# Patient Record
Sex: FEMALE | Race: BLACK OR AFRICAN AMERICAN | NOT HISPANIC OR LATINO | URBAN - METROPOLITAN AREA
[De-identification: names, ages, dates, MRNs, and addresses within clinical notes are randomized per-mention and may not be internally consistent; named-entity substitution may affect disease eponyms.]

---

## 2017-03-20 ENCOUNTER — INPATIENT (INPATIENT)
Facility: HOSPITAL | Age: 52
LOS: 20 days | Discharge: HOME | DRG: 643 | End: 2017-04-10
Attending: HOSPITALIST | Admitting: HOSPITALIST
Payer: MEDICAID

## 2017-03-20 VITALS
DIASTOLIC BLOOD PRESSURE: 88 MMHG | HEIGHT: 60 IN | WEIGHT: 162.92 LBS | SYSTOLIC BLOOD PRESSURE: 128 MMHG | HEART RATE: 82 BPM | RESPIRATION RATE: 20 BRPM | TEMPERATURE: 98 F | OXYGEN SATURATION: 100 %

## 2017-03-20 DIAGNOSIS — E87.0 HYPEROSMOLALITY AND HYPERNATREMIA: ICD-10-CM

## 2017-03-20 LAB
ALBUMIN SERPL ELPH-MCNC: 3.8 G/DL — SIGNIFICANT CHANGE UP (ref 3.3–5.2)
ALP SERPL-CCNC: 105 U/L — SIGNIFICANT CHANGE UP (ref 40–120)
ALT FLD-CCNC: 20 U/L — SIGNIFICANT CHANGE UP
ANION GAP SERPL CALC-SCNC: 12 MMOL/L — SIGNIFICANT CHANGE UP (ref 5–17)
ANION GAP SERPL CALC-SCNC: 9 MMOL/L — SIGNIFICANT CHANGE UP (ref 5–17)
ANISOCYTOSIS BLD QL: SLIGHT — SIGNIFICANT CHANGE UP
APTT BLD: 43.8 SEC — HIGH (ref 27.5–37.4)
AST SERPL-CCNC: 44 U/L — HIGH
BILIRUB SERPL-MCNC: 0.5 MG/DL — SIGNIFICANT CHANGE UP (ref 0.4–2)
BUN SERPL-MCNC: 29 MG/DL — HIGH (ref 8–20)
BUN SERPL-MCNC: 29 MG/DL — HIGH (ref 8–20)
CALCIUM SERPL-MCNC: 8.5 MG/DL — LOW (ref 8.6–10.2)
CALCIUM SERPL-MCNC: 9 MG/DL — SIGNIFICANT CHANGE UP (ref 8.6–10.2)
CHLORIDE SERPL-SCNC: 130 MMOL/L — CRITICAL HIGH (ref 98–107)
CHLORIDE SERPL-SCNC: 130 MMOL/L — CRITICAL HIGH (ref 98–107)
CK MB CFR SERPL CALC: 2.1 NG/ML — SIGNIFICANT CHANGE UP (ref 0–6.7)
CK SERPL-CCNC: 4427 U/L — HIGH (ref 25–170)
CO2 SERPL-SCNC: 24 MMOL/L — SIGNIFICANT CHANGE UP (ref 22–29)
CO2 SERPL-SCNC: 27 MMOL/L — SIGNIFICANT CHANGE UP (ref 22–29)
CREAT SERPL-MCNC: 1.13 MG/DL — SIGNIFICANT CHANGE UP (ref 0.5–1.3)
CREAT SERPL-MCNC: 1.17 MG/DL — SIGNIFICANT CHANGE UP (ref 0.5–1.3)
EOSINOPHIL NFR BLD AUTO: 2 % — SIGNIFICANT CHANGE UP (ref 0–5)
GLUCOSE SERPL-MCNC: 106 MG/DL — SIGNIFICANT CHANGE UP (ref 70–115)
GLUCOSE SERPL-MCNC: 86 MG/DL — SIGNIFICANT CHANGE UP (ref 70–115)
HCT VFR BLD CALC: 32.9 % — LOW (ref 37–47)
HGB BLD-MCNC: 9.6 G/DL — LOW (ref 12–16)
INR BLD: 1.1 RATIO — SIGNIFICANT CHANGE UP (ref 0.88–1.16)
LYMPHOCYTES # BLD AUTO: 43 % — SIGNIFICANT CHANGE UP (ref 20–55)
MACROCYTES BLD QL: SLIGHT — SIGNIFICANT CHANGE UP
MCHC RBC-ENTMCNC: 24.9 PG — LOW (ref 27–31)
MCHC RBC-ENTMCNC: 29.2 G/DL — LOW (ref 32–36)
MCV RBC AUTO: 85.5 FL — SIGNIFICANT CHANGE UP (ref 81–99)
MICROCYTES BLD QL: SLIGHT — SIGNIFICANT CHANGE UP
MONOCYTES NFR BLD AUTO: 5 % — SIGNIFICANT CHANGE UP (ref 3–10)
NEUTROPHILS NFR BLD AUTO: 50 % — SIGNIFICANT CHANGE UP (ref 37–73)
PLAT MORPH BLD: NORMAL — SIGNIFICANT CHANGE UP
PLATELET # BLD AUTO: 90 K/UL — LOW (ref 150–400)
POIKILOCYTOSIS BLD QL AUTO: SLIGHT — SIGNIFICANT CHANGE UP
POLYCHROMASIA BLD QL SMEAR: SLIGHT — SIGNIFICANT CHANGE UP
POTASSIUM SERPL-MCNC: 3.1 MMOL/L — LOW (ref 3.5–5.3)
POTASSIUM SERPL-MCNC: 3.4 MMOL/L — LOW (ref 3.5–5.3)
POTASSIUM SERPL-SCNC: 3.1 MMOL/L — LOW (ref 3.5–5.3)
POTASSIUM SERPL-SCNC: 3.4 MMOL/L — LOW (ref 3.5–5.3)
PROT SERPL-MCNC: 7.4 G/DL — SIGNIFICANT CHANGE UP (ref 6.6–8.7)
PROTHROM AB SERPL-ACNC: 12.1 SEC — SIGNIFICANT CHANGE UP (ref 10–13.1)
RBC # BLD: 3.85 M/UL — LOW (ref 4.4–5.2)
RBC # FLD: 17.1 % — HIGH (ref 11–15.6)
RBC BLD AUTO: ABNORMAL
SODIUM SERPL-SCNC: 166 MMOL/L — CRITICAL HIGH (ref 135–145)
SODIUM SERPL-SCNC: 166 MMOL/L — CRITICAL HIGH (ref 135–145)
TROPONIN T SERPL-MCNC: <0.01 NG/ML — SIGNIFICANT CHANGE UP (ref 0–0.06)
WBC # BLD: 5.2 K/UL — SIGNIFICANT CHANGE UP (ref 4.8–10.8)
WBC # FLD AUTO: 5.2 K/UL — SIGNIFICANT CHANGE UP (ref 4.8–10.8)

## 2017-03-20 PROCEDURE — 93010 ELECTROCARDIOGRAM REPORT: CPT

## 2017-03-20 PROCEDURE — 71010: CPT | Mod: 26

## 2017-03-20 PROCEDURE — 99284 EMERGENCY DEPT VISIT MOD MDM: CPT

## 2017-03-20 PROCEDURE — 70450 CT HEAD/BRAIN W/O DYE: CPT | Mod: 26

## 2017-03-20 PROCEDURE — 99223 1ST HOSP IP/OBS HIGH 75: CPT

## 2017-03-20 RX ORDER — DESMOPRESSIN ACETATE 0.1 MG/1
1 TABLET ORAL
Qty: 0 | Refills: 0 | Status: DISCONTINUED | OUTPATIENT
Start: 2017-03-20 | End: 2017-03-24

## 2017-03-20 RX ORDER — POTASSIUM CHLORIDE 20 MEQ
40 PACKET (EA) ORAL ONCE
Qty: 0 | Refills: 0 | Status: COMPLETED | OUTPATIENT
Start: 2017-03-20 | End: 2017-03-20

## 2017-03-20 RX ORDER — LEVETIRACETAM 250 MG/1
500 TABLET, FILM COATED ORAL
Qty: 0 | Refills: 0 | Status: DISCONTINUED | OUTPATIENT
Start: 2017-03-20 | End: 2017-03-24

## 2017-03-20 RX ORDER — SODIUM CHLORIDE 9 MG/ML
1000 INJECTION INTRAMUSCULAR; INTRAVENOUS; SUBCUTANEOUS
Qty: 0 | Refills: 0 | Status: DISCONTINUED | OUTPATIENT
Start: 2017-03-20 | End: 2017-03-20

## 2017-03-20 RX ORDER — AMLODIPINE BESYLATE 2.5 MG/1
10 TABLET ORAL DAILY
Qty: 0 | Refills: 0 | Status: DISCONTINUED | OUTPATIENT
Start: 2017-03-20 | End: 2017-04-10

## 2017-03-20 RX ORDER — SODIUM CHLORIDE 9 MG/ML
1000 INJECTION, SOLUTION INTRAVENOUS
Qty: 0 | Refills: 0 | Status: DISCONTINUED | OUTPATIENT
Start: 2017-03-20 | End: 2017-03-21

## 2017-03-20 RX ORDER — LABETALOL HCL 100 MG
100 TABLET ORAL
Qty: 0 | Refills: 0 | Status: DISCONTINUED | OUTPATIENT
Start: 2017-03-20 | End: 2017-03-24

## 2017-03-20 RX ADMIN — SODIUM CHLORIDE 125 MILLILITER(S): 9 INJECTION, SOLUTION INTRAVENOUS at 13:38

## 2017-03-20 RX ADMIN — DESMOPRESSIN ACETATE 1 SPRAY(S): 0.1 TABLET ORAL at 19:04

## 2017-03-20 RX ADMIN — LEVETIRACETAM 500 MILLIGRAM(S): 250 TABLET, FILM COATED ORAL at 19:04

## 2017-03-20 RX ADMIN — Medication 40 MILLIEQUIVALENT(S): at 15:38

## 2017-03-20 RX ADMIN — Medication 100 MILLIGRAM(S): at 19:05

## 2017-03-20 NOTE — ED ADULT NURSE NOTE - OBJECTIVE STATEMENT
pt c/o slurred speech for about 2 days with high sodium levels pt reports weakness and increase fatigue as per daughter pt was told to increase fluid intake and iv bolus via picc line, no change in patient's symptoms brought to hospital.

## 2017-03-20 NOTE — CONSULT NOTE ADULT - ASSESSMENT
Hypernatremia==> polyuria due to central DI; reportedly has been off nasal desmopressin  - restart desmopressin  - IV hypotonic fluids  - supplement K+

## 2017-03-20 NOTE — ED PROVIDER NOTE - OBJECTIVE STATEMENT
50 y/o female in ED c/o intermittent worsening AMS x 2 days.  as per family, pt with h/o brain aneurysm with intermittent episodes of AMS secondary to elevated sodium.  pt denies any fever, HA, cp, sob, n/v/d/abd pain.

## 2017-03-20 NOTE — H&P ADULT - NSHPPHYSICALEXAM_GEN_ALL_CORE
Vital Signs Last 24 Hrs  T(C): 36.8, Max: 36.8 (03-20 @ 10:33)  T(F): 98.3, Max: 98.3 (03-20 @ 10:33)  HR: 64 (64 - 82)  BP: 119/75 (119/75 - 128/88)  BP(mean): --  RR: 16 (16 - 20)  SpO2: 100% (100% - 100%)    PHYSICAL EXAM-  GENERAL: alert, NAD, pleasant, fluent speech  HEAD:  Atraumatic, Normocephalic  EYES: EOMI,  conjunctiva and sclera clear  NECK: Supple   CHEST/LUNG: CTA bilaterally   HEART: Regular rate and rhythm; No murmurs   ABDOMEN: Soft, Nontender, Nondistended; Bowel sounds present  EXTREMITIES:  2+ Peripheral Pulses, No clubbing, cyanosis, or edema  NERVOUS SYSTEM:  Alert & Oriented X2, cannot recall year, Motor Strength 5/5 B/L upper and lower extremities; CN grossly intact  SKIN: No rashes or lesions

## 2017-03-20 NOTE — ED PROVIDER NOTE - PMH
Brain aneurysm    Diabetes insipidus    HTN (Hypertension)    Memory loss, short term    Subarachnoid Hemorrhage

## 2017-03-20 NOTE — ED ADULT TRIAGE NOTE - CHIEF COMPLAINT QUOTE
Patient arrived to ED today with c/o slurred speech, high sodium levels, difficulty walking, and weakness for the past two days.  Patient has history of double brain aneurysm.

## 2017-03-20 NOTE — H&P ADULT - HISTORY OF PRESENT ILLNESS
51 y F hx HTN, DI, SAH, cerebral aneurysm s/p clipping 2011, short term memory loss, presented to ER for c/o altered mental status since yesterday. Per the patient and her daughter at bedside, she takes D5W through her PICC line every other day, last given 2 L 2 days ago for Na 160 noted on Friday by her PMD. Reports good po intake. Yesterday was noted to stumble with ambulation and today her daughter noted her slurring her words which has happened w past episodes. Denies headache,  vision disturbance, or seizure.  Denies F/C, CP, SOB, cough, N/V/D, dysuria, abdominal pain. Reports on last hospitalization Na dropped quickly and subsequently had a seizure while in the hospital.

## 2017-03-20 NOTE — ED ADULT NURSE REASSESSMENT NOTE - NS ED NURSE REASSESS COMMENT FT1
Assuming care from previous RN, pt AOx3, denies SOB, LS clear and equal bilaterally, denies pain, denies n/v, patent PICC line in left bicep, fluids running continuous @ 125/hr, pt aware of plan of care, offers no complaints at this time, will continue to monitor.

## 2017-03-21 DIAGNOSIS — R41.82 ALTERED MENTAL STATUS, UNSPECIFIED: ICD-10-CM

## 2017-03-21 DIAGNOSIS — E87.0 HYPEROSMOLALITY AND HYPERNATREMIA: ICD-10-CM

## 2017-03-21 DIAGNOSIS — I67.1 CEREBRAL ANEURYSM, NONRUPTURED: ICD-10-CM

## 2017-03-21 DIAGNOSIS — D69.6 THROMBOCYTOPENIA, UNSPECIFIED: ICD-10-CM

## 2017-03-21 DIAGNOSIS — M62.82 RHABDOMYOLYSIS: ICD-10-CM

## 2017-03-21 DIAGNOSIS — E23.2 DIABETES INSIPIDUS: ICD-10-CM

## 2017-03-21 DIAGNOSIS — E87.6 HYPOKALEMIA: ICD-10-CM

## 2017-03-21 DIAGNOSIS — I10 ESSENTIAL (PRIMARY) HYPERTENSION: ICD-10-CM

## 2017-03-21 LAB
ANION GAP SERPL CALC-SCNC: 10 MMOL/L — SIGNIFICANT CHANGE UP (ref 5–17)
BUN SERPL-MCNC: 29 MG/DL — HIGH (ref 8–20)
CALCIUM SERPL-MCNC: 8.9 MG/DL — SIGNIFICANT CHANGE UP (ref 8.6–10.2)
CHLORIDE SERPL-SCNC: 133 MMOL/L — CRITICAL HIGH (ref 98–107)
CK MB CFR SERPL CALC: 2.1 NG/ML — SIGNIFICANT CHANGE UP (ref 0–6.7)
CK SERPL-CCNC: 3566 U/L — HIGH (ref 25–170)
CO2 SERPL-SCNC: 24 MMOL/L — SIGNIFICANT CHANGE UP (ref 22–29)
CREAT SERPL-MCNC: 1.03 MG/DL — SIGNIFICANT CHANGE UP (ref 0.5–1.3)
GLUCOSE SERPL-MCNC: 78 MG/DL — SIGNIFICANT CHANGE UP (ref 70–115)
HCT VFR BLD CALC: 30.8 % — LOW (ref 37–47)
HGB BLD-MCNC: 9 G/DL — LOW (ref 12–16)
MAGNESIUM SERPL-MCNC: 2.5 MG/DL — SIGNIFICANT CHANGE UP (ref 1.8–2.5)
MCHC RBC-ENTMCNC: 25 PG — LOW (ref 27–31)
MCHC RBC-ENTMCNC: 29.2 G/DL — LOW (ref 32–36)
MCV RBC AUTO: 85.6 FL — SIGNIFICANT CHANGE UP (ref 81–99)
PLATELET # BLD AUTO: 95 K/UL — LOW (ref 150–400)
POTASSIUM SERPL-MCNC: 3.3 MMOL/L — LOW (ref 3.5–5.3)
POTASSIUM SERPL-SCNC: 3.3 MMOL/L — LOW (ref 3.5–5.3)
RBC # BLD: 3.6 M/UL — LOW (ref 4.4–5.2)
RBC # FLD: 17 % — HIGH (ref 11–15.6)
SODIUM SERPL-SCNC: 167 MMOL/L — CRITICAL HIGH (ref 135–145)
WBC # BLD: 5.5 K/UL — SIGNIFICANT CHANGE UP (ref 4.8–10.8)
WBC # FLD AUTO: 5.5 K/UL — SIGNIFICANT CHANGE UP (ref 4.8–10.8)

## 2017-03-21 PROCEDURE — 99233 SBSQ HOSP IP/OBS HIGH 50: CPT

## 2017-03-21 RX ORDER — POTASSIUM CHLORIDE 20 MEQ
40 PACKET (EA) ORAL ONCE
Qty: 0 | Refills: 0 | Status: COMPLETED | OUTPATIENT
Start: 2017-03-21 | End: 2017-03-21

## 2017-03-21 RX ORDER — SODIUM CHLORIDE 9 MG/ML
1000 INJECTION INTRAMUSCULAR; INTRAVENOUS; SUBCUTANEOUS
Qty: 0 | Refills: 0 | Status: DISCONTINUED | OUTPATIENT
Start: 2017-03-21 | End: 2017-03-23

## 2017-03-21 RX ADMIN — Medication 100 MILLIGRAM(S): at 17:27

## 2017-03-21 RX ADMIN — Medication 100 MILLIGRAM(S): at 06:26

## 2017-03-21 RX ADMIN — LEVETIRACETAM 500 MILLIGRAM(S): 250 TABLET, FILM COATED ORAL at 06:26

## 2017-03-21 RX ADMIN — LEVETIRACETAM 500 MILLIGRAM(S): 250 TABLET, FILM COATED ORAL at 17:27

## 2017-03-21 RX ADMIN — SODIUM CHLORIDE 150 MILLILITER(S): 9 INJECTION INTRAMUSCULAR; INTRAVENOUS; SUBCUTANEOUS at 20:11

## 2017-03-21 RX ADMIN — AMLODIPINE BESYLATE 10 MILLIGRAM(S): 2.5 TABLET ORAL at 06:26

## 2017-03-21 RX ADMIN — SODIUM CHLORIDE 150 MILLILITER(S): 9 INJECTION INTRAMUSCULAR; INTRAVENOUS; SUBCUTANEOUS at 16:50

## 2017-03-21 RX ADMIN — Medication 40 MILLIEQUIVALENT(S): at 09:05

## 2017-03-21 RX ADMIN — DESMOPRESSIN ACETATE 1 SPRAY(S): 0.1 TABLET ORAL at 17:27

## 2017-03-21 NOTE — PROGRESS NOTE ADULT - ASSESSMENT
Hypernatremia==> polyuria due to central DI; reportedly has been off nasal desmopressin  - restarted desmopressin  - IV hypotonic fluids adjusted; encouraged PO fluids as well  - supplement K+

## 2017-03-21 NOTE — PROGRESS NOTE ADULT - SUBJECTIVE AND OBJECTIVE BOX
INTERVAL HPI/OVERNIGHT EVENTS:  HPI:  51 y F hx HTN, DI, SAH, cerebral aneurysm s/p clipping 2011, short term memory loss, presented to ER for c/o altered mental status since yesterday. Per the patient and her daughter at bedside, she takes D5W through her PICC line every other day, last given 2 L 2 days ago for Na 160 noted on Friday by her PMD. Reports good po intake. Yesterday was noted to stumble with ambulation and today her daughter noted her slurring her words which has happened w past episodes. Denies headache,  vision disturbance, or seizure.  Denies F/C, CP, SOB, cough, N/V/D, dysuria, abdominal pain. Reports on last hospitalization Na dropped quickly and subsequently had a seizure while in the hospital. (20 Mar 2017 14:01    Patient seen and examined.  She is sitting up in bed A&O in NAD. pleasant, conversing freely and responding appropriately.  Speech clear without slurring.  Some difficulty remembering name of Hospital and year.  Patient reports she is feeling much better and has no complaints at this time.  Plan and the importance of taking home meds discussed with patient.    MEDICATIONS  (STANDING):  sodium chloride 0.45%. 1000milliLiter(s) IV Continuous <Continuous>  levETIRAcetam 500milliGRAM(s) Oral two times a day  labetalol 100milliGRAM(s) Oral two times a day  amLODIPine   Tablet 10milliGRAM(s) Oral daily  desmopressin 0.01% Nasal 1Spray(s) Nasal two times a day    Allergies  No Known Allergies    Vital Signs Last 24 Hrs  T(C): 36.3, Max: 36.9 (03-20 @ 20:29)  T(F): 97.4, Max: 98.5 (03-20 @ 20:29)  HR: 69 (63 - 70)  BP: 122/88 (100/65 - 129/81)  BP(mean): --  RR: 18 (16 - 18)  SpO2: 97% (97% - 100%)    General:   HEENT:  NCAT PERRLA, EOMI, Nares Patent, Pharynx Clear, Uvula midline,   Neck: no lymphadenopathy, no JVD,   Lungs: Clear to auscultation b/l,, no wheezes, no rhonchi, no rales or stridor  CV:  RRR,  S1,S2,  no Murmur  ABD: +BS x 4; soft,  nontender, no distension,  No guarding,   MS: FROM. Ambulates with a cane.  Muscle tone and strength equal b/l. no deformity  EXT:  No cyanosis, no clubbing, no edema b/l.    Neuro: A&O x 3; CN II- XII grossly intact.  No focal deficits,  No sensory or motor deficits.  Equal b/l.    LABS:                          9.0    5.5   )-----------( 95       ( 21 Mar 2017 07:04 )             30.8     21 Mar 2017 07:04    167    |  133    |  29.0   ----------------------------<  78     3.3     |  24.0   |  1.03     Ca    8.9        21 Mar 2017 07:04  Mg     2.5       21 Mar 2017 07:04    TPro  7.4    /  Alb  3.8    /  TBili  0.5    /  DBili  x      /  AST  44     /  ALT  20     /  AlkPhos  105    20 Mar 2017 12:03    PT/INR - ( 20 Mar 2017 12:03 )   PT: 12.1 sec;   INR: 1.10 ratio         PTT - ( 20 Mar 2017 12:03 )  PTT:43.8 sec    RADIOLOGY & ADDITIONAL TESTS:  CXR:  No infiltrate  CT Head:  No acute infarct or hemorrhage

## 2017-03-21 NOTE — PROGRESS NOTE ADULT - PROBLEM SELECTOR PLAN 1
Due to the polyuria of central DI.   Nephrology consult completed. Restarted  DDAVP, continue hypotonic IVF with K supplement.  Nephrology will adjust IV fluids for . Due to the polyuria of central DI.   Nephrology consult completed. Restarted  DDAVP, continue hypotonic IVF with K supplement.  Discussed with Nephrology, hypotonic fluids to be adjusted with close monitoring of sodium levels.

## 2017-03-21 NOTE — PROGRESS NOTE ADULT - SUBJECTIVE AND OBJECTIVE BOX
NEPHROLOGY INTERVAL HPI/OVERNIGHT EVENTS:  pt clinically stable  no acute distress    MEDICATIONS  (STANDING):  sodium chloride 0.45%. 1000milliLiter(s) IV Continuous <Continuous>  levETIRAcetam 500milliGRAM(s) Oral two times a day  labetalol 100milliGRAM(s) Oral two times a day  amLODIPine   Tablet 10milliGRAM(s) Oral daily  desmopressin 0.01% Nasal 1Spray(s) Nasal two times a day    MEDICATIONS  (PRN):      Allergies    No Known Allergies    Intolerances          Vital Signs Last 24 Hrs  T(C): 36.3, Max: 36.9 (03-20 @ 20:29)  T(F): 97.4, Max: 98.5 (03-20 @ 20:29)  HR: 69 (63 - 70)  BP: 122/88 (100/65 - 129/81)  BP(mean): --  RR: 18 (16 - 18)  SpO2: 97% (97% - 100%)    PHYSICAL EXAM:  GENERAL: NAD  EYES: EOMI, PERRLA, conjunctiva and sclera clear  ENMT: No tonsillar erythema, exudates, or enlargement; Moist mucous membranes, Good dentition, No lesions  NECK: Supple, No JVD, Normal thyroid  NERVOUS SYSTEM:  Awake and alert; no focality noted  CHEST/LUNG: Clear to percussion bilaterally; No rales, rhonchi, wheezing, or rubs  HEART: Regular rate and rhythm; No murmurs, rubs, or gallops  ABDOMEN: Soft, Nontender, Nondistended; Bowel sounds present  EXTREMITIES:  2+ Peripheral Pulses, No clubbing, cyanosis, or edema  SKIN: No rashes or lesions    LABS:                        9.0    5.5   )-----------( 95       ( 21 Mar 2017 07:04 )             30.8     21 Mar 2017 07:04    167    |  133    |  29.0   ----------------------------<  78     3.3     |  24.0   |  1.03     Ca    8.9        21 Mar 2017 07:04  Mg     2.5       21 Mar 2017 07:04    TPro  7.4    /  Alb  3.8    /  TBili  0.5    /  DBili  x      /  AST  44     /  ALT  20     /  AlkPhos  105    20 Mar 2017 12:03    PT/INR - ( 20 Mar 2017 12:03 )   PT: 12.1 sec;   INR: 1.10 ratio         PTT - ( 20 Mar 2017 12:03 )  PTT:43.8 sec    Magnesium, Serum: 2.5 mg/dL (03-21 @ 07:04)      RADIOLOGY & ADDITIONAL TESTS:

## 2017-03-21 NOTE — PROGRESS NOTE ADULT - PROBLEM SELECTOR PLAN 4
Continue home meds, Norvasc, Labetalol. On Norvasc and Labetalol. Medications confirmed with the patients daughter.

## 2017-03-21 NOTE — ED ADULT NURSE REASSESSMENT NOTE - NS ED NURSE REASSESS COMMENT FT1
Pt resting comfortably in stretcher, denies pain, denies n/v, resp even and unlabored, sat @ 98% on room air, placed in position of comfort, offers no complaints, will continue to monitor.

## 2017-03-22 LAB
ANION GAP SERPL CALC-SCNC: 9 MMOL/L — SIGNIFICANT CHANGE UP (ref 5–17)
BUN SERPL-MCNC: 22 MG/DL — HIGH (ref 8–20)
CALCIUM SERPL-MCNC: 8.9 MG/DL — SIGNIFICANT CHANGE UP (ref 8.6–10.2)
CHLORIDE SERPL-SCNC: 124 MMOL/L — HIGH (ref 98–107)
CO2 SERPL-SCNC: 27 MMOL/L — SIGNIFICANT CHANGE UP (ref 22–29)
CREAT SERPL-MCNC: 0.81 MG/DL — SIGNIFICANT CHANGE UP (ref 0.5–1.3)
GLUCOSE SERPL-MCNC: 95 MG/DL — SIGNIFICANT CHANGE UP (ref 70–115)
POTASSIUM SERPL-MCNC: 3.5 MMOL/L — SIGNIFICANT CHANGE UP (ref 3.5–5.3)
POTASSIUM SERPL-SCNC: 3.5 MMOL/L — SIGNIFICANT CHANGE UP (ref 3.5–5.3)
SODIUM SERPL-SCNC: 160 MMOL/L — CRITICAL HIGH (ref 135–145)

## 2017-03-22 PROCEDURE — 99233 SBSQ HOSP IP/OBS HIGH 50: CPT

## 2017-03-22 RX ADMIN — DESMOPRESSIN ACETATE 1 SPRAY(S): 0.1 TABLET ORAL at 05:35

## 2017-03-22 RX ADMIN — SODIUM CHLORIDE 150 MILLILITER(S): 9 INJECTION INTRAMUSCULAR; INTRAVENOUS; SUBCUTANEOUS at 17:54

## 2017-03-22 RX ADMIN — LEVETIRACETAM 500 MILLIGRAM(S): 250 TABLET, FILM COATED ORAL at 05:35

## 2017-03-22 RX ADMIN — Medication 100 MILLIGRAM(S): at 05:36

## 2017-03-22 RX ADMIN — SODIUM CHLORIDE 150 MILLILITER(S): 9 INJECTION INTRAMUSCULAR; INTRAVENOUS; SUBCUTANEOUS at 09:37

## 2017-03-22 RX ADMIN — LEVETIRACETAM 500 MILLIGRAM(S): 250 TABLET, FILM COATED ORAL at 17:54

## 2017-03-22 RX ADMIN — DESMOPRESSIN ACETATE 1 SPRAY(S): 0.1 TABLET ORAL at 17:54

## 2017-03-22 RX ADMIN — Medication 100 MILLIGRAM(S): at 17:54

## 2017-03-22 RX ADMIN — AMLODIPINE BESYLATE 10 MILLIGRAM(S): 2.5 TABLET ORAL at 05:36

## 2017-03-22 NOTE — PROGRESS NOTE ADULT - ASSESSMENT
Hypernatremia==> Improving Na. polyuria due to central DI; reportedly has been off nasal desmopressin  - Cont desmopressin  - Cont IV hypotonic fluids adjusted; encouraged PO fluids as well  - supplement K+

## 2017-03-22 NOTE — PROGRESS NOTE ADULT - SUBJECTIVE AND OBJECTIVE BOX
KRUPA MEDINA   ------------------------------  The patient was seen and evaluated for hypernatremia.  The patient is in no acute distress.  Denied any chest pain, palpitations, shortness of breath, abdominal pain.  Feels well.    Vital Signs Last 24 Hrs  T(C): 36.8, Max: 36.8 (03-22 @ 00:00)  T(F): 98.3, Max: 98.3 (03-22 @ 00:00)  HR: 64 (62 - 74)  BP: 140/90 (113/96 - 140/90)  BP(mean): --  RR: 18 (18 - 20)  SpO2: 95% (95% - 99%)    PHYSICAL EXAMINATION:  ----------------------------------------  General appearance: NAD, Awake, Alert  HEENT: NCAT, Conjunctiva clear, EOMI, Pupils reactive  Neck: Supple, No JVD, No tenderness  Lungs: Clear to auscultation, Breath sound equal bilaterally, No wheezes, No rales  Cardiovascular: S1S2, Regular rhythm  Abdomen: Soft, Nontender, Nondistended, No guarding/rebound, Positive bowel sounds  Extremities: No clubbing, No cyanosis, No calf tenderness, Mild lower extremity edema  Neuro: Strength equal bilaterally, No tremors  Psychiatric: Awake and oriented, No anxiety    LABS:  ------------------------------             9.0    5.5   )-----------( 95       ( 21 Mar 2017 07:04 )             30.8     22 Mar 2017 08:04    160    |  124    |  22.0   ----------------------------<  95     3.5     |  27.0   |  0.81     Ca    8.9        22 Mar 2017 08:04  Mg     2.5       21 Mar 2017 07:04    TPro  7.4    /  Alb  3.8    /  TBili  0.5    /  DBili  x      /  AST  44     /  ALT  20     /  AlkPhos  105    20 Mar 2017 12:03    LIVER FUNCTIONS - ( 20 Mar 2017 12:03 )  Alb: 3.8 g/dL / Pro: 7.4 g/dL / ALK PHOS: 105 U/L / ALT: 20 U/L / AST: 44 U/L / GGT: x           CARDIAC MARKERS ( 21 Mar 2017 07:04 )  x     / x     / 3566 U/L / x     / 2.1 ng/mL  CARDIAC MARKERS ( 20 Mar 2017 12:03 )  x     / <0.01 ng/mL / 4427 U/L / x     / 2.1 ng/mL    PT/INR - ( 20 Mar 2017 12:03 )   PT: 12.1 sec;   INR: 1.10 ratio    PTT - ( 20 Mar 2017 12:03 )  PTT:43.8 sec    MEDICATIONS  (STANDING):  levETIRAcetam 500milliGRAM(s) Oral two times a day  labetalol 100milliGRAM(s) Oral two times a day  amLODIPine   Tablet 10milliGRAM(s) Oral daily  desmopressin 0.01% Nasal 1Spray(s) Nasal two times a day  sodium chloride 0.225%. 1000milliLiter(s) IV Continuous <Continuous>      ASSESSMENT / PLAN:  -----------------------------------  Diabetes insipidus / Hypernatremia - Repeat laboratory results with improvement in the sodium level. On Desmopressin and hypotonic saline. Nephrology to follow.    Hypokalemia - Improved with supplementation.    HTN - Close blood pressure monitoring on labetalol and amlodipine.    Thrombocytopenia - Repeat laboratory studies ordered to monitor. No bleeding noted.

## 2017-03-22 NOTE — PROGRESS NOTE ADULT - SUBJECTIVE AND OBJECTIVE BOX
NEPHROLOGY INTERVAL HPI/OVERNIGHT EVENTS:    Examined earlier  Looks comfortable    MEDICATIONS  (STANDING):  levETIRAcetam 500milliGRAM(s) Oral two times a day  labetalol 100milliGRAM(s) Oral two times a day  amLODIPine   Tablet 10milliGRAM(s) Oral daily  desmopressin 0.01% Nasal 1Spray(s) Nasal two times a day  sodium chloride 0.225%. 1000milliLiter(s) IV Continuous <Continuous>    MEDICATIONS  (PRN):      Allergies    No Known Allergies    Intolerances        Vital Signs Last 24 Hrs  T(C): 36.3, Max: 36.8 (03-22 @ 00:00)  T(F): 97.3, Max: 98.3 (03-22 @ 00:00)  HR: 67 (62 - 74)  BP: 139/91 (113/96 - 140/90)  BP(mean): --  RR: 18 (18 - 20)  SpO2: 100% (95% - 100%)  Daily     Daily     PHYSICAL EXAM:  GENERAL: NAD  EYES: EOMI  NECK: Supple, No JVD  NERVOUS SYSTEM:  Awake and alert; no focality noted  CHEST/LUNG: Clear to percussion bilaterally  HEART: Regular rate and rhythm; No murmurs  ABDOMEN: Soft, Nontender, Nondistended; Bowel sounds present  EXTREMITIES:  ,No  edema        LABS:                        9.0    5.5   )-----------( 95       ( 21 Mar 2017 07:04 )             30.8     22 Mar 2017 08:04    160    |  124    |  22.0   ----------------------------<  95     3.5     |  27.0   |  0.81     Ca    8.9        22 Mar 2017 08:04  Mg     2.5       21 Mar 2017 07:04                  RADIOLOGY & ADDITIONAL TESTS:

## 2017-03-23 LAB
ANION GAP SERPL CALC-SCNC: 9 MMOL/L — SIGNIFICANT CHANGE UP (ref 5–17)
BUN SERPL-MCNC: 14 MG/DL — SIGNIFICANT CHANGE UP (ref 8–20)
CALCIUM SERPL-MCNC: 8.7 MG/DL — SIGNIFICANT CHANGE UP (ref 8.6–10.2)
CHLORIDE SERPL-SCNC: 113 MMOL/L — HIGH (ref 98–107)
CO2 SERPL-SCNC: 25 MMOL/L — SIGNIFICANT CHANGE UP (ref 22–29)
CREAT SERPL-MCNC: 0.71 MG/DL — SIGNIFICANT CHANGE UP (ref 0.5–1.3)
GLUCOSE SERPL-MCNC: 82 MG/DL — SIGNIFICANT CHANGE UP (ref 70–115)
HCT VFR BLD CALC: 33.1 % — LOW (ref 37–47)
HGB BLD-MCNC: 10 G/DL — LOW (ref 12–16)
MCHC RBC-ENTMCNC: 24.9 PG — LOW (ref 27–31)
MCHC RBC-ENTMCNC: 30.2 G/DL — LOW (ref 32–36)
MCV RBC AUTO: 82.3 FL — SIGNIFICANT CHANGE UP (ref 81–99)
PLATELET # BLD AUTO: 86 K/UL — LOW (ref 150–400)
POTASSIUM SERPL-MCNC: 3.3 MMOL/L — LOW (ref 3.5–5.3)
POTASSIUM SERPL-SCNC: 3.3 MMOL/L — LOW (ref 3.5–5.3)
RBC # BLD: 4.02 M/UL — LOW (ref 4.4–5.2)
RBC # FLD: 16.2 % — HIGH (ref 11–15.6)
SODIUM SERPL-SCNC: 147 MMOL/L — HIGH (ref 135–145)
WBC # BLD: 5.4 K/UL — SIGNIFICANT CHANGE UP (ref 4.8–10.8)
WBC # FLD AUTO: 5.4 K/UL — SIGNIFICANT CHANGE UP (ref 4.8–10.8)

## 2017-03-23 PROCEDURE — 99233 SBSQ HOSP IP/OBS HIGH 50: CPT

## 2017-03-23 RX ORDER — SODIUM CHLORIDE 9 MG/ML
1000 INJECTION INTRAMUSCULAR; INTRAVENOUS; SUBCUTANEOUS
Qty: 0 | Refills: 0 | Status: DISCONTINUED | OUTPATIENT
Start: 2017-03-23 | End: 2017-03-24

## 2017-03-23 RX ADMIN — LEVETIRACETAM 500 MILLIGRAM(S): 250 TABLET, FILM COATED ORAL at 06:03

## 2017-03-23 RX ADMIN — Medication 100 MILLIGRAM(S): at 17:03

## 2017-03-23 RX ADMIN — DESMOPRESSIN ACETATE 1 SPRAY(S): 0.1 TABLET ORAL at 06:03

## 2017-03-23 RX ADMIN — DESMOPRESSIN ACETATE 1 SPRAY(S): 0.1 TABLET ORAL at 17:04

## 2017-03-23 RX ADMIN — Medication 100 MILLIGRAM(S): at 06:03

## 2017-03-23 RX ADMIN — LEVETIRACETAM 500 MILLIGRAM(S): 250 TABLET, FILM COATED ORAL at 17:04

## 2017-03-23 RX ADMIN — AMLODIPINE BESYLATE 10 MILLIGRAM(S): 2.5 TABLET ORAL at 06:03

## 2017-03-23 RX ADMIN — SODIUM CHLORIDE 150 MILLILITER(S): 9 INJECTION INTRAMUSCULAR; INTRAVENOUS; SUBCUTANEOUS at 06:03

## 2017-03-23 NOTE — PROGRESS NOTE ADULT - SUBJECTIVE AND OBJECTIVE BOX
NEPHROLOGY INTERVAL HPI/OVERNIGHT EVENTS:    Examined earlier  Looks comfortable    MEDICATIONS  (STANDING):  levETIRAcetam 500milliGRAM(s) Oral two times a day  labetalol 100milliGRAM(s) Oral two times a day  amLODIPine   Tablet 10milliGRAM(s) Oral daily  desmopressin 0.01% Nasal 1Spray(s) Nasal two times a day  sodium chloride 0.225% 1000milliLiter(s) IV Continuous <Continuous>    MEDICATIONS  (PRN):      Allergies    No Known Allergies    Intolerances        Vital Signs Last 24 Hrs  T(C): 36, Max: 36.7 (03-22 @ 19:30)  T(F): 96.8, Max: 98 (03-22 @ 19:30)  HR: 67 (62 - 67)  BP: 154/99 (138/88 - 154/99)  BP(mean): --  RR: 18 (18 - 18)  SpO2: 96% (96% - 100%)  Daily     Daily     PHYSICAL EXAM:  GENERAL: NAD  EYES: EOMI  NECK: Supple, No JVD  NERVOUS SYSTEM:  Awake and alert; no focality noted  CHEST/LUNG: Clear to percussion bilaterally  HEART: Regular rate and rhythm; No murmurs  ABDOMEN: Soft, Nontender, Nondistended; Bowel sounds present  EXTREMITIES:  ,No  edema          LABS:                        10.0   5.4   )-----------( 86       ( 23 Mar 2017 07:36 )             33.1     23 Mar 2017 07:36    147    |  113    |  14.0   ----------------------------<  82     3.3     |  25.0   |  0.71     Ca    8.7        23 Mar 2017 07:36                  RADIOLOGY & ADDITIONAL TESTS:

## 2017-03-23 NOTE — PROGRESS NOTE ADULT - SUBJECTIVE AND OBJECTIVE BOX
KRUPA CAROL   ------------------------------  The patient was seen and evaluated for hypernatremia.  The patient is in no acute distress.  Denied any chest pain, palpitations, shortness of breath, abdominal pain.  Feels well.    Vital Signs Last 24 Hrs  T(C): 36, Max: 36.7 (03-22 @ 19:30)  T(F): 96.8, Max: 98 (03-22 @ 19:30)  HR: 67 (62 - 67)  BP: 154/99 (138/88 - 154/99)  BP(mean): --  RR: 18 (18 - 18)  SpO2: 96% (96% - 100%)    PHYSICAL EXAMINATION:  ----------------------------------------  General appearance: NAD, Awake, Alert  HEENT: NCAT, Conjunctiva clear, EOMI, Pupils reactive  Neck: Supple, No JVD, No tenderness  Lungs: Clear to auscultation, Breath sound equal bilaterally, No wheezes, No rales  Cardiovascular: S1S2, Regular rhythm  Abdomen: Soft, Nontender, Nondistended, No guarding/rebound, Positive bowel sounds  Extremities: No clubbing, No cyanosis, No calf tenderness, Mild lower extremity edema  Neuro: Strength equal bilaterally, No tremors  Psychiatric: Awake and oriented, No anxiety    LABS:  ------------------------------             10.0   5.4   )-----------( 86       ( 23 Mar 2017 07:36 )             33.1     23 Mar 2017 07:36    147    |  113    |  14.0   ----------------------------<  82     3.3     |  25.0   |  0.71     Ca    8.7        23 Mar 2017 07:36    MEDICATIONS  (STANDING):  levETIRAcetam 500milliGRAM(s) Oral two times a day  labetalol 100milliGRAM(s) Oral two times a day  amLODIPine   Tablet 10milliGRAM(s) Oral daily  desmopressin 0.01% Nasal 1Spray(s) Nasal two times a day  sodium chloride 0.225%. 1000milliLiter(s) IV Continuous <Continuous>      ASSESSMENT / PLAN:  -----------------------------------  Diabetes insipidus / Hypernatremia - Sodium level improved. On Desmopressin and hypotonic saline. Nephrology to follow. Repeat laboratory studies ordered to monitor.    Hypokalemia - Improved with supplementation.    HTN - Close blood pressure monitoring on labetalol and amlodipine.    Thrombocytopenia - Repeat laboratory studies ordered to monitor. No bleeding noted.

## 2017-03-23 NOTE — PROGRESS NOTE ADULT - ASSESSMENT
Hypernatremia==> Improving Na. polyuria due to central DI; reportedly has been off nasal DDAVP  - Cont DDAVP  - Cont IV hypotonic fluids adjusted; encouraged PO fluids as well  HypoKalemia I added KCL to IVF  AM labs Hypernatremia==> 2/2 Central DI -Improving Na. polyuria reportedly had been off nasal DDAVP  - DDAVP restarted cont  - Cont IV hypotonic fluids adjusted; encouraged PO fluids as well  HypoKalemia I added KCL to IVF  AM labs

## 2017-03-24 DIAGNOSIS — R56.9 UNSPECIFIED CONVULSIONS: ICD-10-CM

## 2017-03-24 DIAGNOSIS — M62.82 RHABDOMYOLYSIS: ICD-10-CM

## 2017-03-24 DIAGNOSIS — I69.398 OTHER SEQUELAE OF CEREBRAL INFARCTION: ICD-10-CM

## 2017-03-24 LAB
ALBUMIN SERPL ELPH-MCNC: 4.1 G/DL — SIGNIFICANT CHANGE UP (ref 3.3–5.2)
ALP SERPL-CCNC: 127 U/L — HIGH (ref 40–120)
ALT FLD-CCNC: 27 U/L — SIGNIFICANT CHANGE UP
ANION GAP SERPL CALC-SCNC: 12 MMOL/L — SIGNIFICANT CHANGE UP (ref 5–17)
ANION GAP SERPL CALC-SCNC: 13 MMOL/L — SIGNIFICANT CHANGE UP (ref 5–17)
ANION GAP SERPL CALC-SCNC: 13 MMOL/L — SIGNIFICANT CHANGE UP (ref 5–17)
ANION GAP SERPL CALC-SCNC: 17 MMOL/L — SIGNIFICANT CHANGE UP (ref 5–17)
ANION GAP SERPL CALC-SCNC: 20 MMOL/L — HIGH (ref 5–17)
APPEARANCE UR: ABNORMAL
APTT BLD: 39.7 SEC — HIGH (ref 27.5–37.4)
AST SERPL-CCNC: 69 U/L — HIGH
BILIRUB SERPL-MCNC: 1.1 MG/DL — SIGNIFICANT CHANGE UP (ref 0.4–2)
BILIRUB UR-MCNC: NEGATIVE — SIGNIFICANT CHANGE UP
BUN SERPL-MCNC: 10 MG/DL — SIGNIFICANT CHANGE UP (ref 8–20)
BUN SERPL-MCNC: 8 MG/DL — SIGNIFICANT CHANGE UP (ref 8–20)
BUN SERPL-MCNC: 9 MG/DL — SIGNIFICANT CHANGE UP (ref 8–20)
CALCIUM SERPL-MCNC: 8.4 MG/DL — LOW (ref 8.6–10.2)
CALCIUM SERPL-MCNC: 8.6 MG/DL — SIGNIFICANT CHANGE UP (ref 8.6–10.2)
CALCIUM SERPL-MCNC: 8.8 MG/DL — SIGNIFICANT CHANGE UP (ref 8.6–10.2)
CALCIUM SERPL-MCNC: 9.1 MG/DL — SIGNIFICANT CHANGE UP (ref 8.6–10.2)
CALCIUM SERPL-MCNC: 9.1 MG/DL — SIGNIFICANT CHANGE UP (ref 8.6–10.2)
CHLORIDE SERPL-SCNC: 87 MMOL/L — LOW (ref 98–107)
CHLORIDE SERPL-SCNC: 87 MMOL/L — LOW (ref 98–107)
CHLORIDE SERPL-SCNC: 89 MMOL/L — LOW (ref 98–107)
CHLORIDE SERPL-SCNC: 89 MMOL/L — LOW (ref 98–107)
CHLORIDE SERPL-SCNC: 95 MMOL/L — LOW (ref 98–107)
CK MB CFR SERPL CALC: 7.1 NG/ML — HIGH (ref 0–6.7)
CK SERPL-CCNC: 5772 U/L — HIGH (ref 25–170)
CO2 SERPL-SCNC: 23 MMOL/L — SIGNIFICANT CHANGE UP (ref 22–29)
CO2 SERPL-SCNC: 25 MMOL/L — SIGNIFICANT CHANGE UP (ref 22–29)
CO2 SERPL-SCNC: 27 MMOL/L — SIGNIFICANT CHANGE UP (ref 22–29)
CO2 SERPL-SCNC: 29 MMOL/L — SIGNIFICANT CHANGE UP (ref 22–29)
CO2 SERPL-SCNC: 30 MMOL/L — HIGH (ref 22–29)
COLOR SPEC: YELLOW — SIGNIFICANT CHANGE UP
COMMENT - URINE: SIGNIFICANT CHANGE UP
CREAT SERPL-MCNC: 0.55 MG/DL — SIGNIFICANT CHANGE UP (ref 0.5–1.3)
CREAT SERPL-MCNC: 0.61 MG/DL — SIGNIFICANT CHANGE UP (ref 0.5–1.3)
CREAT SERPL-MCNC: 0.63 MG/DL — SIGNIFICANT CHANGE UP (ref 0.5–1.3)
CREAT SERPL-MCNC: 0.7 MG/DL — SIGNIFICANT CHANGE UP (ref 0.5–1.3)
CREAT SERPL-MCNC: 0.74 MG/DL — SIGNIFICANT CHANGE UP (ref 0.5–1.3)
DIFF PNL FLD: ABNORMAL
EOSINOPHIL # BLD AUTO: 0.1 K/UL — SIGNIFICANT CHANGE UP (ref 0–0.5)
EOSINOPHIL NFR BLD AUTO: 0.8 % — SIGNIFICANT CHANGE UP (ref 0–6)
EPI CELLS # UR: SIGNIFICANT CHANGE UP
GLUCOSE SERPL-MCNC: 103 MG/DL — SIGNIFICANT CHANGE UP (ref 70–115)
GLUCOSE SERPL-MCNC: 105 MG/DL — SIGNIFICANT CHANGE UP (ref 70–115)
GLUCOSE SERPL-MCNC: 112 MG/DL — SIGNIFICANT CHANGE UP (ref 70–115)
GLUCOSE SERPL-MCNC: 126 MG/DL — HIGH (ref 70–115)
GLUCOSE SERPL-MCNC: 89 MG/DL — SIGNIFICANT CHANGE UP (ref 70–115)
GLUCOSE UR QL: NEGATIVE MG/DL — SIGNIFICANT CHANGE UP
HCT VFR BLD CALC: 33.4 % — LOW (ref 37–47)
HGB BLD-MCNC: 10.7 G/DL — LOW (ref 12–16)
INR BLD: 0.99 RATIO — SIGNIFICANT CHANGE UP (ref 0.88–1.16)
KETONES UR-MCNC: ABNORMAL
LACTATE BLDV-MCNC: 0.7 MMOL/L — SIGNIFICANT CHANGE UP (ref 0.7–2)
LEUKOCYTE ESTERASE UR-ACNC: NEGATIVE — SIGNIFICANT CHANGE UP
LYMPHOCYTES # BLD AUTO: 2.1 K/UL — SIGNIFICANT CHANGE UP (ref 1–4.8)
LYMPHOCYTES # BLD AUTO: 25.1 % — SIGNIFICANT CHANGE UP (ref 20–55)
MAGNESIUM SERPL-MCNC: 1.4 MG/DL — LOW (ref 1.8–2.5)
MAGNESIUM SERPL-MCNC: 2 MG/DL — SIGNIFICANT CHANGE UP (ref 1.8–2.5)
MAGNESIUM SERPL-MCNC: 2.2 MG/DL — SIGNIFICANT CHANGE UP (ref 1.8–2.5)
MAGNESIUM SERPL-MCNC: 2.3 MG/DL — SIGNIFICANT CHANGE UP (ref 1.8–2.5)
MCHC RBC-ENTMCNC: 25.2 PG — LOW (ref 27–31)
MCHC RBC-ENTMCNC: 32 G/DL — SIGNIFICANT CHANGE UP (ref 32–36)
MCV RBC AUTO: 78.6 FL — LOW (ref 81–99)
MONOCYTES # BLD AUTO: 0.3 K/UL — SIGNIFICANT CHANGE UP (ref 0–0.8)
MONOCYTES NFR BLD AUTO: 3 % — SIGNIFICANT CHANGE UP (ref 3–10)
NEUTROPHILS # BLD AUTO: 6 K/UL — SIGNIFICANT CHANGE UP (ref 1.8–8)
NEUTROPHILS NFR BLD AUTO: 70.6 % — SIGNIFICANT CHANGE UP (ref 37–73)
NITRITE UR-MCNC: NEGATIVE — SIGNIFICANT CHANGE UP
PH UR: 5 — SIGNIFICANT CHANGE UP (ref 4.8–8)
PHOSPHATE SERPL-MCNC: 2.1 MG/DL — LOW (ref 2.4–4.7)
PHOSPHATE SERPL-MCNC: 3 MG/DL — SIGNIFICANT CHANGE UP (ref 2.4–4.7)
PHOSPHATE SERPL-MCNC: 3.5 MG/DL — SIGNIFICANT CHANGE UP (ref 2.4–4.7)
PHOSPHATE SERPL-MCNC: 3.6 MG/DL — SIGNIFICANT CHANGE UP (ref 2.4–4.7)
PLATELET # BLD AUTO: 122 K/UL — LOW (ref 150–400)
POTASSIUM SERPL-MCNC: 3.7 MMOL/L — SIGNIFICANT CHANGE UP (ref 3.5–5.3)
POTASSIUM SERPL-MCNC: 3.8 MMOL/L — SIGNIFICANT CHANGE UP (ref 3.5–5.3)
POTASSIUM SERPL-MCNC: 3.8 MMOL/L — SIGNIFICANT CHANGE UP (ref 3.5–5.3)
POTASSIUM SERPL-MCNC: 3.9 MMOL/L — SIGNIFICANT CHANGE UP (ref 3.5–5.3)
POTASSIUM SERPL-MCNC: 4 MMOL/L — SIGNIFICANT CHANGE UP (ref 3.5–5.3)
POTASSIUM SERPL-SCNC: 3.7 MMOL/L — SIGNIFICANT CHANGE UP (ref 3.5–5.3)
POTASSIUM SERPL-SCNC: 3.8 MMOL/L — SIGNIFICANT CHANGE UP (ref 3.5–5.3)
POTASSIUM SERPL-SCNC: 3.8 MMOL/L — SIGNIFICANT CHANGE UP (ref 3.5–5.3)
POTASSIUM SERPL-SCNC: 3.9 MMOL/L — SIGNIFICANT CHANGE UP (ref 3.5–5.3)
POTASSIUM SERPL-SCNC: 4 MMOL/L — SIGNIFICANT CHANGE UP (ref 3.5–5.3)
PROT SERPL-MCNC: 8.2 G/DL — SIGNIFICANT CHANGE UP (ref 6.6–8.7)
PROT UR-MCNC: 30 MG/DL
PROTHROM AB SERPL-ACNC: 10.9 SEC — SIGNIFICANT CHANGE UP (ref 9.8–12.7)
RBC # BLD: 4.25 M/UL — LOW (ref 4.4–5.2)
RBC # FLD: 14.7 % — SIGNIFICANT CHANGE UP (ref 11–15.6)
RBC CASTS # UR COMP ASSIST: SIGNIFICANT CHANGE UP /HPF (ref 0–4)
SODIUM SERPL-SCNC: 129 MMOL/L — LOW (ref 135–145)
SODIUM SERPL-SCNC: 130 MMOL/L — LOW (ref 135–145)
SODIUM SERPL-SCNC: 131 MMOL/L — LOW (ref 135–145)
SODIUM SERPL-SCNC: 131 MMOL/L — LOW (ref 135–145)
SODIUM SERPL-SCNC: 135 MMOL/L — SIGNIFICANT CHANGE UP (ref 135–145)
SP GR SPEC: 1.02 — SIGNIFICANT CHANGE UP (ref 1.01–1.02)
UROBILINOGEN FLD QL: NEGATIVE MG/DL — SIGNIFICANT CHANGE UP
WBC # BLD: 8.5 K/UL — SIGNIFICANT CHANGE UP (ref 4.8–10.8)
WBC # FLD AUTO: 8.5 K/UL — SIGNIFICANT CHANGE UP (ref 4.8–10.8)
WBC UR QL: NEGATIVE — SIGNIFICANT CHANGE UP

## 2017-03-24 PROCEDURE — 70450 CT HEAD/BRAIN W/O DYE: CPT | Mod: 26

## 2017-03-24 PROCEDURE — 71010: CPT | Mod: 26

## 2017-03-24 PROCEDURE — 95819 EEG AWAKE AND ASLEEP: CPT | Mod: 26

## 2017-03-24 PROCEDURE — 99291 CRITICAL CARE FIRST HOUR: CPT

## 2017-03-24 PROCEDURE — 99292 CRITICAL CARE ADDL 30 MIN: CPT

## 2017-03-24 PROCEDURE — 93010 ELECTROCARDIOGRAM REPORT: CPT

## 2017-03-24 PROCEDURE — 99233 SBSQ HOSP IP/OBS HIGH 50: CPT

## 2017-03-24 RX ORDER — LEVETIRACETAM 250 MG/1
1000 TABLET, FILM COATED ORAL
Qty: 0 | Refills: 0 | Status: DISCONTINUED | OUTPATIENT
Start: 2017-03-24 | End: 2017-03-24

## 2017-03-24 RX ORDER — LABETALOL HCL 100 MG
10 TABLET ORAL EVERY 4 HOURS
Qty: 0 | Refills: 0 | Status: DISCONTINUED | OUTPATIENT
Start: 2017-03-24 | End: 2017-03-26

## 2017-03-24 RX ORDER — LEVETIRACETAM 250 MG/1
1000 TABLET, FILM COATED ORAL EVERY 12 HOURS
Qty: 0 | Refills: 0 | Status: DISCONTINUED | OUTPATIENT
Start: 2017-03-24 | End: 2017-03-26

## 2017-03-24 RX ORDER — MAGNESIUM SULFATE 500 MG/ML
2 VIAL (ML) INJECTION ONCE
Qty: 0 | Refills: 0 | Status: COMPLETED | OUTPATIENT
Start: 2017-03-24 | End: 2017-03-24

## 2017-03-24 RX ORDER — DEXTROSE MONOHYDRATE, SODIUM CHLORIDE, AND POTASSIUM CHLORIDE 50; .745; 4.5 G/1000ML; G/1000ML; G/1000ML
1000 INJECTION, SOLUTION INTRAVENOUS
Qty: 0 | Refills: 0 | Status: DISCONTINUED | OUTPATIENT
Start: 2017-03-24 | End: 2017-03-24

## 2017-03-24 RX ORDER — POTASSIUM CHLORIDE 20 MEQ
10 PACKET (EA) ORAL
Qty: 0 | Refills: 0 | Status: COMPLETED | OUTPATIENT
Start: 2017-03-24 | End: 2017-03-24

## 2017-03-24 RX ORDER — LABETALOL HCL 100 MG
10 TABLET ORAL ONCE
Qty: 0 | Refills: 0 | Status: COMPLETED | OUTPATIENT
Start: 2017-03-24 | End: 2017-03-24

## 2017-03-24 RX ORDER — SODIUM CHLORIDE 5 G/100ML
500 INJECTION, SOLUTION INTRAVENOUS
Qty: 0 | Refills: 0 | Status: DISCONTINUED | OUTPATIENT
Start: 2017-03-24 | End: 2017-03-24

## 2017-03-24 RX ADMIN — SODIUM CHLORIDE 50 MILLILITER(S): 5 INJECTION, SOLUTION INTRAVENOUS at 14:51

## 2017-03-24 RX ADMIN — Medication 63.75 MILLIMOLE(S): at 13:27

## 2017-03-24 RX ADMIN — AMLODIPINE BESYLATE 10 MILLIGRAM(S): 2.5 TABLET ORAL at 05:35

## 2017-03-24 RX ADMIN — Medication 100 MILLIEQUIVALENT(S): at 16:38

## 2017-03-24 RX ADMIN — DEXTROSE MONOHYDRATE, SODIUM CHLORIDE, AND POTASSIUM CHLORIDE 150 MILLILITER(S): 50; .745; 4.5 INJECTION, SOLUTION INTRAVENOUS at 10:14

## 2017-03-24 RX ADMIN — LEVETIRACETAM 500 MILLIGRAM(S): 250 TABLET, FILM COATED ORAL at 05:34

## 2017-03-24 RX ADMIN — Medication 50 GRAM(S): at 13:45

## 2017-03-24 RX ADMIN — Medication 100 MILLIEQUIVALENT(S): at 17:35

## 2017-03-24 RX ADMIN — DESMOPRESSIN ACETATE 1 SPRAY(S): 0.1 TABLET ORAL at 05:42

## 2017-03-24 RX ADMIN — Medication 1 MILLIGRAM(S): at 14:50

## 2017-03-24 RX ADMIN — Medication 10 MILLIGRAM(S): at 14:00

## 2017-03-24 RX ADMIN — Medication 10 MILLIGRAM(S): at 21:13

## 2017-03-24 RX ADMIN — SODIUM CHLORIDE 70 MILLILITER(S): 5 INJECTION, SOLUTION INTRAVENOUS at 22:44

## 2017-03-24 RX ADMIN — Medication 100 MILLIEQUIVALENT(S): at 18:37

## 2017-03-24 RX ADMIN — SODIUM CHLORIDE 30 MILLILITER(S): 5 INJECTION, SOLUTION INTRAVENOUS at 13:48

## 2017-03-24 RX ADMIN — LEVETIRACETAM 400 MILLIGRAM(S): 250 TABLET, FILM COATED ORAL at 21:13

## 2017-03-24 RX ADMIN — SODIUM CHLORIDE 70 MILLILITER(S): 5 INJECTION, SOLUTION INTRAVENOUS at 16:39

## 2017-03-24 RX ADMIN — Medication 100 MILLIGRAM(S): at 05:35

## 2017-03-24 NOTE — CONSULT NOTE ADULT - ASSESSMENT
52 yo female, PMHx HTN, DI, SAH s/p aneurysm clipping and R frontotemporal craniotomy in 2011 (Dr. De León - neurosurgery), presented to ED on 03/20/17 with altered mental status likely secondary to hypernatremia, which was rapidly corrected during hospital course, resulting in diffuse cerebral edema and altered mental status. Patient currently able to protect her airway despite altered mental status, but will continue to monitor for indications for advanced airway. Will discontinue DDAVP, treat with normal vs hypertonic saline, and closely monitor sodium levels. Will closely monitor patient for signs of seizures; seizure precautions initiated. Pt also noted to have elevated CPK levels, concern for rhabdomyolysis. Will treat with aggressive hydration and continue to trend CPK levels. 52 yo female, PMHx HTN, DI, SAH s/p aneurysm clipping and R frontotemporal craniotomy in 2011 (Dr. De León - neurosurgery), presented to ED on 03/20/17 with altered mental status likely secondary to hypernatremia, which was rapidly corrected during hospital course, resulting in diffuse cerebral edema and altered mental status. Patient currently able to protect her airway despite altered mental status, but will continue to monitor for indications for advanced airway. Will discontinue DDAVP, treat with normal vs hypertonic saline, and closely monitor sodium levels. Given history of seizures in hyponatremic state, patient is at high risk for seizing. Will closely monitor patient for signs of seizures; seizure precautions initiated. Pt also noted to have elevated CPK levels, concern for rhabdomyolysis. Will treat with aggressive hydration and continue to trend CPK levels.

## 2017-03-24 NOTE — CONSULT NOTE ADULT - SUBJECTIVE AND OBJECTIVE BOX
CHIEF COMPLAINT: altered mentation, seizure    HPI: 51yFemale with h/o cerebral aneursym holden admiteed 3/20 with hypernatremia and increased confusion. She has h/o epilepsy and DI since the aneursym surgery.    This morning she was found to be unresponsive. Sodium had decreaed during her admsiion.  CT suggests cerebral edema.  Brought to ICU and had a witnessed seizure.   Since she has been agitated but is somewhat responsive. Family reports that she has prlonged confusion and agitaiton when she has a seizure.  EEG in progress..no seizure activity to my brief review.      PAST MEDICAL & SURGICAL HISTORY:  Memory loss, short term  Brain aneurysm  Diabetes insipidus  DM (Diabetes Mellitus)  HTN (Hypertension)  Subarachnoid Hemorrhage  S/P Cerebral Aneurysm Repair  S/P Craniotomy    MEDICATIONS  (STANDING):  labetalol 100milliGRAM(s) Oral two times a day  amLODIPine   Tablet 10milliGRAM(s) Oral daily  levETIRAcetam 1000milliGRAM(s) Oral two times a day  sodium chloride 3%. 500milliLiter(s) IV Continuous <Continuous>  potassium chloride  10 mEq/100 mL IVPB 10milliEquivalent(s) IV Intermittent every 1 hour    MEDICATIONS  (PRN):    Allergies    No Known Allergies    Intolerances        FAMILY HISTORY:  Family history of hypertension (Father)          SOCIAL HISTORY:    Tobacco:  no  Alcohol:  no  Drugs:no          REVIEW OF SYSTEMS:    Relevant systems are negative except as noted in the chart, HPI, and PMH      VITAL SIGNS:  Vital Signs Last 24 Hrs  T(C): 36.6, Max: 36.8 (03-23 @ 23:10)  T(F): 97.8, Max: 98.3 (03-23 @ 23:10)  HR: 65 (65 - 97)  BP: 120/72 (120/72 - 171/104)  BP(mean): 91 (80 - 136)  RR: 13 (13 - 37)  SpO2: 100% (76% - 100%)    PHYSICAL EXAMINATION:    General: Well-developed, well nourished, in no acute distress.  Cardiac:  Regular rate and rhythm. No carotid bruits appreciated.  Neurologic:  Agitated, thrashing about in bed. Received Ativan a few minutes ago  PERRL , EOMI. symmetric facial grimace  Move 4 limbs very well without focal weakness      LABS:                          10.7   8.5   )-----------( 122      ( 24 Mar 2017 07:10 )             33.4     24 Mar 2017 15:17    130    |  87     |  8.0    ----------------------------<  126    3.7     |  23.0   |  0.74     Ca    8.8        24 Mar 2017 15:17  Phos  3.5       24 Mar 2017 15:17  Mg     2.3       24 Mar 2017 15:17    TPro  8.2    /  Alb  4.1    /  TBili  1.1    /  DBili  x      /  AST  69     /  ALT  27     /  AlkPhos  127    24 Mar 2017 07:10    LIVER FUNCTIONS - ( 24 Mar 2017 07:10 )  Alb: 4.1 g/dL / Pro: 8.2 g/dL / ALK PHOS: 127 U/L / ALT: 27 U/L / AST: 69 U/L / GGT: x           PT/INR - ( 24 Mar 2017 07:08 )   PT: 10.9 sec;   INR: 0.99 ratio         PTT - ( 24 Mar 2017 07:08 )  PTT:39.7 sec      RADIOLOGY & ADDITIONAL STUDIES:      IMPRESSION:    h/o aneurysm surgery  DI  Epilepsy  Hypernatremia to hyponatremia- may account for acute confusional state .  Recent seizure. Now post-ictal    PLAN:  1. Critical care management with slow correction of electrolytes  2. Keppra 1000mg BID  3. Follow up CT or MRI.

## 2017-03-24 NOTE — PROGRESS NOTE ADULT - SUBJECTIVE AND OBJECTIVE BOX
Pt seen in MICU. Pt was apparently transferred here due to lethargy and head CAT with evidence of edema.   On examination she is lethargic but moving arms, 02 noted - not intubated.  present and i discussed pt with him.  Lungs with bs bilaterally,   CVS no rub noted  Neuro lethargic  Labs : reviewed including this am  Xray report reviewed

## 2017-03-24 NOTE — CONSULT NOTE ADULT - SUBJECTIVE AND OBJECTIVE BOX
Patient is a 51y old  Female who presents with a chief complaint of altered mental status (20 Mar 2017 14:01)      BRIEF HOSPITAL COURSE: 50 yo female, PMHx HTN, DI, SAH s/p aneurysm clipping and R frontotemporal craniotomy in 2011 (Dr. De León - neurosurgery), presented to ED on 03/20/17 with altered mental status, including gait disturbance and slurred speech, x 2 days. Pt has chronic hypernatremia and has periods of AMS due to elevated sodium levels. Pt also has PICC line, which she infuses D5W through every other day for treatment of hypernatremia. According to patient's family, on last hospital admission Na dropped too rapidly and had a seizure. Upon arrival to ED, pt's sodium noted to be 166. Pt denied complaints at time of admission.     Events last 24 hours: Rapid response called at approx 0700 for altered mental status. Per RN, patient is able to walk, talk, and accomplish most ADLs independently; patient was at baseline yesterday. Found this morning to have altered mental status, somnolent, arousable to verbal stimuli but unable to answer simple questions or follow commands. ICU consulted at that time for evaluation of AMS. Upon review of patient's chart, Na was checked once daily during hospital course and dropped from 166 on 03/20/17 to 160 on 03/22/17, to 147 on 03/23/17, to 131 today (03/24/17).     PAST MEDICAL & SURGICAL HISTORY:  Memory loss, short term  Brain aneurysm  Diabetes insipidus  DM (Diabetes Mellitus)  HTN (Hypertension)  Subarachnoid Hemorrhage  S/P Cerebral Aneurysm Repair  S/P Craniotomy      Review of Systems:  CONSTITUTIONAL: No fever, chills, or fatigue  EYES: No eye pain, visual disturbances, or discharge  ENMT:  No difficulty hearing, tinnitus, vertigo; No sinus or throat pain  NECK: No pain or stiffness  RESPIRATORY: No cough, wheezing, chills or hemoptysis; No shortness of breath  CARDIOVASCULAR: No chest pain, palpitations, dizziness, or leg swelling  GASTROINTESTINAL: No abdominal or epigastric pain. No nausea, vomiting, or hematemesis; No diarrhea or constipation. No melena or hematochezia.  GENITOURINARY: No dysuria, frequency, hematuria, or incontinence  NEUROLOGICAL: No headaches, memory loss, loss of strength, numbness, or tremors  SKIN: No itching, burning, rashes, or lesions   MUSCULOSKELETAL: No joint pain or swelling; No muscle, back, or extremity pain  PSYCHIATRIC: No depression, anxiety, mood swings, or difficulty sleeping      Medications:    labetalol 100milliGRAM(s) Oral two times a day  amLODIPine   Tablet 10milliGRAM(s) Oral daily  labetalol Injectable 10milliGRAM(s) IV Push once      levETIRAcetam 500milliGRAM(s) Oral two times a day              sodium chloride 3%. 500milliLiter(s) IV Continuous <Continuous>                ICU Vital Signs Last 24 Hrs  T(C): 36.6, Max: 36.8 (03-23 @ 15:00)  T(F): 97.8, Max: 98.3 (03-23 @ 23:10)  HR: 88 (71 - 97)  BP: 171/104 (123/65 - 171/104)  BP(mean): 136 (80 - 136)  ABP: --  ABP(mean): --  RR: 37 (15 - 37)  SpO2: 98% (76% - 100%)          I&O's Detail        LABS:                        10.7   8.5   )-----------( 122      ( 24 Mar 2017 07:10 )             33.4     24 Mar 2017 12:54    129    |  87     |  8.0    ----------------------------<  89     3.9     |  29.0   |  0.55     Ca    9.1        24 Mar 2017 12:54  Phos  2.1       24 Mar 2017 08:48  Mg     1.4       24 Mar 2017 12:54    TPro  8.2    /  Alb  4.1    /  TBili  1.1    /  DBili  x      /  AST  69     /  ALT  27     /  AlkPhos  127    24 Mar 2017 07:10      CARDIAC MARKERS ( 24 Mar 2017 07:10 )  x     / x     / 5772 U/L / x     / 7.1 ng/mL      CAPILLARY BLOOD GLUCOSE    PT/INR - ( 24 Mar 2017 07:08 )   PT: 10.9 sec;   INR: 0.99 ratio         PTT - ( 24 Mar 2017 07:08 )  PTT:39.7 sec    CULTURES:      Physical Examination:    General: No acute distress.      HEENT: Pupils equal, reactive to light.  Symmetric.    PULM: Clear to auscultation bilaterally, no significant sputum production    CVS: Regular rate and rhythm, no murmurs, rubs, or gallops    ABD: Soft, nondistended, nontender, normoactive bowel sounds, no masses    EXT: No edema, nontender    SKIN: Warm and well perfused, no rashes noted.    NEURO: Alert, oriented, interactive, nonfocal    RADIOLOGY: ***    CRITICAL CARE TIME SPENT: *** Patient is a 51y old  Female who presents with a chief complaint of altered mental status (20 Mar 2017 14:01)      BRIEF HOSPITAL COURSE: 50 yo female, PMHx HTN, DI, SAH s/p aneurysm clipping and R frontotemporal craniotomy in 2011 (Dr. De León - neurosurgery), presented to ED on 03/20/17 with altered mental status, including gait disturbance and slurred speech, x 2 days. Pt has chronic hypernatremia and has periods of AMS due to elevated sodium levels. Pt also has PICC line, which she infuses D5W through every other day for treatment of hypernatremia. According to patient's family, on last hospital admission Na dropped too rapidly and had a seizure. Upon arrival to ED, pt's sodium noted to be 166. Pt denied complaints at time of admission.     Events last 24 hours: Rapid response called at approx 0700 for altered mental status. Per RN, patient is able to walk, talk, and accomplish most ADLs independently; patient was at baseline yesterday. Found this morning to have altered mental status, arousable to verbal stimuli but unable to answer simple questions or follow commands. STAT CT head was obtained, which revealed diffuse cerebral edema, which is a change from pt's prior CT scan 4 days prior. ICU consulted at that time for evaluation of AMS. Upon review of patient's chart, patient was treated with DDAVP and 0.225% NS. Serum sodium was checked once daily during hospital course and dropped from 166 on 03/20/17 to 160 on 03/22/17, to 147 on 03/23/17, to 131 today (03/24/17). Upon examination, pt is somnolent, arousable to verbal stimuli, but unable to comply with questioning or physical exam due to altered mental status.    PAST MEDICAL & SURGICAL HISTORY:  Memory loss, short term  Brain aneurysm  Diabetes insipidus  DM (Diabetes Mellitus)  HTN (Hypertension)  Subarachnoid Hemorrhage  S/P Cerebral Aneurysm Repair  S/P Craniotomy      Review of Systems: unable to obtain due to patient's acute clinical condition.  NEUROLOGICAL: +ALTERED MENTAL STATUS.      Medications:    labetalol 100milliGRAM(s) Oral two times a day  amLODIPine   Tablet 10milliGRAM(s) Oral daily  labetalol Injectable 10milliGRAM(s) IV Push once      levETIRAcetam 500milliGRAM(s) Oral two times a day      sodium chloride 3%. 500milliLiter(s) IV Continuous <Continuous>      ICU Vital Signs Last 24 Hrs  T(C): 36.6, Max: 36.8 (03-23 @ 15:00)  T(F): 97.8, Max: 98.3 (03-23 @ 23:10)  HR: 88 (71 - 97)  BP: 171/104 (123/65 - 171/104)  BP(mean): 136 (80 - 136)  ABP: --  ABP(mean): --  RR: 37 (15 - 37)  SpO2: 98% (76% - 100%)      I&O's Detail      LABS:                        10.7   8.5   )-----------( 122      ( 24 Mar 2017 07:10 )             33.4     24 Mar 2017 12:54    129    |  87     |  8.0    ----------------------------<  89     3.9     |  29.0   |  0.55     Ca    9.1        24 Mar 2017 12:54  Phos  2.1       24 Mar 2017 08:48  Mg     1.4       24 Mar 2017 12:54    TPro  8.2    /  Alb  4.1    /  TBili  1.1    /  DBili  x      /  AST  69     /  ALT  27     /  AlkPhos  127    24 Mar 2017 07:10      CARDIAC MARKERS ( 24 Mar 2017 07:10 )  x     / x     / 5772 U/L / x     / 7.1 ng/mL      CAPILLARY BLOOD GLUCOSE    PT/INR - ( 24 Mar 2017 07:08 )   PT: 10.9 sec;   INR: 0.99 ratio         PTT - ( 24 Mar 2017 07:08 )  PTT:39.7 sec    CULTURES:      Physical Examination:    General: Well developed, well nourished female, lying on bed sleeping in no acute distress.    HEENT: Pupils 3mm equal, reactive to light. Symmetric.    PULM: Clear to auscultation bilaterally, no significant sputum production    CVS: Regular rate and rhythm, +S1, S2. no murmurs, rubs, or gallops    ABD: Soft, nondistended, nontender, normoactive bowel sounds, no masses    EXT: No edema, nontender    SKIN: Warm and well perfused, no rashes noted.    NEURO: Somnolent, arousable to verbal stimuli. RASS -1. Unable to follow commands or answer questions.     RADIOLOGY:    EXAM:  CT BRAIN                          PROCEDURE DATE:  03/24/2017        INTERPRETATION:  CLINICAL HISTORY: Alteration of consciousness, found   different from baseline, sodium, slurred speech    COMPARISON: CT head dated 3/20/2017    TECHNIQUE: Noncontrast CT of the head. Multiplanar reformations are   submitted.    FINDINGS: There is diffuse cerebral edema with moderate effacement of   lateral ventricles. There is complete effacement of basal cisterns. The   sella is redemonstrated.    There is periventricular and subcortical white matter hypodensity without   mass effect, nonspecific, likely representing moderate chronic   microvascular ischemic changes. There is no compelling evidence for an   acute transcortical infarction.     Mild inflammatory mucosal changes are seen throughout the various   portions of the paranasal sinuses. The orbits and mastoid air cells are   unremarkable. Old left frontal temporal craniotomy defect. The calvarium   is otherwise intact.    IMPRESSION:   Diffuse cerebral edema. Preliminary report given by   radiology attending Jessica Mendoza Critical result discussed with   Dr. Mack at 7:40 AM by Dr. Tubbs.                ALYSSA TRUONG M.D., ATTENDING RADIOLOGIST  This document has been electronically signed. Mar 24 2017  8:11AM    CRITICAL CARE TIME SPENT: I have spent 40 minutes of critical care time evaluating and treating this patient and collaborating with interdisciplinary team.

## 2017-03-24 NOTE — CHART NOTE - NSCHARTNOTEFT_GEN_A_CORE
Rapid Response    51 year old female with PMH HTN, DI with chronic picc, SAH, cerebral aneurysm s/p clipping 2011, short term memory loss, presented to ER for c/o altered mental status since yesterday admitted for hypernatremia and AMS with DDAVP with rapid response called for AMS. As per RN pt was talking yesterday following commands and eating her meals. This morning she was moaning, not responding to commands appropriately and had difficulty swallowing her medications this am.    Vital Signs Last 24 Hrs  T(C): 36.8, Max: 36.8 (03-23 @ 15:00)  T(F): 98.3, Max: 98.3 (03-23 @ 23:10)  HR: 82 (75 - 82)  BP: 153/99 (144/96 - 153/99)  BP(mean): --  RR: 18 (18 - 18)  SpO2: 96% (96% - 100%)    PE  GEN pt moaning, not following commands unable to stay still  Chest L/S Clear equal bilateral expansion no wheezing  heart S1S2 tachycardia  Abd soft nontender nondistended  extremities +CMSx4 no calf tenderness no edema    AP 51 year old female with rapid response called for AMS  CBC, Rapid Response    51 year old female with PMH HTN, DI with chronic picc, SAH, cerebral aneurysm s/p clipping 2011, short term memory loss, presented to ER for c/o altered mental status since yesterday admitted for hypernatremia and AMS with DDAVP with rapid response called for AMS. As per RN pt was talking yesterday following commands and eating her meals. This morning she was moaning, not responding to commands appropriately and had difficulty swallowing her medications this am.    Vital Signs Last 24 Hrs  T(C): 36.8, Max: 36.8 (03-23 @ 15:00)  T(F): 98.3, Max: 98.3 (03-23 @ 23:10)  HR: 82 (75 - 82)  BP: 153/99 (144/96 - 153/99)  BP(mean): --  RR: 18 (18 - 18)  SpO2: 96% (96% - 100%)    PE  GEN pt moaning, not following commands unable to stay still  Chest L/S Clear equal bilateral expansion no wheezing  heart S1S2 tachycardia  Abd soft nontender nondistended  extremities +CMSx4 no calf tenderness no edema    AP 51 year old female with rapid response called for AMS  CBC, CMP, Lactate, Blood Culturex2, chest xray Rapid Response    51 year old female with PMH HTN, DI with chronic picc, SAH, cerebral aneurysm s/p clipping 2011, short term memory loss, presented to ER for c/o altered mental status since yesterday admitted for hypernatremia and AMS with DDAVP with rapid response called for AMS. As per RN pt was talking yesterday following commands and eating her meals. This morning she was moaning, not responding to commands appropriately and had difficulty swallowing her medications this am.    Vital Signs Last 24 Hrs  T(C): 36.8, Max: 36.8 (03-23 @ 15:00)  T(F): 98.3, Max: 98.3 (03-23 @ 23:10)  HR: 82 (75 - 82)  BP: 153/99 (144/96 - 153/99)  BP(mean): --  RR: 18 (18 - 18)  SpO2: 96% (96% - 100%)    PE  GEN pt moaning, not following commands unable to stay still  Chest L/S Clear equal bilateral expansion no wheezing  heart S1S2 tachycardia  Abd soft nontender nondistended  extremities +CMSx4 no calf tenderness no edema    24 Mar 2017 07:10    131    |  89     |  8.0    ----------------------------<  105    3.8     |  30.0   |  0.63     Ca    9.1        24 Mar 2017 07:10    TPro  8.2    /  Alb  4.1    /  TBili  1.1    /  DBili  x      /  AST  69     /  ALT  27     /  AlkPhos  127    24 Mar 2017 07:10      AP 51 year old female with rapid response called for AMS  CBC, CMP, Lactate, Blood Culturex2, chest xray, VBG  CT Head  As per Radiologist CT head shows diffuse sulcal effacement with slit like ventricles highly suggestive of diffuse cerebral edema no active herniation  Pt sodium went from 167 on 3/21 to 160 3/22 to 147 3/23 to 131 on 3/24  ICU called to evaluate the patient.   Dr. Tillman called and agrees with the plan above

## 2017-03-24 NOTE — CONSULT NOTE ADULT - ATTENDING COMMENTS
I personally interviewed and examined the patient, independent of the above practitioner. I agree with the above, and have updated the appropriate areas of the chart as noted. I spent _120__ minutes with the patient and family separate from the above practitioner for my evaluation and interview. The patient is in critical condition and requires ICU care and monitoring. Total Critical Care time spent by attending physician was ___120______ minutes, excluding procedure time.    Ms. Dickson provides me with no information as she is stuporous at times and does not respond with verbal answers. I have reviewed her previous documents and recall her from 8/2016 when she was moved to the MICU for a similar even of iatrogenic hyponatremia causing seizure. It would see that Ms. Dickson has suffered a similar event where about she arrived to the hospital with some 'slurring of words' (per family) which is different than her baseline, where about she was found to have a SNa >160. She was treated with her home DDAVP (of which, per verbal report, secondary to some short term memory loss - baseline, she may or may not have been taking as suggested) and 0.0225% NaCl. Over the course of ~2 days her SNa dropped from 166-->131 where about on 3/24 she had an RRT for AMS, with SNa = 133 and CT head with new cerebral edema.     Her initial exam and evaluation are as noted above, upon arrival to the MICU, Ms. Dickson was not following instructions, however was moving all extremities. She had some social mimicking (i.e. grin when being smiled at), however did not respond verbally to instructions, commands, or directions. She did however intentionally reach for objects and attempted to remove noxious stimuli (i.e. oral suction). Her family was at bedside throughout the morning/afternoon and voice strong concern that "yesterday she was walking and eating, talking to us and her self" as well as concerns over this occurring; Passionately enough that upon their initial arrival to the MICU they refused to talk to me and only the "medical director of the hospital" and would not allow me to explain the current situation or re-examine her; though a code gray was called, the situation was diffused and resolved. I have spoken with them several times and updated them on the current plan, situation, and goals of our therapy.     Of note, she did suffer a generalized tonic/clonic seizure @ ~ 1415 that resolved after less than 60 seconds. She was given 100mL of 3% NaCl during this time as well as lorazepam 1mg IV push x 1. Neurology was notified of the patient (Dr. Espinoza- who saw her on her previous admission) and will follow up accordingly; we also increased her home keppra dose to 1gram.     Regarding her hyponatremia, or more so, relative hyponatremia: I do feel the cerebral edema, change in mental status, and unfortunate seizure are attributed to her acute drop in SNa, however it would seem that her "baseline" cerebral osmolstat (i.e. ability to autoregulate and adjust cerebal osmols) has reset and is elevated relative to "normal" (i.e. what lab normals are), therefore the acute drop, while "normal" on lab values is quite abnormal for her. This could have also reduced the seizure threshold for her know seizure disorder, therefore triggering the most recent seizure (meaning it may or may not be a direct seizure from relative hyponatremia), which is why we are increasing her keppra. We will follow up her labs q4 hours and replace electrolytes as required IV, as her mental status does not allow for PO at this time. After her witnessed seizure in the MICU, she did start to have spontaneous movement toward noxious stimuli again (i.e. oral suctioning), but remains now semi-stuporous. I have spoken to her family (Angel) about a possible need for intubation and ventilation, of which at this time, she does not emergently require. He agreed with the "wait and see if she wakes up more" however was not against this if needed. Ideally we will repeat her CT head (non-contrast) and possibly get an MRI with diffusion to evaluate the cerebral edema further. I've ordered an EEG of which I am told (by verbal signout) a "spot" EEG can be done. As for her CPKs I am not sure if this was "agitation" or subclinical seizures, but we will trend them. She is currently on 70mL/hr of 3%NaCl through her left PICC line (present on arrival; clean/dry/intact) and we will trend labs q 3-4 hours with goal SNa > 140-150 OR return to baseline mental status (she may be post ictal for a prolonged time). I should note that the cerebral edema does concern me, especially in the setting of her underlying previous intracranial injuries, for prolonged sequale.  Then family is aware of her current situation and have been updated along the way.

## 2017-03-24 NOTE — CONSULT NOTE ADULT - PROBLEM SELECTOR PROBLEM 1
Altered mental status, unspecified altered mental status type
Altered mental status, unspecified altered mental status type

## 2017-03-24 NOTE — PROGRESS NOTE ADULT - SUBJECTIVE AND OBJECTIVE BOX
KRUPA MEDINA   ------------------------------  The patient was seen and evaluated for hyponatremia.  The patient is in no acute distress.  Awake but lethargic. Responds to voice but not verbal.  Overnight events noted.    Vital Signs Last 24 Hrs  T(C): 36.8, Max: 36.8 (03-23 @ 15:00)  T(F): 98.3, Max: 98.3 (03-23 @ 23:10)  HR: 82 (75 - 82)  BP: 153/99 (144/96 - 153/99)  BP(mean): --  RR: 18 (18 - 18)  SpO2: 96% (96% - 100%)    PHYSICAL EXAMINATION:  ----------------------------------------  General appearance: NAD, Awake but lethargic  HEENT: NCAT, Conjunctiva clear, EOMI, Pupils equally reactive  Neck: Supple, No JVD, No tenderness  Lungs: Clear to auscultation, Breath sound equal bilaterally, No wheezes, No rales  Cardiovascular: S1S2, Regular rhythm  Abdomen: Soft, Nontender, Nondistended, Positive bowel sounds  Extremities: No clubbing, No cyanosis, No calf tenderness, Mild lower extremity edema  Neuro: Strength equal bilaterally, No tremors  Psychiatric: Awake but lethargic, Not answering questions    LABS:  ------------------------------             10.7   8.5   )-----------( 122      ( 24 Mar 2017 07:10 )             33.4     24 Mar 2017 07:10    131    |  89     |  8.0    ----------------------------<  105    3.8     |  30.0   |  0.63     Ca    9.1        24 Mar 2017 07:10    TPro  8.2    /  Alb  4.1    /  TBili  1.1    /  DBili  x      /  AST  69     /  ALT  27     /  AlkPhos  127    24 Mar 2017 07:10    LIVER FUNCTIONS - ( 24 Mar 2017 07:10 )  Alb: 4.1 g/dL / Pro: 8.2 g/dL / ALK PHOS: 127 U/L / ALT: 27 U/L / AST: 69 U/L / GGT: x           CARDIAC MARKERS ( 24 Mar 2017 07:10 )  x     / x     / 5772 U/L / x     / 7.1 ng/mL      PT/INR - ( 24 Mar 2017 07:08 )   PT: 10.9 sec;   INR: 0.99 ratio    PTT - ( 24 Mar 2017 07:08 )  PTT:39.7 sec    MEDICATIONS  (STANDING):  levETIRAcetam 500milliGRAM(s) Oral two times a day  labetalol 100milliGRAM(s) Oral two times a day  amLODIPine   Tablet 10milliGRAM(s) Oral daily  desmopressin 0.01% Nasal 1Spray(s) Nasal two times a day  sodium chloride 0.9% with potassium chloride 20 mEq/L 1000milliLiter(s) IV Continuous <Continuous>      ASSESSMENT / PLAN:  -----------------------------------  Diabetes insipidus / Hypernatremia - Repeat laboratory results noting decrease in sodium level. CT of the head reviewed showing cerebral edema. On Desmopressin. Intravenous fluids discontinued. Discussed with ICU and the patient is to be transferred to the intensive care unit for further management and monitoring.    Hypokalemia - Improved with supplementation.    HTN - Close blood pressure monitoring on labetalol and amlodipine.    Thrombocytopenia - No bleeding on examination. Past laboratory results also noted thrombocytopenia. Most recent CBC with improvement in the platelet count.

## 2017-03-25 LAB
ANION GAP SERPL CALC-SCNC: 11 MMOL/L — SIGNIFICANT CHANGE UP (ref 5–17)
ANION GAP SERPL CALC-SCNC: 11 MMOL/L — SIGNIFICANT CHANGE UP (ref 5–17)
ANION GAP SERPL CALC-SCNC: 12 MMOL/L — SIGNIFICANT CHANGE UP (ref 5–17)
ANION GAP SERPL CALC-SCNC: 12 MMOL/L — SIGNIFICANT CHANGE UP (ref 5–17)
ANION GAP SERPL CALC-SCNC: 13 MMOL/L — SIGNIFICANT CHANGE UP (ref 5–17)
ANION GAP SERPL CALC-SCNC: 13 MMOL/L — SIGNIFICANT CHANGE UP (ref 5–17)
ANION GAP SERPL CALC-SCNC: 14 MMOL/L — SIGNIFICANT CHANGE UP (ref 5–17)
BUN SERPL-MCNC: 6 MG/DL — LOW (ref 8–20)
BUN SERPL-MCNC: 7 MG/DL — LOW (ref 8–20)
BUN SERPL-MCNC: 8 MG/DL — SIGNIFICANT CHANGE UP (ref 8–20)
BUN SERPL-MCNC: 8 MG/DL — SIGNIFICANT CHANGE UP (ref 8–20)
BUN SERPL-MCNC: 9 MG/DL — SIGNIFICANT CHANGE UP (ref 8–20)
CALCIUM SERPL-MCNC: 8.4 MG/DL — LOW (ref 8.6–10.2)
CALCIUM SERPL-MCNC: 8.4 MG/DL — LOW (ref 8.6–10.2)
CALCIUM SERPL-MCNC: 8.5 MG/DL — LOW (ref 8.6–10.2)
CALCIUM SERPL-MCNC: 8.7 MG/DL — SIGNIFICANT CHANGE UP (ref 8.6–10.2)
CHLORIDE SERPL-SCNC: 92 MMOL/L — LOW (ref 98–107)
CHLORIDE SERPL-SCNC: 95 MMOL/L — LOW (ref 98–107)
CHLORIDE SERPL-SCNC: 97 MMOL/L — LOW (ref 98–107)
CHLORIDE SERPL-SCNC: 98 MMOL/L — SIGNIFICANT CHANGE UP (ref 98–107)
CHLORIDE SERPL-SCNC: 99 MMOL/L — SIGNIFICANT CHANGE UP (ref 98–107)
CK MB CFR SERPL CALC: 5.7 NG/ML — SIGNIFICANT CHANGE UP (ref 0–6.7)
CK MB CFR SERPL CALC: 8.3 NG/ML — HIGH (ref 0–6.7)
CK SERPL-CCNC: 1703 U/L — HIGH (ref 25–170)
CK SERPL-CCNC: 2411 U/L — HIGH (ref 25–170)
CK SERPL-CCNC: 3281 U/L — HIGH (ref 25–170)
CK SERPL-CCNC: 3755 U/L — HIGH (ref 25–170)
CO2 SERPL-SCNC: 26 MMOL/L — SIGNIFICANT CHANGE UP (ref 22–29)
CO2 SERPL-SCNC: 27 MMOL/L — SIGNIFICANT CHANGE UP (ref 22–29)
CO2 SERPL-SCNC: 28 MMOL/L — SIGNIFICANT CHANGE UP (ref 22–29)
CO2 SERPL-SCNC: 28 MMOL/L — SIGNIFICANT CHANGE UP (ref 22–29)
CO2 SERPL-SCNC: 29 MMOL/L — SIGNIFICANT CHANGE UP (ref 22–29)
CREAT SERPL-MCNC: 0.62 MG/DL — SIGNIFICANT CHANGE UP (ref 0.5–1.3)
CREAT SERPL-MCNC: 0.66 MG/DL — SIGNIFICANT CHANGE UP (ref 0.5–1.3)
CREAT SERPL-MCNC: 0.67 MG/DL — SIGNIFICANT CHANGE UP (ref 0.5–1.3)
CREAT SERPL-MCNC: 0.7 MG/DL — SIGNIFICANT CHANGE UP (ref 0.5–1.3)
CREAT SERPL-MCNC: 0.72 MG/DL — SIGNIFICANT CHANGE UP (ref 0.5–1.3)
CREAT SERPL-MCNC: 0.73 MG/DL — SIGNIFICANT CHANGE UP (ref 0.5–1.3)
CREAT SERPL-MCNC: 0.74 MG/DL — SIGNIFICANT CHANGE UP (ref 0.5–1.3)
GLUCOSE SERPL-MCNC: 72 MG/DL — SIGNIFICANT CHANGE UP (ref 70–115)
GLUCOSE SERPL-MCNC: 80 MG/DL — SIGNIFICANT CHANGE UP (ref 70–115)
GLUCOSE SERPL-MCNC: 84 MG/DL — SIGNIFICANT CHANGE UP (ref 70–115)
GLUCOSE SERPL-MCNC: 88 MG/DL — SIGNIFICANT CHANGE UP (ref 70–115)
GLUCOSE SERPL-MCNC: 94 MG/DL — SIGNIFICANT CHANGE UP (ref 70–115)
GLUCOSE SERPL-MCNC: 95 MG/DL — SIGNIFICANT CHANGE UP (ref 70–115)
GLUCOSE SERPL-MCNC: 96 MG/DL — SIGNIFICANT CHANGE UP (ref 70–115)
HCT VFR BLD CALC: 30.1 % — LOW (ref 37–47)
HGB BLD-MCNC: 9.8 G/DL — LOW (ref 12–16)
MAGNESIUM SERPL-MCNC: 2 MG/DL — SIGNIFICANT CHANGE UP (ref 1.8–2.5)
MCHC RBC-ENTMCNC: 25 PG — LOW (ref 27–31)
MCHC RBC-ENTMCNC: 32.6 G/DL — SIGNIFICANT CHANGE UP (ref 32–36)
MCV RBC AUTO: 76.8 FL — LOW (ref 81–99)
PHOSPHATE SERPL-MCNC: 2.4 MG/DL — SIGNIFICANT CHANGE UP (ref 2.4–4.7)
PHOSPHATE SERPL-MCNC: 2.6 MG/DL — SIGNIFICANT CHANGE UP (ref 2.4–4.7)
PHOSPHATE SERPL-MCNC: 2.9 MG/DL — SIGNIFICANT CHANGE UP (ref 2.4–4.7)
PLATELET # BLD AUTO: 130 K/UL — LOW (ref 150–400)
POTASSIUM SERPL-MCNC: 3.2 MMOL/L — LOW (ref 3.5–5.3)
POTASSIUM SERPL-MCNC: 3.5 MMOL/L — SIGNIFICANT CHANGE UP (ref 3.5–5.3)
POTASSIUM SERPL-MCNC: 3.6 MMOL/L — SIGNIFICANT CHANGE UP (ref 3.5–5.3)
POTASSIUM SERPL-MCNC: 3.7 MMOL/L — SIGNIFICANT CHANGE UP (ref 3.5–5.3)
POTASSIUM SERPL-MCNC: 3.8 MMOL/L — SIGNIFICANT CHANGE UP (ref 3.5–5.3)
POTASSIUM SERPL-MCNC: 3.8 MMOL/L — SIGNIFICANT CHANGE UP (ref 3.5–5.3)
POTASSIUM SERPL-MCNC: 4.1 MMOL/L — SIGNIFICANT CHANGE UP (ref 3.5–5.3)
POTASSIUM SERPL-SCNC: 3.2 MMOL/L — LOW (ref 3.5–5.3)
POTASSIUM SERPL-SCNC: 3.5 MMOL/L — SIGNIFICANT CHANGE UP (ref 3.5–5.3)
POTASSIUM SERPL-SCNC: 3.6 MMOL/L — SIGNIFICANT CHANGE UP (ref 3.5–5.3)
POTASSIUM SERPL-SCNC: 3.7 MMOL/L — SIGNIFICANT CHANGE UP (ref 3.5–5.3)
POTASSIUM SERPL-SCNC: 3.8 MMOL/L — SIGNIFICANT CHANGE UP (ref 3.5–5.3)
POTASSIUM SERPL-SCNC: 3.8 MMOL/L — SIGNIFICANT CHANGE UP (ref 3.5–5.3)
POTASSIUM SERPL-SCNC: 4.1 MMOL/L — SIGNIFICANT CHANGE UP (ref 3.5–5.3)
RBC # BLD: 3.92 M/UL — LOW (ref 4.4–5.2)
RBC # FLD: 14.9 % — SIGNIFICANT CHANGE UP (ref 11–15.6)
SODIUM SERPL-SCNC: 132 MMOL/L — LOW (ref 135–145)
SODIUM SERPL-SCNC: 135 MMOL/L — SIGNIFICANT CHANGE UP (ref 135–145)
SODIUM SERPL-SCNC: 136 MMOL/L — SIGNIFICANT CHANGE UP (ref 135–145)
SODIUM SERPL-SCNC: 136 MMOL/L — SIGNIFICANT CHANGE UP (ref 135–145)
SODIUM SERPL-SCNC: 137 MMOL/L — SIGNIFICANT CHANGE UP (ref 135–145)
SODIUM SERPL-SCNC: 138 MMOL/L — SIGNIFICANT CHANGE UP (ref 135–145)
SODIUM SERPL-SCNC: 139 MMOL/L — SIGNIFICANT CHANGE UP (ref 135–145)
WBC # BLD: 8.9 K/UL — SIGNIFICANT CHANGE UP (ref 4.8–10.8)
WBC # FLD AUTO: 8.9 K/UL — SIGNIFICANT CHANGE UP (ref 4.8–10.8)

## 2017-03-25 PROCEDURE — 99233 SBSQ HOSP IP/OBS HIGH 50: CPT

## 2017-03-25 RX ORDER — POTASSIUM CHLORIDE 20 MEQ
20 PACKET (EA) ORAL
Qty: 0 | Refills: 0 | Status: DISCONTINUED | OUTPATIENT
Start: 2017-03-25 | End: 2017-03-25

## 2017-03-25 RX ORDER — SODIUM CHLORIDE 9 MG/ML
1000 INJECTION, SOLUTION INTRAVENOUS
Qty: 0 | Refills: 0 | Status: DISCONTINUED | OUTPATIENT
Start: 2017-03-25 | End: 2017-03-25

## 2017-03-25 RX ORDER — SODIUM CHLORIDE 5 G/100ML
500 INJECTION, SOLUTION INTRAVENOUS
Qty: 0 | Refills: 0 | Status: DISCONTINUED | OUTPATIENT
Start: 2017-03-25 | End: 2017-03-25

## 2017-03-25 RX ORDER — SODIUM CHLORIDE 9 MG/ML
1000 INJECTION, SOLUTION INTRAVENOUS
Qty: 0 | Refills: 0 | Status: DISCONTINUED | OUTPATIENT
Start: 2017-03-25 | End: 2017-03-27

## 2017-03-25 RX ORDER — DESMOPRESSIN ACETATE 0.1 MG/1
1 TABLET ORAL DAILY
Qty: 0 | Refills: 0 | Status: DISCONTINUED | OUTPATIENT
Start: 2017-03-25 | End: 2017-04-10

## 2017-03-25 RX ADMIN — DESMOPRESSIN ACETATE 1 SPRAY(S): 0.1 TABLET ORAL at 12:46

## 2017-03-25 RX ADMIN — SODIUM CHLORIDE 50 MILLILITER(S): 9 INJECTION, SOLUTION INTRAVENOUS at 17:44

## 2017-03-25 RX ADMIN — Medication 10 MILLIGRAM(S): at 02:09

## 2017-03-25 RX ADMIN — LEVETIRACETAM 400 MILLIGRAM(S): 250 TABLET, FILM COATED ORAL at 17:43

## 2017-03-25 RX ADMIN — Medication 50 MILLIEQUIVALENT(S): at 11:32

## 2017-03-25 RX ADMIN — LEVETIRACETAM 400 MILLIGRAM(S): 250 TABLET, FILM COATED ORAL at 06:16

## 2017-03-25 RX ADMIN — Medication 50 MILLIEQUIVALENT(S): at 13:54

## 2017-03-25 RX ADMIN — SODIUM CHLORIDE 75 MILLILITER(S): 9 INJECTION, SOLUTION INTRAVENOUS at 07:09

## 2017-03-25 NOTE — PHARMACY COMMUNICATION NOTE - COMMENTS
Spoke to PA regarding stop of 24 hours for hypertonic saline.  PA said patient is a complicated case and is being monitoring/having rate changes hourly. PA wants to keep order standing.

## 2017-03-25 NOTE — PROGRESS NOTE ADULT - SUBJECTIVE AND OBJECTIVE BOX
HOSPITALIST PROGRESS NOTE    KRUPA MEDINA  [id number]  51yFemale    Patient is a 51y old  Female who presents with a chief complaint of altered mental status (20 Mar 2017 14:01)      SUBJECTIVE: Chart reviewed since last visit. Patient seen and examined at bedside.      OBJECTIVE:  Vital Signs Last 24 Hrs  T(C): 37.3, Max: 37.3 ( @ 19:00)  T(F): 99.1, Max: 99.1 ( @ 19:00)  HR: 77 (68 - 97)  BP: 142/92 (122/83 - 171/105)  BP(mean): 112 (90 - 131)  RR: 17 (12 - 40)  SpO2: 99% (94% - 100%)    PHYSICAL EXAMINATION  General: NAD[]   nontoxic[]   ill-appearing[]  Obese[]  HEENT: AT/NC[]  PERRLA[]  EOMI[]   Moist oral mucosa[]  Pharyngeal exudates[]  NECK: Supple[]  JVD[] Carotid bruit[]  CVS: RRR[]  Irregular[]  S1+S2[]   Murmur[]  RESP: Fair air entry bilaterally[]   Clear sounds[]   poor effort []  wheeze[]   Crackles[]  GI: Soft[]  Nondistended[]   Nontender[]   Bowel Sounds[]  Mass[]   HSM[]   Ascites[]  : suprapubic tenderness[]   CVA Tenderness[]   Herrera[]  MS: FROM[]   Edema[]  CNS: AAOx3[]    Motor strength /5     DTR +    Sensory    Coordination    Gait  INTEG: Skin is Warm[]  dry[] Lesion[] Decubitus[]  PSYCH: Mood, Affect    MONITOR:  CAPILLARY BLOOD GLUCOSE        I&O's Summary  I & Os for 24h ending 25 Mar 2017 07:00  =============================================  IN: 1585 ml / OUT: 2260 ml / NET: -675 ml    I & Os for current day (as of 25 Mar 2017 19:21)  =============================================  IN: 1315 ml / OUT: 1130 ml / NET: 185 ml                          9.8    8.9   )-----------( 130      ( 25 Mar 2017 07:24 )             30.1     PT/INR - ( 24 Mar 2017 07:08 )   PT: 10.9 sec;   INR: 0.99 ratio         PTT - ( 24 Mar 2017 07:08 )  PTT:39.7 sec  25 Mar 2017 16:56    136    |  97     |  7.0    ----------------------------<  88     4.1     |  27.0   |  0.70     Ca    8.4        25 Mar 2017 16:56  Phos  2.6       25 Mar 2017 07:23  Mg     2.0       25 Mar 2017 07:23    TPro  8.2    /  Alb  4.1    /  TBili  1.1    /  DBili  x      /  AST  69     /  ALT  27     /  AlkPhos  127    24 Mar 2017 07:10    CARDIAC MARKERS ( 25 Mar 2017 12:11 )  x     / x     / 2411 U/L / x     / 5.7 ng/mL  CARDIAC MARKERS ( 25 Mar 2017 07:23 )  x     / x     / 3281 U/L / x     / 8.3 ng/mL  CARDIAC MARKERS ( 25 Mar 2017 03:31 )  x     / x     / 3755 U/L / x     / 9.6 ng/mL  CARDIAC MARKERS ( 24 Mar 2017 07:10 )  x     / x     / 5772 U/L / x     / 7.1 ng/mL      Urinalysis Basic - ( 24 Mar 2017 21:15 )    Color: Yellow / Appearance: Slightly Turbid / S.025 / pH: x  Gluc: x / Ketone: Small  / Bili: Negative / Urobili: Negative mg/dL   Blood: x / Protein: 30 mg/dL / Nitrite: Negative   Leuk Esterase: Negative / RBC: 0-2 /HPF / WBC Negative   Sq Epi: x / Non Sq Epi: Occasional / Bacteria: x        Culture:    TTE:    RADIOLOGY        MEDICATIONS  (STANDING):  amLODIPine   Tablet 10milliGRAM(s) Oral daily  levETIRAcetam  IVPB 1000milliGRAM(s) IV Intermittent every 12 hours  desmopressin 0.01% Nasal 1Spray(s) Nasal daily  dextrose 5%. 1000milliLiter(s) IV Continuous <Continuous>  multiple electrolytes Injection Type 1 1000milliLiter(s) IV Continuous <Continuous>      MEDICATIONS  (PRN):  labetalol Injectable 10milliGRAM(s) IV Push every 4 hours PRN Systolic blood pressure > 160 HOSPITALIST PROGRESS NOTE    KRUPA MEDINA  [id number]  51yFemale    Patient is a 51y old  Female who presents with a chief complaint of altered mental status (20 Mar 2017 14:01)      SUBJECTIVE: Chart reviewed since admission. ICU, Nephrology, Neurology consults and notes appreciated.  Patient seen and examined at bedside. More awake, no seizure. Denies headache or dizziness.      OBJECTIVE:  Vital Signs Last 24 Hrs  T(C): 37.3, Max: 37.3 ( @ 19:00)  T(F): 99.1, Max: 99.1 ( @ 19:00)  HR: 77 (68 - 97)  BP: 142/92 (122/83 - 171/105)  BP(mean): 112 (90 - 131)  RR: 17 (12 - 40)  SpO2: 99% (94% - 100%)    PHYSICAL EXAMINATION  General: NAD[+]   nontoxic[]   ill-appearing[]  Obese[]  HEENT: AT/NC[+]  PERRLA[]  EOMI[]   Moist oral mucosa[]  Pharyngeal exudates[]  NECK: Supple[+]  JVD[] Carotid bruit[]  CVS: RRR[+]  Irregular[]  S1+S2[+]   Murmur[]  RESP: Fair air entry bilaterally[+]   Clear sounds[+]   poor effort []  wheeze[]   Crackles[]  GI: Soft[+]  Nondistended[+]   Nontender[+]   Bowel Sounds[+]  Mass[]   HSM[]   Ascites[]  : suprapubic tenderness[-]   CVA Tenderness[]   Herrera[]  MS: FROM[+]   Edema[-]  CNS: Awake, aware that in hospital, and March. Generalized weakness  INTEG: Skin is Warm[+]    PSYCH: Fair Mood, Affect      I&O's Summary  I & Os for 24h ending 25 Mar 2017 07:00  =============================================  IN: 1585 ml / OUT: 2260 ml / NET: -675 ml    I & Os for current day (as of 25 Mar 2017 19:21)  =============================================  IN: 1315 ml / OUT: 1130 ml / NET: 185 ml                          9.8    8.9   )-----------( 130      ( 25 Mar 2017 07:24 )             30.1     PT/INR - ( 24 Mar 2017 07:08 )   PT: 10.9 sec;   INR: 0.99 ratio         PTT - ( 24 Mar 2017 07:08 )  PTT:39.7 sec  25 Mar 2017 16:56    136    |  97     |  7.0    ----------------------------<  88     4.1     |  27.0   |  0.70     Ca    8.4        25 Mar 2017 16:56  Phos  2.6       25 Mar 2017 07:23  Mg     2.0       25 Mar 2017 07:23    TPro  8.2    /  Alb  4.1    /  TBili  1.1    /  DBili  x      /  AST  69     /  ALT  27     /  AlkPhos  127    24 Mar 2017 07:10    CARDIAC MARKERS ( 25 Mar 2017 12:11 )  x     / x     / 2411 U/L / x     / 5.7 ng/mL  CARDIAC MARKERS ( 25 Mar 2017 07:23 )  x     / x     / 3281 U/L / x     / 8.3 ng/mL  CARDIAC MARKERS ( 25 Mar 2017 03:31 )  x     / x     / 3755 U/L / x     / 9.6 ng/mL  CARDIAC MARKERS ( 24 Mar 2017 07:10 )  x     / x     / 5772 U/L / x     / 7.1 ng/mL      Urinalysis Basic - ( 24 Mar 2017 21:15 )    Color: Yellow / Appearance: Slightly Turbid / S.025 / pH: x  Gluc: x / Ketone: Small  / Bili: Negative / Urobili: Negative mg/dL   Blood: x / Protein: 30 mg/dL / Nitrite: Negative   Leuk Esterase: Negative / RBC: 0-2 /HPF / WBC Negative   Sq Epi: x / Non Sq Epi: Occasional / Bacteria: x           RADIOLOGY - noted        MEDICATIONS  (STANDING):  amLODIPine   Tablet 10milliGRAM(s) Oral daily  levETIRAcetam  IVPB 1000milliGRAM(s) IV Intermittent every 12 hours  desmopressin 0.01% Nasal 1Spray(s) Nasal daily  dextrose 5%. 1000milliLiter(s) IV Continuous <Continuous>  multiple electrolytes Injection Type 1 1000milliLiter(s) IV Continuous <Continuous>      MEDICATIONS  (PRN):  labetalol Injectable 10milliGRAM(s) IV Push every 4 hours PRN Systolic blood pressure > 160

## 2017-03-25 NOTE — PROGRESS NOTE ADULT - PROBLEM SELECTOR PLAN 1
Seizure likely secondary to metabolic derangements.  Continue Keppra 1000 mg bid  EEG was mostly obscured by artifact but without evidence of seizures.  Can repeat EEG on Monday if necessary.

## 2017-03-25 NOTE — PROGRESS NOTE ADULT - SUBJECTIVE AND OBJECTIVE BOX
CHIEF COMPLAINT:  seizures    INTERVAL HISTORY:  No new complaints. She denies headache. Her  is at the bedside who says that she is slowly improving although she is not yet at her baseline.      VITAL SIGNS:  Vital Signs Last 24 Hrs  T(C): 37, Max: 37 (03-25 @ 10:00)  T(F): 98.6, Max: 98.6 (03-25 @ 10:00)  HR: 76 (65 - 98)  BP: 123/88 (120/72 - 171/105)  BP(mean): 93 (89 - 136)  RR: 27 (13 - 40)  SpO2: 99% (94% - 100%)    PHYSICAL EXAMINATION:  General: Well-developed, well nourished, in no acute distress.  Eyes: Conjunctiva and sclera clear.  Neurologic:  - Mental Status:  Alert, awake, oriented to person, knows she is in a hospital.  Following commands  Language: no aphasia  Cranial Nerves:  Pupils equally round and reactive to light  Extraocular movements intact  No facial weakness, tongue protrudes in the midline  Speech is mildly dysarthric  Motor:  no focal weakness  sensory: unreliable      MEDS:  MEDICATIONS  (STANDING):  amLODIPine   Tablet 10milliGRAM(s) Oral daily  levETIRAcetam  IVPB 1000milliGRAM(s) IV Intermittent every 12 hours  dextrose 5%. 1000milliLiter(s) IV Continuous <Continuous>  desmopressin 0.01% Nasal 1Spray(s) Nasal daily  potassium chloride  20 mEq/100 mL IVPB 20milliEquivalent(s) IV Intermittent every 2 hours    MEDICATIONS  (PRN):  labetalol Injectable 10milliGRAM(s) IV Push every 4 hours PRN Systolic blood pressure > 160      LABS:                          9.8    8.9   )-----------( 130      ( 25 Mar 2017 07:24 )             30.1     25 Mar 2017 12:11    139    |  99     |  7.0    ----------------------------<  95     3.8     |  29.0   |  0.74     Ca    8.5        25 Mar 2017 12:11  Phos  2.6       25 Mar 2017 07:23  Mg     2.0       25 Mar 2017 07:23    TPro  8.2    /  Alb  4.1    /  TBili  1.1    /  DBili  x      /  AST  69     /  ALT  27     /  AlkPhos  127    24 Mar 2017 07:10    LIVER FUNCTIONS - ( 24 Mar 2017 07:10 )  Alb: 4.1 g/dL / Pro: 8.2 g/dL / ALK PHOS: 127 U/L / ALT: 27 U/L / AST: 69 U/L / GGT: x               CT brain 3/24/17: diffuse cerebral edema

## 2017-03-25 NOTE — PROGRESS NOTE ADULT - ASSESSMENT
Diabetes insipidus / Hypernatremia - Repeat laboratory results noting decrease in sodium level. CT of the head reviewed showing cerebral edema. On Desmopressin. Intravenous fluids discontinued. Discussed with ICU and the patient is to be transferred to the intensive care unit for further management and monitoring. Now with normalizing SNa. Encephalopathy resolving. No further seizures. Restarted on hypotonic IVF, Desmopressin with stable SNa.    Hypokalemia - Improved with supplementation. Remains stable.    HTN - Close blood pressure monitoring on labetalol and amlodipine.    Thrombocytopenia - No bleeding on examination. Past laboratory results also noted thrombocytopenia. Most recent CBC with improvement in the platelet count.    Will follow.

## 2017-03-25 NOTE — PROGRESS NOTE ADULT - SUBJECTIVE AND OBJECTIVE BOX
Patient is a 51y old  Female who presents with a chief complaint of altered mental status (20 Mar 2017 14:01)      BRIEF HOSPITAL COURSE:     Events last 24 hours: ***    PAST MEDICAL & SURGICAL HISTORY:  Memory loss, short term  Brain aneurysm  Diabetes insipidus  DM (Diabetes Mellitus)  HTN (Hypertension)  Subarachnoid Hemorrhage  S/P Cerebral Aneurysm Repair  S/P Craniotomy      Review of Systems:  CONSTITUTIONAL: No fever, chills, or fatigue  EYES: No eye pain, visual disturbances, or discharge  ENMT:  No difficulty hearing, tinnitus, vertigo; No sinus or throat pain  NECK: No pain or stiffness  RESPIRATORY: No cough, wheezing, chills or hemoptysis; No shortness of breath  CARDIOVASCULAR: No chest pain, palpitations, dizziness, or leg swelling  GASTROINTESTINAL: No abdominal or epigastric pain. No nausea, vomiting, or hematemesis; No diarrhea or constipation. No melena or hematochezia.  GENITOURINARY: No dysuria, frequency, hematuria, or incontinence  NEUROLOGICAL: No headaches, memory loss, loss of strength, numbness, or tremors  SKIN: No itching, burning, rashes, or lesions   MUSCULOSKELETAL: No joint pain or swelling; No muscle, back, or extremity pain  PSYCHIATRIC: No depression, anxiety, mood swings, or difficulty sleeping      Medications:    amLODIPine   Tablet 10milliGRAM(s) Oral daily  labetalol Injectable 10milliGRAM(s) IV Push every 4 hours PRN      levETIRAcetam  IVPB 1000milliGRAM(s) IV Intermittent every 12 hours            desmopressin 0.01% Nasal 1Spray(s) Nasal daily    dextrose 5%. 1000milliLiter(s) IV Continuous <Continuous>  multiple electrolytes Injection Type 1 1000milliLiter(s) IV Continuous <Continuous>                ICU Vital Signs Last 24 Hrs  T(C): 37.3, Max: 37.3 (- @ 19:00)  T(F): 99.1, Max: 99.1 (- @ 19:00)  HR: 77 (68 - 97)  BP: 142/92 (122/83 - 171/105)  BP(mean): 112 (90 - 131)  ABP: --  ABP(mean): --  RR: 17 (12 - 40)  SpO2: 99% (94% - 100%)          I&O's Detail  I & Os for 24h ending 25 Mar 2017 07:00  =============================================  IN:    sodium chloride 3%: 360 ml    sodium chloride 0.9% with potassium chloride 20 mEq/L: 300 ml    Solution: 300 ml    Solution: 250 ml    sodium chloride 3%: 150 ml    Solution: 150 ml    dextrose 5%.: 75 ml    Total IN: 1585 ml  ---------------------------------------------  OUT:    Indwelling Catheter - Urethral: 2260 ml    Total OUT: 2260 ml  ---------------------------------------------  Total NET: -675 ml    I & Os for current day (as of 25 Mar 2017 19:25)  =============================================  IN:    dextrose 5%.: 600 ml    multiple electrolytes Injection Type 1: 215 ml    dextrose 5%.: 200 ml    Solution: 200 ml    Solution: 100 ml    Total IN: 1315 ml  ---------------------------------------------  OUT:    Indwelling Catheter - Urethral: 1130 ml    Total OUT: 1130 ml  ---------------------------------------------  Total NET: 185 ml        LABS:                        9.8    8.9   )-----------( 130      ( 25 Mar 2017 07:24 )             30.1     25 Mar 2017 16:56    136    |  97     |  7.0    ----------------------------<  88     4.1     |  27.0   |  0.70     Ca    8.4        25 Mar 2017 16:56  Phos  2.6       25 Mar 2017 07:23  Mg     2.0       25 Mar 2017 07:23    TPro  8.2    /  Alb  4.1    /  TBili  1.1    /  DBili  x      /  AST  69     /  ALT  27     /  AlkPhos  127    24 Mar 2017 07:10      CARDIAC MARKERS ( 25 Mar 2017 12:11 )  x     / x     / 2411 U/L / x     / 5.7 ng/mL  CARDIAC MARKERS ( 25 Mar 2017 07:23 )  x     / x     / 3281 U/L / x     / 8.3 ng/mL  CARDIAC MARKERS ( 25 Mar 2017 03:31 )  x     / x     / 3755 U/L / x     / 9.6 ng/mL  CARDIAC MARKERS ( 24 Mar 2017 07:10 )  x     / x     / 5772 U/L / x     / 7.1 ng/mL      CAPILLARY BLOOD GLUCOSE  111 (24 Mar 2017 14:48)    PT/INR - ( 24 Mar 2017 07:08 )   PT: 10.9 sec;   INR: 0.99 ratio         PTT - ( 24 Mar 2017 07:08 )  PTT:39.7 sec  Urinalysis Basic - ( 24 Mar 2017 21:15 )    Color: Yellow / Appearance: Slightly Turbid / S.025 / pH: x  Gluc: x / Ketone: Small  / Bili: Negative / Urobili: Negative mg/dL   Blood: x / Protein: 30 mg/dL / Nitrite: Negative   Leuk Esterase: Negative / RBC: 0-2 /HPF / WBC Negative   Sq Epi: x / Non Sq Epi: Occasional / Bacteria: x      CULTURES:      Physical Examination:    General: No acute distress.  Alert, oriented, interactive, nonfocal    HEENT: Pupils equal, reactive to light.  Symmetric.    PULM: Clear to auscultation bilaterally, no significant sputum production    CVS: Regular rate and rhythm, no murmurs, rubs, or gallops    ABD: Soft, nondistended, nontender, normoactive bowel sounds, no masses    EXT: No edema, nontender    SKIN: Warm and well perfused, no rashes noted.    RADIOLOGY: ***    CRITICAL CARE TIME SPENT: *** Patient is a 51y old  Female who presents with a chief complaint of altered mental status (20 Mar 2017 14:01)      BRIEF HOSPITAL COURSE: Pt is a51 yof with DI presented with hypernatremia - became hyponatremic presenting with cerebral edema, ?seizure    Events last 24 hours: Improved    PAST MEDICAL & SURGICAL HISTORY:  Memory loss, short term  Brain aneurysm  Diabetes insipidus  DM (Diabetes Mellitus)  HTN (Hypertension)  Subarachnoid Hemorrhage  S/P Cerebral Aneurysm Repair  S/P Craniotomy      Review of Systems:  limited      Medications:    amLODIPine   Tablet 10milliGRAM(s) Oral daily  labetalol Injectable 10milliGRAM(s) IV Push every 4 hours PRN  levETIRAcetam  IVPB 1000milliGRAM(s) IV Intermittent every 12 hours  desmopressin 0.01% Nasal 1Spray(s) Nasal daily  dextrose 5%. 1000milliLiter(s) IV Continuous <Continuous>  multiple electrolytes Injection Type 1 1000milliLiter(s) IV Continuous <Continuous>      ICU Vital Signs Last 24 Hrs  T(C): 37.3, Max: 37.3 ( @ 19:00)  T(F): 99.1, Max: 99.1 (- @ 19:00)  HR: 77 (68 - 97)  BP: 142/92 (122/83 - 171/105)  BP(mean): 112 (90 - 131)  ABP: --  ABP(mean): --  RR: 17 (12 - 40)  SpO2: 99% (94% - 100%)          I&O's Detail  I & Os for 24h ending 25 Mar 2017 07:00  =============================================  IN:    sodium chloride 3%: 360 ml    sodium chloride 0.9% with potassium chloride 20 mEq/L: 300 ml    Solution: 300 ml    Solution: 250 ml    sodium chloride 3%: 150 ml    Solution: 150 ml    dextrose 5%.: 75 ml    Total IN: 1585 ml  ---------------------------------------------  OUT:    Indwelling Catheter - Urethral: 2260 ml    Total OUT: 2260 ml  ---------------------------------------------  Total NET: -675 ml    I & Os for current day (as of 25 Mar 2017 19:25)  =============================================  IN:    dextrose 5%.: 600 ml    multiple electrolytes Injection Type 1: 215 ml    dextrose 5%.: 200 ml    Solution: 200 ml    Solution: 100 ml    Total IN: 1315 ml  ---------------------------------------------  OUT:    Indwelling Catheter - Urethral: 1130 ml    Total OUT: 1130 ml  ---------------------------------------------  Total NET: 185 ml        LABS:                        9.8    8.9   )-----------( 130      ( 25 Mar 2017 07:24 )             30.1     25 Mar 2017 16:56    136    |  97     |  7.0    ----------------------------<  88     4.1     |  27.0   |  0.70     Ca    8.4        25 Mar 2017 16:56  Phos  2.6       25 Mar 2017 07:23  Mg     2.0       25 Mar 2017 07:23    TPro  8.2    /  Alb  4.1    /  TBili  1.1    /  DBili  x      /  AST  69     /  ALT  27     /  AlkPhos  127    24 Mar 2017 07:10      CARDIAC MARKERS ( 25 Mar 2017 12:11 )  x     / x     / 2411 U/L / x     / 5.7 ng/mL  CARDIAC MARKERS ( 25 Mar 2017 07:23 )  x     / x     / 3281 U/L / x     / 8.3 ng/mL  CARDIAC MARKERS ( 25 Mar 2017 03:31 )  x     / x     / 3755 U/L / x     / 9.6 ng/mL  CARDIAC MARKERS ( 24 Mar 2017 07:10 )  x     / x     / 5772 U/L / x     / 7.1 ng/mL      CAPILLARY BLOOD GLUCOSE  111 (24 Mar 2017 14:48)    PT/INR - ( 24 Mar 2017 07:08 )   PT: 10.9 sec;   INR: 0.99 ratio         PTT - ( 24 Mar 2017 07:08 )  PTT:39.7 sec  Urinalysis Basic - ( 24 Mar 2017 21:15 )    Color: Yellow / Appearance: Slightly Turbid / S.025 / pH: x  Gluc: x / Ketone: Small  / Bili: Negative / Urobili: Negative mg/dL   Blood: x / Protein: 30 mg/dL / Nitrite: Negative   Leuk Esterase: Negative / RBC: 0-2 /HPF / WBC Negative   Sq Epi: x / Non Sq Epi: Occasional / Bacteria: x      CULTURES:      Physical Examination:    General: No acute distress.  Alert, confused    HEENT: Pupils equal, reactive to light.  Symmetric.    PULM: Clear to auscultation bilaterally, no significant sputum production    CVS: Regular rate and rhythm, no murmurs, rubs, or gallops    ABD: Soft, nondistended, nontender, normoactive bowel sounds, no masses    EXT: No edema, nontender    SKIN: Warm and well perfused, no rashes noted.

## 2017-03-25 NOTE — PROGRESS NOTE ADULT - ASSESSMENT
51 year old woman with history of intracranial hemorrhage secondary to ruptured aneurysm with witnessed seizure in setting of fluctuations in sodium level - initially admitted with hypernatremia and then became hyponatremic.

## 2017-03-25 NOTE — PROGRESS NOTE ADULT - SUBJECTIVE AND OBJECTIVE BOX
NEPHROLOGY INTERVAL HPI/OVERNIGHT EVENTS: no new events  overnight.    MEDICATIONS  (STANDING):  amLODIPine   Tablet 10milliGRAM(s) Oral daily  levETIRAcetam  IVPB 1000milliGRAM(s) IV Intermittent every 12 hours  dextrose 5%. 1000milliLiter(s) IV Continuous <Continuous>    MEDICATIONS  (PRN):  labetalol Injectable 10milliGRAM(s) IV Push every 4 hours PRN Systolic blood pressure > 160      Allergies    No Known Allergies    Intolerances        Vital Signs Last 24 Hrs  T(C): 36.6, Max: 36.6 ( @ 09:43)  T(F): 97.9, Max: 97.9 ( @ 06:00)  HR: 84 (65 - 98)  BP: 146/76 (120/72 - 171/105)  BP(mean): 104 (80 - 136)  RR: 20 (13 - 40)  SpO2: 100% (76% - 100%)  Daily     Daily Weight in k.4 (25 Mar 2017 04:00)    PHYSICAL EXAM:    GENERAL: same  HEAD:    EYES: open  ENMT:   NECK: veins wnl  NERVOUS SYSTEM:  awake, no tremors, moving arms  CHEST/LUNG: clear  HEART: no rub  ABDOMEN: not  tender  EXTREMITIES:  same  LYMPH:   SKIN:     LABS:                        10.7   8.5   )-----------( 122      ( 24 Mar 2017 07:10 )             33.4     25 Mar 2017 03:31    135    |  95     |  8.0    ----------------------------<  84     3.6     |  28.0   |  0.62     Ca    8.5        25 Mar 2017 03:31  Phos  2.4       25 Mar 2017 03:31  Mg     2.0       25 Mar 2017 03:31    TPro  8.2    /  Alb  4.1    /  TBili  1.1    /  DBili  x      /  AST  69     /  ALT  27     /  AlkPhos  127    24 Mar 2017 07:10    PT/INR - ( 24 Mar 2017 07:08 )   PT: 10.9 sec;   INR: 0.99 ratio         PTT - ( 24 Mar 2017 07:08 )  PTT:39.7 sec  Urinalysis Basic - ( 24 Mar 2017 21:15 )    Color: Yellow / Appearance: Slightly Turbid / S.025 / pH: x  Gluc: x / Ketone: Small  / Bili: Negative / Urobili: Negative mg/dL   Blood: x / Protein: 30 mg/dL / Nitrite: Negative   Leuk Esterase: Negative / RBC: 0-2 /HPF / WBC Negative   Sq Epi: x / Non Sq Epi: Occasional / Bacteria: x      Magnesium, Serum: 2.0 mg/dL ( @ 03:31)  Phosphorus Level, Serum: 2.4 mg/dL ( @ 03:31)  Magnesium, Serum: 2.0 mg/dL ( @ 00:43)  Phosphorus Level, Serum: 2.9 mg/dL ( @ 00:43)  Magnesium, Serum: 2.0 mg/dL ( @ 22:15)  Phosphorus Level, Serum: 3.0 mg/dL ( @ 22:15)  Magnesium, Serum: 2.2 mg/dL ( @ 19:25)  Phosphorus Level, Serum: 3.6 mg/dL ( @ 19:25)  Magnesium, Serum: 2.3 mg/dL ( @ 15:17)  Phosphorus Level, Serum: 3.5 mg/dL ( @ 15:17)  Magnesium, Serum: 1.4 mg/dL ( @ 12:54)  Phosphorus Level, Serum: 2.1 mg/dL ( @ 08:48)          RADIOLOGY & ADDITIONAL TESTS:

## 2017-03-26 DIAGNOSIS — E87.1 HYPO-OSMOLALITY AND HYPONATREMIA: ICD-10-CM

## 2017-03-26 LAB
ANION GAP SERPL CALC-SCNC: 10 MMOL/L — SIGNIFICANT CHANGE UP (ref 5–17)
ANION GAP SERPL CALC-SCNC: 12 MMOL/L — SIGNIFICANT CHANGE UP (ref 5–17)
ANION GAP SERPL CALC-SCNC: 13 MMOL/L — SIGNIFICANT CHANGE UP (ref 5–17)
ANION GAP SERPL CALC-SCNC: 13 MMOL/L — SIGNIFICANT CHANGE UP (ref 5–17)
APPEARANCE UR: CLEAR — SIGNIFICANT CHANGE UP
BACTERIA # UR AUTO: ABNORMAL
BILIRUB UR-MCNC: NEGATIVE — SIGNIFICANT CHANGE UP
BUN SERPL-MCNC: 10 MG/DL — SIGNIFICANT CHANGE UP (ref 8–20)
BUN SERPL-MCNC: 6 MG/DL — LOW (ref 8–20)
BUN SERPL-MCNC: 7 MG/DL — LOW (ref 8–20)
BUN SERPL-MCNC: 7 MG/DL — LOW (ref 8–20)
CALCIUM SERPL-MCNC: 8.3 MG/DL — LOW (ref 8.6–10.2)
CALCIUM SERPL-MCNC: 8.6 MG/DL — SIGNIFICANT CHANGE UP (ref 8.6–10.2)
CALCIUM SERPL-MCNC: 8.6 MG/DL — SIGNIFICANT CHANGE UP (ref 8.6–10.2)
CALCIUM SERPL-MCNC: 9 MG/DL — SIGNIFICANT CHANGE UP (ref 8.6–10.2)
CHLORIDE SERPL-SCNC: 97 MMOL/L — LOW (ref 98–107)
CHLORIDE SERPL-SCNC: 98 MMOL/L — SIGNIFICANT CHANGE UP (ref 98–107)
CHLORIDE SERPL-SCNC: 99 MMOL/L — SIGNIFICANT CHANGE UP (ref 98–107)
CHLORIDE SERPL-SCNC: 99 MMOL/L — SIGNIFICANT CHANGE UP (ref 98–107)
CO2 SERPL-SCNC: 27 MMOL/L — SIGNIFICANT CHANGE UP (ref 22–29)
CO2 SERPL-SCNC: 27 MMOL/L — SIGNIFICANT CHANGE UP (ref 22–29)
CO2 SERPL-SCNC: 28 MMOL/L — SIGNIFICANT CHANGE UP (ref 22–29)
CO2 SERPL-SCNC: 29 MMOL/L — SIGNIFICANT CHANGE UP (ref 22–29)
COLOR SPEC: YELLOW — SIGNIFICANT CHANGE UP
COMMENT - URINE: SIGNIFICANT CHANGE UP
CREAT SERPL-MCNC: 0.71 MG/DL — SIGNIFICANT CHANGE UP (ref 0.5–1.3)
CREAT SERPL-MCNC: 0.74 MG/DL — SIGNIFICANT CHANGE UP (ref 0.5–1.3)
CREAT SERPL-MCNC: 0.79 MG/DL — SIGNIFICANT CHANGE UP (ref 0.5–1.3)
CREAT SERPL-MCNC: 0.85 MG/DL — SIGNIFICANT CHANGE UP (ref 0.5–1.3)
CULTURE RESULTS: NO GROWTH — SIGNIFICANT CHANGE UP
DIFF PNL FLD: ABNORMAL
EPI CELLS # UR: ABNORMAL
GLUCOSE SERPL-MCNC: 72 MG/DL — SIGNIFICANT CHANGE UP (ref 70–115)
GLUCOSE SERPL-MCNC: 78 MG/DL — SIGNIFICANT CHANGE UP (ref 70–115)
GLUCOSE SERPL-MCNC: 82 MG/DL — SIGNIFICANT CHANGE UP (ref 70–115)
GLUCOSE SERPL-MCNC: 84 MG/DL — SIGNIFICANT CHANGE UP (ref 70–115)
GLUCOSE UR QL: NEGATIVE MG/DL — SIGNIFICANT CHANGE UP
HCT VFR BLD CALC: 28.6 % — LOW (ref 37–47)
HGB BLD-MCNC: 9 G/DL — LOW (ref 12–16)
KETONES UR-MCNC: NEGATIVE — SIGNIFICANT CHANGE UP
LEUKOCYTE ESTERASE UR-ACNC: ABNORMAL
MAGNESIUM SERPL-MCNC: 2 MG/DL — SIGNIFICANT CHANGE UP (ref 1.8–2.5)
MCHC RBC-ENTMCNC: 24.5 PG — LOW (ref 27–31)
MCHC RBC-ENTMCNC: 31.5 G/DL — LOW (ref 32–36)
MCV RBC AUTO: 77.7 FL — LOW (ref 81–99)
NITRITE UR-MCNC: NEGATIVE — SIGNIFICANT CHANGE UP
OSMOLALITY UR: 422 MOSM/KG — SIGNIFICANT CHANGE UP (ref 300–1000)
PH UR: 5 — SIGNIFICANT CHANGE UP (ref 4.8–8)
PHOSPHATE SERPL-MCNC: 2.4 MG/DL — SIGNIFICANT CHANGE UP (ref 2.4–4.7)
PLATELET # BLD AUTO: 113 K/UL — LOW (ref 150–400)
POTASSIUM SERPL-MCNC: 3.7 MMOL/L — SIGNIFICANT CHANGE UP (ref 3.5–5.3)
POTASSIUM SERPL-MCNC: 3.8 MMOL/L — SIGNIFICANT CHANGE UP (ref 3.5–5.3)
POTASSIUM SERPL-MCNC: 4.2 MMOL/L — SIGNIFICANT CHANGE UP (ref 3.5–5.3)
POTASSIUM SERPL-MCNC: 4.2 MMOL/L — SIGNIFICANT CHANGE UP (ref 3.5–5.3)
POTASSIUM SERPL-SCNC: 3.7 MMOL/L — SIGNIFICANT CHANGE UP (ref 3.5–5.3)
POTASSIUM SERPL-SCNC: 3.8 MMOL/L — SIGNIFICANT CHANGE UP (ref 3.5–5.3)
POTASSIUM SERPL-SCNC: 4.2 MMOL/L — SIGNIFICANT CHANGE UP (ref 3.5–5.3)
POTASSIUM SERPL-SCNC: 4.2 MMOL/L — SIGNIFICANT CHANGE UP (ref 3.5–5.3)
POTASSIUM UR-SCNC: 42 MMOL/L — SIGNIFICANT CHANGE UP
PROT UR-MCNC: 30 MG/DL
RBC # BLD: 3.68 M/UL — LOW (ref 4.4–5.2)
RBC # FLD: 15.3 % — SIGNIFICANT CHANGE UP (ref 11–15.6)
RBC CASTS # UR COMP ASSIST: ABNORMAL /HPF (ref 0–4)
SODIUM SERPL-SCNC: 137 MMOL/L — SIGNIFICANT CHANGE UP (ref 135–145)
SODIUM SERPL-SCNC: 137 MMOL/L — SIGNIFICANT CHANGE UP (ref 135–145)
SODIUM SERPL-SCNC: 138 MMOL/L — SIGNIFICANT CHANGE UP (ref 135–145)
SODIUM SERPL-SCNC: 140 MMOL/L — SIGNIFICANT CHANGE UP (ref 135–145)
SODIUM UR-SCNC: 65 MMOL/L — SIGNIFICANT CHANGE UP
SP GR SPEC: 1.02 — SIGNIFICANT CHANGE UP (ref 1.01–1.02)
SPECIMEN SOURCE: SIGNIFICANT CHANGE UP
UROBILINOGEN FLD QL: NEGATIVE MG/DL — SIGNIFICANT CHANGE UP
WBC # BLD: 7.4 K/UL — SIGNIFICANT CHANGE UP (ref 4.8–10.8)
WBC # FLD AUTO: 7.4 K/UL — SIGNIFICANT CHANGE UP (ref 4.8–10.8)
WBC UR QL: ABNORMAL

## 2017-03-26 PROCEDURE — 99233 SBSQ HOSP IP/OBS HIGH 50: CPT

## 2017-03-26 RX ORDER — POTASSIUM CHLORIDE 20 MEQ
20 PACKET (EA) ORAL ONCE
Qty: 0 | Refills: 0 | Status: COMPLETED | OUTPATIENT
Start: 2017-03-26 | End: 2017-03-26

## 2017-03-26 RX ORDER — LABETALOL HCL 100 MG
100 TABLET ORAL EVERY 12 HOURS
Qty: 0 | Refills: 0 | Status: DISCONTINUED | OUTPATIENT
Start: 2017-03-26 | End: 2017-04-10

## 2017-03-26 RX ORDER — LEVETIRACETAM 250 MG/1
1000 TABLET, FILM COATED ORAL
Qty: 0 | Refills: 0 | Status: DISCONTINUED | OUTPATIENT
Start: 2017-03-26 | End: 2017-04-10

## 2017-03-26 RX ORDER — PANTOPRAZOLE SODIUM 20 MG/1
40 TABLET, DELAYED RELEASE ORAL
Qty: 0 | Refills: 0 | Status: DISCONTINUED | OUTPATIENT
Start: 2017-03-26 | End: 2017-04-10

## 2017-03-26 RX ADMIN — LEVETIRACETAM 1000 MILLIGRAM(S): 250 TABLET, FILM COATED ORAL at 17:27

## 2017-03-26 RX ADMIN — LEVETIRACETAM 400 MILLIGRAM(S): 250 TABLET, FILM COATED ORAL at 05:43

## 2017-03-26 RX ADMIN — DESMOPRESSIN ACETATE 1 SPRAY(S): 0.1 TABLET ORAL at 11:33

## 2017-03-26 RX ADMIN — AMLODIPINE BESYLATE 10 MILLIGRAM(S): 2.5 TABLET ORAL at 05:43

## 2017-03-26 RX ADMIN — Medication 20 MILLIEQUIVALENT(S): at 08:56

## 2017-03-26 RX ADMIN — Medication 100 MILLIGRAM(S): at 17:27

## 2017-03-26 RX ADMIN — SODIUM CHLORIDE 50 MILLILITER(S): 9 INJECTION, SOLUTION INTRAVENOUS at 08:56

## 2017-03-26 NOTE — PROGRESS NOTE ADULT - ASSESSMENT
Diabetes insipidus / Hypernatremia - Repeat laboratory results noting decrease in sodium level. CT of the head reviewed showing cerebral edema. On Desmopressin. Intravenous fluids discontinued. Discussed with ICU and the patient is to be transferred to the intensive care unit for further management and monitoring. Now with normalizing SNa. Encephalopathy resolving. No further seizures. Restarted on hypotonic IVF, Desmopressin with stable SNa.    Hypokalemia - Resolved with supplementation. Remains stable.    HTN - Close blood pressure monitoring on labetalol and amlodipine.    Thrombocytopenia - No bleeding on examination. Past laboratory results also noted thrombocytopenia. Most recent CBC with improvement in the platelet count.    Will continue to follow whilst in ICU    Briefly discussed with ICU, Dr Gonzalez

## 2017-03-26 NOTE — PROGRESS NOTE ADULT - SUBJECTIVE AND OBJECTIVE BOX
NEPHROLOGY INTERVAL HPI/OVERNIGHT EVENTS: no new events.    MEDICATIONS  (STANDING):  amLODIPine   Tablet 10milliGRAM(s) Oral daily  desmopressin 0.01% Nasal 1Spray(s) Nasal daily  multiple electrolytes Injection Type 1 1000milliLiter(s) IV Continuous <Continuous>  levETIRAcetam 1000milliGRAM(s) Oral two times a day  potassium chloride    Tablet ER 20milliEquivalent(s) Oral once    MEDICATIONS  (PRN):  labetalol Injectable 10milliGRAM(s) IV Push every 4 hours PRN Systolic blood pressure > 160      Allergies    No Known Allergies          Vital Signs Last 24 Hrs  T(C): 36.7, Max: 37.5 ( @ 22:00)  T(F): 98.1, Max: 99.5 ( @ 22:00)  HR: 78 (65 - 87)  BP: 125/76 (118/86 - 156/91)  BP(mean): 97 (89 - 131)  RR: 10 (10 - 33)  SpO2: 96% (95% - 100%)  Daily     Daily Weight in k.6 (26 Mar 2017 04:00)    PHYSICAL EXAM:    GENERAL: same  HEAD:  band around head same  EYES: same  ENMT:   NECK: veins wnl  NERVOUS SYSTEM: opens eyes to verbal stimuli, moves extremeties   CHEST/LUNG: no rales noted  HEART: no gallop  ABDOMEN: soft  EXTREMITIES: same, iv left upper arm   LYMPH:   SKIN: no rash    LABS:                        9.8    8.9   )-----------( 130      ( 25 Mar 2017 07:24 )             30.1     26 Mar 2017 05:51    140    |  99     |  7.0    ----------------------------<  72     3.7     |  28.0   |  0.71     Ca    8.3        26 Mar 2017 05:51  Phos  2.4       26 Mar 2017 05:51  Mg     2.0       26 Mar 2017 05:51        Urinalysis Basic - ( 24 Mar 2017 21:15 )    Color: Yellow / Appearance: Slightly Turbid / S.025 / pH: x  Gluc: x / Ketone: Small  / Bili: Negative / Urobili: Negative mg/dL   Blood: x / Protein: 30 mg/dL / Nitrite: Negative   Leuk Esterase: Negative / RBC: 0-2 /HPF / WBC Negative   Sq Epi: x / Non Sq Epi: Occasional / Bacteria: x      Magnesium, Serum: 2.0 mg/dL ( @ 05:51)  Phosphorus Level, Serum: 2.4 mg/dL ( @ 05:51)          RADIOLOGY & ADDITIONAL TESTS:

## 2017-03-26 NOTE — PROGRESS NOTE ADULT - SUBJECTIVE AND OBJECTIVE BOX
Patient is a 51y old  Female who presents with a chief complaint of altered mental status (20 Mar 2017 14:01)      BRIEF HOSPITAL COURSE: 51 yof with DI secondary to SAH, presenting with hypernatremia - became hyponatremic developed cerebral edema, seizure brought to ICU for management    Events last 24 hours: MS improved, eating doesn't remember why she is here    PAST MEDICAL & SURGICAL HISTORY:  Memory loss, short term  Brain aneurysm  Diabetes insipidus  DM (Diabetes Mellitus)  HTN (Hypertension)  Subarachnoid Hemorrhage  S/P Cerebral Aneurysm Repair  S/P Craniotomy      Review of Systems:  CONSTITUTIONAL: No fever, chills, or fatigue  EYES: No eye pain, visual disturbances, or discharge  ENMT:  No difficulty hearing, tinnitus, vertigo; No sinus or throat pain  NECK: No pain or stiffness  RESPIRATORY: No cough, wheezing, chills or hemoptysis; No shortness of breath  CARDIOVASCULAR: No chest pain, palpitations, dizziness, or leg swelling  GASTROINTESTINAL: No abdominal or epigastric pain. No nausea, vomiting, or hematemesis; No diarrhea or constipation. No melena or hematochezia.  GENITOURINARY: No dysuria, frequency, hematuria, or incontinence  NEUROLOGICAL: No headaches, memory loss, loss of strength, numbness, or tremors  SKIN: No itching, burning, rashes, or lesions   MUSCULOSKELETAL: No joint pain or swelling; No muscle, back, or extremity pain  PSYCHIATRIC: No depression, anxiety, mood swings, or difficulty sleeping      Medications:    amLODIPine   Tablet 10milliGRAM(s) Oral daily  labetalol 100milliGRAM(s) Oral every 12 hours      levETIRAcetam 1000milliGRAM(s) Oral two times a day        pantoprazole    Tablet 40milliGRAM(s) Oral before breakfast      desmopressin 0.01% Nasal 1Spray(s) Nasal daily    multiple electrolytes Injection Type 1 1000milliLiter(s) IV Continuous <Continuous>                ICU Vital Signs Last 24 Hrs  T(C): 36.9, Max: 37.5 (03-25 @ 22:00)  T(F): 98.4, Max: 99.5 (03-25 @ 22:00)  HR: 80 (62 - 87)  BP: 112/75 (107/77 - 155/92)  BP(mean): 89 (87 - 118)  ABP: --  ABP(mean): --  RR: 18 (10 - 33)  SpO2: 98% (95% - 100%)          I&O's Detail  I & Os for 24h ending 26 Mar 2017 07:00  =============================================  IN:    multiple electrolytes Injection Type 1: 705 ml    dextrose 5%.: 600 ml    dextrose 5%.: 200 ml    Solution: 200 ml    Solution: 100 ml    Total IN: 1805 ml  ---------------------------------------------  OUT:    Indwelling Catheter - Urethral: 1605 ml    Total OUT: 1605 ml  ---------------------------------------------  Total NET: 200 ml    I & Os for current day (as of 26 Mar 2017 14:33)  =============================================  IN:    multiple electrolytes Injection Type 1: 390 ml    Total IN: 390 ml  ---------------------------------------------  OUT:    Indwelling Catheter - Urethral: 390 ml    Total OUT: 390 ml  ---------------------------------------------  Total NET: 0 ml        LABS:                        9.0    7.4   )-----------( 113      ( 26 Mar 2017 05:51 )             28.6     26 Mar 2017 11:43    137    |  98     |  6.0    ----------------------------<  84     4.2     |  29.0   |  0.74     Ca    8.6        26 Mar 2017 11:43  Phos  2.4       26 Mar 2017 05:51  Mg     2.0       26 Mar 2017 05:51        CARDIAC MARKERS ( 25 Mar 2017 20:43 )  x     / x     / 1703 U/L / x     / 4.0 ng/mL  CARDIAC MARKERS ( 25 Mar 2017 12:11 )  x     / x     / 2411 U/L / x     / 5.7 ng/mL  CARDIAC MARKERS ( 25 Mar 2017 07:23 )  x     / x     / 3281 U/L / x     / 8.3 ng/mL  CARDIAC MARKERS ( 25 Mar 2017 03:31 )  x     / x     / 3755 U/L / x     / 9.6 ng/mL      CAPILLARY BLOOD GLUCOSE  111 (24 Mar 2017 14:48)      Urinalysis Basic - ( 24 Mar 2017 21:15 )    Color: Yellow / Appearance: Slightly Turbid / S.025 / pH: x  Gluc: x / Ketone: Small  / Bili: Negative / Urobili: Negative mg/dL   Blood: x / Protein: 30 mg/dL / Nitrite: Negative   Leuk Esterase: Negative / RBC: 0-2 /HPF / WBC Negative   Sq Epi: x / Non Sq Epi: Occasional / Bacteria: x      CULTURES:  Culture Results:   No growth ( @ 21:15)  Culture Results:   No growth at 48 hours ( @ 07:07)  Culture Results:   No growth at 48 hours ( @ 07:06)      Physical Examination:    General: No acute distress.  Alert, oriented not oriented to time, interactive, nonfocal    HEENT: Pupils equal, reactive to light.  Symmetric.    PULM: Clear to auscultation bilaterally, no significant sputum production    CVS: Regular rate and rhythm, no murmurs, rubs, or gallops    ABD: Soft, nondistended, nontender, normoactive bowel sounds, no masses    EXT: No edema, nontender    SKIN: Warm and well perfused, no rashes noted.        CRITICAL CARE TIME SPENT: 35

## 2017-03-26 NOTE — PROGRESS NOTE ADULT - ASSESSMENT
Pt is a 51 yof with DI secondary to SAH, presenting with hypernatremia developed hyponatremia and AMS, seizure

## 2017-03-26 NOTE — PROGRESS NOTE ADULT - SUBJECTIVE AND OBJECTIVE BOX
HOSPITALIST PROGRESS NOTE    KRUPA MEDINA  [id number]  51yFemale    Patient is a 51y old  Female who presents with a chief complaint of altered mental status (20 Mar 2017 14:01)      SUBJECTIVE: Chart reviewed since last visit. Patient seen and examined at bedside for hypernatremia and seizure. Patient tearful as she didn't know 'its '.  Denies any headache or dizziness.  Denies nausea, vomiting.      OBJECTIVE:  Vital Signs Last 24 Hrs  T(C): 37.1, Max: 37.5 (- @ 22:00)  T(F): 98.8, Max: 99.5 (- @ 22:00)  HR: 82 (62 - 87)  BP: 119/85 (107/77 - 155/92)  BP(mean): 98 (87 - 118)  RR: 15 (10 - 33)  SpO2: 97% (95% - 100%)    PHYSICAL EXAMINATION  General: NAD[+]   nontoxic[]   ill-appearing[]  Obese[]  HEENT: AT/NC[+]  PERRLA[]  EOMI[]   Moist oral mucosa[]  Pharyngeal exudates[]  NECK: Supple[+]  JVD[] Carotid bruit[]  CVS: RRR[+]  Irregular[]  S1+S2[+]   Murmur[]  RESP: Fair air entry bilaterally[+]   Clear sounds[+]   poor effort []  wheeze[]   Crackles[]  GI: Soft[+]  Nondistended[+]   Nontender[+]   Bowel Sounds[+]  Mass[]   HSM[]   Ascites[]  : suprapubic tenderness[-]   CVA Tenderness[]   Herrera[]  MS: FROM[+]   Edema[-]  CNS: Awake, aware that in hospital, and March. Generalized weakness  INTEG: Skin is Warm[+]    PSYCH: Sad mood, Affect      I&O's Summary  I & Os for 24h ending 26 Mar 2017 07:00  =============================================  IN: 1805 ml / OUT: 1605 ml / NET: 200 ml    I & Os for current day (as of 26 Mar 2017 16:48)  =============================================  IN: 475 ml / OUT: 475 ml / NET: 0 ml                          9.0    7.4   )-----------( 113      ( 26 Mar 2017 05:51 )             28.6       26 Mar 2017 11:43    137    |  98     |  6.0    ----------------------------<  84     4.2     |  29.0   |  0.74     Ca    8.6        26 Mar 2017 11:43  Phos  2.4       26 Mar 2017 05:51  Mg     2.0       26 Mar 2017 05:51      CARDIAC MARKERS ( 25 Mar 2017 20:43 )  x     / x     / 1703 U/L / x     / 4.0 ng/mL  CARDIAC MARKERS ( 25 Mar 2017 12:11 )  x     / x     / 2411 U/L / x     / 5.7 ng/mL  CARDIAC MARKERS ( 25 Mar 2017 07:23 )  x     / x     / 3281 U/L / x     / 8.3 ng/mL  CARDIAC MARKERS ( 25 Mar 2017 03:31 )  x     / x     / 3755 U/L / x     / 9.6 ng/mL      Urinalysis Basic - ( 26 Mar 2017 14:39 )    Color: Yellow / Appearance: Clear / S.020 / pH: x  Gluc: x / Ketone: Negative  / Bili: Negative / Urobili: Negative mg/dL   Blood: x / Protein: 30 mg/dL / Nitrite: Negative   Leuk Esterase: Trace / RBC: 11-25 /HPF / WBC 11-25   Sq Epi: x / Non Sq Epi: Moderate / Bacteria: Few      MEDICATIONS  (STANDING):  amLODIPine   Tablet 10milliGRAM(s) Oral daily  desmopressin 0.01% Nasal 1Spray(s) Nasal daily  multiple electrolytes Injection Type 1 1000milliLiter(s) IV Continuous <Continuous>  levETIRAcetam 1000milliGRAM(s) Oral two times a day  pantoprazole    Tablet 40milliGRAM(s) Oral before breakfast  labetalol 100milliGRAM(s) Oral every 12 hours      MEDICATIONS  (PRN):

## 2017-03-27 LAB
ANION GAP SERPL CALC-SCNC: 12 MMOL/L — SIGNIFICANT CHANGE UP (ref 5–17)
ANION GAP SERPL CALC-SCNC: 9 MMOL/L — SIGNIFICANT CHANGE UP (ref 5–17)
ANION GAP SERPL CALC-SCNC: 9 MMOL/L — SIGNIFICANT CHANGE UP (ref 5–17)
BUN SERPL-MCNC: 11 MG/DL — SIGNIFICANT CHANGE UP (ref 8–20)
BUN SERPL-MCNC: 13 MG/DL — SIGNIFICANT CHANGE UP (ref 8–20)
BUN SERPL-MCNC: 14 MG/DL — SIGNIFICANT CHANGE UP (ref 8–20)
CALCIUM SERPL-MCNC: 8.4 MG/DL — LOW (ref 8.6–10.2)
CALCIUM SERPL-MCNC: 8.4 MG/DL — LOW (ref 8.6–10.2)
CALCIUM SERPL-MCNC: 8.8 MG/DL — SIGNIFICANT CHANGE UP (ref 8.6–10.2)
CHLORIDE SERPL-SCNC: 101 MMOL/L — SIGNIFICANT CHANGE UP (ref 98–107)
CHLORIDE SERPL-SCNC: 101 MMOL/L — SIGNIFICANT CHANGE UP (ref 98–107)
CHLORIDE SERPL-SCNC: 103 MMOL/L — SIGNIFICANT CHANGE UP (ref 98–107)
CO2 SERPL-SCNC: 27 MMOL/L — SIGNIFICANT CHANGE UP (ref 22–29)
CO2 SERPL-SCNC: 29 MMOL/L — SIGNIFICANT CHANGE UP (ref 22–29)
CO2 SERPL-SCNC: 29 MMOL/L — SIGNIFICANT CHANGE UP (ref 22–29)
CREAT SERPL-MCNC: 0.73 MG/DL — SIGNIFICANT CHANGE UP (ref 0.5–1.3)
CREAT SERPL-MCNC: 0.73 MG/DL — SIGNIFICANT CHANGE UP (ref 0.5–1.3)
CREAT SERPL-MCNC: 0.77 MG/DL — SIGNIFICANT CHANGE UP (ref 0.5–1.3)
GLUCOSE SERPL-MCNC: 130 MG/DL — HIGH (ref 70–115)
GLUCOSE SERPL-MCNC: 73 MG/DL — SIGNIFICANT CHANGE UP (ref 70–115)
GLUCOSE SERPL-MCNC: 88 MG/DL — SIGNIFICANT CHANGE UP (ref 70–115)
POTASSIUM SERPL-MCNC: 3.8 MMOL/L — SIGNIFICANT CHANGE UP (ref 3.5–5.3)
POTASSIUM SERPL-MCNC: 3.9 MMOL/L — SIGNIFICANT CHANGE UP (ref 3.5–5.3)
POTASSIUM SERPL-MCNC: 4.4 MMOL/L — SIGNIFICANT CHANGE UP (ref 3.5–5.3)
POTASSIUM SERPL-SCNC: 3.8 MMOL/L — SIGNIFICANT CHANGE UP (ref 3.5–5.3)
POTASSIUM SERPL-SCNC: 3.9 MMOL/L — SIGNIFICANT CHANGE UP (ref 3.5–5.3)
POTASSIUM SERPL-SCNC: 4.4 MMOL/L — SIGNIFICANT CHANGE UP (ref 3.5–5.3)
SODIUM SERPL-SCNC: 139 MMOL/L — SIGNIFICANT CHANGE UP (ref 135–145)
SODIUM SERPL-SCNC: 139 MMOL/L — SIGNIFICANT CHANGE UP (ref 135–145)
SODIUM SERPL-SCNC: 142 MMOL/L — SIGNIFICANT CHANGE UP (ref 135–145)

## 2017-03-27 PROCEDURE — 70450 CT HEAD/BRAIN W/O DYE: CPT | Mod: 26

## 2017-03-27 PROCEDURE — 99233 SBSQ HOSP IP/OBS HIGH 50: CPT

## 2017-03-27 RX ORDER — ENOXAPARIN SODIUM 100 MG/ML
40 INJECTION SUBCUTANEOUS DAILY
Qty: 0 | Refills: 0 | Status: DISCONTINUED | OUTPATIENT
Start: 2017-03-27 | End: 2017-04-10

## 2017-03-27 RX ORDER — SODIUM CHLORIDE 9 MG/ML
1000 INJECTION, SOLUTION INTRAVENOUS
Qty: 0 | Refills: 0 | Status: DISCONTINUED | OUTPATIENT
Start: 2017-03-27 | End: 2017-03-28

## 2017-03-27 RX ADMIN — Medication 100 MILLIGRAM(S): at 17:07

## 2017-03-27 RX ADMIN — DESMOPRESSIN ACETATE 1 SPRAY(S): 0.1 TABLET ORAL at 13:10

## 2017-03-27 RX ADMIN — SODIUM CHLORIDE 45 MILLILITER(S): 9 INJECTION, SOLUTION INTRAVENOUS at 17:07

## 2017-03-27 RX ADMIN — LEVETIRACETAM 1000 MILLIGRAM(S): 250 TABLET, FILM COATED ORAL at 06:01

## 2017-03-27 RX ADMIN — AMLODIPINE BESYLATE 10 MILLIGRAM(S): 2.5 TABLET ORAL at 06:01

## 2017-03-27 RX ADMIN — ENOXAPARIN SODIUM 40 MILLIGRAM(S): 100 INJECTION SUBCUTANEOUS at 13:11

## 2017-03-27 RX ADMIN — PANTOPRAZOLE SODIUM 40 MILLIGRAM(S): 20 TABLET, DELAYED RELEASE ORAL at 06:03

## 2017-03-27 RX ADMIN — LEVETIRACETAM 1000 MILLIGRAM(S): 250 TABLET, FILM COATED ORAL at 17:08

## 2017-03-27 RX ADMIN — SODIUM CHLORIDE 50 MILLILITER(S): 9 INJECTION, SOLUTION INTRAVENOUS at 06:01

## 2017-03-27 RX ADMIN — Medication 100 MILLIGRAM(S): at 06:02

## 2017-03-27 NOTE — PHYSICAL THERAPY INITIAL EVALUATION ADULT - ADDITIONAL COMMENTS
Pt lives in a private home with a few steps to enter, no steps inside. She does not use or own any DME.

## 2017-03-27 NOTE — PROGRESS NOTE ADULT - ASSESSMENT
Diabetes insipidus / Hypernatremia - Repeat laboratory results noting decrease in sodium level. CT of the head reviewed showing cerebral edema. On Desmopressin. Intravenous fluids discontinued. Discussed with ICU and the patient is to be transferred to the intensive care unit for further management and monitoring. Now with normalizing SNa. Encephalopathy resolving. No further seizures. Restarted on hypotonic IVF, Desmopressin with stable SNa, now on Isotonic IVF.    Hypokalemia - Resolved with supplementation. Remains stable.    HTN - Close blood pressure monitoring on labetalol and amlodipine.    Thrombocytopenia - No bleeding on examination. Past laboratory results also noted thrombocytopenia. Most recent CBC with improvement in the platelet count.    Will continue to follow whilst in ICU    Briefly discussed with ICU, Dr Gonzalez earlier

## 2017-03-27 NOTE — PROGRESS NOTE ADULT - SUBJECTIVE AND OBJECTIVE BOX
Patient is a 51y old  Female who presents with a chief complaint of altered mental status (20 Mar 2017 14:01)      BRIEF HOSPITAL COURSE: 51 yof with hyponatremia     Events last 24 hours:     PAST MEDICAL & SURGICAL HISTORY:  Memory loss, short term  Brain aneurysm  Diabetes insipidus  DM (Diabetes Mellitus)  HTN (Hypertension)  Subarachnoid Hemorrhage  S/P Cerebral Aneurysm Repair  S/P Craniotomy      Review of Systems:  CONSTITUTIONAL: No fever, chills, or fatigue  EYES: No eye pain, visual disturbances, or discharge  ENMT:  No difficulty hearing, tinnitus, vertigo; No sinus or throat pain  NECK: No pain or stiffness  RESPIRATORY: No cough, wheezing, chills or hemoptysis; No shortness of breath  CARDIOVASCULAR: No chest pain, palpitations, dizziness, or leg swelling  GASTROINTESTINAL: No abdominal or epigastric pain. No nausea, vomiting, or hematemesis; No diarrhea or constipation. No melena or hematochezia.  GENITOURINARY: No dysuria, frequency, hematuria, or incontinence  NEUROLOGICAL: No headaches, memory loss, loss of strength, numbness, or tremors  SKIN: No itching, burning, rashes, or lesions   MUSCULOSKELETAL: No joint pain or swelling; No muscle, back, or extremity pain  PSYCHIATRIC: No depression, anxiety, mood swings, or difficulty sleeping      Medications:    amLODIPine   Tablet 10milliGRAM(s) Oral daily  labetalol 100milliGRAM(s) Oral every 12 hours      levETIRAcetam 1000milliGRAM(s) Oral two times a day      enoxaparin Injectable 40milliGRAM(s) SubCutaneous daily    pantoprazole    Tablet 40milliGRAM(s) Oral before breakfast      desmopressin 0.01% Nasal 1Spray(s) Nasal daily    dextrose 5% + sodium chloride 0.45%. 1000milliLiter(s) IV Continuous <Continuous>                ICU Vital Signs Last 24 Hrs  T(C): 35.9, Max: 37.3 (03-26 @ 18:00)  T(F): 96.6, Max: 99.1 (03-26 @ 18:00)  HR: 71 (68 - 99)  BP: 103/69 (84/55 - 134/79)  BP(mean): 81 (64 - 98)  ABP: --  ABP(mean): --  RR: 18 (11 - 25)  SpO2: 96% (95% - 99%)          I&O's Detail  I & Os for 24h ending 27 Mar 2017 07:00  =============================================  IN:    multiple electrolytes Injection Type 1multiple electrolytes Injection Type 1: 1595 ml    Oral Fluid: 720 ml    Total IN: 2315 ml  ---------------------------------------------  OUT:    Indwelling Catheter - Urethral: 1605 ml    Total OUT: 1605 ml  ---------------------------------------------  Total NET: 710 ml    I & Os for current day (as of 27 Mar 2017 14:02)  =============================================  IN:    Oral Fluid: 237 ml    multiple electrolytes Injection Type 1multiple electrolytes Injection Type 1: 200 ml    Total IN: 437 ml  ---------------------------------------------  OUT:    Indwelling Catheter - Urethral: 200 ml    Total OUT: 200 ml  ---------------------------------------------  Total NET: 237 ml        LABS:                        9.0    7.4   )-----------( 113      ( 26 Mar 2017 05:51 )             28.6     27 Mar 2017 03:52    139    |  101    |  13.0   ----------------------------<  73     3.9     |  29.0   |  0.73     Ca    8.4        27 Mar 2017 03:52  Phos  2.4       26 Mar 2017 05:51  Mg     2.0       26 Mar 2017 05:51        CARDIAC MARKERS ( 25 Mar 2017 20:43 )  x     / x     / 1703 U/L / x     / 4.0 ng/mL      CAPILLARY BLOOD GLUCOSE      Urinalysis Basic - ( 26 Mar 2017 14:39 )    Color: Yellow / Appearance: Clear / S.020 / pH: x  Gluc: x / Ketone: Negative  / Bili: Negative / Urobili: Negative mg/dL   Blood: x / Protein: 30 mg/dL / Nitrite: Negative   Leuk Esterase: Trace / RBC: 11-25 /HPF / WBC 11-25   Sq Epi: x / Non Sq Epi: Moderate / Bacteria: Few      CULTURES:  Culture Results:   No growth ( @ 21:15)  Culture Results:   No growth at 48 hours ( @ 07:07)  Culture Results:   No growth at 48 hours ( @ 07:06)      Physical Examination:    General: No acute distress.  Alert, oriented, interactive, nonfocal    HEENT: Pupils equal, reactive to light.  Symmetric.    PULM: Clear to auscultation bilaterally, no significant sputum production    CVS: Regular rate and rhythm, no murmurs, rubs, or gallops    ABD: Soft, nondistended, nontender, normoactive bowel sounds, no masses    EXT: No edema, nontender    SKIN: Warm and well perfused, no rashes noted.    RADIOLOGY: ***    CRITICAL CARE TIME SPENT: *** Patient is a 51y old  Female who presents with a chief complaint of altered mental status (20 Mar 2017 14:01)      BRIEF HOSPITAL COURSE: 51 yof with hypernatremia, DI secondary to SAH presenting with hypernatremia had hyponatremia cerebral edema and seizure    Events last 24 hours: better this am, walked with PT    PAST MEDICAL & SURGICAL HISTORY:  Memory loss, short term  Brain aneurysm  Diabetes insipidus  DM (Diabetes Mellitus)  HTN (Hypertension)  Subarachnoid Hemorrhage  S/P Cerebral Aneurysm Repair  S/P Craniotomy      Review of Systems:  CONSTITUTIONAL: No fever, chills, or fatigue  EYES: No eye pain, visual disturbances, or discharge  ENMT:  No difficulty hearing, tinnitus, vertigo; No sinus or throat pain  NECK: No pain or stiffness  RESPIRATORY: No cough, wheezing, chills or hemoptysis; No shortness of breath  CARDIOVASCULAR: No chest pain, palpitations, dizziness, or leg swelling  GASTROINTESTINAL: No abdominal or epigastric pain. No nausea, vomiting, or hematemesis; No diarrhea or constipation. No melena or hematochezia.  GENITOURINARY: No dysuria, frequency, hematuria, or incontinence  NEUROLOGICAL: No headaches, memory loss, loss of strength, numbness, or tremors  SKIN: No itching, burning, rashes, or lesions   MUSCULOSKELETAL: No joint pain or swelling; No muscle, back, or extremity pain  PSYCHIATRIC: No depression, anxiety, mood swings, or difficulty sleeping      Medications:    amLODIPine   Tablet 10milliGRAM(s) Oral daily  labetalol 100milliGRAM(s) Oral every 12 hours      levETIRAcetam 1000milliGRAM(s) Oral two times a day      enoxaparin Injectable 40milliGRAM(s) SubCutaneous daily    pantoprazole    Tablet 40milliGRAM(s) Oral before breakfast      desmopressin 0.01% Nasal 1Spray(s) Nasal daily    dextrose 5% + sodium chloride 0.45%. 1000milliLiter(s) IV Continuous <Continuous>                ICU Vital Signs Last 24 Hrs  T(C): 35.9, Max: 37.3 (- @ 18:00)  T(F): 96.6, Max: 99.1 (03- @ 18:00)  HR: 71 (68 - 99)  BP: 103/69 (84/55 - 134/79)  BP(mean): 81 (64 - 98)  ABP: --  ABP(mean): --  RR: 18 (11 - 25)  SpO2: 96% (95% - 99%)          I&O's Detail  I & Os for 24h ending 27 Mar 2017 07:00  =============================================  IN:    multiple electrolytes Injection Type 1multiple electrolytes Injection Type 1: 1595 ml    Oral Fluid: 720 ml    Total IN: 2315 ml  ---------------------------------------------  OUT:    Indwelling Catheter - Urethral: 1605 ml    Total OUT: 1605 ml  ---------------------------------------------  Total NET: 710 ml    I & Os for current day (as of 27 Mar 2017 14:02)  =============================================  IN:    Oral Fluid: 237 ml    multiple electrolytes Injection Type 1multiple electrolytes Injection Type 1: 200 ml    Total IN: 437 ml  ---------------------------------------------  OUT:    Indwelling Catheter - Urethral: 200 ml    Total OUT: 200 ml  ---------------------------------------------  Total NET: 237 ml        LABS:                        9.0    7.4   )-----------( 113      ( 26 Mar 2017 05:51 )             28.6     27 Mar 2017 03:52    139    |  101    |  13.0   ----------------------------<  73     3.9     |  29.0   |  0.73     Ca    8.4        27 Mar 2017 03:52  Phos  2.4       26 Mar 2017 05:51  Mg     2.0       26 Mar 2017 05:51        CARDIAC MARKERS ( 25 Mar 2017 20:43 )  x     / x     / 1703 U/L / x     / 4.0 ng/mL      CAPILLARY BLOOD GLUCOSE      Urinalysis Basic - ( 26 Mar 2017 14:39 )    Color: Yellow / Appearance: Clear / S.020 / pH: x  Gluc: x / Ketone: Negative  / Bili: Negative / Urobili: Negative mg/dL   Blood: x / Protein: 30 mg/dL / Nitrite: Negative   Leuk Esterase: Trace / RBC: 11-25 /HPF / WBC 11-25   Sq Epi: x / Non Sq Epi: Moderate / Bacteria: Few      CULTURES:  Culture Results:   No growth ( @ 21:15)  Culture Results:   No growth at 48 hours ( @ 07:07)  Culture Results:   No growth at 48 hours ( @ 07:06)      Physical Examination:    General: No acute distress.  Alert, oriented to person and place not time, interactive, nonfocal    HEENT: Pupils equal, reactive to light.  Symmetric.    PULM: Clear to auscultation bilaterally, no significant sputum production    CVS: Regular rate and rhythm, no murmurs, rubs, or gallops    ABD: Soft, nondistended, nontender, normoactive bowel sounds, no masses    EXT: No edema, nontender    SKIN: Warm and well perfused, no rashes noted.    RADIOLOGY: ***    CRITICAL CARE TIME SPENT: ***

## 2017-03-27 NOTE — PHYSICAL THERAPY INITIAL EVALUATION ADULT - PERTINENT HX OF CURRENT PROBLEM, REHAB EVAL
Pt is a 52 y/o female who presented from home with AMS and slurred speech. (+) encephalopathy, hypernatremia, seizure.

## 2017-03-27 NOTE — PROGRESS NOTE ADULT - SUBJECTIVE AND OBJECTIVE BOX
NEPHROLOGY INTERVAL HPI/OVERNIGHT EVENTS: No new events.    MEDICATIONS  (STANDING):  amLODIPine   Tablet 10milliGRAM(s) Oral daily  desmopressin 0.01% Nasal 1Spray(s) Nasal daily  multiple electrolytes Injection Type 1 1000milliLiter(s) IV Continuous <Continuous>  levETIRAcetam 1000milliGRAM(s) Oral two times a day  pantoprazole    Tablet 40milliGRAM(s) Oral before breakfast  labetalol 100milliGRAM(s) Oral every 12 hours  enoxaparin Injectable 40milliGRAM(s) SubCutaneous daily    MEDICATIONS  (PRN):      Allergies    No Known Allergies    Intolerances        Vital Signs Last 24 Hrs  T(C): 36, Max: 37.3 ( @ 18:00)  T(F): 96.8, Max: 99.1 ( @ 18:00)  HR: 68 (68 - 99)  BP: 109/72 (84/55 - 143/82)  BP(mean): 86 (64 - 102)  RR: 11 (11 - 25)  SpO2: 97% (95% - 99%)  Daily     Daily     PHYSICAL EXAM:    GENERAL: oob in chair, ate earlier  HEAD:    EYES: wnl  ENMT:   NECK: veins wnl  NERVOUS SYSTEM:  more awake, alert, verbal  CHEST/LUNG: clear  HEART: no gallop or rub  ABDOMEN: not tender  EXTREMITIES:  trace ankle edema  LYMPH:   SKIN: no rash   neg  LABS:                        9.0    7.4   )-----------( 113      ( 26 Mar 2017 05:51 )             28.6     27 Mar 2017 03:52    139    |  101    |  13.0   ----------------------------<  73     3.9     |  29.0   |  0.73     Ca    8.4        27 Mar 2017 03:52  Phos  2.4       26 Mar 2017 05:51  Mg     2.0       26 Mar 2017 05:51        Urinalysis Basic - ( 26 Mar 2017 14:39 )    Color: Yellow / Appearance: Clear / S.020 / pH: x  Gluc: x / Ketone: Negative  / Bili: Negative / Urobili: Negative mg/dL   Blood: x / Protein: 30 mg/dL / Nitrite: Negative   Leuk Esterase: Trace / RBC: 11-25 /HPF / WBC 11-25   Sq Epi: x / Non Sq Epi: Moderate / Bacteria: Few              RADIOLOGY & ADDITIONAL TESTS:

## 2017-03-27 NOTE — PROGRESS NOTE ADULT - SUBJECTIVE AND OBJECTIVE BOX
HOSPITALIST PROGRESS NOTE    KRUPA MEDINA  [id number]  51yFemale    Patient is a 51y old  Female who presents with a chief complaint of altered mental status (20 Mar 2017 14:01)      SUBJECTIVE: Chart reviewed since last visit. Patient seen and examined at bedside earlier for hypernatremia, seizure.  Patient feels fine.  Denies headache, dizziness or thirst.      OBJECTIVE:  Vital Signs Last 24 Hrs  T(C): 35.9, Max: 37.3 ( @ 18:00)  T(F): 96.6, Max: 99.1 ( @ 18:00)  HR: 82 (68 - 99)  BP: 121/78 (84/55 - 134/79)  BP(mean): 94 (64 - 98)  RR: 15 (11 - 25)  SpO2: 98% (95% - 99%)    PHYSICAL EXAMINATION  General: NAD[+]   nontoxic[]   ill-appearing[]  Obese[]  HEENT: AT/NC[+]  PERRLA[]  EOMI[]   Moist oral mucosa[]  Pharyngeal exudates[]  NECK: Supple[+]  JVD[] Carotid bruit[]  CVS: RRR[+]  Irregular[]  S1+S2[+]   Murmur[]  RESP: Fair air entry bilaterally[+]   Clear sounds[+]   poor effort []  wheeze[]   Crackles[]  GI: Soft[+]  Nondistended[+]   Nontender[+]   Bowel Sounds[+]  Mass[]   HSM[]   Ascites[]  : suprapubic tenderness[-]   CVA Tenderness[]   Herrera[]  MS: FROM[+]   Edema[-]  CNS: Awake, aware that in hospital, and March. Generalized weakness  INTEG: Skin is Warm[+]    PSYCH: Fair mood, Affect        I&O's Summary  I & Os for 24h ending 27 Mar 2017 07:00  =============================================  IN: 2315 ml / OUT: 1605 ml / NET: 710 ml    I & Os for current day (as of 27 Mar 2017 17:13)  =============================================  IN: 902 ml / OUT: 380 ml / NET: 522 ml                          9.0    7.4   )-----------( 113      ( 26 Mar 2017 05:51 )             28.6       27 Mar 2017 13:30    142    |  103    |  11.0   ----------------------------<  130    3.8     |  27.0   |  0.73     Ca    8.8        27 Mar 2017 13:30  Phos  2.4       26 Mar 2017 05:51  Mg     2.0       26 Mar 2017 05:51      CARDIAC MARKERS ( 25 Mar 2017 20:43 )  x     / x     / 1703 U/L / x     / 4.0 ng/mL      Urinalysis Basic - ( 26 Mar 2017 14:39 )    Color: Yellow / Appearance: Clear / S.020 / pH: x  Gluc: x / Ketone: Negative  / Bili: Negative / Urobili: Negative mg/dL   Blood: x / Protein: 30 mg/dL / Nitrite: Negative   Leuk Esterase: Trace / RBC: 11-25 /HPF / WBC 11-25   Sq Epi: x / Non Sq Epi: Moderate / Bacteria: Few        Culture:    TTE:    RADIOLOGY        MEDICATIONS  (STANDING):  amLODIPine   Tablet 10milliGRAM(s) Oral daily  desmopressin 0.01% Nasal 1Spray(s) Nasal daily  levETIRAcetam 1000milliGRAM(s) Oral two times a day  pantoprazole    Tablet 40milliGRAM(s) Oral before breakfast  labetalol 100milliGRAM(s) Oral every 12 hours  enoxaparin Injectable 40milliGRAM(s) SubCutaneous daily  dextrose 5% + sodium chloride 0.45%. 1000milliLiter(s) IV Continuous <Continuous>      MEDICATIONS  (PRN):

## 2017-03-27 NOTE — PROGRESS NOTE ADULT - ASSESSMENT
51 yof with DI hypernatremia and hyponatremia cerebral edema and seizure      stable for transfer to floor

## 2017-03-28 DIAGNOSIS — Z29.9 ENCOUNTER FOR PROPHYLACTIC MEASURES, UNSPECIFIED: ICD-10-CM

## 2017-03-28 DIAGNOSIS — I10 ESSENTIAL (PRIMARY) HYPERTENSION: ICD-10-CM

## 2017-03-28 DIAGNOSIS — G93.41 METABOLIC ENCEPHALOPATHY: ICD-10-CM

## 2017-03-28 LAB
ANION GAP SERPL CALC-SCNC: 10 MMOL/L — SIGNIFICANT CHANGE UP (ref 5–17)
ANION GAP SERPL CALC-SCNC: 11 MMOL/L — SIGNIFICANT CHANGE UP (ref 5–17)
ANION GAP SERPL CALC-SCNC: 7 MMOL/L — SIGNIFICANT CHANGE UP (ref 5–17)
APPEARANCE UR: CLEAR — SIGNIFICANT CHANGE UP
BACTERIA # UR AUTO: ABNORMAL
BILIRUB UR-MCNC: NEGATIVE — SIGNIFICANT CHANGE UP
BUN SERPL-MCNC: 11 MG/DL — SIGNIFICANT CHANGE UP (ref 8–20)
BUN SERPL-MCNC: 13 MG/DL — SIGNIFICANT CHANGE UP (ref 8–20)
BUN SERPL-MCNC: 15 MG/DL — SIGNIFICANT CHANGE UP (ref 8–20)
CALCIUM SERPL-MCNC: 8.5 MG/DL — LOW (ref 8.6–10.2)
CALCIUM SERPL-MCNC: 8.7 MG/DL — SIGNIFICANT CHANGE UP (ref 8.6–10.2)
CALCIUM SERPL-MCNC: 8.7 MG/DL — SIGNIFICANT CHANGE UP (ref 8.6–10.2)
CHLORIDE SERPL-SCNC: 103 MMOL/L — SIGNIFICANT CHANGE UP (ref 98–107)
CK MB CFR SERPL CALC: 2.2 NG/ML — SIGNIFICANT CHANGE UP (ref 0–6.7)
CK SERPL-CCNC: 262 U/L — HIGH (ref 25–170)
CO2 SERPL-SCNC: 27 MMOL/L — SIGNIFICANT CHANGE UP (ref 22–29)
CO2 SERPL-SCNC: 29 MMOL/L — SIGNIFICANT CHANGE UP (ref 22–29)
CO2 SERPL-SCNC: 30 MMOL/L — HIGH (ref 22–29)
COLOR SPEC: SIGNIFICANT CHANGE UP
CREAT SERPL-MCNC: 0.65 MG/DL — SIGNIFICANT CHANGE UP (ref 0.5–1.3)
CREAT SERPL-MCNC: 0.69 MG/DL — SIGNIFICANT CHANGE UP (ref 0.5–1.3)
CREAT SERPL-MCNC: 0.8 MG/DL — SIGNIFICANT CHANGE UP (ref 0.5–1.3)
DIFF PNL FLD: ABNORMAL
EPI CELLS # UR: SIGNIFICANT CHANGE UP
FERRITIN SERPL-MCNC: 196.1 NG/ML — SIGNIFICANT CHANGE UP (ref 11–306)
GLUCOSE SERPL-MCNC: 102 MG/DL — SIGNIFICANT CHANGE UP (ref 70–115)
GLUCOSE SERPL-MCNC: 82 MG/DL — SIGNIFICANT CHANGE UP (ref 70–115)
GLUCOSE SERPL-MCNC: 85 MG/DL — SIGNIFICANT CHANGE UP (ref 70–115)
GLUCOSE UR QL: NEGATIVE MG/DL — SIGNIFICANT CHANGE UP
HCT VFR BLD CALC: 26.7 % — LOW (ref 37–47)
HCT VFR BLD CALC: 26.9 % — LOW (ref 37–47)
HGB BLD-MCNC: 8.2 G/DL — LOW (ref 12–16)
HGB BLD-MCNC: 8.3 G/DL — LOW (ref 12–16)
IRON SATN MFR SERPL: 19 % — SIGNIFICANT CHANGE UP (ref 14–50)
IRON SATN MFR SERPL: 42 UG/DL — SIGNIFICANT CHANGE UP (ref 37–145)
KETONES UR-MCNC: NEGATIVE — SIGNIFICANT CHANGE UP
LEUKOCYTE ESTERASE UR-ACNC: NEGATIVE — SIGNIFICANT CHANGE UP
MAGNESIUM SERPL-MCNC: 2.4 MG/DL — SIGNIFICANT CHANGE UP (ref 1.8–2.5)
MCHC RBC-ENTMCNC: 24.7 PG — LOW (ref 27–31)
MCHC RBC-ENTMCNC: 24.9 PG — LOW (ref 27–31)
MCHC RBC-ENTMCNC: 30.7 G/DL — LOW (ref 32–36)
MCHC RBC-ENTMCNC: 30.9 G/DL — LOW (ref 32–36)
MCV RBC AUTO: 80.4 FL — LOW (ref 81–99)
MCV RBC AUTO: 80.5 FL — LOW (ref 81–99)
NITRITE UR-MCNC: NEGATIVE — SIGNIFICANT CHANGE UP
OSMOLALITY UR: 267 MOSM/KG — LOW (ref 300–1000)
PH UR: 6 — SIGNIFICANT CHANGE UP (ref 4.8–8)
PHOSPHATE SERPL-MCNC: 2.7 MG/DL — SIGNIFICANT CHANGE UP (ref 2.4–4.7)
PLATELET # BLD AUTO: 177 K/UL — SIGNIFICANT CHANGE UP (ref 150–400)
PLATELET # BLD AUTO: 191 K/UL — SIGNIFICANT CHANGE UP (ref 150–400)
POTASSIUM SERPL-MCNC: 3.9 MMOL/L — SIGNIFICANT CHANGE UP (ref 3.5–5.3)
POTASSIUM SERPL-MCNC: 4.1 MMOL/L — SIGNIFICANT CHANGE UP (ref 3.5–5.3)
POTASSIUM SERPL-MCNC: 4.2 MMOL/L — SIGNIFICANT CHANGE UP (ref 3.5–5.3)
POTASSIUM SERPL-SCNC: 3.9 MMOL/L — SIGNIFICANT CHANGE UP (ref 3.5–5.3)
POTASSIUM SERPL-SCNC: 4.1 MMOL/L — SIGNIFICANT CHANGE UP (ref 3.5–5.3)
POTASSIUM SERPL-SCNC: 4.2 MMOL/L — SIGNIFICANT CHANGE UP (ref 3.5–5.3)
PROT UR-MCNC: NEGATIVE MG/DL — SIGNIFICANT CHANGE UP
RBC # BLD: 3.32 M/UL — LOW (ref 4.4–5.2)
RBC # BLD: 3.34 M/UL — LOW (ref 4.4–5.2)
RBC # FLD: 16 % — HIGH (ref 11–15.6)
RBC # FLD: 16.2 % — HIGH (ref 11–15.6)
RBC CASTS # UR COMP ASSIST: ABNORMAL /HPF (ref 0–4)
SODIUM SERPL-SCNC: 140 MMOL/L — SIGNIFICANT CHANGE UP (ref 135–145)
SODIUM SERPL-SCNC: 141 MMOL/L — SIGNIFICANT CHANGE UP (ref 135–145)
SODIUM SERPL-SCNC: 142 MMOL/L — SIGNIFICANT CHANGE UP (ref 135–145)
SP GR SPEC: 1.01 — SIGNIFICANT CHANGE UP (ref 1.01–1.02)
TIBC SERPL-MCNC: 222 UG/DL — SIGNIFICANT CHANGE UP (ref 220–430)
TRANSFERRIN SERPL-MCNC: 155 MG/DL — LOW (ref 192–382)
UROBILINOGEN FLD QL: NEGATIVE MG/DL — SIGNIFICANT CHANGE UP
WBC # BLD: 8.2 K/UL — SIGNIFICANT CHANGE UP (ref 4.8–10.8)
WBC # BLD: 9.5 K/UL — SIGNIFICANT CHANGE UP (ref 4.8–10.8)
WBC # FLD AUTO: 8.2 K/UL — SIGNIFICANT CHANGE UP (ref 4.8–10.8)
WBC # FLD AUTO: 9.5 K/UL — SIGNIFICANT CHANGE UP (ref 4.8–10.8)
WBC UR QL: SIGNIFICANT CHANGE UP

## 2017-03-28 PROCEDURE — 99232 SBSQ HOSP IP/OBS MODERATE 35: CPT

## 2017-03-28 RX ORDER — SODIUM CHLORIDE 9 MG/ML
1000 INJECTION, SOLUTION INTRAVENOUS
Qty: 0 | Refills: 0 | Status: DISCONTINUED | OUTPATIENT
Start: 2017-03-28 | End: 2017-03-30

## 2017-03-28 RX ORDER — SODIUM CHLORIDE 9 MG/ML
1000 INJECTION, SOLUTION INTRAVENOUS
Qty: 0 | Refills: 0 | Status: DISCONTINUED | OUTPATIENT
Start: 2017-03-28 | End: 2017-03-28

## 2017-03-28 RX ADMIN — LEVETIRACETAM 1000 MILLIGRAM(S): 250 TABLET, FILM COATED ORAL at 18:27

## 2017-03-28 RX ADMIN — ENOXAPARIN SODIUM 40 MILLIGRAM(S): 100 INJECTION SUBCUTANEOUS at 12:02

## 2017-03-28 RX ADMIN — SODIUM CHLORIDE 50 MILLILITER(S): 9 INJECTION, SOLUTION INTRAVENOUS at 10:09

## 2017-03-28 RX ADMIN — PANTOPRAZOLE SODIUM 40 MILLIGRAM(S): 20 TABLET, DELAYED RELEASE ORAL at 06:00

## 2017-03-28 RX ADMIN — Medication 100 MILLIGRAM(S): at 18:27

## 2017-03-28 RX ADMIN — Medication 1 TABLET(S): at 12:02

## 2017-03-28 RX ADMIN — LEVETIRACETAM 1000 MILLIGRAM(S): 250 TABLET, FILM COATED ORAL at 05:59

## 2017-03-28 RX ADMIN — DESMOPRESSIN ACETATE 1 SPRAY(S): 0.1 TABLET ORAL at 12:02

## 2017-03-28 RX ADMIN — Medication 100 MILLIGRAM(S): at 05:59

## 2017-03-28 RX ADMIN — AMLODIPINE BESYLATE 10 MILLIGRAM(S): 2.5 TABLET ORAL at 05:59

## 2017-03-28 RX ADMIN — SODIUM CHLORIDE 45 MILLILITER(S): 9 INJECTION, SOLUTION INTRAVENOUS at 06:00

## 2017-03-28 NOTE — PROGRESS NOTE ADULT - SUBJECTIVE AND OBJECTIVE BOX
NEPHROLOGY INTERVAL HPI/OVERNIGHT EVENTS: No new events    MEDICATIONS  (STANDING):  amLODIPine   Tablet 10milliGRAM(s) Oral daily  desmopressin 0.01% Nasal 1Spray(s) Nasal daily  levETIRAcetam 1000milliGRAM(s) Oral two times a day  pantoprazole    Tablet 40milliGRAM(s) Oral before breakfast  labetalol 100milliGRAM(s) Oral every 12 hours  enoxaparin Injectable 40milliGRAM(s) SubCutaneous daily  sodium chloride 0.45%. 1000milliLiter(s) IV Continuous <Continuous>    MEDICATIONS  (PRN):      Allergies    No Known Allergies    Intolerances        ICU Vital Signs Last 24 Hrs  T(C): 35.2, Max: 36.3 ( @ 20:00)  T(F): 95.4, Max: 97.3 ( @ 20:00)  HR: 64 (63 - 105)  BP: 108/70 (91/63 - 136/81)  BP(mean): 85 (73 - 104)  ABP: --  ABP(mean): --  RR: 11 (11 - 28)  SpO2: 97% (94% - 100%)      PHYSICAL EXAM:    GENERAL: wnl  HEAD:  wnl  EYES: wnl  ENMT:   NECK: veins not full  NERVOUS SYSTEM:  awake. alert, verbal eating in bed - was up earlier  CHEST/LUNG: clear  HEART: no gallop  ABDOMEN: soft with bs  EXTREMITIES:  same with left upper arm pic  LYMPH:   SKIN:     LABS:                        8.2    8.2   )-----------( 177      ( 28 Mar 2017 04:35 )             26.7     28 Mar 2017 04:35    142    |  103    |  13.0   ----------------------------<  85     3.9     |  29.0   |  0.65     Ca    8.7        28 Mar 2017 04:35  Phos  2.7       28 Mar 2017 04:35  Mg     2.4       28 Mar 2017 04:35        Urinalysis Basic - ( 26 Mar 2017 14:39 )    Color: Yellow / Appearance: Clear / S.020 / pH: x  Gluc: x / Ketone: Negative  / Bili: Negative / Urobili: Negative mg/dL   Blood: x / Protein: 30 mg/dL / Nitrite: Negative   Leuk Esterase: Trace / RBC: 11-25 /HPF / WBC 11-25   Sq Epi: x / Non Sq Epi: Moderate / Bacteria: Few      Magnesium, Serum: 2.4 mg/dL ( @ 04:35)  Phosphorus Level, Serum: 2.7 mg/dL ( @ 04:35)          RADIOLOGY & ADDITIONAL TESTS:

## 2017-03-28 NOTE — PROGRESS NOTE ADULT - ASSESSMENT
Diabetes insipidus / Hypernatremia - Repeat laboratory results noting decrease in sodium level. CT of the head reviewed showing cerebral edema. On Desmopressin. Intravenous fluids discontinued. Discussed with ICU and the patient is to be transferred to the intensive care unit for further management and monitoring. Now with normalizing SNa. Encephalopathy resolving. No further seizures. Restarted on hypotonic IVF, Desmopressin with stable SNa.    Hypokalemia - Resolved with supplementation. Remains stable.    HTN - Close blood pressure monitoring on labetalol and amlodipine.    Thrombocytopenia - No bleeding on examination. Past laboratory results also noted thrombocytopenia. Most recent CBC with improvement in the platelet count.    Will continue to follow whilst in ICU    Briefly discussed with ICU, Dr Gonzalez earlier

## 2017-03-28 NOTE — PROGRESS NOTE ADULT - SUBJECTIVE AND OBJECTIVE BOX
INTERVAL HISTORY:   doing very well, awake and lucid      VITAL SIGNS:  Vital Signs Last 24 Hrs  T(C): 35.2, Max: 36.3 (03-27 @ 20:00)  T(F): 95.4, Max: 97.3 (03-27 @ 20:00)  HR: 70 (63 - 105)  BP: 121/77 (98/72 - 136/81)  BP(mean): 93 (81 - 104)  RR: 17 (11 - 28)  SpO2: 97% (94% - 100%)    PHYSICAL EXAMINATION:    Mentation:  a and o x 3, fully cooperative  Language/Speech:nl  CN:nl  Visual Fields:full  Motor:nl  Sensory:  DTR:  Babinski:      MEDS:  MEDICATIONS  (STANDING):  amLODIPine   Tablet 10milliGRAM(s) Oral daily  desmopressin 0.01% Nasal 1Spray(s) Nasal daily  levETIRAcetam 1000milliGRAM(s) Oral two times a day  pantoprazole    Tablet 40milliGRAM(s) Oral before breakfast  labetalol 100milliGRAM(s) Oral every 12 hours  enoxaparin Injectable 40milliGRAM(s) SubCutaneous daily  Nephro-randee 1Tablet(s) Oral daily  sodium chloride 0.45%. 1000milliLiter(s) IV Continuous <Continuous>    MEDICATIONS  (PRN):      LABS:                          8.2    8.2   )-----------( 177      ( 28 Mar 2017 04:35 )             26.7     28 Mar 2017 04:35    142    |  103    |  13.0   ----------------------------<  85     3.9     |  29.0   |  0.65     Ca    8.7        28 Mar 2017 04:35  Phos  2.7       28 Mar 2017 04:35  Mg     2.4       28 Mar 2017 04:35            RADIOLOGY & ADDITIONAL STUDIES:    Repeat CT 3/27- improved/resolution of edma    IMPRESSION & PLAN:    Transient metabolic encephalopathy and cerebral edema secondary to hypernatremia/hyponatremia  Symptoms have resolved and has done well  Will not actively follow

## 2017-03-28 NOTE — PROGRESS NOTE ADULT - SUBJECTIVE AND OBJECTIVE BOX
HOSPITALIST PROGRESS NOTE    KRUPA MEDINA  [id number]  51yFemale    Patient is a 51y old  Female who presents with a chief complaint of altered mental status (20 Mar 2017 14:01)      SUBJECTIVE: Chart reviewed since last visit. Patient seen and examined at bedside earlier today for seizure and hypo/hypernatremia.  Denies any headache, dizziness or paresthesia.  Feels thirsty.      OBJECTIVE:  Vital Signs Last 24 Hrs  T(C): 35.8, Max: 36.3 ( @ 20:00)  T(F): 96.4, Max: 97.3 ( @ 20:00)  HR: 77 (63 - 87)  BP: 109/79 (97/64 - 136/81)  BP(mean): 60 (60 - 103)  RR: 12 (11 - 24)  SpO2: 98% (94% - 99%)    PHYSICAL EXAMINATION  General: NAD[+]   nontoxic[]   ill-appearing[]  Obese[]  HEENT: AT/NC[+]  PERRLA[]  EOMI[]   Moist oral mucosa[]  Pharyngeal exudates[]  NECK: Supple[+]  JVD[] Carotid bruit[]  CVS: RRR[+]  Irregular[]  S1+S2[+]   Murmur[]  RESP: Fair air entry bilaterally[+]   Clear sounds[+]   poor effort []  wheeze[]   Crackles[]  GI: Soft[+]  Nondistended[+]   Nontender[+]   Bowel Sounds[+]  Mass[]   HSM[]   Ascites[]  : suprapubic tenderness[-]   CVA Tenderness[]   Herrera[]  MS: FROM[+]   Edema[-]  CNS: Awake, aware that in hospital, and March. Generalized weakness  INTEG: Skin is Warm[+]    PSYCH: Fair mood, Affect    I&O's Summary  I & Os for 24h ending 28 Mar 2017 07:00  =============================================  IN: 1858 ml / OUT: 770 ml / NET: 1088 ml    I & Os for current day (as of 28 Mar 2017 18:58)  =============================================  IN: 1212 ml / OUT: 655 ml / NET: 557 ml                          8.3    9.5   )-----------( 191      ( 28 Mar 2017 11:49 )             26.9       28 Mar 2017 11:50    140    |  103    |  11.0   ----------------------------<  82     4.2     |  30.0   |  0.69     Ca    8.7        28 Mar 2017 11:50  Phos  2.7       28 Mar 2017 04:35  Mg     2.4       28 Mar 2017 04:35      CARDIAC MARKERS ( 28 Mar 2017 04:35 )  x     / x     / 262 U/L / x     / 2.2 ng/mL      Urinalysis Basic - ( 28 Mar 2017 11:49 )    Color: Pale Yellow / Appearance: Clear / S.010 / pH: x  Gluc: x / Ketone: Negative  / Bili: Negative / Urobili: Negative mg/dL   Blood: x / Protein: Negative mg/dL / Nitrite: Negative   Leuk Esterase: Negative / RBC: 11-25 /HPF / WBC 0-2   Sq Epi: x / Non Sq Epi: Occasional / Bacteria: Occasional      MEDICATIONS  (STANDING):  amLODIPine   Tablet 10milliGRAM(s) Oral daily  desmopressin 0.01% Nasal 1Spray(s) Nasal daily  levETIRAcetam 1000milliGRAM(s) Oral two times a day  pantoprazole    Tablet 40milliGRAM(s) Oral before breakfast  labetalol 100milliGRAM(s) Oral every 12 hours  enoxaparin Injectable 40milliGRAM(s) SubCutaneous daily  Nephro-randee 1Tablet(s) Oral daily  sodium chloride 0.45%. 1000milliLiter(s) IV Continuous <Continuous>      MEDICATIONS  (PRN):

## 2017-03-29 LAB
ANION GAP SERPL CALC-SCNC: 10 MMOL/L — SIGNIFICANT CHANGE UP (ref 5–17)
BUN SERPL-MCNC: 13 MG/DL — SIGNIFICANT CHANGE UP (ref 8–20)
CALCIUM SERPL-MCNC: 8.3 MG/DL — LOW (ref 8.6–10.2)
CHLORIDE SERPL-SCNC: 104 MMOL/L — SIGNIFICANT CHANGE UP (ref 98–107)
CO2 SERPL-SCNC: 28 MMOL/L — SIGNIFICANT CHANGE UP (ref 22–29)
CREAT SERPL-MCNC: 0.78 MG/DL — SIGNIFICANT CHANGE UP (ref 0.5–1.3)
CULTURE RESULTS: SIGNIFICANT CHANGE UP
CULTURE RESULTS: SIGNIFICANT CHANGE UP
GLUCOSE SERPL-MCNC: 85 MG/DL — SIGNIFICANT CHANGE UP (ref 70–115)
POTASSIUM SERPL-MCNC: 4.1 MMOL/L — SIGNIFICANT CHANGE UP (ref 3.5–5.3)
POTASSIUM SERPL-SCNC: 4.1 MMOL/L — SIGNIFICANT CHANGE UP (ref 3.5–5.3)
SODIUM SERPL-SCNC: 142 MMOL/L — SIGNIFICANT CHANGE UP (ref 135–145)
SPECIMEN SOURCE: SIGNIFICANT CHANGE UP
SPECIMEN SOURCE: SIGNIFICANT CHANGE UP

## 2017-03-29 PROCEDURE — 99232 SBSQ HOSP IP/OBS MODERATE 35: CPT

## 2017-03-29 RX ADMIN — Medication 100 MILLIGRAM(S): at 18:44

## 2017-03-29 RX ADMIN — SODIUM CHLORIDE 50 MILLILITER(S): 9 INJECTION, SOLUTION INTRAVENOUS at 05:16

## 2017-03-29 RX ADMIN — PANTOPRAZOLE SODIUM 40 MILLIGRAM(S): 20 TABLET, DELAYED RELEASE ORAL at 05:16

## 2017-03-29 RX ADMIN — Medication 100 MILLIGRAM(S): at 05:16

## 2017-03-29 RX ADMIN — AMLODIPINE BESYLATE 10 MILLIGRAM(S): 2.5 TABLET ORAL at 05:16

## 2017-03-29 RX ADMIN — ENOXAPARIN SODIUM 40 MILLIGRAM(S): 100 INJECTION SUBCUTANEOUS at 11:49

## 2017-03-29 RX ADMIN — LEVETIRACETAM 1000 MILLIGRAM(S): 250 TABLET, FILM COATED ORAL at 18:44

## 2017-03-29 RX ADMIN — LEVETIRACETAM 1000 MILLIGRAM(S): 250 TABLET, FILM COATED ORAL at 05:16

## 2017-03-29 RX ADMIN — DESMOPRESSIN ACETATE 1 SPRAY(S): 0.1 TABLET ORAL at 11:53

## 2017-03-29 RX ADMIN — Medication 1 TABLET(S): at 11:48

## 2017-03-29 NOTE — PROGRESS NOTE ADULT - SUBJECTIVE AND OBJECTIVE BOX
NEPHROLOGY INTERVAL HPI/OVERNIGHT EVENTS:  pt comfortable when seen earlier  no new complaints  no cp, sob, n/v/d    MEDICATIONS  (STANDING):  amLODIPine   Tablet 10milliGRAM(s) Oral daily  desmopressin 0.01% Nasal 1Spray(s) Nasal daily  levETIRAcetam 1000milliGRAM(s) Oral two times a day  pantoprazole    Tablet 40milliGRAM(s) Oral before breakfast  labetalol 100milliGRAM(s) Oral every 12 hours  enoxaparin Injectable 40milliGRAM(s) SubCutaneous daily  Nephro-randee 1Tablet(s) Oral daily  sodium chloride 0.45%. 1000milliLiter(s) IV Continuous <Continuous>    MEDICATIONS  (PRN):      Allergies    No Known Allergies        Vital Signs Last 24 Hrs  T(C): 36.8, Max: 36.9 ( @ 00:11)  T(F): 98.2, Max: 98.5 ( @ 00:11)  HR: 73 (72 - 87)  BP: 106/68 (97/64 - 128/50)  BP(mean): 96 (60 - 96)  RR: 19 (12 - 22)  SpO2: 99% (96% - 100%)    PHYSICAL EXAM:  GENERAL: NAD  EYES: EOMI, PERRLA, conjunctiva and sclera clear  ENMT: No tonsillar erythema, exudates, or enlargement; Moist mucous membranes, Good dentition, No lesions  NECK: Supple, No JVD, Normal thyroid  NERVOUS SYSTEM:  Awake and alert; no focality noted  CHEST/LUNG: Clear to percussion bilaterally; No rales, rhonchi, wheezing, or rubs  HEART: Regular rate and rhythm; No murmurs, rubs, or gallops  ABDOMEN: Soft, Nontender, Nondistended; Bowel sounds present  EXTREMITIES:  2+ Peripheral Pulses, No clubbing, cyanosis, or edema  SKIN: No rashes or lesions      LABS:                        8.3    9.5   )-----------( 191      ( 28 Mar 2017 11:49 )             26.9     29 Mar 2017 06:22    142    |  104    |  13.0   ----------------------------<  85     4.1     |  28.0   |  0.78     Ca    8.3        29 Mar 2017 06:22  Phos  2.7       28 Mar 2017 04:35  Mg     2.4       28 Mar 2017 04:35        Urinalysis Basic - ( 28 Mar 2017 11:49 )    Color: Pale Yellow / Appearance: Clear / S.010 / pH: x  Gluc: x / Ketone: Negative  / Bili: Negative / Urobili: Negative mg/dL   Blood: x / Protein: Negative mg/dL / Nitrite: Negative   Leuk Esterase: Negative / RBC: 11-25 /HPF / WBC 0-2   Sq Epi: x / Non Sq Epi: Occasional / Bacteria: Occasional          RADIOLOGY & ADDITIONAL TESTS:

## 2017-03-29 NOTE — PROGRESS NOTE ADULT - ASSESSMENT
Hypernatremia==> central DI  Resolved and serum Na+ is stable now  Neurologically stable  - cont nasal desmopressin  - oral fluid intake and IVF as needed  - monitor labs

## 2017-03-29 NOTE — PROGRESS NOTE ADULT - SUBJECTIVE AND OBJECTIVE BOX
HOSPITALIST PROGRESS NOTE    KRUPA MEDINA  [id number]  51yFemale    Patient is a 51y old  Female who presents with a chief complaint of altered mental status (20 Mar 2017 14:01)      SUBJECTIVE: Chart reviewed since last visit. Patient seen and examined at bedside earlier for hypernatremia, seizure, DI  Denies any headache or dizziness.    OBJECTIVE:  Vital Signs Last 24 Hrs  T(C): 36.8, Max: 36.9 ( @ 00:11)  T(F): 98.2, Max: 98.5 ( @ 00:11)  HR: 73 (73 - 87)  BP: 106/68 (106/68 - 128/50)  BP(mean): 96 (60 - 96)  RR: 19 (12 - 22)  SpO2: 99% (96% - 100%)    PHYSICAL EXAMINATION  General: NAD[+]   nontoxic[]   ill-appearing[]  Obese[]  HEENT: AT/NC[+]  PERRLA[]  EOMI[]   Moist oral mucosa[]    NECK: Supple[+]  JVD[] Carotid bruit[]  CVS: RRR[+]   S1+S2[+]     RESP: Fair air entry bilaterally[+]   Clear sounds[+]    GI: Soft[+]  Nondistended[+]   Nontender[+]   Bowel Sounds[+]    : suprapubic tenderness[-]     MS: FROM[+]   Edema[-]  CNS: Awake, aware that in hospital, and March. Generalized weakness  INTEG: Skin is Warm[+]    PSYCH: Fair mood, Affect        I&O's Summary  I & Os for 24h ending 29 Mar 2017 07:00  =============================================  IN: 2052 ml / OUT: 1705 ml / NET: 347 ml    I & Os for current day (as of 29 Mar 2017 11:42)  =============================================  IN: 150 ml / OUT: 300 ml / NET: -150 ml                          8.3    9.5   )-----------( 191      ( 28 Mar 2017 11:49 )             26.9       29 Mar 2017 06:22    142    |  104    |  13.0   ----------------------------<  85     4.1     |  28.0   |  0.78     Ca    8.3        29 Mar 2017 06:22  Phos  2.7       28 Mar 2017 04:35  Mg     2.4       28 Mar 2017 04:35      CARDIAC MARKERS ( 28 Mar 2017 04:35 )  x     / x     / 262 U/L / x     / 2.2 ng/mL      Urinalysis Basic - ( 28 Mar 2017 11:49 )    Color: Pale Yellow / Appearance: Clear / S.010 / pH: x  Gluc: x / Ketone: Negative  / Bili: Negative / Urobili: Negative mg/dL   Blood: x / Protein: Negative mg/dL / Nitrite: Negative   Leuk Esterase: Negative / RBC: 11-25 /HPF / WBC 0-2   Sq Epi: x / Non Sq Epi: Occasional / Bacteria: Occasional    RADIOLOGY   EXAM:  CT BRAIN                        PROCEDURE DATE:  2017   IMPRESSION:      Improved/resolved cerebral edema.  ALYSSA TRUONG M.D., ATTENDING RADIOLOGIST  This document has been electronically signed. Mar 27 2017 11:28AM      MEDICATIONS  (STANDING):  amLODIPine   Tablet 10milliGRAM(s) Oral daily  desmopressin 0.01% Nasal 1Spray(s) Nasal daily  levETIRAcetam 1000milliGRAM(s) Oral two times a day  pantoprazole    Tablet 40milliGRAM(s) Oral before breakfast  labetalol 100milliGRAM(s) Oral every 12 hours  enoxaparin Injectable 40milliGRAM(s) SubCutaneous daily  Nephro-randee 1Tablet(s) Oral daily  sodium chloride 0.45%. 1000milliLiter(s) IV Continuous <Continuous>      MEDICATIONS  (PRN):

## 2017-03-29 NOTE — PROGRESS NOTE ADULT - ASSESSMENT
Diabetes insipidus / Hypernatremia - Repeat laboratory results noting decrease in sodium level. CT of the head reviewed showing cerebral edema. On Desmopressin. Intravenous fluids discontinued. Discussed with ICU and the patient is to be transferred to the intensive care unit for further management and monitoring. Now with normalizing SNa. Encephalopathy resolving. No further seizures. Restarted on hypotonic IVF, Desmopressin with stable SNa. Repeat CTH showed improving/ resolving cerebral edema.    Hypokalemia - Resolved with supplementation. Remains stable.    HTN - Close blood pressure monitoring on labetalol and amlodipine.    Thrombocytopenia - No bleeding on examination. Past laboratory results also noted thrombocytopenia. Most recent CBC with improvement in the platelet count.

## 2017-03-30 LAB
ANION GAP SERPL CALC-SCNC: 9 MMOL/L — SIGNIFICANT CHANGE UP (ref 5–17)
BUN SERPL-MCNC: 13 MG/DL — SIGNIFICANT CHANGE UP (ref 8–20)
CALCIUM SERPL-MCNC: 8.9 MG/DL — SIGNIFICANT CHANGE UP (ref 8.6–10.2)
CHLORIDE SERPL-SCNC: 105 MMOL/L — SIGNIFICANT CHANGE UP (ref 98–107)
CO2 SERPL-SCNC: 28 MMOL/L — SIGNIFICANT CHANGE UP (ref 22–29)
CREAT SERPL-MCNC: 0.84 MG/DL — SIGNIFICANT CHANGE UP (ref 0.5–1.3)
GLUCOSE SERPL-MCNC: 85 MG/DL — SIGNIFICANT CHANGE UP (ref 70–115)
POTASSIUM SERPL-MCNC: 4.6 MMOL/L — SIGNIFICANT CHANGE UP (ref 3.5–5.3)
POTASSIUM SERPL-SCNC: 4.6 MMOL/L — SIGNIFICANT CHANGE UP (ref 3.5–5.3)
SODIUM SERPL-SCNC: 142 MMOL/L — SIGNIFICANT CHANGE UP (ref 135–145)

## 2017-03-30 PROCEDURE — 99232 SBSQ HOSP IP/OBS MODERATE 35: CPT

## 2017-03-30 RX ADMIN — Medication 1 TABLET(S): at 12:08

## 2017-03-30 RX ADMIN — LEVETIRACETAM 1000 MILLIGRAM(S): 250 TABLET, FILM COATED ORAL at 05:28

## 2017-03-30 RX ADMIN — DESMOPRESSIN ACETATE 1 SPRAY(S): 0.1 TABLET ORAL at 12:08

## 2017-03-30 RX ADMIN — ENOXAPARIN SODIUM 40 MILLIGRAM(S): 100 INJECTION SUBCUTANEOUS at 12:08

## 2017-03-30 RX ADMIN — Medication 100 MILLIGRAM(S): at 17:47

## 2017-03-30 RX ADMIN — Medication 100 MILLIGRAM(S): at 05:28

## 2017-03-30 RX ADMIN — LEVETIRACETAM 1000 MILLIGRAM(S): 250 TABLET, FILM COATED ORAL at 17:47

## 2017-03-30 RX ADMIN — PANTOPRAZOLE SODIUM 40 MILLIGRAM(S): 20 TABLET, DELAYED RELEASE ORAL at 05:28

## 2017-03-30 RX ADMIN — AMLODIPINE BESYLATE 10 MILLIGRAM(S): 2.5 TABLET ORAL at 05:28

## 2017-03-30 RX ADMIN — SODIUM CHLORIDE 50 MILLILITER(S): 9 INJECTION, SOLUTION INTRAVENOUS at 05:28

## 2017-03-30 NOTE — PROGRESS NOTE ADULT - SUBJECTIVE AND OBJECTIVE BOX
NEPHROLOGY INTERVAL HPI/OVERNIGHT EVENTS:  pt stable overnight  no acute distress; resting comfortably    MEDICATIONS  (STANDING):  amLODIPine   Tablet 10milliGRAM(s) Oral daily  desmopressin 0.01% Nasal 1Spray(s) Nasal daily  levETIRAcetam 1000milliGRAM(s) Oral two times a day  pantoprazole    Tablet 40milliGRAM(s) Oral before breakfast  labetalol 100milliGRAM(s) Oral every 12 hours  enoxaparin Injectable 40milliGRAM(s) SubCutaneous daily  Nephro-randee 1Tablet(s) Oral daily  sodium chloride 0.45%. 1000milliLiter(s) IV Continuous <Continuous>    MEDICATIONS  (PRN):      Allergies    No Known Allergies        Vital Signs Last 24 Hrs  T(C): 36.4, Max: 36.9 ( @ 20:00)  T(F): 97.6, Max: 98.5 ( @ 20:00)  HR: 81 (72 - 81)  BP: 115/82 (112/79 - 120/83)  BP(mean): 94 (85 - 98)  RR: 13 (13 - 18)  SpO2: 98% (98% - 100%)    PHYSICAL EXAM:  GENERAL: NAD  EYES: EOMI, PERRLA, conjunctiva and sclera clear  ENMT: No tonsillar erythema, exudates, or enlargement; Moist mucous membranes, Good dentition, No lesions  NECK: Supple, No JVD, Normal thyroid  NERVOUS SYSTEM:  Awake and alert; no focality noted  CHEST/LUNG: Clear to percussion bilaterally; No rales, rhonchi, wheezing, or rubs  HEART: Regular rate and rhythm; No murmurs, rubs, or gallops  ABDOMEN: Soft, Nontender, Nondistended; Bowel sounds present  EXTREMITIES:  2+ Peripheral Pulses, No clubbing, cyanosis, or edema  SKIN: No rashes or lesions      LABS:                        8.3    9.5   )-----------( 191      ( 28 Mar 2017 11:49 )             26.9     29 Mar 2017 06:22    142    |  104    |  13.0   ----------------------------<  85     4.1     |  28.0   |  0.78     Ca    8.3        29 Mar 2017 06:22        Urinalysis Basic - ( 28 Mar 2017 11:49 )    Color: Pale Yellow / Appearance: Clear / S.010 / pH: x  Gluc: x / Ketone: Negative  / Bili: Negative / Urobili: Negative mg/dL   Blood: x / Protein: Negative mg/dL / Nitrite: Negative   Leuk Esterase: Negative / RBC: 11-25 /HPF / WBC 0-2   Sq Epi: x / Non Sq Epi: Occasional / Bacteria: Occasional          RADIOLOGY & ADDITIONAL TESTS:

## 2017-03-30 NOTE — PROGRESS NOTE ADULT - ASSESSMENT
Hypernatremia==> central DI  Resolved and serum Na+ is stable   Neurologically improved and at baseline  - cont nasal desmopressin  - d/c IVF; maintain oral fluid intake  - monitor labs

## 2017-03-30 NOTE — PROGRESS NOTE ADULT - SUBJECTIVE AND OBJECTIVE BOX
HOSPITALIST PROGRESS NOTE    KRUPA MEDINA  [id number]  51yFemale    Patient is a 51y old  Female who presents with a chief complaint of altered mental status, with hypernatremia and diabetes insipidus. Now, improving and resolved. She can continue desmopressin and nephrology input has been appreciated. She can be mobilized and downgraded.    SUBJECTIVE: no complaints, "I feel good."    OBJECTIVE:  Vital Signs Last 24 Hrs  T(C): 36.8, Max: 36.9 ( @ 00:11)  T(F): 98.2, Max: 98.5 ( @ 00:11)  HR: 73 (73 - 87)  BP: 106/68 (106/68 - 128/50)  BP(mean): 96 (60 - 96)  RR: 19 (12 - 22)  SpO2: 99% (96% - 100%)    PHYSICAL EXAMINATION  General: NAD[+]   nontoxic[]   ill-appearing[]  Obese[]  HEENT: AT/NC[+]  PERRLA[]  EOMI[]   Moist oral mucosa[]    NECK: Supple[+]  JVD[] Carotid bruit[]  CVS: RRR[+]   S1+S2[+]     RESP: Fair air entry bilaterally[+]   Clear sounds[+]    GI: Soft[+]  Nondistended[+]   Nontender[+]   Bowel Sounds[+]    : suprapubic tenderness[-]     MS: FROM[+]   Edema[-]  CNS: Awake, aware that in hospital, and March. Generalized weakness  INTEG: Skin is Warm[+]    PSYCH: Fair mood, Affect        I&O's Summary  I & Os for 24h ending 29 Mar 2017 07:00  =============================================  IN: 2052 ml / OUT: 1705 ml / NET: 347 ml    I & Os for current day (as of 29 Mar 2017 11:42)  =============================================  IN: 150 ml / OUT: 300 ml / NET: -150 ml                          8.3    9.5   )-----------( 191      ( 28 Mar 2017 11:49 )             26.9       29 Mar 2017 06:22    142    |  104    |  13.0   ----------------------------<  85     4.1     |  28.0   |  0.78     Ca    8.3        29 Mar 2017 06:22  Phos  2.7       28 Mar 2017 04:35  Mg     2.4       28 Mar 2017 04:35      CARDIAC MARKERS ( 28 Mar 2017 04:35 )  x     / x     / 262 U/L / x     / 2.2 ng/mL      Urinalysis Basic - ( 28 Mar 2017 11:49 )    Color: Pale Yellow / Appearance: Clear / S.010 / pH: x  Gluc: x / Ketone: Negative  / Bili: Negative / Urobili: Negative mg/dL   Blood: x / Protein: Negative mg/dL / Nitrite: Negative   Leuk Esterase: Negative / RBC: 11-25 /HPF / WBC 0-2   Sq Epi: x / Non Sq Epi: Occasional / Bacteria: Occasional    RADIOLOGY   EXAM:  CT BRAIN                        PROCEDURE DATE:  2017   IMPRESSION:      Improved/resolved cerebral edema.  ALYSSA TRUONG M.D., ATTENDING RADIOLOGIST  This document has been electronically signed. Mar 27 2017 11:28AM      MEDICATIONS  (STANDING):  amLODIPine   Tablet 10milliGRAM(s) Oral daily  desmopressin 0.01% Nasal 1Spray(s) Nasal daily  levETIRAcetam 1000milliGRAM(s) Oral two times a day  pantoprazole    Tablet 40milliGRAM(s) Oral before breakfast  labetalol 100milliGRAM(s) Oral every 12 hours  enoxaparin Injectable 40milliGRAM(s) SubCutaneous daily  Nephro-randee 1Tablet(s) Oral daily  sodium chloride 0.45%. 1000milliLiter(s) IV Continuous <Continuous>      MEDICATIONS  (PRN):

## 2017-03-31 LAB
ANION GAP SERPL CALC-SCNC: 9 MMOL/L — SIGNIFICANT CHANGE UP (ref 5–17)
BUN SERPL-MCNC: 17 MG/DL — SIGNIFICANT CHANGE UP (ref 8–20)
CALCIUM SERPL-MCNC: 9.2 MG/DL — SIGNIFICANT CHANGE UP (ref 8.6–10.2)
CHLORIDE SERPL-SCNC: 102 MMOL/L — SIGNIFICANT CHANGE UP (ref 98–107)
CO2 SERPL-SCNC: 31 MMOL/L — HIGH (ref 22–29)
CREAT SERPL-MCNC: 0.91 MG/DL — SIGNIFICANT CHANGE UP (ref 0.5–1.3)
GLUCOSE SERPL-MCNC: 83 MG/DL — SIGNIFICANT CHANGE UP (ref 70–115)
HCT VFR BLD CALC: 25.6 % — LOW (ref 37–47)
HGB BLD-MCNC: 7.9 G/DL — LOW (ref 12–16)
MAGNESIUM SERPL-MCNC: 2.2 MG/DL — SIGNIFICANT CHANGE UP (ref 1.8–2.5)
MCHC RBC-ENTMCNC: 25.3 PG — LOW (ref 27–31)
MCHC RBC-ENTMCNC: 30.9 G/DL — LOW (ref 32–36)
MCV RBC AUTO: 82.1 FL — SIGNIFICANT CHANGE UP (ref 81–99)
PHOSPHATE SERPL-MCNC: 3.7 MG/DL — SIGNIFICANT CHANGE UP (ref 2.4–4.7)
PLATELET # BLD AUTO: 249 K/UL — SIGNIFICANT CHANGE UP (ref 150–400)
POTASSIUM SERPL-MCNC: 4.9 MMOL/L — SIGNIFICANT CHANGE UP (ref 3.5–5.3)
POTASSIUM SERPL-SCNC: 4.9 MMOL/L — SIGNIFICANT CHANGE UP (ref 3.5–5.3)
RBC # BLD: 3.12 M/UL — LOW (ref 4.4–5.2)
RBC # FLD: 16.6 % — HIGH (ref 11–15.6)
SODIUM SERPL-SCNC: 142 MMOL/L — SIGNIFICANT CHANGE UP (ref 135–145)
WBC # BLD: 7.8 K/UL — SIGNIFICANT CHANGE UP (ref 4.8–10.8)
WBC # FLD AUTO: 7.8 K/UL — SIGNIFICANT CHANGE UP (ref 4.8–10.8)

## 2017-03-31 PROCEDURE — 99232 SBSQ HOSP IP/OBS MODERATE 35: CPT

## 2017-03-31 RX ORDER — FERROUS SULFATE 325(65) MG
325 TABLET ORAL
Qty: 0 | Refills: 0 | Status: DISCONTINUED | OUTPATIENT
Start: 2017-03-31 | End: 2017-04-10

## 2017-03-31 RX ORDER — SODIUM CHLORIDE 9 MG/ML
1000 INJECTION, SOLUTION INTRAVENOUS
Qty: 0 | Refills: 0 | Status: DISCONTINUED | OUTPATIENT
Start: 2017-04-01 | End: 2017-04-04

## 2017-03-31 RX ORDER — FOLIC ACID 0.8 MG
1 TABLET ORAL DAILY
Qty: 0 | Refills: 0 | Status: DISCONTINUED | OUTPATIENT
Start: 2017-03-31 | End: 2017-04-10

## 2017-03-31 RX ORDER — MULTIVIT-MIN/FERROUS GLUCONATE 9 MG/15 ML
1 LIQUID (ML) ORAL DAILY
Qty: 0 | Refills: 0 | Status: DISCONTINUED | OUTPATIENT
Start: 2017-03-31 | End: 2017-04-09

## 2017-03-31 RX ORDER — IRON SUCROSE 20 MG/ML
200 INJECTION, SOLUTION INTRAVENOUS ONCE
Qty: 0 | Refills: 0 | Status: COMPLETED | OUTPATIENT
Start: 2017-04-01 | End: 2017-04-01

## 2017-03-31 RX ADMIN — PANTOPRAZOLE SODIUM 40 MILLIGRAM(S): 20 TABLET, DELAYED RELEASE ORAL at 08:42

## 2017-03-31 RX ADMIN — LEVETIRACETAM 1000 MILLIGRAM(S): 250 TABLET, FILM COATED ORAL at 18:01

## 2017-03-31 RX ADMIN — LEVETIRACETAM 1000 MILLIGRAM(S): 250 TABLET, FILM COATED ORAL at 06:12

## 2017-03-31 RX ADMIN — Medication 1 MILLIGRAM(S): at 13:02

## 2017-03-31 RX ADMIN — Medication 325 MILLIGRAM(S): at 18:00

## 2017-03-31 RX ADMIN — ENOXAPARIN SODIUM 40 MILLIGRAM(S): 100 INJECTION SUBCUTANEOUS at 13:02

## 2017-03-31 RX ADMIN — Medication 100 MILLIGRAM(S): at 06:12

## 2017-03-31 RX ADMIN — Medication 1 TABLET(S): at 13:02

## 2017-03-31 RX ADMIN — DESMOPRESSIN ACETATE 1 SPRAY(S): 0.1 TABLET ORAL at 12:06

## 2017-03-31 RX ADMIN — Medication 100 MILLIGRAM(S): at 18:45

## 2017-03-31 NOTE — PROGRESS NOTE ADULT - ASSESSMENT
Diabetes insipidus / Hypernatremia -Iitially admitted with hypernatremia with Na>160 Repeat laboratory results after intial treatment noting decrease in sodium level with hyponatremia, seizure and encephalopathy. CT of the head reviewed showing cerebral edema. On Desmopressin. Intravenous fluids discontinued. Discussed with ICU and the patient is to be transferred to the intensive care unit for further management and monitoring with normalizing SNa. Encephalopathy resolving. No further seizures. Restarted on hypotonic IVF, Desmopressin with stable SNa. Repeat CTH showed improving/ resolving cerebral edema. her SNa has been stable around 142-142 for the past few days and her IVF were discontinued by Nephrology.    Hypokalemia - Resolved with supplementation. Remains stable.    HTN - Close blood pressure monitoring on labetalol and amlodipine.    Thrombocytopenia - No bleeding on examination. Past laboratory results also noted thrombocytopenia. Most recent CBC with improvement in the platelet count.

## 2017-03-31 NOTE — PROGRESS NOTE ADULT - ASSESSMENT
Na 142 - stable  has been getting IVF 50/H for 4 H QOD at home  shall increase to 75/H  Hb decreasing 9.9-> 7.8; Tsat low  add venofer 200 qod with IVF x 2-3 doses  stool OB

## 2017-03-31 NOTE — PROGRESS NOTE ADULT - SUBJECTIVE AND OBJECTIVE BOX
HOSPITALIST PROGRESS NOTE    KRUPA MEDINA  [id number]  51yFemale    Patient is a 51y old  Female who presents with a chief complaint of altered mental status (20 Mar 2017 14:01)      SUBJECTIVE: Chart reviewed since last visit. Patient seen and examined at bedside for hyper/hyponatremia and seizures.  Denies any dizziness or headache.  Per  she is still slurring her speech and wants to have brain imaging to ensure resolution of edema.   is also very upset that IVF have been discontinued as maintains patient with cognitive issues and does not have adequate PO fluid intake and also has dietary indiscretions despite supervision thus requiring D5W 200ml via PICC q other day at home.  He only wants Dr Chaidez and Dr Palumbo to see the patient as they are familiar with her.        OBJECTIVE:  Vital Signs Last 24 Hrs  T(C): 36.9, Max: 37.2 (03-30 @ 20:00)  T(F): 98.5, Max: 98.9 (03-30 @ 20:00)  HR: 83 (71 - 83)  BP: 123/93 (97/60 - 123/93)  BP(mean): 85 (85 - 85)  RR: 18 (12 - 18)  SpO2: 100% (100% - 100%)    HYSICAL EXAMINATION  General: NAD[+]   nontoxic[]   ill-appearing[]  Obese[]  HEENT: AT/NC[+]  PERRLA[]  EOMI[]   Moist oral mucosa[]    NECK: Supple[+]  JVD[] Carotid bruit[]  CVS: RRR[+]   S1+S2[+]     RESP: Fair air entry bilaterally[+]   Clear sounds[+]    GI: Soft[+]  Nondistended[+]   Nontender[+]   Bowel Sounds[+]    : suprapubic tenderness[-]     MS: FROM[+]   Edema[-]  CNS: Awake, aware that in hospital, and March. Generalized weakness  INTEG: Skin is Warm[+]    PSYCH: Fair mood, Affect        I&O's Summary    I & Os for current day (as of 31 Mar 2017 18:53)  =============================================  IN: 741 ml / OUT: 1180 ml / NET: -439 ml                          7.9    7.8   )-----------( 249      ( 31 Mar 2017 07:21 )             25.6       31 Mar 2017 07:21    142    |  102    |  17.0   ----------------------------<  83     4.9     |  31.0   |  0.91     Ca    9.2        31 Mar 2017 07:21  Phos  3.7       31 Mar 2017 07:21  Mg     2.2       31 Mar 2017 07:21          MEDICATIONS  (STANDING):  amLODIPine   Tablet 10milliGRAM(s) Oral daily  desmopressin 0.01% Nasal 1Spray(s) Nasal daily  levETIRAcetam 1000milliGRAM(s) Oral two times a day  pantoprazole    Tablet 40milliGRAM(s) Oral before breakfast  labetalol 100milliGRAM(s) Oral every 12 hours  enoxaparin Injectable 40milliGRAM(s) SubCutaneous daily  Nephro-randee 1Tablet(s) Oral daily  ferrous    sulfate 325milliGRAM(s) Oral two times a day with meals  folic acid 1milliGRAM(s) Oral daily  multivitamin/minerals 1Tablet(s) Oral daily      MEDICATIONS  (PRN):

## 2017-03-31 NOTE — PROGRESS NOTE ADULT - SUBJECTIVE AND OBJECTIVE BOX
Patient seen and examined  Cheerful; greeted me with a smile  denies pain  claims to be taking PO well but O > I    I&O's Summary    I & Os for current day (as of 31 Mar 2017 20:02)  =============================================  IN: 741 ml / OUT: 1180 ml / NET: -439 ml      REVIEW OF SYSTEMS:    CONSTITUTIONAL: No F/C    RESPIRATORY: No cough or SOB  CARDIOVASCULAR: No CP/palpitations,    GASTROINTESTINAL: No abdominal , NVD   GENITOURINARY: No UTI sx  NEUROLOGICAL: No headaches/wk/numbness  MUSCULOSKELETAL:  No joint pain/swelling; No LBP  EXTREMITIES : no swelling, pain    Vital Signs Last 24 Hrs  T(C): 36.9, Max: 37 (03-30 @ 22:00)  T(F): 98.5, Max: 98.6 (03-30 @ 22:00)  HR: 83 (73 - 83)  BP: 123/93 (97/60 - 123/93)  BP(mean): --  RR: 18 (16 - 18)  SpO2: 100% (100% - 100%)    PHYSICAL EXAM:    GENERAL: NAD,   EYES:  conjunctiva and sclera clear  NECK: Supple, No JVD/Bruit  NERVOUS SYSTEM:  A/O x3,   CHEST:  CTA ,No rales orrhonchi  HEART:  RRR, No murmurs  ABDOMEN: Soft, NT/ND BS+  EXTREMITIES:  No C/C/Edema; Has PICC L arm  SKIN: No rashes    LABS:                        7.9    7.8   )-----------( 249      ( 31 Mar 2017 07:21 )             25.6     31 Mar 2017 07:21    142    |  102    |  17.0   ----------------------------<  83     4.9     |  31.0   |  0.91     Ca    9.2        31 Mar 2017 07:21  Phos  3.7       31 Mar 2017 07:21  Mg     2.2       31 Mar 2017 07:21        MEDICATIONS  (STANDING):  amLODIPine   Tablet  desmopressin 0.01% Nasal  levETIRAcetam  pantoprazole    Tablet  labetalol  enoxaparin Injectable  Nephro-randee  ferrous    sulfate  folic acid  multivitamin/minerals

## 2017-04-01 LAB
ANION GAP SERPL CALC-SCNC: 7 MMOL/L — SIGNIFICANT CHANGE UP (ref 5–17)
BUN SERPL-MCNC: 18 MG/DL — SIGNIFICANT CHANGE UP (ref 8–20)
CALCIUM SERPL-MCNC: 8.6 MG/DL — SIGNIFICANT CHANGE UP (ref 8.6–10.2)
CHLORIDE SERPL-SCNC: 102 MMOL/L — SIGNIFICANT CHANGE UP (ref 98–107)
CO2 SERPL-SCNC: 31 MMOL/L — HIGH (ref 22–29)
CREAT SERPL-MCNC: 0.89 MG/DL — SIGNIFICANT CHANGE UP (ref 0.5–1.3)
GLUCOSE SERPL-MCNC: 85 MG/DL — SIGNIFICANT CHANGE UP (ref 70–115)
POTASSIUM SERPL-MCNC: 4.7 MMOL/L — SIGNIFICANT CHANGE UP (ref 3.5–5.3)
POTASSIUM SERPL-SCNC: 4.7 MMOL/L — SIGNIFICANT CHANGE UP (ref 3.5–5.3)
SODIUM SERPL-SCNC: 140 MMOL/L — SIGNIFICANT CHANGE UP (ref 135–145)

## 2017-04-01 PROCEDURE — 99232 SBSQ HOSP IP/OBS MODERATE 35: CPT

## 2017-04-01 PROCEDURE — 70450 CT HEAD/BRAIN W/O DYE: CPT | Mod: 26

## 2017-04-01 RX ADMIN — AMLODIPINE BESYLATE 10 MILLIGRAM(S): 2.5 TABLET ORAL at 05:22

## 2017-04-01 RX ADMIN — Medication 325 MILLIGRAM(S): at 08:05

## 2017-04-01 RX ADMIN — Medication 325 MILLIGRAM(S): at 17:49

## 2017-04-01 RX ADMIN — Medication 1 MILLIGRAM(S): at 13:22

## 2017-04-01 RX ADMIN — IRON SUCROSE 110 MILLIGRAM(S): 20 INJECTION, SOLUTION INTRAVENOUS at 04:10

## 2017-04-01 RX ADMIN — DESMOPRESSIN ACETATE 1 SPRAY(S): 0.1 TABLET ORAL at 13:23

## 2017-04-01 RX ADMIN — Medication 100 MILLIGRAM(S): at 17:49

## 2017-04-01 RX ADMIN — LEVETIRACETAM 1000 MILLIGRAM(S): 250 TABLET, FILM COATED ORAL at 17:49

## 2017-04-01 RX ADMIN — ENOXAPARIN SODIUM 40 MILLIGRAM(S): 100 INJECTION SUBCUTANEOUS at 13:23

## 2017-04-01 RX ADMIN — SODIUM CHLORIDE 75 MILLILITER(S): 9 INJECTION, SOLUTION INTRAVENOUS at 00:09

## 2017-04-01 RX ADMIN — Medication 1 TABLET(S): at 13:21

## 2017-04-01 RX ADMIN — Medication 100 MILLIGRAM(S): at 05:23

## 2017-04-01 RX ADMIN — Medication 1 TABLET(S): at 13:22

## 2017-04-01 RX ADMIN — LEVETIRACETAM 1000 MILLIGRAM(S): 250 TABLET, FILM COATED ORAL at 05:23

## 2017-04-01 RX ADMIN — PANTOPRAZOLE SODIUM 40 MILLIGRAM(S): 20 TABLET, DELAYED RELEASE ORAL at 05:23

## 2017-04-01 NOTE — PROGRESS NOTE ADULT - SUBJECTIVE AND OBJECTIVE BOX
HOSPITALIST PROGRESS NOTE    KRUPA MEDINA  [id number]  51yFemale    Patient is a 51y old  Female who presents with a chief complaint of altered mental status (20 Mar 2017 14:01)      SUBJECTIVE: Chart reviewed since last visit. Patient seen and examined at bedside.      OBJECTIVE:  Vital Signs Last 24 Hrs  T(C): 36.6, Max: 36.9 (03-31 @ 14:24)  T(F): 97.8, Max: 98.5 (03-31 @ 14:24)  HR: 78 (73 - 85)  BP: 100/75 (100/75 - 127/85)  RR: 16 (16 - 18)  SpO2: 97% (97% - 97%)    PHYSICAL EXAMINATION  General: NAD[+]   nontoxic[+]     Obese[+]  HEENT: AT/NC[+]  PERRLA[+]  EOMI[+]   Moist oral mucosa[+]    NECK: Supple[+]  JVD[-] Carotid bruit[-]  CVS: RRR[+]   S1+S2[+]     RESP: Fair air entry bilaterally[+]   Clear sounds[+]    GI: Soft[+]  Nondistended[+]   Nontender[+]   Bowel Sounds[+]    : suprapubic tenderness[-]     MS: FROM[+]   Edema[-]  CNS: Awake, aware that in hospital, and March.   INTEG: Skin is Warm[+]    PSYCH: Fair mood, Affect. Forgetful                        7.9    7.8   )-----------( 249      ( 31 Mar 2017 07:21 )             25.6       01 Apr 2017 07:28    140    |  102    |  18.0   ----------------------------<  85     4.7     |  31.0   |  0.89     Ca    8.6        01 Apr 2017 07:28  Phos  3.7       31 Mar 2017 07:21  Mg     2.2       31 Mar 2017 07:21          MEDICATIONS  (STANDING):  amLODIPine   Tablet 10milliGRAM(s) Oral daily  desmopressin 0.01% Nasal 1Spray(s) Nasal daily  levETIRAcetam 1000milliGRAM(s) Oral two times a day  pantoprazole    Tablet 40milliGRAM(s) Oral before breakfast  labetalol 100milliGRAM(s) Oral every 12 hours  enoxaparin Injectable 40milliGRAM(s) SubCutaneous daily  Nephro-randee 1Tablet(s) Oral daily  ferrous    sulfate 325milliGRAM(s) Oral two times a day with meals  folic acid 1milliGRAM(s) Oral daily  multivitamin/minerals 1Tablet(s) Oral daily  dextrose 5%. 1000milliLiter(s) IV Continuous <Continuous>      MEDICATIONS  (PRN): HOSPITALIST PROGRESS NOTE    KRUPA MEDINA  [id number]  51yFemale    Patient is a 51y old  Female who presents with a chief complaint of altered mental status (20 Mar 2017 14:01)      SUBJECTIVE: Chart reviewed since last visit. Patient seen and examined at bedside earlier for hyper/hyponatremia, seizure.  Feels good.  Denies any dizziness, headache, paresthesia or paresis.      OBJECTIVE:  Vital Signs Last 24 Hrs  T(C): 36.6, Max: 36.9 (03-31 @ 14:24)  T(F): 97.8, Max: 98.5 (03-31 @ 14:24)  HR: 78 (73 - 85)  BP: 100/75 (100/75 - 127/85)  RR: 16 (16 - 18)  SpO2: 97% (97% - 97%)    PHYSICAL EXAMINATION  General: NAD[+]   nontoxic[+]     Obese[+]  HEENT: AT/NC[+]  PERRLA[+]  EOMI[+]   Moist oral mucosa[+]    NECK: Supple[+]  JVD[-] Carotid bruit[-]  CVS: RRR[+]   S1+S2[+]     RESP: Fair air entry bilaterally[+]   Clear sounds[+]    GI: Soft[+]  Nondistended[+]   Nontender[+]   Bowel Sounds[+]    : suprapubic tenderness[-]     MS: FROM[+]   Edema[-]  CNS: Awake, aware that in hospital, and March.   INTEG: Skin is Warm[+]    PSYCH: Fair mood, Affect. Forgetful                        7.9    7.8   )-----------( 249      ( 31 Mar 2017 07:21 )             25.6       01 Apr 2017 07:28    140    |  102    |  18.0   ----------------------------<  85     4.7     |  31.0   |  0.89     Ca    8.6        01 Apr 2017 07:28  Phos  3.7       31 Mar 2017 07:21  Mg     2.2       31 Mar 2017 07:21          MEDICATIONS  (STANDING):  amLODIPine   Tablet 10milliGRAM(s) Oral daily  desmopressin 0.01% Nasal 1Spray(s) Nasal daily  levETIRAcetam 1000milliGRAM(s) Oral two times a day  pantoprazole    Tablet 40milliGRAM(s) Oral before breakfast  labetalol 100milliGRAM(s) Oral every 12 hours  enoxaparin Injectable 40milliGRAM(s) SubCutaneous daily  Nephro-randee 1Tablet(s) Oral daily  ferrous    sulfate 325milliGRAM(s) Oral two times a day with meals  folic acid 1milliGRAM(s) Oral daily  multivitamin/minerals 1Tablet(s) Oral daily  dextrose 5%. 1000milliLiter(s) IV Continuous <Continuous>      MEDICATIONS  (PRN):

## 2017-04-01 NOTE — PROGRESS NOTE ADULT - SUBJECTIVE AND OBJECTIVE BOX
Patient seen and examined    Feels fine  no c/o CP SOB NV   No swelling feet    Vital Signs Last 24 Hrs  T(C): 36.6, Max: 36.6 (04-01 @ 10:08)  T(F): 97.8, Max: 97.8 (04-01 @ 10:08)  HR: 78 (77 - 85)  BP: 100/75 (100/75 - 127/85)    RR: 16 (16 - 18)  SpO2: 97% (97% - 97%)    Awake/alert; NAD  chest Clear  No JVD  No murmer  abd soft, NT BS +  No edema      01 Apr 2017 07:28    140    |  102    |  18.0   ----------------------------<  85     4.7     |  31.0   |  0.89     Ca    8.6        01 Apr 2017 07:28  Phos  3.7       31 Mar 2017 07:21  Mg     2.2       31 Mar 2017 07:21                            7.9    7.8   )-----------( 249      ( 31 Mar 2017 07:21 )             25.6       Impression  patient stable  Na 140- check labs qod only  encouraged to drink more  Continue same treatment

## 2017-04-02 PROCEDURE — 99232 SBSQ HOSP IP/OBS MODERATE 35: CPT

## 2017-04-02 RX ADMIN — Medication 1 TABLET(S): at 12:40

## 2017-04-02 RX ADMIN — Medication 100 MILLIGRAM(S): at 07:00

## 2017-04-02 RX ADMIN — LEVETIRACETAM 1000 MILLIGRAM(S): 250 TABLET, FILM COATED ORAL at 17:29

## 2017-04-02 RX ADMIN — Medication 325 MILLIGRAM(S): at 17:29

## 2017-04-02 RX ADMIN — AMLODIPINE BESYLATE 10 MILLIGRAM(S): 2.5 TABLET ORAL at 06:10

## 2017-04-02 RX ADMIN — Medication 100 MILLIGRAM(S): at 17:29

## 2017-04-02 RX ADMIN — DESMOPRESSIN ACETATE 1 SPRAY(S): 0.1 TABLET ORAL at 12:40

## 2017-04-02 RX ADMIN — ENOXAPARIN SODIUM 40 MILLIGRAM(S): 100 INJECTION SUBCUTANEOUS at 12:40

## 2017-04-02 RX ADMIN — Medication 325 MILLIGRAM(S): at 08:27

## 2017-04-02 RX ADMIN — LEVETIRACETAM 1000 MILLIGRAM(S): 250 TABLET, FILM COATED ORAL at 06:10

## 2017-04-02 RX ADMIN — PANTOPRAZOLE SODIUM 40 MILLIGRAM(S): 20 TABLET, DELAYED RELEASE ORAL at 06:10

## 2017-04-02 RX ADMIN — Medication 1 MILLIGRAM(S): at 12:40

## 2017-04-02 NOTE — PROGRESS NOTE ADULT - ASSESSMENT
Diabetes insipidus / Hypernatremia -Iitially admitted with hypernatremia with Na>160 Repeat laboratory results after intial treatment noting decrease in sodium level with hyponatremia, seizure and encephalopathy. CT of the head reviewed showing cerebral edema. On Desmopressin. Intravenous fluids discontinued. Discussed with ICU and the patient is to be transferred to the intensive care unit for further management and monitoring with normalizing SNa. Encephalopathy resolving. No further seizures. Restarted on hypotonic IVF, Desmopressin with stable SNa. Repeat CTH showed improving/ resolving cerebral edema. her SNa has been stable around 142-142 for the past few days and her IVF were discontinued by Nephrology. Resumed every other day according to home regimen per husbands wishes. Repeat CTH without any significant changes.      Hypokalemia - Resolved with supplementation. Remains stable.    HTN - Close blood pressure monitoring on labetalol and amlodipine.    Thrombocytopenia - No bleeding on examination. Past laboratory results also noted thrombocytopenia. Most recent CBC with improvement in the platelet count.

## 2017-04-02 NOTE — PROGRESS NOTE ADULT - SUBJECTIVE AND OBJECTIVE BOX
Feels good  no c/o  ambulating easily  claims to taking more POFs    afeb, 115/70, 84/m  lungs clear  no edema    stable  Labs am and q 2-3 days

## 2017-04-02 NOTE — PROGRESS NOTE ADULT - SUBJECTIVE AND OBJECTIVE BOX
HOSPITALIST PROGRESS NOTE    KRUPA MEDINA  [id number]  51yFemale    Patient is a 51y old  Female who presents with a chief complaint of altered mental status (20 Mar 2017 14:01)      SUBJECTIVE: Chart reviewed since last visit. Patient seen and examined at bedside for hypo/hypernatremia, seizures.  Patient ambulating in hallway with NA, with lots of crackers and milk in her hands  Denies dizziness, headaches, numbness or weakness.  Wants to go home.      OBJECTIVE:  Vital Signs Last 24 Hrs  T(C): 36.9, Max: 36.9 (04-01 @ 17:30)  T(F): 98.5, Max: 98.5 (04-01 @ 23:00)  HR: 81 (81 - 107)  BP: 109/79 (109/79 - 128/88)  RR: 18 (18 - 20)  SpO2: 97% (97% - 97%)    PHYSICAL EXAMINATION  General: NAD[+]   nontoxic[+]     Obese[+]  HEENT: AT/NC[+]  PERRLA[+]  EOMI[+]   Moist oral mucosa[+]    NECK: Supple[+]  JVD[-] Carotid bruit[-]  CVS: RRR[+]   S1+S2[+]     RESP: Fair air entry bilaterally[+]   Clear sounds[+]    GI: Soft[+]  Nondistended[+]   Nontender[+]   Bowel Sounds[+]    : suprapubic tenderness[-]     MS: FROM[+]   Edema[-]  CNS: Awake, aware that in hospital, and March.   INTEG: Skin is Warm[+]    PSYCH: Fair mood, Affect. Forgetful      I&O's Summary      Labs - pending     EXAM:  CT BRAIN                          PROCEDURE DATE:  04/01/2017        INTERPRETATION:  Clinical History: Hyponatremia with cerebral edema.   Follow-up exam.    Noncontrast CT scan of brain is performed with axial images obtained from   skullbase to vertex.    Comparison: CT scan 3/27/2017.     Again noted is right frontal temporal craniotomy and right parasellar   aneurysm clips.  The ventricles are midline without mass effect.  Periventricular white matter low attenuation likely represents chronic   microvascular ischemic disease.  Encephalomalacia is again noted inferior bifrontal lobes.  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    No acute large territorial infarct or acute intraparenchymal hemorrhage   is noted.  Noextra-axial fluid collection is noted.    Empty sella is again noted.    Impression:    No acute intracranial pathology noted.                  ABBY RAMOS M.D., ATTENDING RADIOLOGIST  This document has been electronically signed. Apr 1 2017 11:42AM      MEDICATIONS  (STANDING):  amLODIPine   Tablet 10milliGRAM(s) Oral daily  desmopressin 0.01% Nasal 1Spray(s) Nasal daily  levETIRAcetam 1000milliGRAM(s) Oral two times a day  pantoprazole    Tablet 40milliGRAM(s) Oral before breakfast  labetalol 100milliGRAM(s) Oral every 12 hours  enoxaparin Injectable 40milliGRAM(s) SubCutaneous daily  Nephro-randee 1Tablet(s) Oral daily  ferrous    sulfate 325milliGRAM(s) Oral two times a day with meals  folic acid 1milliGRAM(s) Oral daily  multivitamin/minerals 1Tablet(s) Oral daily  dextrose 5%. 1000milliLiter(s) IV Continuous <Continuous>      MEDICATIONS  (PRN):

## 2017-04-03 LAB
ALBUMIN SERPL ELPH-MCNC: 3.4 G/DL — SIGNIFICANT CHANGE UP (ref 3.3–5.2)
ALP SERPL-CCNC: 94 U/L — SIGNIFICANT CHANGE UP (ref 40–120)
ALT FLD-CCNC: 13 U/L — SIGNIFICANT CHANGE UP
ANION GAP SERPL CALC-SCNC: 9 MMOL/L — SIGNIFICANT CHANGE UP (ref 5–17)
AST SERPL-CCNC: 15 U/L — SIGNIFICANT CHANGE UP
BILIRUB SERPL-MCNC: 0.2 MG/DL — LOW (ref 0.4–2)
BUN SERPL-MCNC: 17 MG/DL — SIGNIFICANT CHANGE UP (ref 8–20)
CALCIUM SERPL-MCNC: 8.9 MG/DL — SIGNIFICANT CHANGE UP (ref 8.6–10.2)
CHLORIDE SERPL-SCNC: 103 MMOL/L — SIGNIFICANT CHANGE UP (ref 98–107)
CO2 SERPL-SCNC: 29 MMOL/L — SIGNIFICANT CHANGE UP (ref 22–29)
CREAT SERPL-MCNC: 0.88 MG/DL — SIGNIFICANT CHANGE UP (ref 0.5–1.3)
GLUCOSE SERPL-MCNC: 85 MG/DL — SIGNIFICANT CHANGE UP (ref 70–115)
HCT VFR BLD CALC: 23.9 % — LOW (ref 37–47)
HGB BLD-MCNC: 7.3 G/DL — LOW (ref 12–16)
MAGNESIUM SERPL-MCNC: 2.4 MG/DL — SIGNIFICANT CHANGE UP (ref 1.8–2.5)
MCHC RBC-ENTMCNC: 25.5 PG — LOW (ref 27–31)
MCHC RBC-ENTMCNC: 30.5 G/DL — LOW (ref 32–36)
MCV RBC AUTO: 83.6 FL — SIGNIFICANT CHANGE UP (ref 81–99)
PLATELET # BLD AUTO: 317 K/UL — SIGNIFICANT CHANGE UP (ref 150–400)
POTASSIUM SERPL-MCNC: 5.1 MMOL/L — SIGNIFICANT CHANGE UP (ref 3.5–5.3)
POTASSIUM SERPL-SCNC: 5.1 MMOL/L — SIGNIFICANT CHANGE UP (ref 3.5–5.3)
PROT SERPL-MCNC: 6.7 G/DL — SIGNIFICANT CHANGE UP (ref 6.6–8.7)
RBC # BLD: 2.86 M/UL — LOW (ref 4.4–5.2)
RBC # FLD: 16.8 % — HIGH (ref 11–15.6)
SODIUM SERPL-SCNC: 141 MMOL/L — SIGNIFICANT CHANGE UP (ref 135–145)
WBC # BLD: 7.4 K/UL — SIGNIFICANT CHANGE UP (ref 4.8–10.8)
WBC # FLD AUTO: 7.4 K/UL — SIGNIFICANT CHANGE UP (ref 4.8–10.8)

## 2017-04-03 PROCEDURE — 99232 SBSQ HOSP IP/OBS MODERATE 35: CPT

## 2017-04-03 RX ORDER — ERYTHROPOIETIN 10000 [IU]/ML
4000 INJECTION, SOLUTION INTRAVENOUS; SUBCUTANEOUS ONCE
Qty: 0 | Refills: 0 | Status: COMPLETED | OUTPATIENT
Start: 2017-04-03 | End: 2017-04-03

## 2017-04-03 RX ORDER — IRON SUCROSE 20 MG/ML
200 INJECTION, SOLUTION INTRAVENOUS ONCE
Qty: 0 | Refills: 0 | Status: COMPLETED | OUTPATIENT
Start: 2017-04-03 | End: 2017-04-03

## 2017-04-03 RX ADMIN — DESMOPRESSIN ACETATE 1 SPRAY(S): 0.1 TABLET ORAL at 11:44

## 2017-04-03 RX ADMIN — Medication 325 MILLIGRAM(S): at 08:39

## 2017-04-03 RX ADMIN — LEVETIRACETAM 1000 MILLIGRAM(S): 250 TABLET, FILM COATED ORAL at 06:38

## 2017-04-03 RX ADMIN — ERYTHROPOIETIN 4000 UNIT(S): 10000 INJECTION, SOLUTION INTRAVENOUS; SUBCUTANEOUS at 12:28

## 2017-04-03 RX ADMIN — Medication 1 MILLIGRAM(S): at 11:44

## 2017-04-03 RX ADMIN — AMLODIPINE BESYLATE 10 MILLIGRAM(S): 2.5 TABLET ORAL at 06:38

## 2017-04-03 RX ADMIN — PANTOPRAZOLE SODIUM 40 MILLIGRAM(S): 20 TABLET, DELAYED RELEASE ORAL at 08:39

## 2017-04-03 RX ADMIN — IRON SUCROSE 110 MILLIGRAM(S): 20 INJECTION, SOLUTION INTRAVENOUS at 19:23

## 2017-04-03 RX ADMIN — Medication 325 MILLIGRAM(S): at 18:28

## 2017-04-03 RX ADMIN — Medication 1 TABLET(S): at 11:44

## 2017-04-03 RX ADMIN — ENOXAPARIN SODIUM 40 MILLIGRAM(S): 100 INJECTION SUBCUTANEOUS at 11:44

## 2017-04-03 RX ADMIN — Medication 100 MILLIGRAM(S): at 06:38

## 2017-04-03 RX ADMIN — Medication 100 MILLIGRAM(S): at 18:28

## 2017-04-03 RX ADMIN — LEVETIRACETAM 1000 MILLIGRAM(S): 250 TABLET, FILM COATED ORAL at 18:28

## 2017-04-03 NOTE — PROGRESS NOTE ADULT - SUBJECTIVE AND OBJECTIVE BOX
NEPHROLOGY INTERVAL HPI/OVERNIGHT EVENTS:    MEDICATIONS  (STANDING):  amLODIPine   Tablet 10milliGRAM(s) Oral daily  desmopressin 0.01% Nasal 1Spray(s) Nasal daily  levETIRAcetam 1000milliGRAM(s) Oral two times a day  pantoprazole    Tablet 40milliGRAM(s) Oral before breakfast  labetalol 100milliGRAM(s) Oral every 12 hours  enoxaparin Injectable 40milliGRAM(s) SubCutaneous daily  Nephro-randee 1Tablet(s) Oral daily  ferrous    sulfate 325milliGRAM(s) Oral two times a day with meals  folic acid 1milliGRAM(s) Oral daily  multivitamin/minerals 1Tablet(s) Oral daily  dextrose 5%. 1000milliLiter(s) IV Continuous <Continuous>    MEDICATIONS  (PRN):      Allergies    No Known Allergies    Intolerances        Vital Signs Last 24 Hrs  T(C): 36.7, Max: 37.3 (04-03 @ 00:32)  T(F): 98, Max: 99.2 (04-03 @ 00:32)  HR: 82 (72 - 84)  BP: 118/81 (110/71 - 118/81)  BP(mean): --  RR: 20 (18 - 20)  SpO2: 96% (96% - 96%)  Daily     Daily     PHYSICAL EXAM:    GENERAL: same  HEAD:  same  EYES:   ENMT:   NECK:   NERVOUS SYSTEM:  awake, oriented, verbal  CHEST/LUNG: clear  HEART: no gallop  ABDOMEN: soft  EXTREMITIES:  left upper arm pic  LYMPH:   SKIN: no rash    LABS:                        7.3    7.4   )-----------( 317      ( 03 Apr 2017 07:12 )             23.9     03 Apr 2017 07:12    141    |  103    |  17.0   ----------------------------<  85     5.1     |  29.0   |  0.88     Ca    8.9        03 Apr 2017 07:12  Mg     2.4       03 Apr 2017 07:12    TPro  6.7    /  Alb  3.4    /  TBili  0.2    /  DBili  x      /  AST  15     /  ALT  13     /  AlkPhos  94     03 Apr 2017 07:12        Magnesium, Serum: 2.4 mg/dL (04-03 @ 07:12)          RADIOLOGY & ADDITIONAL TESTS:

## 2017-04-03 NOTE — PROGRESS NOTE ADULT - SUBJECTIVE AND OBJECTIVE BOX
HOSPITALIST PROGRESS NOTE    KRUPA MEDINA  [id number]  51yFemale    Patient is a 51y old  Female who presents with a chief complaint of altered mental status (20 Mar 2017 14:01)      SUBJECTIVE: Chart reviewed since last visit. Patient seen and examined at bedside earlier today for hyper/hyponatremia and seizures.  Denies any dizziness, headache, weakness or numbness.  Feels good.  Wants to go home.      OBJECTIVE:  Vital Signs Last 24 Hrs  T(C): 36.7, Max: 37.3 (04-03 @ 00:32)  T(F): 98, Max: 99.2 (04-03 @ 00:32)  HR: 82 (72 - 84)  BP: 118/81 (110/71 - 118/81)  RR: 20 (18 - 20)  SpO2: 96% (96% - 96%)    PHYSICAL EXAMINATION  General: NAD[+]   nontoxic[+]     Obese[+]  HEENT: AT/NC[+]  PERRLA[+]  EOMI[+]   Moist oral mucosa[+]    NECK: Supple[+]  JVD[-] Carotid bruit[-]  CVS: RRR[+]   S1+S2[+]     RESP: Fair air entry bilaterally[+]   Clear sounds[+]    GI: Soft[+]  Nondistended[+]   Nontender[+]   Bowel Sounds[+]    : suprapubic tenderness[-]     MS: FROM[+]   Edema[-]  CNS: Awake, aware that in hospital, and March.   INTEG: Skin is Warm[+]    PSYCH: Fair mood, Affect. Forgetful        I&O's Summary                          7.3    7.4   )-----------( 317      ( 03 Apr 2017 07:12 )             23.9       03 Apr 2017 07:12    141    |  103    |  17.0   ----------------------------<  85     5.1     |  29.0   |  0.88     Ca    8.9        03 Apr 2017 07:12  Mg     2.4       03 Apr 2017 07:12    TPro  6.7    /  Alb  3.4    /  TBili  0.2    /  DBili  x      /  AST  15     /  ALT  13     /  AlkPhos  94     03 Apr 2017 07:12          MEDICATIONS  (STANDING):  amLODIPine   Tablet 10milliGRAM(s) Oral daily  desmopressin 0.01% Nasal 1Spray(s) Nasal daily  levETIRAcetam 1000milliGRAM(s) Oral two times a day  pantoprazole    Tablet 40milliGRAM(s) Oral before breakfast  labetalol 100milliGRAM(s) Oral every 12 hours  enoxaparin Injectable 40milliGRAM(s) SubCutaneous daily  Nephro-randee 1Tablet(s) Oral daily  ferrous    sulfate 325milliGRAM(s) Oral two times a day with meals  folic acid 1milliGRAM(s) Oral daily  multivitamin/minerals 1Tablet(s) Oral daily  dextrose 5%. 1000milliLiter(s) IV Continuous <Continuous>  iron sucrose IVPB 200milliGRAM(s) IV Intermittent once  epoetin ru Injectable 4000Unit(s) SubCutaneous once      MEDICATIONS  (PRN):

## 2017-04-03 NOTE — PROGRESS NOTE ADULT - ASSESSMENT
Diabetes insipidus / Hypernatremia -Iitially admitted with hypernatremia with Na>160 Repeat laboratory results after intial treatment noting decrease in sodium level with hyponatremia, seizure and encephalopathy. CT of the head reviewed showing cerebral edema. On Desmopressin. Intravenous fluids discontinued. Discussed with ICU and the patient is to be transferred to the intensive care unit for further management and monitoring with normalizing SNa. Encephalopathy resolving. No further seizures. Restarted on hypotonic IVF, Desmopressin with stable SNa. Repeat CTH showed improving/ resolving cerebral edema. her SNa has been stable around 142-142 for the past few days and her IVF were discontinued by Nephrology. Resumed every other day according to home regimen per husbands wishes. Repeat CTH without any significant changes.  Patient is medically stable and may be discharged home soon.    Hypokalemia - Resolved with supplementation. Remains stable.    HTN - Close blood pressure monitoring on labetalol and amlodipine.    Thrombocytopenia - No bleeding on examination. Past laboratory results also noted thrombocytopenia. Most recent CBC with improvement in the platelet count.

## 2017-04-04 ENCOUNTER — TRANSCRIPTION ENCOUNTER (OUTPATIENT)
Age: 52
End: 2017-04-04

## 2017-04-04 LAB
ANION GAP SERPL CALC-SCNC: 12 MMOL/L — SIGNIFICANT CHANGE UP (ref 5–17)
ANION GAP SERPL CALC-SCNC: 9 MMOL/L — SIGNIFICANT CHANGE UP (ref 5–17)
BUN SERPL-MCNC: 19 MG/DL — SIGNIFICANT CHANGE UP (ref 8–20)
BUN SERPL-MCNC: 21 MG/DL — HIGH (ref 8–20)
CALCIUM SERPL-MCNC: 8.9 MG/DL — SIGNIFICANT CHANGE UP (ref 8.6–10.2)
CALCIUM SERPL-MCNC: 9.3 MG/DL — SIGNIFICANT CHANGE UP (ref 8.6–10.2)
CHLORIDE SERPL-SCNC: 100 MMOL/L — SIGNIFICANT CHANGE UP (ref 98–107)
CHLORIDE SERPL-SCNC: 97 MMOL/L — LOW (ref 98–107)
CO2 SERPL-SCNC: 27 MMOL/L — SIGNIFICANT CHANGE UP (ref 22–29)
CO2 SERPL-SCNC: 27 MMOL/L — SIGNIFICANT CHANGE UP (ref 22–29)
CREAT SERPL-MCNC: 0.86 MG/DL — SIGNIFICANT CHANGE UP (ref 0.5–1.3)
CREAT SERPL-MCNC: 0.92 MG/DL — SIGNIFICANT CHANGE UP (ref 0.5–1.3)
GLUCOSE SERPL-MCNC: 83 MG/DL — SIGNIFICANT CHANGE UP (ref 70–115)
GLUCOSE SERPL-MCNC: 90 MG/DL — SIGNIFICANT CHANGE UP (ref 70–115)
HCT VFR BLD CALC: 24.2 % — LOW (ref 37–47)
HGB BLD-MCNC: 7.4 G/DL — LOW (ref 12–16)
MCHC RBC-ENTMCNC: 25.2 PG — LOW (ref 27–31)
MCHC RBC-ENTMCNC: 30.6 G/DL — LOW (ref 32–36)
MCV RBC AUTO: 82.3 FL — SIGNIFICANT CHANGE UP (ref 81–99)
PLATELET # BLD AUTO: 308 K/UL — SIGNIFICANT CHANGE UP (ref 150–400)
POTASSIUM SERPL-MCNC: 5.2 MMOL/L — SIGNIFICANT CHANGE UP (ref 3.5–5.3)
POTASSIUM SERPL-MCNC: 5.7 MMOL/L — HIGH (ref 3.5–5.3)
POTASSIUM SERPL-SCNC: 5.2 MMOL/L — SIGNIFICANT CHANGE UP (ref 3.5–5.3)
POTASSIUM SERPL-SCNC: 5.7 MMOL/L — HIGH (ref 3.5–5.3)
RBC # BLD: 2.94 M/UL — LOW (ref 4.4–5.2)
RBC # FLD: 16.7 % — HIGH (ref 11–15.6)
SODIUM SERPL-SCNC: 136 MMOL/L — SIGNIFICANT CHANGE UP (ref 135–145)
SODIUM SERPL-SCNC: 136 MMOL/L — SIGNIFICANT CHANGE UP (ref 135–145)
WBC # BLD: 8.2 K/UL — SIGNIFICANT CHANGE UP (ref 4.8–10.8)
WBC # FLD AUTO: 8.2 K/UL — SIGNIFICANT CHANGE UP (ref 4.8–10.8)

## 2017-04-04 PROCEDURE — 99232 SBSQ HOSP IP/OBS MODERATE 35: CPT

## 2017-04-04 RX ADMIN — LEVETIRACETAM 1000 MILLIGRAM(S): 250 TABLET, FILM COATED ORAL at 05:39

## 2017-04-04 RX ADMIN — Medication 1 TABLET(S): at 11:32

## 2017-04-04 RX ADMIN — Medication 1 MILLIGRAM(S): at 11:32

## 2017-04-04 RX ADMIN — AMLODIPINE BESYLATE 10 MILLIGRAM(S): 2.5 TABLET ORAL at 05:39

## 2017-04-04 RX ADMIN — ENOXAPARIN SODIUM 40 MILLIGRAM(S): 100 INJECTION SUBCUTANEOUS at 11:32

## 2017-04-04 RX ADMIN — PANTOPRAZOLE SODIUM 40 MILLIGRAM(S): 20 TABLET, DELAYED RELEASE ORAL at 05:39

## 2017-04-04 RX ADMIN — LEVETIRACETAM 1000 MILLIGRAM(S): 250 TABLET, FILM COATED ORAL at 17:21

## 2017-04-04 RX ADMIN — Medication 100 MILLIGRAM(S): at 17:21

## 2017-04-04 RX ADMIN — Medication 100 MILLIGRAM(S): at 05:39

## 2017-04-04 RX ADMIN — DESMOPRESSIN ACETATE 1 SPRAY(S): 0.1 TABLET ORAL at 11:32

## 2017-04-04 RX ADMIN — Medication 325 MILLIGRAM(S): at 09:12

## 2017-04-04 RX ADMIN — Medication 325 MILLIGRAM(S): at 17:21

## 2017-04-04 NOTE — DISCHARGE NOTE ADULT - MEDICATION SUMMARY - MEDICATIONS TO TAKE
I will START or STAY ON the medications listed below when I get home from the hospital:    levETIRAcetam 1000 mg oral tablet  -- 1 tab(s) by mouth 2 times a day  -- Indication: For Seizure disorder as sequela of cerebrovascular accident    desmopressin nasal  -- 10 microgram(s) into nose once a day  -- Indication: For Diabetes insipidus    labetalol 100 mg oral tablet  -- 1 tab(s) by mouth 2 times a day  -- Indication: For HTN (Hypertension)    amLODIPine 10 mg oral tablet  -- 1 tab(s) by mouth once a day  -- Indication: For HTN (Hypertension)    Dextrose 5% in Water intravenous solution  -- 1000 milliliter(s) intravenous every other day @200 ml/hr  -- Indication: For Diabetes insipidus    ferrous sulfate 325 mg (65 mg elemental iron) oral tablet  -- 1 tab(s) by mouth 2 times a day  -- Indication: For Supplementation    pantoprazole 40 mg oral delayed release tablet  -- 1 tab(s) by mouth once a day  -- Indication: For gerd    Multiple Vitamins with Minerals oral tablet  -- 1 tab(s) by mouth once a day  -- Indication: For Supplementation    folic acid 1 mg oral tablet  -- 1 tab(s) by mouth once a day  -- Indication: For Supplementation

## 2017-04-04 NOTE — DISCHARGE NOTE ADULT - CARE PROVIDER_API CALL
richard peng  Phone: (   )    -  Fax: (   )    -    Robert Chaidez), Internal Medicine; Nephrology  [address] [place] [address] [id number]  Phone: [phone]  Fax: [phone]

## 2017-04-04 NOTE — DISCHARGE NOTE ADULT - PATIENT PORTAL LINK FT
“You can access the [facility] Patient Portal, offered by [facility], by registering with the following website: [email or web address]”

## 2017-04-04 NOTE — DISCHARGE NOTE ADULT - CARE PLAN
Principal Discharge DX:	Hypernatremia  Goal:	prevent hyper, hyponatremia  Instructions for follow-up, activity and diet:	Diet and medications as prescribed.  Weekly BMP  Follow up with PMD in 1 week  Secondary Diagnosis:	Diabetes insipidus  Instructions for follow-up, activity and diet:	As above  Secondary Diagnosis:	Essential hypertension  Instructions for follow-up, activity and diet:	Diet and medications as listed.  Follow up with PMD  Secondary Diagnosis:	Memory loss, short term  Secondary Diagnosis:	Seizure disorder as sequela of cerebrovascular accident  Secondary Diagnosis:	Thrombocytopenia  Instructions for follow-up, activity and diet:	CBC in 1 week

## 2017-04-04 NOTE — DISCHARGE NOTE ADULT - PLAN OF CARE
prevent hyper, hyponatremia Diet and medications as prescribed.  Weekly BMP  Follow up with PMD in 1 week As above Diet and medications as listed.  Follow up with PMD CBC in 1 week

## 2017-04-04 NOTE — PROGRESS NOTE ADULT - SUBJECTIVE AND OBJECTIVE BOX
NEPHROLOGY INTERVAL HPI/OVERNIGHT EVENTS:    MEDICATIONS  (STANDING):  amLODIPine   Tablet 10milliGRAM(s) Oral daily  desmopressin 0.01% Nasal 1Spray(s) Nasal daily  levETIRAcetam 1000milliGRAM(s) Oral two times a day  pantoprazole    Tablet 40milliGRAM(s) Oral before breakfast  labetalol 100milliGRAM(s) Oral every 12 hours  enoxaparin Injectable 40milliGRAM(s) SubCutaneous daily  Nephro-randee 1Tablet(s) Oral daily  ferrous    sulfate 325milliGRAM(s) Oral two times a day with meals  folic acid 1milliGRAM(s) Oral daily  multivitamin/minerals 1Tablet(s) Oral daily    MEDICATIONS  (PRN):      Allergies    No Known Allergies    Intolerances        Vital Signs Last 24 Hrs  T(C): 36.3, Max: 37.2 (04-03 @ 16:48)  T(F): 97.4, Max: 99 (04-03 @ 16:48)  HR: 81 (80 - 81)  BP: 123/90 (107/77 - 123/90)  BP(mean): --  RR: 18 (18 - 18)  SpO2: 99% (99% - 99%)  Daily     Daily     PHYSICAL EXAM:    GENERAL: same  HEAD:  same  EYES:   ENMT:   NECK:   NERVOUS SYSTEM:  oob walking in [place], alert, verbal  CHEST/LUNG: no rales noted  HEART: no gallop noted  ABDOMEN: not tender  EXTREMITIES:  pic same  LYMPH:   SKIN: no rash    LABS:                        7.4    8.2   )-----------( 308      ( 04 Apr 2017 06:15 )             24.2     04 Apr 2017 06:15    136    |  100    |  21.0   ----------------------------<  83     5.2     |  27.0   |  0.92     Ca    8.9        04 Apr 2017 06:15  Mg     2.4       03 Apr 2017 07:12    TPro  6.7    /  Alb  3.4    /  TBili  0.2    /  DBili  x      /  AST  15     /  ALT  13     /  AlkPhos  94     03 Apr 2017 07:12                RADIOLOGY & ADDITIONAL TESTS:

## 2017-04-04 NOTE — PROGRESS NOTE ADULT - SUBJECTIVE AND OBJECTIVE BOX
HOSPITALIST PROGRESS NOTE    KRUPA MEDINA  [id number]  51yFemale    Patient is a 51y old  Female who presents with a chief complaint of Altered mental status (04 Apr 2017 09:03)      SUBJECTIVE: Chart reviewed since last visit. Patient seen and examined at bedside earlier today for hyper/hyponatremia and seizures.  Denies any dizziness, headache, weakness or numbness.  Feels good.  Wants to go home.        OBJECTIVE:  Vital Signs Last 24 Hrs  T(C): 36.3, Max: 37.2 (04-03 @ 16:48)  T(F): 97.4, Max: 99 (04-03 @ 16:48)  HR: 81 (80 - 81)  BP: 123/90 (107/77 - 123/90)  RR: 18 (18 - 18)  SpO2: 99% (99% - 99%)    PHYSICAL EXAMINATION  General: NAD[+]   nontoxic[+]     Obese[+]  HEENT: AT/NC[+]  PERRLA[+]  EOMI[+]   Moist oral mucosa[+]    NECK: Supple[+]  JVD[-] Carotid bruit[-]  CVS: RRR[+]   S1+S2[+]     RESP: Fair air entry bilaterally[+]   Clear sounds[+]    GI: Soft[+]  Nondistended[+]   Nontender[+]   Bowel Sounds[+]    : suprapubic tenderness[-]     MS: FROM[+]   Edema[-]  CNS: Awake, aware that in hospital, and March.   INTEG: Skin is Warm[+]    PSYCH: Fair mood, Affect. Forgetfu    I&O's Summary    I & Os for current day (as of 04 Apr 2017 15:19)  =============================================  IN: 100 ml / OUT: 0 ml / NET: 100 ml                          7.4    8.2   )-----------( 308      ( 04 Apr 2017 06:15 )             24.2       04 Apr 2017 13:21    136    |  97     |  19.0   ----------------------------<  90     5.7     |  27.0   |  0.86     Ca    9.3        04 Apr 2017 13:21  Mg     2.4       03 Apr 2017 07:12    TPro  6.7    /  Alb  3.4    /  TBili  0.2    /  DBili  x      /  AST  15     /  ALT  13     /  AlkPhos  94     03 Apr 2017 07:12          MEDICATIONS  (STANDING):  amLODIPine   Tablet 10milliGRAM(s) Oral daily  desmopressin 0.01% Nasal 1Spray(s) Nasal daily  levETIRAcetam 1000milliGRAM(s) Oral two times a day  pantoprazole    Tablet 40milliGRAM(s) Oral before breakfast  labetalol 100milliGRAM(s) Oral every 12 hours  enoxaparin Injectable 40milliGRAM(s) SubCutaneous daily  Nephro-randee 1Tablet(s) Oral daily  ferrous    sulfate 325milliGRAM(s) Oral two times a day with meals  folic acid 1milliGRAM(s) Oral daily  multivitamin/minerals 1Tablet(s) Oral daily      MEDICATIONS  (PRN):

## 2017-04-04 NOTE — DISCHARGE NOTE ADULT - HOSPITAL COURSE
Diabetes insipidus / Hypernatremia -Iitially admitted with hypernatremia with Na>160 Repeat laboratory results after intial treatment noting decrease in sodium level with hyponatremia, seizure and encephalopathy. CT of the head reviewed showing cerebral edema. On Desmopressin. Intravenous fluids discontinued. Discussed with ICU and the patient is to be transferred to the intensive care unit for further management and monitoring with normalizing SNa. Encephalopathy resolving. No further seizures. Restarted on hypotonic IVF, Desmopressin with stable SNa. Repeat CTH showed improving/ resolving cerebral edema. Her SNa has been stable around 142 for the past few days and her IVF were discontinued by Nephrology. Resumed every other day according to home regimen per husbands wishes. Repeat CTH without any significant changes. She does have short term memory loss and tends to veer from her diet and these indiscretions cause imbalances.  has a hx of diabetes insipidus / Hypernatremia and was initially admitted with hypernatremia with Na>160. Her repeat laboratory results after initial treatment demonstrated a marked decrease in sodium level, with resulting seizure and encephalopathy. Her CT of the head displayed some cerebral edema, on desmopressin. She was moved to the intensive care unit for further management and monitoring with normalizing SNa. Her encephalopathy is resolving. No further seizures. Restarted on hypotonic IVF, Desmopressin with stable SNa. Repeat CTH showed improving/ resolving cerebral edema. Her SNa has been stable around 142 for the past few days and her IVF were discontinued by Nephrology. Resumed every other day according to home regimen per husbands wishes. Repeat CTH without any significant changes. She does have short term memory loss and tends to veer from her diet and these indiscretions cause imbalances.    Today on day of discharge, she is comfortable, has had normal sodium values the last three days on labs and is eating/drinking and mentating appropriately. She is eager to go home.  has a hx of diabetes insipidus / Hypernatremia and was initially admitted with hypernatremia with Na>160. Her repeat laboratory results after initial treatment demonstrated a marked decrease in sodium level, with resulting seizure and encephalopathy. Her CT of the head displayed some cerebral edema, on desmopressin. She was moved to the intensive care unit for further management and monitoring with normalizing SNa. Her encephalopathy is resolving. No further seizures. Restarted on hypotonic IVF, Desmopressin with stable SNa. Repeat CTH showed improving/ resolving cerebral edema. Her SNa has been stable around 142 for the past few days and her IVF were discontinued by Nephrology. Resumed every other day according to home regimen per husbands wishes. Repeat CTH without any significant changes. She does have short term memory loss and tends to veer from her diet and these indiscretions cause imbalances.    Today on day of discharge, she is comfortable, has had normal sodium values the last three days on labs and is eating/drinking and mentating appropriately. She is eager to go home.     I called her  Angel, and he is in full agreement with the discharge plan and satisfied with our care. Her sodiums are optimal currently and she will continue her regular [place] infusions and get her labs monitored once a week by her PMD . Her  is very aware of her medical conditions and all the care it requires.  has a hx of diabetes insipidus / Hypernatremia and was initially admitted with hypernatremia with Na>160. Her repeat laboratory results after initial treatment demonstrated a marked decrease in sodium level, with resulting seizure and encephalopathy. Her CT of the head displayed some cerebral edema, on desmopressin. She was moved to the intensive care unit for further management and monitoring with normalizing SNa. Her encephalopathy is resolving. No further seizures. Restarted on hypotonic IVF, Desmopressin with stable SNa. Repeat CTH showed improving/ resolving cerebral edema. Her SNa has been stable around 142 for the past few days and her IVF were discontinued by Nephrology. Resumed every other day according to home regimen per husbands wishes. Repeat CTH without any significant changes. She does have short term memory loss and tends to veer from her diet and these indiscretions cause imbalances.    Today on day of discharge, she is comfortable, has had normal sodium values the last three days on labs and is eating/drinking and mentating appropriately. She is eager to go home.    I called her  Angel, and he is in full agreement with the discharge plan and satisfied with our care. Her sodiums are optimal currently and she will continue her regular [place] infusions and get her labs monitored once a week by her PMD . Her  is very aware of her medical conditions and all the care it requires.    addendum: I spent 40 minutes discharging this patient and reviewing all discharge instructions.

## 2017-04-04 NOTE — PROGRESS NOTE ADULT - ASSESSMENT
Diabetes insipidus / Hypernatremia -Iitially admitted with hypernatremia with Na>160 Repeat laboratory results after intial treatment noting decrease in sodium level with hyponatremia, seizure and encephalopathy. CT of the head reviewed showing cerebral edema. On Desmopressin. Intravenous fluids discontinued. Discussed with ICU and the patient is to be transferred to the intensive care unit for further management and monitoring with normalizing SNa. Encephalopathy resolving. No further seizures. Restarted on hypotonic IVF, Desmopressin with stable SNa. Repeat CTH showed improving/ resolving cerebral edema. her SNa has been stable around 142-142 for the past few days and her IVF were discontinued by Nephrology. Resumed every other day according to home regimen per husbands wishes. Repeat CTH without any significant changes.  Her SNa today is 136, repeat again 136. Given patient with encephalopthy ariel SNa reaches 130 will defer discharge. Discontinue IVF. Plan for BMP in am    Hypokalemia - Resolved with supplementation. Remains stable.    HTN - Close blood pressure monitoring on labetalol and amlodipine.    Thrombocytopenia - No bleeding on examination. Past laboratory results also noted thrombocytopenia. Most recent CBC with improvement in the platelet count.

## 2017-04-04 NOTE — DISCHARGE NOTE ADULT - SECONDARY DIAGNOSIS.
Diabetes insipidus Essential hypertension Memory loss, short term Seizure disorder as sequela of cerebrovascular accident Thrombocytopenia

## 2017-04-05 LAB
ANION GAP SERPL CALC-SCNC: 10 MMOL/L — SIGNIFICANT CHANGE UP (ref 5–17)
BUN SERPL-MCNC: 16 MG/DL — SIGNIFICANT CHANGE UP (ref 8–20)
CALCIUM SERPL-MCNC: 9.5 MG/DL — SIGNIFICANT CHANGE UP (ref 8.6–10.2)
CHLORIDE SERPL-SCNC: 95 MMOL/L — LOW (ref 98–107)
CO2 SERPL-SCNC: 28 MMOL/L — SIGNIFICANT CHANGE UP (ref 22–29)
CREAT SERPL-MCNC: 0.88 MG/DL — SIGNIFICANT CHANGE UP (ref 0.5–1.3)
GLUCOSE SERPL-MCNC: 131 MG/DL — HIGH (ref 70–115)
POTASSIUM SERPL-MCNC: 4.8 MMOL/L — SIGNIFICANT CHANGE UP (ref 3.5–5.3)
POTASSIUM SERPL-SCNC: 4.8 MMOL/L — SIGNIFICANT CHANGE UP (ref 3.5–5.3)
SODIUM SERPL-SCNC: 133 MMOL/L — LOW (ref 135–145)

## 2017-04-05 PROCEDURE — 99232 SBSQ HOSP IP/OBS MODERATE 35: CPT

## 2017-04-05 RX ADMIN — Medication 100 MILLIGRAM(S): at 17:05

## 2017-04-05 RX ADMIN — DESMOPRESSIN ACETATE 1 SPRAY(S): 0.1 TABLET ORAL at 11:51

## 2017-04-05 RX ADMIN — Medication 1 MILLIGRAM(S): at 11:51

## 2017-04-05 RX ADMIN — LEVETIRACETAM 1000 MILLIGRAM(S): 250 TABLET, FILM COATED ORAL at 05:45

## 2017-04-05 RX ADMIN — Medication 1 TABLET(S): at 11:51

## 2017-04-05 RX ADMIN — Medication 100 MILLIGRAM(S): at 05:45

## 2017-04-05 RX ADMIN — Medication 325 MILLIGRAM(S): at 09:01

## 2017-04-05 RX ADMIN — LEVETIRACETAM 1000 MILLIGRAM(S): 250 TABLET, FILM COATED ORAL at 17:05

## 2017-04-05 RX ADMIN — PANTOPRAZOLE SODIUM 40 MILLIGRAM(S): 20 TABLET, DELAYED RELEASE ORAL at 05:46

## 2017-04-05 RX ADMIN — ENOXAPARIN SODIUM 40 MILLIGRAM(S): 100 INJECTION SUBCUTANEOUS at 11:51

## 2017-04-05 RX ADMIN — AMLODIPINE BESYLATE 10 MILLIGRAM(S): 2.5 TABLET ORAL at 05:46

## 2017-04-05 RX ADMIN — Medication 325 MILLIGRAM(S): at 17:05

## 2017-04-05 NOTE — PROGRESS NOTE ADULT - SUBJECTIVE AND OBJECTIVE BOX
NEPHROLOGY INTERVAL HPI/OVERNIGHT EVENTS:  pt clinically stable  no acute distress  ambulating well    MEDICATIONS  (STANDING):  amLODIPine   Tablet 10milliGRAM(s) Oral daily  desmopressin 0.01% Nasal 1Spray(s) Nasal daily  levETIRAcetam 1000milliGRAM(s) Oral two times a day  pantoprazole    Tablet 40milliGRAM(s) Oral before breakfast  labetalol 100milliGRAM(s) Oral every 12 hours  enoxaparin Injectable 40milliGRAM(s) SubCutaneous daily  Nephro-randee 1Tablet(s) Oral daily  ferrous    sulfate 325milliGRAM(s) Oral two times a day with meals  folic acid 1milliGRAM(s) Oral daily  multivitamin/minerals 1Tablet(s) Oral daily    MEDICATIONS  (PRN):      Allergies    No Known Allergies    Intolerances      Vital Signs Last 24 Hrs  T(C): 36.1, Max: 36.6 (04-04 @ 22:00)  T(F): 97, Max: 97.8 (04-04 @ 22:00)  HR: 77 (73 - 88)  BP: 124/92 (116/64 - 139/90)  BP(mean): --  RR: 18 (18 - 18)  SpO2: 98% (98% - 100%)    PHYSICAL EXAM:  GENERAL: NAD  EYES: EOMI, PERRLA, conjunctiva and sclera clear  ENMT: No tonsillar erythema, exudates, or enlargement; Moist mucous membranes, Good dentition, No lesions  NECK: Supple, No JVD, Normal thyroid  NERVOUS SYSTEM:  Awake and alert; no focality noted  CHEST/LUNG: Clear to percussion bilaterally; No rales, rhonchi, wheezing, or rubs  HEART: Regular rate and rhythm; No murmurs, rubs, or gallops  ABDOMEN: Soft, Nontender, Nondistended; Bowel sounds present  EXTREMITIES:  2+ Peripheral Pulses, No clubbing, cyanosis, or edema  SKIN: No rashes or lesions      LABS:                        7.4    8.2   )-----------( 308      ( 04 Apr 2017 06:15 )             24.2     04-05    133<L>  |  95<L>  |  16.0  ----------------------------<  131<H>  4.8   |  28.0  |  0.88    Ca    9.5      05 Apr 2017 08:55              RADIOLOGY & ADDITIONAL TESTS:

## 2017-04-05 NOTE — PROGRESS NOTE ADULT - ASSESSMENT
Diabetes insipidus / Hypernatremia -Iitially admitted with hypernatremia with Na>160 Repeat laboratory results after intial treatment noting decrease in sodium level with hyponatremia, seizure and encephalopathy. CT of the head reviewed showing cerebral edema. On Desmopressin. Intravenous fluids discontinued. Discussed with ICU and the patient is to be transferred to the intensive care unit for further management and monitoring with normalizing SNa. Encephalopathy resolving. No further seizures. Restarted on hypotonic IVF, Desmopressin with stable SNa. Repeat CTH showed improving/ resolving cerebral edema. her SNa has been stable around 142-142 for the past few days and her IVF were discontinued by Nephrology. Resumed every other day according to home regimen per husbands wishes. Repeat CTH without any significant changes.  Her SNa today is 136, repeat again 136. Given patient develops encephalopathy once SNa [id number] will defer discharge.  Plan for BMP in am    Hypokalemia - Resolved with supplementation. Remains stable.    HTN - Close blood pressure monitoring on labetalol and amlodipine.    Thrombocytopenia - No bleeding on examination. Past laboratory results also noted thrombocytopenia. Most recent CBC with improvement in the platelet count.

## 2017-04-05 NOTE — PROGRESS NOTE ADULT - ASSESSMENT
Hypernatremia==> central DI  Resolved and serum Na+ has been relatively better  Na+ 133 today  - cont nasal desmopressin  - pt to cont with oral fluid intake; may need to decrease intake if serum Na+ decreases further  - monitor labs

## 2017-04-05 NOTE — PROGRESS NOTE ADULT - SUBJECTIVE AND OBJECTIVE BOX
HOSPITALIST PROGRESS NOTE    KRUPA MEDINA  [id number]  51yFemale    Patient is a 51y old  Female who presents with a chief complaint of Altered mental status (04 Apr 2017 09:03)      SUBJECTIVE: Chart reviewed since last visit. Patient seen and examined at bedside earlier today for hyponatremia.  Patient feels fine, denies any dizziness, headache, paresthesias or paresis.      OBJECTIVE:  Vital Signs Last 24 Hrs  T(C): 36.1, Max: 36.6 (04-04 @ 22:00)  T(F): 97, Max: 97.8 (04-04 @ 22:00)  HR: 77 (73 - 88)  BP: 124/92 (116/64 - 139/90)  RR: 18 (18 - 18)  SpO2: 98% (98% - 100%)    PHYSICAL EXAMINATION  General: NAD[+]   nontoxic[+]     Obese[+]  HEENT: AT/NC[+]  PERRLA[+]  EOMI[+]   Moist oral mucosa[+]    NECK: Supple[+]  JVD[-] Carotid bruit[-]  CVS: RRR[+]   S1+S2[+]     RESP: Fair air entry bilaterally[+]   Clear sounds[+]    GI: Soft[+]  Nondistended[+]   Nontender[+]   Bowel Sounds[+]    : suprapubic tenderness[-]     MS: FROM[+]   Edema[-]  CNS: Awake, aware that in hospital, and March.   INTEG: Skin is Warm[+]    PSYCH: Fair mood, Affect. Forgetful        I&O's Summary  I & Os for 24h ending 05 Apr 2017 07:00  =============================================  IN: 360 ml / OUT: 0 ml / NET: 360 ml    I & Os for current day (as of 05 Apr 2017 13:27)  =============================================  IN: 320 ml / OUT: 0 ml / NET: 320 ml                 04-05    133<L>  |  95<L>  |  16.0  ----------------------------<  131<H>  4.8   |  28.0  |  0.88    Ca    9.5      05 Apr 2017 08:55          MEDICATIONS  (STANDING):  amLODIPine   Tablet 10milliGRAM(s) Oral daily  desmopressin 0.01% Nasal 1Spray(s) Nasal daily  levETIRAcetam 1000milliGRAM(s) Oral two times a day  pantoprazole    Tablet 40milliGRAM(s) Oral before breakfast  labetalol 100milliGRAM(s) Oral every 12 hours  enoxaparin Injectable 40milliGRAM(s) SubCutaneous daily  Nephro-randee 1Tablet(s) Oral daily  ferrous    sulfate 325milliGRAM(s) Oral two times a day with meals  folic acid 1milliGRAM(s) Oral daily  multivitamin/minerals 1Tablet(s) Oral daily      MEDICATIONS  (PRN):

## 2017-04-06 DIAGNOSIS — E87.5 HYPERKALEMIA: ICD-10-CM

## 2017-04-06 LAB
ANION GAP SERPL CALC-SCNC: 10 MMOL/L — SIGNIFICANT CHANGE UP (ref 5–17)
ANION GAP SERPL CALC-SCNC: 11 MMOL/L — SIGNIFICANT CHANGE UP (ref 5–17)
BUN SERPL-MCNC: 21 MG/DL — HIGH (ref 8–20)
BUN SERPL-MCNC: 24 MG/DL — HIGH (ref 8–20)
CALCIUM SERPL-MCNC: 9.1 MG/DL — SIGNIFICANT CHANGE UP (ref 8.6–10.2)
CALCIUM SERPL-MCNC: 9.2 MG/DL — SIGNIFICANT CHANGE UP (ref 8.6–10.2)
CHLORIDE SERPL-SCNC: 97 MMOL/L — LOW (ref 98–107)
CHLORIDE SERPL-SCNC: 97 MMOL/L — LOW (ref 98–107)
CO2 SERPL-SCNC: 27 MMOL/L — SIGNIFICANT CHANGE UP (ref 22–29)
CO2 SERPL-SCNC: 28 MMOL/L — SIGNIFICANT CHANGE UP (ref 22–29)
CREAT SERPL-MCNC: 0.78 MG/DL — SIGNIFICANT CHANGE UP (ref 0.5–1.3)
CREAT SERPL-MCNC: 0.87 MG/DL — SIGNIFICANT CHANGE UP (ref 0.5–1.3)
GLUCOSE SERPL-MCNC: 70 MG/DL — SIGNIFICANT CHANGE UP (ref 70–115)
GLUCOSE SERPL-MCNC: 78 MG/DL — SIGNIFICANT CHANGE UP (ref 70–115)
POTASSIUM SERPL-MCNC: 5.5 MMOL/L — HIGH (ref 3.5–5.3)
POTASSIUM SERPL-MCNC: 5.6 MMOL/L — HIGH (ref 3.5–5.3)
POTASSIUM SERPL-SCNC: 5.5 MMOL/L — HIGH (ref 3.5–5.3)
POTASSIUM SERPL-SCNC: 5.6 MMOL/L — HIGH (ref 3.5–5.3)
SODIUM SERPL-SCNC: 134 MMOL/L — LOW (ref 135–145)
SODIUM SERPL-SCNC: 136 MMOL/L — SIGNIFICANT CHANGE UP (ref 135–145)

## 2017-04-06 PROCEDURE — 99232 SBSQ HOSP IP/OBS MODERATE 35: CPT

## 2017-04-06 RX ORDER — SODIUM POLYSTYRENE SULFONATE 4.1 MEQ/G
15 POWDER, FOR SUSPENSION ORAL ONCE
Qty: 0 | Refills: 0 | Status: COMPLETED | OUTPATIENT
Start: 2017-04-06 | End: 2017-04-06

## 2017-04-06 RX ADMIN — Medication 100 MILLIGRAM(S): at 17:19

## 2017-04-06 RX ADMIN — Medication 1 TABLET(S): at 13:04

## 2017-04-06 RX ADMIN — SODIUM POLYSTYRENE SULFONATE 15 GRAM(S): 4.1 POWDER, FOR SUSPENSION ORAL at 17:18

## 2017-04-06 RX ADMIN — AMLODIPINE BESYLATE 10 MILLIGRAM(S): 2.5 TABLET ORAL at 05:29

## 2017-04-06 RX ADMIN — ENOXAPARIN SODIUM 40 MILLIGRAM(S): 100 INJECTION SUBCUTANEOUS at 13:04

## 2017-04-06 RX ADMIN — Medication 325 MILLIGRAM(S): at 08:39

## 2017-04-06 RX ADMIN — DESMOPRESSIN ACETATE 1 SPRAY(S): 0.1 TABLET ORAL at 13:03

## 2017-04-06 RX ADMIN — LEVETIRACETAM 1000 MILLIGRAM(S): 250 TABLET, FILM COATED ORAL at 17:19

## 2017-04-06 RX ADMIN — LEVETIRACETAM 1000 MILLIGRAM(S): 250 TABLET, FILM COATED ORAL at 05:29

## 2017-04-06 RX ADMIN — Medication 325 MILLIGRAM(S): at 17:19

## 2017-04-06 RX ADMIN — Medication 1 MILLIGRAM(S): at 13:04

## 2017-04-06 RX ADMIN — PANTOPRAZOLE SODIUM 40 MILLIGRAM(S): 20 TABLET, DELAYED RELEASE ORAL at 05:29

## 2017-04-06 RX ADMIN — Medication 100 MILLIGRAM(S): at 05:29
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
## 2017-04-06 NOTE — PROGRESS NOTE ADULT - ASSESSMENT
Hypernatremia==> central DI  Resolved post reinstitution of intranasal desmopressin  - cont nasal desmopressin  - pt to cont with oral fluid intake; adjust intake depending upon serum Na+ levels  - monitor labs    Hyperkalemia: etiology not entirely clear  - oral Kayexalate  - serum cortisol level  - low K+ diet  - consider florinef

## 2017-04-06 NOTE — PROGRESS NOTE ADULT - SUBJECTIVE AND OBJECTIVE BOX
HOSPITALIST PROGRESS NOTE    KRUPA MEDINA  [id number]  51yFemale    Patient is a 51y old  Female who presents with a chief complaint of Altered mental status (04 Apr 2017 09:03)      SUBJECTIVE: Chart reviewed since last visit. Patient seen and examined at bedside earlier today for hyponatremia.  Patient feels fine, denies any dizziness, headache, paresthesias or paresis.      OBJECTIVE:  Vital Signs Last 24 Hrs  T(C): 36.6, Max: 36.6 (04-05 @ 22:10)  T(F): 97.9, Max: 97.9 (04-06 @ 10:00)  HR: 73 (66 - 74)  BP: 110/80 (110/80 - 136/93)  RR: 18 (18 - 18)  SpO2: 100% (100% - 100%)    PHYSICAL EXAMINATION  General: NAD[+]   nontoxic[+]     Obese[+]  HEENT: AT/NC[+]  PERRLA[+]  EOMI[+]   Moist oral mucosa[+]    NECK: Supple[+]  JVD[-] Carotid bruit[-]  CVS: RRR[+]   S1+S2[+]     RESP: Fair air entry bilaterally[+]   Clear sounds[+]    GI: Soft[+]  Nondistended[+]   Nontender[+]   Bowel Sounds[+]    : suprapubic tenderness[-]     MS: FROM[+]   Edema[-]  CNS: Awake, aware that in hospital, and March.   INTEG: Skin is Warm[+]    PSYCH: Fair mood, Affect. Forgetful    MONITOR:  CAPILLARY BLOOD GLUCOSE        I&O's Summary    I & Os for current day (as of 06 Apr 2017 13:27)  =============================================  IN: 640 ml / OUT: 0 ml / NET: 640 ml        04-06    134<L>  |  97<L>  |  24.0<H>  ----------------------------<  78  5.6<H>   |  27.0  |  0.78    Ca    9.2      06 Apr 2017 05:33          MEDICATIONS  (STANDING):  amLODIPine   Tablet 10milliGRAM(s) Oral daily  desmopressin 0.01% Nasal 1Spray(s) Nasal daily  levETIRAcetam 1000milliGRAM(s) Oral two times a day  pantoprazole    Tablet 40milliGRAM(s) Oral before breakfast  labetalol 100milliGRAM(s) Oral every 12 hours  enoxaparin Injectable 40milliGRAM(s) SubCutaneous daily  Nephro-randee 1Tablet(s) Oral daily  ferrous    sulfate 325milliGRAM(s) Oral two times a day with meals  folic acid 1milliGRAM(s) Oral daily  multivitamin/minerals 1Tablet(s) Oral daily  sodium polystyrene sulfonate Suspension 15Gram(s) Oral once      MEDICATIONS  (PRN):

## 2017-04-06 NOTE — PROGRESS NOTE ADULT - SUBJECTIVE AND OBJECTIVE BOX
NEPHROLOGY INTERVAL HPI/OVERNIGHT EVENTS:  pt clinically stable  no acute distress noted  tolerating diet    MEDICATIONS  (STANDING):  amLODIPine   Tablet 10milliGRAM(s) Oral daily  desmopressin 0.01% Nasal 1Spray(s) Nasal daily  levETIRAcetam 1000milliGRAM(s) Oral two times a day  pantoprazole    Tablet 40milliGRAM(s) Oral before breakfast  labetalol 100milliGRAM(s) Oral every 12 hours  enoxaparin Injectable 40milliGRAM(s) SubCutaneous daily  Nephro-randee 1Tablet(s) Oral daily  ferrous    sulfate 325milliGRAM(s) Oral two times a day with meals  folic acid 1milliGRAM(s) Oral daily  multivitamin/minerals 1Tablet(s) Oral daily    MEDICATIONS  (PRN):      Allergies    No Known Allergies          Vital Signs Last 24 Hrs  T(C): 36.6, Max: 36.6 (04-05 @ 22:10)  T(F): 97.9, Max: 97.9 (04-06 @ 10:00)  HR: 73 (66 - 74)  BP: 110/80 (110/80 - 136/93)  BP(mean): --  RR: 18 (18 - 18)  SpO2: 100% (100% - 100%)    PHYSICAL EXAM:  GENERAL: NAD  EYES: EOMI, PERRLA, conjunctiva and sclera clear  ENMT: No tonsillar erythema, exudates, or enlargement; Moist mucous membranes, Good dentition, No lesions  NECK: Supple, No JVD, Normal thyroid  NERVOUS SYSTEM:  Awake and alert; no focality noted  CHEST/LUNG: Clear to percussion bilaterally; No rales, rhonchi, wheezing, or rubs  HEART: Regular rate and rhythm; No murmurs, rubs, or gallops  ABDOMEN: Soft, Nontender, Nondistended; Bowel sounds present  EXTREMITIES:  2+ Peripheral Pulses, No clubbing, cyanosis, or edema  SKIN: No rashes or lesions    LABS:    04-06    134<L>  |  97<L>  |  24.0<H>  ----------------------------<  78  5.6<H>   |  27.0  |  0.78    Ca    9.2      06 Apr 2017 05:33                RADIOLOGY & ADDITIONAL TESTS:

## 2017-04-06 NOTE — PROGRESS NOTE ADULT - ASSESSMENT
Diabetes insipidus / Hypernatremia -Iitially admitted with hypernatremia with Na>160 Repeat laboratory results after intial treatment noting decrease in sodium level with hyponatremia, seizure and encephalopathy. CT of the head reviewed showing cerebral edema. On Desmopressin. Intravenous fluids discontinued. Discussed with ICU and the patient is to be transferred to the intensive care unit for further management and monitoring with normalizing SNa. Encephalopathy resolving. No further seizures. Restarted on hypotonic IVF, Desmopressin with stable SNa. Repeat CTH showed improving/ resolving cerebral edema. her SNa has been stable around 142-142 for the past few days and her IVF were discontinued by Nephrology. Resumed every other day according to home regimen per husbands wishes. Repeat CTH without any significant changes.  Her SNa today is 136, repeat again 136. Given patient develops encephalopathy once SNa [id number] will defer discharge.  Plan for BMP in am    Hypokalemia - Resolved with supplementation. Remains stable.    HTN - Close blood pressure monitoring on labetalol and amlodipine.    Thrombocytopenia - No bleeding on examination. Past laboratory results also noted thrombocytopenia. Most recent CBC with improvement in the platelet count. Diabetes insipidus / Hypernatremia -Iitially admitted with hypernatremia with Na>160 Repeat laboratory results after intial treatment noting decrease in sodium level with hyponatremia, seizure and encephalopathy. CT of the head reviewed showing cerebral edema. On Desmopressin. Intravenous fluids discontinued. Discussed with ICU and the patient is to be transferred to the intensive care unit for further management and monitoring with normalizing SNa. Encephalopathy resolving. No further seizures. Restarted on hypotonic IVF, Desmopressin with stable SNa. Repeat CTH showed improving/ resolving cerebral edema. Her SNa has been stable around 142 for the past few days and her IVF were discontinued by Nephrology. Resumed every other day according to home regimen per husbands wishes. Repeat CTH without any significant changes.  Her SNa has come down to 130s, after she was given D5W, she remains asymptomatic and without seizure.     Hypokalemia - Resolved with supplementation. Remains stable.    HTN - Close blood pressure monitoring on labetalol and amlodipine.    Thrombocytopenia - No bleeding on examination. Past laboratory results also noted thrombocytopenia. Most recent CBC with improvement in the platelet count.

## 2017-04-07 LAB
ANION GAP SERPL CALC-SCNC: 12 MMOL/L — SIGNIFICANT CHANGE UP (ref 5–17)
BUN SERPL-MCNC: 18 MG/DL — SIGNIFICANT CHANGE UP (ref 8–20)
CALCIUM SERPL-MCNC: 8.8 MG/DL — SIGNIFICANT CHANGE UP (ref 8.6–10.2)
CHLORIDE SERPL-SCNC: 99 MMOL/L — SIGNIFICANT CHANGE UP (ref 98–107)
CO2 SERPL-SCNC: 26 MMOL/L — SIGNIFICANT CHANGE UP (ref 22–29)
CORTIS AM PEAK SERPL-MCNC: 3.6 UG/DL — LOW (ref 6–18.4)
CREAT SERPL-MCNC: 0.82 MG/DL — SIGNIFICANT CHANGE UP (ref 0.5–1.3)
GLUCOSE SERPL-MCNC: 82 MG/DL — SIGNIFICANT CHANGE UP (ref 70–115)
POTASSIUM SERPL-MCNC: 4.6 MMOL/L — SIGNIFICANT CHANGE UP (ref 3.5–5.3)
POTASSIUM SERPL-SCNC: 4.6 MMOL/L — SIGNIFICANT CHANGE UP (ref 3.5–5.3)
SODIUM SERPL-SCNC: 137 MMOL/L — SIGNIFICANT CHANGE UP (ref 135–145)

## 2017-04-07 PROCEDURE — 99233 SBSQ HOSP IP/OBS HIGH 50: CPT

## 2017-04-07 RX ORDER — FERROUS SULFATE 325(65) MG
1 TABLET ORAL
Qty: 0 | Refills: 0 | COMMUNITY
Start: 2017-04-07

## 2017-04-07 RX ORDER — SODIUM CHLORIDE 9 MG/ML
1000 INJECTION, SOLUTION INTRAVENOUS
Qty: 0 | Refills: 0 | Status: DISCONTINUED | OUTPATIENT
Start: 2017-04-07 | End: 2017-04-10

## 2017-04-07 RX ORDER — FOLIC ACID 0.8 MG
1 TABLET ORAL
Qty: 0 | Refills: 0 | COMMUNITY
Start: 2017-04-07

## 2017-04-07 RX ORDER — LEVETIRACETAM 250 MG/1
1 TABLET, FILM COATED ORAL
Qty: 0 | Refills: 0 | COMMUNITY
Start: 2017-04-07

## 2017-04-07 RX ORDER — MULTIVIT-MIN/FERROUS GLUCONATE 9 MG/15 ML
1 LIQUID (ML) ORAL
Qty: 0 | Refills: 0 | COMMUNITY
Start: 2017-04-07

## 2017-04-07 RX ORDER — SODIUM CHLORIDE 9 MG/ML
1000 INJECTION, SOLUTION INTRAVENOUS
Qty: 15 | Refills: 0 | OUTPATIENT
Start: 2017-04-07

## 2017-04-07 RX ADMIN — Medication 100 MILLIGRAM(S): at 05:16

## 2017-04-07 RX ADMIN — AMLODIPINE BESYLATE 10 MILLIGRAM(S): 2.5 TABLET ORAL at 05:16

## 2017-04-07 RX ADMIN — PANTOPRAZOLE SODIUM 40 MILLIGRAM(S): 20 TABLET, DELAYED RELEASE ORAL at 05:16

## 2017-04-07 RX ADMIN — ENOXAPARIN SODIUM 40 MILLIGRAM(S): 100 INJECTION SUBCUTANEOUS at 12:50

## 2017-04-07 RX ADMIN — Medication 100 MILLIGRAM(S): at 17:43

## 2017-04-07 RX ADMIN — Medication 1 MILLIGRAM(S): at 12:50

## 2017-04-07 RX ADMIN — Medication 1 TABLET(S): at 12:50

## 2017-04-07 RX ADMIN — Medication 325 MILLIGRAM(S): at 08:45

## 2017-04-07 RX ADMIN — Medication 325 MILLIGRAM(S): at 17:43

## 2017-04-07 RX ADMIN — LEVETIRACETAM 1000 MILLIGRAM(S): 250 TABLET, FILM COATED ORAL at 05:16

## 2017-04-07 RX ADMIN — DESMOPRESSIN ACETATE 1 SPRAY(S): 0.1 TABLET ORAL at 12:50

## 2017-04-07 RX ADMIN — LEVETIRACETAM 1000 MILLIGRAM(S): 250 TABLET, FILM COATED ORAL at 17:43

## 2017-04-07 NOTE — PROGRESS NOTE ADULT - ASSESSMENT
Diabetes insipidus / Hypernatremia -Iitially admitted with hypernatremia with Na>160 Repeat laboratory results after intial treatment noting decrease in sodium level with hyponatremia, seizure and encephalopathy. CT of the head reviewed showing cerebral edema. On Desmopressin. Intravenous fluids discontinued. Discussed with ICU and the patient is to be transferred to the intensive care unit for further management and monitoring with normalizing SNa. Encephalopathy resolving. No further seizures. Restarted on hypotonic IVF, Desmopressin with stable SNa. Repeat CTH showed improving/ resolving cerebral edema. Her SNa has been stable around 142 for the past few days and her IVF were discontinued by Nephrology. Resumed every other day according to home regimen per husbands wishes. Repeat CTH without any significant changes.  Her SNa has come down to 130s, after she was given D5W, she remains asymptomatic and without seizure.     Hypokalemia - Resolved with supplementation. Remains stable.    HTN - Close blood pressure monitoring on labetalol and amlodipine.    Thrombocytopenia - No bleeding on examination. Past laboratory results also noted thrombocytopenia. Most recent CBC with improvement in the platelet count.

## 2017-04-07 NOTE — PROGRESS NOTE ADULT - SUBJECTIVE AND OBJECTIVE BOX
NEPHROLOGY INTERVAL HPI/OVERNIGHT EVENTS:    Examined earlier  Looks comfortable    MEDICATIONS  (STANDING):  amLODIPine   Tablet 10milliGRAM(s) Oral daily  desmopressin 0.01% Nasal 1Spray(s) Nasal daily  levETIRAcetam 1000milliGRAM(s) Oral two times a day  pantoprazole    Tablet 40milliGRAM(s) Oral before breakfast  labetalol 100milliGRAM(s) Oral every 12 hours  enoxaparin Injectable 40milliGRAM(s) SubCutaneous daily  Nephro-randee 1Tablet(s) Oral daily  ferrous    sulfate 325milliGRAM(s) Oral two times a day with meals  folic acid 1milliGRAM(s) Oral daily  multivitamin/minerals 1Tablet(s) Oral daily    MEDICATIONS  (PRN):      Allergies    No Known Allergies    Intolerances        Vital Signs Last 24 Hrs  T(C): 36.4, Max: 36.7 (04-06 @ 22:10)  T(F): 97.6, Max: 98 (04-06 @ 22:10)  HR: 83 (70 - 83)  BP: 104/76 (104/76 - 130/80)  BP(mean): --  RR: 20 (18 - 20)  SpO2: 97% (97% - 98%)  Daily     Daily     PHYSICAL EXAM:  GENERAL: NAD  EYES: EOMI  NECK: Supple, No JVD  NERVOUS SYSTEM:  Awake and alert  CHEST/LUNG: Clear to percussion bilaterally; No rubs  HEART: Regular rate and rhythm; No murmurs  ABDOMEN: Soft, Nontender, Nondistended  EXTREMITIES:  no edema          LABS:    04-07    137  |  99  |  18.0  ----------------------------<  82  4.6   |  26.0  |  0.82    Ca    8.8      07 Apr 2017 07:16                  RADIOLOGY & ADDITIONAL TESTS:

## 2017-04-07 NOTE — PROGRESS NOTE ADULT - SUBJECTIVE AND OBJECTIVE BOX
HOSPITALIST PROGRESS NOTE    KRUPA MEDINA  [id number]  51yFemale    Patient is a 51y old  Female who presents with a chief complaint of Altered mental status (04 Apr 2017 09:03)      SUBJECTIVE: Chart reviewed since last visit. Patient seen and examined at bedside earlier today for hyponatremia.  Patient feels fine, denies any dizziness, headache, paresthesias or paresis.      OBJECTIVE:  Vital Signs Last 24 Hrs  T(C): 36.4, Max: 36.7 (04-06 @ 22:10)  T(F): 97.6, Max: 98 (04-06 @ 22:10)  HR: 83 (70 - 83)  BP: 104/76 (104/76 - 130/80)    RR: 20 (18 - 20)  SpO2: 97% (97% - 98%)    PHYSICAL EXAMINATION  General: NAD[+]   nontoxic[+]     Obese[+] Aide present at bedside.  HEENT: AT/NC[+]  PERRLA[+]  EOMI[+]   Moist oral mucosa[+]    NECK: Supple[+]  JVD[-] Carotid bruit[-]  CVS: RRR[+]   S1+S2[+]     RESP: Fair air entry bilaterally[+]   Clear sounds[+]    GI: Soft[+]  Nondistended[+]   Nontender[+]   Bowel Sounds[+]    : suprapubic tenderness[-]     MS: FROM[+]   Edema[-]  CNS: Awake, aware that in hospital, and March.   INTEG: Skin is Warm[+]    PSYCH: Fair mood, Affect. Forgetful          I&O's Summary        04-07    137  |  99  |  18.0  ----------------------------<  82  4.6   |  26.0  |  0.82    Ca    8.8      07 Apr 2017 07:16          MEDICATIONS  (STANDING):  amLODIPine   Tablet 10milliGRAM(s) Oral daily  desmopressin 0.01% Nasal 1Spray(s) Nasal daily  levETIRAcetam 1000milliGRAM(s) Oral two times a day  pantoprazole    Tablet 40milliGRAM(s) Oral before breakfast  labetalol 100milliGRAM(s) Oral every 12 hours  enoxaparin Injectable 40milliGRAM(s) SubCutaneous daily  Nephro-randee 1Tablet(s) Oral daily  ferrous    sulfate 325milliGRAM(s) Oral two times a day with meals  folic acid 1milliGRAM(s) Oral daily  multivitamin/minerals 1Tablet(s) Oral daily      MEDICATIONS  (PRN):

## 2017-04-07 NOTE — PROGRESS NOTE ADULT - ASSESSMENT
Hypernatremia==> central DI   Na better 137  Resolved post reinstitution of intranasal desmopressin  - cont nasal desmopressin  - pt to cont with oral fluid intake; adjust intake depending upon serum Na+ levels  - monitor labs    Hyperkalemia: K improved etiology not entirely clear  - s/p oral Kayexalate  - serum cortisol level  - low K+ diet  - consider florinef

## 2017-04-08 LAB
ANION GAP SERPL CALC-SCNC: 8 MMOL/L — SIGNIFICANT CHANGE UP (ref 5–17)
BUN SERPL-MCNC: 18 MG/DL — SIGNIFICANT CHANGE UP (ref 8–20)
CALCIUM SERPL-MCNC: 9.1 MG/DL — SIGNIFICANT CHANGE UP (ref 8.6–10.2)
CHLORIDE SERPL-SCNC: 102 MMOL/L — SIGNIFICANT CHANGE UP (ref 98–107)
CO2 SERPL-SCNC: 28 MMOL/L — SIGNIFICANT CHANGE UP (ref 22–29)
CREAT SERPL-MCNC: 0.86 MG/DL — SIGNIFICANT CHANGE UP (ref 0.5–1.3)
GLUCOSE SERPL-MCNC: 80 MG/DL — SIGNIFICANT CHANGE UP (ref 70–115)
POTASSIUM SERPL-MCNC: 4.7 MMOL/L — SIGNIFICANT CHANGE UP (ref 3.5–5.3)
POTASSIUM SERPL-SCNC: 4.7 MMOL/L — SIGNIFICANT CHANGE UP (ref 3.5–5.3)
SODIUM SERPL-SCNC: 138 MMOL/L — SIGNIFICANT CHANGE UP (ref 135–145)

## 2017-04-08 PROCEDURE — 99232 SBSQ HOSP IP/OBS MODERATE 35: CPT

## 2017-04-08 RX ADMIN — Medication 1 TABLET(S): at 12:35

## 2017-04-08 RX ADMIN — Medication 325 MILLIGRAM(S): at 08:36

## 2017-04-08 RX ADMIN — LEVETIRACETAM 1000 MILLIGRAM(S): 250 TABLET, FILM COATED ORAL at 18:08

## 2017-04-08 RX ADMIN — AMLODIPINE BESYLATE 10 MILLIGRAM(S): 2.5 TABLET ORAL at 05:24

## 2017-04-08 RX ADMIN — LEVETIRACETAM 1000 MILLIGRAM(S): 250 TABLET, FILM COATED ORAL at 05:24

## 2017-04-08 RX ADMIN — DESMOPRESSIN ACETATE 1 SPRAY(S): 0.1 TABLET ORAL at 12:35

## 2017-04-08 RX ADMIN — Medication 1 MILLIGRAM(S): at 12:35

## 2017-04-08 RX ADMIN — Medication 100 MILLIGRAM(S): at 05:24

## 2017-04-08 RX ADMIN — ENOXAPARIN SODIUM 40 MILLIGRAM(S): 100 INJECTION SUBCUTANEOUS at 12:35

## 2017-04-08 RX ADMIN — PANTOPRAZOLE SODIUM 40 MILLIGRAM(S): 20 TABLET, DELAYED RELEASE ORAL at 05:24

## 2017-04-08 RX ADMIN — Medication 100 MILLIGRAM(S): at 18:08

## 2017-04-08 RX ADMIN — Medication 325 MILLIGRAM(S): at 18:08

## 2017-04-08 NOTE — PROGRESS NOTE ADULT - SUBJECTIVE AND OBJECTIVE BOX
is a 50 y/o female recovering from diabetes insipidus and hypernatremia alternating with hyponatremia. She appears very comfortable today, and her sodium is within normal limits.     Summary:   REVIEW OF SYSTEMS    General:	"comfortable"    Skin/Breast:  	  Ophthalmologic:  	  ENMT:	    Respiratory and Thorax:  	  Cardiovascular:	    Gastrointestinal:	    Genitourinary:	    Musculoskeletal:	    Neurological:	    Psychiatric:	    Hematology/Lymphatics:	    Endocrine:	    Allergic/Immunologic:	  Vital Signs Last 24 Hrs  T(C): 36.9, Max: 37.2 (04-07 @ 17:42)  T(F): 98.5, Max: 98.9 (04-07 @ 17:42)  HR: 79 (79 - 93)  BP: 98/71 (98/71 - 145/95)  BP(mean): 78 (78 - 78)  RR: 14 (14 - 18)  SpO2: 98% (98% - 98%)  PHYSICAL EXAM:  GEN: NAD, comfortable, sitting up in a chair  HEENT: MMM  CVS: S1S2 RRR  PULM: CTABL, good breath sounds  ABD: soft, nontender, no rebound, no guarding  EXTREM: no edema  NEURO: intact  PSYCH  SKIN:      04-08    138  |  102  |  18.0  ----------------------------<  80  4.7   |  28.0  |  0.86    Ca    9.1      08 Apr 2017 06:06      Radiology:     MEDICATIONS  (STANDING):  amLODIPine   Tablet 10milliGRAM(s) Oral daily  desmopressin 0.01% Nasal 1Spray(s) Nasal daily  levETIRAcetam 1000milliGRAM(s) Oral two times a day  pantoprazole    Tablet 40milliGRAM(s) Oral before breakfast  labetalol 100milliGRAM(s) Oral every 12 hours  enoxaparin Injectable 40milliGRAM(s) SubCutaneous daily  Nephro-randee 1Tablet(s) Oral daily  ferrous    sulfate 325milliGRAM(s) Oral two times a day with meals  folic acid 1milliGRAM(s) Oral daily  multivitamin/minerals 1Tablet(s) Oral daily  dextrose 5%. 1000milliLiter(s) IV Continuous <Continuous>    MEDICATIONS  (PRN):

## 2017-04-09 LAB
ANION GAP SERPL CALC-SCNC: 10 MMOL/L — SIGNIFICANT CHANGE UP (ref 5–17)
BUN SERPL-MCNC: 21 MG/DL — HIGH (ref 8–20)
CALCIUM SERPL-MCNC: 9 MG/DL — SIGNIFICANT CHANGE UP (ref 8.6–10.2)
CHLORIDE SERPL-SCNC: 100 MMOL/L — SIGNIFICANT CHANGE UP (ref 98–107)
CO2 SERPL-SCNC: 26 MMOL/L — SIGNIFICANT CHANGE UP (ref 22–29)
CREAT SERPL-MCNC: 0.84 MG/DL — SIGNIFICANT CHANGE UP (ref 0.5–1.3)
GLUCOSE SERPL-MCNC: 83 MG/DL — SIGNIFICANT CHANGE UP (ref 70–115)
MAGNESIUM SERPL-MCNC: 2.2 MG/DL — SIGNIFICANT CHANGE UP (ref 1.8–2.5)
PHOSPHATE SERPL-MCNC: 3.5 MG/DL — SIGNIFICANT CHANGE UP (ref 2.4–4.7)
POTASSIUM SERPL-MCNC: 4.5 MMOL/L — SIGNIFICANT CHANGE UP (ref 3.5–5.3)
POTASSIUM SERPL-SCNC: 4.5 MMOL/L — SIGNIFICANT CHANGE UP (ref 3.5–5.3)
SODIUM SERPL-SCNC: 136 MMOL/L — SIGNIFICANT CHANGE UP (ref 135–145)

## 2017-04-09 PROCEDURE — 99232 SBSQ HOSP IP/OBS MODERATE 35: CPT

## 2017-04-09 RX ADMIN — DESMOPRESSIN ACETATE 1 SPRAY(S): 0.1 TABLET ORAL at 13:03

## 2017-04-09 RX ADMIN — Medication 1 TABLET(S): at 13:04

## 2017-04-09 RX ADMIN — Medication 1 MILLIGRAM(S): at 13:04

## 2017-04-09 RX ADMIN — LEVETIRACETAM 1000 MILLIGRAM(S): 250 TABLET, FILM COATED ORAL at 18:06

## 2017-04-09 RX ADMIN — Medication 325 MILLIGRAM(S): at 09:08

## 2017-04-09 RX ADMIN — Medication 100 MILLIGRAM(S): at 18:42

## 2017-04-09 RX ADMIN — Medication 100 MILLIGRAM(S): at 05:54

## 2017-04-09 RX ADMIN — PANTOPRAZOLE SODIUM 40 MILLIGRAM(S): 20 TABLET, DELAYED RELEASE ORAL at 05:54

## 2017-04-09 RX ADMIN — ENOXAPARIN SODIUM 40 MILLIGRAM(S): 100 INJECTION SUBCUTANEOUS at 13:03

## 2017-04-09 RX ADMIN — AMLODIPINE BESYLATE 10 MILLIGRAM(S): 2.5 TABLET ORAL at 05:54

## 2017-04-09 RX ADMIN — Medication 325 MILLIGRAM(S): at 18:05

## 2017-04-09 RX ADMIN — LEVETIRACETAM 1000 MILLIGRAM(S): 250 TABLET, FILM COATED ORAL at 05:54

## 2017-04-09 NOTE — PROGRESS NOTE ADULT - SUBJECTIVE AND OBJECTIVE BOX
NEPHROLOGY INTERVAL HPI/OVERNIGHT EVENTS:    Examined earlier  Looks comfortable    MEDICATIONS  (STANDING):  amLODIPine   Tablet 10milliGRAM(s) Oral daily  desmopressin 0.01% Nasal 1Spray(s) Nasal daily  levETIRAcetam 1000milliGRAM(s) Oral two times a day  pantoprazole    Tablet 40milliGRAM(s) Oral before breakfast  labetalol 100milliGRAM(s) Oral every 12 hours  enoxaparin Injectable 40milliGRAM(s) SubCutaneous daily  Nephro-randee 1Tablet(s) Oral daily  ferrous    sulfate 325milliGRAM(s) Oral two times a day with meals  folic acid 1milliGRAM(s) Oral daily  multivitamin/minerals 1Tablet(s) Oral daily  dextrose 5%. 1000milliLiter(s) IV Continuous <Continuous>    MEDICATIONS  (PRN):      Allergies    No Known Allergies    Intolerances        Vital Signs Last 24 Hrs  T(C): 36.1, Max: 36.6 (04-08 @ 18:05)  T(F): 97, Max: 97.9 (04-08 @ 18:05)  HR: 74 (72 - 74)  BP: 116/80 (116/80 - 121/83)  BP(mean): --  RR: 16 (16 - 16)  SpO2: 98% (97% - 98%)  Daily     Daily     PHYSICAL EXAM:  GENERAL: NAD  EYES: EOMI  NECK: Supple, No JVD  NERVOUS SYSTEM:  Awake and alert  CHEST/LUNG: Clear to percussion bilaterally; No rubs  HEART: Regular rate and rhythm; No murmurs  ABDOMEN: Soft, Nontender, Nondistended  EXTREMITIES:  no edema          LABS:    04-09    136  |  100  |  21.0<H>  ----------------------------<  83  4.5   |  26.0  |  0.84    Ca    9.0      09 Apr 2017 05:35  Phos  3.5     04-09  Mg     2.2     04-09          Magnesium, Serum: 2.2 mg/dL (04-09 @ 05:35)  Phosphorus Level, Serum: 3.5 mg/dL (04-09 @ 05:35)          RADIOLOGY & ADDITIONAL TESTS:

## 2017-04-09 NOTE — PROGRESS NOTE ADULT - SUBJECTIVE AND OBJECTIVE BOX
is a 50 y/o female recovering from diabetes insipidus and hypernatremia alternating with hyponatremia. She appears very comfortable today, and her sodium is within normal limits.     Summary:   REVIEW OF SYSTEMS    General: "I'm doing well."	    Skin/Breast:  	  Ophthalmologic:  	  ENMT:	    Respiratory and Thorax:  	  Cardiovascular:	    Gastrointestinal:	    Genitourinary:	    Musculoskeletal:	    Neurological:	    Psychiatric:	    Hematology/Lymphatics:	    Endocrine:	    Allergic/Immunologic:	  Vital Signs Last 24 Hrs  T(C): 36.1, Max: 36.6 (04-08 @ 18:05)  T(F): 97, Max: 97.9 (04-08 @ 18:05)  HR: 74 (72 - 74)  BP: 116/80 (116/80 - 121/83)  BP(mean): --  RR: 16 (16 - 16)  SpO2: 98% (97% - 98%)  PHYSICAL EXAM:  GEN: NAD, comfortable, sitting up in a chair  HEENT: MMM  CVS: S1S2 RRR  PULM: CTABL, good breath sounds  ABD: soft, nontender, no rebound  EXTREM: no edema  NEURO: intact  PSYCH  SKIN:      04-09    136  |  100  |  21.0<H>  ----------------------------<  83  4.5   |  26.0  |  0.84    Ca    9.0      09 Apr 2017 05:35  Phos  3.5     04-09  Mg     2.2     04-09    Radiology:     MEDICATIONS  (STANDING):  amLODIPine   Tablet 10milliGRAM(s) Oral daily  desmopressin 0.01% Nasal 1Spray(s) Nasal daily  levETIRAcetam 1000milliGRAM(s) Oral two times a day  pantoprazole    Tablet 40milliGRAM(s) Oral before breakfast  labetalol 100milliGRAM(s) Oral every 12 hours  enoxaparin Injectable 40milliGRAM(s) SubCutaneous daily  Nephro-randee 1Tablet(s) Oral daily  ferrous    sulfate 325milliGRAM(s) Oral two times a day with meals  folic acid 1milliGRAM(s) Oral daily  multivitamin/minerals 1Tablet(s) Oral daily  dextrose 5%. 1000milliLiter(s) IV Continuous <Continuous>    MEDICATIONS  (PRN):

## 2017-04-09 NOTE — PROGRESS NOTE ADULT - ASSESSMENT
Hypernatremia==> central DI   Na better 136  Resolved post reinstitution of intranasal desmopressin  - cont nasal desmopressin  - pt to cont with oral fluid intake; adjust intake depending upon serum Na+ levels  - monitor labs    Hyperkalemia: K improved etiology not entirely clear  - s/p oral Kayexalate  - serum cortisol level  - low K+ diet  - consider florinef

## 2017-04-10 VITALS
HEART RATE: 88 BPM | TEMPERATURE: 98 F | SYSTOLIC BLOOD PRESSURE: 122 MMHG | DIASTOLIC BLOOD PRESSURE: 89 MMHG | RESPIRATION RATE: 20 BRPM

## 2017-04-10 LAB
ANION GAP SERPL CALC-SCNC: 11 MMOL/L — SIGNIFICANT CHANGE UP (ref 5–17)
BUN SERPL-MCNC: 17 MG/DL — SIGNIFICANT CHANGE UP (ref 8–20)
CALCIUM SERPL-MCNC: 9.3 MG/DL — SIGNIFICANT CHANGE UP (ref 8.6–10.2)
CHLORIDE SERPL-SCNC: 103 MMOL/L — SIGNIFICANT CHANGE UP (ref 98–107)
CO2 SERPL-SCNC: 26 MMOL/L — SIGNIFICANT CHANGE UP (ref 22–29)
CREAT SERPL-MCNC: 0.75 MG/DL — SIGNIFICANT CHANGE UP (ref 0.5–1.3)
GLUCOSE SERPL-MCNC: 79 MG/DL — SIGNIFICANT CHANGE UP (ref 70–115)
HCT VFR BLD CALC: 25.4 % — LOW (ref 37–47)
HGB BLD-MCNC: 7.9 G/DL — LOW (ref 12–16)
MAGNESIUM SERPL-MCNC: 2.1 MG/DL — SIGNIFICANT CHANGE UP (ref 1.8–2.5)
MCHC RBC-ENTMCNC: 25.6 PG — LOW (ref 27–31)
MCHC RBC-ENTMCNC: 31.1 G/DL — LOW (ref 32–36)
MCV RBC AUTO: 82.2 FL — SIGNIFICANT CHANGE UP (ref 81–99)
PHOSPHATE SERPL-MCNC: 3.3 MG/DL — SIGNIFICANT CHANGE UP (ref 2.4–4.7)
PLATELET # BLD AUTO: 331 K/UL — SIGNIFICANT CHANGE UP (ref 150–400)
POTASSIUM SERPL-MCNC: 4.8 MMOL/L — SIGNIFICANT CHANGE UP (ref 3.5–5.3)
POTASSIUM SERPL-SCNC: 4.8 MMOL/L — SIGNIFICANT CHANGE UP (ref 3.5–5.3)
RBC # BLD: 3.09 M/UL — LOW (ref 4.4–5.2)
RBC # FLD: 16.9 % — HIGH (ref 11–15.6)
SODIUM SERPL-SCNC: 140 MMOL/L — SIGNIFICANT CHANGE UP (ref 135–145)
WBC # BLD: 6.9 K/UL — SIGNIFICANT CHANGE UP (ref 4.8–10.8)
WBC # FLD AUTO: 6.9 K/UL — SIGNIFICANT CHANGE UP (ref 4.8–10.8)

## 2017-04-10 PROCEDURE — 83550 IRON BINDING TEST: CPT

## 2017-04-10 PROCEDURE — 84100 ASSAY OF PHOSPHORUS: CPT

## 2017-04-10 PROCEDURE — 83605 ASSAY OF LACTIC ACID: CPT

## 2017-04-10 PROCEDURE — 83735 ASSAY OF MAGNESIUM: CPT

## 2017-04-10 PROCEDURE — 84484 ASSAY OF TROPONIN QUANT: CPT

## 2017-04-10 PROCEDURE — 71045 X-RAY EXAM CHEST 1 VIEW: CPT

## 2017-04-10 PROCEDURE — 87086 URINE CULTURE/COLONY COUNT: CPT

## 2017-04-10 PROCEDURE — 36415 COLL VENOUS BLD VENIPUNCTURE: CPT

## 2017-04-10 PROCEDURE — 85730 THROMBOPLASTIN TIME PARTIAL: CPT

## 2017-04-10 PROCEDURE — 84466 ASSAY OF TRANSFERRIN: CPT

## 2017-04-10 PROCEDURE — 82553 CREATINE MB FRACTION: CPT

## 2017-04-10 PROCEDURE — 87040 BLOOD CULTURE FOR BACTERIA: CPT

## 2017-04-10 PROCEDURE — 97116 GAIT TRAINING THERAPY: CPT

## 2017-04-10 PROCEDURE — 83935 ASSAY OF URINE OSMOLALITY: CPT

## 2017-04-10 PROCEDURE — 80053 COMPREHEN METABOLIC PANEL: CPT

## 2017-04-10 PROCEDURE — 82728 ASSAY OF FERRITIN: CPT

## 2017-04-10 PROCEDURE — 85610 PROTHROMBIN TIME: CPT

## 2017-04-10 PROCEDURE — 84133 ASSAY OF URINE POTASSIUM: CPT

## 2017-04-10 PROCEDURE — 70450 CT HEAD/BRAIN W/O DYE: CPT

## 2017-04-10 PROCEDURE — 84300 ASSAY OF URINE SODIUM: CPT

## 2017-04-10 PROCEDURE — 95819 EEG AWAKE AND ASLEEP: CPT

## 2017-04-10 PROCEDURE — 80048 BASIC METABOLIC PNL TOTAL CA: CPT

## 2017-04-10 PROCEDURE — 97163 PT EVAL HIGH COMPLEX 45 MIN: CPT

## 2017-04-10 PROCEDURE — 99285 EMERGENCY DEPT VISIT HI MDM: CPT | Mod: 25

## 2017-04-10 PROCEDURE — 82533 TOTAL CORTISOL: CPT

## 2017-04-10 PROCEDURE — 97110 THERAPEUTIC EXERCISES: CPT

## 2017-04-10 PROCEDURE — 81001 URINALYSIS AUTO W/SCOPE: CPT

## 2017-04-10 PROCEDURE — 82550 ASSAY OF CK (CPK): CPT

## 2017-04-10 PROCEDURE — 99239 HOSP IP/OBS DSCHRG MGMT >30: CPT

## 2017-04-10 PROCEDURE — 85027 COMPLETE CBC AUTOMATED: CPT

## 2017-04-10 PROCEDURE — 97530 THERAPEUTIC ACTIVITIES: CPT

## 2017-04-10 PROCEDURE — 93005 ELECTROCARDIOGRAM TRACING: CPT

## 2017-04-10 RX ORDER — AMLODIPINE BESYLATE 2.5 MG/1
1 TABLET ORAL
Qty: 30 | Refills: 0 | OUTPATIENT
Start: 2017-04-10 | End: 2017-05-10

## 2017-04-10 RX ORDER — LABETALOL HCL 100 MG
1 TABLET ORAL
Qty: 60 | Refills: 0 | OUTPATIENT
Start: 2017-04-10 | End: 2017-05-10

## 2017-04-10 RX ORDER — LEVETIRACETAM 250 MG/1
1 TABLET, FILM COATED ORAL
Qty: 60 | Refills: 0 | OUTPATIENT
Start: 2017-04-10 | End: 2017-05-10

## 2017-04-10 RX ORDER — PANTOPRAZOLE SODIUM 20 MG/1
1 TABLET, DELAYED RELEASE ORAL
Qty: 0 | Refills: 0 | COMMUNITY

## 2017-04-10 RX ORDER — FERROUS SULFATE 325(65) MG
1 TABLET ORAL
Qty: 60 | Refills: 0
Start: 2017-04-10 | End: 2017-05-10

## 2017-04-10 RX ORDER — PANTOPRAZOLE SODIUM 20 MG/1
1 TABLET, DELAYED RELEASE ORAL
Qty: 30 | Refills: 0
Start: 2017-04-10 | End: 2017-05-10

## 2017-04-10 RX ORDER — MULTIVIT-MIN/FERROUS GLUCONATE 9 MG/15 ML
1 LIQUID (ML) ORAL
Qty: 30 | Refills: 0
Start: 2017-04-10 | End: 2017-05-10

## 2017-04-10 RX ADMIN — ENOXAPARIN SODIUM 40 MILLIGRAM(S): 100 INJECTION SUBCUTANEOUS at 11:33

## 2017-04-10 RX ADMIN — DESMOPRESSIN ACETATE 1 SPRAY(S): 0.1 TABLET ORAL at 11:33

## 2017-04-10 RX ADMIN — Medication 100 MILLIGRAM(S): at 05:26

## 2017-04-10 RX ADMIN — Medication 1 MILLIGRAM(S): at 11:33

## 2017-04-10 RX ADMIN — Medication 325 MILLIGRAM(S): at 08:08

## 2017-04-10 RX ADMIN — Medication 325 MILLIGRAM(S): at 17:30

## 2017-04-10 RX ADMIN — PANTOPRAZOLE SODIUM 40 MILLIGRAM(S): 20 TABLET, DELAYED RELEASE ORAL at 05:26

## 2017-04-10 RX ADMIN — Medication 1 TABLET(S): at 11:33

## 2017-04-10 RX ADMIN — LEVETIRACETAM 1000 MILLIGRAM(S): 250 TABLET, FILM COATED ORAL at 05:26

## 2017-04-10 RX ADMIN — LEVETIRACETAM 1000 MILLIGRAM(S): 250 TABLET, FILM COATED ORAL at 17:30

## 2017-04-10 RX ADMIN — Medication 100 MILLIGRAM(S): at 17:30

## 2017-04-10 RX ADMIN — AMLODIPINE BESYLATE 10 MILLIGRAM(S): 2.5 TABLET ORAL at 05:26

## 2017-04-10 NOTE — PROGRESS NOTE ADULT - PROBLEM SELECTOR PROBLEM 1
Seizures due to metabolic disorder
Metabolic encephalopathy
Seizures due to metabolic disorder
Hypernatremia
Hyponatremia
Seizures due to metabolic disorder

## 2017-04-10 NOTE — PROGRESS NOTE ADULT - PROBLEM SELECTOR PROBLEM 5
Essential hypertension
Thrombocytopenia
Brain aneurysm
Essential hypertension
Hyponatremia

## 2017-04-10 NOTE — PROGRESS NOTE ADULT - PROBLEM SELECTOR PLAN 5
continue to monitor
Continue antihypertensive
continue to monitor
Continue antihypertensive
Hx of Clipping. Stable
resolved monitor NA

## 2017-04-10 NOTE — PROGRESS NOTE ADULT - PROBLEM SELECTOR PROBLEM 2
Hyponatremia
Altered mental status, unspecified altered mental status type
Diabetes insipidus
Altered mental status, unspecified altered mental status type
Diabetes insipidus
Hyponatremia
Non-traumatic rhabdomyolysis
Seizures due to metabolic disorder

## 2017-04-10 NOTE — PROGRESS NOTE ADULT - SUBJECTIVE AND OBJECTIVE BOX
is a 52 y/o female recovering from diabetes insipidus and hypernatremia alternating with hyponatremia. She appears very comfortable today, and her sodium is within normal limits. She can be discharged home.    Summary:   REVIEW OF SYSTEMS    General: "I'm doing well."	    Skin/Breast:  	  Ophthalmologic:  	  ENMT:	    Respiratory and Thorax:  	  Cardiovascular:	    Gastrointestinal:	    Genitourinary:	    Musculoskeletal:	    Neurological:	    Psychiatric:	    Hematology/Lymphatics:	    Endocrine:	    Allergic/Immunologic:	  Vital Signs Last 24 Hrs  T(C): 36.1, Max: 36.6 (04-08 @ 18:05)  T(F): 97, Max: 97.9 (04-08 @ 18:05)  HR: 74 (72 - 74)  BP: 116/80 (116/80 - 121/83)  BP(mean): --  RR: 16 (16 - 16)  SpO2: 98% (97% - 98%)  PHYSICAL EXAM:  GEN: NAD, comfortable, sitting up in a chair  HEENT: MMM  CVS: S1S2 RRR  PULM: CTABL, good breath sounds  ABD: soft, nontender, no rebound  EXTREM: no edema  NEURO: intact  PSYCH  SKIN:      04-09    136  |  100  |  21.0<H>  ----------------------------<  83  4.5   |  26.0  |  0.84    Ca    9.0      09 Apr 2017 05:35  Phos  3.5     04-09  Mg     2.2     04-09    Radiology:     MEDICATIONS  (STANDING):  amLODIPine   Tablet 10milliGRAM(s) Oral daily  desmopressin 0.01% Nasal 1Spray(s) Nasal daily  levETIRAcetam 1000milliGRAM(s) Oral two times a day  pantoprazole    Tablet 40milliGRAM(s) Oral before breakfast  labetalol 100milliGRAM(s) Oral every 12 hours  enoxaparin Injectable 40milliGRAM(s) SubCutaneous daily  Nephro-randee 1Tablet(s) Oral daily  ferrous    sulfate 325milliGRAM(s) Oral two times a day with meals  folic acid 1milliGRAM(s) Oral daily  multivitamin/minerals 1Tablet(s) Oral daily  dextrose 5%. 1000milliLiter(s) IV Continuous <Continuous>    MEDICATIONS  (PRN):

## 2017-04-10 NOTE — PROGRESS NOTE ADULT - PROBLEM SELECTOR PROBLEM 4
Diabetes insipidus
Thrombocytopenia
HTN (Hypertension)
HTN (Hypertension)
Hypernatremia
Thrombocytopenia

## 2017-04-10 NOTE — PROGRESS NOTE ADULT - PROBLEM SELECTOR PLAN 2
Continue Desmopressin,    Given patient with encephalopathy once SNa reaches 130 will defer discharge. Discontinue IVF. Plan for BMP in am      Nephrology follow up
Continue Desmopressin,    Given patient with encephalopathy once SNa reaches 130 will defer discharge. Discontinue IVF. Plan for BMP in am      Nephrology follow up
Continue Desmopressin,   D5W q alternate day per   monitor SNa
Continue Desmopressin,   D5W q alternate day per   monitor SNa  Nephrology follow up
Continue Desmopressin,   Given patient with encephalopathy earlier during hospital stay with hyponatremia, will continue to monitor SNa, once improves will discharge home.  Not having IVF.   Repeat BMP  Nephrology follow up
Continue Desmopressin,   Given patient with encephalopathy earlier during hospital stay with hyponatremia, will continue to monitor SNa, once improves will discharge home.  Not having IVF. Will need to have D5W 1L every other day at home upon discharge  Repeat BMP  Nephrology follow up
Continue Desmopressin,   outpatient f/u with [facility] as before
Continue Desmopressin, d/c IVF per nephrology  monitor SNa
Continue Desmopressin, hypotonic IVF per nephrology  monitor SNa
Patient's  reports that she typically has a prolonged post-ictal period, consistent with her presentation at this time. Her mental status is gradually improving. Continue supportive care.
Continue DDAVP.  Treat as above.
Continue Desmopressin, hypotonic IVF per nephrology  monitor SNa
keppra also seizures after SAH
monitor CK
secondary to hyponatremia

## 2017-04-10 NOTE — PROGRESS NOTE ADULT - ASSESSMENT
Hypernatremia==> central DI   Na better 140  Resolved post reinstitution of intranasal desmopressin  - cont nasal desmopressin  - pt to cont with oral fluid intake; adjust intake depending upon serum Na+ levels  - monitor labs    Hyperkalemia: K improved etiology not entirely clear  - s/p oral Kayexalate  - low K+ diet  - consider florinef

## 2017-04-10 NOTE — PROGRESS NOTE ADULT - PROBLEM SELECTOR PROBLEM 6
Essential hypertension
Prophylactic measure
Hypokalemia
Prophylactic measure

## 2017-04-10 NOTE — PROGRESS NOTE ADULT - SUBJECTIVE AND OBJECTIVE BOX
NEPHROLOGY INTERVAL HPI/OVERNIGHT EVENTS:    Examined earlier  Looks comfortable    MEDICATIONS  (STANDING):  amLODIPine   Tablet 10milliGRAM(s) Oral daily  desmopressin 0.01% Nasal 1Spray(s) Nasal daily  levETIRAcetam 1000milliGRAM(s) Oral two times a day  pantoprazole    Tablet 40milliGRAM(s) Oral before breakfast  labetalol 100milliGRAM(s) Oral every 12 hours  enoxaparin Injectable 40milliGRAM(s) SubCutaneous daily  Nephro-randee 1Tablet(s) Oral daily  ferrous    sulfate 325milliGRAM(s) Oral two times a day with meals  folic acid 1milliGRAM(s) Oral daily  dextrose 5%. 1000milliLiter(s) IV Continuous <Continuous>    MEDICATIONS  (PRN):      Allergies    No Known Allergies    Intolerances        Vital Signs Last 24 Hrs  T(C): 36.6, Max: 36.9 (04-09 @ 17:23)  T(F): 97.8, Max: 98.4 (04-09 @ 17:23)  HR: 71 (71 - 79)  BP: 111/74 (111/74 - 124/80)  BP(mean): --  RR: 18 (18 - 18)  SpO2: 98% (98% - 99%)  Daily     Daily     PHYSICAL EXAM:  GENERAL: NAD  EYES: EOMI  NECK: Supple, No JVD  NERVOUS SYSTEM:  Awake and alert  CHEST/LUNG: Clear to percussion bilaterally; No rubs  HEART: Regular rate and rhythm; No murmurs  ABDOMEN: Soft, Nontender, Nondistended  EXTREMITIES:  no edema          LABS:                        7.9    6.9   )-----------( 331      ( 10 Apr 2017 05:27 )             25.4     04-10    140  |  103  |  17.0  ----------------------------<  79  4.8   |  26.0  |  0.75    Ca    9.3      10 Apr 2017 05:27  Phos  3.3     04-10  Mg     2.1     04-10          Magnesium, Serum: 2.1 mg/dL (04-10 @ 05:27)  Phosphorus Level, Serum: 3.3 mg/dL (04-10 @ 05:27)          RADIOLOGY & ADDITIONAL TESTS:

## 2017-04-10 NOTE — PROGRESS NOTE ADULT - PROBLEM SELECTOR PLAN 3
As above
Monitor labs and slow correction of metabolic disturbances.
repeat BMP  Kayexalate if persistently elevated
resolved
As above
DDAVP, monitor NA q8hr, replace 1 ml UOP
Resolved
secondary to hyponatremia - monitor NA, restart DDAVP, match UOP with IV fluids
DDAVP, d5 1/2 NS, q 8 hr BMP, q2 hr uop

## 2017-04-10 NOTE — PROGRESS NOTE ADULT - PROBLEM SELECTOR PROBLEM 7
Prophylactic measure
Rhabdomyolysis

## 2017-04-12 RX ORDER — DESMOPRESSIN ACETATE 0.1 MG/1
10 TABLET ORAL
Qty: 1 | Refills: 0 | OUTPATIENT
Start: 2017-04-12 | End: 2017-05-12

## 2017-07-12 ENCOUNTER — INPATIENT (INPATIENT)
Facility: HOSPITAL | Age: 52
LOS: 4 days | Discharge: HOME | DRG: 644 | End: 2017-07-17
Attending: INTERNAL MEDICINE | Admitting: HOSPITALIST
Payer: MEDICAID

## 2017-07-12 VITALS
TEMPERATURE: 98 F | OXYGEN SATURATION: 99 % | HEART RATE: 80 BPM | HEIGHT: 60 IN | DIASTOLIC BLOOD PRESSURE: 74 MMHG | WEIGHT: 179.9 LBS | RESPIRATION RATE: 16 BRPM | SYSTOLIC BLOOD PRESSURE: 104 MMHG

## 2017-07-12 DIAGNOSIS — N17.9 ACUTE KIDNEY FAILURE, UNSPECIFIED: ICD-10-CM

## 2017-07-12 DIAGNOSIS — E87.0 HYPEROSMOLALITY AND HYPERNATREMIA: ICD-10-CM

## 2017-07-12 DIAGNOSIS — E23.2 DIABETES INSIPIDUS: ICD-10-CM

## 2017-07-12 DIAGNOSIS — I67.1 CEREBRAL ANEURYSM, NONRUPTURED: ICD-10-CM

## 2017-07-12 DIAGNOSIS — I10 ESSENTIAL (PRIMARY) HYPERTENSION: ICD-10-CM

## 2017-07-12 LAB
ALBUMIN SERPL ELPH-MCNC: 4.4 G/DL — SIGNIFICANT CHANGE UP (ref 3.3–5.2)
ALP SERPL-CCNC: 90 U/L — SIGNIFICANT CHANGE UP (ref 40–120)
ALT FLD-CCNC: 13 U/L — SIGNIFICANT CHANGE UP
ANION GAP SERPL CALC-SCNC: 12 MMOL/L — SIGNIFICANT CHANGE UP (ref 5–17)
ANION GAP SERPL CALC-SCNC: 13 MMOL/L — SIGNIFICANT CHANGE UP (ref 5–17)
ANION GAP SERPL CALC-SCNC: 14 MMOL/L — SIGNIFICANT CHANGE UP (ref 5–17)
AST SERPL-CCNC: 14 U/L — SIGNIFICANT CHANGE UP
BILIRUB SERPL-MCNC: 0.7 MG/DL — SIGNIFICANT CHANGE UP (ref 0.4–2)
BUN SERPL-MCNC: 29 MG/DL — HIGH (ref 8–20)
BUN SERPL-MCNC: 30 MG/DL — HIGH (ref 8–20)
BUN SERPL-MCNC: 30 MG/DL — HIGH (ref 8–20)
CALCIUM SERPL-MCNC: 9 MG/DL — SIGNIFICANT CHANGE UP (ref 8.6–10.2)
CALCIUM SERPL-MCNC: 9 MG/DL — SIGNIFICANT CHANGE UP (ref 8.6–10.2)
CALCIUM SERPL-MCNC: 9.3 MG/DL — SIGNIFICANT CHANGE UP (ref 8.6–10.2)
CHLORIDE SERPL-SCNC: 125 MMOL/L — HIGH (ref 98–107)
CHLORIDE SERPL-SCNC: 128 MMOL/L — HIGH (ref 98–107)
CHLORIDE SERPL-SCNC: 131 MMOL/L — HIGH (ref 98–107)
CO2 SERPL-SCNC: 26 MMOL/L — SIGNIFICANT CHANGE UP (ref 22–29)
CO2 SERPL-SCNC: 26 MMOL/L — SIGNIFICANT CHANGE UP (ref 22–29)
CO2 SERPL-SCNC: 27 MMOL/L — SIGNIFICANT CHANGE UP (ref 22–29)
CREAT SERPL-MCNC: 1.06 MG/DL — SIGNIFICANT CHANGE UP (ref 0.5–1.3)
CREAT SERPL-MCNC: 1.28 MG/DL — SIGNIFICANT CHANGE UP (ref 0.5–1.3)
CREAT SERPL-MCNC: 1.36 MG/DL — HIGH (ref 0.5–1.3)
GLUCOSE SERPL-MCNC: 86 MG/DL — SIGNIFICANT CHANGE UP (ref 70–115)
GLUCOSE SERPL-MCNC: 89 MG/DL — SIGNIFICANT CHANGE UP (ref 70–115)
GLUCOSE SERPL-MCNC: 95 MG/DL — SIGNIFICANT CHANGE UP (ref 70–115)
HCT VFR BLD CALC: 38.1 % — SIGNIFICANT CHANGE UP (ref 37–47)
HGB BLD-MCNC: 10.5 G/DL — LOW (ref 12–16)
MCHC RBC-ENTMCNC: 24.2 PG — LOW (ref 27–31)
MCHC RBC-ENTMCNC: 27.6 G/DL — LOW (ref 32–36)
MCV RBC AUTO: 88 FL — SIGNIFICANT CHANGE UP (ref 81–99)
OSMOLALITY SERPL: 363 MOS/KG — HIGH (ref 275–300)
OSMOLALITY UR: 872 MOSM/KG — SIGNIFICANT CHANGE UP (ref 300–1000)
PLATELET # BLD AUTO: 170 K/UL — SIGNIFICANT CHANGE UP (ref 150–400)
POTASSIUM SERPL-MCNC: 3.8 MMOL/L — SIGNIFICANT CHANGE UP (ref 3.5–5.3)
POTASSIUM SERPL-MCNC: 4 MMOL/L — SIGNIFICANT CHANGE UP (ref 3.5–5.3)
POTASSIUM SERPL-MCNC: 4.3 MMOL/L — SIGNIFICANT CHANGE UP (ref 3.5–5.3)
POTASSIUM SERPL-SCNC: 3.8 MMOL/L — SIGNIFICANT CHANGE UP (ref 3.5–5.3)
POTASSIUM SERPL-SCNC: 4 MMOL/L — SIGNIFICANT CHANGE UP (ref 3.5–5.3)
POTASSIUM SERPL-SCNC: 4.3 MMOL/L — SIGNIFICANT CHANGE UP (ref 3.5–5.3)
PROT SERPL-MCNC: 8.2 G/DL — SIGNIFICANT CHANGE UP (ref 6.6–8.7)
RBC # BLD: 4.33 M/UL — LOW (ref 4.4–5.2)
RBC # FLD: 15.4 % — SIGNIFICANT CHANGE UP (ref 11–15.6)
SODIUM SERPL-SCNC: 164 MMOL/L — CRITICAL HIGH (ref 135–145)
SODIUM SERPL-SCNC: 167 MMOL/L — CRITICAL HIGH (ref 135–145)
SODIUM SERPL-SCNC: 171 MMOL/L — CRITICAL HIGH (ref 135–145)
T4 AB SER-ACNC: 5.9 UG/DL — SIGNIFICANT CHANGE UP (ref 4.5–12)
TSH SERPL-MCNC: 0.36 UIU/ML — SIGNIFICANT CHANGE UP (ref 0.27–4.2)
WBC # BLD: 4.9 K/UL — SIGNIFICANT CHANGE UP (ref 4.8–10.8)
WBC # FLD AUTO: 4.9 K/UL — SIGNIFICANT CHANGE UP (ref 4.8–10.8)

## 2017-07-12 PROCEDURE — 99284 EMERGENCY DEPT VISIT MOD MDM: CPT

## 2017-07-12 PROCEDURE — 99223 1ST HOSP IP/OBS HIGH 75: CPT

## 2017-07-12 PROCEDURE — 71010: CPT | Mod: 26

## 2017-07-12 RX ORDER — SODIUM CHLORIDE 9 MG/ML
1000 INJECTION, SOLUTION INTRAVENOUS
Qty: 0 | Refills: 0 | Status: DISCONTINUED | OUTPATIENT
Start: 2017-07-12 | End: 2017-07-12

## 2017-07-12 RX ORDER — DESMOPRESSIN ACETATE 0.1 MG/1
1 TABLET ORAL DAILY
Qty: 0 | Refills: 0 | Status: DISCONTINUED | OUTPATIENT
Start: 2017-07-12 | End: 2017-07-14

## 2017-07-12 RX ORDER — AMLODIPINE BESYLATE 2.5 MG/1
10 TABLET ORAL DAILY
Qty: 0 | Refills: 0 | Status: DISCONTINUED | OUTPATIENT
Start: 2017-07-12 | End: 2017-07-17

## 2017-07-12 RX ORDER — SODIUM CHLORIDE 9 MG/ML
1000 INJECTION, SOLUTION INTRAVENOUS
Qty: 0 | Refills: 0 | Status: DISCONTINUED | OUTPATIENT
Start: 2017-07-12 | End: 2017-07-13

## 2017-07-12 RX ORDER — ENOXAPARIN SODIUM 100 MG/ML
30 INJECTION SUBCUTANEOUS EVERY 24 HOURS
Qty: 0 | Refills: 0 | Status: DISCONTINUED | OUTPATIENT
Start: 2017-07-12 | End: 2017-07-15

## 2017-07-12 RX ORDER — LEVETIRACETAM 250 MG/1
1000 TABLET, FILM COATED ORAL
Qty: 0 | Refills: 0 | Status: DISCONTINUED | OUTPATIENT
Start: 2017-07-12 | End: 2017-07-17

## 2017-07-12 RX ORDER — LABETALOL HCL 100 MG
100 TABLET ORAL
Qty: 0 | Refills: 0 | Status: DISCONTINUED | OUTPATIENT
Start: 2017-07-12 | End: 2017-07-17

## 2017-07-12 RX ADMIN — ENOXAPARIN SODIUM 30 MILLIGRAM(S): 100 INJECTION SUBCUTANEOUS at 13:38

## 2017-07-12 RX ADMIN — Medication 100 MILLIGRAM(S): at 17:18

## 2017-07-12 RX ADMIN — AMLODIPINE BESYLATE 10 MILLIGRAM(S): 2.5 TABLET ORAL at 13:38

## 2017-07-12 RX ADMIN — DESMOPRESSIN ACETATE 1 SPRAY(S): 0.1 TABLET ORAL at 17:18

## 2017-07-12 RX ADMIN — LEVETIRACETAM 1000 MILLIGRAM(S): 250 TABLET, FILM COATED ORAL at 17:18

## 2017-07-12 RX ADMIN — SODIUM CHLORIDE 100 MILLILITER(S): 9 INJECTION, SOLUTION INTRAVENOUS at 11:13

## 2017-07-12 NOTE — ED STATDOCS - ATTENDING CONTRIBUTION TO CARE
I, Yair Bhatia, performed the initial face to face bedside interview with this patient regarding history of present illness, review of symptoms and relevant past medical, social and family history.  I completed an independent physical examination.  I was the initial provider who evaluated this patient. I have signed out the follow up of any pending tests (i.e. labs, radiological studies) to the ACP.  I have communicated the patient’s plan of care and disposition with the ACP.  The history, relevant review of systems, past medical and surgical history, medical decision making, and physical examination was documented by the scribe in my presence and I attest to the accuracy of the documentation.

## 2017-07-12 NOTE — CONSULT NOTE ADULT - ASSESSMENT
52 y/o F with a h/o HTN, brain aneurysm, short term memory loss, DI with hypernatremia.    Patient is currently asymptomatic, without complaints. No reported seizure activity. Hemodynamically stable. Patient does not require ICU level care at this point in time, if condition deteriorates, will reevaluate and treat as necessary.

## 2017-07-12 NOTE — CONSULT NOTE ADULT - ASSESSMENT
1. Central diabetes insipidus as a sequela of intracranial hemorrhage. Condition complicated by decreased thirst sensation. Pt. appears to be chronically under-treated with DDAVP, which under normal circumstances is compensated for by encouragement to drink water and supplemental IV fluids; the increased water intake likely decreased over the past week leading to hypernatremia.  As pt. is relatively asymptomatic, it is reasonable to correct sodium slowly. Consider increasing DDAVP dose to q12h in the future, but this will have to be done carefully to avoid hyponatremia, and followed as an outpatient.  2. Doubt anterior pituitary dysfunction, but will assess with routine labs.

## 2017-07-12 NOTE — H&P ADULT - ASSESSMENT
51 yr old female with hx HTN, DI, SAH, cerebral aneurysm s/p clipping 2011 multiple admissions for hypernatremia on home infusion therapy for the same presents to Er with daughter for lab s/o High sodium in 160s range. No symptoms at present. In Er, LAbs s/o Sodium 171. she is being admitted for hypernatremia in the setting of DI inspite of being on Home infusion therapy. Evaluated by ICU - no indication for ICU admission at this time.

## 2017-07-12 NOTE — CONSULT NOTE ADULT - ASSESSMENT
Improved chronic hypernatremia   known CDI   H/O symptoms  from rapid reversal   Continue the current measures   Endocrine noted   Will follow

## 2017-07-12 NOTE — H&P ADULT - HISTORY OF PRESENT ILLNESS
51 yr old female with hx HTN, DI, SAH, cerebral aneurysm s/p clipping 2011 multiple admissions for hypernatremia on home infusion therapy for the same presents to Er with daughter for lab s/o High sodium in 160s range. No symptoms at present. Pt does not answer any questions - directs to ask daughter/ who is over the phone. Daughter denies any h/o abnormally high urine outpt, any recent medication changes. She thinks she may have had a lot of salt intake in last 2-3 days when she visited her son. Per daughter , pt is not a reliable ro provide any history/symptoms as she is forgetful. denies any fever/chills/cough. Has been doing well for past few months.     In the ER< Vitals are normal. LAbs s/o Sodium 171. she is being admitted for hypernatremia in the setting of DI inspite of being on Home infusion therapy.

## 2017-07-12 NOTE — ED ADULT NURSE NOTE - CHIEF COMPLAINT QUOTE
Pt states [place] lab called her because her sodium is high.  Pt receives hydration every other day via PICC in left arm  Hx of 2 brain aneurysms.

## 2017-07-12 NOTE — ED ADULT NURSE NOTE - OBJECTIVE STATEMENT
Pt  with daughter present at bedside. Pt receives infusion care for hydration through [facility] via left arm picc. As per daughter they received a phone call at 0400 stating her sodium level was 165 from blood work drawn on Tuesday.  CMP drawn, pending results. Pt has left upper armpicc, site dressed, not dated, green curos cap placed in ER

## 2017-07-12 NOTE — H&P ADULT - PROBLEM SELECTOR PLAN 3
[facility] med Endocrine consult to see if current dosage if DDAVP is adequate or needs adjustment  Dr del [facility] consulted

## 2017-07-12 NOTE — CONSULT NOTE ADULT - SUBJECTIVE AND OBJECTIVE BOX
HPI:  One of multiple hospitalizations due to hypernatremia. Past history of diabetes insipidus since 2015, developing after CNS aneurysmal bleed in 2012. As a results pt. has impaired short term memory, and may also have decreased awareness of symptoms of thirst and polyuria. Her DI has been managed with nasal DDAVP, one spray in the morning, and with encouragement to drink water given by her daughter. Every 2 days she gets an IV infusion of D5W, and electrolytes are checked weekly. Over the past week pt. has been residing with her son, and although she has been using the DDAVP her water intake may have decreased. She was referred to the ER due to a serum sodium level of 171.  Prior hospitalization led to hyponatremia, associated with seizure activity.  No current symptoms of lethargy or thirst, or of new neurologic symptoms.      PAST MEDICAL & SURGICAL HISTORY:  Memory loss, short term  Brain aneurysm  Diabetes insipidus  HTN (Hypertension)  Subarachnoid Hemorrhage  S/P Cerebral Aneurysm Repair  S/P Craniotomy      FAMILY HISTORY:  Family history of hypertension (Father)      SOCIAL HISTORY:  lives with daughter    REVIEW OF SYSTEMS:  difficult to obtain reliably from pt. due to memory deficits. Denies thirst or weakness.    MEDICATIONS  (STANDING):  dextrose 5%. 1000 milliLiter(s) (100 mL/Hr) IV Continuous <Continuous>  enoxaparin Injectable 30 milliGRAM(s) SubCutaneous every 24 hours  amLODIPine   Tablet 10 milliGRAM(s) Oral daily  labetalol 100 milliGRAM(s) Oral two times a day  levETIRAcetam 1000 milliGRAM(s) Oral two times a day  desmopressin 0.01% Nasal 1 Spray(s) Nasal daily    MEDICATIONS  (PRN):      Allergies    No Known Allergies    Intolerances    PHYSICAL EXAM:    Vital Signs Last 24 Hrs  T(C): 36.9 (12 Jul 2017 09:07), Max: 36.9 (12 Jul 2017 09:07)  T(F): 98.5 (12 Jul 2017 09:07), Max: 98.5 (12 Jul 2017 09:07)  HR: 74 (12 Jul 2017 13:39) (74 - 80)  BP: 109/74 (12 Jul 2017 13:39) (104/74 - 109/74)  BP(mean): --  RR: 18 (12 Jul 2017 13:39) (16 - 18)  SpO2: 98% (12 Jul 2017 13:39) (98% - 99%)    General appearance: Well developed, well nourished.    Eyes: Pupils equal. EOM full. No exophthalmos.    Neck: Trachea midline. No thyroid enlargement.    Lungs: Normal respiratory excursion. Lungs clear.    CV: Regular cardiac rhythm. No murmur.     Musculoskeletal: No cyanosis, clubbing, or edema. No pedal lesions.    Skin: Warm and dry. No rash. No acanthosis.    Neuro: Cranial nerves intact. Normal motor function.     Psych: Normal affect, alert and oriented.            LABS:                        10.5   4.9   )-----------( 170      ( 12 Jul 2017 09:44 )             38.1     07-12    171<HH>  |  131<H>  |  30.0<H>  ----------------------------<  89  3.8   |  26.0  |  1.36<H>    Ca    9.3      12 Jul 2017 09:44    TPro  8.2  /  Alb  4.4  /  TBili  0.7  /  DBili  x   /  AST  14  /  ALT  13  /  AlkPhos  90  07-12      LIVER FUNCTIONS - ( 12 Jul 2017 09:44 )  Alb: 4.4 g/dL / Pro: 8.2 g/dL / ALK PHOS: 90 U/L / ALT: 13 U/L / AST: 14 U/L / GGT: x

## 2017-07-12 NOTE — H&P ADULT - PROBLEM SELECTOR PLAN 2
Endocrine consult to see if current dosage if DDAVP is adequate or needs adjustment  Dr [facility] consulted most likely 2/2 volume loss - pre-renal  ct iv fluids  monitor bmp

## 2017-07-12 NOTE — CONSULT NOTE ADULT - SUBJECTIVE AND OBJECTIVE BOX
Patient is a 51y old  Female who presents with a chief complaint of     BRIEF HOSPITAL COURSE: 50 y/o F with a h/o HTN, brain aneurysm, short term memory loss, DI who presents to ED after being called early this morning by lab, who stated that recent blood draw showed increased serum Na. Na of 171 here in [facility]. Patient seen and examined in ED. Awake, alert, comfortable, without complaints. Daughter at bedside providing history. Reports that patient has a PICC line for which she receives D5W @ 200 mL/hr every other day for chronic hypernatremia related to DI. Takes intranasal DDAVP as well. Daughter also reports that patient has been hospitalized here at [facility] before in the past for hypernatremia and patient's Na level was lowered too quickly, resulting in seizure. Patient and daughter are comfortable with current treatment, however, requests careful monitoring of serum Na with q 3 hr labs. Patient currently has D5W running at 100mL/hr. Daughter reports patient is at her baseline mental status and has not noticed any changes, also denies any seizure activity.       PAST MEDICAL & SURGICAL HISTORY:  Memory loss, short term  Brain aneurysm  Diabetes insipidus  HTN (Hypertension)  Subarachnoid Hemorrhage  S/P Cerebral Aneurysm Repair  S/P Craniotomy    Allergies    No Known Allergies    Intolerances      FAMILY HISTORY:  Family history of hypertension (Father)      Review of Systems:  CONSTITUTIONAL: No fever, chills, or fatigue  EYES: No eye pain, visual disturbances, or discharge  ENMT:  No difficulty hearing, tinnitus, vertigo; No sinus or throat pain  NECK: No pain or stiffness  RESPIRATORY: No cough, wheezing, chills or hemoptysis; No shortness of breath  CARDIOVASCULAR: No chest pain, palpitations, dizziness, or leg swelling  GASTROINTESTINAL: No abdominal or epigastric pain. No nausea, vomiting, or hematemesis; No diarrhea or constipation. No melena or hematochezia.  GENITOURINARY: No dysuria, frequency, hematuria, or incontinence  NEUROLOGICAL: No headaches, memory loss, loss of strength, numbness, or tremors  SKIN: No itching, burning, rashes, or lesions   MUSCULOSKELETAL: No joint pain or swelling; No muscle, back, or extremity pain  PSYCHIATRIC: No depression, anxiety, mood swings, or difficulty sleeping      Medications:    amLODIPine   Tablet 10 milliGRAM(s) Oral daily  labetalol 100 milliGRAM(s) Oral two times a day  levETIRAcetam 1000 milliGRAM(s) Oral two times a day  enoxaparin Injectable 30 milliGRAM(s) SubCutaneous every 24 hours  desmopressin 0.01% Nasal 1 Spray(s) Nasal daily  dextrose 5%. 1000 milliLiter(s) IV Continuous <Continuous>          ICU Vital Signs Last 24 Hrs  T(C): 36.9 (12 Jul 2017 09:07), Max: 36.9 (12 Jul 2017 09:07)  T(F): 98.5 (12 Jul 2017 09:07), Max: 98.5 (12 Jul 2017 09:07)  HR: 78 (12 Jul 2017 10:55) (78 - 80)  BP: 105/65 (12 Jul 2017 10:55) (104/74 - 105/65)  BP(mean): --  ABP: --  ABP(mean): --  RR: 18 (12 Jul 2017 10:55) (16 - 18)  SpO2: 98% (12 Jul 2017 10:55) (98% - 99%)    Vital Signs Last 24 Hrs  T(C): 36.9 (12 Jul 2017 09:07), Max: 36.9 (12 Jul 2017 09:07)  T(F): 98.5 (12 Jul 2017 09:07), Max: 98.5 (12 Jul 2017 09:07)  HR: 78 (12 Jul 2017 10:55) (78 - 80)  BP: 105/65 (12 Jul 2017 10:55) (104/74 - 105/65)  BP(mean): --  RR: 18 (12 Jul 2017 10:55) (16 - 18)  SpO2: 98% (12 Jul 2017 10:55) (98% - 99%)        I&O's Detail        LABS:                        10.5   4.9   )-----------( 170      ( 12 Jul 2017 09:44 )             38.1     07-12    171<HH>  |  131<H>  |  30.0<H>  ----------------------------<  89  3.8   |  26.0  |  1.36<H>    Ca    9.3      12 Jul 2017 09:44    TPro  8.2  /  Alb  4.4  /  TBili  0.7  /  DBili  x   /  AST  14  /  ALT  13  /  AlkPhos  90  07-12          CAPILLARY BLOOD GLUCOSE            CULTURES:      Physical Examination:    General: No acute distress.  Alert, oriented, interactive, nonfocal    HEENT: Pupils equal, reactive to light.  Symmetric.    PULM: Clear to auscultation bilaterally, no significant sputum production    CVS: Regular rate and rhythm, no murmurs, rubs, or gallops    ABD: Soft, nondistended, nontender, normoactive bowel sounds, no masses    EXT: b/l LE edema (1+ pitting), nontender    SKIN: Warm and well perfused, no rashes noted.    RADIOLOGY: n/a    CRITICAL CARE TIME SPENT: 61 mins

## 2017-07-12 NOTE — ED STATDOCS - PROGRESS NOTE DETAILS
NP NOTE: Pt seen by intake physician and HPI/ROS/PE/MDM reviewed. Pt seen and evaluated. Discussed plan and any resulted studies at this time. Will continue to monitor and re-evaluate.  Re-Evaluation: pt received NS infusion for DI, notified that her Na is currently 165. no seizure like activity. will eval labs. notified that sodium is 171, ordered 1liter 1/2 ns, will order ekg and monitor, move to main for further evaluation. notified that sodium is 171, ordered 1liter 1/2 ns, will order ekg and monitor, move to main for further evaluation and admission consulted ICU PADeangelo, change to D5W at 100ml/hr and he will eval pt. spoke with Dr. Taylor, admit to her svc. pt feeling well. eval by ICU PA and Dr. Lima.

## 2017-07-12 NOTE — CONSULT NOTE ADULT - ATTENDING COMMENTS
I know Ms. Dickson well from her unfortunate previous admissions to both [facility] and the MICU. Most of her MICU visits have been attributed to rapid drop in her serum sodium. I strongly encourage review of old charts and notes. At this time I agree that Ms. Dickson does not require MICU level of therapy or monitor as she was brought to the ED secondary to "being told to" d/t to an elevated serum Na on an outside lab draw. She has known DI, a left PICC line (from home) that is used for 5% dextrose infusions every other day, and has chronically elevated sodium. Review of previous notes reminded me that, physiologically I feel her central "osmostat" has been reset and she can, neurologically, tolerate high serum sodiums. At this time she is at her baseline, drinking water, watching TV, and acting appropriately. As noted above, family discussions with the plan of care and therapy is likely the most beneficial component for her hospitalization. She does have a seizure disorder and is very sensitive to drops in serum sodium. Suggest checking sodium levels every 4s or so hours initially (if it is deemed she requires these), avoid large volumes of free water, and I would titrate any therapy to her mental status and not a serum sodium level persay (must correlate the two). Other continuation of home medications and therapies as per the primary team.     The recommendations as noted were d/w Dr. hay/ORION woo @ ~ 1300and the bedside RN. The primary medical team (including but not limited to the physician,  RN, and/or RT if necessary)  noted they were aware of the recommendations, agreed with their implementation, and would follow up on their initiation and completion. Furthermore, all ordered treatment, therapies, and interventions will be followed up by Dr. hay or their covering provider throughout the day/evening, as per Dr. hay sign-out and discretion. Thank you for this opportunity and please re-consult as necessary.

## 2017-07-12 NOTE — ED ADULT TRIAGE NOTE - CHIEF COMPLAINT QUOTE
Pt states [facility] called her because her sodium is high.  Pt receives hydration every other day via PICC in left arm  Hx of 2 brain aneurysms.

## 2017-07-12 NOTE — H&P ADULT - PROBLEM SELECTOR PLAN 1
BMP q4  slow correction of Sodium  renal consult  Encourage oral intake of water  discussed with  and daughter

## 2017-07-12 NOTE — ED STATDOCS - OBJECTIVE STATEMENT
52 y/o F w/ PMHx of brain aneurysm, diabetes insipidus, and HTN presents to the ED c/o possible hypernatremia onset today. Pt states she received fluids today through her PICC line, and she usually receives them every other day. She takes amlodipine, carvedilol, and pantoprazole. Pt denies fever, CP or any other complaints at this time.

## 2017-07-12 NOTE — CONSULT NOTE ADULT - PROBLEM SELECTOR RECOMMENDATION 9
Chronic issue, related to DI. Would recommend detailed discussion with family to agree upon a treatment plan for Na reduction, as they are patient's caretakers and have dealt with this issue several times in the past and have specific goals of care. Patient and daughter are currently okay with D5W @ 100mL/hr right now, as she receives D5W @ 200mL/hr every other day, but are concerned  Close monitoring of serum Na with serial BMP's q 4 hrs. Monitor mental status for acute changes. Would only reduce Na by a maximum of 10 in initial 24 hours. Chronic issue, related to DI. Would recommend detailed discussion with family to agree upon a treatment plan for Na reduction, as they are patient's caretakers and have dealt with this issue several times in the past and have specific goals of care. Patient and daughter are currently okay with D5W @ 100mL/hr right now, as she receives D5W @ 200mL/hr every other day, but are concerned with Na being reduced too quickly and labs not being followed up on. Recommend close monitoring of serum Na with serial BMP's q 4 hrs. Monitor mental status for acute changes. Would only reduce Na by a maximum of 10 in initial 24 hours.

## 2017-07-12 NOTE — ED ADULT NURSE NOTE - CHPI ED SYMPTOMS NEG
no fever/no vomiting/no dizziness/no pain/no chills/no nausea/no back pain/no decreased eating/drinking/no headache/no loss of consciousness

## 2017-07-12 NOTE — H&P ADULT - NSHPPHYSICALEXAM_GEN_ALL_CORE
GENERAL: NAD, well-groomed, well-developed  HEAD:  Atraumatic, Normocephalic  EYES: EOMI, PERRLA, conjunctiva and sclera clear  ENMT: Moist mucous membranes, Good dentition  NECK: Supple, No JVD  NERVOUS SYSTEM:  Alert & Oriented X3, Good concentration  CHEST/LUNG: Clear to percussion bilaterally; No rales, rhonchi, wheezing, or rubs  HEART: Regular rate and rhythm; No murmurs, rubs, or gallops  ABDOMEN: Soft, Nontender, Nondistended; Bowel sounds present  EXTREMITIES:   No clubbing, cyanosis, or edema  SKIN: No rashes or lesions

## 2017-07-12 NOTE — CONSULT NOTE ADULT - SUBJECTIVE AND OBJECTIVE BOX
Patient is a 51y old  Female who presents with a chief complaint of abnormal blood work (12 Jul 2017 14:28)      HPI:  51 yr old female with hx HTN, DI, SAH, cerebral aneurysm s/p clipping 2011 multiple admissions for hypernatremia on home infusion therapy for the same presents to Er with daughter for lab s/o High sodium in 160s range. No symptoms at present. Pt does not answer any questions - directs to ask daughter/ who is over the phone. Daughter denies any h/o abnormally high urine outpt, any recent medication changes. She thinks she may have had a lot of salt intake in last 2-3 days when she visited her son. Per daughter , pt is not a reliable ro provide any history/symptoms as she is forgetful. denies any fever/chills/cough. Has been doing well for past few months.     In the ER< Vitals are normal. LAbs s/o Sodium 171. she is being admitted for hypernatremia in the setting of DI inspite of being on Home infusion therapy. (12 Jul 2017 14:28)    Above noted   . Was only on Nasal Vasopressin 10 mcg once daily  Endocrine evaluation noted   IVF in ER noted   She becomes symptomatic if free water deficit corrected too quickly       PAST MEDICAL & SURGICAL HISTORY:  Memory loss, short term  Brain aneurysm  Diabetes insipidus  HTN (Hypertension)  Subarachnoid Hemorrhage  S/P Cerebral Aneurysm Repair  S/P Craniotomy      FAMILY HISTORY:  Family history of hypertension (Father)      Social History:    MEDICATIONS  (STANDING):  dextrose 5%. 1000 milliLiter(s) (100 mL/Hr) IV Continuous <Continuous>  enoxaparin Injectable 30 milliGRAM(s) SubCutaneous every 24 hours  amLODIPine   Tablet 10 milliGRAM(s) Oral daily  labetalol 100 milliGRAM(s) Oral two times a day  levETIRAcetam 1000 milliGRAM(s) Oral two times a day  desmopressin 0.01% Nasal 1 Spray(s) Nasal daily    MEDICATIONS  (PRN):      Allergies    No Known Allergies    Intolerances        Vital Signs Last 24 Hrs  T(C): 36.9 (12 Jul 2017 17:18), Max: 36.9 (12 Jul 2017 09:07)  T(F): 98.4 (12 Jul 2017 17:18), Max: 98.5 (12 Jul 2017 09:07)  HR: 67 (12 Jul 2017 17:18) (67 - 80)  BP: 111/80 (12 Jul 2017 17:18) (104/74 - 111/80)  BP(mean): --  RR: 18 (12 Jul 2017 17:18) (16 - 18)  SpO2: 95% (12 Jul 2017 17:18) (95% - 99%)  Daily Height in cm: 152.4 (12 Jul 2017 09:07)    Daily   I&O's Detail    12 Jul 2017 07:01  -  12 Jul 2017 17:56  --------------------------------------------------------  IN:    dextrose 5%.: 300 mL  Total IN: 300 mL    OUT:  Total OUT: 0 mL    Total NET: 300 mL        I&O's Summary    12 Jul 2017 07:01  -  12 Jul 2017 17:56  --------------------------------------------------------  IN: 300 mL / OUT: 0 mL / NET: 300 mL        PHYSICAL EXAM:    GENERAL: NAD,   HEAD:  Atraumatic, Normocephalic. No edema   NECK: Supple, No JVD, Normal thyroid  CHEST/LUNG: Clear to percussion bilaterally; No rales, rhonchi, wheezing, or rubs  HEART: Regular rate and rhythm; No murmurs, rubs, or gallops  ABDOMEN: Soft, Nontender, Nondistended; Bowel sounds present  EXTREMITIES:  2+ Peripheral Pulses, No clubbing, cyanosis, or edema        LABS:                        10.5   4.9   )-----------( 170      ( 12 Jul 2017 09:44 )             38.1     07-12    167<HH>  |  128<H>  |  30.0<H>  ----------------------------<  95  4.3   |  27.0  |  1.28    Ca    9.0      12 Jul 2017 14:49    TPro  8.2  /  Alb  4.4  /  TBili  0.7  /  DBili  x   /  AST  14  /  ALT  13  /  AlkPhos  90  07-12                RADIOLOGY & ADDITIONAL TESTS:

## 2017-07-13 LAB
ANION GAP SERPL CALC-SCNC: 11 MMOL/L — SIGNIFICANT CHANGE UP (ref 5–17)
ANION GAP SERPL CALC-SCNC: 12 MMOL/L — SIGNIFICANT CHANGE UP (ref 5–17)
ANION GAP SERPL CALC-SCNC: 13 MMOL/L — SIGNIFICANT CHANGE UP (ref 5–17)
ANION GAP SERPL CALC-SCNC: 14 MMOL/L — SIGNIFICANT CHANGE UP (ref 5–17)
ANISOCYTOSIS BLD QL: SLIGHT — SIGNIFICANT CHANGE UP
BASOPHILS # BLD AUTO: 0 K/UL — SIGNIFICANT CHANGE UP (ref 0–0.2)
BASOPHILS NFR BLD AUTO: 0 % — SIGNIFICANT CHANGE UP (ref 0–2)
BUN SERPL-MCNC: 23 MG/DL — HIGH (ref 8–20)
BUN SERPL-MCNC: 24 MG/DL — HIGH (ref 8–20)
BUN SERPL-MCNC: 26 MG/DL — HIGH (ref 8–20)
BUN SERPL-MCNC: 28 MG/DL — HIGH (ref 8–20)
CALCIUM SERPL-MCNC: 9 MG/DL — SIGNIFICANT CHANGE UP (ref 8.6–10.2)
CALCIUM SERPL-MCNC: 9 MG/DL — SIGNIFICANT CHANGE UP (ref 8.6–10.2)
CALCIUM SERPL-MCNC: 9.1 MG/DL — SIGNIFICANT CHANGE UP (ref 8.6–10.2)
CALCIUM SERPL-MCNC: 9.1 MG/DL — SIGNIFICANT CHANGE UP (ref 8.6–10.2)
CHLORIDE SERPL-SCNC: 116 MMOL/L — HIGH (ref 98–107)
CHLORIDE SERPL-SCNC: 116 MMOL/L — HIGH (ref 98–107)
CHLORIDE SERPL-SCNC: 118 MMOL/L — HIGH (ref 98–107)
CHLORIDE SERPL-SCNC: 121 MMOL/L — HIGH (ref 98–107)
CO2 SERPL-SCNC: 22 MMOL/L — SIGNIFICANT CHANGE UP (ref 22–29)
CO2 SERPL-SCNC: 25 MMOL/L — SIGNIFICANT CHANGE UP (ref 22–29)
CO2 SERPL-SCNC: 26 MMOL/L — SIGNIFICANT CHANGE UP (ref 22–29)
CO2 SERPL-SCNC: 27 MMOL/L — SIGNIFICANT CHANGE UP (ref 22–29)
CORTIS AM PEAK SERPL-MCNC: 8.7 UG/DL — SIGNIFICANT CHANGE UP (ref 6–18.4)
CREAT SERPL-MCNC: 0.86 MG/DL — SIGNIFICANT CHANGE UP (ref 0.5–1.3)
CREAT SERPL-MCNC: 0.87 MG/DL — SIGNIFICANT CHANGE UP (ref 0.5–1.3)
CREAT SERPL-MCNC: 0.95 MG/DL — SIGNIFICANT CHANGE UP (ref 0.5–1.3)
CREAT SERPL-MCNC: 1.03 MG/DL — SIGNIFICANT CHANGE UP (ref 0.5–1.3)
EOSINOPHIL # BLD AUTO: 0.2 K/UL — SIGNIFICANT CHANGE UP (ref 0–0.5)
EOSINOPHIL NFR BLD AUTO: 3 % — SIGNIFICANT CHANGE UP (ref 0–5)
FSH SERPL-MCNC: 58.7 IU/L — SIGNIFICANT CHANGE UP
GLUCOSE SERPL-MCNC: 115 MG/DL — SIGNIFICANT CHANGE UP (ref 70–115)
GLUCOSE SERPL-MCNC: 92 MG/DL — SIGNIFICANT CHANGE UP (ref 70–115)
GLUCOSE SERPL-MCNC: 94 MG/DL — SIGNIFICANT CHANGE UP (ref 70–115)
GLUCOSE SERPL-MCNC: 96 MG/DL — SIGNIFICANT CHANGE UP (ref 70–115)
HCT VFR BLD CALC: 34.6 % — LOW (ref 37–47)
HGB BLD-MCNC: 9.8 G/DL — LOW (ref 12–16)
HYPOCHROMIA BLD QL: SLIGHT — SIGNIFICANT CHANGE UP
LH SERPL-ACNC: 30.3 IU/L — SIGNIFICANT CHANGE UP
LYMPHOCYTES # BLD AUTO: 2.2 K/UL — SIGNIFICANT CHANGE UP (ref 1–4.8)
LYMPHOCYTES # BLD AUTO: 41 % — SIGNIFICANT CHANGE UP (ref 20–55)
MCHC RBC-ENTMCNC: 24.6 PG — LOW (ref 27–31)
MCHC RBC-ENTMCNC: 28.3 G/DL — LOW (ref 32–36)
MCV RBC AUTO: 86.9 FL — SIGNIFICANT CHANGE UP (ref 81–99)
MONOCYTES # BLD AUTO: 0.2 K/UL — SIGNIFICANT CHANGE UP (ref 0–0.8)
MONOCYTES NFR BLD AUTO: 5 % — SIGNIFICANT CHANGE UP (ref 3–10)
NEUTROPHILS # BLD AUTO: 2.2 K/UL — SIGNIFICANT CHANGE UP (ref 1.8–8)
NEUTROPHILS NFR BLD AUTO: 49 % — SIGNIFICANT CHANGE UP (ref 37–73)
NEUTS BAND # BLD: 2 % — SIGNIFICANT CHANGE UP (ref 0–8)
PLAT MORPH BLD: NORMAL — SIGNIFICANT CHANGE UP
PLATELET # BLD AUTO: 149 K/UL — LOW (ref 150–400)
POIKILOCYTOSIS BLD QL AUTO: SLIGHT — SIGNIFICANT CHANGE UP
POTASSIUM SERPL-MCNC: 3.5 MMOL/L — SIGNIFICANT CHANGE UP (ref 3.5–5.3)
POTASSIUM SERPL-MCNC: 3.6 MMOL/L — SIGNIFICANT CHANGE UP (ref 3.5–5.3)
POTASSIUM SERPL-MCNC: 3.7 MMOL/L — SIGNIFICANT CHANGE UP (ref 3.5–5.3)
POTASSIUM SERPL-MCNC: 3.9 MMOL/L — SIGNIFICANT CHANGE UP (ref 3.5–5.3)
POTASSIUM SERPL-SCNC: 3.5 MMOL/L — SIGNIFICANT CHANGE UP (ref 3.5–5.3)
POTASSIUM SERPL-SCNC: 3.6 MMOL/L — SIGNIFICANT CHANGE UP (ref 3.5–5.3)
POTASSIUM SERPL-SCNC: 3.7 MMOL/L — SIGNIFICANT CHANGE UP (ref 3.5–5.3)
POTASSIUM SERPL-SCNC: 3.9 MMOL/L — SIGNIFICANT CHANGE UP (ref 3.5–5.3)
RBC # BLD: 3.98 M/UL — LOW (ref 4.4–5.2)
RBC # FLD: 15.4 % — SIGNIFICANT CHANGE UP (ref 11–15.6)
RBC BLD AUTO: ABNORMAL
SODIUM SERPL-SCNC: 152 MMOL/L — HIGH (ref 135–145)
SODIUM SERPL-SCNC: 154 MMOL/L — HIGH (ref 135–145)
SODIUM SERPL-SCNC: 156 MMOL/L — HIGH (ref 135–145)
SODIUM SERPL-SCNC: 159 MMOL/L — HIGH (ref 135–145)
WBC # BLD: 4.9 K/UL — SIGNIFICANT CHANGE UP (ref 4.8–10.8)
WBC # FLD AUTO: 4.9 K/UL — SIGNIFICANT CHANGE UP (ref 4.8–10.8)

## 2017-07-13 PROCEDURE — 99233 SBSQ HOSP IP/OBS HIGH 50: CPT

## 2017-07-13 RX ORDER — SODIUM CHLORIDE 9 MG/ML
1000 INJECTION, SOLUTION INTRAVENOUS
Qty: 0 | Refills: 0 | Status: DISCONTINUED | OUTPATIENT
Start: 2017-07-13 | End: 2017-07-13

## 2017-07-13 RX ADMIN — LEVETIRACETAM 1000 MILLIGRAM(S): 250 TABLET, FILM COATED ORAL at 05:39

## 2017-07-13 RX ADMIN — SODIUM CHLORIDE 75 MILLILITER(S): 9 INJECTION, SOLUTION INTRAVENOUS at 12:08

## 2017-07-13 RX ADMIN — LEVETIRACETAM 1000 MILLIGRAM(S): 250 TABLET, FILM COATED ORAL at 22:12

## 2017-07-13 RX ADMIN — Medication 100 MILLIGRAM(S): at 05:39

## 2017-07-13 RX ADMIN — ENOXAPARIN SODIUM 30 MILLIGRAM(S): 100 INJECTION SUBCUTANEOUS at 12:12

## 2017-07-13 RX ADMIN — DESMOPRESSIN ACETATE 1 SPRAY(S): 0.1 TABLET ORAL at 12:09

## 2017-07-13 RX ADMIN — SODIUM CHLORIDE 100 MILLILITER(S): 9 INJECTION, SOLUTION INTRAVENOUS at 08:30

## 2017-07-13 RX ADMIN — Medication 100 MILLIGRAM(S): at 22:12

## 2017-07-13 RX ADMIN — AMLODIPINE BESYLATE 10 MILLIGRAM(S): 2.5 TABLET ORAL at 05:39

## 2017-07-13 NOTE — PROGRESS NOTE ADULT - SUBJECTIVE AND OBJECTIVE BOX
KRUPA MEDINA    [id number]    51y      Female    CC: labs s/o hypernatremia  NO physical complaints - feels well    INTERVAL HPI/OVERNIGHT EVENTS: no acute events  Sitter a tbedside at family request as pt gets confused intermittently.     REVIEW OF SYSTEMS:    CONSTITUTIONAL: No fever  RESPIRATORY:  No shortness of breath  GASTROINTESTINAL: No abdominal pain. No nausea, vomiting  NEUROLOGICAL: No headaches      Vital Signs Last 24 Hrs  T(C): 36.4 (13 Jul 2017 13:10), Max: 36.9 (12 Jul 2017 17:18)  T(F): 97.6 (13 Jul 2017 13:10), Max: 98.4 (12 Jul 2017 17:18)  HR: 66 (13 Jul 2017 13:10) (63 - 75)  BP: 128/55 (13 Jul 2017 13:10) (110/77 - 144/93)  BP(mean): --  RR: 19 (13 Jul 2017 13:10) (18 - 20)  SpO2: 90% (13 Jul 2017 13:10) (90% - 100%)    PHYSICAL EXAM:    GENERAL: NAD, well-groomed  HEENT: PERRL, +EOMI  NECK: soft, Supple, No JVD,   CHEST/LUNG: Clear to percussion bilaterally; No wheezing  HEART: S1S2+, Regular rate and rhythm; No murmurs, rubs, or gallops  EXTREMITIES:   No clubbing, cyanosis, or edema  NEURO: AAOX3, no focal deficits        07-12 @ 07:01  -  07-13 @ 07:00  --------------------------------------------------------  IN: 536 mL / OUT: 0 mL / NET: 536 mL        LABS:                        9.8    4.9   )-----------( 149      ( 13 Jul 2017 05:39 )             34.6     07-13    156<H>  |  118<H>  |  26.0<H>  ----------------------------<  96  3.9   |  25.0  |  0.95    Ca    9.0      13 Jul 2017 09:16    TPro  8.2  /  Alb  4.4  /  TBili  0.7  /  DBili  x   /  AST  14  /  ALT  13  /  AlkPhos  90  07-12            MEDICATIONS  (STANDING):  enoxaparin Injectable 30 milliGRAM(s) SubCutaneous every 24 hours  amLODIPine   Tablet 10 milliGRAM(s) Oral daily  labetalol 100 milliGRAM(s) Oral two times a day  levETIRAcetam 1000 milliGRAM(s) Oral two times a day  desmopressin 0.01% Nasal 1 Spray(s) Nasal daily  dextrose 5%. 1000 milliLiter(s) (75 mL/Hr) IV Continuous <Continuous>    MEDICATIONS  (PRN):      RADIOLOGY & ADDITIONAL TESTS:    Consultant notes reviewed

## 2017-07-13 NOTE — PROGRESS NOTE ADULT - PROBLEM SELECTOR PLAN 1
BMP stat - if lower -than 156- stop IV fluids   slow correction of Sodium  renal and endocrine recs appreciated

## 2017-07-13 NOTE — PROGRESS NOTE ADULT - SUBJECTIVE AND OBJECTIVE BOX
INTERVAL HPI/OVERNIGHT EVENTS:  follow up hypernatremia    MEDICATIONS  (STANDING):  enoxaparin Injectable 30 milliGRAM(s) SubCutaneous every 24 hours  amLODIPine   Tablet 10 milliGRAM(s) Oral daily  labetalol 100 milliGRAM(s) Oral two times a day  levETIRAcetam 1000 milliGRAM(s) Oral two times a day  desmopressin 0.01% Nasal 1 Spray(s) Nasal daily  dextrose 5%. 1000 milliLiter(s) (75 mL/Hr) IV Continuous <Continuous>    MEDICATIONS  (PRN):      Allergies    No Known Allergies    Intolerances        Review of systems:  no c/o polydipsia. feels well    Vital Signs Last 24 Hrs  T(C): 36.4 (13 Jul 2017 13:10), Max: 36.9 (12 Jul 2017 17:18)  T(F): 97.6 (13 Jul 2017 13:10), Max: 98.4 (12 Jul 2017 17:18)  HR: 66 (13 Jul 2017 13:10) (63 - 75)  BP: 128/55 (13 Jul 2017 13:10) (110/77 - 144/93)  BP(mean): --  RR: 19 (13 Jul 2017 13:10) (18 - 20)  SpO2: 90% (13 Jul 2017 13:10) (90% - 100%)    PHYSICAL EXAM:  Constitutional: NAD, well-groomed, well-developed  Respiratory: CTAB  Cardiovascular: S1 and S2, RRR, no M/G/R  Neurological: A/O x 3, no focal deficits    LABS:                        9.8    4.9   )-----------( 149      ( 13 Jul 2017 05:39 )             34.6     07-13    156<H>  |  118<H>  |  26.0<H>  ----------------------------<  96  3.9   |  25.0  |  0.95    Ca    9.0      13 Jul 2017 09:16    TPro  8.2  /  Alb  4.4  /  TBili  0.7  /  DBili  x   /  AST  14  /  ALT  13  /  AlkPhos  90  07-12

## 2017-07-13 NOTE — PROGRESS NOTE ADULT - SUBJECTIVE AND OBJECTIVE BOX
NEPHROLOGY INTERVAL HPI/OVERNIGHT EVENTS:    No new events   Seen earlier   Endo follow up noted     MEDICATIONS  (STANDING):  enoxaparin Injectable 30 milliGRAM(s) SubCutaneous every 24 hours  amLODIPine   Tablet 10 milliGRAM(s) Oral daily  labetalol 100 milliGRAM(s) Oral two times a day  levETIRAcetam 1000 milliGRAM(s) Oral two times a day  desmopressin 0.01% Nasal 1 Spray(s) Nasal daily  dextrose 5%. 1000 milliLiter(s) (75 mL/Hr) IV Continuous <Continuous>    MEDICATIONS  (PRN):      Allergies    No Known Allergies    Intolerances        Vital Signs Last 24 Hrs  T(C): 36.4 (13 Jul 2017 13:10), Max: 36.9 (12 Jul 2017 17:18)  T(F): 97.6 (13 Jul 2017 13:10), Max: 98.4 (12 Jul 2017 17:18)  HR: 66 (13 Jul 2017 13:10) (63 - 75)  BP: 128/55 (13 Jul 2017 13:10) (110/77 - 144/93)  BP(mean): --  RR: 19 (13 Jul 2017 13:10) (18 - 20)  SpO2: 90% (13 Jul 2017 13:10) (90% - 100%)  Daily     Daily   I&O's Detail    12 Jul 2017 07:01  -  13 Jul 2017 07:00  --------------------------------------------------------  IN:    dextrose 5%.: 300 mL    Oral Fluid: 236 mL  Total IN: 536 mL    OUT:  Total OUT: 0 mL    Total NET: 536 mL        I&O's Summary    12 Jul 2017 07:01  -  13 Jul 2017 07:00  --------------------------------------------------------  IN: 536 mL / OUT: 0 mL / NET: 536 mL        PHYSICAL EXAM:  HEAD:  Atraumatic, Normocephalic. No edema   NECK: Supple, No JVD, Normal thyroid  CHEST/LUNG: Clear to percussion bilaterally; No rales, rhonchi, wheezing, or rubs  HEART: Regular rate and rhythm; No murmurs, rubs, or gallops  ABDOMEN: Soft, Nontender, Nondistended; Bowel sounds present  EXTREMITIES:  2+ Peripheral Pulses, No clubbing, cyanosis, or edema    LABS:                        9.8    4.9   )-----------( 149      ( 13 Jul 2017 05:39 )             34.6     07-13    156<H>  |  118<H>  |  26.0<H>  ----------------------------<  96  3.9   |  25.0  |  0.95    Ca    9.0      13 Jul 2017 09:16    TPro  8.2  /  Alb  4.4  /  TBili  0.7  /  DBili  x   /  AST  14  /  ALT  13  /  AlkPhos  90  07-12                RADIOLOGY & ADDITIONAL TESTS:

## 2017-07-13 NOTE — PROGRESS NOTE ADULT - ASSESSMENT
Hypernatremia due to central diabetes insipidus, improving.  In terms of chronic management, two options:  1. Increase DDAVP to q12h, and discontinue outpatient IV fluid and stop pushing water intake.   2. Continue the current regimen, which is effective as long as sufficient water is provided.  The first option is preferable, but will need to be discussed with family and followed closely, as there is potential for hyponatremia.

## 2017-07-13 NOTE — PROGRESS NOTE ADULT - ASSESSMENT
Improved chronic hypernatremia   known CDI   H/O symptoms  from rapid reversal   Continue the current measures   Endocrine noted , plans for future outpatient follow up noted

## 2017-07-14 LAB
ANION GAP SERPL CALC-SCNC: 11 MMOL/L — SIGNIFICANT CHANGE UP (ref 5–17)
ANION GAP SERPL CALC-SCNC: 11 MMOL/L — SIGNIFICANT CHANGE UP (ref 5–17)
BUN SERPL-MCNC: 22 MG/DL — HIGH (ref 8–20)
BUN SERPL-MCNC: 23 MG/DL — HIGH (ref 8–20)
CALCIUM SERPL-MCNC: 8.8 MG/DL — SIGNIFICANT CHANGE UP (ref 8.6–10.2)
CALCIUM SERPL-MCNC: 8.9 MG/DL — SIGNIFICANT CHANGE UP (ref 8.6–10.2)
CHLORIDE SERPL-SCNC: 115 MMOL/L — HIGH (ref 98–107)
CHLORIDE SERPL-SCNC: 118 MMOL/L — HIGH (ref 98–107)
CO2 SERPL-SCNC: 25 MMOL/L — SIGNIFICANT CHANGE UP (ref 22–29)
CO2 SERPL-SCNC: 27 MMOL/L — SIGNIFICANT CHANGE UP (ref 22–29)
CREAT SERPL-MCNC: 0.9 MG/DL — SIGNIFICANT CHANGE UP (ref 0.5–1.3)
CREAT SERPL-MCNC: 0.91 MG/DL — SIGNIFICANT CHANGE UP (ref 0.5–1.3)
GLUCOSE SERPL-MCNC: 83 MG/DL — SIGNIFICANT CHANGE UP (ref 70–115)
GLUCOSE SERPL-MCNC: 84 MG/DL — SIGNIFICANT CHANGE UP (ref 70–115)
POTASSIUM SERPL-MCNC: 3.5 MMOL/L — SIGNIFICANT CHANGE UP (ref 3.5–5.3)
POTASSIUM SERPL-MCNC: 3.9 MMOL/L — SIGNIFICANT CHANGE UP (ref 3.5–5.3)
POTASSIUM SERPL-SCNC: 3.5 MMOL/L — SIGNIFICANT CHANGE UP (ref 3.5–5.3)
POTASSIUM SERPL-SCNC: 3.9 MMOL/L — SIGNIFICANT CHANGE UP (ref 3.5–5.3)
SODIUM SERPL-SCNC: 151 MMOL/L — HIGH (ref 135–145)
SODIUM SERPL-SCNC: 156 MMOL/L — HIGH (ref 135–145)

## 2017-07-14 PROCEDURE — 99233 SBSQ HOSP IP/OBS HIGH 50: CPT

## 2017-07-14 RX ORDER — SODIUM CHLORIDE 9 MG/ML
1000 INJECTION, SOLUTION INTRAVENOUS
Qty: 0 | Refills: 0 | Status: DISCONTINUED | OUTPATIENT
Start: 2017-07-14 | End: 2017-07-15

## 2017-07-14 RX ORDER — DESMOPRESSIN ACETATE 0.1 MG/1
1 TABLET ORAL EVERY 12 HOURS
Qty: 0 | Refills: 0 | Status: DISCONTINUED | OUTPATIENT
Start: 2017-07-14 | End: 2017-07-17

## 2017-07-14 RX ADMIN — LEVETIRACETAM 1000 MILLIGRAM(S): 250 TABLET, FILM COATED ORAL at 17:06

## 2017-07-14 RX ADMIN — LEVETIRACETAM 1000 MILLIGRAM(S): 250 TABLET, FILM COATED ORAL at 05:18

## 2017-07-14 RX ADMIN — SODIUM CHLORIDE 75 MILLILITER(S): 9 INJECTION, SOLUTION INTRAVENOUS at 09:51

## 2017-07-14 RX ADMIN — ENOXAPARIN SODIUM 30 MILLIGRAM(S): 100 INJECTION SUBCUTANEOUS at 17:05

## 2017-07-14 RX ADMIN — DESMOPRESSIN ACETATE 1 SPRAY(S): 0.1 TABLET ORAL at 17:45

## 2017-07-14 RX ADMIN — Medication 100 MILLIGRAM(S): at 17:05

## 2017-07-14 RX ADMIN — Medication 100 MILLIGRAM(S): at 05:18

## 2017-07-14 RX ADMIN — AMLODIPINE BESYLATE 10 MILLIGRAM(S): 2.5 TABLET ORAL at 05:18

## 2017-07-14 NOTE — PROGRESS NOTE ADULT - ASSESSMENT
51 yr old female with hx HTN, DI, SAH, cerebral aneurysm s/p clipping 2011 multiple admissions for hypernatremia on home infusion therapy for the same presents to Er with daughter for lab s/o High sodium in 160s range. No symptoms at present. In Er, LAbs s/o Sodium 171. she is being admitted for hypernatremia in the setting of DI inspite of being on Home infusion therapy. Evaluated by ICU - no indication for ICU admission at this time. Started on Iv dextrose water - with correction of sodium appropraitely. Fluids were held for overnight 7/13 to allow slower corrrection.   REnal and endocrine following.   May need home going DDAVP + Iv fluid changed - Needs PMD to manage - home iv infusions on discharge

## 2017-07-14 NOTE — PROGRESS NOTE ADULT - SUBJECTIVE AND OBJECTIVE BOX
NEPHROLOGY INTERVAL HPI/OVERNIGHT EVENTS: No new events.    MEDICATIONS  (STANDING):  enoxaparin Injectable 30 milliGRAM(s) SubCutaneous every 24 hours  amLODIPine   Tablet 10 milliGRAM(s) Oral daily  labetalol 100 milliGRAM(s) Oral two times a day  levETIRAcetam 1000 milliGRAM(s) Oral two times a day  dextrose 5%. 1000 milliLiter(s) (75 mL/Hr) IV Continuous <Continuous>  desmopressin 0.01% Nasal 1 Spray(s) Nasal every 12 hours    MEDICATIONS  (PRN):      Allergies    No Known Allergies            Vital Signs Last 24 Hrs  T(C): 36.8 (2017 10:54), Max: 36.8 (2017 10:54)  T(F): 98.3 (2017 10:54), Max: 98.3 (2017 10:54)  HR: 69 (2017 10:54) (62 - 72)  BP: 99/62 (2017 10:54) (99/62 - 130/98)  BP(mean): --  RR: 18 (2017 10:54) (16 - 19)  SpO2: 97% (2017 10:54) (90% - 100%)  Daily     Daily Weight in k.2 (2017 04:37)    PHYSICAL EXAM:    GENERAL: Oriented ,  alert  HEAD:  same  EYES:   ENMT:   NECK:  veins wnl  NERVOUS SYSTEM:  wnl  CHEST/LUNG: clear  HEART:  no gallop or rub  ABDOMEN:  soft, not tender  EXTREMITIES:  no edema  LYMPH:   SKIN: no rash  : neg  LABS:                        9.8    4.9   )-----------( 149      ( 2017 05:39 )             34.6     07-14    156<H>  |  118<H>  |  22.0<H>  ----------------------------<  84  3.5   |  27.0  |  0.90    Ca    8.8      2017 07:21                  RADIOLOGY & ADDITIONAL TESTS:

## 2017-07-14 NOTE — PROGRESS NOTE ADULT - SUBJECTIVE AND OBJECTIVE BOX
KRUPA MEDINA    [id number]    51y      Female    CC: admitted for labs s/o hypernatremia inspite of being on home iv fluid infusion for DI . h/o seizures with fast correction of Sodium  asymptomatic   feels good, nocomplaints    INTERVAL HPI/OVERNIGHT EVENTS: no acute events    REVIEW OF SYSTEMS:    CONSTITUTIONAL: No fever  RESPIRATORY:  No shortness of breath  GASTROINTESTINAL: . No nausea, vomiting      Vital Signs Last 24 Hrs  T(C): 36.8 (14 Jul 2017 10:54), Max: 36.8 (14 Jul 2017 10:54)  T(F): 98.3 (14 Jul 2017 10:54), Max: 98.3 (14 Jul 2017 10:54)  HR: 69 (14 Jul 2017 10:54) (62 - 72)  BP: 99/62 (14 Jul 2017 10:54) (99/62 - 130/98)  BP(mean): --  RR: 18 (14 Jul 2017 10:54) (16 - 19)  SpO2: 97% (14 Jul 2017 10:54) (90% - 100%)    PHYSICAL EXAM:    GENERAL: NAD, well-groomed  HEENT: PERRL, +EOMI  CHEST/LUNG: Clear to percussion bilaterally; No wheezing  HEART: S1S2+, Regular rate and rhythm; No murmurs, rubs, or gallops  EXTREMITIES:   No clubbing, cyanosis, or edema  NEURO: AAOX3, no focal deficits        07-13 @ 07:01  -  07-14 @ 07:00  --------------------------------------------------------  IN: 280 mL / OUT: 0 mL / NET: 280 mL        LABS:                        9.8    4.9   )-----------( 149      ( 13 Jul 2017 05:39 )             34.6     07-14    156<H>  |  118<H>  |  22.0<H>  ----------------------------<  84  3.5   |  27.0  |  0.90    Ca    8.8      14 Jul 2017 07:21              MEDICATIONS  (STANDING):  enoxaparin Injectable 30 milliGRAM(s) SubCutaneous every 24 hours  amLODIPine   Tablet 10 milliGRAM(s) Oral daily  labetalol 100 milliGRAM(s) Oral two times a day  levETIRAcetam 1000 milliGRAM(s) Oral two times a day  dextrose 5%. 1000 milliLiter(s) (75 mL/Hr) IV Continuous <Continuous>  desmopressin 0.01% Nasal 1 Spray(s) Nasal every 12 hours    MEDICATIONS  (PRN):      RADIOLOGY & ADDITIONAL TESTS:

## 2017-07-14 NOTE — PROGRESS NOTE ADULT - PROBLEM SELECTOR PLAN 1
BMPat 8pm - change to I/4 NS if Sodium not improving per renal    slow correction of Sodium  renal and endocrine recs appreciated

## 2017-07-14 NOTE — PROGRESS NOTE ADULT - SUBJECTIVE AND OBJECTIVE BOX
INTERVAL HPI/OVERNIGHT EVENTS:  follow up diabetes insipidus    MEDICATIONS  (STANDING):  enoxaparin Injectable 30 milliGRAM(s) SubCutaneous every 24 hours  amLODIPine   Tablet 10 milliGRAM(s) Oral daily  labetalol 100 milliGRAM(s) Oral two times a day  levETIRAcetam 1000 milliGRAM(s) Oral two times a day  desmopressin 0.01% Nasal 1 Spray(s) Nasal daily  dextrose 5%. 1000 milliLiter(s) (75 mL/Hr) IV Continuous <Continuous>    MEDICATIONS  (PRN):      Allergies    No Known Allergies    Intolerances        Review of systems:  pt. denies polydipsia or polyuria    Vital Signs Last 24 Hrs  T(C): 36.4 (14 Jul 2017 04:37), Max: 36.7 (13 Jul 2017 17:10)  T(F): 97.5 (14 Jul 2017 04:37), Max: 98 (13 Jul 2017 17:10)  HR: 62 (14 Jul 2017 04:37) (62 - 72)  BP: 108/67 (14 Jul 2017 04:37) (108/67 - 130/98)  BP(mean): --  RR: 16 (14 Jul 2017 04:37) (16 - 19)  SpO2: 99% (14 Jul 2017 04:37) (90% - 100%)    PHYSICAL EXAM:  Constitutional: NAD, well-groomed, well-developed  HEENT: PERRLA, EOMI, no exophalmos  Respiratory: CTAB  Cardiovascular: S1 and S2, RRR, no M/G/R  Psychiatric: Normal mood, normal affect    LABS:                        9.8    4.9   )-----------( 149      ( 13 Jul 2017 05:39 )             34.6     07-14    156<H>  |  118<H>  |  22.0<H>  ----------------------------<  84  3.5   |  27.0  |  0.90    Ca    8.8      14 Jul 2017 07:21

## 2017-07-14 NOTE — PROGRESS NOTE ADULT - ASSESSMENT
Pt. remains hypernatremic, with no further improvement since IV fluid held. Will empirically add a second dose of DDAVP for now.

## 2017-07-15 DIAGNOSIS — R79.89 OTHER SPECIFIED ABNORMAL FINDINGS OF BLOOD CHEMISTRY: ICD-10-CM

## 2017-07-15 DIAGNOSIS — I10 ESSENTIAL (PRIMARY) HYPERTENSION: ICD-10-CM

## 2017-07-15 DIAGNOSIS — Z29.9 ENCOUNTER FOR PROPHYLACTIC MEASURES, UNSPECIFIED: ICD-10-CM

## 2017-07-15 LAB
ANION GAP SERPL CALC-SCNC: 10 MMOL/L — SIGNIFICANT CHANGE UP (ref 5–17)
ANION GAP SERPL CALC-SCNC: 7 MMOL/L — SIGNIFICANT CHANGE UP (ref 5–17)
BUN SERPL-MCNC: 22 MG/DL — HIGH (ref 8–20)
BUN SERPL-MCNC: 23 MG/DL — HIGH (ref 8–20)
CALCIUM SERPL-MCNC: 8.6 MG/DL — SIGNIFICANT CHANGE UP (ref 8.6–10.2)
CALCIUM SERPL-MCNC: 8.8 MG/DL — SIGNIFICANT CHANGE UP (ref 8.6–10.2)
CHLORIDE SERPL-SCNC: 112 MMOL/L — HIGH (ref 98–107)
CHLORIDE SERPL-SCNC: 114 MMOL/L — HIGH (ref 98–107)
CO2 SERPL-SCNC: 26 MMOL/L — SIGNIFICANT CHANGE UP (ref 22–29)
CO2 SERPL-SCNC: 27 MMOL/L — SIGNIFICANT CHANGE UP (ref 22–29)
CREAT SERPL-MCNC: 0.81 MG/DL — SIGNIFICANT CHANGE UP (ref 0.5–1.3)
CREAT SERPL-MCNC: 1.01 MG/DL — SIGNIFICANT CHANGE UP (ref 0.5–1.3)
GLUCOSE SERPL-MCNC: 86 MG/DL — SIGNIFICANT CHANGE UP (ref 70–115)
GLUCOSE SERPL-MCNC: 86 MG/DL — SIGNIFICANT CHANGE UP (ref 70–115)
POTASSIUM SERPL-MCNC: 3.7 MMOL/L — SIGNIFICANT CHANGE UP (ref 3.5–5.3)
POTASSIUM SERPL-MCNC: 4.4 MMOL/L — SIGNIFICANT CHANGE UP (ref 3.5–5.3)
POTASSIUM SERPL-SCNC: 3.7 MMOL/L — SIGNIFICANT CHANGE UP (ref 3.5–5.3)
POTASSIUM SERPL-SCNC: 4.4 MMOL/L — SIGNIFICANT CHANGE UP (ref 3.5–5.3)
SODIUM SERPL-SCNC: 146 MMOL/L — HIGH (ref 135–145)
SODIUM SERPL-SCNC: 150 MMOL/L — HIGH (ref 135–145)

## 2017-07-15 PROCEDURE — 99233 SBSQ HOSP IP/OBS HIGH 50: CPT

## 2017-07-15 RX ORDER — DEXTROSE MONOHYDRATE, SODIUM CHLORIDE, AND POTASSIUM CHLORIDE 50; .745; 4.5 G/1000ML; G/1000ML; G/1000ML
1000 INJECTION, SOLUTION INTRAVENOUS
Qty: 0 | Refills: 0 | Status: DISCONTINUED | OUTPATIENT
Start: 2017-07-15 | End: 2017-07-16

## 2017-07-15 RX ORDER — DEXTROSE MONOHYDRATE, SODIUM CHLORIDE, AND POTASSIUM CHLORIDE 50; .745; 4.5 G/1000ML; G/1000ML; G/1000ML
1000 INJECTION, SOLUTION INTRAVENOUS
Qty: 0 | Refills: 0 | Status: DISCONTINUED | OUTPATIENT
Start: 2017-07-15 | End: 2017-07-15

## 2017-07-15 RX ADMIN — DESMOPRESSIN ACETATE 1 SPRAY(S): 0.1 TABLET ORAL at 17:18

## 2017-07-15 RX ADMIN — DEXTROSE MONOHYDRATE, SODIUM CHLORIDE, AND POTASSIUM CHLORIDE 40 MILLILITER(S): 50; .745; 4.5 INJECTION, SOLUTION INTRAVENOUS at 23:10

## 2017-07-15 RX ADMIN — ENOXAPARIN SODIUM 30 MILLIGRAM(S): 100 INJECTION SUBCUTANEOUS at 13:33

## 2017-07-15 RX ADMIN — Medication 100 MILLIGRAM(S): at 05:29

## 2017-07-15 RX ADMIN — DEXTROSE MONOHYDRATE, SODIUM CHLORIDE, AND POTASSIUM CHLORIDE 60 MILLILITER(S): 50; .745; 4.5 INJECTION, SOLUTION INTRAVENOUS at 13:33

## 2017-07-15 RX ADMIN — SODIUM CHLORIDE 75 MILLILITER(S): 9 INJECTION, SOLUTION INTRAVENOUS at 05:28

## 2017-07-15 RX ADMIN — AMLODIPINE BESYLATE 10 MILLIGRAM(S): 2.5 TABLET ORAL at 05:29

## 2017-07-15 RX ADMIN — LEVETIRACETAM 1000 MILLIGRAM(S): 250 TABLET, FILM COATED ORAL at 17:19

## 2017-07-15 RX ADMIN — Medication 100 MILLIGRAM(S): at 17:20

## 2017-07-15 RX ADMIN — LEVETIRACETAM 1000 MILLIGRAM(S): 250 TABLET, FILM COATED ORAL at 05:29

## 2017-07-15 RX ADMIN — DESMOPRESSIN ACETATE 1 SPRAY(S): 0.1 TABLET ORAL at 05:29

## 2017-07-15 NOTE — PROGRESS NOTE ADULT - SUBJECTIVE AND OBJECTIVE BOX
Patient is a 51y old  Female who presents with a chief complaint of abnormal blood work (12 Jul 2017 14:28)      HEALTH ISSUES - PROBLEM Dx:  TOSHIA (acute kidney injury): TOSHIA (acute kidney injury)  Brain aneurysm: Brain aneurysm  HTN (Hypertension): HTN (Hypertension)  Diabetes insipidus: Diabetes insipidus  Hypernatremia: Hypernatremia      INTERVAL HPI/OVERNIGHT EVENTS:  Patient seen and examined at bedside. No acute events overnight. Patient states she is feeling well,denies chest pain, SOB, abd pain, N/V, fever, chills, dysuria or any other complaints. All remainder ROS negative.     Vital Signs Last 24 Hrs  T(C): 36.5 (15 Jul 2017 08:32), Max: 36.7 (14 Jul 2017 20:50)  T(F): 97.7 (15 Jul 2017 08:32), Max: 98 (14 Jul 2017 20:50)  HR: 65 (15 Jul 2017 08:32) (62 - 70)  BP: 115/81 (15 Jul 2017 08:32) (105/73 - 122/62)  BP(mean): --  RR: 16 (15 Jul 2017 08:32) (16 - 20)  SpO2: 98% (14 Jul 2017 20:50) (98% - 98%)      I&O's Summary    14 Jul 2017 07:01  -  15 Jul 2017 07:00  --------------------------------------------------------  IN: 1720 mL / OUT: 490 mL / NET: 1230 mL      CONSTITUTIONAL: Well appearing, well nourished, awake, alert and in no apparent distress  lear bilaterally, pupils equal, round and reactive to light. EOMI.  CARDIAC: Normal rate, regular rhythm.  Heart sounds S1, S2.  No murmurs, rubs or gallops   RESPIRATORY: Breath sounds clear and equal bilaterally. No wheezes, rhales or rhonchi  GASTROENTEROLOGY: Soft, NT ND BS+ normoactive   EXTREMITIES: No edema, cyanosis or deformity   NEUROLOGICAL: Alert and oriented, no focal deficits, no motor or sensory deficits.  SKIN: No rash, skin turgor     MEDICATIONS  (STANDING):  enoxaparin Injectable 30 milliGRAM(s) SubCutaneous every 24 hours  amLODIPine   Tablet 10 milliGRAM(s) Oral daily  labetalol 100 milliGRAM(s) Oral two times a day  levETIRAcetam 1000 milliGRAM(s) Oral two times a day  desmopressin 0.01% Nasal 1 Spray(s) Nasal every 12 hours  sodium chloride 0.45% with potassium chloride 20 mEq/L 1000 milliLiter(s) (60 mL/Hr) IV Continuous <Continuous>      LABS:    07-15    150<H>  |  114<H>  |  22.0<H>  ----------------------------<  86  3.7   |  26.0  |  0.81    Ca    8.6      15 Jul 2017 06:46      RADIOLOGY & ADDITIONAL TESTS:  No new imaging studies Patient is a 51y old  Female who presents with a chief complaint of abnormal blood work (12 Jul 2017 14:28)      HEALTH ISSUES - PROBLEM Dx:  TOSHIA (acute kidney injury): TOSHIA (acute kidney injury)  Brain aneurysm: Brain aneurysm  HTN (Hypertension): HTN (Hypertension)  Diabetes insipidus: Diabetes insipidus  Hypernatremia: Hypernatremia      INTERVAL HPI/OVERNIGHT EVENTS:  Patient seen and examined at bedside. No acute events overnight. Patient states she is feeling well,denies chest pain, SOB, abd pain, N/V, fever, chills, dysuria or any other complaints. All remainder ROS negative.     Vital Signs Last 24 Hrs  T(C): 36.5 (15 Jul 2017 08:32), Max: 36.7 (14 Jul 2017 20:50)  T(F): 97.7 (15 Jul 2017 08:32), Max: 98 (14 Jul 2017 20:50)  HR: 65 (15 Jul 2017 08:32) (62 - 70)  BP: 115/81 (15 Jul 2017 08:32) (105/73 - 122/62)  BP(mean): --  RR: 16 (15 Jul 2017 08:32) (16 - 20)  SpO2: 98% (14 Jul 2017 20:50) (98% - 98%)      I&O's Summary    14 Jul 2017 07:01  -  15 Jul 2017 07:00  --------------------------------------------------------  IN: 1720 mL / OUT: 490 mL / NET: 1230 mL      CONSTITUTIONAL: Well appearing, well nourished, awake, alert and in no apparent distress  lear bilaterally, pupils equal, round and reactive to light. EOMI.  CARDIAC: Normal rate, regular rhythm.  Heart sounds S1, S2.  No murmurs, rubs or gallops   RESPIRATORY: Breath sounds clear and equal bilaterally. No wheezes, rhales or rhonchi  GASTROENTEROLOGY: Soft, NT ND BS+ normoactive   EXTREMITIES: No edema, cyanosis or deformity   SKIN: No rash, skin turgor  poor    MEDICATIONS  (STANDING):  enoxaparin Injectable 30 milliGRAM(s) SubCutaneous every 24 hours  amLODIPine   Tablet 10 milliGRAM(s) Oral daily  labetalol 100 milliGRAM(s) Oral two times a day  levETIRAcetam 1000 milliGRAM(s) Oral two times a day  desmopressin 0.01% Nasal 1 Spray(s) Nasal every 12 hours  sodium chloride 0.45% with potassium chloride 20 mEq/L 1000 milliLiter(s) (60 mL/Hr) IV Continuous <Continuous>      LABS:    07-15    150<H>  |  114<H>  |  22.0<H>  ----------------------------<  86  3.7   |  26.0  |  0.81    Ca    8.6      15 Jul 2017 06:46      RADIOLOGY & ADDITIONAL TESTS:  No new imaging studies

## 2017-07-15 NOTE — PROGRESS NOTE ADULT - PROBLEM SELECTOR PLAN 5
DVT PPx with SCD boots b/l while in bed and patient is ambulating will d/c lovenox especially given hx prior SAH/cerebral anuerysm

## 2017-07-15 NOTE — PROGRESS NOTE ADULT - PROBLEM SELECTOR PLAN 1
Underlying Diabetes Insipidus and multiple hospitalizations for hypernatremia  -Sodium: 150 today   -d/w nephrologist Dr. Chaidez and discontinued dextrose IVF and started 1/2 ns + kcl @60ml/hr   -c/w DDAVP Q12hrs   -d/w endocrinologist Dr. Mercado and plan if patient is agreeable is to transition the patient to DDAVP and not be on infusion IVF at home. Discussed this plan with the patient and will discuss with her daughter in detail for further management when the patient is to be discharged. For now patient will remains on IVF and DDAVP until sodium normalizes. Underlying Diabetes Insipidus and multiple hospitalizations for hypernatremia  -Sodium: 150 today   -repeat bmp tonight and in the am   -d/w nephrologist Dr. Chaidez and discontinued dextrose IVF and started 1/2 ns + kcl @60ml/hr   -c/w DDAVP Q12hrs   -d/w endocrinologist Dr. Mercado and plan if patient is agreeable is to transition the patient to DDAVP and not be on infusion IVF at home. Discussed this plan with the patient and will discuss with her daughter in detail for further management when the patient is to be discharged. For now patient will remains on IVF and DDAVP until sodium normalizes.

## 2017-07-15 NOTE — PROGRESS NOTE ADULT - PROBLEM SELECTOR PLAN 2
-remains stable and will continue with monitoring   -c/w IVF therapy   -nephro f/u noted and appreciated

## 2017-07-15 NOTE — PROGRESS NOTE ADULT - ASSESSMENT
Gradual improvement in hypernatremia. On DDAVP twice daily. Advise continuation of this regimen with close monitoring of serum sodium

## 2017-07-15 NOTE — PROGRESS NOTE ADULT - ASSESSMENT
51 year old female with pmhx HTN, DI, SAH, cerebral aneurysm s/p clipping 2011 multiple admissions for hypernatremia on home infusion therapy with IVF admitted with hypernatremia Na: 171 now trending down to 150 on DDAVP and IVF. Nephrology and endocrinology continue to follow the patient.

## 2017-07-15 NOTE — CHART NOTE - NSCHARTNOTEFT_GEN_A_CORE
Repeat bmp shows sodium of 146 discussed with Dr. Chaidez ( nephrologist) amd recommended to  decrease current IVF 1/2 ns + 20meq kcl from 60ml/hr to 40ml/hr and will f/u bmp in the am. Adjusted dose of IVF and bmp ordered for the am.

## 2017-07-15 NOTE — PROGRESS NOTE ADULT - SUBJECTIVE AND OBJECTIVE BOX
INTERVAL HPI/OVERNIGHT EVENTS:  follow up diabetes insipidus    MEDICATIONS  (STANDING):  enoxaparin Injectable 30 milliGRAM(s) SubCutaneous every 24 hours  amLODIPine   Tablet 10 milliGRAM(s) Oral daily  labetalol 100 milliGRAM(s) Oral two times a day  levETIRAcetam 1000 milliGRAM(s) Oral two times a day  desmopressin 0.01% Nasal 1 Spray(s) Nasal every 12 hours  sodium chloride 0.45% with potassium chloride 20 mEq/L 1000 milliLiter(s) (60 mL/Hr) IV Continuous <Continuous>    MEDICATIONS  (PRN):      Allergies    No Known Allergies    Intolerances    Vital Signs Last 24 Hrs  T(C): 36.5 (15 Jul 2017 08:32), Max: 36.7 (14 Jul 2017 20:50)  T(F): 97.7 (15 Jul 2017 08:32), Max: 98 (14 Jul 2017 20:50)  HR: 65 (15 Jul 2017 08:32) (62 - 70)  BP: 115/81 (15 Jul 2017 08:32) (105/73 - 122/62)  BP(mean): --  RR: 16 (15 Jul 2017 08:32) (16 - 20)  SpO2: 98% (14 Jul 2017 20:50) (98% - 98%)      LABS:    07-15    150<H>  |  114<H>  |  22.0<H>  ----------------------------<  86  3.7   |  26.0  |  0.81    Ca    8.6      15 Jul 2017 06:46

## 2017-07-15 NOTE — PROGRESS NOTE ADULT - SUBJECTIVE AND OBJECTIVE BOX
NEPHROLOGY INTERVAL HPI/OVERNIGHT EVENTS: No new events.    MEDICATIONS  (STANDING):  enoxaparin Injectable 30 milliGRAM(s) SubCutaneous every 24 hours  amLODIPine   Tablet 10 milliGRAM(s) Oral daily  labetalol 100 milliGRAM(s) Oral two times a day  levETIRAcetam 1000 milliGRAM(s) Oral two times a day  dextrose 5%. 1000 milliLiter(s) (75 mL/Hr) IV Continuous <Continuous>  desmopressin 0.01% Nasal 1 Spray(s) Nasal every 12 hours    MEDICATIONS  (PRN):      Allergies    No Known Allergies            Vital Signs Last 24 Hrs  T(C): 36.8 (2017 10:54), Max: 36.8 (2017 10:54)  T(F): 98.3 (2017 10:54), Max: 98.3 (2017 10:54)  HR: 69 (2017 10:54) (62 - 72)  BP: 99/62 (2017 10:54) (99/62 - 130/98)  BP(mean): --  RR: 18 (2017 10:54) (16 - 19)  SpO2: 97% (2017 10:54) (90% - 100%)  Daily     Daily Weight in k.2 (2017 04:37)    PHYSICAL EXAM:    GENERAL: Oriented ,  alert  HEAD:  same  EYES:  wnl  ENMT:   NECK:  veins wnl  NERVOUS SYSTEM:  awake, alert, oriented  CHEST/LUNG: clear  HEART:  no gallop or rub  ABDOMEN:  soft, not tender  EXTREMITIES:  no edema  LYMPH:   SKIN: no rash  : neg  LABS: 07-15    150<H>  |  114<H>  |  22.0<H>  ----------------------------<  86  3.7   |  26.0  |  0.81    Ca    8.6      15 Jul 2017 06:46                            9.8    4.9   )-----------( 149      ( 2017 05:39 )             34.6         156<H>  |  118<H>  |  22.0<H>  ----------------------------<  84  3.5   |  27.0  |  0.90    Ca    8.8      2017 07:21                  RADIOLOGY & ADDITIONAL TESTS:

## 2017-07-16 LAB
ANION GAP SERPL CALC-SCNC: 10 MMOL/L — SIGNIFICANT CHANGE UP (ref 5–17)
BUN SERPL-MCNC: 19 MG/DL — SIGNIFICANT CHANGE UP (ref 8–20)
CALCIUM SERPL-MCNC: 8.5 MG/DL — LOW (ref 8.6–10.2)
CHLORIDE SERPL-SCNC: 112 MMOL/L — HIGH (ref 98–107)
CO2 SERPL-SCNC: 25 MMOL/L — SIGNIFICANT CHANGE UP (ref 22–29)
CREAT SERPL-MCNC: 0.81 MG/DL — SIGNIFICANT CHANGE UP (ref 0.5–1.3)
GLUCOSE SERPL-MCNC: 80 MG/DL — SIGNIFICANT CHANGE UP (ref 70–115)
POTASSIUM SERPL-MCNC: 3.8 MMOL/L — SIGNIFICANT CHANGE UP (ref 3.5–5.3)
POTASSIUM SERPL-SCNC: 3.8 MMOL/L — SIGNIFICANT CHANGE UP (ref 3.5–5.3)
SODIUM SERPL-SCNC: 147 MMOL/L — HIGH (ref 135–145)

## 2017-07-16 PROCEDURE — 99233 SBSQ HOSP IP/OBS HIGH 50: CPT

## 2017-07-16 RX ORDER — DEXTROSE MONOHYDRATE, SODIUM CHLORIDE, AND POTASSIUM CHLORIDE 50; .745; 4.5 G/1000ML; G/1000ML; G/1000ML
1000 INJECTION, SOLUTION INTRAVENOUS
Qty: 0 | Refills: 0 | Status: DISCONTINUED | OUTPATIENT
Start: 2017-07-16 | End: 2017-07-17

## 2017-07-16 RX ADMIN — DEXTROSE MONOHYDRATE, SODIUM CHLORIDE, AND POTASSIUM CHLORIDE 60 MILLILITER(S): 50; .745; 4.5 INJECTION, SOLUTION INTRAVENOUS at 23:07

## 2017-07-16 RX ADMIN — LEVETIRACETAM 1000 MILLIGRAM(S): 250 TABLET, FILM COATED ORAL at 06:01

## 2017-07-16 RX ADMIN — Medication 100 MILLIGRAM(S): at 06:01

## 2017-07-16 RX ADMIN — DESMOPRESSIN ACETATE 1 SPRAY(S): 0.1 TABLET ORAL at 17:11

## 2017-07-16 RX ADMIN — LEVETIRACETAM 1000 MILLIGRAM(S): 250 TABLET, FILM COATED ORAL at 17:11

## 2017-07-16 RX ADMIN — DESMOPRESSIN ACETATE 1 SPRAY(S): 0.1 TABLET ORAL at 06:01

## 2017-07-16 RX ADMIN — Medication 100 MILLIGRAM(S): at 17:11

## 2017-07-16 RX ADMIN — AMLODIPINE BESYLATE 10 MILLIGRAM(S): 2.5 TABLET ORAL at 06:01

## 2017-07-16 NOTE — PROGRESS NOTE ADULT - PROBLEM SELECTOR PLAN 1
Underlying Diabetes Insipidus and multiple hospitalizations for hypernatremia  -Sodium: 147 today   -Adjusted IVF rate from 1/2 NS + 20meq kcl from 40ml/hr to 60ml/hr and d/w Dr. Chaidez in regards to this rate   -repeat bmp in the am   -d/w nephrologist Dr. Chaidez and Dr. Mercado who are in agreement with continuing DDAVP BID and patients infusions as an outpatient as she was prior to hospitalization. The only change being recommended is an increase in her DDAVP from once a day to twice a day. There may be resistance from her  b/c they are very involved in the patients care and performing it in one certain way. There will be concern for hyponatremia but patient follows closely with Dr. Zavala/Dr. Palumbo as an outpatient.

## 2017-07-16 NOTE — PROGRESS NOTE ADULT - SUBJECTIVE AND OBJECTIVE BOX
Patient is a 51y old  Female who presents with a chief complaint of abnormal blood work (12 Jul 2017 14:28)      HEALTH ISSUES - PROBLEM Dx:  Prophylactic measure: Prophylactic measure  Essential hypertension: Essential hypertension  Prerenal azotemia: Prerenal azotemia  TOSHIA (acute kidney injury): TOSHIA (acute kidney injury)  Brain aneurysm: Brain aneurysm  HTN (Hypertension): HTN (Hypertension)  Diabetes insipidus: Diabetes insipidus  Hypernatremia: Hypernatremia      INTERVAL HPI/OVERNIGHT EVENTS:  Patient seen and examined at bedside. No acute events overnight. Patient states she is feeling well, denies chest pain, SOB, abd pain, N/V, fever, chills, dysuria or any other complaints. All remainder ROS negative.    Vital Signs Last 24 Hrs  T(C): 36.5 (16 Jul 2017 09:05), Max: 36.6 (15 Jul 2017 21:23)  T(F): 97.7 (16 Jul 2017 09:05), Max: 97.9 (15 Jul 2017 21:23)  HR: 65 (16 Jul 2017 09:05) (63 - 71)  BP: 139/96 (16 Jul 2017 09:05) (112/76 - 139/96)  BP(mean): --  RR: 16 (16 Jul 2017 09:05) (16 - 20)  SpO2: 95% (16 Jul 2017 04:38) (95% - 95%)    CONSTITUTIONAL: Well appearing, well nourished, awake, alert and in no apparent distress  lear bilaterally, pupils equal, round and reactive to light. EOMI.  CARDIAC: Normal rate, regular rhythm.  Heart sounds S1, S2.  No murmurs, rubs or gallops   RESPIRATORY: Breath sounds clear and equal bilaterally. No wheezes, rhales or rhonchi  GASTROENTEROLOGY: Soft, NT ND BS+ normoactive   EXTREMITIES: No edema, cyanosis or deformity   SKIN: No rash, skin turgor wnl    MEDICATIONS  (STANDING):  amLODIPine   Tablet 10 milliGRAM(s) Oral daily  labetalol 100 milliGRAM(s) Oral two times a day  levETIRAcetam 1000 milliGRAM(s) Oral two times a day  desmopressin 0.01% Nasal 1 Spray(s) Nasal every 12 hours  sodium chloride 0.45% with potassium chloride 20 mEq/L 1000 milliLiter(s) (60 mL/Hr) IV Continuous <Continuous>    MEDICATIONS  (PRN):      LABS:    07-16    147<H>  |  112<H>  |  19.0  ----------------------------<  80  3.8   |  25.0  |  0.81    Ca    8.5<L>      16 Jul 2017 08:41      RADIOLOGY & ADDITIONAL TESTS:  No new imaging studies

## 2017-07-16 NOTE — PROGRESS NOTE ADULT - ASSESSMENT
51 year old female with pmhx HTN, DI, SAH, cerebral aneurysm s/p clipping 2011 multiple admissions for hypernatremia on home infusion therapy with IVF and on DDAVP QD admitted with hypernatremia Na: 171 now trending down to 147 on DDAVP BID and IVF. Nephrology and endocrinology continue to follow the patient.

## 2017-07-17 VITALS
TEMPERATURE: 98 F | OXYGEN SATURATION: 99 % | DIASTOLIC BLOOD PRESSURE: 82 MMHG | HEART RATE: 72 BPM | SYSTOLIC BLOOD PRESSURE: 118 MMHG | RESPIRATION RATE: 16 BRPM

## 2017-07-17 LAB
ANION GAP SERPL CALC-SCNC: 8 MMOL/L — SIGNIFICANT CHANGE UP (ref 5–17)
BUN SERPL-MCNC: 20 MG/DL — SIGNIFICANT CHANGE UP (ref 8–20)
CALCIUM SERPL-MCNC: 8.8 MG/DL — SIGNIFICANT CHANGE UP (ref 8.6–10.2)
CHLORIDE SERPL-SCNC: 111 MMOL/L — HIGH (ref 98–107)
CO2 SERPL-SCNC: 27 MMOL/L — SIGNIFICANT CHANGE UP (ref 22–29)
CREAT SERPL-MCNC: 0.84 MG/DL — SIGNIFICANT CHANGE UP (ref 0.5–1.3)
GLUCOSE SERPL-MCNC: 62 MG/DL — LOW (ref 70–115)
POTASSIUM SERPL-MCNC: 4.6 MMOL/L — SIGNIFICANT CHANGE UP (ref 3.5–5.3)
POTASSIUM SERPL-SCNC: 4.6 MMOL/L — SIGNIFICANT CHANGE UP (ref 3.5–5.3)
SODIUM SERPL-SCNC: 146 MMOL/L — HIGH (ref 135–145)

## 2017-07-17 PROCEDURE — 85027 COMPLETE CBC AUTOMATED: CPT

## 2017-07-17 PROCEDURE — 99285 EMERGENCY DEPT VISIT HI MDM: CPT | Mod: 25

## 2017-07-17 PROCEDURE — 80053 COMPREHEN METABOLIC PANEL: CPT

## 2017-07-17 PROCEDURE — 36415 COLL VENOUS BLD VENIPUNCTURE: CPT

## 2017-07-17 PROCEDURE — 83001 ASSAY OF GONADOTROPIN (FSH): CPT

## 2017-07-17 PROCEDURE — 83002 ASSAY OF GONADOTROPIN (LH): CPT

## 2017-07-17 PROCEDURE — 93005 ELECTROCARDIOGRAM TRACING: CPT

## 2017-07-17 PROCEDURE — 84443 ASSAY THYROID STIM HORMONE: CPT

## 2017-07-17 PROCEDURE — 82533 TOTAL CORTISOL: CPT

## 2017-07-17 PROCEDURE — 71045 X-RAY EXAM CHEST 1 VIEW: CPT

## 2017-07-17 PROCEDURE — 80048 BASIC METABOLIC PNL TOTAL CA: CPT

## 2017-07-17 PROCEDURE — 83935 ASSAY OF URINE OSMOLALITY: CPT

## 2017-07-17 PROCEDURE — 99239 HOSP IP/OBS DSCHRG MGMT >30: CPT

## 2017-07-17 PROCEDURE — 84436 ASSAY OF TOTAL THYROXINE: CPT

## 2017-07-17 PROCEDURE — 83930 ASSAY OF BLOOD OSMOLALITY: CPT

## 2017-07-17 RX ORDER — LEVETIRACETAM 250 MG/1
1 TABLET, FILM COATED ORAL
Qty: 0 | Refills: 0 | COMMUNITY
Start: 2017-07-17

## 2017-07-17 RX ORDER — SODIUM CHLORIDE 9 MG/ML
1000 INJECTION, SOLUTION INTRAVENOUS
Qty: 15 | Refills: 0 | OUTPATIENT
Start: 2017-07-17

## 2017-07-17 RX ORDER — LABETALOL HCL 100 MG
1 TABLET ORAL
Qty: 0 | Refills: 0 | COMMUNITY
Start: 2017-07-17

## 2017-07-17 RX ORDER — DESMOPRESSIN ACETATE 0.1 MG/1
1 TABLET ORAL
Qty: 0 | Refills: 0 | COMMUNITY
Start: 2017-07-17

## 2017-07-17 RX ORDER — AMLODIPINE BESYLATE 2.5 MG/1
1 TABLET ORAL
Qty: 0 | Refills: 0 | COMMUNITY
Start: 2017-07-17

## 2017-07-17 RX ADMIN — LEVETIRACETAM 1000 MILLIGRAM(S): 250 TABLET, FILM COATED ORAL at 17:55

## 2017-07-17 RX ADMIN — Medication 100 MILLIGRAM(S): at 05:00

## 2017-07-17 RX ADMIN — LEVETIRACETAM 1000 MILLIGRAM(S): 250 TABLET, FILM COATED ORAL at 05:00

## 2017-07-17 RX ADMIN — DESMOPRESSIN ACETATE 1 SPRAY(S): 0.1 TABLET ORAL at 17:55

## 2017-07-17 RX ADMIN — DESMOPRESSIN ACETATE 1 SPRAY(S): 0.1 TABLET ORAL at 05:00

## 2017-07-17 RX ADMIN — AMLODIPINE BESYLATE 10 MILLIGRAM(S): 2.5 TABLET ORAL at 05:00

## 2017-07-17 RX ADMIN — Medication 100 MILLIGRAM(S): at 17:55

## 2017-07-17 NOTE — PROGRESS NOTE ADULT - ATTENDING COMMENTS
Home with  DDAVP ; iv fluid as before; follow up as op with endo and LMD
IV fluid with Kl labs tonight and in am.
Pt presently on iv dextrose  , will need to watch glucose as it can act as osmotic  diuretic . If sodium not improved  in next 24h would switch  to 1/4 N saline iv.
Dispo: Depending on sodium levels will plan for discharge in 24-48 hrs. Will d/w  in regards to maintaining DDAVP to bid dosing and continuing with IVF infusions as an outpatient through [facility]. The  is very resistant to any changes will d/w him in regards to the recommended plan of care and pt is followed closely by Dr. Palumbo and Dr. Zavala.
Dispo: Patient medically stable to be discharged home to continue care as per primary care physician Dr. Zavala and Dr. Palumbo and to continue with infusions with [facility], d/w CM to re-instate infusions and c/w DDAVP at increased dose of twice a day. Dr. Zavala to repeat lab work tomorrow.
Discussed with the patient's  in regards to the current plan of care Rui Santiago and the recommendations provided by Dr. Mercado and Dr. Chaidez. He is very hesitant in regards to any change in the patients management and states that his wife almost  four years ago b/c she was solely on DDAVP therapy and not on IVF. Patient's  was very difficult to speak to, was aggressive in his demeanor in speaking to me over the phone and threatened to speak to the "medical director" in regards to the care that is provided at [facility]. He is unhappy with the way the physicians have been handling the care, although he acknowledges that the treatment is working. The patient solely follows with Dr. Zavala her PMD and nephrologist Dr. Palumbo. I d/wl him that I would reach Dr. Zavala and speak to him in regards to the current management as well. I advised him that I called him to update him in regards to the care and the progress and that moving forward I will continue to do so.

## 2017-07-17 NOTE — PROGRESS NOTE ADULT - PROBLEM SELECTOR PLAN 1
Underlying Diabetes Insipidus and multiple hospitalizations for hypernatremia  -Sodium: 146 today and has remained stable now for 48 hrs   -patient remains asymptomatic   -d/w nephrology Dr. Chaidez and patient cleared for discharge today,  -D/C IVF   -d/w nephrologist Dr. Chaidez and Dr. Mercado who are in agreement with continuing DDAVP BID and patients infusions as an outpatient as she was prior to hospitalization. The only change being recommended is an increase in her DDAVP from once a day to twice a day.   -Tried reaching the patients  but his phone continues to go to voicemail. Yesterday I left a voicemail with no return call. Today called the patients daughter Millie Santiago via phone [phone] in regards to discharge planning and the above plan and she is in agreement. She states that Dr. Zavala can check her lab work tomorrow.

## 2017-07-17 NOTE — PROGRESS NOTE ADULT - SUBJECTIVE AND OBJECTIVE BOX
Patient is a 51y old  Female who presents with a chief complaint of abnormal blood work (12 Jul 2017 14:28)      HEALTH ISSUES - PROBLEM Dx:  Prophylactic measure: Prophylactic measure  Essential hypertension: Essential hypertension  Prerenal azotemia: Prerenal azotemia  TOSHIA (acute kidney injury): TOSHIA (acute kidney injury)  Brain aneurysm: Brain aneurysm  HTN (Hypertension): HTN (Hypertension)  Diabetes insipidus: Diabetes insipidus  Hypernatremia: Hypernatremia      INTERVAL HPI/OVERNIGHT EVENTS:  Patient seen and examined at bedside. No acute events overnight. Patient states she is feeling well, patient chest pain, SOB, abd pain, N/V, fever, chills, dysuria or any other complaints. All remainder ROS negative.     Vital Signs Last 24 Hrs  T(C): 36.6 (17 Jul 2017 08:27), Max: 36.6 (17 Jul 2017 08:27)  T(F): 97.8 (17 Jul 2017 08:27), Max: 97.8 (17 Jul 2017 08:27)  HR: 84 (17 Jul 2017 08:27) (62 - 84)  BP: 122/90 (17 Jul 2017 08:27) (110/82 - 128/94)  BP(mean): --  RR: 20 (17 Jul 2017 08:27) (16 - 20)  SpO2: 96% (16 Jul 2017 20:28) (96% - 96%)      I&O's Summary    16 Jul 2017 07:01  -  17 Jul 2017 07:00  --------------------------------------------------------  IN: 1760 mL / OUT: 0 mL / NET: 1760 mL      CONSTITUTIONAL: Well appearing, well nourished, awake, alert and in no apparent distress  CARDIAC: Normal rate, regular rhythm.  Heart sounds S1, S2.  No murmurs, rubs or gallops   RESPIRATORY: Breath sounds clear and equal bilaterally. No wheezes, rhales or rhonchi  GASTROENTEROLOGY: Soft, NT ND BS+ normoactive   EXTREMITIES: No edema, cyanosis or deformity   SKIN: No rash, skin turgor wnl    MEDICATIONS  (STANDING):  amLODIPine   Tablet 10 milliGRAM(s) Oral daily  labetalol 100 milliGRAM(s) Oral two times a day  levETIRAcetam 1000 milliGRAM(s) Oral two times a day  desmopressin 0.01% Nasal 1 Spray(s) Nasal every 12 hours  sodium chloride 0.45% with potassium chloride 20 mEq/L 1000 milliLiter(s) (60 mL/Hr) IV Continuous <Continuous>    MEDICATIONS  (PRN):      LABS:    07-17    146<H>  |  111<H>  |  20.0  ----------------------------<  62<L>  4.6   |  27.0  |  0.84    Ca    8.8      17 Jul 2017 07:51      RADIOLOGY & ADDITIONAL TESTS:  No new imaging studies

## 2017-07-17 NOTE — PROGRESS NOTE ADULT - SUBJECTIVE AND OBJECTIVE BOX
INTERVAL HPI/OVERNIGHT EVENTS:  pt feels well   ready to go home today   and conitnue current outpt regimen    no weakness no HA no vision chanegs   MEDICATIONS  (STANDING):  amLODIPine   Tablet 10 milliGRAM(s) Oral daily  labetalol 100 milliGRAM(s) Oral two times a day  levETIRAcetam 1000 milliGRAM(s) Oral two times a day  desmopressin 0.01% Nasal 1 Spray(s) Nasal every 12 hours    MEDICATIONS  (PRN):      Allergies    No Known Allergies    Intolerances        Review of systems:    Vital Signs Last 24 Hrs  T(C): 36.6 (17 Jul 2017 08:27), Max: 36.6 (17 Jul 2017 08:27)  T(F): 97.8 (17 Jul 2017 08:27), Max: 97.8 (17 Jul 2017 08:27)  HR: 84 (17 Jul 2017 08:27) (62 - 84)  BP: 122/90 (17 Jul 2017 08:27) (110/82 - 128/94)  BP(mean): --  RR: 20 (17 Jul 2017 08:27) (16 - 20)  SpO2: 96% (16 Jul 2017 20:28) (96% - 96%)    PHYSICAL EXAM:      Constitutional: NAD, well-groomed, well-developed  HEENT: PERRLA, EOMI, no exophalmos  Neck: No LAD, No JVD, trachea midline, no thyroid enlargement  tenderness  Respiratory: CTAB  Cardiovascular: S1 and S2, RRR, no M/G/R    Extremities: No peripheral edema, no pedal lesions          LABS:    07-17    146<H>  |  111<H>  |  20.0  ----------------------------<  62<L>  4.6   |  27.0  |  0.84    Ca    8.8      17 Jul 2017 07:51            CAPILLARY BLOOD GLUCOSE      RADIOLOGY & ADDITIONAL TESTS:

## 2017-07-17 NOTE — DISCHARGE NOTE ADULT - PLAN OF CARE
Prevent future episodes of hypernatremia Continue with DDAVP twice a daily and infusions as per prior as directed through [facility]. Please follow up with Dr. Zavala for blood work to completed tomorrow. Continue with DDAVP and infusions and oral hydration. Continue with current home medication regimen. Continue with supportive care. s/p h/o clipping

## 2017-07-17 NOTE — DISCHARGE NOTE ADULT - ADDITIONAL INSTRUCTIONS
Please continue with management as per primary care physician Dr. Zavala and nephrologist Dr. Palumbo. Continue with DDAVP twice a day and infusions as prescribed. Discussed with Dr. Zavala physician assistant the plan of care and patient to have blood work checked tomorrow.

## 2017-07-17 NOTE — DISCHARGE NOTE ADULT - PATIENT PORTAL LINK FT
“You can access the [facility] Patient Portal, offered by [facility], by registering with the following website: [email or web address]”

## 2017-07-17 NOTE — PROGRESS NOTE ADULT - SUBJECTIVE AND OBJECTIVE BOX
NEPHROLOGY INTERVAL HPI/OVERNIGHT EVENTS: No new events.    MEDICATIONS  (STANDING):  enoxaparin Injectable 30 milliGRAM(s) SubCutaneous every 24 hours  amLODIPine   Tablet 10 milliGRAM(s) Oral daily  labetalol 100 milliGRAM(s) Oral two times a day  levETIRAcetam 1000 milliGRAM(s) Oral two times a day  dextrose 5%. 1000 milliLiter(s) (75 mL/Hr) IV Continuous <Continuous>  desmopressin 0.01% Nasal 1 Spray(s) Nasal every 12 hours    MEDICATIONS  (PRN):      Allergies    No Known Allergies            Vital Signs Last 24 Hrs  T(C): 36.8 (2017 10:54), Max: 36.8 (2017 10:54)  T(F): 98.3 (2017 10:54), Max: 98.3 (2017 10:54)  HR: 69 (2017 10:54) (62 - 72)  BP: 99/62 (2017 10:54) (99/62 - 130/98)  BP(mean): --  RR: 18 (2017 10:54) (16 - 19)  SpO2: 97% (2017 10:54) (90% - 100%)  Daily     Daily Weight in k.2 (2017 04:37)    PHYSICAL EXAM:    GENERAL: Oriented ,  alert, verbal in bed with water at bedside and iv fluid noted  HEAD:  same  EYES:  wnl  ENMT:   NECK:  veins wnl  NERVOUS SYSTEM:  awake, alert, oriented  CHEST/LUNG: clear  HEART:  no gallop or rub  ABDOMEN:  soft, not tender  EXTREMITIES:  no edema  LYMPH:   SKIN: no rash  : neg  LABS: 07-15 07-17    146<H>  |  111<H>  |  20.0  ----------------------------<  62<L>  4.6   |  27.0  |  0.84    Ca    8.8      2017 07:51        150<H>  |  114<H>  |  22.0<H>  ----------------------------<  86  3.7   |  26.0  |  0.81    Ca    8.6      15 Jul 2017 06:46                            9.8    4.9   )-----------( 149      ( 2017 05:39 )             34.6     07-14    156<H>  |  118<H>  |  22.0<H>  ----------------------------<  84  3.5   |  27.0  |  0.90    Ca    8.8      2017 07:21                  RADIOLOGY & ADDITIONAL TESTS:

## 2017-07-17 NOTE — DISCHARGE NOTE ADULT - HOSPITAL COURSE
51 year old female with pmhx HTN, Diabetes Insipidus, SAH, cerebral aneurysm s/p clipping 2011 multiple admissions for hypernatremia on home infusion therapy with IVF and on DDAVP QD, recently d/c from [facility] in April 2017 for hypernatremia being tx and with marked decrease in her sodium level with resulting seizure and encephalopathy currently presented with AMS and admitted with hypernatremia Na: 171 now trending down to 146 on DDAVP BID and IVF. Nephrology and endocrinology were consulted and continued to follow the patient. Patient has remained with a stable sodium for the past 48 hrs. Patient medically cleared to be discharged home to continue care as an outpatient with Dr. Zavala and Dr. Palumbo. Called Dr. Thompson Zavala office via phone [phone] and he is on vacation d/w PA in regards to the current plan of care. Patient to continue with DDAVP BID and infusions from [facility] as per regimen prior to hospitalization. Re-instated by CM. Discussed plan of care with patients daughter and patient to have blood work checked tomorrow.     Discharge planning took 45 minutes

## 2017-07-17 NOTE — DISCHARGE NOTE ADULT - CARE PLAN
Principal Discharge DX:	Hypernatremia  Goal:	Prevent future episodes of hypernatremia  Instructions for follow-up, activity and diet:	Continue with DDAVP twice a daily and infusions as per prior as directed through [facility]. Please follow up with Dr. Zavala for blood work to completed tomorrow.  Secondary Diagnosis:	Diabetes insipidus  Instructions for follow-up, activity and diet:	Continue with DDAVP and infusions and oral hydration.  Secondary Diagnosis:	Essential hypertension  Instructions for follow-up, activity and diet:	Continue with current home medication regimen.  Secondary Diagnosis:	Brain aneurysm  Goal:	s/p h/o clipping  Instructions for follow-up, activity and diet:	Continue with supportive care. Principal Discharge DX:	Hypernatremia  Goal:	Prevent future episodes of hypernatremia  Instructions for follow-up, activity and diet:	Continue with DDAVP twice a daily and infusions as per prior as directed through [facility]. Please follow up with Dr. Zavala for blood work to completed tomorrow.  Secondary Diagnosis:	Diabetes insipidus  Instructions for follow-up, activity and diet:	Continue with DDAVP and infusions and oral hydration.  Secondary Diagnosis:	Essential hypertension  Instructions for follow-up, activity and diet:	Continue with current home medication regimen.  Secondary Diagnosis:	Brain aneurysm  Goal:	s/p h/o clipping  Instructions for follow-up, activity and diet:	Continue with supportive care. Principal Discharge DX:	Hypernatremia  Goal:	Prevent future episodes of hypernatremia  Instructions for follow-up, activity and diet:	Continue with DDAVP twice a daily and infusions as per prior as directed through [facility]. Please follow up with Dr. Zavala for blood work to completed tomorrow.  Secondary Diagnosis:	Diabetes insipidus  Instructions for follow-up, activity and diet:	Continue with DDAVP and infusions and oral hydration.  Secondary Diagnosis:	Essential hypertension  Instructions for follow-up, activity and diet:	Continue with current home medication regimen.  Secondary Diagnosis:	Brain aneurysm  Goal:	s/p h/o clipping  Instructions for follow-up, activity and diet:	Continue with supportive care.

## 2017-07-17 NOTE — PROGRESS NOTE ADULT - ASSESSMENT
51 year old female with pmhx HTN, DI, SAH, cerebral aneurysm s/p clipping 2011 multiple admissions for hypernatremia on home infusion therapy with IVF and on DDAVP QD admitted with hypernatremia Na: 171 now trending down to 146 on DDAVP BID and IVF. Nephrology and endocrinology continue to follow the patient. Patient has remained with a stable sodium for the past 48 hrs. Patient medically cleared to be discharged home to continue care as an outpatient with Dr. Zavala and Dr. Palumbo.

## 2017-07-17 NOTE — DISCHARGE NOTE ADULT - PROVIDER TOKENS
TOKEN:'[id number]',FREE:[LAST:[Dr. Zavala],PHONE:[(   )    -],FAX:[(   )    -],ADDRESS:[Primary care physician]] TOKEN:'[id number]',FREE:[LAST:[Dr. Zavala],PHONE:[(   )    -],FAX:[(   )    -],ADDRESS:[Primary care physician]],TOKEN:'[id number]'

## 2017-07-17 NOTE — DISCHARGE NOTE ADULT - MEDICATION SUMMARY - MEDICATIONS TO CHANGE
I will SWITCH the dose or number of times a day I take the medications listed below when I get home from the hospital:    desmopressin 10 mcg/inh nasal spray  -- 10 microgram(s) into nose once a day  -- For the nose.  It is very important that you take or use this exactly as directed.  Do not skip doses or discontinue unless directed by your doctor.  May cause drowsiness.  Alcohol may intensify this effect.  Use care when operating dangerous machinery.

## 2017-07-17 NOTE — DISCHARGE NOTE ADULT - CARE PROVIDER_API CALL
Anthony Palumbo), Internal Medicine; Nephrology  [address] [place] [address]  Phone: [phone]  Fax: [phone]    Dr. Zavala,   Primary care physician  Phone: (   )    -  Fax: (   )    - Anthony Palumbo), Internal Medicine; Nephrology  [address] [facility] [place] [address]  Phone: [phone]  Fax: [phone]    Dr. Zavala,   Primary care physician  Phone: (   )    -  Fax: (   )    -    Ranjith Mercado), EndocrinologyMetabDiabetes; Internal Medicine  [address] [place] [address]  Phone: [phone]  Fax: [phone]

## 2017-07-17 NOTE — DISCHARGE NOTE ADULT - MEDICATION SUMMARY - MEDICATIONS TO TAKE
I will START or STAY ON the medications listed below when I get home from the hospital:    levETIRAcetam 1000 mg oral tablet  -- 1 tab(s) by mouth 2 times a day  -- Indication: For seizure prophylaxis     desmopressin 0.01% nasal solution  -- 1 spray(s) into nose every 12 hours  -- Indication: For Diabetes insipidus    labetalol 100 mg oral tablet  -- 1 tab(s) by mouth 2 times a day  -- Indication: For Hypertension    amLODIPine 10 mg oral tablet  -- 1 tab(s) by mouth once a day  -- Indication: For Hypertension    Dextrose 5% in Water intravenous solution  -- 1000 milliliter(s) intravenous every other day @200 ml/hr  -- Indication: For Diabetes insipidus    ferrous sulfate 325 mg (65 mg elemental iron) oral tablet  -- 1 tab(s) by mouth 2 times a day  -- Indication: For Supplementation    pantoprazole 40 mg oral delayed release tablet  -- 1 tab(s) by mouth once a day  -- Indication: For Prophylactic measure    Multiple Vitamins with Minerals oral tablet  -- 1 tab(s) by mouth once a day  -- Indication: For Supplementation    folic acid 1 mg oral tablet  -- 1 tab(s) by mouth once a day  -- Indication: For Supplementation

## 2017-08-29 NOTE — PROGRESS NOTE ADULT - PROBLEM SELECTOR PROBLEM 3
Diabetes insipidus
Hyperkalemia
Hypernatremia
Altered mental status, unspecified altered mental status type
Altered mental status, unspecified altered mental status type
Diabetes insipidus
no indicators present

## 2017-09-14 ENCOUNTER — INPATIENT (INPATIENT)
Facility: HOSPITAL | Age: 52
LOS: 15 days | Discharge: HOME | DRG: 641 | End: 2017-09-30
Attending: INTERNAL MEDICINE | Admitting: HOSPITALIST
Payer: MEDICAID

## 2017-09-14 VITALS
TEMPERATURE: 99 F | HEART RATE: 74 BPM | OXYGEN SATURATION: 98 % | HEIGHT: 63 IN | RESPIRATION RATE: 18 BRPM | DIASTOLIC BLOOD PRESSURE: 88 MMHG | WEIGHT: 182.98 LBS | SYSTOLIC BLOOD PRESSURE: 124 MMHG

## 2017-09-14 DIAGNOSIS — E87.0 HYPEROSMOLALITY AND HYPERNATREMIA: ICD-10-CM

## 2017-09-14 DIAGNOSIS — I10 ESSENTIAL (PRIMARY) HYPERTENSION: ICD-10-CM

## 2017-09-14 DIAGNOSIS — R56.9 UNSPECIFIED CONVULSIONS: ICD-10-CM

## 2017-09-14 LAB
ALBUMIN SERPL ELPH-MCNC: 3.8 G/DL — SIGNIFICANT CHANGE UP (ref 3.3–5.2)
ALP SERPL-CCNC: 165 U/L — HIGH (ref 40–120)
ALT FLD-CCNC: 60 U/L — HIGH
ANION GAP SERPL CALC-SCNC: 10 MMOL/L — SIGNIFICANT CHANGE UP (ref 5–17)
ANISOCYTOSIS BLD QL: SLIGHT — SIGNIFICANT CHANGE UP
APTT BLD: 43.2 SEC — HIGH (ref 27.5–37.4)
AST SERPL-CCNC: 21 U/L — SIGNIFICANT CHANGE UP
BASOPHILS # BLD AUTO: 0 K/UL — SIGNIFICANT CHANGE UP (ref 0–0.2)
BASOPHILS NFR BLD AUTO: 0.2 % — SIGNIFICANT CHANGE UP (ref 0–2)
BILIRUB SERPL-MCNC: 0.7 MG/DL — SIGNIFICANT CHANGE UP (ref 0.4–2)
BUN SERPL-MCNC: 26 MG/DL — HIGH (ref 8–20)
CALCIUM SERPL-MCNC: 9.1 MG/DL — SIGNIFICANT CHANGE UP (ref 8.6–10.2)
CHLORIDE SERPL-SCNC: 128 MMOL/L — HIGH (ref 98–107)
CO2 SERPL-SCNC: 24 MMOL/L — SIGNIFICANT CHANGE UP (ref 22–29)
CREAT SERPL-MCNC: 1.08 MG/DL — SIGNIFICANT CHANGE UP (ref 0.5–1.3)
EOSINOPHIL # BLD AUTO: 0.1 K/UL — SIGNIFICANT CHANGE UP (ref 0–0.5)
EOSINOPHIL NFR BLD AUTO: 2 % — SIGNIFICANT CHANGE UP (ref 0–6)
GLUCOSE SERPL-MCNC: 84 MG/DL — SIGNIFICANT CHANGE UP (ref 70–115)
HCT VFR BLD CALC: 32.6 % — LOW (ref 37–47)
HGB BLD-MCNC: 9.2 G/DL — LOW (ref 12–16)
HYPOCHROMIA BLD QL: SLIGHT — SIGNIFICANT CHANGE UP
INR BLD: 1.02 RATIO — SIGNIFICANT CHANGE UP (ref 0.88–1.16)
LYMPHOCYTES # BLD AUTO: 2.1 K/UL — SIGNIFICANT CHANGE UP (ref 1–4.8)
LYMPHOCYTES # BLD AUTO: 43.5 % — SIGNIFICANT CHANGE UP (ref 20–55)
MACROCYTES BLD QL: SLIGHT — SIGNIFICANT CHANGE UP
MCHC RBC-ENTMCNC: 24.1 PG — LOW (ref 27–31)
MCHC RBC-ENTMCNC: 28.2 G/DL — LOW (ref 32–36)
MCV RBC AUTO: 85.6 FL — SIGNIFICANT CHANGE UP (ref 81–99)
MICROCYTES BLD QL: SLIGHT — SIGNIFICANT CHANGE UP
MONOCYTES # BLD AUTO: 0.4 K/UL — SIGNIFICANT CHANGE UP (ref 0–0.8)
MONOCYTES NFR BLD AUTO: 8.5 % — SIGNIFICANT CHANGE UP (ref 3–10)
NEUTROPHILS # BLD AUTO: 2.2 K/UL — SIGNIFICANT CHANGE UP (ref 1.8–8)
NEUTROPHILS NFR BLD AUTO: 45.4 % — SIGNIFICANT CHANGE UP (ref 37–73)
OVALOCYTES BLD QL SMEAR: SLIGHT — SIGNIFICANT CHANGE UP
PLAT MORPH BLD: NORMAL — SIGNIFICANT CHANGE UP
PLATELET # BLD AUTO: 115 K/UL — LOW (ref 150–400)
POIKILOCYTOSIS BLD QL AUTO: SLIGHT — SIGNIFICANT CHANGE UP
POTASSIUM SERPL-MCNC: 3.8 MMOL/L — SIGNIFICANT CHANGE UP (ref 3.5–5.3)
POTASSIUM SERPL-SCNC: 3.8 MMOL/L — SIGNIFICANT CHANGE UP (ref 3.5–5.3)
PROT SERPL-MCNC: 7.6 G/DL — SIGNIFICANT CHANGE UP (ref 6.6–8.7)
PROTHROM AB SERPL-ACNC: 11.2 SEC — SIGNIFICANT CHANGE UP (ref 9.8–12.7)
RBC # BLD: 3.81 M/UL — LOW (ref 4.4–5.2)
RBC # FLD: 18.4 % — HIGH (ref 11–15.6)
RBC BLD AUTO: ABNORMAL
SODIUM SERPL-SCNC: 162 MMOL/L — CRITICAL HIGH (ref 135–145)
WBC # BLD: 4.9 K/UL — SIGNIFICANT CHANGE UP (ref 4.8–10.8)
WBC # FLD AUTO: 4.9 K/UL — SIGNIFICANT CHANGE UP (ref 4.8–10.8)

## 2017-09-14 PROCEDURE — 99223 1ST HOSP IP/OBS HIGH 75: CPT

## 2017-09-14 PROCEDURE — 99285 EMERGENCY DEPT VISIT HI MDM: CPT

## 2017-09-14 PROCEDURE — 93010 ELECTROCARDIOGRAM REPORT: CPT

## 2017-09-14 RX ORDER — FERROUS SULFATE 325(65) MG
325 TABLET ORAL DAILY
Qty: 0 | Refills: 0 | Status: DISCONTINUED | OUTPATIENT
Start: 2017-09-14 | End: 2017-09-30

## 2017-09-14 RX ORDER — SODIUM CHLORIDE 9 MG/ML
1000 INJECTION, SOLUTION INTRAVENOUS
Qty: 0 | Refills: 0 | Status: DISCONTINUED | OUTPATIENT
Start: 2017-09-14 | End: 2017-09-15

## 2017-09-14 RX ORDER — LEVETIRACETAM 250 MG/1
1000 TABLET, FILM COATED ORAL
Qty: 0 | Refills: 0 | Status: DISCONTINUED | OUTPATIENT
Start: 2017-09-14 | End: 2017-09-30

## 2017-09-14 RX ORDER — AMLODIPINE BESYLATE 2.5 MG/1
10 TABLET ORAL DAILY
Qty: 0 | Refills: 0 | Status: DISCONTINUED | OUTPATIENT
Start: 2017-09-14 | End: 2017-09-30

## 2017-09-14 RX ORDER — HEPARIN SODIUM 5000 [USP'U]/ML
5000 INJECTION INTRAVENOUS; SUBCUTANEOUS EVERY 12 HOURS
Qty: 0 | Refills: 0 | Status: DISCONTINUED | OUTPATIENT
Start: 2017-09-15 | End: 2017-09-25

## 2017-09-14 RX ORDER — LABETALOL HCL 100 MG
100 TABLET ORAL
Qty: 0 | Refills: 0 | Status: DISCONTINUED | OUTPATIENT
Start: 2017-09-14 | End: 2017-09-30

## 2017-09-14 RX ORDER — DESMOPRESSIN ACETATE 0.1 MG/1
1 TABLET ORAL EVERY 12 HOURS
Qty: 0 | Refills: 0 | Status: DISCONTINUED | OUTPATIENT
Start: 2017-09-14 | End: 2017-09-18

## 2017-09-14 RX ORDER — PANTOPRAZOLE SODIUM 20 MG/1
40 TABLET, DELAYED RELEASE ORAL
Qty: 0 | Refills: 0 | Status: DISCONTINUED | OUTPATIENT
Start: 2017-09-14 | End: 2017-09-19

## 2017-09-14 RX ADMIN — SODIUM CHLORIDE 75 MILLILITER(S): 9 INJECTION, SOLUTION INTRAVENOUS at 20:15

## 2017-09-14 NOTE — H&P ADULT - NEGATIVE NEUROLOGICAL SYMPTOMS
no transient paralysis/no focal seizures/no syncope/no loss of consciousness/no vertigo/no tremors/no difficulty walking/no weakness/no paresthesias/no generalized seizures

## 2017-09-14 NOTE — H&P ADULT - NEUROLOGICAL DETAILS
responds to verbal commands/deep reflexes intact/responds to pain/normal strength/sensation intact/cranial nerves intact/superficial reflexes intact

## 2017-09-14 NOTE — ED PROVIDER NOTE - OBJECTIVE STATEMENT
This is a 52 y/o F with history of diabetes insipidus s/p aneurism repair 5 years ago presenting to the ED for evaluation of sluggishness. Patient's daughter reports that patient had blood work drawn two days ago by her home nurse. Two days ago, daughter also remarks that she started to notice that patient was getting sluggish to respond. She reports patient at baseline has short term memory loss form aneurism repair.  Today the lab work reported a sodium of 160. Daughter states that patient had a sodium level of 170 in the past that required admission to [facility] x 2 weeks. At present, patient is sluggish and daughter reports increased facial swelling. Denies CP. Daughter also reports that during her prior admission, the patient was given fluids to rapidly and she had a seizure. Daughter denies seizure, recent trauma, and fall. This is a 50 y/o F with history of diabetes insipidus s/p aneurism repair 5 years ago presenting to the ED for evaluation of sluggishness. Patient's daughter reports that patient had blood work drawn two days ago by her home nurse. Two days ago, daughter also remarks that she started to notice that patient was getting sluggish to respond. She reports patient at baseline has short term memory loss form aneurism repair.  Today the lab work reported a sodium of 160. Daughter states that patient had a sodium level of 170 in the past that required admission to [facility] x 2 weeks. At present, patient is sluggish and daughter reports increased facial swelling. Denies CP. Daughter also reports that during her prior admission, the patient was given fluids to rapidly and she had a seizure. Daughter denies seizure, recent trauma, and fall.  PCP [place]  Neuro: Yonny Chaidez This is a 50 y/o F with history of diabetes insipidus s/p aneurism repair 5 years ago presenting to the ED for evaluation of sluggishness. Patient's daughter reports that patient had blood work drawn two days ago by her home nurse. Two days ago, daughter also remarks that she started to notice that patient was getting sluggish to respond. She reports patient at baseline has short term memory loss form aneurtsm repair.  Today the lab work reported a sodium of 160. Daughter states that patient had a sodium level of 170 in the past that required admission to [facility] x 2 weeks. At present, patient is sluggish and daughter reports increased facial swelling. Denies CP. Daughter also reports that during her prior admission, the patient was given fluids to rapidly and she had a seizure. Daughter denies seizure, recent trauma, and fall.  PCP [place]  Neuro: Yonny Chaidez

## 2017-09-14 NOTE — ED STATDOCS - PROGRESS NOTE DETAILS
52 y/o female pt with daughter at bedside with a hx of a double brain aneurysms, was sent to ED by her PMD for abnormal labs. Pt daughters states her sodium was 160.  Daughter also states the pt does not remember to  hydrate. Pt was last here in July for elevated sodium.  Pt will be sent to Main for further evaluation.     PMD: Dr Zavala  Nephrologist : Dr Palumbo

## 2017-09-14 NOTE — H&P ADULT - PROBLEM SELECTOR PLAN 1
Unclear on baseline mentation.  Will obtain head CT scan to R/O acute process.  Hx of DI.  Continue with desmopressin.  Continue with IVF, and repeat Sodium level in 4 hrs.  discussed case with Nephrology.  Monitor Sodium level, and continue with neuro checks

## 2017-09-14 NOTE — ED PROVIDER NOTE - NEUROLOGICAL, MLM
Patient is oriented to person and year. Daughter remarks that patient normally has short term memory loss.

## 2017-09-14 NOTE — ED STATDOCS - MEDICAL DECISION MAKING DETAILS
I, Clare Stephens, performed the initial face to face bedside interview with this patient regarding history of present illness and determined that the patient should be seen in the main ED.  The history, was documented by the scribe in my presence and I attest to the accuracy of the documentation.

## 2017-09-14 NOTE — ED PROVIDER NOTE - NS_ ATTENDINGSCRIBEDETAILS _ED_A_ED_FT
I, Rahul Horne, performed the initial face to face bedside interview with this patient regarding history of present illness, review of symptoms and relevant past medical, social and family history.  I completed an independent physical examination.  I was the initial provider who evaluated this patient. I have signed out the follow up of any pending tests (i.e. labs, radiological studies) to the ACP.  I have communicated the patient’s plan of care and disposition with the ACP.  The history, relevant review of systems, past medical and surgical history, medical decision making, and physical examination was documented by the scribe in my presence and I attest to the accuracy of the documentation. I, Rahul Horne, performed the initial face to face bedside interview with this patient regarding history of present illness, review of symptoms and relevant past medical, social and family history.  I completed an independent physical examination.    The history, relevant review of systems, past medical and surgical history, medical decision making, and physical examination was documented by the scribe in my presence and I attest to the accuracy of the documentation.

## 2017-09-14 NOTE — ED ADULT NURSE NOTE - OBJECTIVE STATEMENT
Pt received sitting on stretcher in NAD. Pt AOx3 was sent in by MD for hypernatremia, states  . Neuro WNL. PERRLA. Lungs CTA, RR even unlabored. Ab soft non tender, + bowel sounds x 4quads. Denies Nausea, Vomiting, Diarrhea. Skin warm, dry, color appropriate for age and race.  Generalized edema noted.

## 2017-09-15 DIAGNOSIS — E23.2 DIABETES INSIPIDUS: ICD-10-CM

## 2017-09-15 DIAGNOSIS — Z29.9 ENCOUNTER FOR PROPHYLACTIC MEASURES, UNSPECIFIED: ICD-10-CM

## 2017-09-15 LAB
ALBUMIN SERPL ELPH-MCNC: 3.8 G/DL — SIGNIFICANT CHANGE UP (ref 3.3–5.2)
ALP SERPL-CCNC: 158 U/L — HIGH (ref 40–120)
ALT FLD-CCNC: 55 U/L — HIGH
ANION GAP SERPL CALC-SCNC: 11 MMOL/L — SIGNIFICANT CHANGE UP (ref 5–17)
ANION GAP SERPL CALC-SCNC: 13 MMOL/L — SIGNIFICANT CHANGE UP (ref 5–17)
ANION GAP SERPL CALC-SCNC: 8 MMOL/L — SIGNIFICANT CHANGE UP (ref 5–17)
ANION GAP SERPL CALC-SCNC: 9 MMOL/L — SIGNIFICANT CHANGE UP (ref 5–17)
APPEARANCE UR: ABNORMAL
AST SERPL-CCNC: 20 U/L — SIGNIFICANT CHANGE UP
BILIRUB SERPL-MCNC: 0.7 MG/DL — SIGNIFICANT CHANGE UP (ref 0.4–2)
BILIRUB UR-MCNC: NEGATIVE — SIGNIFICANT CHANGE UP
BUN SERPL-MCNC: 23 MG/DL — HIGH (ref 8–20)
BUN SERPL-MCNC: 24 MG/DL — HIGH (ref 8–20)
BUN SERPL-MCNC: 25 MG/DL — HIGH (ref 8–20)
BUN SERPL-MCNC: 25 MG/DL — HIGH (ref 8–20)
CALCIUM SERPL-MCNC: 8.9 MG/DL — SIGNIFICANT CHANGE UP (ref 8.6–10.2)
CALCIUM SERPL-MCNC: 9 MG/DL — SIGNIFICANT CHANGE UP (ref 8.6–10.2)
CALCIUM SERPL-MCNC: 9 MG/DL — SIGNIFICANT CHANGE UP (ref 8.6–10.2)
CALCIUM SERPL-MCNC: 9.1 MG/DL — SIGNIFICANT CHANGE UP (ref 8.6–10.2)
CHLORIDE SERPL-SCNC: 126 MMOL/L — HIGH (ref 98–107)
CHLORIDE SERPL-SCNC: 129 MMOL/L — HIGH (ref 98–107)
CHLORIDE SERPL-SCNC: 129 MMOL/L — HIGH (ref 98–107)
CHLORIDE SERPL-SCNC: 131 MMOL/L — HIGH (ref 98–107)
CO2 SERPL-SCNC: 21 MMOL/L — LOW (ref 22–29)
CO2 SERPL-SCNC: 23 MMOL/L — SIGNIFICANT CHANGE UP (ref 22–29)
CO2 SERPL-SCNC: 25 MMOL/L — SIGNIFICANT CHANGE UP (ref 22–29)
CO2 SERPL-SCNC: 25 MMOL/L — SIGNIFICANT CHANGE UP (ref 22–29)
COLOR SPEC: YELLOW — SIGNIFICANT CHANGE UP
CREAT SERPL-MCNC: 1.1 MG/DL — SIGNIFICANT CHANGE UP (ref 0.5–1.3)
CREAT SERPL-MCNC: 1.12 MG/DL — SIGNIFICANT CHANGE UP (ref 0.5–1.3)
CREAT SERPL-MCNC: 1.18 MG/DL — SIGNIFICANT CHANGE UP (ref 0.5–1.3)
CREAT SERPL-MCNC: 1.24 MG/DL — SIGNIFICANT CHANGE UP (ref 0.5–1.3)
DIFF PNL FLD: ABNORMAL
EPI CELLS # UR: SIGNIFICANT CHANGE UP
GLUCOSE SERPL-MCNC: 154 MG/DL — HIGH (ref 70–115)
GLUCOSE SERPL-MCNC: 164 MG/DL — HIGH (ref 70–115)
GLUCOSE SERPL-MCNC: 190 MG/DL — HIGH (ref 70–115)
GLUCOSE SERPL-MCNC: 80 MG/DL — SIGNIFICANT CHANGE UP (ref 70–115)
GLUCOSE UR QL: NEGATIVE MG/DL — SIGNIFICANT CHANGE UP
HCT VFR BLD CALC: 33.5 % — LOW (ref 37–47)
HGB BLD-MCNC: 9.6 G/DL — LOW (ref 12–16)
KETONES UR-MCNC: NEGATIVE — SIGNIFICANT CHANGE UP
LEUKOCYTE ESTERASE UR-ACNC: ABNORMAL
MCHC RBC-ENTMCNC: 24.6 PG — LOW (ref 27–31)
MCHC RBC-ENTMCNC: 28.7 G/DL — LOW (ref 32–36)
MCV RBC AUTO: 85.7 FL — SIGNIFICANT CHANGE UP (ref 81–99)
NITRITE UR-MCNC: NEGATIVE — SIGNIFICANT CHANGE UP
PH UR: 5 — SIGNIFICANT CHANGE UP (ref 5–8)
PLATELET # BLD AUTO: 116 K/UL — LOW (ref 150–400)
POTASSIUM SERPL-MCNC: 3.8 MMOL/L — SIGNIFICANT CHANGE UP (ref 3.5–5.3)
POTASSIUM SERPL-MCNC: 4.1 MMOL/L — SIGNIFICANT CHANGE UP (ref 3.5–5.3)
POTASSIUM SERPL-MCNC: 4.2 MMOL/L — SIGNIFICANT CHANGE UP (ref 3.5–5.3)
POTASSIUM SERPL-MCNC: 4.4 MMOL/L — SIGNIFICANT CHANGE UP (ref 3.5–5.3)
POTASSIUM SERPL-SCNC: 3.8 MMOL/L — SIGNIFICANT CHANGE UP (ref 3.5–5.3)
POTASSIUM SERPL-SCNC: 4.1 MMOL/L — SIGNIFICANT CHANGE UP (ref 3.5–5.3)
POTASSIUM SERPL-SCNC: 4.2 MMOL/L — SIGNIFICANT CHANGE UP (ref 3.5–5.3)
POTASSIUM SERPL-SCNC: 4.4 MMOL/L — SIGNIFICANT CHANGE UP (ref 3.5–5.3)
PROT SERPL-MCNC: 7.5 G/DL — SIGNIFICANT CHANGE UP (ref 6.6–8.7)
PROT UR-MCNC: 30 MG/DL
RBC # BLD: 3.91 M/UL — LOW (ref 4.4–5.2)
RBC # FLD: 18.3 % — HIGH (ref 11–15.6)
RBC CASTS # UR COMP ASSIST: SIGNIFICANT CHANGE UP /HPF (ref 0–4)
SODIUM SERPL-SCNC: 160 MMOL/L — CRITICAL HIGH (ref 135–145)
SODIUM SERPL-SCNC: 162 MMOL/L — CRITICAL HIGH (ref 135–145)
SODIUM SERPL-SCNC: 163 MMOL/L — CRITICAL HIGH (ref 135–145)
SODIUM SERPL-SCNC: 165 MMOL/L — CRITICAL HIGH (ref 135–145)
SP GR SPEC: 1.02 — SIGNIFICANT CHANGE UP (ref 1.01–1.02)
UROBILINOGEN FLD QL: NEGATIVE MG/DL — SIGNIFICANT CHANGE UP
WBC # BLD: 5 K/UL — SIGNIFICANT CHANGE UP (ref 4.8–10.8)
WBC # FLD AUTO: 5 K/UL — SIGNIFICANT CHANGE UP (ref 4.8–10.8)
WBC UR QL: ABNORMAL

## 2017-09-15 PROCEDURE — 71010: CPT | Mod: 26

## 2017-09-15 PROCEDURE — 99233 SBSQ HOSP IP/OBS HIGH 50: CPT

## 2017-09-15 PROCEDURE — 70450 CT HEAD/BRAIN W/O DYE: CPT | Mod: 26

## 2017-09-15 RX ORDER — SODIUM CHLORIDE 9 MG/ML
1000 INJECTION, SOLUTION INTRAVENOUS
Qty: 0 | Refills: 0 | Status: DISCONTINUED | OUTPATIENT
Start: 2017-09-15 | End: 2017-09-15

## 2017-09-15 RX ORDER — SODIUM CHLORIDE 9 MG/ML
1000 INJECTION INTRAMUSCULAR; INTRAVENOUS; SUBCUTANEOUS
Qty: 0 | Refills: 0 | Status: DISCONTINUED | OUTPATIENT
Start: 2017-09-15 | End: 2017-09-17

## 2017-09-15 RX ADMIN — SODIUM CHLORIDE 70 MILLILITER(S): 9 INJECTION, SOLUTION INTRAVENOUS at 03:14

## 2017-09-15 RX ADMIN — Medication 100 MILLIGRAM(S): at 06:23

## 2017-09-15 RX ADMIN — SODIUM CHLORIDE 83 MILLILITER(S): 9 INJECTION INTRAMUSCULAR; INTRAVENOUS; SUBCUTANEOUS at 17:35

## 2017-09-15 RX ADMIN — SODIUM CHLORIDE 60 MILLILITER(S): 9 INJECTION, SOLUTION INTRAVENOUS at 04:13

## 2017-09-15 RX ADMIN — LEVETIRACETAM 1000 MILLIGRAM(S): 250 TABLET, FILM COATED ORAL at 17:29

## 2017-09-15 RX ADMIN — SODIUM CHLORIDE 60 MILLILITER(S): 9 INJECTION, SOLUTION INTRAVENOUS at 08:00

## 2017-09-15 RX ADMIN — DESMOPRESSIN ACETATE 1 SPRAY(S): 0.1 TABLET ORAL at 17:29

## 2017-09-15 RX ADMIN — SODIUM CHLORIDE 70 MILLILITER(S): 9 INJECTION, SOLUTION INTRAVENOUS at 02:43

## 2017-09-15 RX ADMIN — AMLODIPINE BESYLATE 10 MILLIGRAM(S): 2.5 TABLET ORAL at 06:23

## 2017-09-15 RX ADMIN — DESMOPRESSIN ACETATE 1 SPRAY(S): 0.1 TABLET ORAL at 06:36

## 2017-09-15 RX ADMIN — PANTOPRAZOLE SODIUM 40 MILLIGRAM(S): 20 TABLET, DELAYED RELEASE ORAL at 06:23

## 2017-09-15 RX ADMIN — Medication 325 MILLIGRAM(S): at 13:22

## 2017-09-15 RX ADMIN — HEPARIN SODIUM 5000 UNIT(S): 5000 INJECTION INTRAVENOUS; SUBCUTANEOUS at 17:29

## 2017-09-15 RX ADMIN — LEVETIRACETAM 1000 MILLIGRAM(S): 250 TABLET, FILM COATED ORAL at 06:27

## 2017-09-15 NOTE — ED ADULT NURSE REASSESSMENT NOTE - NIH STROKE SCALE: 6B. MOTOR LEG, RIGHT, QM
(0) No drift; leg holds 30 degree position for full 5 secs
(0) No drift; leg holds 30 degree position for full 5 secs

## 2017-09-15 NOTE — PROGRESS NOTE ADULT - ASSESSMENT
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   As a results pt. has impaired short term memory, and may also have decreased awareness of symptoms of thirst and polyuria. Patient is 51 yr old female with hx HTN, DI, SAH, cerebral aneurysm s/p clipping 2011 multiple admissions for hypernatremia Presenting with hyperantremia.  Patient noticed to be lethargic.  Blood work done 2 days ago shows hypernatremia.  In the ED patient was started on IVF.  Patient appears to be alert, and orientated to place, and year only.  Offers no other complaints  As per daughter on recent admission, pt is Poor historian due to memory issues, and may also have decreased awareness of symptoms of thirst and polyuria.

## 2017-09-15 NOTE — ED ADULT NURSE REASSESSMENT NOTE - NS ED NURSE REASSESS COMMENT FT1
pt resting comfortably in stretcher, breathing is even and unlabored. pt awaiting bed availability on floor. will continu to monitor and reassess.

## 2017-09-15 NOTE — PROGRESS NOTE ADULT - PROBLEM SELECTOR PLAN 3
DASH/TLC diet  BP stable 100/65- 124/88  Labetalol 100mg PO BID  Amlodipine 10mg PO QD DASH/TLC diet  BP stable   Labetalol 100mg PO BID  Amlodipine 10mg PO QD

## 2017-09-15 NOTE — PROGRESS NOTE ADULT - PROBLEM SELECTOR PLAN 1
Endocrinology Consult  BMP- monitor Na level  CT head- negative for acute process  Continue with desmopressin nasal 1 spray Q12H  Continue with IVF dextrose 5% + 0.45% NaCl @ 200cc/hr Na=160  Endocrinology Consult  BMP- monitor Na level  Na levels Q6H for now  CT head- negative for acute process  Continue with desmopressin nasal 1 spray Q12H  Continue with IVF dextrose 5% + 0.45% NaCl @ 200cc/hr  Monitor mental status for acute changes Chronic hypernatremia due to DI from aneurysmal clipping  Na=160 today  Endocrinology Consult called  [facility] q6, monitor Na level, achive slow correction, AAO x 3 currently  CT head- negative for acute process  Continue with desmopressin nasal 1 spray Q12H  Continue with IVF dextrose 5% + 0.45% NaCl @ 200cc/hr  Encourage free water intake  Monitor mental status for acute changes  Renal consult Dr. Crook called  Pt's daughter administers meds & IVF, pt gets desmopressin & D5 @ 200cc/hr every other day with increased free water intake. Pt was recently admitted twice when her free water intake declined & sodium went up. Need to access the social situation, SW consult  CT head -ve

## 2017-09-15 NOTE — ED ADULT NURSE REASSESSMENT NOTE - NS ED NURSE REASSESS COMMENT FT1
assumed care of pt from RN in ED. pt resting comfortably in stretcher. breathing si even and unlabored a&ox3. pt remains on cardiac montior NSR noted with rate of 67. pt admitted. pt awaiting bed availability on floor, will continue to mon tiro and reassess

## 2017-09-15 NOTE — ED ADULT NURSE REASSESSMENT NOTE - NS ED NURSE REASSESS COMMENT FT1
verbal report given to CHRISTINA ocampo on 4 luther. pt resting comfortably in cart.  breathing sie jamal and unlabored. pt awaiting transport to floor. will continue to monitor and reassess. NAD VSS

## 2017-09-15 NOTE — PROGRESS NOTE ADULT - SUBJECTIVE AND OBJECTIVE BOX
Patient is a 51y old  Female who presents with a chief complaint of     HPI:  Patient is 51 yr old female with hx HTN, DI, SAH, cerebral aneurysm s/p clipping 2011 multiple admissions for hypernatremia Presenting with hyperantremia.  Patient noticed to be lethargic.  Blood work done 2 days ago shows hypernatremia.  In the ED patient was started on IVF.  Patient appears to be alert, and orientated to place, and year only.  Offers no other complaints  As per daughter on recent admission, pt is Poor historian due to memory issues. (14 Sep 2017 22:42)    FAMILY HISTORY:  Family history of hypertension (Father)      INTERVAL HPI/OVERNIGHT EVENTS:  Pt. states she feels much better, good appetite, ambulatory.  Denies chills/fever, CP, SOB, dyspnea, palpitations, ha/dizziness    PAST MEDICAL & SURGICAL HISTORY:  Memory loss, short term  Brain aneurysm  Diabetes insipidus  HTN (Hypertension)  Subarachnoid Hemorrhage  S/P Cerebral Aneurysm Repair  S/P Craniotomy      Allergies    No Known Allergies    Intolerances        Vital Signs Last 24 Hrs  T(C): 36.8 (15 Sep 2017 07:37), Max: 37.1 (14 Sep 2017 18:10)  T(F): 98.3 (15 Sep 2017 07:37), Max: 98.8 (14 Sep 2017 18:10)  HR: 67 (15 Sep 2017 07:37) (67 - 74)  BP: 100/65 (15 Sep 2017 07:37) (100/65 - 124/88)  BP(mean): 100 (14 Sep 2017 22:42) (100 - 100)  RR: 16 (15 Sep 2017 07:37) (16 - 18)  SpO2: 99% (15 Sep 2017 07:37) (98% - 99%)                                9.6    5.0   )-----------( 116      ( 15 Sep 2017 03:08 )             33.5     LIVER FUNCTIONS - ( 15 Sep 2017 03:08 )  Alb: 3.8 g/dL / Pro: 7.5 g/dL / ALK PHOS: 158 U/L / ALT: 55 U/L / AST: 20 U/L / GGT: x           PT/INR - ( 14 Sep 2017 20:49 )   PT: 11.2 sec;   INR: 1.02 ratio         PTT - ( 14 Sep 2017 20:49 )  PTT:43.2 sec      RADIOLOGY & ADDITIONAL STUDIES:  < from: CT Head No Cont (09.15.17 @ 01:14) >  No acute intracranial hemorrhage, mass effect, or evidence of acute   vascular territorial infarction.          MEDICATIONS:  levETIRAcetam 1000 milliGRAM(s) Oral two times a day  desmopressin 0.01% Nasal 1 Spray(s) Nasal every 12 hours  labetalol 100 milliGRAM(s) Oral two times a day  amLODIPine   Tablet 10 milliGRAM(s) Oral daily  ferrous    sulfate 325 milliGRAM(s) Oral daily  pantoprazole    Tablet 40 milliGRAM(s) Oral before breakfast  heparin  Injectable 5000 Unit(s) SubCutaneous every 12 hours  dextrose 5% + sodium chloride 0.45%. 1000 milliLiter(s) IV Continuous <Continuous> Patient is a 51y old  Female who presents with a chief complaint of unsteady gait    HPI:  Patient is 51 yr old female with hx HTN, DI, SAH, cerebral aneurysm s/p clipping 2011 multiple admissions for hypernatremia Presenting with hyperantremia.  Patient noticed to be lethargic.  Blood work done 2 days ago shows hypernatremia.  In the ED patient was started on IVF.  Patient appears to be alert, and orientated to place, and year only.  Offers no other complaints  As per daughter on recent admission, pt is Poor historian due to memory issues. (14 Sep 2017 22:42)    FAMILY HISTORY:  Family history of hypertension (Father)      INTERVAL HPI/OVERNIGHT EVENTS:  Pt. states she feels much better, good appetite, ambulatory.  Denies chills/fever, CP, SOB, dyspnea, palpitations, ha/dizziness. c/o unsteady gait    PAST MEDICAL & SURGICAL HISTORY:  Memory loss, short term  Brain aneurysm  Diabetes insipidus  HTN (Hypertension)  Subarachnoid Hemorrhage  S/P Cerebral Aneurysm Repair  S/P Craniotomy      Allergies    No Known Allergies    Intolerances        Vital Signs Last 24 Hrs  T(C): 36.8 (15 Sep 2017 07:37), Max: 37.1 (14 Sep 2017 18:10)  T(F): 98.3 (15 Sep 2017 07:37), Max: 98.8 (14 Sep 2017 18:10)  HR: 67 (15 Sep 2017 07:37) (67 - 74)  BP: 100/65 (15 Sep 2017 07:37) (100/65 - 124/88)  BP(mean): 100 (14 Sep 2017 22:42) (100 - 100)  RR: 16 (15 Sep 2017 07:37) (16 - 18)  SpO2: 99% (15 Sep 2017 07:37) (98% - 99%)                                9.6    5.0   )-----------( 116      ( 15 Sep 2017 03:08 )             33.5     LIVER FUNCTIONS - ( 15 Sep 2017 03:08 )  Alb: 3.8 g/dL / Pro: 7.5 g/dL / ALK PHOS: 158 U/L / ALT: 55 U/L / AST: 20 U/L / GGT: x           PT/INR - ( 14 Sep 2017 20:49 )   PT: 11.2 sec;   INR: 1.02 ratio         PTT - ( 14 Sep 2017 20:49 )  PTT:43.2 sec      RADIOLOGY & ADDITIONAL STUDIES:  < from: CT Head No Cont (09.15.17 @ 01:14) >  No acute intracranial hemorrhage, mass effect, or evidence of acute   vascular territorial infarction.          MEDICATIONS:  levETIRAcetam 1000 milliGRAM(s) Oral two times a day  desmopressin 0.01% Nasal 1 Spray(s) Nasal every 12 hours  labetalol 100 milliGRAM(s) Oral two times a day  amLODIPine   Tablet 10 milliGRAM(s) Oral daily  ferrous    sulfate 325 milliGRAM(s) Oral daily  pantoprazole    Tablet 40 milliGRAM(s) Oral before breakfast  heparin  Injectable 5000 Unit(s) SubCutaneous every 12 hours  dextrose 5% + sodium chloride 0.45%. 1000 milliLiter(s) IV Continuous <Continuous>

## 2017-09-15 NOTE — ED ADULT NURSE REASSESSMENT NOTE - NS ED NURSE REASSESS COMMENT FT1
pt transported to [address] [place] [address] accompanied by transport tech and RN volence. pt resting comfortably in cart. breathing is even and unlabored. pt transported on cardiac monitor NSR noted with rate of 77. NAD VSS>

## 2017-09-15 NOTE — PROGRESS NOTE ADULT - PROBLEM SELECTOR PLAN 5
DDAVP  Seizure precautions DDAVP  Seizure precautions  Confirm PICC - CXR ordered  DVT prx- Heparin 5000 units SQ QD  GI prx- Protonix 40mg PO QD  Anemia- FeSO4 325mg PO QD DDAVP  Seizure precautions  Confirm PICC - CXR ordered  DVT prx- Heparin 5000 units SQ QD  GI prx- Protonix 40mg PO QD  Anemia- FeSO4 325mg PO QD  PT evaluation DDAVP  Seizure precautions  Confirm PICC - CXR ordered  DVT prx- Heparin 5000 units SQ QD  GI prx- Protonix 40mg PO QD  Anemia- FeSO4 325mg PO QD  PT evaluation for gait instability, CT head -ve

## 2017-09-15 NOTE — ED ADULT NURSE REASSESSMENT NOTE - NS ED NURSE REASSESS COMMENT FT1
pt resting comfortably in stretcher. breathing is even and unlabored. pt admitted. pt awaiting bed availability on floor. will continue to monitor and reassess

## 2017-09-16 DIAGNOSIS — I10 ESSENTIAL (PRIMARY) HYPERTENSION: ICD-10-CM

## 2017-09-16 LAB
ANION GAP SERPL CALC-SCNC: 10 MMOL/L — SIGNIFICANT CHANGE UP (ref 5–17)
ANION GAP SERPL CALC-SCNC: 6 MMOL/L — SIGNIFICANT CHANGE UP (ref 5–17)
BASOPHILS # BLD AUTO: 0 K/UL — SIGNIFICANT CHANGE UP (ref 0–0.2)
BASOPHILS NFR BLD AUTO: 0.3 % — SIGNIFICANT CHANGE UP (ref 0–2)
BUN SERPL-MCNC: 21 MG/DL — HIGH (ref 8–20)
BUN SERPL-MCNC: 23 MG/DL — HIGH (ref 8–20)
CALCIUM SERPL-MCNC: 8.3 MG/DL — LOW (ref 8.6–10.2)
CALCIUM SERPL-MCNC: 8.4 MG/DL — LOW (ref 8.6–10.2)
CHLORIDE SERPL-SCNC: 121 MMOL/L — HIGH (ref 98–107)
CHLORIDE SERPL-SCNC: 123 MMOL/L — HIGH (ref 98–107)
CO2 SERPL-SCNC: 25 MMOL/L — SIGNIFICANT CHANGE UP (ref 22–29)
CO2 SERPL-SCNC: 26 MMOL/L — SIGNIFICANT CHANGE UP (ref 22–29)
CREAT SERPL-MCNC: 1.14 MG/DL — SIGNIFICANT CHANGE UP (ref 0.5–1.3)
CREAT SERPL-MCNC: 1.2 MG/DL — SIGNIFICANT CHANGE UP (ref 0.5–1.3)
EOSINOPHIL # BLD AUTO: 0.1 K/UL — SIGNIFICANT CHANGE UP (ref 0–0.5)
EOSINOPHIL NFR BLD AUTO: 3.3 % — SIGNIFICANT CHANGE UP (ref 0–6)
GLUCOSE SERPL-MCNC: 78 MG/DL — SIGNIFICANT CHANGE UP (ref 70–115)
GLUCOSE SERPL-MCNC: 97 MG/DL — SIGNIFICANT CHANGE UP (ref 70–115)
HCT VFR BLD CALC: 28.5 % — LOW (ref 37–47)
HGB BLD-MCNC: 8.3 G/DL — LOW (ref 12–16)
LYMPHOCYTES # BLD AUTO: 2 K/UL — SIGNIFICANT CHANGE UP (ref 1–4.8)
LYMPHOCYTES # BLD AUTO: 49.9 % — SIGNIFICANT CHANGE UP (ref 20–55)
MAGNESIUM SERPL-MCNC: 2.2 MG/DL — SIGNIFICANT CHANGE UP (ref 1.6–2.6)
MCHC RBC-ENTMCNC: 24.6 PG — LOW (ref 27–31)
MCHC RBC-ENTMCNC: 29.1 G/DL — LOW (ref 32–36)
MCV RBC AUTO: 84.3 FL — SIGNIFICANT CHANGE UP (ref 81–99)
MONOCYTES # BLD AUTO: 0.2 K/UL — SIGNIFICANT CHANGE UP (ref 0–0.8)
MONOCYTES NFR BLD AUTO: 5.3 % — SIGNIFICANT CHANGE UP (ref 3–10)
NEUTROPHILS # BLD AUTO: 1.6 K/UL — LOW (ref 1.8–8)
NEUTROPHILS NFR BLD AUTO: 40.4 % — SIGNIFICANT CHANGE UP (ref 37–73)
PHOSPHATE SERPL-MCNC: 2.7 MG/DL — SIGNIFICANT CHANGE UP (ref 2.4–4.7)
PLATELET # BLD AUTO: 107 K/UL — LOW (ref 150–400)
POTASSIUM SERPL-MCNC: 3.5 MMOL/L — SIGNIFICANT CHANGE UP (ref 3.5–5.3)
POTASSIUM SERPL-MCNC: 4.1 MMOL/L — SIGNIFICANT CHANGE UP (ref 3.5–5.3)
POTASSIUM SERPL-SCNC: 3.5 MMOL/L — SIGNIFICANT CHANGE UP (ref 3.5–5.3)
POTASSIUM SERPL-SCNC: 4.1 MMOL/L — SIGNIFICANT CHANGE UP (ref 3.5–5.3)
RBC # BLD: 3.38 M/UL — LOW (ref 4.4–5.2)
RBC # FLD: 18.1 % — HIGH (ref 11–15.6)
SODIUM SERPL-SCNC: 155 MMOL/L — HIGH (ref 135–145)
SODIUM SERPL-SCNC: 156 MMOL/L — HIGH (ref 135–145)
WBC # BLD: 4 K/UL — LOW (ref 4.8–10.8)
WBC # FLD AUTO: 4 K/UL — LOW (ref 4.8–10.8)

## 2017-09-16 PROCEDURE — 99233 SBSQ HOSP IP/OBS HIGH 50: CPT

## 2017-09-16 RX ADMIN — DESMOPRESSIN ACETATE 1 SPRAY(S): 0.1 TABLET ORAL at 17:02

## 2017-09-16 RX ADMIN — SODIUM CHLORIDE 83 MILLILITER(S): 9 INJECTION INTRAMUSCULAR; INTRAVENOUS; SUBCUTANEOUS at 05:57

## 2017-09-16 RX ADMIN — HEPARIN SODIUM 5000 UNIT(S): 5000 INJECTION INTRAVENOUS; SUBCUTANEOUS at 05:49

## 2017-09-16 RX ADMIN — Medication 100 MILLIGRAM(S): at 17:02

## 2017-09-16 RX ADMIN — LEVETIRACETAM 1000 MILLIGRAM(S): 250 TABLET, FILM COATED ORAL at 05:49

## 2017-09-16 RX ADMIN — PANTOPRAZOLE SODIUM 40 MILLIGRAM(S): 20 TABLET, DELAYED RELEASE ORAL at 05:49

## 2017-09-16 RX ADMIN — AMLODIPINE BESYLATE 10 MILLIGRAM(S): 2.5 TABLET ORAL at 05:49

## 2017-09-16 RX ADMIN — Medication 325 MILLIGRAM(S): at 11:36

## 2017-09-16 RX ADMIN — DESMOPRESSIN ACETATE 1 SPRAY(S): 0.1 TABLET ORAL at 05:51

## 2017-09-16 RX ADMIN — LEVETIRACETAM 1000 MILLIGRAM(S): 250 TABLET, FILM COATED ORAL at 17:02

## 2017-09-16 RX ADMIN — HEPARIN SODIUM 5000 UNIT(S): 5000 INJECTION INTRAVENOUS; SUBCUTANEOUS at 17:02

## 2017-09-16 RX ADMIN — Medication 100 MILLIGRAM(S): at 06:33

## 2017-09-16 NOTE — PROGRESS NOTE ADULT - ASSESSMENT
51 yr old female with hx HTN, DI, SAH, cerebral aneurysm s/p clipping 2011 multiple admissions for hypernatremia     Improved chronic hypernatremia   No Polyuria  known CDI   Known H/O symptoms  from rapid reversal   EST free water deficit of 8 L ---> getting better   Continue the current measures   Continue  1/4 NS @ 83 ml/hr   Serial labs     HTN - Watch on meds

## 2017-09-16 NOTE — PROGRESS NOTE ADULT - ASSESSMENT
50 yo F with h/o HTN, DI, SAH, cerebral aneurysm s/p clipping 2011 here with hypernatremia due to lapse in control of diabetes insipidus.

## 2017-09-16 NOTE — PROGRESS NOTE ADULT - SUBJECTIVE AND OBJECTIVE BOX
KRUPA MEDINA    [id number]    51y      Female    INTERVAL HPI/OVERNIGHT EVENTS: No events on. States that she feels better today.    Hospital course:  52 yo F with h/o HTN, DI, SAH, cerebral aneurysm s/p clipping , multiple readmissions for hypernatremia presented with lethargy and hypernatremia. Lab work done as outpatient revealed hypernatremia. In the ED, sodium was noted to b 160 and was started on D5NS. Per renal, IVF changed to 1/4NS at 83cc/hr as pt with known history of symptoms from rapid reversal.      REVIEW OF SYSTEMS:    CONSTITUTIONAL: No fever  GASTROINTESTINAL: No nausea, vomiting  NEUROLOGICAL: No headaches    Vital Signs Last 24 Hrs  T(C): 36.6 (16 Sep 2017 08:10), Max: 36.9 (15 Sep 2017 18:24)  T(F): 97.9 (16 Sep 2017 08:10), Max: 98.4 (15 Sep 2017 18:24)  HR: 67 (16 Sep 2017 08:18) (67 - 99)  BP: 125/95 (16 Sep 2017 05:51) (98/76 - 125/95)  BP(mean): --  RR: 12 (16 Sep 2017 08:18) (11 - 18)  SpO2: 98% (16 Sep 2017 08:18) (98% - 100%)    PHYSICAL EXAM:    GENERAL: NAD, well groomed   HEENT: PERRL, +EOMI, MMM  CHEST/LUNG: Clear to percussion bilaterally   HEART: S1S2+, Regular rate and rhythm  ABDOMEN: Soft, Nontender, Nondistended; Bowel sounds present  NEURO: AAOX3    LABS:                        8.3    4.0   )-----------( 107      ( 16 Sep 2017 05:50 )             28.5     09-16    155<H>  |  123<H>  |  23.0<H>  ----------------------------<  78  3.5   |  26.0  |  1.20    Ca    8.4<L>      16 Sep 2017 05:50  Phos  2.7     09-16  Mg     2.2     09-16    TPro  7.5  /  Alb  3.8  /  TBili  0.7  /  DBili  x   /  AST  20  /  ALT  55<H>  /  AlkPhos  158<H>  09-15    PT/INR - ( 14 Sep 2017 20:49 )   PT: 11.2 sec;   INR: 1.02 ratio         PTT - ( 14 Sep 2017 20:49 )  PTT:43.2 sec  Urinalysis Basic - ( 15 Sep 2017 21:52 )    Color: Yellow / Appearance: x / S.020 / pH: x  Gluc: x / Ketone: Negative  / Bili: Negative / Urobili: Negative mg/dL   Blood: x / Protein: 30 mg/dL / Nitrite: Negative   Leuk Esterase: Small / RBC: 0-2 /HPF / WBC 6-10   Sq Epi: x / Non Sq Epi: Few / Bacteria: x          MEDICATIONS  (STANDING):  levETIRAcetam 1000 milliGRAM(s) Oral two times a day  desmopressin 0.01% Nasal 1 Spray(s) Nasal every 12 hours  labetalol 100 milliGRAM(s) Oral two times a day  amLODIPine   Tablet 10 milliGRAM(s) Oral daily  ferrous    sulfate 325 milliGRAM(s) Oral daily  pantoprazole    Tablet 40 milliGRAM(s) Oral before breakfast  heparin  Injectable 5000 Unit(s) SubCutaneous every 12 hours  sodium chloride 0.225%. 1000 milliLiter(s) (83 mL/Hr) IV Continuous <Continuous>    MEDICATIONS  (PRN):      RADIOLOGY & ADDITIONAL TESTS:

## 2017-09-16 NOTE — PROGRESS NOTE ADULT - SUBJECTIVE AND OBJECTIVE BOX
NEPHROLOGY INTERVAL HPI/OVERNIGHT EVENTS:    feels better   No new complaints   IVF noted   No SOB or CP    ml     MEDICATIONS  (STANDING):  levETIRAcetam 1000 milliGRAM(s) Oral two times a day  desmopressin 0.01% Nasal 1 Spray(s) Nasal every 12 hours  labetalol 100 milliGRAM(s) Oral two times a day  amLODIPine   Tablet 10 milliGRAM(s) Oral daily  ferrous    sulfate 325 milliGRAM(s) Oral daily  pantoprazole    Tablet 40 milliGRAM(s) Oral before breakfast  heparin  Injectable 5000 Unit(s) SubCutaneous every 12 hours  sodium chloride 0.225%. 1000 milliLiter(s) (83 mL/Hr) IV Continuous <Continuous>    MEDICATIONS  (PRN):      Allergies    No Known Allergies    Intolerances          Vital Signs Last 24 Hrs  T(C): 36.6 (16 Sep 2017 08:10), Max: 36.9 (15 Sep 2017 18:24)  T(F): 97.9 (16 Sep 2017 08:10), Max: 98.4 (15 Sep 2017 18:24)  HR: 67 (16 Sep 2017 08:18) (67 - 99)  BP: 114/84 (16 Sep 2017 08:18) (98/76 - 125/95)  BP(mean): --  RR: 12 (16 Sep 2017 08:18) (11 - 18)  SpO2: 98% (16 Sep 2017 08:18) (98% - 100%)  Daily     Daily Weight in k.2 (16 Sep 2017 05:51)  I&O's Detail    15 Sep 2017 07:01  -  16 Sep 2017 07:00  --------------------------------------------------------  IN:    sodium chloride 0.225%.: 1162 mL  Total IN: 1162 mL    OUT:    Voided: 450 mL  Total OUT: 450 mL    Total NET: 712 mL      16 Sep 2017 07:01  -  16 Sep 2017 10:14  --------------------------------------------------------  IN:    Oral Fluid: 240 mL    sodium chloride 0.225%.: 249 mL  Total IN: 489 mL    OUT:    Voided: 200 mL  Total OUT: 200 mL    Total NET: 289 mL        I&O's Summary    15 Sep 2017 07:  -  16 Sep 2017 07:00  --------------------------------------------------------  IN: 1162 mL / OUT: 450 mL / NET: 712 mL    16 Sep 2017 07:01  -  16 Sep 2017 10:14  --------------------------------------------------------  IN: 489 mL / OUT: 200 mL / NET: 289 mL        PHYSICAL EXAM:  HEAD:  anicteric , no edema   NECK: Supple, No JVD,  CHEST/LUNG: Clear to percussion bilaterally; No rales, rhonchi, wheezing, or rubs  HEART: Regular rate and rhythm; No murmurs, rubs, or gallops  ABDOMEN: Soft, Nontender, Nondistended; Bowel sounds present  EXTREMITIES:  2+ Peripheral Pulses, No clubbing, cyanosis, or edema    LABS:                        8.3    4.0   )-----------( 107      ( 16 Sep 2017 05:50 )             28.5     -    155<H>  |  123<H>  |  23.0<H>  ----------------------------<  78  3.5   |  26.0  |  1.20    Ca    8.4<L>      16 Sep 2017 05:50  Phos  2.7       Mg     2.2         TPro  7.5  /  Alb  3.8  /  TBili  0.7  /  DBili  x   /  AST  20  /  ALT  55<H>  /  AlkPhos  158<H>  09-15    PT/INR - ( 14 Sep 2017 20:49 )   PT: 11.2 sec;   INR: 1.02 ratio         PTT - ( 14 Sep 2017 20:49 )  PTT:43.2 sec  Urinalysis Basic - ( 15 Sep 2017 21:52 )    Color: Yellow / Appearance: x / S.020 / pH: x  Gluc: x / Ketone: Negative  / Bili: Negative / Urobili: Negative mg/dL   Blood: x / Protein: 30 mg/dL / Nitrite: Negative   Leuk Esterase: Small / RBC: 0-2 /HPF / WBC 6-10   Sq Epi: x / Non Sq Epi: Few / Bacteria: x      Magnesium, Serum: 2.2 mg/dL ( @ 05:50)  Phosphorus Level, Serum: 2.7 mg/dL ( @ 05:50)          RADIOLOGY & ADDITIONAL TESTS:

## 2017-09-17 LAB
ANION GAP SERPL CALC-SCNC: 10 MMOL/L — SIGNIFICANT CHANGE UP (ref 5–17)
BUN SERPL-MCNC: 21 MG/DL — HIGH (ref 8–20)
CALCIUM SERPL-MCNC: 8.2 MG/DL — LOW (ref 8.6–10.2)
CHLORIDE SERPL-SCNC: 121 MMOL/L — HIGH (ref 98–107)
CO2 SERPL-SCNC: 26 MMOL/L — SIGNIFICANT CHANGE UP (ref 22–29)
CREAT SERPL-MCNC: 1.01 MG/DL — SIGNIFICANT CHANGE UP (ref 0.5–1.3)
FERRITIN SERPL-MCNC: 180 NG/ML — SIGNIFICANT CHANGE UP (ref 11–306)
FOLATE SERPL-MCNC: 4.3 NG/ML — SIGNIFICANT CHANGE UP (ref 4–16)
GLUCOSE SERPL-MCNC: 75 MG/DL — SIGNIFICANT CHANGE UP (ref 70–115)
HCT VFR BLD CALC: 29.2 % — LOW (ref 37–47)
HGB BLD-MCNC: 8.4 G/DL — LOW (ref 12–16)
IRON SATN MFR SERPL: 50 % — SIGNIFICANT CHANGE UP (ref 14–50)
IRON SATN MFR SERPL: 90 UG/DL — SIGNIFICANT CHANGE UP (ref 37–145)
LEVETIRACETAM SERPL-MCNC: 13.7 MCG/ML — SIGNIFICANT CHANGE UP (ref 12–46)
MCHC RBC-ENTMCNC: 24.3 PG — LOW (ref 27–31)
MCHC RBC-ENTMCNC: 28.8 G/DL — LOW (ref 32–36)
MCV RBC AUTO: 84.4 FL — SIGNIFICANT CHANGE UP (ref 81–99)
PLATELET # BLD AUTO: 110 K/UL — LOW (ref 150–400)
POTASSIUM SERPL-MCNC: 3.6 MMOL/L — SIGNIFICANT CHANGE UP (ref 3.5–5.3)
POTASSIUM SERPL-SCNC: 3.6 MMOL/L — SIGNIFICANT CHANGE UP (ref 3.5–5.3)
RBC # BLD: 3.46 M/UL — LOW (ref 4.4–5.2)
RBC # FLD: 17.4 % — HIGH (ref 11–15.6)
SODIUM SERPL-SCNC: 157 MMOL/L — HIGH (ref 135–145)
TIBC SERPL-MCNC: 182 UG/DL — LOW (ref 220–430)
TRANSFERRIN SERPL-MCNC: 127 MG/DL — LOW (ref 192–382)
VIT B12 SERPL-MCNC: 448 PG/ML — SIGNIFICANT CHANGE UP (ref 180–914)
WBC # BLD: 4.5 K/UL — LOW (ref 4.8–10.8)
WBC # FLD AUTO: 4.5 K/UL — LOW (ref 4.8–10.8)

## 2017-09-17 PROCEDURE — 99233 SBSQ HOSP IP/OBS HIGH 50: CPT

## 2017-09-17 RX ORDER — POTASSIUM CHLORIDE 20 MEQ
40 PACKET (EA) ORAL EVERY 4 HOURS
Qty: 0 | Refills: 0 | Status: COMPLETED | OUTPATIENT
Start: 2017-09-17 | End: 2017-09-17

## 2017-09-17 RX ORDER — SODIUM CHLORIDE 9 MG/ML
1000 INJECTION INTRAMUSCULAR; INTRAVENOUS; SUBCUTANEOUS
Qty: 0 | Refills: 0 | Status: DISCONTINUED | OUTPATIENT
Start: 2017-09-17 | End: 2017-09-19

## 2017-09-17 RX ADMIN — DESMOPRESSIN ACETATE 1 SPRAY(S): 0.1 TABLET ORAL at 17:35

## 2017-09-17 RX ADMIN — HEPARIN SODIUM 5000 UNIT(S): 5000 INJECTION INTRAVENOUS; SUBCUTANEOUS at 05:31

## 2017-09-17 RX ADMIN — Medication 40 MILLIEQUIVALENT(S): at 14:25

## 2017-09-17 RX ADMIN — Medication 325 MILLIGRAM(S): at 11:58

## 2017-09-17 RX ADMIN — DESMOPRESSIN ACETATE 1 SPRAY(S): 0.1 TABLET ORAL at 05:31

## 2017-09-17 RX ADMIN — LEVETIRACETAM 1000 MILLIGRAM(S): 250 TABLET, FILM COATED ORAL at 05:32

## 2017-09-17 RX ADMIN — SODIUM CHLORIDE 125 MILLILITER(S): 9 INJECTION INTRAMUSCULAR; INTRAVENOUS; SUBCUTANEOUS at 16:48

## 2017-09-17 RX ADMIN — Medication 40 MILLIEQUIVALENT(S): at 11:57

## 2017-09-17 RX ADMIN — LEVETIRACETAM 1000 MILLIGRAM(S): 250 TABLET, FILM COATED ORAL at 17:35

## 2017-09-17 RX ADMIN — SODIUM CHLORIDE 83 MILLILITER(S): 9 INJECTION INTRAMUSCULAR; INTRAVENOUS; SUBCUTANEOUS at 05:33

## 2017-09-17 RX ADMIN — PANTOPRAZOLE SODIUM 40 MILLIGRAM(S): 20 TABLET, DELAYED RELEASE ORAL at 05:31

## 2017-09-17 RX ADMIN — Medication 100 MILLIGRAM(S): at 05:32

## 2017-09-17 RX ADMIN — AMLODIPINE BESYLATE 10 MILLIGRAM(S): 2.5 TABLET ORAL at 05:32

## 2017-09-17 RX ADMIN — Medication 100 MILLIGRAM(S): at 17:36

## 2017-09-17 RX ADMIN — HEPARIN SODIUM 5000 UNIT(S): 5000 INJECTION INTRAVENOUS; SUBCUTANEOUS at 17:35

## 2017-09-17 NOTE — PROGRESS NOTE ADULT - SUBJECTIVE AND OBJECTIVE BOX
INTERVAL HPI/OVERNIGHT EVENTS:  follow up of diabetes insipidus    MEDICATIONS  (STANDING):  levETIRAcetam 1000 milliGRAM(s) Oral two times a day  desmopressin 0.01% Nasal 1 Spray(s) Nasal every 12 hours  labetalol 100 milliGRAM(s) Oral two times a day  amLODIPine   Tablet 10 milliGRAM(s) Oral daily  ferrous    sulfate 325 milliGRAM(s) Oral daily  pantoprazole    Tablet 40 milliGRAM(s) Oral before breakfast  heparin  Injectable 5000 Unit(s) SubCutaneous every 12 hours  sodium chloride 0.225%. 1000 milliLiter(s) (125 mL/Hr) IV Continuous <Continuous>    MEDICATIONS  (PRN):      Allergies    No Known Allergies    Intolerances        Review of systems:  pt. denies polydipsia or polyuria    Vital Signs Last 24 Hrs  T(C): 36.7 (17 Sep 2017 15:15), Max: 36.7 (17 Sep 2017 11:57)  T(F): 98.1 (17 Sep 2017 15:15), Max: 98.1 (17 Sep 2017 15:15)  HR: 78 (17 Sep 2017 12:00) (67 - 78)  BP: 119/88 (17 Sep 2017 12:00) (107/85 - 122/89)  BP(mean): --  RR: 14 (17 Sep 2017 12:00) (14 - 20)  SpO2: 98% (17 Sep 2017 12:00) (98% - 99%)        LABS:                        8.4    4.5   )-----------( 110      ( 17 Sep 2017 08:40 )             29.2     09-17    157<H>  |  121<H>  |  21.0<H>  ----------------------------<  75  3.6   |  26.0  |  1.01    Ca    8.2<L>      17 Sep 2017 08:40  Phos  2.7     09-16  Mg     2.2     09-16      Urinalysis Basic - ( 15 Sep 2017 21:52 )    Color: Yellow / Appearance: x / S.020 / pH: x  Gluc: x / Ketone: Negative  / Bili: Negative / Urobili: Negative mg/dL   Blood: x / Protein: 30 mg/dL / Nitrite: Negative   Leuk Esterase: Small / RBC: 0-2 /HPF / WBC 6-10   Sq Epi: x / Non Sq Epi: Few / Bacteria: x

## 2017-09-17 NOTE — PROGRESS NOTE ADULT - PROBLEM SELECTOR PLAN 1
Na 157 this morning  Currently receiving 1/4NS at 83cc/hr  Will await further recommendations from renal  Appreciate renal and endocrine consult

## 2017-09-17 NOTE — PROGRESS NOTE ADULT - SUBJECTIVE AND OBJECTIVE BOX
KRUPA MEDINA    [id number]    51y      Female    INTERVAL HPI/OVERNIGHT EVENTS: No events on. Feels well today.    Hospital course:  52 yo F with h/o HTN, DI, SAH, cerebral aneurysm s/p clipping , multiple readmissions for hypernatremia presented with lethargy and hypernatremia. Lab work done as outpatient revealed hypernatremia. In the ED, sodium was noted to b 160 and was started on D5NS. Per renal, IVF changed to 1/4NS at 83cc/hr as pt with known history of symptoms from rapid reversal.      REVIEW OF SYSTEMS:    CONSTITUTIONAL: No fever  RESPIRATORY: No cough, wheezing, hemoptysis     Vital Signs Last 24 Hrs  T(C): 36.4 (17 Sep 2017 07:46), Max: 36.6 (17 Sep 2017 03:54)  T(F): 97.5 (17 Sep 2017 07:46), Max: 97.9 (17 Sep 2017 03:54)  HR: 67 (17 Sep 2017 03:54) (62 - 72)  BP: 122/89 (17 Sep 2017 03:54) (107/85 - 125/94)  BP(mean): --  RR: 16 (17 Sep 2017 03:54) (12 - 20)  SpO2: 98% (17 Sep 2017 03:54) (96% - 99%)    PHYSICAL EXAM:    GENERAL: NAD, well-groomed  HEENT: PERRL, +EOMI, MMM  CHEST/LUNG: Clear to percussion bilaterally  HEART: S1S2+, Regular rate and rhythm; No murmurs, rubs, or gallops  ABDOMEN: Soft, Nontender, Nondistended; Bowel sounds present       LABS:                        8.4    4.5   )-----------( 110      ( 17 Sep 2017 08:40 )             29.2     09-17    157<H>  |  121<H>  |  21.0<H>  ----------------------------<  75  3.6   |  26.0  |  1.01    Ca    8.2<L>      17 Sep 2017 08:40  Phos  2.7     09-16  Mg     2.2     09-16        Urinalysis Basic - ( 15 Sep 2017 21:52 )    Color: Yellow / Appearance: x / S.020 / pH: x  Gluc: x / Ketone: Negative  / Bili: Negative / Urobili: Negative mg/dL   Blood: x / Protein: 30 mg/dL / Nitrite: Negative   Leuk Esterase: Small / RBC: 0-2 /HPF / WBC 6-10   Sq Epi: x / Non Sq Epi: Few / Bacteria: x          MEDICATIONS  (STANDING):  levETIRAcetam 1000 milliGRAM(s) Oral two times a day  desmopressin 0.01% Nasal 1 Spray(s) Nasal every 12 hours  labetalol 100 milliGRAM(s) Oral two times a day  amLODIPine   Tablet 10 milliGRAM(s) Oral daily  ferrous    sulfate 325 milliGRAM(s) Oral daily  pantoprazole    Tablet 40 milliGRAM(s) Oral before breakfast  heparin  Injectable 5000 Unit(s) SubCutaneous every 12 hours  sodium chloride 0.225%. 1000 milliLiter(s) (83 mL/Hr) IV Continuous <Continuous>    MEDICATIONS  (PRN):      RADIOLOGY & ADDITIONAL TESTS:

## 2017-09-17 NOTE — PROGRESS NOTE ADULT - ASSESSMENT
Slowly resolving hypernatremia due to uncontrolled diabetes insipidus. Pt. may require an increase in her DDAVP dose; will check urine osmolarity to confirm this. Pt. remains on IV fluids.

## 2017-09-17 NOTE — PROGRESS NOTE ADULT - SUBJECTIVE AND OBJECTIVE BOX
NEPHROLOGY INTERVAL HPI/OVERNIGHT EVENTS:    awake and interactive   Endocrine noted   Not drinking much  - 3 cups per day   IVF noted     MEDICATIONS  (STANDING):  levETIRAcetam 1000 milliGRAM(s) Oral two times a day  desmopressin 0.01% Nasal 1 Spray(s) Nasal every 12 hours  labetalol 100 milliGRAM(s) Oral two times a day  amLODIPine   Tablet 10 milliGRAM(s) Oral daily  ferrous    sulfate 325 milliGRAM(s) Oral daily  pantoprazole    Tablet 40 milliGRAM(s) Oral before breakfast  heparin  Injectable 5000 Unit(s) SubCutaneous every 12 hours  sodium chloride 0.225%. 1000 milliLiter(s) (83 mL/Hr) IV Continuous <Continuous>  potassium chloride    Tablet ER 40 milliEquivalent(s) Oral every 4 hours    MEDICATIONS  (PRN):      Allergies    No Known Allergies    Intolerances            Vital Signs Last 24 Hrs  T(C): 36.4 (17 Sep 2017 07:46), Max: 36.6 (17 Sep 2017 03:54)  T(F): 97.5 (17 Sep 2017 07:46), Max: 97.9 (17 Sep 2017 03:54)  HR: 67 (17 Sep 2017 03:54) (62 - 72)  BP: 122/89 (17 Sep 2017 03:54) (107/85 - 125/94)  BP(mean): --  RR: 16 (17 Sep 2017 03:54) (12 - 20)  SpO2: 98% (17 Sep 2017 03:54) (96% - 99%)  Daily     Daily Weight in k.9 (17 Sep 2017 03:54)  I&O's Detail    16 Sep 2017 07:  -  17 Sep 2017 07:00  --------------------------------------------------------  IN:    Oral Fluid: 240 mL    sodium chloride 0.225%.: 1826 mL  Total IN: 2066 mL    OUT:    Voided: 1225 mL  Total OUT: 1225 mL    Total NET: 841 mL        I&O's Summary    16 Sep 2017 07:  -  17 Sep 2017 07:00  --------------------------------------------------------  IN: 2066 mL / OUT: 1225 mL / NET: 841 mL        PHYSICAL EXAM:  HEAD:  anicteric , no edema   NECK: Supple, No JVD,  CHEST/LUNG: Clear to percussion bilaterally; No rales, rhonchi, wheezing, or rubs  HEART: Regular rate and rhythm; No murmurs, rubs, or gallops  ABDOMEN: Soft, Nontender, Nondistended; Bowel sounds present  EXTREMITIES:  2+ Peripheral Pulses, No clubbing, cyanosis, or edema    LABS:                        8.4    4.5   )-----------( 110      ( 17 Sep 2017 08:40 )             29.2     -    157<H>  |  121<H>  |  21.0<H>  ----------------------------<  75  3.6   |  26.0  |  1.01    Ca    8.2<L>      17 Sep 2017 08:40  Phos  2.7     -  Mg     2.2     -16        Urinalysis Basic - ( 15 Sep 2017 21:52 )    Color: Yellow / Appearance: x / S.020 / pH: x  Gluc: x / Ketone: Negative  / Bili: Negative / Urobili: Negative mg/dL   Blood: x / Protein: 30 mg/dL / Nitrite: Negative   Leuk Esterase: Small / RBC: 0-2 /HPF / WBC 6-10   Sq Epi: x / Non Sq Epi: Few / Bacteria: x              RADIOLOGY & ADDITIONAL TESTS:

## 2017-09-17 NOTE — PROGRESS NOTE ADULT - ASSESSMENT
51 yr old female with hx HTN, DI, SAH, cerebral aneurysm s/p clipping 2011 multiple admissions for hypernatremia     Improved chronic hypernatremia   No Polyuria  known CDI   Known H/O symptoms  from rapid reversal   EST free water deficit of 8 L  at start  Continue the current measures   Increase 1/4 NS to 125 ml/ hr   ddavp as per Endocrine   Increased fluid intake encouraged   Serial labs     HTN - Watch on meds

## 2017-09-18 LAB
ANION GAP SERPL CALC-SCNC: 11 MMOL/L — SIGNIFICANT CHANGE UP (ref 5–17)
BUN SERPL-MCNC: 17 MG/DL — SIGNIFICANT CHANGE UP (ref 8–20)
CALCIUM SERPL-MCNC: 7.2 MG/DL — LOW (ref 8.6–10.2)
CHLORIDE SERPL-SCNC: 118 MMOL/L — HIGH (ref 98–107)
CO2 SERPL-SCNC: 25 MMOL/L — SIGNIFICANT CHANGE UP (ref 22–29)
CREAT SERPL-MCNC: 0.96 MG/DL — SIGNIFICANT CHANGE UP (ref 0.5–1.3)
GLUCOSE SERPL-MCNC: 108 MG/DL — SIGNIFICANT CHANGE UP (ref 70–115)
HCT VFR BLD CALC: 28.2 % — LOW (ref 37–47)
HGB BLD-MCNC: 8.1 G/DL — LOW (ref 12–16)
LEVETIRACETAM SERPL-MCNC: 25.4 MCG/ML — SIGNIFICANT CHANGE UP (ref 12–46)
MCHC RBC-ENTMCNC: 24.3 PG — LOW (ref 27–31)
MCHC RBC-ENTMCNC: 28.7 G/DL — LOW (ref 32–36)
MCV RBC AUTO: 84.7 FL — SIGNIFICANT CHANGE UP (ref 81–99)
OSMOLALITY UR: 286 MOSM/KG — LOW (ref 300–1000)
PLATELET # BLD AUTO: 126 K/UL — LOW (ref 150–400)
POTASSIUM SERPL-MCNC: 4.6 MMOL/L — SIGNIFICANT CHANGE UP (ref 3.5–5.3)
POTASSIUM SERPL-SCNC: 4.6 MMOL/L — SIGNIFICANT CHANGE UP (ref 3.5–5.3)
RBC # BLD: 3.33 M/UL — LOW (ref 4.4–5.2)
RBC # FLD: 17.6 % — HIGH (ref 11–15.6)
SODIUM SERPL-SCNC: 154 MMOL/L — HIGH (ref 135–145)
WBC # BLD: 4.7 K/UL — LOW (ref 4.8–10.8)
WBC # FLD AUTO: 4.7 K/UL — LOW (ref 4.8–10.8)

## 2017-09-18 PROCEDURE — 99233 SBSQ HOSP IP/OBS HIGH 50: CPT

## 2017-09-18 RX ORDER — DESMOPRESSIN ACETATE 0.1 MG/1
1 TABLET ORAL EVERY 8 HOURS
Qty: 0 | Refills: 0 | Status: DISCONTINUED | OUTPATIENT
Start: 2017-09-18 | End: 2017-09-20

## 2017-09-18 RX ADMIN — PANTOPRAZOLE SODIUM 40 MILLIGRAM(S): 20 TABLET, DELAYED RELEASE ORAL at 05:12

## 2017-09-18 RX ADMIN — Medication 100 MILLIGRAM(S): at 18:32

## 2017-09-18 RX ADMIN — AMLODIPINE BESYLATE 10 MILLIGRAM(S): 2.5 TABLET ORAL at 05:12

## 2017-09-18 RX ADMIN — LEVETIRACETAM 1000 MILLIGRAM(S): 250 TABLET, FILM COATED ORAL at 18:32

## 2017-09-18 RX ADMIN — HEPARIN SODIUM 5000 UNIT(S): 5000 INJECTION INTRAVENOUS; SUBCUTANEOUS at 05:13

## 2017-09-18 RX ADMIN — DESMOPRESSIN ACETATE 1 SPRAY(S): 0.1 TABLET ORAL at 21:30

## 2017-09-18 RX ADMIN — Medication 325 MILLIGRAM(S): at 12:03

## 2017-09-18 RX ADMIN — Medication 100 MILLIGRAM(S): at 05:12

## 2017-09-18 RX ADMIN — HEPARIN SODIUM 5000 UNIT(S): 5000 INJECTION INTRAVENOUS; SUBCUTANEOUS at 18:32

## 2017-09-18 RX ADMIN — SODIUM CHLORIDE 125 MILLILITER(S): 9 INJECTION INTRAMUSCULAR; INTRAVENOUS; SUBCUTANEOUS at 12:03

## 2017-09-18 RX ADMIN — SODIUM CHLORIDE 125 MILLILITER(S): 9 INJECTION INTRAMUSCULAR; INTRAVENOUS; SUBCUTANEOUS at 18:33

## 2017-09-18 RX ADMIN — DESMOPRESSIN ACETATE 1 SPRAY(S): 0.1 TABLET ORAL at 05:12

## 2017-09-18 RX ADMIN — DESMOPRESSIN ACETATE 1 SPRAY(S): 0.1 TABLET ORAL at 15:41

## 2017-09-18 RX ADMIN — LEVETIRACETAM 1000 MILLIGRAM(S): 250 TABLET, FILM COATED ORAL at 05:12

## 2017-09-18 NOTE — PROGRESS NOTE ADULT - ASSESSMENT
51 year old female with hx of diabetes insipidus, SAH, cerebral aneurysm s/p clipping, HTN admitted with hypernatremia due to loss of control of DI in outpatient setting.  She remains hypernatremic with a dilute urine on hypotonic fluids.    1.  DI-  will increase ddAVP to 1 spray 3XD.  Follow urine osmolality and serum sodium.  Fluid management as per renal.  2.  HTN-  on Amlodipine and labetalol.

## 2017-09-18 NOTE — PROGRESS NOTE ADULT - SUBJECTIVE AND OBJECTIVE BOX
KRUPA MEDINA    [id number]    51y      Female    INTERVAL HPI/OVERNIGHT EVENTS: No events on. Feels well.    Hospital course:  52 yo F with h/o HTN, DI, SAH, cerebral aneurysm s/p clipping 2011, multiple readmissions for hypernatremia presented with lethargy and hypernatremia. Lab work done as outpatient revealed hypernatremia. In the ED, sodium was noted to b 160 and was started on D5NS. Per renal, IVF changed to 1/4NS at 83cc/hr as pt with known history of symptoms from rapid reversal.       REVIEW OF SYSTEMS:    CONSTITUTIONAL: No fever   RESPIRATORY: No cough, wheezing, hemoptysis; No shortness of breath  CARDIOVASCULAR: No chest pain     Vital Signs Last 24 Hrs  T(C): 37.2 (18 Sep 2017 08:30), Max: 37.2 (18 Sep 2017 08:30)  T(F): 98.9 (18 Sep 2017 08:30), Max: 98.9 (18 Sep 2017 08:30)  HR: 72 (18 Sep 2017 08:30) (70 - 75)  BP: 156/90 (18 Sep 2017 08:30) (11/83 - 156/90)  BP(mean): --  RR: 18 (18 Sep 2017 08:30) (14 - 18)  SpO2: 96% (18 Sep 2017 08:30) (95% - 98%)    PHYSICAL EXAM:    GENERAL: NAD, well-groomed  HEENT: MMM  CHEST/LUNG: Clear to percussion bilaterally  HEART: S1S2+, Regular rate and rhythm   ABDOMEN: Soft, Nontender, Nondistended; Bowel sounds present       LABS:                        8.1    4.7   )-----------( 126      ( 18 Sep 2017 11:00 )             28.2     09-18    154<H>  |  118<H>  |  17.0  ----------------------------<  108  4.6   |  25.0  |  0.96    Ca    7.2<L>      18 Sep 2017 11:00              MEDICATIONS  (STANDING):  levETIRAcetam 1000 milliGRAM(s) Oral two times a day  labetalol 100 milliGRAM(s) Oral two times a day  amLODIPine   Tablet 10 milliGRAM(s) Oral daily  ferrous    sulfate 325 milliGRAM(s) Oral daily  pantoprazole    Tablet 40 milliGRAM(s) Oral before breakfast  heparin  Injectable 5000 Unit(s) SubCutaneous every 12 hours  sodium chloride 0.225%. 1000 milliLiter(s) (125 mL/Hr) IV Continuous <Continuous>  desmopressin 0.01% Nasal 1 Spray(s) Nasal every 8 hours    MEDICATIONS  (PRN):      RADIOLOGY & ADDITIONAL TESTS:

## 2017-09-18 NOTE — PROGRESS NOTE ADULT - SUBJECTIVE AND OBJECTIVE BOX
Interval HPI/ Overnight Events:  Patient seen at bedside for follow up of DI.  Remains a poor historian.  Currently denies any associated polyuria or polydipsia.  Denies any headaches.  Remains on 1/4 NS gtt.    MEDICATIONS  (STANDING):  levETIRAcetam 1000 milliGRAM(s) Oral two times a day  desmopressin 0.01% Nasal 1 Spray(s) Nasal every 12 hours  labetalol 100 milliGRAM(s) Oral two times a day  amLODIPine   Tablet 10 milliGRAM(s) Oral daily  ferrous    sulfate 325 milliGRAM(s) Oral daily  pantoprazole    Tablet 40 milliGRAM(s) Oral before breakfast  heparin  Injectable 5000 Unit(s) SubCutaneous every 12 hours  sodium chloride 0.225%. 1000 milliLiter(s) (125 mL/Hr) IV Continuous <Continuous>    Vital Signs Last 24 Hrs  T(C): 37.2 (18 Sep 2017 08:30), Max: 37.2 (18 Sep 2017 08:30)  T(F): 98.9 (18 Sep 2017 08:30), Max: 98.9 (18 Sep 2017 08:30)  HR: 72 (18 Sep 2017 08:30) (70 - 78)  BP: 156/90 (18 Sep 2017 08:30) (11/83 - 156/90)  BP(mean): --  RR: 18 (18 Sep 2017 08:30) (14 - 18)  SpO2: 96% (18 Sep 2017 08:30) (95% - 98%)      PE:  Gen: Awake and alert, NAD  HEENT:  sclera anicteric, MMM  Neck:  no thyromegaly, no LAD  CV:  nl S1 + S2, RRR, no m/r/g  Resp:  nl respiratory effort, CTA b/l  MS:  no c/c/e, nl muscle tone    LABS:  09-17    157<H>  |  121<H>  |  21.0<H>  ----------------------------<  75  3.6   |  26.0  |  1.01    Ca    8.2<L>      17 Sep 2017 08:40                          8.1    4.7   )-----------( 126      ( 18 Sep 2017 11:00 )             28.2     Urine Osmolality:  286

## 2017-09-18 NOTE — PROGRESS NOTE ADULT - ASSESSMENT
52 yo F with h/o HTN, DI, SAH, cerebral aneurysm s/p clipping 2011 here with hypernatremia due to lapse in control of diabetes insipidus.

## 2017-09-18 NOTE — PROGRESS NOTE ADULT - SUBJECTIVE AND OBJECTIVE BOX
NEPHROLOGY INTERVAL HPI/OVERNIGHT EVENTS:    Examined earlier  Looks comfortable    MEDICATIONS  (STANDING):  levETIRAcetam 1000 milliGRAM(s) Oral two times a day  labetalol 100 milliGRAM(s) Oral two times a day  amLODIPine   Tablet 10 milliGRAM(s) Oral daily  ferrous    sulfate 325 milliGRAM(s) Oral daily  pantoprazole    Tablet 40 milliGRAM(s) Oral before breakfast  heparin  Injectable 5000 Unit(s) SubCutaneous every 12 hours  sodium chloride 0.225%. 1000 milliLiter(s) (125 mL/Hr) IV Continuous <Continuous>  desmopressin 0.01% Nasal 1 Spray(s) Nasal every 8 hours    MEDICATIONS  (PRN):      Allergies    No Known Allergies    Intolerances        Vital Signs Last 24 Hrs  T(C): 37.2 (18 Sep 2017 08:30), Max: 37.2 (18 Sep 2017 08:30)  T(F): 98.9 (18 Sep 2017 08:30), Max: 98.9 (18 Sep 2017 08:30)  HR: 72 (18 Sep 2017 08:30) (70 - 75)  BP: 156/90 (18 Sep 2017 08:30) (11/83 - 156/90)  BP(mean): --  RR: 18 (18 Sep 2017 08:30) (14 - 18)  SpO2: 96% (18 Sep 2017 08:30) (95% - 98%)  Daily     Daily     PHYSICAL EXAM:  HEAD:  anicteric , no edema   NECK: Supple, No JVD,  CHEST/LUNG: Clear to percussion bilaterally; No rales  HEART: Regular rate and rhythm; No murmurs  ABDOMEN: Soft, Nontender, Nondistended; Bowel sounds present  EXTREMITIES:  TNo edema      LABS:                        8.1    4.7   )-----------( 126      ( 18 Sep 2017 11:00 )             28.2     09-18    154<H>  |  118<H>  |  17.0  ----------------------------<  108  4.6   |  25.0  |  0.96    Ca    7.2<L>      18 Sep 2017 11:00                  RADIOLOGY & ADDITIONAL TESTS:

## 2017-09-18 NOTE — PROGRESS NOTE ADULT - ASSESSMENT
51 yr old female with hx HTN, DI, SAH, cerebral aneurysm s/p clipping 2011 multiple admissions for hypernatremia     Improving hypernatremia   No Polyuria  known CDI   Known H/O symptoms  from rapid reversal   EST free water deficit of 8 L  at start  Continue the current measures   Increase 1/4 NS to 125 ml/ hr and cont ddavp as per Endocrine   Increased fluid intake encouraged   Serial labs     HTN - Watch on meds

## 2017-09-19 ENCOUNTER — TRANSCRIPTION ENCOUNTER (OUTPATIENT)
Age: 52
End: 2017-09-19

## 2017-09-19 LAB
ANION GAP SERPL CALC-SCNC: 11 MMOL/L — SIGNIFICANT CHANGE UP (ref 5–17)
ANION GAP SERPL CALC-SCNC: 9 MMOL/L — SIGNIFICANT CHANGE UP (ref 5–17)
BUN SERPL-MCNC: 14 MG/DL — SIGNIFICANT CHANGE UP (ref 8–20)
BUN SERPL-MCNC: 16 MG/DL — SIGNIFICANT CHANGE UP (ref 8–20)
CALCIUM SERPL-MCNC: 8.4 MG/DL — LOW (ref 8.6–10.2)
CALCIUM SERPL-MCNC: 9.1 MG/DL — SIGNIFICANT CHANGE UP (ref 8.6–10.2)
CHLORIDE SERPL-SCNC: 105 MMOL/L — SIGNIFICANT CHANGE UP (ref 98–107)
CHLORIDE SERPL-SCNC: 96 MMOL/L — LOW (ref 98–107)
CO2 SERPL-SCNC: 26 MMOL/L — SIGNIFICANT CHANGE UP (ref 22–29)
CO2 SERPL-SCNC: 29 MMOL/L — SIGNIFICANT CHANGE UP (ref 22–29)
CREAT SERPL-MCNC: 0.79 MG/DL — SIGNIFICANT CHANGE UP (ref 0.5–1.3)
CREAT SERPL-MCNC: 0.8 MG/DL — SIGNIFICANT CHANGE UP (ref 0.5–1.3)
GLUCOSE SERPL-MCNC: 79 MG/DL — SIGNIFICANT CHANGE UP (ref 70–115)
GLUCOSE SERPL-MCNC: 93 MG/DL — SIGNIFICANT CHANGE UP (ref 70–115)
HCT VFR BLD CALC: 28.5 % — LOW (ref 37–47)
HGB BLD-MCNC: 8.5 G/DL — LOW (ref 12–16)
MCHC RBC-ENTMCNC: 24.5 PG — LOW (ref 27–31)
MCHC RBC-ENTMCNC: 29.8 G/DL — LOW (ref 32–36)
MCV RBC AUTO: 82.1 FL — SIGNIFICANT CHANGE UP (ref 81–99)
PLATELET # BLD AUTO: 115 K/UL — LOW (ref 150–400)
POTASSIUM SERPL-MCNC: 4 MMOL/L — SIGNIFICANT CHANGE UP (ref 3.5–5.3)
POTASSIUM SERPL-MCNC: 4.2 MMOL/L — SIGNIFICANT CHANGE UP (ref 3.5–5.3)
POTASSIUM SERPL-SCNC: 4 MMOL/L — SIGNIFICANT CHANGE UP (ref 3.5–5.3)
POTASSIUM SERPL-SCNC: 4.2 MMOL/L — SIGNIFICANT CHANGE UP (ref 3.5–5.3)
RBC # BLD: 3.47 M/UL — LOW (ref 4.4–5.2)
RBC # FLD: 17.3 % — HIGH (ref 11–15.6)
SODIUM SERPL-SCNC: 136 MMOL/L — SIGNIFICANT CHANGE UP (ref 135–145)
SODIUM SERPL-SCNC: 140 MMOL/L — SIGNIFICANT CHANGE UP (ref 135–145)
WBC # BLD: 5 K/UL — SIGNIFICANT CHANGE UP (ref 4.8–10.8)
WBC # FLD AUTO: 5 K/UL — SIGNIFICANT CHANGE UP (ref 4.8–10.8)

## 2017-09-19 PROCEDURE — 99233 SBSQ HOSP IP/OBS HIGH 50: CPT

## 2017-09-19 RX ORDER — PANTOPRAZOLE SODIUM 20 MG/1
40 TABLET, DELAYED RELEASE ORAL
Qty: 0 | Refills: 0 | Status: COMPLETED | OUTPATIENT
Start: 2017-09-19 | End: 2017-09-20

## 2017-09-19 RX ORDER — SODIUM CHLORIDE 9 MG/ML
1 INJECTION INTRAMUSCULAR; INTRAVENOUS; SUBCUTANEOUS
Qty: 0 | Refills: 0 | Status: DISCONTINUED | OUTPATIENT
Start: 2017-09-19 | End: 2017-09-20

## 2017-09-19 RX ORDER — SODIUM CHLORIDE 9 MG/ML
1000 INJECTION INTRAMUSCULAR; INTRAVENOUS; SUBCUTANEOUS
Qty: 0 | Refills: 0 | Status: COMPLETED | OUTPATIENT
Start: 2017-09-19 | End: 2017-09-19

## 2017-09-19 RX ORDER — ONDANSETRON 8 MG/1
4 TABLET, FILM COATED ORAL ONCE
Qty: 0 | Refills: 0 | Status: COMPLETED | OUTPATIENT
Start: 2017-09-19 | End: 2017-09-19

## 2017-09-19 RX ORDER — ONDANSETRON 8 MG/1
4 TABLET, FILM COATED ORAL EVERY 6 HOURS
Qty: 0 | Refills: 0 | Status: DISCONTINUED | OUTPATIENT
Start: 2017-09-19 | End: 2017-09-30

## 2017-09-19 RX ORDER — DESMOPRESSIN ACETATE 0.1 MG/1
1 TABLET ORAL
Qty: 3 | Refills: 0 | OUTPATIENT
Start: 2017-09-19

## 2017-09-19 RX ADMIN — PANTOPRAZOLE SODIUM 40 MILLIGRAM(S): 20 TABLET, DELAYED RELEASE ORAL at 05:11

## 2017-09-19 RX ADMIN — DESMOPRESSIN ACETATE 1 SPRAY(S): 0.1 TABLET ORAL at 22:07

## 2017-09-19 RX ADMIN — Medication 325 MILLIGRAM(S): at 11:44

## 2017-09-19 RX ADMIN — SODIUM CHLORIDE 1 GRAM(S): 9 INJECTION INTRAMUSCULAR; INTRAVENOUS; SUBCUTANEOUS at 22:27

## 2017-09-19 RX ADMIN — HEPARIN SODIUM 5000 UNIT(S): 5000 INJECTION INTRAVENOUS; SUBCUTANEOUS at 05:11

## 2017-09-19 RX ADMIN — DESMOPRESSIN ACETATE 1 SPRAY(S): 0.1 TABLET ORAL at 15:18

## 2017-09-19 RX ADMIN — DESMOPRESSIN ACETATE 1 SPRAY(S): 0.1 TABLET ORAL at 05:11

## 2017-09-19 RX ADMIN — AMLODIPINE BESYLATE 10 MILLIGRAM(S): 2.5 TABLET ORAL at 05:10

## 2017-09-19 RX ADMIN — Medication 100 MILLIGRAM(S): at 05:10

## 2017-09-19 RX ADMIN — LEVETIRACETAM 1000 MILLIGRAM(S): 250 TABLET, FILM COATED ORAL at 17:31

## 2017-09-19 RX ADMIN — SODIUM CHLORIDE 100 MILLILITER(S): 9 INJECTION INTRAMUSCULAR; INTRAVENOUS; SUBCUTANEOUS at 22:07

## 2017-09-19 RX ADMIN — Medication 100 MILLIGRAM(S): at 17:31

## 2017-09-19 RX ADMIN — ONDANSETRON 4 MILLIGRAM(S): 8 TABLET, FILM COATED ORAL at 19:32

## 2017-09-19 RX ADMIN — LEVETIRACETAM 1000 MILLIGRAM(S): 250 TABLET, FILM COATED ORAL at 05:10

## 2017-09-19 NOTE — PROGRESS NOTE ADULT - SUBJECTIVE AND OBJECTIVE BOX
NEPHROLOGY INTERVAL HPI/OVERNIGHT EVENTS:    Examined earlier  Sitting up in bed  "I'm feeling better doc"    MEDICATIONS  (STANDING):  levETIRAcetam 1000 milliGRAM(s) Oral two times a day  labetalol 100 milliGRAM(s) Oral two times a day  amLODIPine   Tablet 10 milliGRAM(s) Oral daily  ferrous    sulfate 325 milliGRAM(s) Oral daily  pantoprazole    Tablet 40 milliGRAM(s) Oral before breakfast  heparin  Injectable 5000 Unit(s) SubCutaneous every 12 hours  desmopressin 0.01% Nasal 1 Spray(s) Nasal every 8 hours    MEDICATIONS  (PRN):      Allergies    No Known Allergies    Intolerances        Vital Signs Last 24 Hrs  T(C): 36.4 (19 Sep 2017 08:04), Max: 36.6 (18 Sep 2017 15:58)  T(F): 97.5 (19 Sep 2017 08:04), Max: 97.9 (18 Sep 2017 15:58)  HR: 67 (19 Sep 2017 08:04) (67 - 70)  BP: 149/99 (19 Sep 2017 08:04) (124/86 - 149/99)  BP(mean): --  RR: 18 (19 Sep 2017 08:04) (18 - 18)  SpO2: 99% (19 Sep 2017 08:04) (96% - 99%)  Daily     Daily     PHYSICAL EXAM:  HEAD:  anicteric , no edema   NECK: Supple, No JVD,  CHEST/LUNG: Clear to percussion bilaterally; No rales  HEART: Regular rate and rhythm; No murmurs  ABDOMEN: Soft, Nontender, Nondistended; Bowel sounds present  EXTREMITIES:  TNo edema      LABS:                        8.5    5.0   )-----------( 115      ( 19 Sep 2017 08:23 )             28.5     09-19    140  |  105  |  16.0  ----------------------------<  79  4.0   |  26.0  |  0.79    Ca    8.4<L>      19 Sep 2017 08:23                  RADIOLOGY & ADDITIONAL TESTS:

## 2017-09-19 NOTE — PROGRESS NOTE ADULT - ASSESSMENT
51 yr old female with hx HTN, DI, SAH, cerebral aneurysm s/p clipping 2011 multiple admissions for hypernatremia     Improving hypernatremia Na now 140  No Polyuria  known CDI   Known H/O symptoms  from rapid reversal   EST free water deficit of 8 L  at start  Continue the current measures   NA improved w 1/4 NS to 125 ml/ hr and ddavp   Increased fluid intake encouraged

## 2017-09-19 NOTE — DISCHARGE NOTE ADULT - PATIENT PORTAL LINK FT
“You can access the [facility] Patient Portal, offered by [facility], by registering with the following website: [email or web address]”

## 2017-09-19 NOTE — PROGRESS NOTE ADULT - SUBJECTIVE AND OBJECTIVE BOX
KRUPA MEDINA    [id number]    51y      Female    INTERVAL HPI/OVERNIGHT EVENTS: No events on. Feels well today.    Hospital course;  50 yo F with h/o HTN, DI, SAH, cerebral aneurysm s/p clipping 2011, multiple readmissions for hypernatremia presented with lethargy and hypernatremia. Lab work done as outpatient revealed hypernatremia. In the ED, sodium was noted to b 160 and was started on D5NS. Per renal, IVF changed to 1/4NS at 83cc/hr as pt with known history of symptoms from rapid reversal. Sodium continued to trend up and rate of fluid infusion was increased. DDAVP increased to tid.      REVIEW OF SYSTEMS:    CONSTITUTIONAL: No fever   RESPIRATORY: No cough, wheezing, hemoptysis   CARDIOVASCULAR: No chest pain     Vital Signs Last 24 Hrs  T(C): 36.4 (19 Sep 2017 08:04), Max: 36.6 (18 Sep 2017 15:58)  T(F): 97.5 (19 Sep 2017 08:04), Max: 97.9 (18 Sep 2017 15:58)  HR: 67 (19 Sep 2017 08:04) (67 - 70)  BP: 149/99 (19 Sep 2017 08:04) (124/86 - 149/99)  BP(mean): --  RR: 18 (19 Sep 2017 08:04) (18 - 18)  SpO2: 99% (19 Sep 2017 08:04) (96% - 99%)    PHYSICAL EXAM:    GENERAL: NAD, well-groomed  HEENT: PERRL, +EOMI, MMM  CHEST/LUNG: Clear to percussion bilaterally   HEART: S1S2+, Regular rate and rhythm   ABDOMEN: Soft, Nontender, Nondistended; Bowel sounds present      LABS:                        8.5    5.0   )-----------( 115      ( 19 Sep 2017 08:23 )             28.5     09-19    140  |  105  |  16.0  ----------------------------<  79  4.0   |  26.0  |  0.79    Ca    8.4<L>      19 Sep 2017 08:23              MEDICATIONS  (STANDING):  levETIRAcetam 1000 milliGRAM(s) Oral two times a day  labetalol 100 milliGRAM(s) Oral two times a day  amLODIPine   Tablet 10 milliGRAM(s) Oral daily  ferrous    sulfate 325 milliGRAM(s) Oral daily  pantoprazole    Tablet 40 milliGRAM(s) Oral before breakfast  heparin  Injectable 5000 Unit(s) SubCutaneous every 12 hours  sodium chloride 0.225%. 1000 milliLiter(s) (125 mL/Hr) IV Continuous <Continuous>  desmopressin 0.01% Nasal 1 Spray(s) Nasal every 8 hours    MEDICATIONS  (PRN):      RADIOLOGY & ADDITIONAL TESTS:

## 2017-09-19 NOTE — DISCHARGE NOTE ADULT - MEDICATION SUMMARY - MEDICATIONS TO CHANGE
I will SWITCH the dose or number of times a day I take the medications listed below when I get home from the hospital:    desmopressin 0.01% nasal solution  -- 1 spray(s) into nose every 12 hours

## 2017-09-19 NOTE — DISCHARGE NOTE ADULT - MEDICATION SUMMARY - MEDICATIONS TO STOP TAKING
I will STOP taking the medications listed below when I get home from the hospital:    Dextrose 5% in Water intravenous solution  -- 1000 milliliter(s) intravenous every other day @200 ml/hr

## 2017-09-19 NOTE — DISCHARGE NOTE ADULT - PLAN OF CARE
Therapeutic optimization Follow up with your nephrologist in one week. Continue with desmopressin. Follow up with your endocrinologist in one week. Continue with labetalol and amlodipine. Continue with keppra. It is important to see your primary physician as well as the physicians noted below within the next week to perform a comprehensive medical review.  Call their offices for an appointment as soon as you leave the hospital.  If you do not have a primary physician, contact the [facility] at [place] [phone] located on [address] [place] [address].  Your medical issues appear to be stable at this time, but if your symptoms recur or worsen, contact your physicians and/or return to the hospital if necessary.  If you encounter any issues or questions with your medication, call your physicians before stopping the medication.  Do not drive.  Limit your diet to 2 grams of sodium daily.

## 2017-09-19 NOTE — PROGRESS NOTE ADULT - SUBJECTIVE AND OBJECTIVE BOX
Interval HPI/ Overnight Events:  Seen for follow up of DI.  IVFs were D/C'ed this AM as sodium is now normal.  Denies any associated polyuria, polydipsia or headache.    MEDICATIONS  (STANDING):  levETIRAcetam 1000 milliGRAM(s) Oral two times a day  labetalol 100 milliGRAM(s) Oral two times a day  amLODIPine   Tablet 10 milliGRAM(s) Oral daily  ferrous    sulfate 325 milliGRAM(s) Oral daily  pantoprazole    Tablet 40 milliGRAM(s) Oral before breakfast  heparin  Injectable 5000 Unit(s) SubCutaneous every 12 hours  desmopressin 0.01% Nasal 1 Spray(s) Nasal every 8 hours    Vital Signs Last 24 Hrs  T(C): 36.4 (19 Sep 2017 08:04), Max: 36.6 (18 Sep 2017 15:58)  T(F): 97.5 (19 Sep 2017 08:04), Max: 97.9 (18 Sep 2017 15:58)  HR: 67 (19 Sep 2017 08:04) (67 - 70)  BP: 149/99 (19 Sep 2017 08:04) (124/86 - 149/99)  RR: 18 (19 Sep 2017 08:04) (18 - 18)  SpO2: 99% (19 Sep 2017 08:04) (96% - 99%)    PE:  Gen: AAO, NAD  HEENT:  sclera anicteric,  MMM  Neck:  no thyromegaly, no LAD  CV:  nl S1 + S2, RRR, no m/r/g  Resp:  nl respiratory effort, CTA b/l  GI/ Abd: soft, NT/ ND, BS +  MS:  no c/c/e, nl muscle tone    LABS:  09-19    140  |  105  |  16.0  ----------------------------<  79  4.0   |  26.0  |  0.79    Ca    8.4<L>      19 Sep 2017 08:23

## 2017-09-19 NOTE — DISCHARGE NOTE ADULT - HOME CARE AGENCY
[facility]  [facility] . VISIT FOR 10/2 (MONDAY). [facility] [phone]

## 2017-09-19 NOTE — PROGRESS NOTE ADULT - ASSESSMENT
51 year old female with hx of diabetes insipidus, SAH, cerebral aneurysm s/p clipping, HTN admitted with hypernatremia due to loss of control of DI in outpatient setting.  Her hypernatremia has now resolved after increased dose of ddAVP and IVFs.      1.  DI- Would continue current dose of ddAVP.  Encouraged PO fluid intake, but likely has impaired thirst mechanism so would resume previous outpatient IVF orders (was getting hydration every other day).  Will need follow up in our office within one week.    2.  HTN-  continue Amlodipine and labetalol.

## 2017-09-19 NOTE — DISCHARGE NOTE ADULT - CARE PLAN
Principal Discharge DX:	Hypernatremia  Goal:	Therapeutic optimization  Instructions for follow-up, activity and diet:	Follow up with your nephrologist in one week. Continue with desmopressin.  Secondary Diagnosis:	Diabetes insipidus  Instructions for follow-up, activity and diet:	Follow up with your endocrinologist in one week.  Secondary Diagnosis:	Essential hypertension  Instructions for follow-up, activity and diet:	Continue with labetalol and amlodipine.  Secondary Diagnosis:	Seizure  Instructions for follow-up, activity and diet:	Continue with keppra. Principal Discharge DX:	Hypernatremia  Goal:	Therapeutic optimization  Instructions for follow-up, activity and diet:	It is important to see your primary physician as well as the physicians noted below within the next week to perform a comprehensive medical review.  Call their offices for an appointment as soon as you leave the hospital.  If you do not have a primary physician, contact the [facility] at [place] [phone] located on [address] [place] [address], [place] [address].  Your medical issues appear to be stable at this time, but if your symptoms recur or worsen, contact your physicians and/or return to the hospital if necessary.  If you encounter any issues or questions with your medication, call your physicians before stopping the medication.  Do not drive.  Limit your diet to 2 grams of sodium daily.  Secondary Diagnosis:	Diabetes insipidus  Instructions for follow-up, activity and diet:	Follow up with your endocrinologist in one week.  Secondary Diagnosis:	Essential hypertension  Instructions for follow-up, activity and diet:	Continue with labetalol and amlodipine.  Secondary Diagnosis:	Seizure  Instructions for follow-up, activity and diet:	Continue with keppra.  Secondary Diagnosis:	Mild cognitive impairment with memory loss

## 2017-09-19 NOTE — DISCHARGE NOTE ADULT - CARE PROVIDER_API CALL
Ranjith Mercado), EndocrinologyMetabDiabetes; Internal Medicine  [address] [place] [address]  Phone: [phone]  Fax: [phone]    Michael Portillo (DO), Internal Medicine  [address] [place] [address]  Phone: [phone]  Fax: [phone]

## 2017-09-19 NOTE — DISCHARGE NOTE ADULT - MEDICATION SUMMARY - MEDICATIONS TO TAKE
I will START or STAY ON the medications listed below when I get home from the hospital:    levETIRAcetam 1000 mg oral tablet  -- 1 tab(s) by mouth 2 times a day  -- Indication: For Seizure    desmopressin 0.01% nasal solution  -- 1 spray(s) into nose every 8 hours  -- Indication: For Diabetes insipidus    labetalol 100 mg oral tablet  -- 1 tab(s) by mouth 2 times a day  -- Indication: For HTN (Hypertension)    amLODIPine 10 mg oral tablet  -- 1 tab(s) by mouth once a day  -- Indication: For HTN (Hypertension)    ferrous sulfate 325 mg (65 mg elemental iron) oral tablet  -- 1 tab(s) by mouth 2 times a day  -- Indication: For anemia    pantoprazole 40 mg oral delayed release tablet  -- 1 tab(s) by mouth once a day  -- Indication: For gerd    Multiple Vitamins with Minerals oral tablet  -- 1 tab(s) by mouth once a day  -- Indication: For Supplement    folic acid 1 mg oral tablet  -- 1 tab(s) by mouth once a day  -- Indication: For anemia I will START or STAY ON the medications listed below when I get home from the hospital:    levETIRAcetam 1000 mg oral tablet  -- 1 tab(s) by mouth 2 times a day  -- Indication: For Seizure    desmopressin 0.01% nasal solution  -- 1 spray(s) into nose once a day  -- Indication: For Diabetes insipidus    labetalol 100 mg oral tablet  -- 1 tab(s) by mouth 2 times a day  -- Indication: For HTN (Hypertension)    amLODIPine 10 mg oral tablet  -- 1 tab(s) by mouth once a day  -- Indication: For HTN (Hypertension)    ferrous sulfate 325 mg (65 mg elemental iron) oral tablet  -- 1 tab(s) by mouth 2 times a day  -- Indication: For Anemia, unspecified type    pantoprazole 40 mg oral delayed release tablet  -- 1 tab(s) by mouth once a day  -- Indication: For gerd    Multiple Vitamins with Minerals oral tablet  -- 1 tab(s) by mouth once a day  -- Indication: For Supplement    folic acid 1 mg oral tablet  -- 1 tab(s) by mouth once a day  -- Indication: For Supplement

## 2017-09-19 NOTE — DISCHARGE NOTE ADULT - HOSPITAL COURSE
52 yo F with h/o HTN, DI, SAH, cerebral aneurysm s/p clipping 2011, multiple readmissions for hypernatremia presented with lethargy and hypernatremia. Lab work done as outpatient revealed hypernatremia. In the ED, sodium was noted to b 160 and was started on D5NS. Per renal, IVF changed to 1/4NS at 83cc/hr as pt with known history of symptoms from rapid reversal. Sodium continued to trend up and rate of fluid infusion was increased. DDAVP increased to tid. Sodium improved to 140.     Time to discharge: >35 minutes spent coordinating care 51 year old female with history of HTN, DI, SAH, cerebral aneurysm s/p clipping 2011, multiple readmissions for hypernatremia presented with lethargy and hypernatremia. Lab work done as outpatient revealed hypernatremia. In the ED, sodium was noted to b 160 and was started on D5NS. Per renal, IVF changed to 1/4NS at 83cc/hr as pt with known history of symptoms from rapid reversal. Sodium continued to trend up and rate of fluid infusion was increased. DDAVP increased to tid. Sodium improved to 140. Patient spent 12 days in the hospital. She has wide excursions in her sodium, at one time requiring ICU admission because of markedly elevated sodium levels. On 9/26 her sodium was as low as 136. I spoke to her  who is uncomfortable with her sodium less than 140 as she is prone to seizure.    Time to discharge: >35 minutes spent coordinating care Patient: KRUPA MEDINA [id number]                 Internal Medicine Hospitalist Discharge Note - Dr. Richie Anne    Chief Complaint: Patient is a 51y old  Female who presents with a chief complaint of hypernatremia (19 Sep 2017 09:00)    Hospital course:  50 yo F with h/o HTN, DI, SAH, cerebral aneurysm s/p clipping 2011, multiple readmissions for BRITTLE hypernatremia presented with lethargy and hypernatremia. Lab work done as outpatient revealed hypernatremia. On prior admission in March, pt developed seizure after acute drop in sodium level to 131, whereby thought process was that her new baseline cerebral osmolstat has been reset and is elevated relative to normal, thus reducing her seizure threshold. In the ED, sodium was noted to b 160 and was started on D5NS. Per renal, IVF changed to 1/4NS at 83cc/hr as pt with known history of symptoms from rapid reversal. Sodium continued to trend up and rate of fluid infusion was increased. DDAVP increased to tid. On 9/19, sodium level trended down to 140 and pt was cleared for discharge. Later that evening, pt was noted by  to have worsening memory. Repeat sodium level showed 136 and discharge was held. Desmopressin held.  Transferred to ICU for further management of hyper / hyponatremia.  Na improved to 143.  Seen by endocrine and renal, restarted on DDAVP.  Na has been stable 141-145.  Hgb has ranged 8.0-8.4, attributed to extensive lab draws performed during this hospitalization.  No s/s of acute blood loss.  Discussed outpatient monitoring of Hgb with .  Pt asymptomatic.  Will have BIW blood draws to monitor Na.  Visiting nurse services arranged for D5W 1000ml q48h.  This was discussed with PMD 9/29.    ____________________PHYSICAL EXAM:  Vitals reviewed as indicated below  GENERAL:  NAD Alert and Oriented x 2 to person, place (cffy=[id number], President =Nakul)  HEENT: NCAT  CARDIOVASCULAR:  S1, S2  LUNGS: CTAB  ABDOMEN:  soft, (-) tenderness, (-) distension, (+) bowel sounds, (-) guarding, (-) rebound (-) rigidity  EXTREMITIES:  no cyanosis / clubbing / edema.   ____________________  VITALS:  Vital Signs Last 24 Hrs  T(C): 37.1 (30 Sep 2017 09:46), Max: 37.3 (30 Sep 2017 04:33)  T(F): 98.8 (30 Sep 2017 09:46), Max: 99.2 (30 Sep 2017 04:33)  HR: 71 (30 Sep 2017 09:46) (71 - 94)  BP: 118/78 (30 Sep 2017 09:46) (106/68 - 133/89)  BP(mean): --  RR: 17 (30 Sep 2017 09:46) (16 - 19)  SpO2: 98% (30 Sep 2017 04:33) (98% - 100%) Daily     Daily   CAPILLARY BLOOD GLUCOSE  I&O's Summary    29 Sep 2017 07:01  -  30 Sep 2017 07:00  --------------------------------------------------------  IN: 2560 mL / OUT: 900 mL / NET: 1660 mL    30 Sep 2017 07:01  -  30 Sep 2017 11:11  --------------------------------------------------------  IN: 355 mL / OUT: 550 mL / NET: -195 mL  LABS:             8.0    5.8   )-----------( 287      ( 29 Sep 2017 07:18 )             26.2     09-30    141  |  102  |  24.0<H>  ----------------------------<  96  4.6   |  27.0  |  0.89    Ca    9.1      30 Sep 2017 09:22  50 yo F with h/o HTN, DI, SAH, cerebral aneurysm s/p clipping 2011 here with BRITTLE hypernatremia due to lapse in control of diabetes insipidus.     Problem/Plan - 1: ·  Problem: Hypernatremia.  Plan: Diabetes Insipidus with brittle control.  On DDAVP, IVF at home.   Problem/Plan - 2: ·  Problem: Diabetes insipidus.  Plan: continue DDAVP.   Problem/Plan - 3: ·  Problem: Essential hypertension.  Plan: Normotensive c/w labetalol and amlodipine.   Problem/Plan - 4: ·  Problem: Seizure.  Plan: C/w keppra 1gm bid.   Problem/Plan - 5: ·  Problem: Preventive measure.  Plan: >DVT prophylaxis - subcutaneous Heparin.   Problem/Plan - 6: Problem: Anemia, unspecified type. Plan: Multifactorial, may be due in part to extensive labwork during this hospitalization. Outpatient followup.    Disposition: stable for discharge.  Outpatient followup as above.  Patient was advised to return if they experience any recurrence or worsening of symptoms.  Total time spent on discharge was 35 minutes.  -Richie Anne D.O., Hospitalist, [facility]

## 2017-09-20 DIAGNOSIS — E87.1 HYPO-OSMOLALITY AND HYPONATREMIA: ICD-10-CM

## 2017-09-20 LAB
ANION GAP SERPL CALC-SCNC: 12 MMOL/L — SIGNIFICANT CHANGE UP (ref 5–17)
ANION GAP SERPL CALC-SCNC: 12 MMOL/L — SIGNIFICANT CHANGE UP (ref 5–17)
ANION GAP SERPL CALC-SCNC: 13 MMOL/L — SIGNIFICANT CHANGE UP (ref 5–17)
ANION GAP SERPL CALC-SCNC: 13 MMOL/L — SIGNIFICANT CHANGE UP (ref 5–17)
ANION GAP SERPL CALC-SCNC: 15 MMOL/L — SIGNIFICANT CHANGE UP (ref 5–17)
BUN SERPL-MCNC: 12 MG/DL — SIGNIFICANT CHANGE UP (ref 8–20)
BUN SERPL-MCNC: 13 MG/DL — SIGNIFICANT CHANGE UP (ref 8–20)
BUN SERPL-MCNC: 15 MG/DL — SIGNIFICANT CHANGE UP (ref 8–20)
CALCIUM SERPL-MCNC: 8.9 MG/DL — SIGNIFICANT CHANGE UP (ref 8.6–10.2)
CALCIUM SERPL-MCNC: 9 MG/DL — SIGNIFICANT CHANGE UP (ref 8.6–10.2)
CALCIUM SERPL-MCNC: 9.1 MG/DL — SIGNIFICANT CHANGE UP (ref 8.6–10.2)
CALCIUM SERPL-MCNC: 9.1 MG/DL — SIGNIFICANT CHANGE UP (ref 8.6–10.2)
CALCIUM SERPL-MCNC: 9.3 MG/DL — SIGNIFICANT CHANGE UP (ref 8.6–10.2)
CHLORIDE SERPL-SCNC: 87 MMOL/L — LOW (ref 98–107)
CHLORIDE SERPL-SCNC: 87 MMOL/L — LOW (ref 98–107)
CHLORIDE SERPL-SCNC: 88 MMOL/L — LOW (ref 98–107)
CHLORIDE SERPL-SCNC: 93 MMOL/L — LOW (ref 98–107)
CHLORIDE SERPL-SCNC: 94 MMOL/L — LOW (ref 98–107)
CO2 SERPL-SCNC: 24 MMOL/L — SIGNIFICANT CHANGE UP (ref 22–29)
CO2 SERPL-SCNC: 25 MMOL/L — SIGNIFICANT CHANGE UP (ref 22–29)
CO2 SERPL-SCNC: 25 MMOL/L — SIGNIFICANT CHANGE UP (ref 22–29)
CO2 SERPL-SCNC: 27 MMOL/L — SIGNIFICANT CHANGE UP (ref 22–29)
CO2 SERPL-SCNC: 27 MMOL/L — SIGNIFICANT CHANGE UP (ref 22–29)
CREAT SERPL-MCNC: 0.74 MG/DL — SIGNIFICANT CHANGE UP (ref 0.5–1.3)
CREAT SERPL-MCNC: 0.76 MG/DL — SIGNIFICANT CHANGE UP (ref 0.5–1.3)
CREAT SERPL-MCNC: 0.76 MG/DL — SIGNIFICANT CHANGE UP (ref 0.5–1.3)
CREAT SERPL-MCNC: 0.77 MG/DL — SIGNIFICANT CHANGE UP (ref 0.5–1.3)
CREAT SERPL-MCNC: 0.79 MG/DL — SIGNIFICANT CHANGE UP (ref 0.5–1.3)
GLUCOSE SERPL-MCNC: 101 MG/DL — SIGNIFICANT CHANGE UP (ref 70–115)
GLUCOSE SERPL-MCNC: 82 MG/DL — SIGNIFICANT CHANGE UP (ref 70–115)
GLUCOSE SERPL-MCNC: 88 MG/DL — SIGNIFICANT CHANGE UP (ref 70–115)
GLUCOSE SERPL-MCNC: 90 MG/DL — SIGNIFICANT CHANGE UP (ref 70–115)
GLUCOSE SERPL-MCNC: 96 MG/DL — SIGNIFICANT CHANGE UP (ref 70–115)
HCT VFR BLD CALC: 30.8 % — LOW (ref 37–47)
HGB BLD-MCNC: 9.8 G/DL — LOW (ref 12–16)
MCHC RBC-ENTMCNC: 24.6 PG — LOW (ref 27–31)
MCHC RBC-ENTMCNC: 31.8 G/DL — LOW (ref 32–36)
MCV RBC AUTO: 77.4 FL — LOW (ref 81–99)
PLATELET # BLD AUTO: 173 K/UL — SIGNIFICANT CHANGE UP (ref 150–400)
POTASSIUM SERPL-MCNC: 4.5 MMOL/L — SIGNIFICANT CHANGE UP (ref 3.5–5.3)
POTASSIUM SERPL-MCNC: 4.5 MMOL/L — SIGNIFICANT CHANGE UP (ref 3.5–5.3)
POTASSIUM SERPL-MCNC: 4.6 MMOL/L — SIGNIFICANT CHANGE UP (ref 3.5–5.3)
POTASSIUM SERPL-MCNC: 4.6 MMOL/L — SIGNIFICANT CHANGE UP (ref 3.5–5.3)
POTASSIUM SERPL-MCNC: 5.1 MMOL/L — SIGNIFICANT CHANGE UP (ref 3.5–5.3)
POTASSIUM SERPL-SCNC: 4.5 MMOL/L — SIGNIFICANT CHANGE UP (ref 3.5–5.3)
POTASSIUM SERPL-SCNC: 4.5 MMOL/L — SIGNIFICANT CHANGE UP (ref 3.5–5.3)
POTASSIUM SERPL-SCNC: 4.6 MMOL/L — SIGNIFICANT CHANGE UP (ref 3.5–5.3)
POTASSIUM SERPL-SCNC: 4.6 MMOL/L — SIGNIFICANT CHANGE UP (ref 3.5–5.3)
POTASSIUM SERPL-SCNC: 5.1 MMOL/L — SIGNIFICANT CHANGE UP (ref 3.5–5.3)
RBC # BLD: 3.98 M/UL — LOW (ref 4.4–5.2)
RBC # FLD: 15.8 % — HIGH (ref 11–15.6)
SODIUM SERPL-SCNC: 126 MMOL/L — LOW (ref 135–145)
SODIUM SERPL-SCNC: 126 MMOL/L — LOW (ref 135–145)
SODIUM SERPL-SCNC: 127 MMOL/L — LOW (ref 135–145)
SODIUM SERPL-SCNC: 131 MMOL/L — LOW (ref 135–145)
SODIUM SERPL-SCNC: 132 MMOL/L — LOW (ref 135–145)
WBC # BLD: 6.5 K/UL — SIGNIFICANT CHANGE UP (ref 4.8–10.8)
WBC # FLD AUTO: 6.5 K/UL — SIGNIFICANT CHANGE UP (ref 4.8–10.8)

## 2017-09-20 PROCEDURE — 99233 SBSQ HOSP IP/OBS HIGH 50: CPT

## 2017-09-20 RX ORDER — SODIUM CHLORIDE 5 G/100ML
100 INJECTION, SOLUTION INTRAVENOUS
Qty: 0 | Refills: 0 | Status: DISCONTINUED | OUTPATIENT
Start: 2017-09-20 | End: 2017-09-21

## 2017-09-20 RX ORDER — SODIUM CHLORIDE 9 MG/ML
1000 INJECTION INTRAMUSCULAR; INTRAVENOUS; SUBCUTANEOUS
Qty: 0 | Refills: 0 | Status: COMPLETED | OUTPATIENT
Start: 2017-09-20 | End: 2017-09-20

## 2017-09-20 RX ORDER — SODIUM CHLORIDE 9 MG/ML
1000 INJECTION INTRAMUSCULAR; INTRAVENOUS; SUBCUTANEOUS
Qty: 0 | Refills: 0 | Status: DISCONTINUED | OUTPATIENT
Start: 2017-09-20 | End: 2017-09-22

## 2017-09-20 RX ADMIN — PANTOPRAZOLE SODIUM 40 MILLIGRAM(S): 20 TABLET, DELAYED RELEASE ORAL at 15:27

## 2017-09-20 RX ADMIN — SODIUM CHLORIDE 1 GRAM(S): 9 INJECTION INTRAMUSCULAR; INTRAVENOUS; SUBCUTANEOUS at 05:48

## 2017-09-20 RX ADMIN — Medication 100 MILLIGRAM(S): at 05:48

## 2017-09-20 RX ADMIN — AMLODIPINE BESYLATE 10 MILLIGRAM(S): 2.5 TABLET ORAL at 05:52

## 2017-09-20 RX ADMIN — SODIUM CHLORIDE 100 MILLILITER(S): 9 INJECTION INTRAMUSCULAR; INTRAVENOUS; SUBCUTANEOUS at 18:22

## 2017-09-20 RX ADMIN — LEVETIRACETAM 1000 MILLIGRAM(S): 250 TABLET, FILM COATED ORAL at 05:49

## 2017-09-20 RX ADMIN — HEPARIN SODIUM 5000 UNIT(S): 5000 INJECTION INTRAVENOUS; SUBCUTANEOUS at 05:49

## 2017-09-20 RX ADMIN — Medication 100 MILLIGRAM(S): at 18:22

## 2017-09-20 RX ADMIN — Medication 325 MILLIGRAM(S): at 11:37

## 2017-09-20 RX ADMIN — SODIUM CHLORIDE 100 MILLILITER(S): 9 INJECTION INTRAMUSCULAR; INTRAVENOUS; SUBCUTANEOUS at 14:08

## 2017-09-20 RX ADMIN — PANTOPRAZOLE SODIUM 40 MILLIGRAM(S): 20 TABLET, DELAYED RELEASE ORAL at 05:48

## 2017-09-20 RX ADMIN — HEPARIN SODIUM 5000 UNIT(S): 5000 INJECTION INTRAVENOUS; SUBCUTANEOUS at 18:22

## 2017-09-20 RX ADMIN — LEVETIRACETAM 1000 MILLIGRAM(S): 250 TABLET, FILM COATED ORAL at 18:22

## 2017-09-20 NOTE — PROGRESS NOTE ADULT - SUBJECTIVE AND OBJECTIVE BOX
Patient seen and examined    Feels better when seen  had been vomiting earlier  no c/o CP SOB    No swelling feet    Vital Signs Last 24 Hrs  T(C): 36.9 (09-20-17 @ 08:06), Max: 36.9 (09-20-17 @ 00:19)  T(F): 98.4 (09-20-17 @ 08:06), Max: 98.5 (09-20-17 @ 00:19)  HR: 73 (09-20-17 @ 08:06) (71 - 75)  BP: 150/80 (09-20-17 @ 08:06) (145/95 - 161/107)  BP(mean): --  RR: 18 (09-20-17 @ 00:19) (18 - 18)  SpO2: 98% (09-20-17 @ 00:19) (98% - 98%)    Chest   clear  CV   no murmur  Abd   soft, NT BS+  Extr   No edema  Neuro  grossly iintact motor        20 Sep 2017 12:38    126    |  87     |  12.0   ----------------------------<  90     5.1     |  27.0   |  0.77     Ca    9.3        20 Sep 2017 12:38                            9.8    6.5   )-----------( 173      ( 20 Sep 2017 08:30 )             30.8

## 2017-09-20 NOTE — PROGRESS NOTE ADULT - ASSESSMENT
na runs high, now decreasing  d/c DDAVp for now - done NS at 100 cc/H x1 now and may need t continue till Na > 135 and then d/c  once na > 140, shall resume DDAVP only 1 spray qd and adjust as needed  labs am

## 2017-09-20 NOTE — PROGRESS NOTE ADULT - SUBJECTIVE AND OBJECTIVE BOX
Interval HPI/ Overnight Events:  Patient seen at bedside for follow up of DI.  Patient was found to have Sodium of 136 yesterday evening, which dropped to 131 at 1AM.  Her ddAVP was help and she was given 2 alt tabs.  She feels well this morning and denies any headache, N/V.  She denies any polyuria or polydipsia.     MEDICATIONS  (STANDING):  levETIRAcetam 1000 milliGRAM(s) Oral two times a day  labetalol 100 milliGRAM(s) Oral two times a day  amLODIPine   Tablet 10 milliGRAM(s) Oral daily  ferrous    sulfate 325 milliGRAM(s) Oral daily  heparin  Injectable 5000 Unit(s) SubCutaneous every 12 hours  sodium chloride 1 Gram(s) Oral two times a day  pantoprazole    Tablet 40 milliGRAM(s) Oral two times a day before meals    Vital Signs Last 24 Hrs  T(C): 36.9 (20 Sep 2017 08:06), Max: 36.9 (20 Sep 2017 00:19)  T(F): 98.4 (20 Sep 2017 08:06), Max: 98.5 (20 Sep 2017 00:19)  HR: 723 (20 Sep 2017 08:06) (71 - 723)  BP: 150/80 (20 Sep 2017 08:06) (145/95 - 161/107)  RR: 18 (20 Sep 2017 00:19) (18 - 18)  SpO2: 98% (20 Sep 2017 00:19) (98% - 98%)    PE:  Gen: AAO, NAD  HEENT:  sclera anicteric,  MMM  Neck:  no thyromegaly, no LAD  CV:  nl S1 + S2, RRR, no m/r/g  Resp:  nl respiratory effort, CTA b/l  GI/ Abd: soft, NT/ ND, BS +  MS:  no c/c/e    LABS:  09-20    132<L>  |  93<L>  |  12.0  ----------------------------<  82  4.5   |  27.0  |  0.74    Ca    9.1      20 Sep 2017 08:30

## 2017-09-20 NOTE — DIETITIAN INITIAL EVALUATION ADULT. - OTHER INFO
Pt seen at bedside. Pt states having good appetite and PO intake PTA. Pt had episode of emesis yesterday (9/19) and discharge was held. Per chart, pt sodium levels had dropped to a undesirable level and salt tabs have been started. Pt denies difficulty chewing/swallowing. Nutrition education on DASH/TLC diet attempted, however, pt seems to be with AMS.

## 2017-09-20 NOTE — CONSULT NOTE ADULT - ATTENDING COMMENTS
Pt seen and evaluated with [facility], agree with above assessment and plan. Poorly controlled DI post SAH with rapid Na correction leading to confusion and vomiting: Plan: hold all fluids and DDAVP allow for pt's body to catch up... repeat lytes q4 and resume DDAVP when appropriate. Neuro checks, tight BP and UOP monitoring in ICU setting . Renal and endocrine following.

## 2017-09-20 NOTE — PROGRESS NOTE ADULT - ASSESSMENT
51 year old female with hx of diabetes insipidus, SAH, cerebral aneurysm s/p clipping, HTN admitted with hypernatremia due to loss of control of DI in outpatient setting.  Last night she developed hyponatremia likely from excess ddAVP and fluid intake.      1.  DI-  Sodium now trending back up.  Continue to hold ddAVP until Sodium is around 140.  Then will resume at lower dose 1 spray BID.  If we are concerned that she is not administering the nasal spray correctly, thereby causing irregular dosing, we could try the PO form. No need to give salt tabs.  Let patient drink to thirst.   Repeat Na at 1PM.   Follow urine osm and sp gravity.   If possible, would keep record of strict I + Os.   2.  HTN-  continue Amlodipine and labetalol.

## 2017-09-20 NOTE — CONSULT NOTE ADULT - PROBLEM SELECTOR RECOMMENDATION 9
Hold IVF for now  Hold DDAVP for now  Endocrine following, as pt's sodium starts to improve will resume DDAVP at previous dose of bid dosing  Renal following  Monitor sodium every 4 hours, if pt developes further symptoms/seizures will treat with hypertonic saline

## 2017-09-20 NOTE — PROGRESS NOTE ADULT - PROBLEM SELECTOR PLAN 1
Labile sodium levels that are difficult to control  Na 132 this morning  stop ivf and desmopressin  Repeat BMP q 4-hrs  Appreciate renal and endocrine consult

## 2017-09-20 NOTE — PROGRESS NOTE ADULT - ASSESSMENT
52 yo F with h/o HTN, DI, SAH, cerebral aneurysm s/p clipping 2011 here with BRITTLE hypernatremia due to lapse in control of diabetes insipidus.

## 2017-09-20 NOTE — PROGRESS NOTE ADULT - SUBJECTIVE AND OBJECTIVE BOX
KRUPA MEDINA    [id number]    51y      Female    INTERVAL HPI/OVERNIGHT EVENTS: Per , pt noted to have worsening memory. Repeat BMP showed sodium 136 and he reported that pt is at high risk of seizures below 140. Discharge was held, and pt was started on salt tabs.    Hospital course:  50 yo F with h/o HTN, DI, SAH, cerebral aneurysm s/p clipping 2011, multiple readmissions for BRITTLE hypernatremia presented with lethargy and hypernatremia. Lab work done as outpatient revealed hypernatremia. On prior admission in March, pt developed seizure after acute drop in sodium level to 131, whereby thought process was that her new baseline cerebral osmolstat has been rest and is elevated relative to normal, thereby reducing her seizure threshold. In the ED, sodium was noted to b 160 and was started on D5NS. Per renal, IVF changed to 1/4NS at 83cc/hr as pt with known history of symptoms from rapid reversal. Sodium continued to trend up and rate of fluid infusion was increased. DDAVP increased to tid. On 9/19, sodium level trended down to 140 and pt was cleared for discharge. Later that evening, pt was noted by  to have worsening memory. Repeat sodium level showed 136 and discharge was held. Desmopressin held.       REVIEW OF SYSTEMS:    CONSTITUTIONAL: No fever   RESPIRATORY: No cough, wheezing, hemoptysis  CARDIOVASCULAR: No chest pain       Vital Signs Last 24 Hrs  T(C): 36.9 (20 Sep 2017 08:06), Max: 36.9 (20 Sep 2017 00:19)  T(F): 98.4 (20 Sep 2017 08:06), Max: 98.5 (20 Sep 2017 00:19)  HR: 723 (20 Sep 2017 08:06) (71 - 723)  BP: 150/80 (20 Sep 2017 08:06) (145/95 - 161/107)  BP(mean): --  RR: 18 (20 Sep 2017 00:19) (18 - 18)  SpO2: 98% (20 Sep 2017 00:19) (98% - 98%)    PHYSICAL EXAM:    GENERAL: NAD, well-groomed  HEENT: MMM  CHEST/LUNG: Clear to percussion bilaterally   HEART: S1S2+, Regular rate and rhythm   ABDOMEN: Soft, Nontender, Nondistended; Bowel sounds present    LABS:                        9.8    6.5   )-----------( 173      ( 20 Sep 2017 08:30 )             30.8     09-20    132<L>  |  93<L>  |  12.0  ----------------------------<  82  4.5   |  27.0  |  0.74    Ca    9.1      20 Sep 2017 08:30              MEDICATIONS  (STANDING):  levETIRAcetam 1000 milliGRAM(s) Oral two times a day  labetalol 100 milliGRAM(s) Oral two times a day  amLODIPine   Tablet 10 milliGRAM(s) Oral daily  ferrous    sulfate 325 milliGRAM(s) Oral daily  heparin  Injectable 5000 Unit(s) SubCutaneous every 12 hours  pantoprazole    Tablet 40 milliGRAM(s) Oral two times a day before meals    MEDICATIONS  (PRN):  ondansetron Injectable 4 milliGRAM(s) IV Push every 6 hours PRN Nausea and/or Vomiting      RADIOLOGY & ADDITIONAL TESTS: KRUPA MEDINA    [id number]    51y      Female    INTERVAL HPI/OVERNIGHT EVENTS: Per , pt noted to have worsening memory. Repeat BMP showed sodium 136 and he reported that pt is at high risk of seizures below 140. Discharge was held, and pt was started on salt tabs.    Hospital course:  52 yo F with h/o HTN, DI, SAH, cerebral aneurysm s/p clipping 2011, multiple readmissions for BRITTLE hypernatremia presented with lethargy and hypernatremia. Lab work done as outpatient revealed hypernatremia. On prior admission in March, pt developed seizure after acute drop in sodium level to 131, whereby thought process was that her new baseline cerebral osmolstat has been reset and is elevated relative to normal, thus reducing her seizure threshold. In the ED, sodium was noted to b 160 and was started on D5NS. Per renal, IVF changed to 1/4NS at 83cc/hr as pt with known history of symptoms from rapid reversal. Sodium continued to trend up and rate of fluid infusion was increased. DDAVP increased to tid. On 9/19, sodium level trended down to 140 and pt was cleared for discharge. Later that evening, pt was noted by  to have worsening memory. Repeat sodium level showed 136 and discharge was held. Desmopressin held.       REVIEW OF SYSTEMS:    CONSTITUTIONAL: No fever   RESPIRATORY: No cough, wheezing, hemoptysis  CARDIOVASCULAR: No chest pain       Vital Signs Last 24 Hrs  T(C): 36.9 (20 Sep 2017 08:06), Max: 36.9 (20 Sep 2017 00:19)  T(F): 98.4 (20 Sep 2017 08:06), Max: 98.5 (20 Sep 2017 00:19)  HR: 723 (20 Sep 2017 08:06) (71 - 723)  BP: 150/80 (20 Sep 2017 08:06) (145/95 - 161/107)  BP(mean): --  RR: 18 (20 Sep 2017 00:19) (18 - 18)  SpO2: 98% (20 Sep 2017 00:19) (98% - 98%)    PHYSICAL EXAM:    GENERAL: NAD, well-groomed  HEENT: MMM  CHEST/LUNG: Clear to percussion bilaterally   HEART: S1S2+, Regular rate and rhythm   ABDOMEN: Soft, Nontender, Nondistended; Bowel sounds present    LABS:                        9.8    6.5   )-----------( 173      ( 20 Sep 2017 08:30 )             30.8     09-20    132<L>  |  93<L>  |  12.0  ----------------------------<  82  4.5   |  27.0  |  0.74    Ca    9.1      20 Sep 2017 08:30              MEDICATIONS  (STANDING):  levETIRAcetam 1000 milliGRAM(s) Oral two times a day  labetalol 100 milliGRAM(s) Oral two times a day  amLODIPine   Tablet 10 milliGRAM(s) Oral daily  ferrous    sulfate 325 milliGRAM(s) Oral daily  heparin  Injectable 5000 Unit(s) SubCutaneous every 12 hours  pantoprazole    Tablet 40 milliGRAM(s) Oral two times a day before meals    MEDICATIONS  (PRN):  ondansetron Injectable 4 milliGRAM(s) IV Push every 6 hours PRN Nausea and/or Vomiting      RADIOLOGY & ADDITIONAL TESTS:

## 2017-09-20 NOTE — CONSULT NOTE ADULT - ASSESSMENT
51 yr old female with hx HTN, DI, SAH, cerebral aneurysm s/p clipping 2011 multiple admissions for hypernatremia     Improved chronic hypernatremia   known CDI   Known H/O symptoms  from rapid reversal   EST free water deficit of 8 L  Continue the current measures   Change IVF to 1/4 NS @ 83 ml/hr   Serial labs
Hypernatremia due to lapse in control of diabetes insipidus. Appears to need adjustment in outpatient DDAVp dose, but will need more information about recent medication use.  For now will order DDAVp q12h.
51 YOF with chronic DI and acute on chronic hypernatremia now with overcorrection and profound hyponatremia with neurologic symptoms

## 2017-09-20 NOTE — CONSULT NOTE ADULT - SUBJECTIVE AND OBJECTIVE BOX
HPI:  Patient is 51 yr old female with hx HTN, DI, SAH, cerebral aneurysm s/p clipping 2011 multiple admissions for hypernatremia Presenting with hypernatremia.  Patient noticed to be lethargic.  Blood work done 2 days ago shows hypernatremia.  In the ED patient was started on IVF.  Patient appears to be alert, and orientated to place, and year only.  Offers no other complaints  As per daughter on recent admission, pt is Poor historian due to memory issues. (14 Sep 2017 22:42)  Pt. known to me from a prior admission. H/o diabetes insipidus, on DDAVP and periodic outpatient IV fluid supplements.      PAST MEDICAL & SURGICAL HISTORY:  Memory loss, short term  Brain aneurysm  Diabetes insipidus  HTN (Hypertension)  Subarachnoid Hemorrhage  S/P Cerebral Aneurysm Repair  S/P Craniotomy      FAMILY HISTORY:  Family history of hypertension (Father)      SOCIAL HISTORY:    REVIEW OF SYSTEMS:  pt. currently denies symptoms, but due to limited recall not completely reliable    pantoprazole    Tablet 40 milliGRAM(s) Oral before breakfast  heparin  Injectable 5000 Unit(s) SubCutaneous every 12 hours  sodium chloride 0.225%. 1000 milliLiter(s) (83 mL/Hr) IV Continuous <Continuous>    MEDICATIONS  (PRN):      Allergies    No Known Allergies    Intolerancw    PHYSICAL EXAM:    Vital Signs Last 24 Hrs  T(C): 36.4 (15 Sep 2017 11:15), Max: 37.1 (14 Sep 2017 18:10)  T(F): 97.6 (15 Sep 2017 11:15), Max: 98.8 (14 Sep 2017 18:10)  HR: 99 (15 Sep 2017 11:15) (67 - 99)  BP: 116/90 (15 Sep 2017 11:15) (100/65 - 124/88)  BP(mean): 100 (14 Sep 2017 22:42) (100 - 100)  RR: 16 (15 Sep 2017 11:15) (16 - 18)  SpO2: 100% (15 Sep 2017 11:15) (98% - 100%)    General appearance: Well developed, well nourished.    Eyes: EOM full. No exophthalmos.    Neck: Trachea midline. No thyroid enlargement.    Lungs: Normal respiratory excursion. Lungs clear.    CV: Regular cardiac rhythm. No murmur.     Musculoskeletal: No cyanosis, clubbing, or edema. No pedal lesions.    Skin: Warm and dry.     Neuro: Cranial nerves intact. Normal motor  function.     Psych: Normal affect, impaired judgement.            LABS:                        9.6    5.0   )-----------( 116      ( 15 Sep 2017 03:08 )             33.5     09-15    162<HH>  |  129<H>  |  23.0<H>  ----------------------------<  80  4.4   |  25.0  |  1.12    Ca    8.9      15 Sep 2017 13:20    TPro  7.5  /  Alb  3.8  /  TBili  0.7  /  DBili  x   /  AST  20  /  ALT  55<H>  /  AlkPhos  158<H>  09-15      LIVER FUNCTIONS - ( 15 Sep 2017 03:08 )  Alb: 3.8 g/dL / Pro: 7.5 g/dL / ALK PHOS: 158 U/L / ALT: 55 U/L / AST: 20 U/L / GGT: x
Patient is a 51y old  Female who presents with a chief complaint of     HPI:  Patient is 51 yr old female with hx HTN, DI, SAH, cerebral aneurysm s/p clipping 2011 multiple admissions for hypernatremia Presenting with hyperantremia.  Patient noticed to be lethargic.  Blood work done 2 days ago shows hypernatremia.  In the ED patient was started on IVF.  Patient appears to be alert, and orientated to place, and year only.  Offers no other complaints  As per daughter on recent admission, pt is Poor historian due to memory issues. (14 Sep 2017 22:42)    Above noted   Feels better with measures in ER   Free water deficit 8 L       PAST MEDICAL & SURGICAL HISTORY:  Memory loss, short term  Brain aneurysm  Diabetes insipidus  HTN (Hypertension)  Subarachnoid Hemorrhage  S/P Cerebral Aneurysm Repair  S/P Craniotomy      FAMILY HISTORY:  Family history of hypertension (Father)      Social History:    MEDICATIONS  (STANDING):  levETIRAcetam 1000 milliGRAM(s) Oral two times a day  desmopressin 0.01% Nasal 1 Spray(s) Nasal every 12 hours  labetalol 100 milliGRAM(s) Oral two times a day  amLODIPine   Tablet 10 milliGRAM(s) Oral daily  ferrous    sulfate 325 milliGRAM(s) Oral daily  pantoprazole    Tablet 40 milliGRAM(s) Oral before breakfast  heparin  Injectable 5000 Unit(s) SubCutaneous every 12 hours  dextrose 5% + sodium chloride 0.45%. 1000 milliLiter(s) (60 mL/Hr) IV Continuous <Continuous>    MEDICATIONS  (PRN):      Allergies    No Known Allergies    Intolerances        Vital Signs Last 24 Hrs  T(C): 36.4 (15 Sep 2017 11:15), Max: 37.1 (14 Sep 2017 18:10)  T(F): 97.6 (15 Sep 2017 11:15), Max: 98.8 (14 Sep 2017 18:10)  HR: 99 (15 Sep 2017 11:15) (67 - 99)  BP: 116/90 (15 Sep 2017 11:15) (100/65 - 124/88)  BP(mean): 100 (14 Sep 2017 22:42) (100 - 100)  RR: 16 (15 Sep 2017 11:15) (16 - 18)  SpO2: 100% (15 Sep 2017 11:15) (98% - 100%)  Daily Height in cm: 160.02 (14 Sep 2017 18:10)    Daily   I&O's Detail    I&O's Summary      PHYSICAL EXAM:    GENERAL: NAD,   HEAD:  anicteric , no edema   NECK: Supple, No JVD,  CHEST/LUNG: Clear to percussion bilaterally; No rales, rhonchi, wheezing, or rubs  HEART: Regular rate and rhythm; No murmurs, rubs, or gallops  ABDOMEN: Soft, Nontender, Nondistended; Bowel sounds present  EXTREMITIES:  2+ Peripheral Pulses, No clubbing, cyanosis, or edema          LABS:                        9.6    5.0   )-----------( 116      ( 15 Sep 2017 03:08 )             33.5     09-15    162<HH>  |  129<H>  |  23.0<H>  ----------------------------<  80  4.4   |  25.0  |  1.12    Ca    8.9      15 Sep 2017 13:20    TPro  7.5  /  Alb  3.8  /  TBili  0.7  /  DBili  x   /  AST  20  /  ALT  55<H>  /  AlkPhos  158<H>  09-15    PT/INR - ( 14 Sep 2017 20:49 )   PT: 11.2 sec;   INR: 1.02 ratio         PTT - ( 14 Sep 2017 20:49 )  PTT:43.2 sec            RADIOLOGY & ADDITIONAL TESTS:
Pt is a 51 YOF h/o SAH due to cerebral aneurysm which was clipped in 2011.  Pt has developed seizure d/o post surgery as well as chronic DI for which she is on DDAVP 2x/day.  Pt has had multiple hospitalizations for hypernatremia and required correction.  She was here several months ago and was admitted to ICU for overcorrection/hyponatremia which led to seizure and ICU admission.  Pt has been being monitored by renal/endocrine.  She was treated with quarter normal saline but was correcting very slowly so they increased her DDAVP from twice a day to 3 times a day.  Pt sodium despite stopping fluids now has dropped to 126 and pt has become symptomatic with nausea, confusion, slurred speech which is similiar to symptoms on a prior admission for same.  At that time pt had a seizure at approx Na 131.  So for this pt a Na of 126 represents relative severe hyponatremia.  Therefore we will move her to ICU for closer monitoring, more frequent lab evaluation/monitoring sodium and neurologic checks.  Pt is being followed already by both renal and endocrinology.  At this point we will hold IVF and hold DDAVP and allow sodium to auto-correct, however if pt becomes more symptomatic and/or has seizure we can consider administering hypertonic saline in ICU.  REASON FOR CONSULT: Hyponatremia/symptomatic    CONSULT REQUESTED BY: Brittany    Patient is a 51y old  Female who presents with a chief complaint of hypernatremia (19 Sep 2017 09:00)      BRIEF HOSPITAL COURSE: as above    Events last 24 hours: as above    PAST MEDICAL & SURGICAL HISTORY:  Memory loss, short term  Brain aneurysm  Diabetes insipidus  HTN (Hypertension)  Subarachnoid Hemorrhage  S/P Cerebral Aneurysm Repair  S/P Craniotomy    Allergies    No Known Allergies    Intolerances      FAMILY HISTORY:  Family history of hypertension (Father)      Review of Systems:  Pt currently confused and unable to answer questions in ROS    Medications:    labetalol 100 milliGRAM(s) Oral two times a day  amLODIPine   Tablet 10 milliGRAM(s) Oral daily      levETIRAcetam 1000 milliGRAM(s) Oral two times a day  ondansetron Injectable 4 milliGRAM(s) IV Push every 6 hours PRN      heparin  Injectable 5000 Unit(s) SubCutaneous every 12 hours    pantoprazole    Tablet 40 milliGRAM(s) Oral two times a day before meals        ferrous    sulfate 325 milliGRAM(s) Oral daily                ICU Vital Signs Last 24 Hrs  T(C): 36.9 (20 Sep 2017 08:06), Max: 36.9 (20 Sep 2017 00:19)  T(F): 98.4 (20 Sep 2017 08:06), Max: 98.5 (20 Sep 2017 00:19)  HR: 73 (20 Sep 2017 08:06) (71 - 75)  BP: 150/80 (20 Sep 2017 08:06) (145/95 - 161/107)  BP(mean): --  ABP: --  ABP(mean): --  RR: 18 (20 Sep 2017 00:19) (18 - 18)  SpO2: 98% (20 Sep 2017 00:19) (98% - 98%)    Vital Signs Last 24 Hrs  T(C): 36.9 (20 Sep 2017 08:06), Max: 36.9 (20 Sep 2017 00:19)  T(F): 98.4 (20 Sep 2017 08:06), Max: 98.5 (20 Sep 2017 00:19)  HR: 73 (20 Sep 2017 08:06) (71 - 75)  BP: 150/80 (20 Sep 2017 08:06) (145/95 - 161/107)  BP(mean): --  RR: 18 (20 Sep 2017 00:19) (18 - 18)  SpO2: 98% (20 Sep 2017 00:19) (98% - 98%)        I&O's Detail        LABS:                        9.8    6.5   )-----------( 173      ( 20 Sep 2017 08:30 )             30.8     09-20    126<L>  |  87<L>  |  12.0  ----------------------------<  90  5.1   |  27.0  |  0.77    Ca    9.3      20 Sep 2017 12:38            CAPILLARY BLOOD GLUCOSE            CULTURES:      Physical Examination:    General: No acute distress.  Alert, confused to time, oriented to place and name    HEENT: Pupils equal, reactive to light.  Symmetric.    PULM: Clear to auscultation bilaterally, no significant sputum production    CVS: Regular rate and rhythm, no murmurs, rubs, or gallops    ABD: Soft, nondistended, nontender, normoactive bowel sounds, no masses    EXT: No edema, nontender    SKIN: Warm and well perfused, no rashes noted.    RADIOLOGY: ***    CRITICAL CARE TIME SPENT: 30min

## 2017-09-21 LAB
ANION GAP SERPL CALC-SCNC: 10 MMOL/L — SIGNIFICANT CHANGE UP (ref 5–17)
ANION GAP SERPL CALC-SCNC: 11 MMOL/L — SIGNIFICANT CHANGE UP (ref 5–17)
ANION GAP SERPL CALC-SCNC: 12 MMOL/L — SIGNIFICANT CHANGE UP (ref 5–17)
ANION GAP SERPL CALC-SCNC: 14 MMOL/L — SIGNIFICANT CHANGE UP (ref 5–17)
BUN SERPL-MCNC: 15 MG/DL — SIGNIFICANT CHANGE UP (ref 8–20)
BUN SERPL-MCNC: 15 MG/DL — SIGNIFICANT CHANGE UP (ref 8–20)
BUN SERPL-MCNC: 16 MG/DL — SIGNIFICANT CHANGE UP (ref 8–20)
BUN SERPL-MCNC: 19 MG/DL — SIGNIFICANT CHANGE UP (ref 8–20)
BUN SERPL-MCNC: 21 MG/DL — HIGH (ref 8–20)
BUN SERPL-MCNC: 22 MG/DL — HIGH (ref 8–20)
CALCIUM SERPL-MCNC: 8.2 MG/DL — LOW (ref 8.6–10.2)
CALCIUM SERPL-MCNC: 8.3 MG/DL — LOW (ref 8.6–10.2)
CALCIUM SERPL-MCNC: 8.6 MG/DL — SIGNIFICANT CHANGE UP (ref 8.6–10.2)
CALCIUM SERPL-MCNC: 8.7 MG/DL — SIGNIFICANT CHANGE UP (ref 8.6–10.2)
CALCIUM SERPL-MCNC: 8.7 MG/DL — SIGNIFICANT CHANGE UP (ref 8.6–10.2)
CALCIUM SERPL-MCNC: 8.8 MG/DL — SIGNIFICANT CHANGE UP (ref 8.6–10.2)
CHLORIDE SERPL-SCNC: 100 MMOL/L — SIGNIFICANT CHANGE UP (ref 98–107)
CHLORIDE SERPL-SCNC: 90 MMOL/L — LOW (ref 98–107)
CHLORIDE SERPL-SCNC: 94 MMOL/L — LOW (ref 98–107)
CHLORIDE SERPL-SCNC: 95 MMOL/L — LOW (ref 98–107)
CHLORIDE SERPL-SCNC: 96 MMOL/L — LOW (ref 98–107)
CHLORIDE SERPL-SCNC: 98 MMOL/L — SIGNIFICANT CHANGE UP (ref 98–107)
CO2 SERPL-SCNC: 21 MMOL/L — LOW (ref 22–29)
CO2 SERPL-SCNC: 24 MMOL/L — SIGNIFICANT CHANGE UP (ref 22–29)
CO2 SERPL-SCNC: 25 MMOL/L — SIGNIFICANT CHANGE UP (ref 22–29)
CO2 SERPL-SCNC: 25 MMOL/L — SIGNIFICANT CHANGE UP (ref 22–29)
CO2 SERPL-SCNC: 26 MMOL/L — SIGNIFICANT CHANGE UP (ref 22–29)
CO2 SERPL-SCNC: 26 MMOL/L — SIGNIFICANT CHANGE UP (ref 22–29)
CREAT SERPL-MCNC: 0.73 MG/DL — SIGNIFICANT CHANGE UP (ref 0.5–1.3)
CREAT SERPL-MCNC: 0.8 MG/DL — SIGNIFICANT CHANGE UP (ref 0.5–1.3)
CREAT SERPL-MCNC: 0.8 MG/DL — SIGNIFICANT CHANGE UP (ref 0.5–1.3)
CREAT SERPL-MCNC: 0.81 MG/DL — SIGNIFICANT CHANGE UP (ref 0.5–1.3)
CREAT SERPL-MCNC: 0.83 MG/DL — SIGNIFICANT CHANGE UP (ref 0.5–1.3)
CREAT SERPL-MCNC: 0.87 MG/DL — SIGNIFICANT CHANGE UP (ref 0.5–1.3)
GLUCOSE SERPL-MCNC: 113 MG/DL — SIGNIFICANT CHANGE UP (ref 70–115)
GLUCOSE SERPL-MCNC: 114 MG/DL — SIGNIFICANT CHANGE UP (ref 70–115)
GLUCOSE SERPL-MCNC: 116 MG/DL — HIGH (ref 70–115)
GLUCOSE SERPL-MCNC: 77 MG/DL — SIGNIFICANT CHANGE UP (ref 70–115)
GLUCOSE SERPL-MCNC: 80 MG/DL — SIGNIFICANT CHANGE UP (ref 70–115)
GLUCOSE SERPL-MCNC: 98 MG/DL — SIGNIFICANT CHANGE UP (ref 70–115)
HCT VFR BLD CALC: 29 % — LOW (ref 37–47)
HGB BLD-MCNC: 9.5 G/DL — LOW (ref 12–16)
MAGNESIUM SERPL-MCNC: 1.7 MG/DL — SIGNIFICANT CHANGE UP (ref 1.6–2.6)
MCHC RBC-ENTMCNC: 24.8 PG — LOW (ref 27–31)
MCHC RBC-ENTMCNC: 32.8 G/DL — SIGNIFICANT CHANGE UP (ref 32–36)
MCV RBC AUTO: 75.7 FL — LOW (ref 81–99)
OSMOLALITY UR: 145 MOSM/KG — LOW (ref 300–1000)
PHOSPHATE SERPL-MCNC: 3.4 MG/DL — SIGNIFICANT CHANGE UP (ref 2.4–4.7)
PLATELET # BLD AUTO: 184 K/UL — SIGNIFICANT CHANGE UP (ref 150–400)
POTASSIUM SERPL-MCNC: 3.6 MMOL/L — SIGNIFICANT CHANGE UP (ref 3.5–5.3)
POTASSIUM SERPL-MCNC: 3.8 MMOL/L — SIGNIFICANT CHANGE UP (ref 3.5–5.3)
POTASSIUM SERPL-MCNC: 4.1 MMOL/L — SIGNIFICANT CHANGE UP (ref 3.5–5.3)
POTASSIUM SERPL-MCNC: 4.5 MMOL/L — SIGNIFICANT CHANGE UP (ref 3.5–5.3)
POTASSIUM SERPL-MCNC: 4.6 MMOL/L — SIGNIFICANT CHANGE UP (ref 3.5–5.3)
POTASSIUM SERPL-MCNC: 4.7 MMOL/L — SIGNIFICANT CHANGE UP (ref 3.5–5.3)
POTASSIUM SERPL-SCNC: 3.6 MMOL/L — SIGNIFICANT CHANGE UP (ref 3.5–5.3)
POTASSIUM SERPL-SCNC: 3.8 MMOL/L — SIGNIFICANT CHANGE UP (ref 3.5–5.3)
POTASSIUM SERPL-SCNC: 4.1 MMOL/L — SIGNIFICANT CHANGE UP (ref 3.5–5.3)
POTASSIUM SERPL-SCNC: 4.5 MMOL/L — SIGNIFICANT CHANGE UP (ref 3.5–5.3)
POTASSIUM SERPL-SCNC: 4.6 MMOL/L — SIGNIFICANT CHANGE UP (ref 3.5–5.3)
POTASSIUM SERPL-SCNC: 4.7 MMOL/L — SIGNIFICANT CHANGE UP (ref 3.5–5.3)
RBC # BLD: 3.83 M/UL — LOW (ref 4.4–5.2)
RBC # FLD: 15.7 % — HIGH (ref 11–15.6)
SODIUM SERPL-SCNC: 126 MMOL/L — LOW (ref 135–145)
SODIUM SERPL-SCNC: 132 MMOL/L — LOW (ref 135–145)
SODIUM SERPL-SCNC: 132 MMOL/L — LOW (ref 135–145)
SODIUM SERPL-SCNC: 133 MMOL/L — LOW (ref 135–145)
SODIUM SERPL-SCNC: 133 MMOL/L — LOW (ref 135–145)
SODIUM SERPL-SCNC: 135 MMOL/L — SIGNIFICANT CHANGE UP (ref 135–145)
WBC # BLD: 6.1 K/UL — SIGNIFICANT CHANGE UP (ref 4.8–10.8)
WBC # FLD AUTO: 6.1 K/UL — SIGNIFICANT CHANGE UP (ref 4.8–10.8)

## 2017-09-21 PROCEDURE — 99233 SBSQ HOSP IP/OBS HIGH 50: CPT

## 2017-09-21 RX ORDER — MAGNESIUM SULFATE 500 MG/ML
1 VIAL (ML) INJECTION ONCE
Qty: 0 | Refills: 0 | Status: COMPLETED | OUTPATIENT
Start: 2017-09-21 | End: 2017-09-21

## 2017-09-21 RX ORDER — POTASSIUM CHLORIDE 20 MEQ
20 PACKET (EA) ORAL ONCE
Qty: 0 | Refills: 0 | Status: COMPLETED | OUTPATIENT
Start: 2017-09-21 | End: 2017-09-21

## 2017-09-21 RX ADMIN — Medication 20 MILLIEQUIVALENT(S): at 11:27

## 2017-09-21 RX ADMIN — LEVETIRACETAM 1000 MILLIGRAM(S): 250 TABLET, FILM COATED ORAL at 05:27

## 2017-09-21 RX ADMIN — AMLODIPINE BESYLATE 10 MILLIGRAM(S): 2.5 TABLET ORAL at 05:27

## 2017-09-21 RX ADMIN — SODIUM CHLORIDE 100 MILLILITER(S): 9 INJECTION INTRAMUSCULAR; INTRAVENOUS; SUBCUTANEOUS at 05:27

## 2017-09-21 RX ADMIN — HEPARIN SODIUM 5000 UNIT(S): 5000 INJECTION INTRAVENOUS; SUBCUTANEOUS at 05:27

## 2017-09-21 RX ADMIN — SODIUM CHLORIDE 100 MILLILITER(S): 9 INJECTION INTRAMUSCULAR; INTRAVENOUS; SUBCUTANEOUS at 17:27

## 2017-09-21 RX ADMIN — Medication 100 MILLIGRAM(S): at 17:27

## 2017-09-21 RX ADMIN — Medication 325 MILLIGRAM(S): at 11:27

## 2017-09-21 RX ADMIN — LEVETIRACETAM 1000 MILLIGRAM(S): 250 TABLET, FILM COATED ORAL at 17:27

## 2017-09-21 RX ADMIN — Medication 100 GRAM(S): at 11:27

## 2017-09-21 RX ADMIN — HEPARIN SODIUM 5000 UNIT(S): 5000 INJECTION INTRAVENOUS; SUBCUTANEOUS at 17:27

## 2017-09-21 NOTE — PROGRESS NOTE ADULT - PROBLEM SELECTOR PLAN 3
Manage as above. When Na 140 or above start DDAVP at lower dose than usual- 1 spray BID, as per endocrinology.

## 2017-09-21 NOTE — PROGRESS NOTE ADULT - SUBJECTIVE AND OBJECTIVE BOX
Patient is a 51y old  Female who presents with a chief complaint of hypernatremia (19 Sep 2017 09:00)      BRIEF HOSPITAL COURSE: 50 y/o F with a h/o SAH due to cerebral aneurysm which was clipped in 2011.  Pt has developed seizure d/o post surgery as well as chronic DI for which she is on DDAVP 2x/day.  Pt has had multiple hospitalizations for hypernatremia.  She was here several months ago and was admitted to ICU for overcorrection/hyponatremia which led to seizure and ICU admission.  Pt has been being monitored by renal/endocrine.  She was treated with quarter normal saline but was correcting very slowly so they increased her DDAVP from twice a day to 3 times a day.  Pt sodium despite stopping fluids now has dropped to 126 and pt has become symptomatic with nausea, confusion, slurred speech which is similiar to symptoms on a prior admission for same.  At that time pt had a seizure at approx Na 131.  So for this pt a Na of 126 represents relative severe hyponatremia.     Events last 24 hours: Patient is awake, alert, pleasant, and conversant. At baseline mental status. Denies complaints.     PAST MEDICAL & SURGICAL HISTORY:  Memory loss, short term  Brain aneurysm  Diabetes insipidus  HTN (Hypertension)  Subarachnoid Hemorrhage  S/P Cerebral Aneurysm Repair  S/P Craniotomy      Review of Systems:  CONSTITUTIONAL: No fever, chills, or fatigue  EYES: No eye pain, visual disturbances, or discharge  ENMT:  No difficulty hearing, tinnitus, vertigo; No sinus or throat pain  NECK: No pain or stiffness  RESPIRATORY: No cough, wheezing, chills or hemoptysis; No shortness of breath  CARDIOVASCULAR: No chest pain, palpitations, dizziness, or leg swelling  GASTROINTESTINAL: No abdominal or epigastric pain. No nausea, vomiting, or hematemesis; No diarrhea or constipation. No melena or hematochezia.  GENITOURINARY: No dysuria, frequency, hematuria, or incontinence  NEUROLOGICAL: No headaches, memory loss, loss of strength, numbness, or tremors  SKIN: No itching, burning, rashes, or lesions   MUSCULOSKELETAL: No joint pain or swelling; No muscle, back, or extremity pain  PSYCHIATRIC: No depression, anxiety, mood swings, or difficulty sleeping      Medications:    labetalol 100 milliGRAM(s) Oral two times a day  amLODIPine   Tablet 10 milliGRAM(s) Oral daily      levETIRAcetam 1000 milliGRAM(s) Oral two times a day  ondansetron Injectable 4 milliGRAM(s) IV Push every 6 hours PRN      heparin  Injectable 5000 Unit(s) SubCutaneous every 12 hours          ferrous    sulfate 325 milliGRAM(s) Oral daily  sodium chloride 0.9%. 1000 milliLiter(s) IV Continuous <Continuous>                ICU Vital Signs Last 24 Hrs  T(C): 36.4 (21 Sep 2017 17:00), Max: 36.8 (20 Sep 2017 20:26)  T(F): 97.5 (21 Sep 2017 17:00), Max: 98.2 (20 Sep 2017 20:26)  HR: 77 (21 Sep 2017 17:00) (58 - 89)  BP: 119/76 (21 Sep 2017 17:00) (101/66 - 150/94)  BP(mean): 89 (21 Sep 2017 17:00) (80 - 115)  ABP: --  ABP(mean): --  RR: 21 (21 Sep 2017 17:00) (16 - 31)  SpO2: 99% (21 Sep 2017 17:00) (96% - 100%)          I&O's Detail    20 Sep 2017 07:01  -  21 Sep 2017 07:00  --------------------------------------------------------  IN:    Oral Fluid: 560 mL    sodium chloride 0.9%.: 1300 mL  Total IN: 1860 mL    OUT:    Voided: 950 mL  Total OUT: 950 mL    Total NET: 910 mL      21 Sep 2017 07:01  -  21 Sep 2017 17:40  --------------------------------------------------------  IN:    Oral Fluid: 990 mL    sodium chloride 0.9%.: 1100 mL    Solution: 100 mL  Total IN: 2190 mL    OUT:    Voided: 1850 mL  Total OUT: 1850 mL    Total NET: 340 mL            LABS:                        9.5    6.1   )-----------( 184      ( 21 Sep 2017 04:43 )             29.0     09-21    133<L>  |  96<L>  |  19.0  ----------------------------<  80  4.5   |  25.0  |  0.87    Ca    8.6      21 Sep 2017 14:11  Phos  3.4     09-21  Mg     1.7     09-21            CAPILLARY BLOOD GLUCOSE            CULTURES:      Physical Examination:    General: No acute distress.  Alert, oriented, interactive, nonfocal    HEENT: Pupils equal, reactive to light.  Symmetric.    PULM: Clear to auscultation bilaterally, no significant sputum production    CVS: Regular rate and rhythm, no murmurs, rubs, or gallops    ABD: Soft, nondistended, nontender, normoactive bowel sounds, no masses    EXT: No edema, nontender    SKIN: Warm and well perfused, no rashes noted.    NEURO: A&Ox3, strength 5/5 all extremities, cranial nerves grossly intact, no focal deficits    RADIOLOGY: ***    CRITICAL CARE TIME SPENT: ***  Evaluating/treating patient, reviewing data/labs/imaging, discussing case with multidisciplinary team, discussing plan/goals of care with patient/family. Non-inclusive of procedure time. Patient is a 51y old  Female who presents with a chief complaint of hypernatremia (19 Sep 2017 09:00)      BRIEF HOSPITAL COURSE: 50 y/o F with a h/o HTN, SAH due to cerebral aneurysm which was clipped in 2011.  Pt has developed seizure d/o post surgery as well as chronic DI for which she is on DDAVP 2x/day.  Pt has had multiple hospitalizations for hypernatremia.  She was here several months ago and was admitted to ICU for overcorrection/hyponatremia which led to seizure and ICU admission.  Pt has been being monitored by renal/endocrine.  She was treated with quarter normal saline but was correcting very slowly so they increased her DDAVP from twice a day to 3 times a day.  Pt sodium despite stopping fluids now has dropped to 126 and pt has become symptomatic with nausea, confusion, slurred speech which is similiar to symptoms on a prior admission for same- at that time pt had a seizure at approx Na 131.  So for this pt a Na of 126 represents relative severe hyponatremia.     Events last 24 hours: Patient is awake, alert, pleasant, and conversant. Eating. At baseline mental status. Denies complaints. Na: 133. On D5W @ 100 mL/hr.     PAST MEDICAL & SURGICAL HISTORY:  Memory loss, short term  Brain aneurysm  Diabetes insipidus  HTN (Hypertension)  Subarachnoid Hemorrhage  S/P Cerebral Aneurysm Repair  S/P Craniotomy      Review of Systems:  CONSTITUTIONAL: No fever, chills, or fatigue  EYES: No eye pain, visual disturbances, or discharge  ENMT:  No difficulty hearing, tinnitus, vertigo; No sinus or throat pain  NECK: No pain or stiffness  RESPIRATORY: No cough, wheezing, chills or hemoptysis; No shortness of breath  CARDIOVASCULAR: No chest pain, palpitations, dizziness, or leg swelling  GASTROINTESTINAL: No abdominal or epigastric pain. No nausea, vomiting, or hematemesis; No diarrhea or constipation. No melena or hematochezia.  GENITOURINARY: No dysuria, frequency, hematuria, or incontinence  NEUROLOGICAL: No headaches, memory loss, loss of strength, numbness, or tremors  SKIN: No itching, burning, rashes, or lesions   MUSCULOSKELETAL: No joint pain or swelling; No muscle, back, or extremity pain  PSYCHIATRIC: No depression, anxiety, mood swings, or difficulty sleeping      Medications:    labetalol 100 milliGRAM(s) Oral two times a day  amLODIPine   Tablet 10 milliGRAM(s) Oral daily  levETIRAcetam 1000 milliGRAM(s) Oral two times a day  ondansetron Injectable 4 milliGRAM(s) IV Push every 6 hours PRN  heparin  Injectable 5000 Unit(s) SubCutaneous every 12 hours  ferrous    sulfate 325 milliGRAM(s) Oral daily  sodium chloride 0.9%. 1000 milliLiter(s) IV Continuous <Continuous>          ICU Vital Signs Last 24 Hrs  T(C): 36.4 (21 Sep 2017 17:00), Max: 36.8 (20 Sep 2017 20:26)  T(F): 97.5 (21 Sep 2017 17:00), Max: 98.2 (20 Sep 2017 20:26)  HR: 77 (21 Sep 2017 17:00) (58 - 89)  BP: 119/76 (21 Sep 2017 17:00) (101/66 - 150/94)  BP(mean): 89 (21 Sep 2017 17:00) (80 - 115)  ABP: --  ABP(mean): --  RR: 21 (21 Sep 2017 17:00) (16 - 31)  SpO2: 99% (21 Sep 2017 17:00) (96% - 100%)          I&O's Detail    20 Sep 2017 07:01  -  21 Sep 2017 07:00  --------------------------------------------------------  IN:    Oral Fluid: 560 mL    sodium chloride 0.9%.: 1300 mL  Total IN: 1860 mL    OUT:    Voided: 950 mL  Total OUT: 950 mL    Total NET: 910 mL      21 Sep 2017 07:01  -  21 Sep 2017 17:40  --------------------------------------------------------  IN:    Oral Fluid: 990 mL    sodium chloride 0.9%.: 1100 mL    Solution: 100 mL  Total IN: 2190 mL    OUT:    Voided: 1850 mL  Total OUT: 1850 mL    Total NET: 340 mL            LABS:                        9.5    6.1   )-----------( 184      ( 21 Sep 2017 04:43 )             29.0     09-21    133<L>  |  96<L>  |  19.0  ----------------------------<  80  4.5   |  25.0  |  0.87    Ca    8.6      21 Sep 2017 14:11  Phos  3.4     09-21  Mg     1.7     09-21            CAPILLARY BLOOD GLUCOSE            CULTURES:      Physical Examination:    General: No acute distress.  Alert, oriented, interactive, nonfocal    HEENT: Pupils equal, reactive to light.  Symmetric.    PULM: Clear to auscultation bilaterally, no significant sputum production    CVS: Regular rate and rhythm, no murmurs, rubs, or gallops    ABD: Soft, nondistended, nontender, normoactive bowel sounds, no masses    EXT: No edema, nontender    SKIN: Warm and well perfused, no rashes noted.    NEURO: A&Ox3, strength 5/5 all extremities, cranial nerves grossly intact, no focal deficits    RADIOLOGY:     < from: Xray Chest 1 View AP/PA. (09.15.17 @ 10:54) >  Impression:    Left PICC line tip in the left subclavian vein or left innominate vein.   This finding is unchanged since 7/12/2017.      CRITICAL CARE TIME SPENT: 33 mins  Evaluating/treating patient, reviewing data/labs/imaging, discussing case with multidisciplinary team, discussing plan/goals of care with patient/family. Non-inclusive of procedure time. Patient is a 51y old  Female who presents with a chief complaint of hypernatremia (19 Sep 2017 09:00)      BRIEF HOSPITAL COURSE: 52 y/o F with a h/o HTN, SAH due to cerebral aneurysm which was clipped in 2011.  Pt has developed seizure d/o post surgery as well as chronic DI for which she is on DDAVP 2x/day.  Pt has had multiple hospitalizations for hypernatremia.  She was here several months ago and was admitted to ICU for overcorrection/hyponatremia which led to seizure and ICU admission.  Pt has been being monitored by renal/endocrine.  She was treated with quarter normal saline but was correcting very slowly so they increased her DDAVP from twice a day to 3 times a day.  Pt sodium despite stopping fluids now has dropped to 126 and pt has become symptomatic with nausea, confusion, slurred speech which is similiar to symptoms on a prior admission for same- at that time pt had a seizure at approx Na 131.  So for this pt a Na of 126 represents relative severe hyponatremia.     Events last 24 hours: Patient is awake, alert, pleasant, and conversant. Eating. At baseline mental status. Denies complaints. Na: 133. On NS 0.9% @ 100 mL/hr.     PAST MEDICAL & SURGICAL HISTORY:  Memory loss, short term  Brain aneurysm  Diabetes insipidus  HTN (Hypertension)  Subarachnoid Hemorrhage  S/P Cerebral Aneurysm Repair  S/P Craniotomy      Review of Systems:  CONSTITUTIONAL: No fever, chills, or fatigue  EYES: No eye pain, visual disturbances, or discharge  ENMT:  No difficulty hearing, tinnitus, vertigo; No sinus or throat pain  NECK: No pain or stiffness  RESPIRATORY: No cough, wheezing, chills or hemoptysis; No shortness of breath  CARDIOVASCULAR: No chest pain, palpitations, dizziness, or leg swelling  GASTROINTESTINAL: No abdominal or epigastric pain. No nausea, vomiting, or hematemesis; No diarrhea or constipation. No melena or hematochezia.  GENITOURINARY: No dysuria, frequency, hematuria, or incontinence  NEUROLOGICAL: No headaches, memory loss, loss of strength, numbness, or tremors  SKIN: No itching, burning, rashes, or lesions   MUSCULOSKELETAL: No joint pain or swelling; No muscle, back, or extremity pain  PSYCHIATRIC: No depression, anxiety, mood swings, or difficulty sleeping      Medications:    labetalol 100 milliGRAM(s) Oral two times a day  amLODIPine   Tablet 10 milliGRAM(s) Oral daily  levETIRAcetam 1000 milliGRAM(s) Oral two times a day  ondansetron Injectable 4 milliGRAM(s) IV Push every 6 hours PRN  heparin  Injectable 5000 Unit(s) SubCutaneous every 12 hours  ferrous    sulfate 325 milliGRAM(s) Oral daily  sodium chloride 0.9%. 1000 milliLiter(s) IV Continuous <Continuous>          ICU Vital Signs Last 24 Hrs  T(C): 36.4 (21 Sep 2017 17:00), Max: 36.8 (20 Sep 2017 20:26)  T(F): 97.5 (21 Sep 2017 17:00), Max: 98.2 (20 Sep 2017 20:26)  HR: 77 (21 Sep 2017 17:00) (58 - 89)  BP: 119/76 (21 Sep 2017 17:00) (101/66 - 150/94)  BP(mean): 89 (21 Sep 2017 17:00) (80 - 115)  ABP: --  ABP(mean): --  RR: 21 (21 Sep 2017 17:00) (16 - 31)  SpO2: 99% (21 Sep 2017 17:00) (96% - 100%)          I&O's Detail    20 Sep 2017 07:01  -  21 Sep 2017 07:00  --------------------------------------------------------  IN:    Oral Fluid: 560 mL    sodium chloride 0.9%.: 1300 mL  Total IN: 1860 mL    OUT:    Voided: 950 mL  Total OUT: 950 mL    Total NET: 910 mL      21 Sep 2017 07:01  -  21 Sep 2017 17:40  --------------------------------------------------------  IN:    Oral Fluid: 990 mL    sodium chloride 0.9%.: 1100 mL    Solution: 100 mL  Total IN: 2190 mL    OUT:    Voided: 1850 mL  Total OUT: 1850 mL    Total NET: 340 mL            LABS:                        9.5    6.1   )-----------( 184      ( 21 Sep 2017 04:43 )             29.0     09-21    133<L>  |  96<L>  |  19.0  ----------------------------<  80  4.5   |  25.0  |  0.87    Ca    8.6      21 Sep 2017 14:11  Phos  3.4     09-21  Mg     1.7     09-21            CAPILLARY BLOOD GLUCOSE            CULTURES:      Physical Examination:    General: No acute distress.  Alert, oriented, interactive, nonfocal    HEENT: Pupils equal, reactive to light.  Symmetric.    PULM: Clear to auscultation bilaterally, no significant sputum production    CVS: Regular rate and rhythm, no murmurs, rubs, or gallops    ABD: Soft, nondistended, nontender, normoactive bowel sounds, no masses    EXT: No edema, nontender    SKIN: Warm and well perfused, no rashes noted.    NEURO: A&Ox3, strength 5/5 all extremities, cranial nerves grossly intact, no focal deficits    RADIOLOGY:     < from: Xray Chest 1 View AP/PA. (09.15.17 @ 10:54) >  Impression:    Left PICC line tip in the left subclavian vein or left innominate vein.   This finding is unchanged since 7/12/2017.      CRITICAL CARE TIME SPENT: 33 mins  Evaluating/treating patient, reviewing data/labs/imaging, discussing case with multidisciplinary team, discussing plan/goals of care with patient/family. Non-inclusive of procedure time.

## 2017-09-21 NOTE — PROGRESS NOTE ADULT - ASSESSMENT
51 year old female with hx of diabetes insipidus, SAH, cerebral aneurysm s/p clipping, HTN admitted with hypernatremia due to loss of control of DI in outpatient setting.  She has developed hyponatremia after fluids and ddavp, however Na is now trending back to normal.      1.  DI- Continue to hold ddavp and closely monitor serum Na and urine Osm.  Once serum  Na 140 or higher will restart ddAVP.    2.  HTN-  controlled on Amlodipine and labetalol.

## 2017-09-21 NOTE — PROGRESS NOTE ADULT - ASSESSMENT
Hyponatremia likely d/t DSDAVP and fluids  On NS now with slowly rising Na  Cont IVF for now  Hold DDAVP unless Na > 143

## 2017-09-21 NOTE — PROGRESS NOTE ADULT - ASSESSMENT
50 y/o F with a h/o HTN, SAH, chronic DI, chronic hypernatremia with overcorrection/hyponatremia and AMS, n/v.

## 2017-09-21 NOTE — PROGRESS NOTE ADULT - SUBJECTIVE AND OBJECTIVE BOX
Patient seen and examined  remains in ICU - had Sz in the [facility] with lower Na    I&O's Summary    20 Sep 2017 07:01  -  21 Sep 2017 07:00  --------------------------------------------------------  IN: 1860 mL / OUT: 950 mL / NET: 910 mL    21 Sep 2017 07:01  -  21 Sep 2017 18:12  --------------------------------------------------------  IN: 2190 mL / OUT: 1850 mL / NET: 340 mL        REVIEW OF SYSTEMS:    CONSTITUTIONAL: No F/C  Taking PO easily  RESPIRATORY: No cough or SOB  CARDIOVASCULAR: No CP/palpitations,    GASTROINTESTINAL: No abdominal pain , NVD   GENITOURINARY: No UTI sx  NEUROLOGICAL: No headaches/wk/numbness  MUSCULOSKELETAL:  No joint pain/swelling; No LBP  EXTREMITIES : no swelling,    Vital Signs Last 24 Hrs  T(C): 36.4 (21 Sep 2017 17:00), Max: 36.8 (20 Sep 2017 20:26)  T(F): 97.5 (21 Sep 2017 17:00), Max: 98.2 (20 Sep 2017 20:26)  HR: 77 (21 Sep 2017 17:00) (58 - 89)  BP: 119/76 (21 Sep 2017 17:00) (101/66 - 150/94)  BP(mean): 89 (21 Sep 2017 17:00) (80 - 115)  RR: 21 (21 Sep 2017 17:00) (16 - 31)  SpO2: 99% (21 Sep 2017 17:00) (96% - 100%)    PHYSICAL EXAM:    GENERAL: NAD,   EYES:  conjunctiva and sclera clear  NECK: Supple, No JVD/Bruit  NERVOUS SYSTEM:  A/O x3,   CHEST:  CTA ,No rales or rhonchi  HEART:  RRR, No murmurs  ABDOMEN: Soft, NT/ND BS+  EXTREMITIES:  No Edema;  SKIN: No rashes/ good turgor    LABS:                        9.5    6.1   )-----------( 184      ( 21 Sep 2017 04:43 )             29.0     09-21    133<L>  |  96<L>  |  19.0  ----------------------------<  80  4.5   |  25.0  |  0.87    Ca    8.6      21 Sep 2017 14:11  Phos  3.4     09-21  Mg     1.7     09-21        MEDICATIONS  (STANDING):  levETIRAcetam  labetalol  amLODIPine   Tablet  ferrous    sulfate  heparin  Injectable  ondansetron Injectable PRN  sodium chloride 0.9%.

## 2017-09-21 NOTE — PROGRESS NOTE ADULT - PROBLEM SELECTOR PLAN 1
Related to overcorrection of hypernatremia. Given chronic hypernatremia, Na of 126 is severely hyponatremic for this patient. Continue D5W @ 100 mL/hr. Na currently slowly trending upwards, 133 on most recent check. F/u repeat BMP, will shut off IVF when Na reaches 140. Continue to hold DDAVP until Na > 140. Continue to follow urine osmolality and urine specific gravity. Related to overcorrection of hypernatremia. Given chronic hypernatremia, Na of 126 is severely hyponatremic for this patient. Continue NS 0.9% @ 100 mL/hr. Na currently slowly trending upwards, 133 on most recent check. F/u repeat BMP, will shut off IVF when Na reaches 140. Continue to hold DDAVP until Na > 140. Continue to follow urine osmolality and urine specific gravity.

## 2017-09-21 NOTE — PROGRESS NOTE ADULT - SUBJECTIVE AND OBJECTIVE BOX
Interval HPI/ Overnight Events:  Patient seen for follow up of DI.  Transferred to ICU yesterday for symptomatic drop in Na to 126.  ddAVP has been held and Na now back to 132.  Currently denies any associated nausea, vomiting, headache, polyuria or polydipsia.  Produced about 1500 cc urine in last 12 hours.      MEDICATIONS  (STANDING):  levETIRAcetam 1000 milliGRAM(s) Oral two times a day  labetalol 100 milliGRAM(s) Oral two times a day  amLODIPine   Tablet 10 milliGRAM(s) Oral daily  ferrous    sulfate 325 milliGRAM(s) Oral daily  heparin  Injectable 5000 Unit(s) SubCutaneous every 12 hours  sodium chloride 0.9%. 1000 milliLiter(s) (100 mL/Hr) IV Continuous <Continuous>    Vital Signs Last 24 Hrs  T(C): 36.7 (21 Sep 2017 12:00), Max: 37 (20 Sep 2017 15:45)  T(F): 98.1 (21 Sep 2017 12:00), Max: 98.6 (20 Sep 2017 15:45)  HR: 69 (21 Sep 2017 15:00) (58 - 89)  BP: 127/79 (21 Sep 2017 15:00) (101/66 - 156/97)  BP(mean): 96 (21 Sep 2017 15:00) (80 - 118)  RR: 18 (21 Sep 2017 15:00) (16 - 31)  SpO2: 98% (21 Sep 2017 15:00) (96% - 100%)    PE:  Gen: AAO, NAD  HEENT:  sclera anicteric,  MMM  Neck:  no thyromegaly, no LAD  CV:  nl S1 + S2, RRR, no m/r/g  Resp:  nl respiratory effort, CTA b/l  GI/ Abd: soft, NT/ ND, BS +  MS:  no c/c/e    LABS:  09-21    133<L>  |  96<L>  |  19.0  ----------------------------<  80  4.5   |  25.0  |  0.87    Ca    8.6      21 Sep 2017 14:11  Phos  3.4     09-21  Mg     1.7     09-21    CBC Full  -  ( 21 Sep 2017 04:43 )  WBC Count : 6.1 K/uL  Hemoglobin : 9.5 g/dL  Hematocrit : 29.0 %  Platelet Count - Automated : 184 K/uL  Mean Cell Volume : 75.7 fl  Mean Cell Hemoglobin : 24.8 pg  Mean Cell Hemoglobin Concentration : 32.8 g/dL

## 2017-09-22 LAB
ALBUMIN SERPL ELPH-MCNC: 3.7 G/DL — SIGNIFICANT CHANGE UP (ref 3.3–5.2)
ALP SERPL-CCNC: 131 U/L — HIGH (ref 40–120)
ALT FLD-CCNC: 60 U/L — HIGH
ANION GAP SERPL CALC-SCNC: 10 MMOL/L — SIGNIFICANT CHANGE UP (ref 5–17)
ANION GAP SERPL CALC-SCNC: 11 MMOL/L — SIGNIFICANT CHANGE UP (ref 5–17)
ANION GAP SERPL CALC-SCNC: 12 MMOL/L — SIGNIFICANT CHANGE UP (ref 5–17)
APTT BLD: 36.3 SEC — SIGNIFICANT CHANGE UP (ref 27.5–37.4)
AST SERPL-CCNC: 55 U/L — HIGH
BILIRUB SERPL-MCNC: 0.3 MG/DL — LOW (ref 0.4–2)
BUN SERPL-MCNC: 20 MG/DL — SIGNIFICANT CHANGE UP (ref 8–20)
BUN SERPL-MCNC: 22 MG/DL — HIGH (ref 8–20)
BUN SERPL-MCNC: 22 MG/DL — HIGH (ref 8–20)
CALCIUM SERPL-MCNC: 8.3 MG/DL — LOW (ref 8.6–10.2)
CALCIUM SERPL-MCNC: 8.5 MG/DL — LOW (ref 8.6–10.2)
CALCIUM SERPL-MCNC: 8.6 MG/DL — SIGNIFICANT CHANGE UP (ref 8.6–10.2)
CHLORIDE SERPL-SCNC: 105 MMOL/L — SIGNIFICANT CHANGE UP (ref 98–107)
CHLORIDE SERPL-SCNC: 106 MMOL/L — SIGNIFICANT CHANGE UP (ref 98–107)
CHLORIDE SERPL-SCNC: 108 MMOL/L — HIGH (ref 98–107)
CO2 SERPL-SCNC: 24 MMOL/L — SIGNIFICANT CHANGE UP (ref 22–29)
CO2 SERPL-SCNC: 25 MMOL/L — SIGNIFICANT CHANGE UP (ref 22–29)
CO2 SERPL-SCNC: 26 MMOL/L — SIGNIFICANT CHANGE UP (ref 22–29)
CREAT SERPL-MCNC: 0.75 MG/DL — SIGNIFICANT CHANGE UP (ref 0.5–1.3)
CREAT SERPL-MCNC: 0.77 MG/DL — SIGNIFICANT CHANGE UP (ref 0.5–1.3)
CREAT SERPL-MCNC: 0.79 MG/DL — SIGNIFICANT CHANGE UP (ref 0.5–1.3)
GLUCOSE SERPL-MCNC: 76 MG/DL — SIGNIFICANT CHANGE UP (ref 70–115)
GLUCOSE SERPL-MCNC: 80 MG/DL — SIGNIFICANT CHANGE UP (ref 70–115)
GLUCOSE SERPL-MCNC: 91 MG/DL — SIGNIFICANT CHANGE UP (ref 70–115)
HCT VFR BLD CALC: 26.3 % — LOW (ref 37–47)
HCT VFR BLD CALC: 28.7 % — LOW (ref 37–47)
HGB BLD-MCNC: 8.5 G/DL — LOW (ref 12–16)
HGB BLD-MCNC: 9.1 G/DL — LOW (ref 12–16)
INR BLD: 0.86 RATIO — LOW (ref 0.88–1.16)
MAGNESIUM SERPL-MCNC: 2.4 MG/DL — SIGNIFICANT CHANGE UP (ref 1.6–2.6)
MCHC RBC-ENTMCNC: 24.9 PG — LOW (ref 27–31)
MCHC RBC-ENTMCNC: 25 PG — LOW (ref 27–31)
MCHC RBC-ENTMCNC: 31.7 G/DL — LOW (ref 32–36)
MCHC RBC-ENTMCNC: 32.3 G/DL — SIGNIFICANT CHANGE UP (ref 32–36)
MCV RBC AUTO: 77.1 FL — LOW (ref 81–99)
MCV RBC AUTO: 78.8 FL — LOW (ref 81–99)
PLATELET # BLD AUTO: 168 K/UL — SIGNIFICANT CHANGE UP (ref 150–400)
PLATELET # BLD AUTO: 190 K/UL — SIGNIFICANT CHANGE UP (ref 150–400)
POTASSIUM SERPL-MCNC: 4.2 MMOL/L — SIGNIFICANT CHANGE UP (ref 3.5–5.3)
POTASSIUM SERPL-MCNC: 4.5 MMOL/L — SIGNIFICANT CHANGE UP (ref 3.5–5.3)
POTASSIUM SERPL-MCNC: 5 MMOL/L — SIGNIFICANT CHANGE UP (ref 3.5–5.3)
POTASSIUM SERPL-SCNC: 4.2 MMOL/L — SIGNIFICANT CHANGE UP (ref 3.5–5.3)
POTASSIUM SERPL-SCNC: 4.5 MMOL/L — SIGNIFICANT CHANGE UP (ref 3.5–5.3)
POTASSIUM SERPL-SCNC: 5 MMOL/L — SIGNIFICANT CHANGE UP (ref 3.5–5.3)
PROT SERPL-MCNC: 6.8 G/DL — SIGNIFICANT CHANGE UP (ref 6.6–8.7)
PROTHROM AB SERPL-ACNC: 9.4 SEC — LOW (ref 9.8–12.7)
RBC # BLD: 3.41 M/UL — LOW (ref 4.4–5.2)
RBC # BLD: 3.64 M/UL — LOW (ref 4.4–5.2)
RBC # FLD: 16.5 % — HIGH (ref 11–15.6)
RBC # FLD: 17.1 % — HIGH (ref 11–15.6)
SODIUM SERPL-SCNC: 141 MMOL/L — SIGNIFICANT CHANGE UP (ref 135–145)
SODIUM SERPL-SCNC: 143 MMOL/L — SIGNIFICANT CHANGE UP (ref 135–145)
SODIUM SERPL-SCNC: 143 MMOL/L — SIGNIFICANT CHANGE UP (ref 135–145)
WBC # BLD: 5.4 K/UL — SIGNIFICANT CHANGE UP (ref 4.8–10.8)
WBC # BLD: 5.4 K/UL — SIGNIFICANT CHANGE UP (ref 4.8–10.8)
WBC # FLD AUTO: 5.4 K/UL — SIGNIFICANT CHANGE UP (ref 4.8–10.8)
WBC # FLD AUTO: 5.4 K/UL — SIGNIFICANT CHANGE UP (ref 4.8–10.8)

## 2017-09-22 PROCEDURE — 99233 SBSQ HOSP IP/OBS HIGH 50: CPT

## 2017-09-22 RX ORDER — DESMOPRESSIN ACETATE 0.1 MG/1
1 TABLET ORAL DAILY
Qty: 0 | Refills: 0 | Status: DISCONTINUED | OUTPATIENT
Start: 2017-09-22 | End: 2017-09-24

## 2017-09-22 RX ADMIN — DESMOPRESSIN ACETATE 1 SPRAY(S): 0.1 TABLET ORAL at 11:25

## 2017-09-22 RX ADMIN — LEVETIRACETAM 1000 MILLIGRAM(S): 250 TABLET, FILM COATED ORAL at 17:10

## 2017-09-22 RX ADMIN — HEPARIN SODIUM 5000 UNIT(S): 5000 INJECTION INTRAVENOUS; SUBCUTANEOUS at 06:17

## 2017-09-22 RX ADMIN — HEPARIN SODIUM 5000 UNIT(S): 5000 INJECTION INTRAVENOUS; SUBCUTANEOUS at 17:10

## 2017-09-22 RX ADMIN — Medication 325 MILLIGRAM(S): at 11:25

## 2017-09-22 RX ADMIN — LEVETIRACETAM 1000 MILLIGRAM(S): 250 TABLET, FILM COATED ORAL at 06:17

## 2017-09-22 RX ADMIN — SODIUM CHLORIDE 100 MILLILITER(S): 9 INJECTION INTRAMUSCULAR; INTRAVENOUS; SUBCUTANEOUS at 06:16

## 2017-09-22 NOTE — PROGRESS NOTE ADULT - SUBJECTIVE AND OBJECTIVE BOX
Patient: KRUPA MEDINA [id number] 51y Female                 Internal Medicine Hospitalist Progress Note - Dr. Richie Anne    Chief Complaint: Patient is a 51y old  Female who presents with a chief complaint of hypernatremia (19 Sep 2017 09:00)    Hospital course:  50 yo F with h/o HTN, DI, SAH, cerebral aneurysm s/p clipping , multiple readmissions for BRITTLE hypernatremia presented with lethargy and hypernatremia. Lab work done as outpatient revealed hypernatremia. On prior admission in March, pt developed seizure after acute drop in sodium level to 131, whereby thought process was that her new baseline cerebral osmolstat has been reset and is elevated relative to normal, thus reducing her seizure threshold. In the ED, sodium was noted to b 160 and was started on D5NS. Per renal, IVF changed to 1/4NS at 83cc/hr as pt with known history of symptoms from rapid reversal. Sodium continued to trend up and rate of fluid infusion was increased. DDAVP increased to tid. On , sodium level trended down to 140 and pt was cleared for discharge. Later that evening, pt was noted by  to have worsening memory. Repeat sodium level showed 136 and discharge was held. Desmopressin held.  Transferred to ICU for further management of hyper / hyponatremia.      Na has now normalized.  Patient offers no complaints.  Still slightly confused.      ____________________PHYSICAL EXAM:  Vitals reviewed as indicated below  GENERAL:  NAD Alert and Oriented x 2 to person, place ([id number])  HEENT: NCAT  CARDIOVASCULAR:  S1, S2  LUNGS: CTAB  ABDOMEN:  soft, (-) tenderness, (-) distension, (+) bowel sounds, (-) guarding, (-) rebound (-) rigidity  EXTREMITIES:  no cyanosis / clubbing / edema.   ____________________    BACKGROUND:  HEALTH ISSUES - PROBLEM Dx:  Hyponatremia: Hyponatremia  Essential hypertension: Essential hypertension  Preventive measure: Preventive measure  Diabetes insipidus: Diabetes insipidus  Seizure: Seizure  HTN (Hypertension): HTN (Hypertension)  Hypernatremia: Hypernatremia        Allergies    No Known Allergies    Intolerances      PAST MEDICAL & SURGICAL HISTORY:  Memory loss, short term  Brain aneurysm  Diabetes insipidus  HTN (Hypertension)  Subarachnoid Hemorrhage  S/P Cerebral Aneurysm Repair  S/P Craniotomy      VITALS:  Vital Signs Last 24 Hrs  T(C): 36.6 (22 Sep 2017 11:57), Max: 36.6 (22 Sep 2017 11:57)  T(F): 97.8 (22 Sep 2017 11:57), Max: 97.8 (22 Sep 2017 11:57)  HR: 79 (22 Sep 2017 15:00) (63 - 95)  BP: 114/74 (22 Sep 2017 15:00) (90/61 - 123/79)  BP(mean): 88 (22 Sep 2017 15:00) (70 - 101)  RR: 16 (22 Sep 2017 15:00) (13 - 23)  SpO2: 99% (22 Sep 2017 15:00) (94% - 100%) Daily     Daily Weight in k.6 (22 Sep 2017 06:00)  CAPILLARY BLOOD GLUCOSE  86 (22 Sep 2017 09:00)  76 (22 Sep 2017 07:00)  76 (22 Sep 2017 06:00)  139 (22 Sep 2017 02:00)  123 (21 Sep 2017 23:00)  113 (21 Sep 2017 20:00)        I&O's Summary    21 Sep 2017 07:  -  22 Sep 2017 07:00  --------------------------------------------------------  IN: 3490 mL / OUT: 3450 mL / NET: 40 mL    22 Sep 2017 07:01  -  22 Sep 2017 15:30  --------------------------------------------------------  IN: 1060 mL / OUT: 400 mL / NET: 660 mL        LABS:                        8.5    5.4   )-----------( 168      ( 22 Sep 2017 05:57 )             26.3     09-22    143  |  106  |  20.0  ----------------------------<  80  4.2   |  25.0  |  0.79    Ca    8.6      22 Sep 2017 10:14  Phos  3.4       Mg     2.4         TPro  6.8  /  Alb  3.7  /  TBili  0.3<L>  /  DBili  x   /  AST  55<H>  /  ALT  60<H>  /  AlkPhos  131<H>      PT/INR - ( 22 Sep 2017 05:57 )   PT: 9.4 sec;   INR: 0.86 ratio         PTT - ( 22 Sep 2017 05:57 )  PTT:36.3 sec  LIVER FUNCTIONS - ( 22 Sep 2017 05:57 )  Alb: 3.7 g/dL / Pro: 6.8 g/dL / ALK PHOS: 131 U/L / ALT: 60 U/L / AST: 55 U/L / GGT: x                   MEDICATIONS:  MEDICATIONS  (STANDING):  levETIRAcetam 1000 milliGRAM(s) Oral two times a day  labetalol 100 milliGRAM(s) Oral two times a day  amLODIPine   Tablet 10 milliGRAM(s) Oral daily  ferrous    sulfate 325 milliGRAM(s) Oral daily  heparin  Injectable 5000 Unit(s) SubCutaneous every 12 hours  desmopressin 0.01% Nasal 1 Spray(s) Nasal daily    MEDICATIONS  (PRN):  ondansetron Injectable 4 milliGRAM(s) IV Push every 6 hours PRN Nausea and/or Vomiting

## 2017-09-22 NOTE — PROGRESS NOTE ADULT - SUBJECTIVE AND OBJECTIVE BOX
Patient seen and examined  Cheerful  Taking PO easily    I&O's Summary    21 Sep 2017 07:01  -  22 Sep 2017 07:00  --------------------------------------------------------  IN: 3490 mL / OUT: 3450 mL / NET: 40 mL    22 Sep 2017 07:01  -  22 Sep 2017 14:45  --------------------------------------------------------  IN: 1060 mL / OUT: 400 mL / NET: 660 mL    REVIEW OF SYSTEMS:  CONSTITUTIONAL: No F/C  RESPIRATORY: No cough or SOB  CARDIOVASCULAR: No CP/palpitations,    GASTROINTESTINAL: No abdominal pain , NVD   GENITOURINARY: No UTI sx  NEUROLOGICAL: No headaches/wk/numbness  MUSCULOSKELETAL:  No joint pain/swelling; No LBP  EXTREMITIES : no swelling,    Vital Signs Last 24 Hrs  T(C): 36.6 (22 Sep 2017 11:57), Max: 36.6 (22 Sep 2017 11:57)  T(F): 97.8 (22 Sep 2017 11:57), Max: 97.8 (22 Sep 2017 11:57)  HR: 89 (22 Sep 2017 14:00) (63 - 95)  BP: 110/61 (22 Sep 2017 14:00) (90/61 - 127/79)  BP(mean): 79 (22 Sep 2017 14:00) (70 - 101)  RR: 22 (22 Sep 2017 14:00) (13 - 23)  SpO2: 98% (22 Sep 2017 14:00) (94% - 100%)    PHYSICAL EXAM:    GENERAL: NAD,   EYES:  conjunctiva and sclera clear  NECK: Supple, No JVD/Bruit  NERVOUS SYSTEM:  A/O x3,   CHEST:  CTA ,No rales or rhonchi  HEART:  RRR, No murmurs  ABDOMEN: Soft, NT/ND BS+  EXTREMITIES:  No Edema;  SKIN: No rashes    LABS:                        8.5    5.4   )-----------( 168      ( 22 Sep 2017 05:57 )             26.3     09-22    143  |  106  |  20.0  ----------------------------<  80  4.2   |  25.0  |  0.79    Ca    8.6      22 Sep 2017 10:14  Phos  3.4     09-21  Mg     2.4     09-22    TPro  6.8  /  Alb  3.7  /  TBili  0.3<L>  /  DBili  x   /  AST  55<H>  /  ALT  60<H>  /  AlkPhos  131<H>  09-22      MEDICATIONS  (STANDING):  levETIRAcetam  labetalol  amLODIPine   Tablet  ferrous    sulfate  heparin  Injectable  ondansetron Injectable PRN  desmopressin 0.01% Nasal

## 2017-09-22 NOTE — PROGRESS NOTE ADULT - ASSESSMENT
50 yo F with h/o HTN, DI, SAH, cerebral aneurysm s/p clipping 2011 here with BRITTLE hypernatremia due to lapse in control of diabetes insipidus.

## 2017-09-22 NOTE — PROGRESS NOTE ADULT - ASSESSMENT
51 year old female with hx of diabetes insipidus, SAH, cerebral aneurysm s/p clipping, HTN admitted with hypernatremia due to loss of control of DI in outpatient setting.  She developed hyponatremia after fluids and ddavp, however Na has now normalized.    1.  DI- Repeat Na level is pending, and if still 140 or greater agree with restarting ddAVP 1 spray daily.  Would continue to follow Na level q6-8 hours and if still trending upward throughout the day, will likely have to increase ddAVP back to baseline dose of twice daily. Once Na stabilized, would encourage PO intake of fluids as to prepare her for discharge and reduce the risk of overcorrection again on IVFs.  However, I suspect that this patient does not have a fully intact thirst mechanism, which explains her labile Na status.  Will need assistance from family to stress consistent PO fluid intake to minimize risk of future hospitalizations from hypernatremia.    2.  HTN-  controlled on Amlodipine and labetalol.

## 2017-09-22 NOTE — PROGRESS NOTE ADULT - PROBLEM SELECTOR PLAN 1
Restarted on DDAVP due to Na 143.  Endocrine and renal recommendations reviewed.  will monitor BMP q8h

## 2017-09-22 NOTE — PROGRESS NOTE ADULT - PROBLEM SELECTOR PLAN 1
Related to overcorrection of hypernatremia. Given chronic hypernatremia, Na of 126 is severely hyponatremic for this patient. Continue NS 0.9% @ 100 mL/hr. Na currently slowly trending upwards, 133 on most recent check. F/u repeat BMP, will shut off IVF when Na reaches 140. Continue to hold DDAVP until Na > 140. Continue to follow urine osmolality and urine specific gravity.

## 2017-09-22 NOTE — PROGRESS NOTE ADULT - SUBJECTIVE AND OBJECTIVE BOX
Patient is a 51y old  Female who presents with a chief complaint of hypernatremia (19 Sep 2017 09:00)      BRIEF HOSPITAL COURSE: 50 y/o F with a h/o HTN, SAH due to cerebral aneurysm which was clipped in 2011.  Pt has developed seizure d/o post surgery as well as chronic DI for which she is on DDAVP 2x/day.  Pt has had multiple hospitalizations for hypernatremia.  She was here several months ago and was admitted to ICU for overcorrection/hyponatremia which led to seizure and ICU admission.  Pt has been being monitored by renal/endocrine.  She was treated with quarter normal saline but was correcting very slowly so they increased her DDAVP from twice a day to 3 times a day.  Pt sodium despite stopping fluids now has dropped to 126 and pt has become symptomatic with nausea, confusion, slurred speech which is similiar to symptoms on a prior admission for same- at that time pt had a seizure at approx Na 131.  So for this pt a Na of 126 represents relative severe hyponatremia.     Events last 24 hours: Na normalized     PAST MEDICAL & SURGICAL HISTORY:  Memory loss, short term  Brain aneurysm  Diabetes insipidus  HTN (Hypertension)  Subarachnoid Hemorrhage  S/P Cerebral Aneurysm Repair  S/P Craniotomy      Review of Systems:  CONSTITUTIONAL: No fever, chills, or fatigue  EYES: No eye pain, visual disturbances, or discharge  ENMT:  No difficulty hearing, tinnitus, vertigo; No sinus or throat pain  NECK: No pain or stiffness  RESPIRATORY: No cough, wheezing, chills or hemoptysis; No shortness of breath  CARDIOVASCULAR: No chest pain, palpitations, dizziness, or leg swelling  GASTROINTESTINAL: No abdominal or epigastric pain. No nausea, vomiting, or hematemesis; No diarrhea or constipation. No melena or hematochezia.  GENITOURINARY: No dysuria, frequency, hematuria, or incontinence  NEUROLOGICAL: No headaches, memory loss, loss of strength, numbness, or tremors  SKIN: No itching, burning, rashes, or lesions   MUSCULOSKELETAL: No joint pain or swelling; No muscle, back, or extremity pain  PSYCHIATRIC: No depression, anxiety, mood swings, or difficulty sleeping      Medications:    labetalol 100 milliGRAM(s) Oral two times a day  amLODIPine   Tablet 10 milliGRAM(s) Oral daily      levETIRAcetam 1000 milliGRAM(s) Oral two times a day  ondansetron Injectable 4 milliGRAM(s) IV Push every 6 hours PRN      heparin  Injectable 5000 Unit(s) SubCutaneous every 12 hours        desmopressin 0.01% Nasal 1 Spray(s) Nasal daily    ferrous    sulfate 325 milliGRAM(s) Oral daily                ICU Vital Signs Last 24 Hrs  T(C): 36.3 (22 Sep 2017 07:00), Max: 36.7 (21 Sep 2017 12:00)  T(F): 97.4 (22 Sep 2017 07:00), Max: 98.1 (21 Sep 2017 12:00)  HR: 91 (22 Sep 2017 09:00) (63 - 95)  BP: 98/59 (22 Sep 2017 09:00) (90/61 - 127/79)  BP(mean): 73 (22 Sep 2017 09:00) (70 - 101)  ABP: --  ABP(mean): --  RR: 23 (22 Sep 2017 09:00) (13 - 23)  SpO2: 99% (22 Sep 2017 09:00) (94% - 100%)          I&O's Detail    21 Sep 2017 07:01  -  22 Sep 2017 07:00  --------------------------------------------------------  IN:    Oral Fluid: 990 mL    sodium chloride 0.9%: 2400 mL    Solution: 100 mL  Total IN: 3490 mL    OUT:    Voided: 3450 mL  Total OUT: 3450 mL    Total NET: 40 mL      22 Sep 2017 07:01  -  22 Sep 2017 09:51  --------------------------------------------------------  IN:    Oral Fluid: 260 mL    sodium chloride 0.9%: 100 mL  Total IN: 360 mL    OUT:  Total OUT: 0 mL    Total NET: 360 mL            LABS:                        8.5    5.4   )-----------( 168      ( 22 Sep 2017 05:57 )             26.3     09-22    141  |  105  |  22.0<H>  ----------------------------<  76  4.5   |  26.0  |  0.75    Ca    8.3<L>      22 Sep 2017 05:57  Phos  3.4     09-21  Mg     2.4     09-22    TPro  6.8  /  Alb  3.7  /  TBili  0.3<L>  /  DBili  x   /  AST  55<H>  /  ALT  60<H>  /  AlkPhos  131<H>  09-22          CAPILLARY BLOOD GLUCOSE  86 (22 Sep 2017 09:00)        PT/INR - ( 22 Sep 2017 05:57 )   PT: 9.4 sec;   INR: 0.86 ratio         PTT - ( 22 Sep 2017 05:57 )  PTT:36.3 sec    CULTURES:      Physical Examination:    General: No acute distress.      HEENT: Pupils equal, reactive to light.  Symmetric.    PULM: Clear to auscultation bilaterally, no significant sputum production    CVS: Regular rate and rhythm, no murmurs, rubs, or gallops    ABD: Soft, nondistended, nontender, normoactive bowel sounds, no masses    EXT: No edema, nontender    SKIN: Warm and well perfused, no rashes noted.    NEURO: Alert, oriented, interactive, nonfocal    RADIOLOGY: ***    CRITICAL CARE TIME SPENT: ***

## 2017-09-22 NOTE — PROGRESS NOTE ADULT - ASSESSMENT
Hypernatremia d/t CDI - pt forgets to drink  Has PICC for IVF + DDAVP -> hyponatremia; taken off DDAVP    Now Na rising again, restarted on lower dose DDAVP  May need to increase BID  drinking POF and encouraged to do so

## 2017-09-22 NOTE — PROGRESS NOTE ADULT - SUBJECTIVE AND OBJECTIVE BOX
Interval HPI/ Overnight Events:  Seen for follow up of DI.  Getting IVF (NS at 100 cc /hr).  Patient without complaints this morning (denies polyuria, polydipsia, headache, N/V.)    MEDICATIONS  (STANDING):  levETIRAcetam 1000 milliGRAM(s) Oral two times a day  labetalol 100 milliGRAM(s) Oral two times a day  amLODIPine   Tablet 10 milliGRAM(s) Oral daily  ferrous    sulfate 325 milliGRAM(s) Oral daily  heparin  Injectable 5000 Unit(s) SubCutaneous every 12 hours  desmopressin 0.01% Nasal 1 Spray(s) Nasal daily (not yet given)    Vital Signs Last 24 Hrs  T(C): 36.3 (22 Sep 2017 07:00), Max: 36.7 (21 Sep 2017 12:00)  T(F): 97.4 (22 Sep 2017 07:00), Max: 98.1 (21 Sep 2017 12:00)  HR: 91 (22 Sep 2017 09:00) (63 - 95)  BP: 98/59 (22 Sep 2017 09:00) (90/61 - 127/79)  BP(mean): 73 (22 Sep 2017 09:00) (70 - 101)  RR: 23 (22 Sep 2017 09:00) (13 - 23)  SpO2: 99% (22 Sep 2017 09:00) (94% - 100%)    PE:  Gen: AAO, NAD  HEENT:  sclera anicteric,  MMM  Neck:  no thyromegaly, no LAD  CV:  nl S1 + S2, RRR, no m/r/g  Resp:  nl respiratory effort, CTA b/l  GI/ Abd: soft, NT/ ND, BS +  MS:  no c/c/e, nl muscle tone    LABS:  09-22    141  |  105  |  22.0<H>  ----------------------------<  76  4.5   |  26.0  |  0.75    Ca    8.3<L>      22 Sep 2017 05:57  Phos  3.4     09-21  Mg     2.4     09-22    TPro  6.8  /  Alb  3.7  /  TBili  0.3<L>  /  DBili  x   /  AST  55<H>  /  ALT  60<H>  /  AlkPhos  131<H>  09-22    CBC Full  -  ( 22 Sep 2017 05:57 )  WBC Count : 5.4 K/uL  Hemoglobin : 8.5 g/dL  Hematocrit : 26.3 %  Platelet Count - Automated : 168 K/uL  Mean Cell Volume : 77.1 fl  Mean Cell Hemoglobin : 24.9 pg  Mean Cell Hemoglobin Concentration : 32.3 g/dL

## 2017-09-23 LAB
ANION GAP SERPL CALC-SCNC: 10 MMOL/L — SIGNIFICANT CHANGE UP (ref 5–17)
ANION GAP SERPL CALC-SCNC: 12 MMOL/L — SIGNIFICANT CHANGE UP (ref 5–17)
BUN SERPL-MCNC: 18 MG/DL — SIGNIFICANT CHANGE UP (ref 8–20)
BUN SERPL-MCNC: 21 MG/DL — HIGH (ref 8–20)
CALCIUM SERPL-MCNC: 8.2 MG/DL — LOW (ref 8.6–10.2)
CALCIUM SERPL-MCNC: 8.8 MG/DL — SIGNIFICANT CHANGE UP (ref 8.6–10.2)
CHLORIDE SERPL-SCNC: 104 MMOL/L — SIGNIFICANT CHANGE UP (ref 98–107)
CHLORIDE SERPL-SCNC: 105 MMOL/L — SIGNIFICANT CHANGE UP (ref 98–107)
CO2 SERPL-SCNC: 26 MMOL/L — SIGNIFICANT CHANGE UP (ref 22–29)
CO2 SERPL-SCNC: 27 MMOL/L — SIGNIFICANT CHANGE UP (ref 22–29)
CREAT SERPL-MCNC: 0.8 MG/DL — SIGNIFICANT CHANGE UP (ref 0.5–1.3)
CREAT SERPL-MCNC: 0.9 MG/DL — SIGNIFICANT CHANGE UP (ref 0.5–1.3)
GLUCOSE SERPL-MCNC: 110 MG/DL — SIGNIFICANT CHANGE UP (ref 70–115)
GLUCOSE SERPL-MCNC: 84 MG/DL — SIGNIFICANT CHANGE UP (ref 70–115)
HCT VFR BLD CALC: 27.9 % — LOW (ref 37–47)
HGB BLD-MCNC: 8.4 G/DL — LOW (ref 12–16)
MCHC RBC-ENTMCNC: 24.7 PG — LOW (ref 27–31)
MCHC RBC-ENTMCNC: 30.1 G/DL — LOW (ref 32–36)
MCV RBC AUTO: 82.1 FL — SIGNIFICANT CHANGE UP (ref 81–99)
PLATELET # BLD AUTO: 191 K/UL — SIGNIFICANT CHANGE UP (ref 150–400)
POTASSIUM SERPL-MCNC: 4.3 MMOL/L — SIGNIFICANT CHANGE UP (ref 3.5–5.3)
POTASSIUM SERPL-MCNC: 4.6 MMOL/L — SIGNIFICANT CHANGE UP (ref 3.5–5.3)
POTASSIUM SERPL-SCNC: 4.3 MMOL/L — SIGNIFICANT CHANGE UP (ref 3.5–5.3)
POTASSIUM SERPL-SCNC: 4.6 MMOL/L — SIGNIFICANT CHANGE UP (ref 3.5–5.3)
RBC # BLD: 3.4 M/UL — LOW (ref 4.4–5.2)
RBC # FLD: 17.3 % — HIGH (ref 11–15.6)
SODIUM SERPL-SCNC: 141 MMOL/L — SIGNIFICANT CHANGE UP (ref 135–145)
SODIUM SERPL-SCNC: 143 MMOL/L — SIGNIFICANT CHANGE UP (ref 135–145)
WBC # BLD: 6.4 K/UL — SIGNIFICANT CHANGE UP (ref 4.8–10.8)
WBC # FLD AUTO: 6.4 K/UL — SIGNIFICANT CHANGE UP (ref 4.8–10.8)

## 2017-09-23 PROCEDURE — 99233 SBSQ HOSP IP/OBS HIGH 50: CPT

## 2017-09-23 RX ADMIN — LEVETIRACETAM 1000 MILLIGRAM(S): 250 TABLET, FILM COATED ORAL at 17:01

## 2017-09-23 RX ADMIN — LEVETIRACETAM 1000 MILLIGRAM(S): 250 TABLET, FILM COATED ORAL at 05:15

## 2017-09-23 RX ADMIN — HEPARIN SODIUM 5000 UNIT(S): 5000 INJECTION INTRAVENOUS; SUBCUTANEOUS at 05:15

## 2017-09-23 RX ADMIN — Medication 325 MILLIGRAM(S): at 12:35

## 2017-09-23 RX ADMIN — DESMOPRESSIN ACETATE 1 SPRAY(S): 0.1 TABLET ORAL at 12:34

## 2017-09-23 RX ADMIN — HEPARIN SODIUM 5000 UNIT(S): 5000 INJECTION INTRAVENOUS; SUBCUTANEOUS at 17:01

## 2017-09-23 NOTE — PROGRESS NOTE ADULT - PROBLEM SELECTOR PLAN 1
Restarted on DDAVP due to Na 143.  Endocrine and renal recommendations reviewed.  Frequent monitoring of  BMP

## 2017-09-23 NOTE — PROGRESS NOTE ADULT - SUBJECTIVE AND OBJECTIVE BOX
Patient: KRUPA MEDINA [id number] 51y Female                 Internal Medicine Hospitalist Progress Note - Dr. Richie Anne    Chief Complaint: Patient is a 51y old  Female who presents with a chief complaint of hypernatremia (19 Sep 2017 09:00)    Hospital course:  50 yo F with h/o HTN, DI, SAH, cerebral aneurysm s/p clipping 2011, multiple readmissions for BRITTLE hypernatremia presented with lethargy and hypernatremia. Lab work done as outpatient revealed hypernatremia. On prior admission in March, pt developed seizure after acute drop in sodium level to 131, whereby thought process was that her new baseline cerebral osmolstat has been reset and is elevated relative to normal, thus reducing her seizure threshold. In the ED, sodium was noted to b 160 and was started on D5NS. Per renal, IVF changed to 1/4NS at 83cc/hr as pt with known history of symptoms from rapid reversal. Sodium continued to trend up and rate of fluid infusion was increased. DDAVP increased to tid. On 9/19, sodium level trended down to 140 and pt was cleared for discharge. Later that evening, pt was noted by  to have worsening memory. Repeat sodium level showed 136 and discharge was held. Desmopressin held.  Transferred to ICU for further management of hyper / hyponatremia.  Na improved to 143.  Seen by endocrine and renal, restarted on DDAVP.      Pt ambulating freely and independently.  Offers no complaints.       ____________________PHYSICAL EXAM:  Vitals reviewed as indicated below  GENERAL:  NAD Alert and Oriented x 2 to person, place (fhoe=[id number])  HEENT: NCAT  CARDIOVASCULAR:  S1, S2  LUNGS: CTAB  ABDOMEN:  soft, (-) tenderness, (-) distension, (+) bowel sounds, (-) guarding, (-) rebound (-) rigidity  EXTREMITIES:  no cyanosis / clubbing / edema.   ____________________    VITALS:  Vital Signs Last 24 Hrs  T(C): 36 (23 Sep 2017 09:45), Max: 37 (22 Sep 2017 19:25)  T(F): 96.8 (23 Sep 2017 09:45), Max: 98.6 (22 Sep 2017 19:25)  HR: 78 (23 Sep 2017 09:45) (74 - 100)  BP: 110/76 (23 Sep 2017 09:45) (107/73 - 118/74)  BP(mean): 85 (22 Sep 2017 17:08) (79 - 95)  RR: 18 (23 Sep 2017 09:45) (16 - 29)  SpO2: 99% (23 Sep 2017 09:45) (98% - 99%) Daily     Daily   CAPILLARY BLOOD GLUCOSE        I&O's Summary    22 Sep 2017 07:01  -  23 Sep 2017 07:00  --------------------------------------------------------  IN: 1660 mL / OUT: 750 mL / NET: 910 mL        LABS:                        8.4    6.4   )-----------( 191      ( 23 Sep 2017 08:27 )             27.9     09-23    143  |  105  |  18.0  ----------------------------<  110  4.3   |  26.0  |  0.90    Ca    8.8      23 Sep 2017 09:15  Mg     2.4     09-22    TPro  6.8  /  Alb  3.7  /  TBili  0.3<L>  /  DBili  x   /  AST  55<H>  /  ALT  60<H>  /  AlkPhos  131<H>  09-22    PT/INR - ( 22 Sep 2017 05:57 )   PT: 9.4 sec;   INR: 0.86 ratio         PTT - ( 22 Sep 2017 05:57 )  PTT:36.3 sec  LIVER FUNCTIONS - ( 22 Sep 2017 05:57 )  Alb: 3.7 g/dL / Pro: 6.8 g/dL / ALK PHOS: 131 U/L / ALT: 60 U/L / AST: 55 U/L / GGT: x                   MEDICATIONS:  levETIRAcetam 1000 milliGRAM(s) Oral two times a day  labetalol 100 milliGRAM(s) Oral two times a day  amLODIPine   Tablet 10 milliGRAM(s) Oral daily  ferrous    sulfate 325 milliGRAM(s) Oral daily  heparin  Injectable 5000 Unit(s) SubCutaneous every 12 hours  ondansetron Injectable 4 milliGRAM(s) IV Push every 6 hours PRN  desmopressin 0.01% Nasal 1 Spray(s) Nasal daily

## 2017-09-23 NOTE — PROGRESS NOTE ADULT - SUBJECTIVE AND OBJECTIVE BOX
NEPHROLOGY INTERVAL HPI/OVERNIGHT EVENTS:    Examined earlier  Looks comfortable    MEDICATIONS  (STANDING):  levETIRAcetam 1000 milliGRAM(s) Oral two times a day  labetalol 100 milliGRAM(s) Oral two times a day  amLODIPine   Tablet 10 milliGRAM(s) Oral daily  ferrous    sulfate 325 milliGRAM(s) Oral daily  heparin  Injectable 5000 Unit(s) SubCutaneous every 12 hours  desmopressin 0.01% Nasal 1 Spray(s) Nasal daily    MEDICATIONS  (PRN):  ondansetron Injectable 4 milliGRAM(s) IV Push every 6 hours PRN Nausea and/or Vomiting      Allergies    No Known Allergies    Intolerances        Vital Signs Last 24 Hrs  T(C): 36 (23 Sep 2017 09:45), Max: 37 (22 Sep 2017 19:25)  T(F): 96.8 (23 Sep 2017 09:45), Max: 98.6 (22 Sep 2017 19:25)  HR: 78 (23 Sep 2017 09:45) (74 - 100)  BP: 110/76 (23 Sep 2017 09:45) (107/73 - 118/74)  BP(mean): 85 (22 Sep 2017 17:08) (79 - 95)  RR: 18 (23 Sep 2017 09:45) (16 - 29)  SpO2: 99% (23 Sep 2017 09:45) (98% - 99%)  Daily     Daily     PHYSICAL EXAM:  EYES:  conjunctiva and sclera clear  NECK: Supple, No JVD/Bruit  NERVOUS SYSTEM:  A/O x3,   CHEST:  CTA ,No rales or rhonchi  HEART:  RRR, No murmurs  ABDOMEN: Soft, NT/ND BS+  EXTREMITIES:  No Edema;  SKIN: No rashes    LABS:                        8.4    6.4   )-----------( 191      ( 23 Sep 2017 08:27 )             27.9     09-23    143  |  105  |  18.0  ----------------------------<  110  4.3   |  26.0  |  0.90    Ca    8.8      23 Sep 2017 09:15  Mg     2.4     09-22    TPro  6.8  /  Alb  3.7  /  TBili  0.3<L>  /  DBili  x   /  AST  55<H>  /  ALT  60<H>  /  AlkPhos  131<H>  09-22    PT/INR - ( 22 Sep 2017 05:57 )   PT: 9.4 sec;   INR: 0.86 ratio         PTT - ( 22 Sep 2017 05:57 )  PTT:36.3 sec            RADIOLOGY & ADDITIONAL TESTS:

## 2017-09-23 NOTE — PROGRESS NOTE ADULT - ASSESSMENT
Hypernatremia Na better today etiology d/t CDI - pt forgets to drink  Has PICC for IVF + DDAVP ->developed  hyponatremia; taken off DDAVP    Now Na rising again, restarted on lower dose DDAVP  May need to increase BID  drinking PO encouraged to cont

## 2017-09-24 LAB
ANION GAP SERPL CALC-SCNC: 11 MMOL/L — SIGNIFICANT CHANGE UP (ref 5–17)
BUN SERPL-MCNC: 18 MG/DL — SIGNIFICANT CHANGE UP (ref 8–20)
CALCIUM SERPL-MCNC: 9.1 MG/DL — SIGNIFICANT CHANGE UP (ref 8.6–10.2)
CHLORIDE SERPL-SCNC: 105 MMOL/L — SIGNIFICANT CHANGE UP (ref 98–107)
CO2 SERPL-SCNC: 28 MMOL/L — SIGNIFICANT CHANGE UP (ref 22–29)
CREAT SERPL-MCNC: 0.78 MG/DL — SIGNIFICANT CHANGE UP (ref 0.5–1.3)
GLUCOSE SERPL-MCNC: 106 MG/DL — SIGNIFICANT CHANGE UP (ref 70–115)
POTASSIUM SERPL-MCNC: 4.3 MMOL/L — SIGNIFICANT CHANGE UP (ref 3.5–5.3)
POTASSIUM SERPL-SCNC: 4.3 MMOL/L — SIGNIFICANT CHANGE UP (ref 3.5–5.3)
SODIUM SERPL-SCNC: 144 MMOL/L — SIGNIFICANT CHANGE UP (ref 135–145)

## 2017-09-24 PROCEDURE — 99233 SBSQ HOSP IP/OBS HIGH 50: CPT

## 2017-09-24 RX ORDER — DESMOPRESSIN ACETATE 0.1 MG/1
1 TABLET ORAL
Qty: 0 | Refills: 0 | Status: DISCONTINUED | OUTPATIENT
Start: 2017-09-24 | End: 2017-09-26

## 2017-09-24 RX ADMIN — LEVETIRACETAM 1000 MILLIGRAM(S): 250 TABLET, FILM COATED ORAL at 05:02

## 2017-09-24 RX ADMIN — Medication 100 MILLIGRAM(S): at 17:21

## 2017-09-24 RX ADMIN — AMLODIPINE BESYLATE 10 MILLIGRAM(S): 2.5 TABLET ORAL at 05:03

## 2017-09-24 RX ADMIN — DESMOPRESSIN ACETATE 1 SPRAY(S): 0.1 TABLET ORAL at 11:48

## 2017-09-24 RX ADMIN — DESMOPRESSIN ACETATE 1 SPRAY(S): 0.1 TABLET ORAL at 17:21

## 2017-09-24 RX ADMIN — HEPARIN SODIUM 5000 UNIT(S): 5000 INJECTION INTRAVENOUS; SUBCUTANEOUS at 05:02

## 2017-09-24 RX ADMIN — Medication 100 MILLIGRAM(S): at 05:02

## 2017-09-24 RX ADMIN — LEVETIRACETAM 1000 MILLIGRAM(S): 250 TABLET, FILM COATED ORAL at 17:21

## 2017-09-24 RX ADMIN — HEPARIN SODIUM 5000 UNIT(S): 5000 INJECTION INTRAVENOUS; SUBCUTANEOUS at 17:21

## 2017-09-24 RX ADMIN — Medication 325 MILLIGRAM(S): at 11:19

## 2017-09-24 NOTE — PROGRESS NOTE ADULT - SUBJECTIVE AND OBJECTIVE BOX
NEPHROLOGY INTERVAL HPI/OVERNIGHT EVENTS:    Examined earlier  Clinically unchanged    MEDICATIONS  (STANDING):  levETIRAcetam 1000 milliGRAM(s) Oral two times a day  labetalol 100 milliGRAM(s) Oral two times a day  amLODIPine   Tablet 10 milliGRAM(s) Oral daily  ferrous    sulfate 325 milliGRAM(s) Oral daily  heparin  Injectable 5000 Unit(s) SubCutaneous every 12 hours  desmopressin 0.01% Nasal 1 Spray(s) Nasal two times a day    MEDICATIONS  (PRN):  ondansetron Injectable 4 milliGRAM(s) IV Push every 6 hours PRN Nausea and/or Vomiting      Allergies    No Known Allergies    Intolerances        Vital Signs Last 24 Hrs  T(C): 36.3 (24 Sep 2017 08:00), Max: 36.8 (23 Sep 2017 20:20)  T(F): 97.4 (24 Sep 2017 08:00), Max: 98.3 (23 Sep 2017 20:20)  HR: 79 (24 Sep 2017 08:00) (74 - 90)  BP: 115/77 (24 Sep 2017 08:00) (105/73 - 122/80)  BP(mean): --  RR: 20 (24 Sep 2017 08:00) (18 - 20)  SpO2: 99% (24 Sep 2017 05:10) (97% - 99%)  Daily     Daily     PHYSICAL EXAM:  EYES:  conjunctiva and sclera clear  NECK: Supple, No JVD/Bruit  NERVOUS SYSTEM:  A/O x3,   CHEST:  CTA ,No rales or rhonchi  HEART:  RRR, No murmurs  ABDOMEN: Soft, NT/ND BS+  EXTREMITIES:  No Edema;      LABS:                        8.4    6.4   )-----------( 191      ( 23 Sep 2017 08:27 )             27.9     09-24    144  |  105  |  18.0  ----------------------------<  106  4.3   |  28.0  |  0.78    Ca    9.1      24 Sep 2017 08:38                  RADIOLOGY & ADDITIONAL TESTS:

## 2017-09-24 NOTE — PROGRESS NOTE ADULT - SUBJECTIVE AND OBJECTIVE BOX
Patient: KRUPA MEDIAN [id number] 51y Female                 Internal Medicine Hospitalist Progress Note - Dr. Richie Anne    Chief Complaint: Patient is a 51y old  Female who presents with a chief complaint of hypernatremia (19 Sep 2017 09:00)    Hospital course:  50 yo F with h/o HTN, DI, SAH, cerebral aneurysm s/p clipping 2011, multiple readmissions for BRITTLE hypernatremia presented with lethargy and hypernatremia. Lab work done as outpatient revealed hypernatremia. On prior admission in March, pt developed seizure after acute drop in sodium level to 131, whereby thought process was that her new baseline cerebral osmolstat has been reset and is elevated relative to normal, thus reducing her seizure threshold. In the ED, sodium was noted to b 160 and was started on D5NS. Per renal, IVF changed to 1/4NS at 83cc/hr as pt with known history of symptoms from rapid reversal. Sodium continued to trend up and rate of fluid infusion was increased. DDAVP increased to tid. On 9/19, sodium level trended down to 140 and pt was cleared for discharge. Later that evening, pt was noted by  to have worsening memory. Repeat sodium level showed 136 and discharge was held. Desmopressin held.  Transferred to ICU for further management of hyper / hyponatremia.  Na improved to 143.  Seen by endocrine and renal, restarted on DDAVP.      Pt reports drinking significant free water as she does at home.  Admits to eating multiple saltine crackers.  Na 143.  Denies complaints.  No weakness / numbness.  Ambulating freely around nursing unit.     ____________________PHYSICAL EXAM:  Vitals reviewed as indicated below  GENERAL:  NAD Alert and Oriented x 2 to person, place (ejag=[id number], President =Nakul)  HEENT: NCAT  CARDIOVASCULAR:  S1, S2  LUNGS: CTAB  ABDOMEN:  soft, (-) tenderness, (-) distension, (+) bowel sounds, (-) guarding, (-) rebound (-) rigidity  EXTREMITIES:  no cyanosis / clubbing / edema.   ____________________    VITALS:  Vital Signs Last 24 Hrs  T(C): 36.3 (24 Sep 2017 08:00), Max: 36.8 (23 Sep 2017 20:20)  T(F): 97.4 (24 Sep 2017 08:00), Max: 98.3 (23 Sep 2017 20:20)  HR: 79 (24 Sep 2017 08:00) (74 - 90)  BP: 115/77 (24 Sep 2017 08:00) (105/73 - 122/80)  BP(mean): --  RR: 20 (24 Sep 2017 08:00) (18 - 20)  SpO2: 99% (24 Sep 2017 05:10) (97% - 99%) Daily     Daily   CAPILLARY BLOOD GLUCOSE        I&O's Summary    23 Sep 2017 07:01  -  24 Sep 2017 07:00  --------------------------------------------------------  IN: 940 mL / OUT: 0 mL / NET: 940 mL        LABS:                        8.4    6.4   )-----------( 191      ( 23 Sep 2017 08:27 )             27.9     09-24    144  |  105  |  18.0  ----------------------------<  106  4.3   |  28.0  |  0.78    Ca    9.1      24 Sep 2017 08:38                  MEDICATIONS:  levETIRAcetam 1000 milliGRAM(s) Oral two times a day  labetalol 100 milliGRAM(s) Oral two times a day  amLODIPine   Tablet 10 milliGRAM(s) Oral daily  ferrous    sulfate 325 milliGRAM(s) Oral daily  heparin  Injectable 5000 Unit(s) SubCutaneous every 12 hours  ondansetron Injectable 4 milliGRAM(s) IV Push every 6 hours PRN  desmopressin 0.01% Nasal 1 Spray(s) Nasal two times a day

## 2017-09-24 NOTE — PROGRESS NOTE ADULT - ASSESSMENT
Hypernatremia Na better today etiology d/t CDI - pt forgets to drink  Has PICC for IVF + DDAVP ->developed  hyponatremia; taken off DDAVP    Now Na rising again today Na 144 agree with  DDAVP escalation to BID  drinking PO encouraged to cont

## 2017-09-24 NOTE — PROGRESS NOTE ADULT - SUBJECTIVE AND OBJECTIVE BOX
Interval HPI/ Overnight Events:  Seen for follow up of DI.  ddAVP was increased to BID today as Na was trending up (143).  Claims to be drinking frequent PO fluids, but denies specific polydipsia.  No headache, N/V,    MEDICATIONS  (STANDING):  levETIRAcetam 1000 milliGRAM(s) Oral two times a day  labetalol 100 milliGRAM(s) Oral two times a day  amLODIPine   Tablet 10 milliGRAM(s) Oral daily  ferrous    sulfate 325 milliGRAM(s) Oral daily  heparin  Injectable 5000 Unit(s) SubCutaneous every 12 hours  desmopressin 0.01% Nasal 1 Spray(s) Nasal two times a day    Vital Signs Last 24 Hrs  T(C): 36.2 (24 Sep 2017 15:38), Max: 36.8 (23 Sep 2017 20:20)  T(F): 97.1 (24 Sep 2017 15:38), Max: 98.3 (23 Sep 2017 20:20)  HR: 85 (24 Sep 2017 15:38) (74 - 90)  BP: 111/80 (24 Sep 2017 15:38) (111/80 - 122/80)  RR: 18 (24 Sep 2017 15:38) (18 - 20)  SpO2: 99% (24 Sep 2017 05:10) (97% - 99%)    PE:  Gen: AAO, NAD  HEENT:  sclera anicteric, MMM  Neck:  no thyromegaly, no LAD  CV:  nl S1 + S2, RRR, no m/r/g  Resp:  nl respiratory effort, CTA b/l  MS:  no c/c/e    LABS:  09-24    144  |  105  |  18.0  ----------------------------<  106  4.3   |  28.0  |  0.78    Ca    9.1      24 Sep 2017 08:38                          8.4    6.4   )-----------( 191      ( 23 Sep 2017 08:27 )             27.9

## 2017-09-24 NOTE — PROGRESS NOTE ADULT - PROBLEM SELECTOR PLAN 1
Discussed diet modfication.  DDAVP d/w renal - will increase to BID.  Will need close monitoring of Na to avoid overcorrection.

## 2017-09-24 NOTE — PROGRESS NOTE ADULT - ASSESSMENT
51 year old female with hx of diabetes insipidus, SAH, cerebral aneurysm s/p clipping, HTN admitted with hypernatremia due to loss of control of DI in outpatient setting.  She developed hyponatremia after fluids and ddavp, however Na has now normalized.    1.  DI-  Agree with increased dose of ddAVP  (currently on home regimen).  Would continue to trend sodium levels.  Encouraged PO intake of fluids to minimize need for IVFs in future.    2.  HTN-  controlled on Amlodipine and labetalol.

## 2017-09-25 LAB
ANION GAP SERPL CALC-SCNC: 12 MMOL/L — SIGNIFICANT CHANGE UP (ref 5–17)
BUN SERPL-MCNC: 23 MG/DL — HIGH (ref 8–20)
CALCIUM SERPL-MCNC: 9.1 MG/DL — SIGNIFICANT CHANGE UP (ref 8.6–10.2)
CHLORIDE SERPL-SCNC: 106 MMOL/L — SIGNIFICANT CHANGE UP (ref 98–107)
CO2 SERPL-SCNC: 26 MMOL/L — SIGNIFICANT CHANGE UP (ref 22–29)
CREAT SERPL-MCNC: 0.91 MG/DL — SIGNIFICANT CHANGE UP (ref 0.5–1.3)
GLUCOSE SERPL-MCNC: 148 MG/DL — HIGH (ref 70–115)
POTASSIUM SERPL-MCNC: 4.8 MMOL/L — SIGNIFICANT CHANGE UP (ref 3.5–5.3)
POTASSIUM SERPL-SCNC: 4.8 MMOL/L — SIGNIFICANT CHANGE UP (ref 3.5–5.3)
SODIUM SERPL-SCNC: 144 MMOL/L — SIGNIFICANT CHANGE UP (ref 135–145)

## 2017-09-25 PROCEDURE — 99233 SBSQ HOSP IP/OBS HIGH 50: CPT

## 2017-09-25 RX ADMIN — DESMOPRESSIN ACETATE 1 SPRAY(S): 0.1 TABLET ORAL at 17:38

## 2017-09-25 RX ADMIN — HEPARIN SODIUM 5000 UNIT(S): 5000 INJECTION INTRAVENOUS; SUBCUTANEOUS at 05:34

## 2017-09-25 RX ADMIN — DESMOPRESSIN ACETATE 1 SPRAY(S): 0.1 TABLET ORAL at 05:33

## 2017-09-25 RX ADMIN — Medication 100 MILLIGRAM(S): at 05:33

## 2017-09-25 RX ADMIN — Medication 325 MILLIGRAM(S): at 12:15

## 2017-09-25 RX ADMIN — LEVETIRACETAM 1000 MILLIGRAM(S): 250 TABLET, FILM COATED ORAL at 05:33

## 2017-09-25 RX ADMIN — AMLODIPINE BESYLATE 10 MILLIGRAM(S): 2.5 TABLET ORAL at 05:33

## 2017-09-25 RX ADMIN — LEVETIRACETAM 1000 MILLIGRAM(S): 250 TABLET, FILM COATED ORAL at 17:38

## 2017-09-25 NOTE — PROGRESS NOTE ADULT - SUBJECTIVE AND OBJECTIVE BOX
CHIEF COMPLAINT: hypernatremia    Patient seen and examined at the bedside. No acute overnight events. Complaining of nothing this AM. Denies fever/chills, headache, lightheadedness, dizziness, chest pain, palpitations, shortness of breath, cough, abd pain, nausea/vomiting/diarrhea, muscle pain.      =========================================================================================  T(C): 36.9 (09-25-17 @ 07:45), Max: 36.9 (09-25-17 @ 07:45)  HR: 69 (09-25-17 @ 07:45) (69 - 94)  BP: 100/70 (09-25-17 @ 07:45) (100/70 - 136/94)  RR: 20 (09-25-17 @ 07:45) (18 - 20)  SpO2: 99% (09-25-17 @ 05:31) (98% - 99%)    PHYSICAL EXAM.    GEN - appears age appropriate. well nourished. pleasant. no distress.   HEENT - NCAT, EOMI, DAO  RESP - CTA BL, no wheeze/stridor/rhonchi/crackles. not on supplemental O2. able to speak in full sentences without distress.   CARDIO - NS1S2, RRR. No murmurs/rubs/gallops.  ABD - Soft/Non tender/Non distended. Normal BS x4 quadrants. no guarding/rebound tenderness.  Ext - No STEWART.  MSK - BL 5/5 strength on upper and lower extremities.   Neuro - AAOx3. cn 2-12 grossly intact  Psych - normal affect  Skin - c/d/i. no rashes/lesions    =========================================================================================  LABS.    09-25    144  |  106  |  23.0<H>  ----------------------------<  148<H>  4.8   |  26.0  |  0.91    Ca    9.1      25 Sep 2017 09:32            =========================================================================================  IMAGING.     =========================================================================================    MEDICATIONS  (STANDING):  levETIRAcetam 1000 milliGRAM(s) Oral two times a day  labetalol 100 milliGRAM(s) Oral two times a day  amLODIPine   Tablet 10 milliGRAM(s) Oral daily  ferrous    sulfate 325 milliGRAM(s) Oral daily  desmopressin 0.01% Nasal 1 Spray(s) Nasal two times a day    MEDICATIONS  (PRN):  ondansetron Injectable 4 milliGRAM(s) IV Push every 6 hours PRN Nausea and/or Vomiting

## 2017-09-25 NOTE — PROGRESS NOTE ADULT - NSHPATTENDINGPLANDISCUSS_GEN_ALL_CORE
the patient.  All imaging and results of lab/other studies reviewed by me. All questions answered to the satisfaction of the patient. At this time she agrees with the current plan of therapy.
pt, rn, med PA

## 2017-09-25 NOTE — PROGRESS NOTE ADULT - SUBJECTIVE AND OBJECTIVE BOX
Patient seen and examined    Feels fine; ambulating in the hallway easiy  Taking POF easily  no c/o CP SOB NV   No swelling feet    Patient without any significant change  no specific c/o    Vital Signs Last 24 Hrs  T(C): 36.9 (09-25-17 @ 15:47), Max: 36.9 (09-25-17 @ 07:45)  T(F): 98.4 (09-25-17 @ 15:47), Max: 98.5 (09-25-17 @ 07:45)  HR: 95 (09-25-17 @ 15:47) (69 - 95)  BP: 105/71 (09-25-17 @ 15:47) (100/70 - 136/94)  BP(mean): --  RR: 18 (09-25-17 @ 15:47) (18 - 20)  SpO2: 99% (09-25-17 @ 05:31) (98% - 99%)    Chest   clear  CV   no murmur  Abd   soft, NT BS+  Extr   No edema  Neuro  grossly iintact motor      25 Sep 2017 09:32    144    |  106    |  23.0   ----------------------------<  148    4.8     |  26.0   |  0.91     Ca    9.1        25 Sep 2017 09:32        Na 144  on DDAVP 2/day - watch

## 2017-09-25 NOTE — PROGRESS NOTE ADULT - PROBLEM SELECTOR PLAN 1
- as high as 165 on admission  - hx of hypernatremia in past  - Endo and nephro appreciated  - now resolved  PLAN  - low salt diet  - DDAVP BID  - cont to trend Na  - fu consultants, poss discharge home today

## 2017-09-26 LAB
ANION GAP SERPL CALC-SCNC: 8 MMOL/L — SIGNIFICANT CHANGE UP (ref 5–17)
ANION GAP SERPL CALC-SCNC: 9 MMOL/L — SIGNIFICANT CHANGE UP (ref 5–17)
BUN SERPL-MCNC: 19 MG/DL — SIGNIFICANT CHANGE UP (ref 8–20)
BUN SERPL-MCNC: 20 MG/DL — SIGNIFICANT CHANGE UP (ref 8–20)
CALCIUM SERPL-MCNC: 8.8 MG/DL — SIGNIFICANT CHANGE UP (ref 8.6–10.2)
CALCIUM SERPL-MCNC: 8.9 MG/DL — SIGNIFICANT CHANGE UP (ref 8.6–10.2)
CHLORIDE SERPL-SCNC: 100 MMOL/L — SIGNIFICANT CHANGE UP (ref 98–107)
CHLORIDE SERPL-SCNC: 103 MMOL/L — SIGNIFICANT CHANGE UP (ref 98–107)
CO2 SERPL-SCNC: 28 MMOL/L — SIGNIFICANT CHANGE UP (ref 22–29)
CO2 SERPL-SCNC: 29 MMOL/L — SIGNIFICANT CHANGE UP (ref 22–29)
CREAT SERPL-MCNC: 0.88 MG/DL — SIGNIFICANT CHANGE UP (ref 0.5–1.3)
CREAT SERPL-MCNC: 0.91 MG/DL — SIGNIFICANT CHANGE UP (ref 0.5–1.3)
GLUCOSE SERPL-MCNC: 115 MG/DL — SIGNIFICANT CHANGE UP (ref 70–115)
GLUCOSE SERPL-MCNC: 84 MG/DL — SIGNIFICANT CHANGE UP (ref 70–115)
POTASSIUM SERPL-MCNC: 5 MMOL/L — SIGNIFICANT CHANGE UP (ref 3.5–5.3)
POTASSIUM SERPL-MCNC: 5.5 MMOL/L — HIGH (ref 3.5–5.3)
POTASSIUM SERPL-SCNC: 5 MMOL/L — SIGNIFICANT CHANGE UP (ref 3.5–5.3)
POTASSIUM SERPL-SCNC: 5.5 MMOL/L — HIGH (ref 3.5–5.3)
SODIUM SERPL-SCNC: 136 MMOL/L — SIGNIFICANT CHANGE UP (ref 135–145)
SODIUM SERPL-SCNC: 141 MMOL/L — SIGNIFICANT CHANGE UP (ref 135–145)

## 2017-09-26 PROCEDURE — 99233 SBSQ HOSP IP/OBS HIGH 50: CPT

## 2017-09-26 RX ORDER — DESMOPRESSIN ACETATE 0.1 MG/1
1 TABLET ORAL DAILY
Qty: 0 | Refills: 0 | Status: DISCONTINUED | OUTPATIENT
Start: 2017-09-26 | End: 2017-09-30

## 2017-09-26 RX ORDER — LABETALOL HCL 100 MG
1 TABLET ORAL
Qty: 60 | Refills: 0
Start: 2017-09-26 | End: 2017-10-26

## 2017-09-26 RX ORDER — FOLIC ACID 0.8 MG
1 TABLET ORAL
Qty: 30 | Refills: 0
Start: 2017-09-26 | End: 2017-10-26

## 2017-09-26 RX ORDER — AMLODIPINE BESYLATE 2.5 MG/1
1 TABLET ORAL
Qty: 30 | Refills: 0
Start: 2017-09-26 | End: 2017-10-26

## 2017-09-26 RX ORDER — DESMOPRESSIN ACETATE 0.1 MG/1
1 TABLET ORAL
Qty: 3 | Refills: 0 | OUTPATIENT
Start: 2017-09-26 | End: 2017-10-26

## 2017-09-26 RX ORDER — LEVETIRACETAM 250 MG/1
1 TABLET, FILM COATED ORAL
Qty: 60 | Refills: 0
Start: 2017-09-26 | End: 2017-10-26

## 2017-09-26 RX ADMIN — Medication 325 MILLIGRAM(S): at 12:02

## 2017-09-26 RX ADMIN — LEVETIRACETAM 1000 MILLIGRAM(S): 250 TABLET, FILM COATED ORAL at 17:03

## 2017-09-26 RX ADMIN — DESMOPRESSIN ACETATE 1 SPRAY(S): 0.1 TABLET ORAL at 05:06

## 2017-09-26 RX ADMIN — Medication 100 MILLIGRAM(S): at 05:06

## 2017-09-26 RX ADMIN — LEVETIRACETAM 1000 MILLIGRAM(S): 250 TABLET, FILM COATED ORAL at 05:06

## 2017-09-26 RX ADMIN — AMLODIPINE BESYLATE 10 MILLIGRAM(S): 2.5 TABLET ORAL at 05:06

## 2017-09-26 RX ADMIN — Medication 100 MILLIGRAM(S): at 17:03

## 2017-09-26 NOTE — PROGRESS NOTE ADULT - SUBJECTIVE AND OBJECTIVE BOX
Patient seen and examined    Feels fine; ambulating  no c/o CP SOB NV   No swelling feet    Vital Signs Last 24 Hrs  T(C): 36.5 (09-26-17 @ 15:38), Max: 37 (09-25-17 @ 21:10)  T(F): 97.7 (09-26-17 @ 15:38), Max: 98.6 (09-25-17 @ 21:10)  HR: 74 (09-26-17 @ 15:38) (71 - 95)  BP: 144/95 (09-26-17 @ 15:38) (101/68 - 144/95)  BP(mean): --  RR: 17 (09-26-17 @ 09:00) (16 - 18)  SpO2: 97% (09-26-17 @ 09:00) (95% - 99%)    Chest   clear  CV   no murmur  Abd   soft, NT BS+  Extr   No edema  Neuro  grossly iintact motor    26 Sep 2017 13:14    136    |  100    |  19.0   ----------------------------<  115    5.0     |  28.0   |  0.88     Ca    8.9        26 Sep 2017 13:14    Na decreasing - lower DDAVP to qd  K better - ? hemolysed  Labs am  D/w Dr. eLchuga

## 2017-09-26 NOTE — PROGRESS NOTE ADULT - SUBJECTIVE AND OBJECTIVE BOX
INTERVAL HPI/OVERNIGHT EVENTS:  follow upf pr hyeprnatremia and SIADh.  CVA with residual memory impairment.     MEDICATIONS  (STANDING):  levETIRAcetam 1000 milliGRAM(s) Oral two times a day  labetalol 100 milliGRAM(s) Oral two times a day  amLODIPine   Tablet 10 milliGRAM(s) Oral daily  ferrous    sulfate 325 milliGRAM(s) Oral daily  desmopressin 0.01% Nasal 1 Spray(s) Nasal daily    MEDICATIONS  (PRN):  ondansetron Injectable 4 milliGRAM(s) IV Push every 6 hours PRN Nausea and/or Vomiting    Allergies  No Known Allergies    Review of systems: no cp, no sob, no     Vital Signs Last 24 Hrs  T(C): 36.5 (26 Sep 2017 15:38), Max: 37 (25 Sep 2017 21:10)  T(F): 97.7 (26 Sep 2017 15:38), Max: 98.6 (25 Sep 2017 21:10)  HR: 74 (26 Sep 2017 15:38) (71 - 77)  BP: 144/95 (26 Sep 2017 15:38) (101/68 - 144/95)  BP(mean): --  RR: 17 (26 Sep 2017 09:00) (16 - 18)  SpO2: 97% (26 Sep 2017 09:00) (95% - 99%)    PHYSICAL EXAM:  Constitutional: NAD, well-groomed, well-developed  HEENT: PERRLA, EOMI, no exophalmos  Neck:  No JVD, trachea midline, no thyromegaly.   Respiratory: CTAB  Cardiovascular: S1 and S2, RRR, no M/G/R  Gastrointestinal: BS+, soft, no organomegaly or mass  Extremities: No peripheral edema, no pedal lesions  Vascular: 2+ peripheral pulses  Neurological: A/O x 3, no focal deficits  Psychiatric: Normal mood, normal affect  Musculoskeletal: 5/5 strength b/l upper and lower extremities  Skin: No rashes, no acanthosis        LABS:    09-26    136  |  100  |  19.0  ----------------------------<  115  5.0   |  28.0  |  0.88    Ca    8.9      26 Sep 2017 13:14

## 2017-09-26 NOTE — PROGRESS NOTE ADULT - SUBJECTIVE AND OBJECTIVE BOX
KRUPA MEDINA     Chief Complaint: Patient is a 51y old  Female who presents with a chief complaint of hypernatremia (19 Sep 2017 09:00)      PAST MEDICAL & SURGICAL HISTORY:  Memory loss, short term  Brain aneurysm  Diabetes insipidus  HTN (Hypertension)  Subarachnoid Hemorrhage  S/P Cerebral Aneurysm Repair  S/P Craniotomy      HPI/OVERNIGHT EVENTS: Patient is pleasant but she has no memory. Her potassium is elevated but it is probably hemolysis.    MEDICATIONS  (STANDING):  levETIRAcetam 1000 milliGRAM(s) Oral two times a day  labetalol 100 milliGRAM(s) Oral two times a day  amLODIPine   Tablet 10 milliGRAM(s) Oral daily  ferrous    sulfate 325 milliGRAM(s) Oral daily  desmopressin 0.01% Nasal 1 Spray(s) Nasal two times a day      Vital Signs Last 24 Hrs  T(C): 36.7 (26 Sep 2017 09:00), Max: 37 (25 Sep 2017 21:10)  T(F): 98.1 (26 Sep 2017 09:00), Max: 98.6 (25 Sep 2017 21:10)  HR: 77 (26 Sep 2017 09:00) (71 - 95)  BP: 101/68 (26 Sep 2017 09:00) (101/68 - 125/82)  BP(mean): --  RR: 17 (26 Sep 2017 09:00) (16 - 18)  SpO2: 97% (26 Sep 2017 09:00) (95% - 99%)    PHYSICAL EXAM:  Constitutional: NAD, well-groomed, well-developed  HEENT: PERRLA, EOMI, Normal Hearing, MMM  Neck: No LAD, No JVD  Back: Normal spine flexure, No CVA tenderness  Respiratory: CTAB Cardiovascular: S1 and S2, RRR, no M/G/R  Gastrointestinal: BS+, soft, NT/ND  Extremities: No peripheral edema  Vascular: 2+ peripheral pulses  Neurological: confused    CAPILLARY BLOOD GLUCOSE    LABS:    09-26    141  |  103  |  20.0  ----------------------------<  84  5.5<H>   |  29.0  |  0.91    Ca    8.8      26 Sep 2017 06:57            RADIOLOGY & ADDITIONAL TESTS:

## 2017-09-26 NOTE — PROGRESS NOTE ADULT - ASSESSMENT
50 yo F with h/o HTN, DI, SAH, cerebral aneurysm s/p clipping 2011 here with BRITTLE hypernatremia due to lapse in control of diabetes insipidus. 51 year old female with history of HTN, DI, SAH, cerebral aneurysm s/p clipping 2011 here with BRITTLE hypernatremia due to lapse in control of diabetes insipidus. On 9/26 her sodium decreased to 136. I had a long discussion with Dr Palumbo, the  and . Currently her picc line is out and her ddavp has been reduced to once a day. I will speak with the  regarding options for her care including resuming the picc line and or increasing the ddavp dose. I fear that no matter what we decide the patient is at extremely high risk because her memory loss prevents her from monitoring her fluids status on her own, and her sodium levels fluctuate wildly with minimal stimulus.

## 2017-09-26 NOTE — PROGRESS NOTE ADULT - ASSESSMENT
51 year old female with history of HTN, DI, SAH, cerebral aneurysm s/p clipping 2011 here with BRITTLE hypernatremia due to lapse in control of diabetes insipidus. Patient is at extremely high risk because her memory loss prevents her from monitoring her fluids status on her own, and her sodium levels fluctuate wildly with minimal stimulus.   52 yo F with h/o HTN, DI, SAH, cerebral aneurysm s/p clipping 2011 here with BRITTLE hypernatremia due to lapse in control of diabetes insipidus.        Hypernatremia.  Plan: - as high as 165 on admission  - hx of hypernatremia in past  - Endo and nephro appreciated  - now resolved  continue low salt diet and DDAVP BID    Diabetes insipidus.  Plan: - continue DDAVP.    Essential hypertension.  Plan: - goal BP <140/90, controlled  - c/w labetalol and amlodipine.     C/w keppra 1gm bid.

## 2017-09-27 DIAGNOSIS — E87.5 HYPERKALEMIA: ICD-10-CM

## 2017-09-27 LAB
ANION GAP SERPL CALC-SCNC: 10 MMOL/L — SIGNIFICANT CHANGE UP (ref 5–17)
ANION GAP SERPL CALC-SCNC: 11 MMOL/L — SIGNIFICANT CHANGE UP (ref 5–17)
ANION GAP SERPL CALC-SCNC: 4 MMOL/L — LOW (ref 5–17)
BUN SERPL-MCNC: 20 MG/DL — SIGNIFICANT CHANGE UP (ref 8–20)
BUN SERPL-MCNC: 21 MG/DL — HIGH (ref 8–20)
BUN SERPL-MCNC: 22 MG/DL — HIGH (ref 8–20)
CALCIUM SERPL-MCNC: 9 MG/DL — SIGNIFICANT CHANGE UP (ref 8.6–10.2)
CALCIUM SERPL-MCNC: 9 MG/DL — SIGNIFICANT CHANGE UP (ref 8.6–10.2)
CALCIUM SERPL-MCNC: 9.5 MG/DL — SIGNIFICANT CHANGE UP (ref 8.6–10.2)
CHLORIDE SERPL-SCNC: 97 MMOL/L — LOW (ref 98–107)
CHLORIDE SERPL-SCNC: 97 MMOL/L — LOW (ref 98–107)
CHLORIDE SERPL-SCNC: 98 MMOL/L — SIGNIFICANT CHANGE UP (ref 98–107)
CO2 SERPL-SCNC: 28 MMOL/L — SIGNIFICANT CHANGE UP (ref 22–29)
CO2 SERPL-SCNC: 28 MMOL/L — SIGNIFICANT CHANGE UP (ref 22–29)
CO2 SERPL-SCNC: 32 MMOL/L — HIGH (ref 22–29)
CREAT SERPL-MCNC: 0.77 MG/DL — SIGNIFICANT CHANGE UP (ref 0.5–1.3)
CREAT SERPL-MCNC: 0.79 MG/DL — SIGNIFICANT CHANGE UP (ref 0.5–1.3)
CREAT SERPL-MCNC: 0.86 MG/DL — SIGNIFICANT CHANGE UP (ref 0.5–1.3)
GLUCOSE SERPL-MCNC: 88 MG/DL — SIGNIFICANT CHANGE UP (ref 70–115)
GLUCOSE SERPL-MCNC: 89 MG/DL — SIGNIFICANT CHANGE UP (ref 70–115)
GLUCOSE SERPL-MCNC: 99 MG/DL — SIGNIFICANT CHANGE UP (ref 70–115)
POTASSIUM SERPL-MCNC: 5.4 MMOL/L — HIGH (ref 3.5–5.3)
POTASSIUM SERPL-MCNC: 6 MMOL/L — HIGH (ref 3.5–5.3)
POTASSIUM SERPL-MCNC: 6.1 MMOL/L — CRITICAL HIGH (ref 3.5–5.3)
POTASSIUM SERPL-SCNC: 5.4 MMOL/L — HIGH (ref 3.5–5.3)
POTASSIUM SERPL-SCNC: 6 MMOL/L — HIGH (ref 3.5–5.3)
POTASSIUM SERPL-SCNC: 6.1 MMOL/L — CRITICAL HIGH (ref 3.5–5.3)
SODIUM SERPL-SCNC: 134 MMOL/L — LOW (ref 135–145)
SODIUM SERPL-SCNC: 135 MMOL/L — SIGNIFICANT CHANGE UP (ref 135–145)
SODIUM SERPL-SCNC: 136 MMOL/L — SIGNIFICANT CHANGE UP (ref 135–145)

## 2017-09-27 PROCEDURE — 99233 SBSQ HOSP IP/OBS HIGH 50: CPT

## 2017-09-27 RX ORDER — SODIUM POLYSTYRENE SULFONATE 4.1 MEQ/G
30 POWDER, FOR SUSPENSION ORAL ONCE
Qty: 0 | Refills: 0 | Status: COMPLETED | OUTPATIENT
Start: 2017-09-27 | End: 2017-09-27

## 2017-09-27 RX ADMIN — Medication 100 MILLIGRAM(S): at 05:46

## 2017-09-27 RX ADMIN — Medication 100 MILLIGRAM(S): at 18:59

## 2017-09-27 RX ADMIN — AMLODIPINE BESYLATE 10 MILLIGRAM(S): 2.5 TABLET ORAL at 05:46

## 2017-09-27 RX ADMIN — Medication 325 MILLIGRAM(S): at 13:46

## 2017-09-27 RX ADMIN — SODIUM POLYSTYRENE SULFONATE 30 GRAM(S): 4.1 POWDER, FOR SUSPENSION ORAL at 18:59

## 2017-09-27 RX ADMIN — LEVETIRACETAM 1000 MILLIGRAM(S): 250 TABLET, FILM COATED ORAL at 18:59

## 2017-09-27 RX ADMIN — LEVETIRACETAM 1000 MILLIGRAM(S): 250 TABLET, FILM COATED ORAL at 05:47

## 2017-09-27 NOTE — CHART NOTE - NSCHARTNOTEFT_GEN_A_CORE
Source: Patient [x ]  Family [ ]   other [ ]    Current Diet:  dash/tlc    Patient reports [ ] nausea  [ ] vomiting [ ] diarrhea [ ] constipation  [ ]chewing problems [ ] swallowing issues  [ ] other:     PO intake:  < 50% [ ]   50-75%  [ ]   %  [x ]  other :    Source for PO intake x ] Patient [ ] family [ ] chart [ ] staff [ ] other    Enteral /Parenteral Nutrition:     Current Weight:     % Weight Change     Pertinent Medications: MEDICATIONS  (STANDING):  levETIRAcetam 1000 milliGRAM(s) Oral two times a day  labetalol 100 milliGRAM(s) Oral two times a day  amLODIPine   Tablet 10 milliGRAM(s) Oral daily  ferrous    sulfate 325 milliGRAM(s) Oral daily  desmopressin 0.01% Nasal 1 Spray(s) Nasal daily    MEDICATIONS  (PRN):  ondansetron Injectable 4 milliGRAM(s) IV Push every 6 hours PRN Nausea and/or Vomiting    Pertinent Labs:  09-27 Na134 mmol/L<L> Glu 89 mg/dL K+ 6.0 mmol/L<H> Cr  0.86 mg/dL BUN 20.0 mg/dL Phos n/a   Alb n/a   PAB n/a               Skin: intact    Nutrition focused physical exam conducted - found signs of malnutrition [ ]absent [ ]present    Subcutaneous fat loss: [ ] Orbital fat pads region, [ ]Buccal fat region, [ ]Triceps region,  [ ]Ribs region    Muscle wasting: [ ]Temples region, [ ]Clavicle region, [ ]Shoulder region, [ ]Scapula region, [ ]Interosseous region,  [ ]thigh region, [ ]Calf region    Estimated Needs:   [x ] no change since previous assessment  [ ] recalculated:     Current Nutrition Diagnosis:  Pt at nutritional risk for nutrition-knowledge deficit related to lack of knowledge about modified diet as evidenced by pt unable to state back educational points about DASH/TLC diet. Per chart, pt forgetful and declining memory.  Pt denies difficulty chewing/swallowing. na still decreased, and K elevated. Recommendations below:    Recommendations: Continue diet as ordered    Monitoring and Evaluation:   [x ] PO intake [x ] Tolerance to diet prescription [X] Weights  [X] Follow up per protocol [X] Labs:

## 2017-09-27 NOTE — PROGRESS NOTE ADULT - SUBJECTIVE AND OBJECTIVE BOX
KRUPA MEDINA     Chief Complaint: Patient is a 51y old  Female who presents with a chief complaint of hypernatremia (19 Sep 2017 09:00)      PAST MEDICAL & SURGICAL HISTORY:  Memory loss, short term  Brain aneurysm  Diabetes insipidus  HTN (Hypertension)  Subarachnoid Hemorrhage  S/P Cerebral Aneurysm Repair  S/P Craniotomy      HPI/OVERNIGHT EVENTS: Patient has been consuming more ater. her input is In 850 out 390. Therefore her sodium is 134 today. I spoke to her  today. I held her ddavp. After speaking with Dr Palumbo her ddavp was reduced from bid to once a day. Her PICC line will be replaced tomorrow. I will repeat her sodium right now.    MEDICATIONS  (STANDING):  levETIRAcetam 1000 milliGRAM(s) Oral two times a day  labetalol 100 milliGRAM(s) Oral two times a day  amLODIPine   Tablet 10 milliGRAM(s) Oral daily  ferrous    sulfate 325 milliGRAM(s) Oral daily  desmopressin 0.01% Nasal 1 Spray(s) Nasal daily  sodium polystyrene sulfonate Suspension 30 Gram(s) Oral once      Vital Signs Last 24 Hrs  T(C): 36.1 (27 Sep 2017 15:23), Max: 36.6 (27 Sep 2017 04:00)  T(F): 97 (27 Sep 2017 15:23), Max: 97.9 (27 Sep 2017 07:57)  HR: 74 (27 Sep 2017 15:23) (63 - 91)  BP: 108/77 (27 Sep 2017 15:23) (108/77 - 132/87)  BP(mean): --  RR: 20 (27 Sep 2017 07:57) (20 - 20)  SpO2: 98% (26 Sep 2017 20:20) (98% - 98%)    PHYSICAL EXAM:  Constitutional: Patient is very forgetful  HEENT: PERRLA, EOMI, Normal Hearing, MMM  Neck: No LAD, No JVD  Back: Normal spine flexure, No CVA tenderness  Respiratory: CTAB Cardiovascular: S1 and S2, RRR, no M/G/R  Gastrointestinal: BS+, soft, NT/ND  Extremities: No peripheral edema  Vascular: 2+ peripheral pulses  Neurological:  poor short term memory  Psychiatric: Normal mood, normal affect  Musculoskeletal: 5/5 strength b/l upper and lower extremities  Skin: No rashes    CAPILLARY BLOOD GLUCOSE    LABS:    09-27    134<L>  |  98  |  20.0  ----------------------------<  89  6.0<H>   |  32.0<H>  |  0.86    Ca    9.0      27 Sep 2017 06:35            RADIOLOGY & ADDITIONAL TESTS:

## 2017-09-27 NOTE — PROGRESS NOTE ADULT - ASSESSMENT
51 yr old female with hx HTN, DI, SAH, cerebral aneurysm s/p clipping 2011 multiple admissions for hypernatremia     Improved chronic hypernatremia   No Polyuria  known CDI   Known H/O symptoms  from rapid reversal   EST free water deficit of 8 L  at start  Continue the current measures     HTN - Watch on meds     Hyperkalemia - Kayexalate x 1 , Prior Cortisol OK

## 2017-09-27 NOTE — CHART NOTE - NSCHARTNOTEFT_GEN_A_CORE
Administrative Note - Hospitalist Director /  of Medicine  Patient's spouse, Angel Santiago, was at the bedside and had asked to speak to me to discuss the patient's case and active management.  He expressed his concerns regarding the patient's sodium levels being below 140 as the patient had her last seizure with a sodium level of 137 during a previous admission.  He noted that the patient's sodium must be tightly controlled between 144-147 for the patient to be "safe".  He requested that the patient's sodium be checked every 4-6 hours especially knowing that the last level was 134.  Repeat sodium from 1700 was 135 with a potassium of 6.1 for which kayexelate was ordered.  The patient was placed on seizure precautions, bed alarm, and BMP checks have been ordered 4 times per day for the next 36 hours and will be reassessed on 9/29.  Constant observation was also ordered and should be maintained until the patient's sodium is above 138 on two consecutive checks.  He also requested that we attempt to have the same few physicians manage the patients case alongside the current nephrology group to minimize the chance in variation of management.  A PICC line has also been ordered so the patient can resume IV fluids upon discharge as there are times the patient forgets to take any fluids or can't remember how much fluid she had during the day.  I left Mr. Santiago my cell # to contact me if he has any further issues so we can mitigate them as early as possible.  Case was discussed with Dr. Hernandez and nurse manager Teresa.

## 2017-09-27 NOTE — PROGRESS NOTE ADULT - SUBJECTIVE AND OBJECTIVE BOX
NEPHROLOGY INTERVAL HPI/OVERNIGHT EVENTS:    Feels better   Meds reviewed   Endocrine follow up noted     MEDICATIONS  (STANDING):  levETIRAcetam 1000 milliGRAM(s) Oral two times a day  labetalol 100 milliGRAM(s) Oral two times a day  amLODIPine   Tablet 10 milliGRAM(s) Oral daily  ferrous    sulfate 325 milliGRAM(s) Oral daily  desmopressin 0.01% Nasal 1 Spray(s) Nasal daily    MEDICATIONS  (PRN):  ondansetron Injectable 4 milliGRAM(s) IV Push every 6 hours PRN Nausea and/or Vomiting      Allergies    No Known Allergies    Intolerances          Vital Signs Last 24 Hrs  T(C): 36.1 (27 Sep 2017 15:23), Max: 36.6 (27 Sep 2017 04:00)  T(F): 97 (27 Sep 2017 15:23), Max: 97.9 (27 Sep 2017 07:57)  HR: 74 (27 Sep 2017 15:23) (63 - 91)  BP: 108/77 (27 Sep 2017 15:23) (108/77 - 132/87)  BP(mean): --  RR: 20 (27 Sep 2017 07:57) (20 - 20)  SpO2: 98% (26 Sep 2017 20:20) (98% - 98%)  Daily     Daily   I&O's Detail    26 Sep 2017 07:01  -  27 Sep 2017 07:00  --------------------------------------------------------  IN:    Oral Fluid: 850 mL  Total IN: 850 mL    OUT:    Voided: 390 mL  Total OUT: 390 mL    Total NET: 460 mL        I&O's Summary    26 Sep 2017 07:01  -  27 Sep 2017 07:00  --------------------------------------------------------  IN: 850 mL / OUT: 390 mL / NET: 460 mL        PHYSICAL EXAM:  HEAD:  anicteric , no edema   NECK: Supple, No JVD,  CHEST/LUNG: Clear to percussion bilaterally; No rales, rhonchi, wheezing, or rubs  HEART: Regular rate and rhythm; No murmurs, rubs, or gallops  ABDOMEN: Soft, Nontender, Nondistended; Bowel sounds present  EXTREMITIES:  2+ Peripheral Pulses, No clubbing, cyanosis, or edema    LABS:    09-27    134<L>  |  98  |  20.0  ----------------------------<  89  6.0<H>   |  32.0<H>  |  0.86    Ca    9.0      27 Sep 2017 06:35                  RADIOLOGY & ADDITIONAL TESTS:

## 2017-09-27 NOTE — PROGRESS NOTE ADULT - ASSESSMENT
51 year old female with history of HTN, DI, SAH, cerebral aneurysm s/p clipping 2011 here with BRITTLE hypernatremia due to lapse in control of diabetes insipidus. On 9/26 her sodium decreased to 136. I had a long discussion with Dr Palumbo, the  and . Currently her picc line is out and her ddavp has been reduced to once a day. I will speak with the  regarding options for her care including resuming the picc line and or increasing the ddavp dose. I fear that no matter what we decide the patient is at extremely high risk because her memory loss prevents her from monitoring her fluids status on her own, and her sodium levels fluctuate wildly with minimal stimulus. On 9/27 despite reducing her ddavp to once a day her sodium continues to decrease. Her input is greater than her output. I ordered a repeat sodium stat. I ordered to have the PICC line replaced tomorrow. Her dose of ddavp was held on 9/27 because of her hyponatremia.

## 2017-09-27 NOTE — PROGRESS NOTE ADULT - PROBLEM SELECTOR PLAN 1
- as high as 165 on admission  - hx of hypernatremia in past  - Endo and nephro appreciated  - now resolved  PLAN  - low salt diet  - DDAVP BID  - cont to trend Na   9/27 Her sodium has dropped to 134. Repeat sodium twice a day.

## 2017-09-28 LAB
ANION GAP SERPL CALC-SCNC: 11 MMOL/L — SIGNIFICANT CHANGE UP (ref 5–17)
ANION GAP SERPL CALC-SCNC: 12 MMOL/L — SIGNIFICANT CHANGE UP (ref 5–17)
ANION GAP SERPL CALC-SCNC: 12 MMOL/L — SIGNIFICANT CHANGE UP (ref 5–17)
BUN SERPL-MCNC: 20 MG/DL — SIGNIFICANT CHANGE UP (ref 8–20)
BUN SERPL-MCNC: 20 MG/DL — SIGNIFICANT CHANGE UP (ref 8–20)
BUN SERPL-MCNC: 24 MG/DL — HIGH (ref 8–20)
CALCIUM SERPL-MCNC: 8.8 MG/DL — SIGNIFICANT CHANGE UP (ref 8.6–10.2)
CALCIUM SERPL-MCNC: 9 MG/DL — SIGNIFICANT CHANGE UP (ref 8.6–10.2)
CALCIUM SERPL-MCNC: 9.1 MG/DL — SIGNIFICANT CHANGE UP (ref 8.6–10.2)
CHLORIDE SERPL-SCNC: 98 MMOL/L — SIGNIFICANT CHANGE UP (ref 98–107)
CHLORIDE SERPL-SCNC: 99 MMOL/L — SIGNIFICANT CHANGE UP (ref 98–107)
CHLORIDE SERPL-SCNC: 99 MMOL/L — SIGNIFICANT CHANGE UP (ref 98–107)
CO2 SERPL-SCNC: 28 MMOL/L — SIGNIFICANT CHANGE UP (ref 22–29)
CREAT SERPL-MCNC: 0.8 MG/DL — SIGNIFICANT CHANGE UP (ref 0.5–1.3)
CREAT SERPL-MCNC: 0.83 MG/DL — SIGNIFICANT CHANGE UP (ref 0.5–1.3)
CREAT SERPL-MCNC: 0.83 MG/DL — SIGNIFICANT CHANGE UP (ref 0.5–1.3)
GLUCOSE SERPL-MCNC: 108 MG/DL — SIGNIFICANT CHANGE UP (ref 70–115)
GLUCOSE SERPL-MCNC: 110 MG/DL — SIGNIFICANT CHANGE UP (ref 70–115)
GLUCOSE SERPL-MCNC: 76 MG/DL — SIGNIFICANT CHANGE UP (ref 70–115)
HCT VFR BLD CALC: 27.4 % — LOW (ref 37–47)
HGB BLD-MCNC: 8.6 G/DL — LOW (ref 12–16)
MCHC RBC-ENTMCNC: 25.9 PG — LOW (ref 27–31)
MCHC RBC-ENTMCNC: 31.4 G/DL — LOW (ref 32–36)
MCV RBC AUTO: 82.5 FL — SIGNIFICANT CHANGE UP (ref 81–99)
PLATELET # BLD AUTO: 325 K/UL — SIGNIFICANT CHANGE UP (ref 150–400)
POTASSIUM SERPL-MCNC: 4.6 MMOL/L — SIGNIFICANT CHANGE UP (ref 3.5–5.3)
POTASSIUM SERPL-MCNC: 4.8 MMOL/L — SIGNIFICANT CHANGE UP (ref 3.5–5.3)
POTASSIUM SERPL-MCNC: 5.4 MMOL/L — HIGH (ref 3.5–5.3)
POTASSIUM SERPL-SCNC: 4.6 MMOL/L — SIGNIFICANT CHANGE UP (ref 3.5–5.3)
POTASSIUM SERPL-SCNC: 4.8 MMOL/L — SIGNIFICANT CHANGE UP (ref 3.5–5.3)
POTASSIUM SERPL-SCNC: 5.4 MMOL/L — HIGH (ref 3.5–5.3)
RBC # BLD: 3.32 M/UL — LOW (ref 4.4–5.2)
RBC # FLD: 17.6 % — HIGH (ref 11–15.6)
SODIUM SERPL-SCNC: 138 MMOL/L — SIGNIFICANT CHANGE UP (ref 135–145)
SODIUM SERPL-SCNC: 138 MMOL/L — SIGNIFICANT CHANGE UP (ref 135–145)
SODIUM SERPL-SCNC: 139 MMOL/L — SIGNIFICANT CHANGE UP (ref 135–145)
WBC # BLD: 6.2 K/UL — SIGNIFICANT CHANGE UP (ref 4.8–10.8)
WBC # FLD AUTO: 6.2 K/UL — SIGNIFICANT CHANGE UP (ref 4.8–10.8)

## 2017-09-28 PROCEDURE — 99233 SBSQ HOSP IP/OBS HIGH 50: CPT

## 2017-09-28 PROCEDURE — 71010: CPT | Mod: 26

## 2017-09-28 RX ADMIN — LEVETIRACETAM 1000 MILLIGRAM(S): 250 TABLET, FILM COATED ORAL at 18:06

## 2017-09-28 RX ADMIN — Medication 100 MILLIGRAM(S): at 18:06

## 2017-09-28 RX ADMIN — Medication 100 MILLIGRAM(S): at 07:00

## 2017-09-28 RX ADMIN — Medication 325 MILLIGRAM(S): at 12:50

## 2017-09-28 RX ADMIN — AMLODIPINE BESYLATE 10 MILLIGRAM(S): 2.5 TABLET ORAL at 07:00

## 2017-09-28 RX ADMIN — LEVETIRACETAM 1000 MILLIGRAM(S): 250 TABLET, FILM COATED ORAL at 07:00

## 2017-09-28 NOTE — PROCEDURE NOTE - NSPROCDETAILS_GEN_ALL_CORE
sterile dressing applied/ultrasound guidance/sterile technique, catheter placed/supine position/ultrasound assessment/location identified, draped/prepped, sterile technique used

## 2017-09-28 NOTE — PROGRESS NOTE ADULT - SUBJECTIVE AND OBJECTIVE BOX
Patient: KRUPA MEDINA [id number] 51y Female                 Internal Medicine Hospitalist Progress Note - Dr. Richie Anne    Chief Complaint: Patient is a 51y old  Female who presents with a chief complaint of hypernatremia (19 Sep 2017 09:00)    Hospital course:  52 yo F with h/o HTN, DI, SAH, cerebral aneurysm s/p clipping 2011, multiple readmissions for BRITTLE hypernatremia presented with lethargy and hypernatremia. Lab work done as outpatient revealed hypernatremia. On prior admission in March, pt developed seizure after acute drop in sodium level to 131, whereby thought process was that her new baseline cerebral osmolstat has been reset and is elevated relative to normal, thus reducing her seizure threshold. In the ED, sodium was noted to b 160 and was started on D5NS. Per renal, IVF changed to 1/4NS at 83cc/hr as pt with known history of symptoms from rapid reversal. Sodium continued to trend up and rate of fluid infusion was increased. DDAVP increased to tid. On 9/19, sodium level trended down to 140 and pt was cleared for discharge. Later that evening, pt was noted by  to have worsening memory. Repeat sodium level showed 136 and discharge was held. Desmopressin held.  Transferred to ICU for further management of hyper / hyponatremia.  Na improved to 143.  Seen by endocrine and renal, restarted on DDAVP.      Pt seen with 1:1 at bedside.  She denies complaints, ambulating freely.  No weakness / numbness.  No additional complaints.  No seizures noted.     ____________________PHYSICAL EXAM:  Vitals reviewed as indicated below  GENERAL:  NAD Alert and Oriented x 2 to person, place (zolk=[id number], President =Nakul)  HEENT: NCAT  CARDIOVASCULAR:  S1, S2  LUNGS: CTAB  ABDOMEN:  soft, (-) tenderness, (-) distension, (+) bowel sounds, (-) guarding, (-) rebound (-) rigidity  EXTREMITIES:  no cyanosis / clubbing / edema.   ____________________    VITALS:  Vital Signs Last 24 Hrs  T(C): 36.7 (28 Sep 2017 08:00), Max: 36.7 (27 Sep 2017 21:00)  T(F): 98.1 (28 Sep 2017 08:00), Max: 98.1 (28 Sep 2017 08:00)  HR: 78 (28 Sep 2017 08:00) (66 - 81)  BP: 114/76 (28 Sep 2017 08:00) (104/74 - 119/79)  BP(mean): --  RR: 20 (28 Sep 2017 08:00) (18 - 20)  SpO2: 96% (28 Sep 2017 06:00) (96% - 99%) Daily     Daily   CAPILLARY BLOOD GLUCOSE        I&O's Summary    27 Sep 2017 07:01  -  28 Sep 2017 07:00  --------------------------------------------------------  IN: 360 mL / OUT: 0 mL / NET: 360 mL        LABS:    09-28    139  |  99  |  20.0  ----------------------------<  108  4.8   |  28.0  |  0.80    Ca    9.0      28 Sep 2017 07:19                  MEDICATIONS:  levETIRAcetam 1000 milliGRAM(s) Oral two times a day  labetalol 100 milliGRAM(s) Oral two times a day  amLODIPine   Tablet 10 milliGRAM(s) Oral daily  ferrous    sulfate 325 milliGRAM(s) Oral daily  ondansetron Injectable 4 milliGRAM(s) IV Push every 6 hours PRN  desmopressin 0.01% Nasal 1 Spray(s) Nasal daily

## 2017-09-28 NOTE — PROGRESS NOTE ADULT - SUBJECTIVE AND OBJECTIVE BOX
NEPHROLOGY INTERVAL HPI/OVERNIGHT EVENTS:    Feels well   No new events     MEDICATIONS  (STANDING):  levETIRAcetam 1000 milliGRAM(s) Oral two times a day  labetalol 100 milliGRAM(s) Oral two times a day  amLODIPine   Tablet 10 milliGRAM(s) Oral daily  ferrous    sulfate 325 milliGRAM(s) Oral daily  desmopressin 0.01% Nasal 1 Spray(s) Nasal daily    MEDICATIONS  (PRN):  ondansetron Injectable 4 milliGRAM(s) IV Push every 6 hours PRN Nausea and/or Vomiting      Allergies    No Known Allergies    Intolerances          Vital Signs Last 24 Hrs  T(C): 36.7 (28 Sep 2017 08:00), Max: 36.7 (27 Sep 2017 21:00)  T(F): 98.1 (28 Sep 2017 08:00), Max: 98.1 (28 Sep 2017 08:00)  HR: 78 (28 Sep 2017 08:00) (66 - 81)  BP: 114/76 (28 Sep 2017 08:00) (104/74 - 119/79)  BP(mean): --  RR: 20 (28 Sep 2017 08:00) (18 - 20)  SpO2: 96% (28 Sep 2017 06:00) (96% - 99%)  Daily     Daily   I&O's Detail    27 Sep 2017 07:01  -  28 Sep 2017 07:00  --------------------------------------------------------  IN:    Oral Fluid: 360 mL  Total IN: 360 mL    OUT:  Total OUT: 0 mL    Total NET: 360 mL        I&O's Summary    27 Sep 2017 07:01  -  28 Sep 2017 07:00  --------------------------------------------------------  IN: 360 mL / OUT: 0 mL / NET: 360 mL        PHYSICAL EXAM:  PHYSICAL EXAM:  HEAD:  anicteric , no edema   NECK: Supple, No JVD,  CHEST/LUNG: Clear to percussion bilaterally; No rales, rhonchi, wheezing, or rubs  HEART: Regular rate and rhythm; No murmurs, rubs, or gallops  ABDOMEN: Soft, Nontender, Nondistended; Bowel sounds present  EXTREMITIES:  2+ Peripheral Pulses, No clubbing, cyanosis, or edema      09-28 labs     139  |  99  |  20.0  ----------------------------<  108  4.8   |  28.0  |  0.80    Ca    9.0      28 Sep 2017 07:19                  RADIOLOGY & ADDITIONAL TESTS:

## 2017-09-28 NOTE — PROGRESS NOTE ADULT - PROBLEM SELECTOR PLAN 1
Diabetes Insipidus with brittle control.  I discussed DDAVP and fluid management with renal.  Will monitor Na q12h.  Will consider specified intervals of free water once Na > 138.   Renal and Endocrine followup appreciated.  I left message with  at [phone] to discuss plan of care.

## 2017-09-28 NOTE — PROGRESS NOTE ADULT - PROBLEM SELECTOR PLAN 1
Physical Therapy Evaluation      Visit Count: 1  Plan of Care Dates: Initial: 9/28/2017 Through: 11/23/2017    Insurance Information:THERAPY BENEFITS:  PAYOR: [facility] MEDICARE COMPLETE  VISIT LIMIT: MEDICAL NECESSITY  AUTHORIZATION NEEDED: NO  NOTES: FOLLOW MEDICARE GUIDELINES  DEDUCTIBLE: $0                      MET: N/A  OUT OF POCKET: $4900.00     MET: $YES  CO-INSURANCE: 0%  COPAY: $40.00  CALL REF # ONLINE  Next Referring Provider Visit: 10/12/17    Referred by: KANDIS Keane  Medical Diagnosis (from order):  150.9 (ICD-9-CM) - C15.9 (ICD-10-CM) - Squamous cell esophageal cancer (CMS/HCC)  Treatment Diagnosis: cancer Pain, Impaired Posture, Impaired Joint Mobility, Impaired Range of Motion, Impaired Motor Function/Muscle Performance, Impaired Gait/Locomotion Deficits, Impaired Mobility, Impaired Balance and Movement Coordination Impairments  Insurance: 1. [facility] MEDICARE COMPLETE  2. N/A    Date of Onset: Diagnosed May of 2017    Diagnosis Precautions: cancer   Chart reviewed: Relevant co-morbidities, allergies, tests and medications: HTN, diabetes, thyroid disease , xray, PET     SUBJECTIVE   Pt had prehab for lung/esophagus before surgery. Pt has not been working on HEP but walking daily for 15 minutes or 1 block. Pt not using incentive spirometer but still has it and no questions.   Cancer Location: esophagus   Surgery: Yes: DOS: 9/8/17, Surgery Performed: ROBOTIC XI TRANSHIATAL ESOPHAGECTOMY WITH TRANSCERVICAL ENDOSCOPIC MOBILIZATION WITH CERVICAL ESOPHAGOGASTROSTOMY, BOTOX PYLOROPLASTY, CHOLECYSTECTOMY  Chemotherapy: Yes, Completed June 2017   Radiation: Yes, Completed. June 2017     Pt still has a J tube and has been using for 100% of nutrition. Pt can start soft foods as of today.     Current Symptoms:   Pain: Patient reports pain is not an issue/concern Pt has some irritation by J tube but not pain. Pt states its not constant.   Limited energy the last few months and now worse after surgery.    Fatigue: current 7/10, best 4/10, worst 9/10    Function:  Limitations and exacerbating factors (patient reported): Activity, Prolonged standing, Prolonged walking. difficulty with bed mobility, difficulty with transfers, lifting and carrying, pushing/pulling, yard/house work, stairs, standing, bending/lifting, walking, squatting  Prior level (patient reported): independent with all activities of daily living and instrumental activites of daily living, ascending and descending stairs with step to pattern    Prior Treatment: outpatient physical therapy in the past year for current condition. Hospitalization, home health services or skilled nursing facility in the last 30 days: No, per patient.    Home Environment/Social Support: Patient lives with children, in a 1 story home, needs to complete stairs for laundry completion.  Patient has assistance as needed from family/friends.      Safety:  Do you feel safe at home, work and/or school? yes, per patient  Falls: balance history in last year (< or equal to 2 falls/near falls and/or reasons) does not indicate further testing    Patient Goals/Concerns:  To fight fatigue, be more independent with ADLs - cooking, laundry     OBJECTIVE   Vitals:   Heart Rate: 98  Pulse Ox/O2 Saturation: 92%    Posture/Observation/Breathing Pattern:  moderate forward head , moderate rounded/forward shoulder, moderate to severe increase thoracic kyphosis, upper chest, accessory breathing pattern, shallow breathing pattern    Inspection:  Scar Tissue Integrity: scabbing  Surrounding Soft Tissue: restricted tissue mobility   Anterior neck incision and midline abdomen incision     AROM of cervical and B shoulders: WNL and no increased pain     Strength of B shoulders: 4-/5 pain free  strength of BLE: 4-/5 pain free     Gait Test:  Gait:  Arrives in WC   Pr states no AD in the house, and 2 ww outside (educated to being walker to next session)    Stair Ambulation:  Step to pattern with railing      Initial Treatment     Initial evaluation completed.    Therapeutic Exercise:   Reviewed prehab HEP    Reinforced deep breathing and use of incentive spirometer   Cues needed for scap retraction x 10 *  seated exercise x 10 each BLE*   Marching   LAQ   Heel raises and toe taps      Therapeutic Activity:  Educated on how therapy can help  educated not to over do activity  Educated what to do if sore with new exercise program  Decided 1x/week would be better due to fatigue levels   Answered questions about HEP    Initial Home Program:  * above denotes home program issuance as well    Plan for next session:   6 minute walk test - update goal   Bike  Level 1 theraband exercises for arm  Posture re-ed  Review diaphragmatic  breathing   Thoracic ext in chair and trunk isometrics in chair     ASSESSMENT   74 year old female presents to therapy with significant decline in prior level of function due to signs and symptoms consistent with side effects of chemo, radiation, and surgery for esophageal cancer.  Patient underwent ROBOTIC XI TRANSHIATAL ESOPHAGECTOMY WITH TRANSCERVICAL ENDOSCOPIC MOBILIZATION WITH CERVICAL ESOPHAGOGASTROSTOMY, BOTOX PYLOROPLASTY, CHOLECYSTECTOMY on 9/8/17. Pt is healing well with limited post-surgical pain. Some J tube irritation present and fatigue is main complaint.       Outcome:    Benefit from skilled therapy: yes   Rehab potential is good due to positive factors family support, post - surgical  and negative factors age, activity tolerance    Predicted patient presentation: evolving clinical presentation with changing characteristics    Plan of care below to: increase strength/stability, increase range of motion, decrease pain, improve balance/proprioception, reduce falls/fall risk, improve muscle coordination, improve joint mobility, improve safety at home and in community, improve activity tolerance, improve quality of gait, improve activities of daily living and instrumental activites of  daily living, improve functional independence, improve coordination, improve body mechanics to address functional limitations listed above    Goals:  To be obtained by end of this plan of care:  1. Patient independent with modified and progressed home exercise program.  To improve: Overall conditioning, UE Strength, LE Strength through the cancer exercise program  Patient to ambulate TBD feet in the 6 Minute Walk Test for independence in community ambulation with 2 wheeled walker.  Patient to demonstrate proper upright sitting and standing posture.   Patient to demonstrate proper technique of diaphragmatic breathing and         demonstrate understanding of purpose and proper use of incentive spirometer to increase inspiratory muscle strength post operatively.     PLAN   Frequency/Duration: 1 times per week for 8 weeks with tapering as the patient progresses  Skilled training and instruction for the following interventions:  Gait Training (37722)  Manual Therapy (69847)  Neuromuscular Re-Education (24042)  Therapeutic Activity (16394)  Therapeutic Exercise (10727)  Electrical Stimulation (83281//49515)   Heat/Cold (43770)  Laser    The plan of care and goals were established with the patient and patient's family who concurs.  Patient has been given attendance policy at time of initial evaluation.    Patient Education:  Who will be receiving education: patient and patient's family  Are they ready to learn: yes  Preferred learning style: written, verbal, demonstration  Barriers to learning: no barriers apparent at this time   Result of initial outlined education: Verbalizes understanding, Demonstrates understanding and Needs reinforcement    THERAPY DAILY BILLING   Primary Insurance: [facility] MEDICARE COMPLETE  Secondary Insurance: N/A    Evaluation Procedures:  Physical Therapy Evaluation: Moderate Complexity    Timed Procedures:  Therapeutic Activity, 10 minutes  Therapeutic Exercise, 15 minutes    Untimed  Procedures:  No untimed codes were used on this date of service    Total Treatment Time: 45 minutes    G-Code:  G-Code Score ABN form  : Current Mobility Limitation,  CK - 40% to 59% impaired, limited or restricted  : Goal Mobility Limitation,  CJ - 20% to 39% impaired, limited or restricted  Modifier based on outcome measure(s)/functional testing/clinical judgement as listed above    The referring provider's electronic or written signature on the evaluation authorizes the therapy plan of care and certifies the need for these services, furnished under this plan of care while under their care.  Electronically sent for physician signature     Patient agrees with above POC.         keppra, secondary to metabolic

## 2017-09-28 NOTE — PROGRESS NOTE ADULT - ASSESSMENT
51 yr old female with hx HTN, DI, SAH, cerebral aneurysm s/p clipping 2011 multiple admissions for hypernatremia     Improved chronic hypernatremia   No Polyuria  known CDI   Known H/O symptoms  from rapid reversal   Continue the current measures   Agree with plan as per Dr Anne   Patient should be sent home with possible twice a week home draws and timed oraln  free water q 6 hours     HTN - Watch on meds     Hyperkalemia - Kayexalate x 1, betetr  , Prior Cortisol OK

## 2017-09-28 NOTE — PROCEDURE NOTE - PROCEDURE
<<-----Click on this checkbox to enter Procedure PICC line placement  09/28/2017    Active  LEEHANIRAV

## 2017-09-29 DIAGNOSIS — D64.9 ANEMIA, UNSPECIFIED: ICD-10-CM

## 2017-09-29 LAB
ANION GAP SERPL CALC-SCNC: 13 MMOL/L — SIGNIFICANT CHANGE UP (ref 5–17)
ANION GAP SERPL CALC-SCNC: 9 MMOL/L — SIGNIFICANT CHANGE UP (ref 5–17)
BUN SERPL-MCNC: 23 MG/DL — HIGH (ref 8–20)
BUN SERPL-MCNC: 29 MG/DL — HIGH (ref 8–20)
CALCIUM SERPL-MCNC: 8.9 MG/DL — SIGNIFICANT CHANGE UP (ref 8.6–10.2)
CALCIUM SERPL-MCNC: 9 MG/DL — SIGNIFICANT CHANGE UP (ref 8.6–10.2)
CHLORIDE SERPL-SCNC: 106 MMOL/L — SIGNIFICANT CHANGE UP (ref 98–107)
CHLORIDE SERPL-SCNC: 106 MMOL/L — SIGNIFICANT CHANGE UP (ref 98–107)
CO2 SERPL-SCNC: 26 MMOL/L — SIGNIFICANT CHANGE UP (ref 22–29)
CO2 SERPL-SCNC: 30 MMOL/L — HIGH (ref 22–29)
CREAT SERPL-MCNC: 0.85 MG/DL — SIGNIFICANT CHANGE UP (ref 0.5–1.3)
CREAT SERPL-MCNC: 0.9 MG/DL — SIGNIFICANT CHANGE UP (ref 0.5–1.3)
GLUCOSE SERPL-MCNC: 74 MG/DL — SIGNIFICANT CHANGE UP (ref 70–115)
GLUCOSE SERPL-MCNC: 81 MG/DL — SIGNIFICANT CHANGE UP (ref 70–115)
HCT VFR BLD CALC: 26.2 % — LOW (ref 37–47)
HGB BLD-MCNC: 8 G/DL — LOW (ref 12–16)
MCHC RBC-ENTMCNC: 25.2 PG — LOW (ref 27–31)
MCHC RBC-ENTMCNC: 30.5 G/DL — LOW (ref 32–36)
MCV RBC AUTO: 82.6 FL — SIGNIFICANT CHANGE UP (ref 81–99)
PLATELET # BLD AUTO: 287 K/UL — SIGNIFICANT CHANGE UP (ref 150–400)
POTASSIUM SERPL-MCNC: 5 MMOL/L — SIGNIFICANT CHANGE UP (ref 3.5–5.3)
POTASSIUM SERPL-MCNC: 5.1 MMOL/L — SIGNIFICANT CHANGE UP (ref 3.5–5.3)
POTASSIUM SERPL-SCNC: 5 MMOL/L — SIGNIFICANT CHANGE UP (ref 3.5–5.3)
POTASSIUM SERPL-SCNC: 5.1 MMOL/L — SIGNIFICANT CHANGE UP (ref 3.5–5.3)
RBC # BLD: 3.17 M/UL — LOW (ref 4.4–5.2)
RBC # FLD: 17.2 % — HIGH (ref 11–15.6)
SODIUM SERPL-SCNC: 145 MMOL/L — SIGNIFICANT CHANGE UP (ref 135–145)
SODIUM SERPL-SCNC: 145 MMOL/L — SIGNIFICANT CHANGE UP (ref 135–145)
WBC # BLD: 5.8 K/UL — SIGNIFICANT CHANGE UP (ref 4.8–10.8)
WBC # FLD AUTO: 5.8 K/UL — SIGNIFICANT CHANGE UP (ref 4.8–10.8)

## 2017-09-29 PROCEDURE — 99233 SBSQ HOSP IP/OBS HIGH 50: CPT

## 2017-09-29 RX ADMIN — LEVETIRACETAM 1000 MILLIGRAM(S): 250 TABLET, FILM COATED ORAL at 05:10

## 2017-09-29 RX ADMIN — AMLODIPINE BESYLATE 10 MILLIGRAM(S): 2.5 TABLET ORAL at 05:10

## 2017-09-29 RX ADMIN — Medication 100 MILLIGRAM(S): at 05:10

## 2017-09-29 RX ADMIN — LEVETIRACETAM 1000 MILLIGRAM(S): 250 TABLET, FILM COATED ORAL at 17:44

## 2017-09-29 RX ADMIN — Medication 325 MILLIGRAM(S): at 11:30

## 2017-09-29 RX ADMIN — DESMOPRESSIN ACETATE 1 SPRAY(S): 0.1 TABLET ORAL at 11:30

## 2017-09-29 NOTE — PROGRESS NOTE ADULT - PROBLEM SELECTOR PROBLEM 5
Preventive measure

## 2017-09-29 NOTE — PROGRESS NOTE ADULT - PROBLEM SELECTOR PLAN 6
Her potassium was elevated on 9/27 and kayexalate was given.
Multifactorial, may be due in part to extensive labwork during this hospitalization.  Changed CBC to q12h.  Check stool OB.  Outpatient followup.

## 2017-09-29 NOTE — PROGRESS NOTE ADULT - PROVIDER SPECIALTY LIST ADULT
Critical Care
Endocrinology
Hospitalist
Nephrology
Endocrinology
Critical Care
Hospitalist

## 2017-09-29 NOTE — PROGRESS NOTE ADULT - SUBJECTIVE AND OBJECTIVE BOX
NEPHROLOGY INTERVAL HPI/OVERNIGHT EVENTS:  pt clinically stable  no acute distress    MEDICATIONS  (STANDING):  levETIRAcetam 1000 milliGRAM(s) Oral two times a day  labetalol 100 milliGRAM(s) Oral two times a day  amLODIPine   Tablet 10 milliGRAM(s) Oral daily  ferrous    sulfate 325 milliGRAM(s) Oral daily  desmopressin 0.01% Nasal 1 Spray(s) Nasal daily    MEDICATIONS  (PRN):  ondansetron Injectable 4 milliGRAM(s) IV Push every 6 hours PRN Nausea and/or Vomiting      Allergies    No Known Allergies          Vital Signs Last 24 Hrs  T(C): 36.6 (29 Sep 2017 08:41), Max: 36.9 (28 Sep 2017 19:38)  T(F): 97.8 (29 Sep 2017 08:41), Max: 98.4 (28 Sep 2017 19:38)  HR: 69 (29 Sep 2017 08:41) (69 - 83)  BP: 119/83 (29 Sep 2017 08:41) (113/77 - 122/82)  BP(mean): --  RR: 18 (29 Sep 2017 08:41) (18 - 21)  SpO2: 99% (29 Sep 2017 08:41) (97% - 99%)    PHYSICAL EXAM:  HEAD:  anicteric , no edema   NECK: Supple, No JVD,  CHEST/LUNG: Clear to percussion bilaterally; No rales, rhonchi, wheezing, or rubs  HEART: Regular rate and rhythm; No murmurs, rubs, or gallops  ABDOMEN: Soft, Nontender, Nondistended; Bowel sounds present  EXTREMITIES:  2+ Peripheral Pulses, No clubbing, cyanosis, or edema    LABS:                        8.0    5.8   )-----------( 287      ( 29 Sep 2017 07:18 )             26.2     09-29    145  |  106  |  23.0<H>  ----------------------------<  81  5.0   |  30.0<H>  |  0.85    Ca    8.9      29 Sep 2017 07:18              RADIOLOGY & ADDITIONAL TESTS:

## 2017-09-29 NOTE — PROGRESS NOTE ADULT - SUBJECTIVE AND OBJECTIVE BOX
Patient: KRUPA MEDINA [id number] 51y Female                 Internal Medicine Hospitalist Progress Note - Dr. Richie Anne    Chief Complaint: Patient is a 51y old  Female who presents with a chief complaint of hypernatremia (19 Sep 2017 09:00)    Hospital course:  50 yo F with h/o HTN, DI, SAH, cerebral aneurysm s/p clipping 2011, multiple readmissions for BRITTLE hypernatremia presented with lethargy and hypernatremia. Lab work done as outpatient revealed hypernatremia. On prior admission in March, pt developed seizure after acute drop in sodium level to 131, whereby thought process was that her new baseline cerebral osmolstat has been reset and is elevated relative to normal, thus reducing her seizure threshold. In the ED, sodium was noted to b 160 and was started on D5NS. Per renal, IVF changed to 1/4NS at 83cc/hr as pt with known history of symptoms from rapid reversal. Sodium continued to trend up and rate of fluid infusion was increased. DDAVP increased to tid. On 9/19, sodium level trended down to 140 and pt was cleared for discharge. Later that evening, pt was noted by  to have worsening memory. Repeat sodium level showed 136 and discharge was held. Desmopressin held.  Transferred to ICU for further management of hyper / hyponatremia.  Na improved to 143.  Seen by endocrine and renal, restarted on DDAVP.      Pt seen with 1:1 at bedside.  She denies complaints, ambulating freely.  No weakness / numbness.  No additional complaints.  No seizures noted.     ____________________PHYSICAL EXAM:  Vitals reviewed as indicated below  GENERAL:  NAD Alert and Oriented x 2 to person, place (khvh=[id number], President =Nakul)  HEENT: NCAT  CARDIOVASCULAR:  S1, S2  LUNGS: CTAB  ABDOMEN:  soft, (-) tenderness, (-) distension, (+) bowel sounds, (-) guarding, (-) rebound (-) rigidity  EXTREMITIES:  no cyanosis / clubbing / edema.   ____________________    VITALS:  Vital Signs Last 24 Hrs  T(C): 36.6 (29 Sep 2017 08:41), Max: 36.9 (28 Sep 2017 19:38)  T(F): 97.8 (29 Sep 2017 08:41), Max: 98.4 (28 Sep 2017 19:38)  HR: 69 (29 Sep 2017 08:41) (69 - 83)  BP: 119/83 (29 Sep 2017 08:41) (113/77 - 122/82)  BP(mean): --  RR: 18 (29 Sep 2017 08:41) (18 - 21)  SpO2: 99% (29 Sep 2017 08:41) (97% - 99%) Daily     Daily   CAPILLARY BLOOD GLUCOSE        I&O's Summary    28 Sep 2017 07:01  -  29 Sep 2017 07:00  --------------------------------------------------------  IN: 1200 mL / OUT: 850 mL / NET: 350 mL    29 Sep 2017 07:01  -  29 Sep 2017 11:13  --------------------------------------------------------  IN: 300 mL / OUT: 100 mL / NET: 200 mL        LABS:                        8.0    5.8   )-----------( 287      ( 29 Sep 2017 07:18 )             26.2     09-29    145  |  106  |  23.0<H>  ----------------------------<  81  5.0   |  30.0<H>  |  0.85    Ca    8.9      29 Sep 2017 07:18                  MEDICATIONS:  levETIRAcetam 1000 milliGRAM(s) Oral two times a day  labetalol 100 milliGRAM(s) Oral two times a day  amLODIPine   Tablet 10 milliGRAM(s) Oral daily  ferrous    sulfate 325 milliGRAM(s) Oral daily  ondansetron Injectable 4 milliGRAM(s) IV Push every 6 hours PRN  desmopressin 0.01% Nasal 1 Spray(s) Nasal daily

## 2017-09-29 NOTE — PROGRESS NOTE ADULT - ATTENDING COMMENTS
Pt seen and examined with CCM PA on rounds, agree with above assessment and plan. Continue NS @100ml until Na >135 then stop, resume DDAVP when Na >140.
Addendum: Repeat BMP at 1pm showed sodium 126 and pt persistently nauseated, vomiting, and confused. MICU consulted and transferred for further care. Left message with , Angel.
Discussed above plan with pt's .   If Hgb stable, plan d/c in am.  Discussed need for frequent labwork, which we will attempt to have done twice weekly.    Discussed monitoring pt's daily weight as an indirect measure of her hydrational status, as pt unable to recall her total daily fluid intake.  Continue diet modification, IV and po fluids.  Pt's  is in agreement with her discharge plan, is aware of the high likelihood of rehospitalization due to pt's cognitive issues and refractory DI.  I discussed having a HHA / family with patient as a means of monitoring her daily po fluid intake.
Pt is stable to transfer out of ICU at this time. She has a narrow window of Na that she tolerates without symptomology, pt education and follow up are paramount. Pt care handed over to Dr. Anne.
Pt seen and examined. Note addended where needed. Plan discussed with Sunshine SULLIVAN. Chronic hypernatremia due to DI from aneurysmal clipping. Slow with correction to avoid shifts. c/w D5 0.45% saline, desmopressin bid & free water intake. Endocrine & Renal consults called.

## 2017-09-29 NOTE — PROGRESS NOTE ADULT - PROBLEM SELECTOR PLAN 1
Diabetes Insipidus with brittle control.  I discussed DDAVP and fluid management with renal.  Will monitor Na q12h.  Started on specified intervals of free water once Na > 138.

## 2017-09-29 NOTE — PROGRESS NOTE ADULT - PROBLEM SELECTOR PROBLEM 1
Hypernatremia
Hypernatremia
Hyponatremia
Hypernatremia
Hyponatremia
Hypernatremia

## 2017-09-29 NOTE — PROGRESS NOTE ADULT - PROBLEM SELECTOR PROBLEM 4
HTN (Hypertension)
Seizure
HTN (Hypertension)
Seizure

## 2017-09-29 NOTE — PROGRESS NOTE ADULT - ASSESSMENT
Chronic hypernatremia ==>   No Polyuria  known CDI   Known H/O symptoms  from rapid reversal   Continue the current measures   will need close monitoring of labs

## 2017-09-30 VITALS
RESPIRATION RATE: 17 BRPM | DIASTOLIC BLOOD PRESSURE: 78 MMHG | SYSTOLIC BLOOD PRESSURE: 118 MMHG | TEMPERATURE: 99 F | HEART RATE: 71 BPM

## 2017-09-30 LAB
ANION GAP SERPL CALC-SCNC: 12 MMOL/L — SIGNIFICANT CHANGE UP (ref 5–17)
BUN SERPL-MCNC: 24 MG/DL — HIGH (ref 8–20)
CALCIUM SERPL-MCNC: 9.1 MG/DL — SIGNIFICANT CHANGE UP (ref 8.6–10.2)
CHLORIDE SERPL-SCNC: 102 MMOL/L — SIGNIFICANT CHANGE UP (ref 98–107)
CO2 SERPL-SCNC: 27 MMOL/L — SIGNIFICANT CHANGE UP (ref 22–29)
CREAT SERPL-MCNC: 0.89 MG/DL — SIGNIFICANT CHANGE UP (ref 0.5–1.3)
GLUCOSE SERPL-MCNC: 96 MG/DL — SIGNIFICANT CHANGE UP (ref 70–115)
POTASSIUM SERPL-MCNC: 4.6 MMOL/L — SIGNIFICANT CHANGE UP (ref 3.5–5.3)
POTASSIUM SERPL-SCNC: 4.6 MMOL/L — SIGNIFICANT CHANGE UP (ref 3.5–5.3)
SODIUM SERPL-SCNC: 141 MMOL/L — SIGNIFICANT CHANGE UP (ref 135–145)

## 2017-09-30 PROCEDURE — 80177 DRUG SCRN QUAN LEVETIRACETAM: CPT

## 2017-09-30 PROCEDURE — 36415 COLL VENOUS BLD VENIPUNCTURE: CPT

## 2017-09-30 PROCEDURE — 82728 ASSAY OF FERRITIN: CPT

## 2017-09-30 PROCEDURE — 80048 BASIC METABOLIC PNL TOTAL CA: CPT

## 2017-09-30 PROCEDURE — 85027 COMPLETE CBC AUTOMATED: CPT

## 2017-09-30 PROCEDURE — 85730 THROMBOPLASTIN TIME PARTIAL: CPT

## 2017-09-30 PROCEDURE — 82607 VITAMIN B-12: CPT

## 2017-09-30 PROCEDURE — 93005 ELECTROCARDIOGRAM TRACING: CPT

## 2017-09-30 PROCEDURE — 85610 PROTHROMBIN TIME: CPT

## 2017-09-30 PROCEDURE — 97163 PT EVAL HIGH COMPLEX 45 MIN: CPT

## 2017-09-30 PROCEDURE — 81001 URINALYSIS AUTO W/SCOPE: CPT

## 2017-09-30 PROCEDURE — 83550 IRON BINDING TEST: CPT

## 2017-09-30 PROCEDURE — 80053 COMPREHEN METABOLIC PANEL: CPT

## 2017-09-30 PROCEDURE — 99285 EMERGENCY DEPT VISIT HI MDM: CPT | Mod: 25

## 2017-09-30 PROCEDURE — 70450 CT HEAD/BRAIN W/O DYE: CPT

## 2017-09-30 PROCEDURE — 99239 HOSP IP/OBS DSCHRG MGMT >30: CPT

## 2017-09-30 PROCEDURE — 83935 ASSAY OF URINE OSMOLALITY: CPT

## 2017-09-30 PROCEDURE — 71045 X-RAY EXAM CHEST 1 VIEW: CPT

## 2017-09-30 PROCEDURE — 82746 ASSAY OF FOLIC ACID SERUM: CPT

## 2017-09-30 PROCEDURE — 83735 ASSAY OF MAGNESIUM: CPT

## 2017-09-30 PROCEDURE — 84466 ASSAY OF TRANSFERRIN: CPT

## 2017-09-30 PROCEDURE — 84100 ASSAY OF PHOSPHORUS: CPT

## 2017-09-30 RX ORDER — DESMOPRESSIN ACETATE 0.1 MG/1
1 TABLET ORAL
Qty: 0 | Refills: 0 | DISCHARGE
Start: 2017-09-30

## 2017-09-30 RX ORDER — DESMOPRESSIN ACETATE 0.1 MG/1
1 TABLET ORAL
Qty: 3 | Refills: 0 | OUTPATIENT
Start: 2017-09-30 | End: 2017-10-30

## 2017-09-30 RX ADMIN — DESMOPRESSIN ACETATE 1 SPRAY(S): 0.1 TABLET ORAL at 12:41

## 2017-09-30 RX ADMIN — Medication 325 MILLIGRAM(S): at 12:42

## 2017-09-30 RX ADMIN — LEVETIRACETAM 1000 MILLIGRAM(S): 250 TABLET, FILM COATED ORAL at 05:32

## 2018-01-23 NOTE — DISCHARGE NOTE ADULT - NSTOBACCONEVERSMOKERY/N_GEN_A
No Area H Indication Text: Tumors in this location are included in Area H (eyelids, eyebrows, nose, lips, chin, ear, pre-auricular, post-auricular, temple, genitalia, hands, feet, ankles and areola).  Tissue conservation is critical in these anatomic locations.

## 2018-03-10 NOTE — H&P ADULT - HISTORY OF PRESENT ILLNESS
Patient is 51 yr old female with hx HTN, DI, SAH, cerebral aneurysm s/p clipping 2011 multiple admissions for hypernatremia Presenting with hyperantremia.  Patient noticed to be lethargic.  Blood work done 2 days ago shows hypernatremia.  In the ED patient was started on IVF.  Patient appears to be alert, and orientated to place, and year only.  Offers no other complaints  As per daughter on recent admission, pt is Poor historian due to memory issues. STIFFNESS/PAIN

## 2018-08-10 NOTE — ED ADULT NURSE REASSESSMENT NOTE - MUSCULOSKELETAL WDL
Full range of motion of upper and lower extremities, no joint tenderness/swelling. negative - no vomiting, no diarrhea

## 2018-10-05 NOTE — ED ADULT NURSE NOTE - CADM POA VASCULAR ACCESS LOCATION
Artifical sphincter is successfully operating after reactivation. Pt may resume his normal schedule of drainage.  Ness catheter was removed. left arm

## 2018-11-01 ENCOUNTER — OUTPATIENT (OUTPATIENT)
Dept: OUTPATIENT SERVICES | Facility: HOSPITAL | Age: 53
LOS: 1 days | End: 2018-11-01
Payer: MEDICAID

## 2018-11-01 PROCEDURE — G9001: CPT

## 2018-11-08 DIAGNOSIS — Z71.89 OTHER SPECIFIED COUNSELING: ICD-10-CM

## 2018-12-09 NOTE — ED ADULT NURSE REASSESSMENT NOTE - NS ED NURSE REASSESS COMMENT FT1
Patient Instructions by Leandro Castañeda MD at 01/16/17 07:43 PM     Author:  Leandro Castañeda MD Service:  (none) Author Type:  Physician     Filed:  01/16/17 07:44 PM Encounter Date:  1/16/2017 Status:  Signed     :  Leandro Castañeda MD (Physician)            PATIENT INFORMATION        Follow-Up  - If not better in 3-5 days, call or make a follow-up appointment.    Additional Educational Resources:  For additional resources regarding your symptoms, diagnosis, or further health information, please visit the Health Resources section on [email or web address] or the Online Health Resources section in [employer].        Revision History        User Key Date/Time User Provider Type Action    > [N/A] 01/16/17 07:44 PM Leandro Castañeda MD Physician Sign            
Pt care endorsed at this time, daughter at the bedside , IV fluids infusing as per MD order, no distress noted , CM in place , seizure precaution in place, will continue to monitor
Pt sodium level resulted with 171, NP TYRELL woo aware. Pt to be admitted. Pt moved and settled into A1, placed on tele monitor, EKG completed, seizure precautions in place ( suction set up, bed padded, staff made aware). Report given to receiving RN and family made aware of plan of care. Pt denies any extreme thirst.  Pt left upper arm picc flushed , good flow and + blood return, provider note in for access.

## 2019-09-18 ENCOUNTER — OUTPATIENT (OUTPATIENT)
Dept: OUTPATIENT SERVICES | Facility: HOSPITAL | Age: 54
LOS: 1 days | End: 2019-09-18
Payer: MEDICAID

## 2019-09-18 DIAGNOSIS — M79.609 PAIN IN UNSPECIFIED LIMB: ICD-10-CM

## 2019-09-18 PROCEDURE — 36584 COMPL RPLCMT PICC RS&I: CPT | Mod: RT

## 2019-09-18 PROCEDURE — C1751: CPT

## 2019-09-18 PROCEDURE — C1769: CPT

## 2019-09-18 PROCEDURE — 77001 FLUOROGUIDE FOR VEIN DEVICE: CPT

## 2019-09-18 PROCEDURE — 36584 COMPL RPLCMT PICC RS&I: CPT

## 2019-09-18 PROCEDURE — 36005 INJECTION EXT VENOGRAPHY: CPT

## 2019-09-18 PROCEDURE — C1894: CPT

## 2019-11-01 ENCOUNTER — OUTPATIENT (OUTPATIENT)
Dept: OUTPATIENT SERVICES | Facility: HOSPITAL | Age: 54
LOS: 1 days | End: 2019-11-01

## 2019-11-08 ENCOUNTER — INPATIENT (INPATIENT)
Facility: HOSPITAL | Age: 54
LOS: 5 days | Discharge: HOME | DRG: 644 | End: 2019-11-14
Attending: HOSPITALIST | Admitting: HOSPITALIST
Payer: MEDICAID

## 2019-11-08 VITALS
DIASTOLIC BLOOD PRESSURE: 74 MMHG | HEART RATE: 71 BPM | RESPIRATION RATE: 18 BRPM | SYSTOLIC BLOOD PRESSURE: 102 MMHG | HEIGHT: 63 IN | TEMPERATURE: 98 F | OXYGEN SATURATION: 100 % | WEIGHT: 162.92 LBS

## 2019-11-08 DIAGNOSIS — E22.2 SYNDROME OF INAPPROPRIATE SECRETION OF ANTIDIURETIC HORMONE: ICD-10-CM

## 2019-11-08 DIAGNOSIS — D64.9 ANEMIA, UNSPECIFIED: ICD-10-CM

## 2019-11-08 DIAGNOSIS — E87.0 HYPEROSMOLALITY AND HYPERNATREMIA: ICD-10-CM

## 2019-11-08 DIAGNOSIS — I10 ESSENTIAL (PRIMARY) HYPERTENSION: ICD-10-CM

## 2019-11-08 DIAGNOSIS — Z98.84 BARIATRIC SURGERY STATUS: Chronic | ICD-10-CM

## 2019-11-08 LAB
ANION GAP SERPL CALC-SCNC: 12 MMOL/L — SIGNIFICANT CHANGE UP (ref 5–17)
ANION GAP SERPL CALC-SCNC: 13 MMOL/L — SIGNIFICANT CHANGE UP (ref 5–17)
ANION GAP SERPL CALC-SCNC: 14 MMOL/L — SIGNIFICANT CHANGE UP (ref 5–17)
ANISOCYTOSIS BLD QL: SLIGHT — SIGNIFICANT CHANGE UP
APPEARANCE UR: CLEAR — SIGNIFICANT CHANGE UP
BACTERIA # UR AUTO: ABNORMAL
BASOPHILS # BLD AUTO: 0.02 K/UL — SIGNIFICANT CHANGE UP (ref 0–0.2)
BASOPHILS NFR BLD AUTO: 0.4 % — SIGNIFICANT CHANGE UP (ref 0–2)
BILIRUB UR-MCNC: NEGATIVE — SIGNIFICANT CHANGE UP
BLD GP AB SCN SERPL QL: SIGNIFICANT CHANGE UP
BUN SERPL-MCNC: 33 MG/DL — HIGH (ref 8–20)
BUN SERPL-MCNC: 35 MG/DL — HIGH (ref 8–20)
BUN SERPL-MCNC: 37 MG/DL — HIGH (ref 8–20)
CALCIUM SERPL-MCNC: 8.8 MG/DL — SIGNIFICANT CHANGE UP (ref 8.6–10.2)
CALCIUM SERPL-MCNC: 8.8 MG/DL — SIGNIFICANT CHANGE UP (ref 8.6–10.2)
CALCIUM SERPL-MCNC: 8.9 MG/DL — SIGNIFICANT CHANGE UP (ref 8.6–10.2)
CHLORIDE SERPL-SCNC: 132 MMOL/L — HIGH (ref 98–107)
CHLORIDE SERPL-SCNC: 137 MMOL/L — HIGH (ref 98–107)
CHLORIDE SERPL-SCNC: 139 MMOL/L — HIGH (ref 98–107)
CO2 SERPL-SCNC: 19 MMOL/L — LOW (ref 22–29)
CO2 SERPL-SCNC: 19 MMOL/L — LOW (ref 22–29)
CO2 SERPL-SCNC: 20 MMOL/L — LOW (ref 22–29)
COLOR SPEC: YELLOW — SIGNIFICANT CHANGE UP
CREAT SERPL-MCNC: 1.42 MG/DL — HIGH (ref 0.5–1.3)
CREAT SERPL-MCNC: 1.44 MG/DL — HIGH (ref 0.5–1.3)
CREAT SERPL-MCNC: 1.6 MG/DL — HIGH (ref 0.5–1.3)
DACRYOCYTES BLD QL SMEAR: SLIGHT — SIGNIFICANT CHANGE UP
DIFF PNL FLD: NEGATIVE — SIGNIFICANT CHANGE UP
ELLIPTOCYTES BLD QL SMEAR: SLIGHT — SIGNIFICANT CHANGE UP
EOSINOPHIL # BLD AUTO: 0.14 K/UL — SIGNIFICANT CHANGE UP (ref 0–0.5)
EOSINOPHIL NFR BLD AUTO: 3 % — SIGNIFICANT CHANGE UP (ref 0–6)
EPI CELLS # UR: SIGNIFICANT CHANGE UP
FERRITIN SERPL-MCNC: 394 NG/ML — HIGH (ref 15–150)
GLUCOSE SERPL-MCNC: 65 MG/DL — LOW (ref 70–115)
GLUCOSE SERPL-MCNC: 76 MG/DL — SIGNIFICANT CHANGE UP (ref 70–115)
GLUCOSE SERPL-MCNC: 80 MG/DL — SIGNIFICANT CHANGE UP (ref 70–115)
GLUCOSE UR QL: NEGATIVE MG/DL — SIGNIFICANT CHANGE UP
HCT VFR BLD CALC: 28.2 % — LOW (ref 34.5–45)
HCT VFR BLD CALC: 32 % — LOW (ref 34.5–45)
HGB BLD-MCNC: 7.4 G/DL — LOW (ref 11.5–15.5)
HGB BLD-MCNC: 8.7 G/DL — LOW (ref 11.5–15.5)
HYPOCHROMIA BLD QL: SLIGHT — SIGNIFICANT CHANGE UP
IMM GRANULOCYTES NFR BLD AUTO: 0.9 % — SIGNIFICANT CHANGE UP (ref 0–1.5)
IRON SATN MFR SERPL: 101 UG/DL — SIGNIFICANT CHANGE UP (ref 37–145)
IRON SATN MFR SERPL: 57 % — HIGH (ref 14–50)
KETONES UR-MCNC: NEGATIVE — SIGNIFICANT CHANGE UP
LEUKOCYTE ESTERASE UR-ACNC: ABNORMAL
LYMPHOCYTES # BLD AUTO: 1.57 K/UL — SIGNIFICANT CHANGE UP (ref 1–3.3)
LYMPHOCYTES # BLD AUTO: 34.1 % — SIGNIFICANT CHANGE UP (ref 13–44)
MACROCYTES BLD QL: SLIGHT — SIGNIFICANT CHANGE UP
MANUAL SMEAR VERIFICATION: SIGNIFICANT CHANGE UP
MCHC RBC-ENTMCNC: 26 PG — LOW (ref 27–34)
MCHC RBC-ENTMCNC: 26.2 GM/DL — LOW (ref 32–36)
MCHC RBC-ENTMCNC: 26.9 PG — LOW (ref 27–34)
MCHC RBC-ENTMCNC: 27.2 GM/DL — LOW (ref 32–36)
MCV RBC AUTO: 98.8 FL — SIGNIFICANT CHANGE UP (ref 80–100)
MCV RBC AUTO: 98.9 FL — SIGNIFICANT CHANGE UP (ref 80–100)
MICROCYTES BLD QL: SLIGHT — SIGNIFICANT CHANGE UP
MONOCYTES # BLD AUTO: 0.18 K/UL — SIGNIFICANT CHANGE UP (ref 0–0.9)
MONOCYTES NFR BLD AUTO: 3.9 % — SIGNIFICANT CHANGE UP (ref 2–14)
NEUTROPHILS # BLD AUTO: 2.66 K/UL — SIGNIFICANT CHANGE UP (ref 1.8–7.4)
NEUTROPHILS NFR BLD AUTO: 57.7 % — SIGNIFICANT CHANGE UP (ref 43–77)
NITRITE UR-MCNC: NEGATIVE — SIGNIFICANT CHANGE UP
OVALOCYTES BLD QL SMEAR: SLIGHT — SIGNIFICANT CHANGE UP
PH UR: 5 — SIGNIFICANT CHANGE UP (ref 5–8)
PLAT MORPH BLD: NORMAL — SIGNIFICANT CHANGE UP
PLATELET # BLD AUTO: 140 K/UL — LOW (ref 150–400)
PLATELET # BLD AUTO: 146 K/UL — LOW (ref 150–400)
POIKILOCYTOSIS BLD QL AUTO: SLIGHT — SIGNIFICANT CHANGE UP
POLYCHROMASIA BLD QL SMEAR: SLIGHT — SIGNIFICANT CHANGE UP
POTASSIUM SERPL-MCNC: 4.3 MMOL/L — SIGNIFICANT CHANGE UP (ref 3.5–5.3)
POTASSIUM SERPL-MCNC: 4.4 MMOL/L — SIGNIFICANT CHANGE UP (ref 3.5–5.3)
POTASSIUM SERPL-MCNC: 4.6 MMOL/L — SIGNIFICANT CHANGE UP (ref 3.5–5.3)
POTASSIUM SERPL-SCNC: 4.3 MMOL/L — SIGNIFICANT CHANGE UP (ref 3.5–5.3)
POTASSIUM SERPL-SCNC: 4.4 MMOL/L — SIGNIFICANT CHANGE UP (ref 3.5–5.3)
POTASSIUM SERPL-SCNC: 4.6 MMOL/L — SIGNIFICANT CHANGE UP (ref 3.5–5.3)
PROT UR-MCNC: 30 MG/DL
RBC # BLD: 2.85 M/UL — LOW (ref 3.8–5.2)
RBC # BLD: 3.24 M/UL — LOW (ref 3.8–5.2)
RBC # FLD: 17.7 % — HIGH (ref 10.3–14.5)
RBC # FLD: 18 % — HIGH (ref 10.3–14.5)
RBC BLD AUTO: ABNORMAL
RBC CASTS # UR COMP ASSIST: SIGNIFICANT CHANGE UP /HPF (ref 0–4)
SCHISTOCYTES BLD QL AUTO: SLIGHT — SIGNIFICANT CHANGE UP
SODIUM SERPL-SCNC: 163 MMOL/L — CRITICAL HIGH (ref 135–145)
SODIUM SERPL-SCNC: 170 MMOL/L — CRITICAL HIGH (ref 135–145)
SODIUM SERPL-SCNC: 172 MMOL/L — CRITICAL HIGH (ref 135–145)
SP GR SPEC: 1.02 — SIGNIFICANT CHANGE UP (ref 1.01–1.02)
TIBC SERPL-MCNC: 177 UG/DL — LOW (ref 220–430)
TRANSFERRIN SERPL-MCNC: 124 MG/DL — LOW (ref 192–382)
UROBILINOGEN FLD QL: NEGATIVE MG/DL — SIGNIFICANT CHANGE UP
WBC # BLD: 4.61 K/UL — SIGNIFICANT CHANGE UP (ref 3.8–10.5)
WBC # BLD: 5.65 K/UL — SIGNIFICANT CHANGE UP (ref 3.8–10.5)
WBC # FLD AUTO: 4.61 K/UL — SIGNIFICANT CHANGE UP (ref 3.8–10.5)
WBC # FLD AUTO: 5.65 K/UL — SIGNIFICANT CHANGE UP (ref 3.8–10.5)
WBC UR QL: SIGNIFICANT CHANGE UP

## 2019-11-08 PROCEDURE — 99284 EMERGENCY DEPT VISIT MOD MDM: CPT

## 2019-11-08 PROCEDURE — 99223 1ST HOSP IP/OBS HIGH 75: CPT

## 2019-11-08 RX ORDER — MULTIVIT-MIN/FERROUS GLUCONATE 9 MG/15 ML
1 LIQUID (ML) ORAL DAILY
Refills: 0 | Status: DISCONTINUED | OUTPATIENT
Start: 2019-11-08 | End: 2019-11-14

## 2019-11-08 RX ORDER — ENOXAPARIN SODIUM 100 MG/ML
40 INJECTION SUBCUTANEOUS DAILY
Refills: 0 | Status: DISCONTINUED | OUTPATIENT
Start: 2019-11-08 | End: 2019-11-14

## 2019-11-08 RX ORDER — LEVETIRACETAM 250 MG/1
500 TABLET, FILM COATED ORAL
Refills: 0 | Status: DISCONTINUED | OUTPATIENT
Start: 2019-11-08 | End: 2019-11-14

## 2019-11-08 RX ORDER — DESMOPRESSIN ACETATE 0.1 MG/1
1 TABLET ORAL EVERY 12 HOURS
Refills: 0 | Status: DISCONTINUED | OUTPATIENT
Start: 2019-11-08 | End: 2019-11-11

## 2019-11-08 RX ORDER — LABETALOL HCL 100 MG
100 TABLET ORAL
Refills: 0 | Status: DISCONTINUED | OUTPATIENT
Start: 2019-11-08 | End: 2019-11-14

## 2019-11-08 RX ORDER — PANTOPRAZOLE SODIUM 20 MG/1
40 TABLET, DELAYED RELEASE ORAL
Refills: 0 | Status: DISCONTINUED | OUTPATIENT
Start: 2019-11-08 | End: 2019-11-14

## 2019-11-08 RX ORDER — SODIUM CHLORIDE 9 MG/ML
1000 INJECTION INTRAMUSCULAR; INTRAVENOUS; SUBCUTANEOUS
Refills: 0 | Status: DISCONTINUED | OUTPATIENT
Start: 2019-11-08 | End: 2019-11-08

## 2019-11-08 RX ORDER — SODIUM CHLORIDE 9 MG/ML
1000 INJECTION, SOLUTION INTRAVENOUS
Refills: 0 | Status: DISCONTINUED | OUTPATIENT
Start: 2019-11-08 | End: 2019-11-08

## 2019-11-08 RX ORDER — FOLIC ACID 0.8 MG
1 TABLET ORAL DAILY
Refills: 0 | Status: DISCONTINUED | OUTPATIENT
Start: 2019-11-08 | End: 2019-11-14

## 2019-11-08 RX ORDER — INFLUENZA VIRUS VACCINE 15; 15; 15; 15 UG/.5ML; UG/.5ML; UG/.5ML; UG/.5ML
0.5 SUSPENSION INTRAMUSCULAR ONCE
Refills: 0 | Status: DISCONTINUED | OUTPATIENT
Start: 2019-11-08 | End: 2019-11-14

## 2019-11-08 RX ORDER — AMLODIPINE BESYLATE 2.5 MG/1
10 TABLET ORAL DAILY
Refills: 0 | Status: DISCONTINUED | OUTPATIENT
Start: 2019-11-08 | End: 2019-11-11

## 2019-11-08 RX ADMIN — ENOXAPARIN SODIUM 40 MILLIGRAM(S): 100 INJECTION SUBCUTANEOUS at 17:57

## 2019-11-08 RX ADMIN — LEVETIRACETAM 500 MILLIGRAM(S): 250 TABLET, FILM COATED ORAL at 17:57

## 2019-11-08 RX ADMIN — SODIUM CHLORIDE 75 MILLILITER(S): 9 INJECTION INTRAMUSCULAR; INTRAVENOUS; SUBCUTANEOUS at 13:37

## 2019-11-08 RX ADMIN — DESMOPRESSIN ACETATE 1 MICROGRAM(S): 0.1 TABLET ORAL at 17:58

## 2019-11-08 RX ADMIN — Medication 1 MILLIGRAM(S): at 17:57

## 2019-11-08 RX ADMIN — SODIUM CHLORIDE 100 MILLILITER(S): 9 INJECTION, SOLUTION INTRAVENOUS at 18:44

## 2019-11-08 NOTE — CONSULT NOTE ADULT - SUBJECTIVE AND OBJECTIVE BOX
Patient is a 54y old  Female who presents with a chief complaint of anemia (08 Nov 2019 14:20)       HPI:  55 y/o female with h/o gastric bypass surgery, HTN, chronic anemia, diabetes insipidus after an episode of ICH 2nd to brain aneurysm chronic PICC line for home iv hydration (1000 cc D5 every otherday), was referred to the ER as labs showed low hemoglobin. In the ER, labs showed Hgb: 7.4. patient denies bleeding per orifices. menopause. no chest pain , palpitations or SOB. no headache or blurry vision. no fatigue or body aches. no h/o hereditary anemias.  Labs also showed S. Na: 172. patient has h/o recurrent hypernatremia. (08 Nov 2019 14:20)      PAST MEDICAL & SURGICAL HISTORY:  Memory loss, short term  Brain aneurysm  Diabetes insipidus  HTN (Hypertension)  Subarachnoid Hemorrhage  S/P Cerebral Aneurysm Repair  S/P Craniotomy      FAMILY HISTORY:  Family history of hypertension  [place]    Social History:Non smoker    MEDICATIONS  (STANDING):  enoxaparin Injectable 40 milliGRAM(s) SubCutaneous daily  levETIRAcetam 500 milliGRAM(s) Oral two times a day  sodium chloride 0.225%. 1000 milliLiter(s) (100 mL/Hr) IV Continuous <Continuous>    MEDICATIONS  (PRN):   Meds reviewed    Allergies    No Known Allergies        REVIEW OF SYSTEMS:    CONSTITUTIONAL:   feels tired  EYES: No eye pain, visual disturbances, or discharge  ENMT:  No difficulty hearing, tinnitus, vertigo; No sinus or throat pain  NECK: No pain or stiffness  BREASTS: No pain, masses, or nipple discharge  RESPIRATORY: neg  CARDIOVASCULAR: No chest pain, palpitations, dizziness,   GASTROINTESTINAL: No abdominal or epigastric pain. No nausea, vomiting, or hematemesis; No diarrhea or constipation. No melena or hematochezia.Trunckal obesity  GENITOURINARY: No dysuria, frequency, hematuria, or incontinence  NEUROLOGICAL: No headaches, memory loss, loss of strength, numbness, or tremors  SKIN: No rash  LYMPH NODES: No enlarged glands  ENDOCRINE: No heat or cold intolerance; No hair loss  MUSCULOSKELETAL: Neg  PSYCHIATRIC: No depression, anxiety, mood swings, or difficulty sleeping  HEME/LYMPH: No easy bruising, or bleeding gums  ALLERY AND IMMUNOLOGIC: No hives or eczema      Vital Signs Last 24 Hrs  T(C): 36.7 (08 Nov 2019 09:51), Max: 36.7 (08 Nov 2019 09:51)  T(F): 98.1 (08 Nov 2019 09:51), Max: 98.1 (08 Nov 2019 09:51)  HR: 59 (08 Nov 2019 15:13) (59 - 71)  BP: 110/79 (08 Nov 2019 15:13) (102/74 - 110/79)  BP(mean): --  RR: 18 (08 Nov 2019 15:13) (18 - 18)  SpO2: 100% (08 Nov 2019 15:13) (100% - 100%)  Daily Height in cm: 160.02 (08 Nov 2019 09:51)        PHYSICAL EXAM:    GENERAL: appears comfortable in bed in ER  HEAD:  Atraumatic, Normocephalic  EYES: EOMI, PERRLA, conjunctiva and sclera clear  ENMT: No tonsillar erythema, exudates, or enlargement; No lesions  NECK: Supple, neck  veins wnl  NERVOUS SYSTEM:  Alert , Good concentration; Motor Strength wnl upper and lower extremities;  CHEST/LUNG: Clear to percussion bilaterally; No rales, rhonchi, wheezing, or rubs  HEART: Regular rate and rhythm; No murmurs, rubs, or gallops  ABDOMEN: Soft, Nontender, Nondistended; Bowel sounds present,  EXTREMITIES:  wnl  LYMPH: No lymphadenopathy noted  SKIN: No rashes or lesions, pale      LABS:                        7.4    4.61  )-----------( 146      ( 08 Nov 2019 10:51 )             28.2     11-08    172<HH>  |  139<H>  |  37.0<H>  ----------------------------<  80  4.6   |  19.0<L>  |  1.60<H>    Ca    8.9      08 Nov 2019 10:51                  RADIOLOGY & ADDITIONAL TESTS:

## 2019-11-08 NOTE — H&P ADULT - HISTORY OF PRESENT ILLNESS
55 y/o female with h/o gastric bypass surgery, HTN, chronic anemia, diabetes insipidus after an episode of ICH 2nd to brain aneurysm chronic PICC line for home iv hydration (1000 cc D5 every otherday), was referred to the ER as labs showed low hemoglobin. In the ER, labs showed Hgb: 7.4. patient denies bleeding per orifices. menopause. no chest pain , palpitations or SOB. no headache or blurry vision. no fatigue or body aches. Labs also showed S. Na: 172. patient has h/o recurrent hypernatremia. 55 y/o female with h/o gastric bypass surgery, HTN, chronic anemia, diabetes insipidus after an episode of ICH 2nd to brain aneurysm chronic PICC line for home iv hydration (1000 cc D5 every otherday), was referred to the ER as labs showed low hemoglobin. In the ER, labs showed Hgb: 7.4. patient denies bleeding per orifices. menopause. no chest pain , palpitations or SOB. no headache or blurry vision. no fatigue or body aches. no h/o hereditary anemias.  Labs also showed S. Na: 172. patient has h/o recurrent hypernatremia.

## 2019-11-08 NOTE — ED STATDOCS - OBJECTIVE STATEMENT
55y/o F with PMHx of Brain Aneurysm (2012) and DM presents to the ED for transfusion. Pt had recent lab work with PCP, was found to have low blood count and was referred to f/u at ED for transfusion. Pt has h/o prior transfusion 1 year ago. Denies N/V, melena, fatigue, dizziness, lightheaded, chills. No additional complaints at this time.

## 2019-11-08 NOTE — ED ADULT TRIAGE NOTE - CHIEF COMPLAINT QUOTE
patient was sent from Primary care for blood transfusion, patient denies any symptoms, hx of brain anneurism. denies any bleeding or black stools.

## 2019-11-08 NOTE — ED ADULT NURSE REASSESSMENT NOTE - NS ED NURSE REASSESS COMMENT FT1
pt being seen by Dr. Abbott, pt admitted, as per Dr. Abbott after placement of PIC line can be used for blood transfusion

## 2019-11-08 NOTE — H&P ADULT - PROBLEM SELECTOR PLAN 2
probably 2nd to gastric bypass surgery. as per patient's spouse, patient has long standing anemia and was worked up and no hereditary anemia was found. transfuse2 units pRBC.

## 2019-11-08 NOTE — H&P ADULT - NSICDXPASTMEDICALHX_GEN_ALL_CORE_FT
PAST MEDICAL HISTORY:  Brain aneurysm     Diabetes insipidus     HTN (Hypertension)     Memory loss, short term     Subarachnoid Hemorrhage

## 2019-11-08 NOTE — H&P ADULT - MS EXT PE MLT D E PC
Full range of motion of upper and lower extremities, no joint tenderness/swelling. no clubbing/no cyanosis/no pedal edema/no calf tenderness

## 2019-11-08 NOTE — H&P ADULT - PROBLEM SELECTOR PLAN 1
0.45% NaCl iv drip @100 cc /h. repeat labs every 6 hrs. Nephrology consult Dr. Chaidez. Admit to Step down

## 2019-11-08 NOTE — ED STATDOCS - ENMT, MLM
Nasal mucosa clear.  Dry oral mucosa. Throat has no vesicles, no oropharyngeal exudates and uvula is midline.

## 2019-11-08 NOTE — ED STATDOCS - CLINICAL SUMMARY MEDICAL DECISION MAKING FREE TEXT BOX
Reported anemia, will check labs and if anemia is present, will transfuse. Reported anemia, will check labs and if anemia is present, will transfuse.  + anemia  admit observation Reported anemia, will check labs and if anemia is present, will transfuse.  + anemia  admit observation  observation cancelled. Sodium is elevated. Needs admission

## 2019-11-08 NOTE — CHART NOTE - NSCHARTNOTEFT_GEN_A_CORE
Met with Family and AOD at bedside to discuss family concerns about care plan. Patient family states her sodium has had rapid fluctuations in the past due to her central DI. They are requesting Q1 hour blood draws to monitor her sodium while on hypotonic fluids and DDAVP. Explained that usually Q 4 is sufficient and currently her Na is correcting appropriately. Nephrology on call discussed treatment plan with Son on phone and I spoke to him as well At this time we will do chem Q 2 hours overnight to more closely monitor Na correction rate. All other questions/concerns addressed with Son/family at bedside, and Nursing staff. Will endorse to day team in AM.

## 2019-11-08 NOTE — ED STATDOCS - ATTENDING CONTRIBUTION TO CARE
I, Bridget Reyes, performed the initial face to face bedside interview with this patient regarding history of present illness, review of symptoms and relevant past medical, social and family history.  I completed an independent physical examination.  I was the initial provider who evaluated this patient. I have signed out the follow up of any pending tests (i.e. labs, radiological studies) to the ACP.  I have communicated the patient’s plan of care and disposition with the ACP.  The history, relevant review of systems, past medical and surgical history, medical decision making, and physical examination was documented by the scribe in my presence and I attest to the accuracy of the documentation.

## 2019-11-09 LAB
ALBUMIN SERPL ELPH-MCNC: 3.8 G/DL — SIGNIFICANT CHANGE UP (ref 3.3–5.2)
ALP SERPL-CCNC: 102 U/L — SIGNIFICANT CHANGE UP (ref 40–120)
ALT FLD-CCNC: 10 U/L — SIGNIFICANT CHANGE UP
ANION GAP SERPL CALC-SCNC: 10 MMOL/L — SIGNIFICANT CHANGE UP (ref 5–17)
ANION GAP SERPL CALC-SCNC: 11 MMOL/L — SIGNIFICANT CHANGE UP (ref 5–17)
ANION GAP SERPL CALC-SCNC: 11 MMOL/L — SIGNIFICANT CHANGE UP (ref 5–17)
ANION GAP SERPL CALC-SCNC: 12 MMOL/L — SIGNIFICANT CHANGE UP (ref 5–17)
ANION GAP SERPL CALC-SCNC: 12 MMOL/L — SIGNIFICANT CHANGE UP (ref 5–17)
ANION GAP SERPL CALC-SCNC: 8 MMOL/L — SIGNIFICANT CHANGE UP (ref 5–17)
ANION GAP SERPL CALC-SCNC: 9 MMOL/L — SIGNIFICANT CHANGE UP (ref 5–17)
AST SERPL-CCNC: 12 U/L — SIGNIFICANT CHANGE UP
BASOPHILS # BLD AUTO: 0.02 K/UL — SIGNIFICANT CHANGE UP (ref 0–0.2)
BASOPHILS NFR BLD AUTO: 0.4 % — SIGNIFICANT CHANGE UP (ref 0–2)
BILIRUB SERPL-MCNC: 0.5 MG/DL — SIGNIFICANT CHANGE UP (ref 0.4–2)
BUN SERPL-MCNC: 28 MG/DL — HIGH (ref 8–20)
BUN SERPL-MCNC: 28 MG/DL — HIGH (ref 8–20)
BUN SERPL-MCNC: 30 MG/DL — HIGH (ref 8–20)
BUN SERPL-MCNC: 31 MG/DL — HIGH (ref 8–20)
BUN SERPL-MCNC: 32 MG/DL — HIGH (ref 8–20)
BUN SERPL-MCNC: 32 MG/DL — HIGH (ref 8–20)
BUN SERPL-MCNC: 33 MG/DL — HIGH (ref 8–20)
CALCIUM SERPL-MCNC: 8 MG/DL — LOW (ref 8.6–10.2)
CALCIUM SERPL-MCNC: 8.1 MG/DL — LOW (ref 8.6–10.2)
CALCIUM SERPL-MCNC: 8.3 MG/DL — LOW (ref 8.6–10.2)
CALCIUM SERPL-MCNC: 8.3 MG/DL — LOW (ref 8.6–10.2)
CALCIUM SERPL-MCNC: 8.5 MG/DL — LOW (ref 8.6–10.2)
CHLORIDE SERPL-SCNC: 126 MMOL/L — HIGH (ref 98–107)
CHLORIDE SERPL-SCNC: 129 MMOL/L — HIGH (ref 98–107)
CHLORIDE SERPL-SCNC: 130 MMOL/L — HIGH (ref 98–107)
CHLORIDE SERPL-SCNC: 132 MMOL/L — HIGH (ref 98–107)
CHLORIDE SERPL-SCNC: 132 MMOL/L — HIGH (ref 98–107)
CHLORIDE SERPL-SCNC: 133 MMOL/L — HIGH (ref 98–107)
CHLORIDE SERPL-SCNC: 134 MMOL/L — HIGH (ref 98–107)
CO2 SERPL-SCNC: 19 MMOL/L — LOW (ref 22–29)
CO2 SERPL-SCNC: 19 MMOL/L — LOW (ref 22–29)
CO2 SERPL-SCNC: 20 MMOL/L — LOW (ref 22–29)
CO2 SERPL-SCNC: 21 MMOL/L — LOW (ref 22–29)
CREAT SERPL-MCNC: 1.27 MG/DL — SIGNIFICANT CHANGE UP (ref 0.5–1.3)
CREAT SERPL-MCNC: 1.3 MG/DL — SIGNIFICANT CHANGE UP (ref 0.5–1.3)
CREAT SERPL-MCNC: 1.3 MG/DL — SIGNIFICANT CHANGE UP (ref 0.5–1.3)
CREAT SERPL-MCNC: 1.31 MG/DL — HIGH (ref 0.5–1.3)
CREAT SERPL-MCNC: 1.32 MG/DL — HIGH (ref 0.5–1.3)
CREAT SERPL-MCNC: 1.36 MG/DL — HIGH (ref 0.5–1.3)
CREAT SERPL-MCNC: 1.48 MG/DL — HIGH (ref 0.5–1.3)
CREAT SERPL-MCNC: 1.55 MG/DL — HIGH (ref 0.5–1.3)
CREAT SERPL-MCNC: 1.62 MG/DL — HIGH (ref 0.5–1.3)
EOSINOPHIL # BLD AUTO: 0.18 K/UL — SIGNIFICANT CHANGE UP (ref 0–0.5)
EOSINOPHIL NFR BLD AUTO: 3.2 % — SIGNIFICANT CHANGE UP (ref 0–6)
GLUCOSE SERPL-MCNC: 107 MG/DL — SIGNIFICANT CHANGE UP (ref 70–115)
GLUCOSE SERPL-MCNC: 123 MG/DL — HIGH (ref 70–115)
GLUCOSE SERPL-MCNC: 70 MG/DL — SIGNIFICANT CHANGE UP (ref 70–115)
GLUCOSE SERPL-MCNC: 72 MG/DL — SIGNIFICANT CHANGE UP (ref 70–115)
GLUCOSE SERPL-MCNC: 78 MG/DL — SIGNIFICANT CHANGE UP (ref 70–115)
GLUCOSE SERPL-MCNC: 84 MG/DL — SIGNIFICANT CHANGE UP (ref 70–115)
GLUCOSE SERPL-MCNC: 86 MG/DL — SIGNIFICANT CHANGE UP (ref 70–115)
HCT VFR BLD CALC: 32.7 % — LOW (ref 34.5–45)
HGB BLD-MCNC: 9.2 G/DL — LOW (ref 11.5–15.5)
IMM GRANULOCYTES NFR BLD AUTO: 1.1 % — SIGNIFICANT CHANGE UP (ref 0–1.5)
LYMPHOCYTES # BLD AUTO: 1.85 K/UL — SIGNIFICANT CHANGE UP (ref 1–3.3)
LYMPHOCYTES # BLD AUTO: 32.7 % — SIGNIFICANT CHANGE UP (ref 13–44)
MAGNESIUM SERPL-MCNC: 2.2 MG/DL — SIGNIFICANT CHANGE UP (ref 1.6–2.6)
MCHC RBC-ENTMCNC: 26.6 PG — LOW (ref 27–34)
MCHC RBC-ENTMCNC: 28.1 GM/DL — LOW (ref 32–36)
MCV RBC AUTO: 94.5 FL — SIGNIFICANT CHANGE UP (ref 80–100)
MONOCYTES # BLD AUTO: 0.31 K/UL — SIGNIFICANT CHANGE UP (ref 0–0.9)
MONOCYTES NFR BLD AUTO: 5.5 % — SIGNIFICANT CHANGE UP (ref 2–14)
NEUTROPHILS # BLD AUTO: 3.24 K/UL — SIGNIFICANT CHANGE UP (ref 1.8–7.4)
NEUTROPHILS NFR BLD AUTO: 57.1 % — SIGNIFICANT CHANGE UP (ref 43–77)
OB PNL STL: NEGATIVE — SIGNIFICANT CHANGE UP
OSMOLALITY SERPL: 353 MOSMOL/KG — HIGH (ref 275–300)
OSMOLALITY UR: 805 MOSM/KG — SIGNIFICANT CHANGE UP (ref 300–1000)
PHOSPHATE SERPL-MCNC: 2.6 MG/DL — SIGNIFICANT CHANGE UP (ref 2.4–4.7)
PLATELET # BLD AUTO: 131 K/UL — LOW (ref 150–400)
POTASSIUM SERPL-MCNC: 3.7 MMOL/L — SIGNIFICANT CHANGE UP (ref 3.5–5.3)
POTASSIUM SERPL-MCNC: 3.7 MMOL/L — SIGNIFICANT CHANGE UP (ref 3.5–5.3)
POTASSIUM SERPL-MCNC: 3.8 MMOL/L — SIGNIFICANT CHANGE UP (ref 3.5–5.3)
POTASSIUM SERPL-MCNC: 4 MMOL/L — SIGNIFICANT CHANGE UP (ref 3.5–5.3)
POTASSIUM SERPL-MCNC: 4.1 MMOL/L — SIGNIFICANT CHANGE UP (ref 3.5–5.3)
POTASSIUM SERPL-MCNC: 4.2 MMOL/L — SIGNIFICANT CHANGE UP (ref 3.5–5.3)
POTASSIUM SERPL-MCNC: 4.2 MMOL/L — SIGNIFICANT CHANGE UP (ref 3.5–5.3)
POTASSIUM SERPL-SCNC: 3.7 MMOL/L — SIGNIFICANT CHANGE UP (ref 3.5–5.3)
POTASSIUM SERPL-SCNC: 3.7 MMOL/L — SIGNIFICANT CHANGE UP (ref 3.5–5.3)
POTASSIUM SERPL-SCNC: 3.8 MMOL/L — SIGNIFICANT CHANGE UP (ref 3.5–5.3)
POTASSIUM SERPL-SCNC: 4 MMOL/L — SIGNIFICANT CHANGE UP (ref 3.5–5.3)
POTASSIUM SERPL-SCNC: 4.1 MMOL/L — SIGNIFICANT CHANGE UP (ref 3.5–5.3)
POTASSIUM SERPL-SCNC: 4.2 MMOL/L — SIGNIFICANT CHANGE UP (ref 3.5–5.3)
POTASSIUM SERPL-SCNC: 4.2 MMOL/L — SIGNIFICANT CHANGE UP (ref 3.5–5.3)
PROT SERPL-MCNC: 6.7 G/DL — SIGNIFICANT CHANGE UP (ref 6.6–8.7)
RBC # BLD: 3.46 M/UL — LOW (ref 3.8–5.2)
RBC # FLD: 17.8 % — HIGH (ref 10.3–14.5)
SODIUM SERPL-SCNC: 158 MMOL/L — HIGH (ref 135–145)
SODIUM SERPL-SCNC: 158 MMOL/L — HIGH (ref 135–145)
SODIUM SERPL-SCNC: 159 MMOL/L — HIGH (ref 135–145)
SODIUM SERPL-SCNC: 162 MMOL/L — CRITICAL HIGH (ref 135–145)
SODIUM SERPL-SCNC: 163 MMOL/L — CRITICAL HIGH (ref 135–145)
SODIUM SERPL-SCNC: 164 MMOL/L — CRITICAL HIGH (ref 135–145)
SODIUM SERPL-SCNC: 165 MMOL/L — CRITICAL HIGH (ref 135–145)
SODIUM UR-SCNC: 145 MMOL/L — SIGNIFICANT CHANGE UP
WBC # BLD: 5.66 K/UL — SIGNIFICANT CHANGE UP (ref 3.8–10.5)
WBC # FLD AUTO: 5.66 K/UL — SIGNIFICANT CHANGE UP (ref 3.8–10.5)

## 2019-11-09 PROCEDURE — 99233 SBSQ HOSP IP/OBS HIGH 50: CPT

## 2019-11-09 RX ORDER — SODIUM BICARBONATE 1 MEQ/ML
650 SYRINGE (ML) INTRAVENOUS THREE TIMES A DAY
Refills: 0 | Status: DISCONTINUED | OUTPATIENT
Start: 2019-11-09 | End: 2019-11-14

## 2019-11-09 RX ORDER — SODIUM CHLORIDE 9 MG/ML
1000 INJECTION, SOLUTION INTRAVENOUS
Refills: 0 | Status: DISCONTINUED | OUTPATIENT
Start: 2019-11-09 | End: 2019-11-10

## 2019-11-09 RX ADMIN — Medication 100 MILLIGRAM(S): at 18:19

## 2019-11-09 RX ADMIN — Medication 1 MILLIGRAM(S): at 12:28

## 2019-11-09 RX ADMIN — DESMOPRESSIN ACETATE 1 MICROGRAM(S): 0.1 TABLET ORAL at 18:19

## 2019-11-09 RX ADMIN — Medication 100 MILLIGRAM(S): at 05:50

## 2019-11-09 RX ADMIN — Medication 1 TABLET(S): at 12:28

## 2019-11-09 RX ADMIN — SODIUM CHLORIDE 75 MILLILITER(S): 9 INJECTION, SOLUTION INTRAVENOUS at 12:30

## 2019-11-09 RX ADMIN — Medication 650 MILLIGRAM(S): at 20:57

## 2019-11-09 RX ADMIN — LEVETIRACETAM 500 MILLIGRAM(S): 250 TABLET, FILM COATED ORAL at 05:50

## 2019-11-09 RX ADMIN — ENOXAPARIN SODIUM 40 MILLIGRAM(S): 100 INJECTION SUBCUTANEOUS at 12:28

## 2019-11-09 RX ADMIN — SODIUM CHLORIDE 50 MILLILITER(S): 9 INJECTION, SOLUTION INTRAVENOUS at 00:37

## 2019-11-09 RX ADMIN — SODIUM CHLORIDE 75 MILLILITER(S): 9 INJECTION, SOLUTION INTRAVENOUS at 06:05

## 2019-11-09 RX ADMIN — PANTOPRAZOLE SODIUM 40 MILLIGRAM(S): 20 TABLET, DELAYED RELEASE ORAL at 05:53

## 2019-11-09 RX ADMIN — LEVETIRACETAM 500 MILLIGRAM(S): 250 TABLET, FILM COATED ORAL at 18:18

## 2019-11-09 RX ADMIN — DESMOPRESSIN ACETATE 1 MICROGRAM(S): 0.1 TABLET ORAL at 05:57

## 2019-11-09 RX ADMIN — AMLODIPINE BESYLATE 10 MILLIGRAM(S): 2.5 TABLET ORAL at 05:50

## 2019-11-09 NOTE — PROGRESS NOTE ADULT - SUBJECTIVE AND OBJECTIVE BOX
NEPHROLOGY INTERVAL HPI/OVERNIGHT EVENTS: No new events, in monitored bed.    MEDICATIONS  (STANDING):  amLODIPine   Tablet 10 milliGRAM(s) Oral daily  desmopressin Injectable 1 MICROGram(s) SubCutaneous every 12 hours  enoxaparin Injectable 40 milliGRAM(s) SubCutaneous daily  folic acid 1 milliGRAM(s) Oral daily  influenza   Vaccine 0.5 milliLiter(s) IntraMuscular once  labetalol 100 milliGRAM(s) Oral two times a day  levETIRAcetam 500 milliGRAM(s) Oral two times a day  multivitamin/minerals 1 Tablet(s) Oral daily  pantoprazole    Tablet 40 milliGRAM(s) Oral before breakfast  sodium chloride 0.45%. 1000 milliLiter(s) (75 mL/Hr) IV Continuous <Continuous>    MEDICATIONS  (PRN):      Allergies    No Known Allergies            Vital Signs Last 24 Hrs  T(C): 37.1 (2019 07:48), Max: 37.1 (2019 07:48)  T(F): 98.8 (2019 07:48), Max: 98.8 (2019 07:48)  HR: 72 (2019 07:00) (56 - 77)  BP: 105/70 (2019 07:00) (102/74 - 144/75)  BP(mean): 82 (2019 07:00) (82 - 102)  RR: 14 (2019 07:00) (12 - 26)  SpO2: 96% (2019 07:00) (96% - 100%)  Daily Height in cm: 152.4 (2019 22:57)    Daily Weight in k.9 (2019 05:01)    PHYSICAL EXAM:    GENERAL: Comfortable in bed.  HEAD:  same  EYES: wnl  ENMT:   NECK: veins not distended  NERVOUS SYSTEM:  alert, oriented, verbal  CHEST/LUNG: clear  HEART: no gallop or rub  ABDOMEN: soft, not tender  EXTREMITIES:  same  LYMPH:   SKIN: no rash    LABS:                        9.2    5.66  )-----------( 131      ( 2019 05:13 )             32.7     11-    162<HH>  |  132<H>  |  32.0<H>  ----------------------------<  72  4.0   |  21.0<L>  |  1.27    Ca    8.3<L>      2019 07:06  Phos  2.6       Mg     2.2         TPro  6.7  /  Alb  3.8  /  TBili  0.5  /  DBili  x   /  AST  12  /  ALT  10  /  AlkPhos  102        Urinalysis Basic - ( 2019 23:48 )    Color: Yellow / Appearance: Clear / S.020 / pH: x  Gluc: x / Ketone: Negative  / Bili: Negative / Urobili: Negative mg/dL   Blood: x / Protein: 30 mg/dL / Nitrite: Negative   Leuk Esterase: Trace / RBC: 0-2 /HPF / WBC 3-5   Sq Epi: x / Non Sq Epi: Occasional / Bacteria: Occasional      Magnesium, Serum: 2.2 mg/dL ( @ 05:14)  Phosphorus Level, Serum: 2.6 mg/dL ( @ 05:14)          RADIOLOGY & ADDITIONAL TESTS:

## 2019-11-09 NOTE — PROGRESS NOTE ADULT - ASSESSMENT
1. 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   on sc DDAVP , half normal saline .   BMP every 4 hour .  nephrology is closely managing .  close monitoring in ICU , increased risk for seizure .    2. Mild TOSHIA on CKD  improved after hydration .  will monitor.    3. Hx if ICH aneurysm rupture   continue to monitor.    4. Hx of seizures   on keppra .    5. hx of gastric bypass and chronic anemia   was transfused yesterday .  will monitor.    6. HTN   c/w labetalol and amlodipine .    7. DVT prophylaxis  on lovenox.    details extensively discussed with daughter and sister at bedside , all concerns answered . 1. 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   on sc DDAVP , half normal saline .   BMP every 4 hour .  nephrology is closely managing .  close monitoring in ICU , increased risk for seizure .    2. Mild TOSHIA on CKD  improved after hydration .  will monitor.    3. Hx if ICH aneurysm rupture   continue to monitor.    4. Hx of seizures   on keppra .    5. hx of gastric bypass and chronic anemia   was transfused yesterday .  will monitor.    6. HTN   c/w labetalol and amlodipine .    7. DVT prophylaxis  on lovenox.    details extensively discussed with daughter and sister at bedside , all concerns answered .

## 2019-11-09 NOTE — PROGRESS NOTE ADULT - SUBJECTIVE AND OBJECTIVE BOX
CC: hypernatremia    INTERVAL HPI/OVERNIGHT EVENTS: seen and examined , sodium level improved as compared to yesterday , BMP being done every 4 hours, nephrology is closely following patient , will continue to be in ICU for now. patient is more awake , energetic , being herself as per family .     REVIEW OF SYSTEMS:    CONSTITUTIONAL: feels better   RESPIRATORY: No cough, wheezing, hemoptysis; No shortness of breath  CARDIOVASCULAR: No chest pain, palpitations  GASTROINTESTINAL: No abdominal or epigastric pain. No nausea, vomiting  NEUROLOGICAL: No headaches, or blurry vision       Vital Signs Last 24 Hrs  T(C): 36.4 (2019 11:52), Max: 37.1 (2019 07:48)  T(F): 97.6 (2019 11:52), Max: 98.8 (2019 07:48)  HR: 76 (2019 13:00) (56 - 77)  BP: 106/72 (2019 13:00) (101/70 - 144/75)  BP(mean): 84 (2019 13:00) (82 - 102)  RR: 16 (2019 13:00) (12 - 28)  SpO2: 96% (2019 13:00) (96% - 100%)    PHYSICAL EXAM:    GENERAL: NAD, sitting in chair   CHEST/LUNG: Clear to percussion bilaterally; No wheezing  HEART: S1S2+, Regular rate and rhythm; No murmurs, rubs, or gallops  ABDOMEN: Soft, Nontender, Nondistended; Bowel sounds present  EXTREMITIES:  no pitting edema , pulses palpated BL.      LABS:                        9.2    5.66  )-----------( 131      ( 2019 05:13 )             32.7         164<HH>  |  133<H>  |  28.0<H>  ----------------------------<  78  4.1   |  21.0<L>  |  1.32<H>    Ca    8.5<L>      2019 10:42  Phos  2.6       Mg     2.2         TPro  6.7  /  Alb  3.8  /  TBili  0.5  /  DBili  x   /  AST  12  /  ALT  10  /  AlkPhos  102        Urinalysis Basic - ( 2019 23:48 )    Color: Yellow / Appearance: Clear / S.020 / pH: x  Gluc: x / Ketone: Negative  / Bili: Negative / Urobili: Negative mg/dL   Blood: x / Protein: 30 mg/dL / Nitrite: Negative   Leuk Esterase: Trace / RBC: 0-2 /HPF / WBC 3-5   Sq Epi: x / Non Sq Epi: Occasional / Bacteria: Occasional          MEDICATIONS  (STANDING):  amLODIPine   Tablet 10 milliGRAM(s) Oral daily  desmopressin Injectable 1 MICROGram(s) SubCutaneous every 12 hours  enoxaparin Injectable 40 milliGRAM(s) SubCutaneous daily  folic acid 1 milliGRAM(s) Oral daily  influenza   Vaccine 0.5 milliLiter(s) IntraMuscular once  labetalol 100 milliGRAM(s) Oral two times a day  levETIRAcetam 500 milliGRAM(s) Oral two times a day  multivitamin/minerals 1 Tablet(s) Oral daily  pantoprazole    Tablet 40 milliGRAM(s) Oral before breakfast  sodium chloride 0.45%. 1000 milliLiter(s) (75 mL/Hr) IV Continuous <Continuous>    MEDICATIONS  (PRN):      RADIOLOGY & ADDITIONAL TESTS:

## 2019-11-10 LAB
ANION GAP SERPL CALC-SCNC: 10 MMOL/L — SIGNIFICANT CHANGE UP (ref 5–17)
ANION GAP SERPL CALC-SCNC: 11 MMOL/L — SIGNIFICANT CHANGE UP (ref 5–17)
ANION GAP SERPL CALC-SCNC: 12 MMOL/L — SIGNIFICANT CHANGE UP (ref 5–17)
APPEARANCE UR: CLEAR — SIGNIFICANT CHANGE UP
BACTERIA # UR AUTO: ABNORMAL
BILIRUB UR-MCNC: NEGATIVE — SIGNIFICANT CHANGE UP
BUN SERPL-MCNC: 26 MG/DL — HIGH (ref 8–20)
BUN SERPL-MCNC: 26 MG/DL — HIGH (ref 8–20)
BUN SERPL-MCNC: 27 MG/DL — HIGH (ref 8–20)
BUN SERPL-MCNC: 29 MG/DL — HIGH (ref 8–20)
BUN SERPL-MCNC: 30 MG/DL — HIGH (ref 8–20)
BUN SERPL-MCNC: 30 MG/DL — HIGH (ref 8–20)
CALCIUM SERPL-MCNC: 8.2 MG/DL — LOW (ref 8.6–10.2)
CALCIUM SERPL-MCNC: 8.2 MG/DL — LOW (ref 8.6–10.2)
CALCIUM SERPL-MCNC: 8.3 MG/DL — LOW (ref 8.6–10.2)
CALCIUM SERPL-MCNC: 8.3 MG/DL — LOW (ref 8.6–10.2)
CALCIUM SERPL-MCNC: 8.4 MG/DL — LOW (ref 8.6–10.2)
CALCIUM SERPL-MCNC: 8.5 MG/DL — LOW (ref 8.6–10.2)
CHLORIDE SERPL-SCNC: 125 MMOL/L — HIGH (ref 98–107)
CHLORIDE SERPL-SCNC: 125 MMOL/L — HIGH (ref 98–107)
CHLORIDE SERPL-SCNC: 126 MMOL/L — HIGH (ref 98–107)
CHLORIDE SERPL-SCNC: 127 MMOL/L — HIGH (ref 98–107)
CO2 SERPL-SCNC: 20 MMOL/L — LOW (ref 22–29)
CO2 SERPL-SCNC: 20 MMOL/L — LOW (ref 22–29)
CO2 SERPL-SCNC: 21 MMOL/L — LOW (ref 22–29)
CO2 SERPL-SCNC: 22 MMOL/L — SIGNIFICANT CHANGE UP (ref 22–29)
COLOR SPEC: YELLOW — SIGNIFICANT CHANGE UP
CREAT SERPL-MCNC: 1.16 MG/DL — SIGNIFICANT CHANGE UP (ref 0.5–1.3)
CREAT SERPL-MCNC: 1.2 MG/DL — SIGNIFICANT CHANGE UP (ref 0.5–1.3)
CREAT SERPL-MCNC: 1.2 MG/DL — SIGNIFICANT CHANGE UP (ref 0.5–1.3)
CREAT SERPL-MCNC: 1.31 MG/DL — HIGH (ref 0.5–1.3)
CREAT SERPL-MCNC: 1.37 MG/DL — HIGH (ref 0.5–1.3)
CREAT SERPL-MCNC: 1.38 MG/DL — HIGH (ref 0.5–1.3)
DIFF PNL FLD: NEGATIVE — SIGNIFICANT CHANGE UP
EPI CELLS # UR: SIGNIFICANT CHANGE UP
GLUCOSE SERPL-MCNC: 121 MG/DL — HIGH (ref 70–115)
GLUCOSE SERPL-MCNC: 137 MG/DL — HIGH (ref 70–115)
GLUCOSE SERPL-MCNC: 67 MG/DL — LOW (ref 70–115)
GLUCOSE SERPL-MCNC: 86 MG/DL — SIGNIFICANT CHANGE UP (ref 70–115)
GLUCOSE SERPL-MCNC: 87 MG/DL — SIGNIFICANT CHANGE UP (ref 70–115)
GLUCOSE SERPL-MCNC: 88 MG/DL — SIGNIFICANT CHANGE UP (ref 70–115)
GLUCOSE UR QL: NEGATIVE MG/DL — SIGNIFICANT CHANGE UP
HCV AB S/CO SERPL IA: 0.13 S/CO — SIGNIFICANT CHANGE UP (ref 0–0.99)
HCV AB SERPL-IMP: SIGNIFICANT CHANGE UP
KETONES UR-MCNC: NEGATIVE — SIGNIFICANT CHANGE UP
LEUKOCYTE ESTERASE UR-ACNC: ABNORMAL
NITRITE UR-MCNC: NEGATIVE — SIGNIFICANT CHANGE UP
OSMOLALITY UR: 729 MOSM/KG — SIGNIFICANT CHANGE UP (ref 300–1000)
PH UR: 6 — SIGNIFICANT CHANGE UP (ref 5–8)
POTASSIUM SERPL-MCNC: 3.7 MMOL/L — SIGNIFICANT CHANGE UP (ref 3.5–5.3)
POTASSIUM SERPL-MCNC: 3.8 MMOL/L — SIGNIFICANT CHANGE UP (ref 3.5–5.3)
POTASSIUM SERPL-MCNC: 3.9 MMOL/L — SIGNIFICANT CHANGE UP (ref 3.5–5.3)
POTASSIUM SERPL-MCNC: 4.1 MMOL/L — SIGNIFICANT CHANGE UP (ref 3.5–5.3)
POTASSIUM SERPL-SCNC: 3.7 MMOL/L — SIGNIFICANT CHANGE UP (ref 3.5–5.3)
POTASSIUM SERPL-SCNC: 3.8 MMOL/L — SIGNIFICANT CHANGE UP (ref 3.5–5.3)
POTASSIUM SERPL-SCNC: 3.9 MMOL/L — SIGNIFICANT CHANGE UP (ref 3.5–5.3)
POTASSIUM SERPL-SCNC: 4.1 MMOL/L — SIGNIFICANT CHANGE UP (ref 3.5–5.3)
POTASSIUM UR-SCNC: 21 MMOL/L — SIGNIFICANT CHANGE UP
PROT UR-MCNC: 30 MG/DL
RBC CASTS # UR COMP ASSIST: ABNORMAL /HPF (ref 0–4)
SODIUM SERPL-SCNC: 156 MMOL/L — HIGH (ref 135–145)
SODIUM SERPL-SCNC: 157 MMOL/L — HIGH (ref 135–145)
SODIUM SERPL-SCNC: 158 MMOL/L — HIGH (ref 135–145)
SODIUM SERPL-SCNC: 159 MMOL/L — HIGH (ref 135–145)
SODIUM UR-SCNC: 155 MMOL/L — SIGNIFICANT CHANGE UP
SP GR SPEC: 1.02 — SIGNIFICANT CHANGE UP (ref 1.01–1.02)
UROBILINOGEN FLD QL: NEGATIVE MG/DL — SIGNIFICANT CHANGE UP
WBC UR QL: ABNORMAL

## 2019-11-10 PROCEDURE — 99232 SBSQ HOSP IP/OBS MODERATE 35: CPT

## 2019-11-10 RX ORDER — POTASSIUM CHLORIDE 20 MEQ
20 PACKET (EA) ORAL
Refills: 0 | Status: DISCONTINUED | OUTPATIENT
Start: 2019-11-10 | End: 2019-11-12

## 2019-11-10 RX ORDER — SODIUM CHLORIDE 9 MG/ML
1000 INJECTION, SOLUTION INTRAVENOUS
Refills: 0 | Status: DISCONTINUED | OUTPATIENT
Start: 2019-11-10 | End: 2019-11-11

## 2019-11-10 RX ADMIN — SODIUM CHLORIDE 75 MILLILITER(S): 9 INJECTION, SOLUTION INTRAVENOUS at 18:04

## 2019-11-10 RX ADMIN — Medication 100 MILLIGRAM(S): at 05:56

## 2019-11-10 RX ADMIN — PANTOPRAZOLE SODIUM 40 MILLIGRAM(S): 20 TABLET, DELAYED RELEASE ORAL at 05:56

## 2019-11-10 RX ADMIN — ENOXAPARIN SODIUM 40 MILLIGRAM(S): 100 INJECTION SUBCUTANEOUS at 12:13

## 2019-11-10 RX ADMIN — Medication 650 MILLIGRAM(S): at 05:57

## 2019-11-10 RX ADMIN — Medication 20 MILLIEQUIVALENT(S): at 17:04

## 2019-11-10 RX ADMIN — Medication 650 MILLIGRAM(S): at 21:43

## 2019-11-10 RX ADMIN — DESMOPRESSIN ACETATE 1 MICROGRAM(S): 0.1 TABLET ORAL at 17:34

## 2019-11-10 RX ADMIN — Medication 1 MILLIGRAM(S): at 12:14

## 2019-11-10 RX ADMIN — AMLODIPINE BESYLATE 10 MILLIGRAM(S): 2.5 TABLET ORAL at 05:56

## 2019-11-10 RX ADMIN — Medication 1 TABLET(S): at 12:15

## 2019-11-10 RX ADMIN — DESMOPRESSIN ACETATE 1 MICROGRAM(S): 0.1 TABLET ORAL at 05:57

## 2019-11-10 RX ADMIN — LEVETIRACETAM 500 MILLIGRAM(S): 250 TABLET, FILM COATED ORAL at 05:57

## 2019-11-10 RX ADMIN — Medication 100 MILLIGRAM(S): at 17:02

## 2019-11-10 RX ADMIN — Medication 650 MILLIGRAM(S): at 15:13

## 2019-11-10 RX ADMIN — LEVETIRACETAM 500 MILLIGRAM(S): 250 TABLET, FILM COATED ORAL at 17:02

## 2019-11-10 NOTE — PROGRESS NOTE ADULT - SUBJECTIVE AND OBJECTIVE BOX
NEPHROLOGY INTERVAL HPI/OVERNIGHT EVENTS: No new events, oob in chair eating.    MEDICATIONS  (STANDING):  amLODIPine   Tablet 10 milliGRAM(s) Oral daily  desmopressin Injectable 1 MICROGram(s) SubCutaneous every 12 hours  enoxaparin Injectable 40 milliGRAM(s) SubCutaneous daily  folic acid 1 milliGRAM(s) Oral daily  influenza   Vaccine 0.5 milliLiter(s) IntraMuscular once  labetalol 100 milliGRAM(s) Oral two times a day  levETIRAcetam 500 milliGRAM(s) Oral two times a day  multivitamin/minerals 1 Tablet(s) Oral daily  pantoprazole    Tablet 40 milliGRAM(s) Oral before breakfast  sodium chloride 0.45%. 1000 milliLiter(s) (75 mL/Hr) IV Continuous <Continuous>    MEDICATIONS  (PRN):      Allergies    No Known Allergies            Vital Signs Last 24 HrsICU   T(C): 36.2 (10 Nov 2019 08:00), Max: 36.9 (2019 20:31)  T(F): 97.2 (10 Nov 2019 08:00), Max: 98.4 (2019 20:31)  HR: 63 (10 Nov 2019 07:00) (63 - 81)  BP: 118/82 (10 Nov 2019 07:00) (101/70 - 138/76)  BP(mean): 96 (10 Nov 2019 07:00) (82 - 107)  ABP: --  ABP(mean): --  RR: 13 (10 Nov 2019 07:00) (12 - 29)  SpO2: 100% (10 Nov 2019 07:00) (87% - 100%)    T(C): 37.1 (2019 07:48), Max: 37.1 (2019 07:48)  T(F): 98.8 (2019 07:48), Max: 98.8 (2019 07:48)  HR: 72 (2019 07:00) (56 - 77)  BP: 105/70 (2019 07:00) (102/74 - 144/75)  BP(mean): 82 (2019 07:00) (82 - 102)  RR: 14 (2019 07:00) (12 - 26)  SpO2: 96% (2019 07:00) (96% - 100%)  Daily Height in cm: 152.4 (2019 22:57)    Daily Weight in k.9 (2019 05:01)    PHYSICAL EXAM:    GENERAL: Comfortable.  HEAD:  same  EYES: wnl  ENMT:   NECK: veins not distended  NERVOUS SYSTEM:  alert, oriented, verbal  CHEST/LUNG: clear  HEART: no gallop or rub  ABDOMEN: soft, not tender  EXTREMITIES:  same  LYMPH:   SKIN: no rash    LABS:    11-10    156<H>  |  125<H>  |  29.0<H>  ----------------------------<  87  3.7   |  21.0<L>  |  1.20    Ca    8.2<L>      10 Nov 2019 08:34  Phos  2.6     -  Mg     2.2         TPro  6.7  /  Alb  3.8  /  TBili  0.5  /  DBili  x   /  AST  12  /  ALT  10  /  AlkPhos  102                            9.2    5.66  )-----------( 131      ( 2019 05:13 )             32.7         162<HH>  |  132<H>  |  32.0<H>  ----------------------------<  72  4.0   |  21.0<L>  |  1.27    Ca    8.3<L>      2019 07:06  Phos  2.6       Mg     2.2         TPro  6.7  /  Alb  3.8  /  TBili  0.5  /  DBili  x   /  AST  12  /  ALT  10  /  AlkPhos  102        Urinalysis Basic - ( 2019 23:48 )    Color: Yellow / Appearance: Clear / S.020 / pH: x  Gluc: x / Ketone: Negative  / Bili: Negative / Urobili: Negative mg/dL   Blood: x / Protein: 30 mg/dL / Nitrite: Negative   Leuk Esterase: Trace / RBC: 0-2 /HPF / WBC 3-5   Sq Epi: x / Non Sq Epi: Occasional / Bacteria: Occasional      Magnesium, Serum: 2.2 mg/dL ( @ 05:14)  Phosphorus Level, Serum: 2.6 mg/dL ( @ 05:14)          RADIOLOGY & ADDITIONAL TESTS:

## 2019-11-10 NOTE — PROGRESS NOTE ADULT - ASSESSMENT
1. 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   on sc DDAVP , half normal saline .   sodium improved at 157 , fluid increased to 75 from 50 as per nephro.   BMP every 6 hour.   nephrology is closely managing .  close monitoring in ICU , increased risk for seizure .    2. Mild TOSHIA on CKD  fluctuating level, on fluids , nephrology is managing .    3. Hx if ICH aneurysm rupture   continue to monitor.    4. Hx of seizures   on keppra .    5. hx of gastric bypass and chronic anemia   was transfused , FOBT negative .  will monitor.    6. HTN   c/w labetalol and amlodipine .    7. DVT prophylaxis  on lovenox.    no family members at bedside today . 1. 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   on sc DDAVP , half normal saline .   sodium improved at 157 , fluid increased to 75 from 50 as per nephro.   BMP every 6 hour.   nephrology is closely managing .  close monitoring in ICU , increased risk for seizure .    2. Mild TOSHIA on CKD  fluctuating level, on fluids , nephrology is managing .    3. Hx if ICH aneurysm rupture   continue to monitor.    4. Hx of seizures   on keppra .    5. hx of gastric bypass and chronic anemia   was transfused , FOBT negative .  will monitor.    6. HTN   c/w labetalol and amlodipine .    7. DVT prophylaxis  on lovenox.    no family members at bedside today .

## 2019-11-10 NOTE — PROGRESS NOTE ADULT - SUBJECTIVE AND OBJECTIVE BOX
CC: hypernatremia     INTERVAL HPI/OVERNIGHT EVENTS: seen and examined , sodium level is improving fluid has been adjusted by nephrology . patient feel better overall     REVIEW OF SYSTEMS:    CONSTITUTIONAL: feels better   RESPIRATORY: No cough, wheezing, hemoptysis; No shortness of breath  CARDIOVASCULAR: No chest pain, palpitations  GASTROINTESTINAL: No abdominal or epigastric pain. No nausea, vomiting  NEUROLOGICAL: No headaches, or blurry vision       Vital Signs Last 24 Hrs  T(C): 36.2 (10 Nov 2019 08:00), Max: 36.9 (2019 20:31)  T(F): 97.2 (10 Nov 2019 08:00), Max: 98.4 (2019 20:31)  HR: 66 (10 Nov 2019 11:) (63 - 81)  BP: 113/81 (10 Nov 2019 11:) (99/65 - 138/76)  BP(mean): 93 (10 Nov 2019 11:) (77 - 107)  RR: 12 (10 Nov 2019 11:) (10 - 29)  SpO2: 100% (10 Nov 2019 11:) (87% - 100%)    PHYSICAL EXAM:    GENERAL: NAD, resting comfortable in bed   CHEST/LUNG: Clear to percussion bilaterally; No wheezing  HEART: S1S2+, Regular rate and rhythm; No murmurs, rubs, or gallops  ABDOMEN: Soft, Nontender, Nondistended; Bowel sounds present  EXTREMITIES:  no pitting edema , pulses palpated BL.        LABS:                        9.2    5.66  )-----------( 131      ( 2019 05:13 )             32.7     11-10    156<H>  |  125<H>  |  29.0<H>  ----------------------------<  87  3.7   |  21.0<L>  |  1.20    Ca    8.2<L>      10 Nov 2019 08:34  Phos  2.6       Mg     2.2         TPro  6.7  /  Alb  3.8  /  TBili  0.5  /  DBili  x   /  AST  12  /  ALT  10  /  AlkPhos  102  11-      Urinalysis Basic - ( 2019 23:48 )    Color: Yellow / Appearance: Clear / S.020 / pH: x  Gluc: x / Ketone: Negative  / Bili: Negative / Urobili: Negative mg/dL   Blood: x / Protein: 30 mg/dL / Nitrite: Negative   Leuk Esterase: Trace / RBC: 0-2 /HPF / WBC 3-5   Sq Epi: x / Non Sq Epi: Occasional / Bacteria: Occasional          MEDICATIONS  (STANDING):  amLODIPine   Tablet 10 milliGRAM(s) Oral daily  desmopressin Injectable 1 MICROGram(s) SubCutaneous every 12 hours  enoxaparin Injectable 40 milliGRAM(s) SubCutaneous daily  folic acid 1 milliGRAM(s) Oral daily  influenza   Vaccine 0.5 milliLiter(s) IntraMuscular once  labetalol 100 milliGRAM(s) Oral two times a day  levETIRAcetam 500 milliGRAM(s) Oral two times a day  multivitamin/minerals 1 Tablet(s) Oral daily  pantoprazole    Tablet 40 milliGRAM(s) Oral before breakfast  potassium chloride    Tablet ER 20 milliEquivalent(s) Oral two times a day  sodium bicarbonate 650 milliGRAM(s) Oral three times a day  sodium chloride 0.45%. 1000 milliLiter(s) (75 mL/Hr) IV Continuous <Continuous>    MEDICATIONS  (PRN):      RADIOLOGY & ADDITIONAL TESTS:

## 2019-11-11 ENCOUNTER — OUTPATIENT (OUTPATIENT)
Dept: OUTPATIENT SERVICES | Facility: HOSPITAL | Age: 54
LOS: 1 days | Discharge: HOME | End: 2019-11-11

## 2019-11-11 DIAGNOSIS — D64.9 ANEMIA, UNSPECIFIED: ICD-10-CM

## 2019-11-11 DIAGNOSIS — Z98.84 BARIATRIC SURGERY STATUS: Chronic | ICD-10-CM

## 2019-11-11 DIAGNOSIS — Z71.89 OTHER SPECIFIED COUNSELING: ICD-10-CM

## 2019-11-11 LAB
ANION GAP SERPL CALC-SCNC: 11 MMOL/L — SIGNIFICANT CHANGE UP (ref 5–17)
ANION GAP SERPL CALC-SCNC: 11 MMOL/L — SIGNIFICANT CHANGE UP (ref 5–17)
ANION GAP SERPL CALC-SCNC: 12 MMOL/L — SIGNIFICANT CHANGE UP (ref 5–17)
BUN SERPL-MCNC: 22 MG/DL — HIGH (ref 8–20)
BUN SERPL-MCNC: 23 MG/DL — HIGH (ref 8–20)
BUN SERPL-MCNC: 24 MG/DL — HIGH (ref 8–20)
BUN SERPL-MCNC: 24 MG/DL — HIGH (ref 8–20)
BUN SERPL-MCNC: 26 MG/DL — HIGH (ref 8–20)
CALCIUM SERPL-MCNC: 8.1 MG/DL — LOW (ref 8.6–10.2)
CALCIUM SERPL-MCNC: 8.2 MG/DL — LOW (ref 8.6–10.2)
CALCIUM SERPL-MCNC: 8.2 MG/DL — LOW (ref 8.6–10.2)
CALCIUM SERPL-MCNC: 8.4 MG/DL — LOW (ref 8.6–10.2)
CALCIUM SERPL-MCNC: 8.4 MG/DL — LOW (ref 8.6–10.2)
CHLORIDE SERPL-SCNC: 124 MMOL/L — HIGH (ref 98–107)
CHLORIDE SERPL-SCNC: 126 MMOL/L — HIGH (ref 98–107)
CHLORIDE SERPL-SCNC: 127 MMOL/L — HIGH (ref 98–107)
CO2 SERPL-SCNC: 19 MMOL/L — LOW (ref 22–29)
CO2 SERPL-SCNC: 20 MMOL/L — LOW (ref 22–29)
CO2 SERPL-SCNC: 21 MMOL/L — LOW (ref 22–29)
CO2 SERPL-SCNC: 21 MMOL/L — LOW (ref 22–29)
CO2 SERPL-SCNC: 22 MMOL/L — SIGNIFICANT CHANGE UP (ref 22–29)
CREAT SERPL-MCNC: 1.2 MG/DL — SIGNIFICANT CHANGE UP (ref 0.5–1.3)
CREAT SERPL-MCNC: 1.21 MG/DL — SIGNIFICANT CHANGE UP (ref 0.5–1.3)
CREAT SERPL-MCNC: 1.23 MG/DL — SIGNIFICANT CHANGE UP (ref 0.5–1.3)
CREAT SERPL-MCNC: 1.25 MG/DL — SIGNIFICANT CHANGE UP (ref 0.5–1.3)
CREAT SERPL-MCNC: 1.27 MG/DL — SIGNIFICANT CHANGE UP (ref 0.5–1.3)
GLUCOSE SERPL-MCNC: 111 MG/DL — SIGNIFICANT CHANGE UP (ref 70–115)
GLUCOSE SERPL-MCNC: 114 MG/DL — SIGNIFICANT CHANGE UP (ref 70–115)
GLUCOSE SERPL-MCNC: 69 MG/DL — LOW (ref 70–115)
GLUCOSE SERPL-MCNC: 70 MG/DL — SIGNIFICANT CHANGE UP (ref 70–115)
GLUCOSE SERPL-MCNC: 77 MG/DL — SIGNIFICANT CHANGE UP (ref 70–115)
HCT VFR BLD CALC: 31.2 % — LOW (ref 34.5–45)
HGB BLD-MCNC: 8.9 G/DL — LOW (ref 11.5–15.5)
LEVETIRACETAM SERPL-MCNC: 28 MCG/ML — SIGNIFICANT CHANGE UP (ref 12–46)
MCHC RBC-ENTMCNC: 26.6 PG — LOW (ref 27–34)
MCHC RBC-ENTMCNC: 28.5 GM/DL — LOW (ref 32–36)
MCV RBC AUTO: 93.4 FL — SIGNIFICANT CHANGE UP (ref 80–100)
PLATELET # BLD AUTO: 131 K/UL — LOW (ref 150–400)
POTASSIUM SERPL-MCNC: 4 MMOL/L — SIGNIFICANT CHANGE UP (ref 3.5–5.3)
POTASSIUM SERPL-MCNC: 4.3 MMOL/L — SIGNIFICANT CHANGE UP (ref 3.5–5.3)
POTASSIUM SERPL-MCNC: 4.4 MMOL/L — SIGNIFICANT CHANGE UP (ref 3.5–5.3)
POTASSIUM SERPL-SCNC: 4 MMOL/L — SIGNIFICANT CHANGE UP (ref 3.5–5.3)
POTASSIUM SERPL-SCNC: 4.3 MMOL/L — SIGNIFICANT CHANGE UP (ref 3.5–5.3)
POTASSIUM SERPL-SCNC: 4.4 MMOL/L — SIGNIFICANT CHANGE UP (ref 3.5–5.3)
RBC # BLD: 3.34 M/UL — LOW (ref 3.8–5.2)
RBC # FLD: 17.4 % — HIGH (ref 10.3–14.5)
SODIUM SERPL-SCNC: 154 MMOL/L — HIGH (ref 135–145)
SODIUM SERPL-SCNC: 159 MMOL/L — HIGH (ref 135–145)
SODIUM SERPL-SCNC: 161 MMOL/L — CRITICAL HIGH (ref 135–145)
WBC # BLD: 4.82 K/UL — SIGNIFICANT CHANGE UP (ref 3.8–10.5)
WBC # FLD AUTO: 4.82 K/UL — SIGNIFICANT CHANGE UP (ref 3.8–10.5)

## 2019-11-11 PROCEDURE — 99232 SBSQ HOSP IP/OBS MODERATE 35: CPT

## 2019-11-11 RX ORDER — DESMOPRESSIN ACETATE 0.1 MG/1
0.1 TABLET ORAL
Refills: 0 | Status: DISCONTINUED | OUTPATIENT
Start: 2019-11-11 | End: 2019-11-14

## 2019-11-11 RX ORDER — AMLODIPINE BESYLATE 2.5 MG/1
10 TABLET ORAL DAILY
Refills: 0 | Status: DISCONTINUED | OUTPATIENT
Start: 2019-11-11 | End: 2019-11-14

## 2019-11-11 RX ORDER — SODIUM CHLORIDE 9 MG/ML
1000 INJECTION, SOLUTION INTRAVENOUS
Refills: 0 | Status: DISCONTINUED | OUTPATIENT
Start: 2019-11-11 | End: 2019-11-12

## 2019-11-11 RX ADMIN — AMLODIPINE BESYLATE 10 MILLIGRAM(S): 2.5 TABLET ORAL at 06:24

## 2019-11-11 RX ADMIN — SODIUM CHLORIDE 95 MILLILITER(S): 9 INJECTION, SOLUTION INTRAVENOUS at 00:07

## 2019-11-11 RX ADMIN — AMLODIPINE BESYLATE 10 MILLIGRAM(S): 2.5 TABLET ORAL at 13:36

## 2019-11-11 RX ADMIN — PANTOPRAZOLE SODIUM 40 MILLIGRAM(S): 20 TABLET, DELAYED RELEASE ORAL at 06:24

## 2019-11-11 RX ADMIN — Medication 650 MILLIGRAM(S): at 21:23

## 2019-11-11 RX ADMIN — DESMOPRESSIN ACETATE 0.1 MILLIGRAM(S): 0.1 TABLET ORAL at 21:23

## 2019-11-11 RX ADMIN — DESMOPRESSIN ACETATE 1 MICROGRAM(S): 0.1 TABLET ORAL at 06:24

## 2019-11-11 RX ADMIN — SODIUM CHLORIDE 150 MILLILITER(S): 9 INJECTION, SOLUTION INTRAVENOUS at 21:23

## 2019-11-11 RX ADMIN — SODIUM CHLORIDE 150 MILLILITER(S): 9 INJECTION, SOLUTION INTRAVENOUS at 14:39

## 2019-11-11 RX ADMIN — Medication 100 MILLIGRAM(S): at 06:24

## 2019-11-11 RX ADMIN — Medication 100 MILLIGRAM(S): at 17:22

## 2019-11-11 RX ADMIN — LEVETIRACETAM 500 MILLIGRAM(S): 250 TABLET, FILM COATED ORAL at 17:22

## 2019-11-11 RX ADMIN — DESMOPRESSIN ACETATE 0.1 MILLIGRAM(S): 0.1 TABLET ORAL at 14:38

## 2019-11-11 RX ADMIN — Medication 20 MILLIEQUIVALENT(S): at 06:24

## 2019-11-11 RX ADMIN — Medication 20 MILLIEQUIVALENT(S): at 17:22

## 2019-11-11 RX ADMIN — Medication 650 MILLIGRAM(S): at 13:30

## 2019-11-11 RX ADMIN — Medication 1 TABLET(S): at 13:27

## 2019-11-11 RX ADMIN — ENOXAPARIN SODIUM 40 MILLIGRAM(S): 100 INJECTION SUBCUTANEOUS at 13:26

## 2019-11-11 RX ADMIN — Medication 650 MILLIGRAM(S): at 06:24

## 2019-11-11 RX ADMIN — LEVETIRACETAM 500 MILLIGRAM(S): 250 TABLET, FILM COATED ORAL at 06:24

## 2019-11-11 RX ADMIN — Medication 1 MILLIGRAM(S): at 13:27

## 2019-11-11 NOTE — ED ADULT NURSE NOTE - CHIEF COMPLAINT
Patient:   ROSEMARIE MIRANDA            MRN: [id number]            FIN: [id number]              Age:   57 years     Sex:  FEMALE     :  57   Associated Diagnoses:   None   Author:   JAROD ZULUAGA     Basic Information   Time seen: Date & time 19 14:02:00.   History source: Patient.   Arrival mode: Ambulance.   History limitation: None.   Additional information: Chief Complaint from Nursing Triage Note : Chief Complaint ED  19 14:15 CST       Chief Complaint ED        pt with SOB x 2 days, worse today. On NRB upon arrival.  .     History of Present Illness   57 year-old female with past medical hx of CVA, HTN, HLD, COPD, tobacco use and cardiomyopathy brought to ED with cc of SOB x2 days, which began suddenly and has worsened acutely today. Patient denies any cough, chest pain, congestion, headache, or any other complaints. She states she has a ventilator and has been intubated previously.       Review of Systems   Constitutional symptoms:  No fever, no chills, no sweats, no weakness.   Eye symptoms:  Vision unchanged.   Respiratory symptoms:  Shortness of breath, No cough,    Cardiovascular symptoms:  No chest pain,    Gastrointestinal symptoms:  No abdominal pain, no nausea, no vomiting, no diarrhea.   Genitourinary symptoms:  No dysuria,    Musculoskeletal symptoms:  No back pain,    Neurologic symptoms:  No headache, no dizziness, no numbness, no tingling, no weakness.      Health Status   Allergies: No known allergies.   Medications: Per nurse's notes.   Immunizations: Per nurse's notes.     Past Medical/ Family/ Social History   Medical history:    All Problems  Cardiomyopathy / [id number] / Confirmed  CVA (cerebrovascular accident) / [id number] / Confirmed  Cholesterol/High density lipoprotein ratio measurement / [id number] / Confirmed  High blood pressure / [id number] / Confirmed.   Surgical history:    Hysterectomy ([id number]).  Open heart surgery ([id number]).   section  The patient is a 51y Female complaining of slurred speech. ([id number]).  Ankle fusion ([id number]).  Knee replacement ([id number])..   Family history: Include Family History   Myocardial infarction  FATHER  .   Social history:    Social & Psychosocial Habits  Alcohol  02/28/2015  Use: Current, Current    Frequency: Daily    Last Use: today    Frequency: Daily  Exercise  02/28/2015  Do You Exercise: Never  Home/Environment  02/28/2015  Alcohol Abuse in Household: Yes    Substance Abuse in Household: No    Smoker in Household: Yes    Alcohol Abuse in Household: Yes    Substance Abuse in Household: No    Patient Lives With: Significant Other    Smoker in Household: Yes  Substance Abuse  02/28/2015  Use: None, None  Tobacco  02/28/2015  Smoking Tobacco Use: Current every day smoker    Used in Last 12 Months: Yes    Type: Cigarettes    Smoking Tobacco Use: Current every day smoker    Type: Cigarettes    Smoking Tobacco Use: Current every day smoker    If Cigarettes Used, How Many: 1 Pack Per Day    Smoking Cessation: Information Provided    If Cigarettes Used, How Many: 1 Pack Per Day    Type: Cigarettes    If Cigarettes Used, How Many: 1 Pack Per Day  Cultural/Baptism Practices  02/28/2015  Continue Cultural/Baptism Practices While in Hospital: No  .     Physical Examination              Vital Signs   Vital Sign   11/05/19 14:00 CST Heart/Pulse Rate 119  HI    Respiration Rate 30 breaths/min  HI    SpO2 81 %  LOW    NIBP Systolic 122  Normal    NIBP Diastolic 87  Normal    NIBP MAP 97  Normal   .              Oxygen saturation:  81 %.   O2 saturation reviewed and hypoxic on room air.   General:  Alert, moderate distress.    Skin:  Warm, dry, intact.    Head:  Normocephalic, atraumatic.    Neck:  Supple, trachea midline, No JVD.    Eye:  Pupils are equal, round and reactive to light, extraocular movements are intact, normal conjunctiva.   Ears, nose, mouth and throat:  Oral mucosa moist.   Cardiovascular:  Regular rate and rhythm, No murmur, Normal peripheral perfusion, No  edema.   Respiratory:  wheezing; moving air adequately.   Chest wall:  midline scar on chest.   Back:  Nontender.   Musculoskeletal:  Normal ROM.   Gastrointestinal:  Soft, Nontender, Non distended, abd wall hernia easily reducible.   Neurological:  Alert and oriented to person, place, time, and situation, No focal neurological deficit observed, normal sensory observed, normal motor observed.   Psychiatric:  Cooperative, appropriate mood & affect.      Medical Decision Making   Rationale:  Multiple differential diagnoses were considered. The patient was apprised of diagnostic and treatment options including alternate modes of care, in addition to risks and benefits, for this medical condition. Based on this discussion the patient agrees with this chosen diagnostic and treatment plan..  Documents reviewed:  Emergency department nurses' notes, emergency department records, prior records.   Orders  The following medications were given in the ED:  14:15    Orderable : methylPREDNISolone sodium succinate(methylPREDNISolone sodium succinate injection (SOLU-Medrol))(methylPREDNISolone sodium succinate)  Status :  Completed    Ordering Dr : JAROD ZULUAGA Entered By : Genevieve Rausch  Detail  :  125 mg, Slow IV Push, One Time (unscheduled), Routine, Order Start: 11/05/19 14:15:00 CST, Injection  14:11    Orderable : albuterol(albuterol inhalation 0.5% 2.5 mg/0.5 mL concentrated solution (Proventil))(albuterol)  Status :  Ordered    Ordering Dr : JAROD ZULUAGA Entered By : JAROD ZULUAGA  Detail  :  10 mg, Nebulizer, One Time (unscheduled), Routine, Order Start: 11/05/19 14:15:00 CST, Inhalation, Continuous nebulizer.  Electrocardiogram:  Time 11/05/19 14:05:00, rate 107, EP Interp, The Rhythm is sinus tachycardia.  , widened QRS with LBBB.      Reexamination/ Reevaluation   Time: 11/05/19 14:02:00 .   Notes: Due to patient's condition and O2 Saturation, she will be placed on BIPAP. Respiratory is at bedside. .      Impression and Plan   Plan   Disposition: Patient care transitioned to: Time: 11/05/19 14:30:00, ARIADNE, KIRILL ASHLEY, Pending labs, radiology and disposition.   Counseled: Patient, Regarding diagnosis, Regarding diagnostic results, Regarding treatment plan, Patient indicated understanding of instructions.   Notes: Charting performed by ED Scribes Cecille Sutton and Heriberto Rivera for Dr. Vergara.  I have reviewed the scribe's charting and agree that the final signed note accurately reflects my personal performance of the history, physical exam, hospital course, and assessment and plan..

## 2019-11-11 NOTE — PROGRESS NOTE ADULT - ASSESSMENT
55 y/o female with h/o gastric bypass surgery, HTN, chronic anemia, diabetes insipidus after an episode of ICH 2nd to brain aneurysm chronic PICC line for home iv hydration (1000 cc D5 every otherday), was referred to the ER as labs showed low hemoglobin. In the ER, labs showed Hgb: 7.4. Labs showed Na: 172. patient has h/o recurrent hypernatremia.       1. 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   on sc DDAVP, half normal saline @75 cc/hr per renal.   sodium today 11/11/19= 159.   BMP every 6 hour.     2. Mild TOSHIA on CKD  fluctuating level, on fluids, nephrology following.    3. Hx of ICH aneurysm rupture   neurology exam stable.  continue to monitor.    4. Hx of seizures   on keppra.    5. hx of gastric bypass and chronic anemia   was transfused for Hg 7.4, FOBT negative.  Hg remaining stable 11/11= 8.9.    6. HTN   c/w labetalol and amlodipine.  normotensive.    7. DVT prophylaxis  on lovenox.    no family members at bedside today.  pt stable for downgrade to medical floor- will cont BMP q6h. 53 y/o female with h/o gastric bypass surgery, HTN, chronic anemia, diabetes insipidus after an episode of ICH 2nd to brain aneurysm chronic PICC line for home iv hydration (1000 cc D5 every otherday), was referred to the ER as labs showed low hemoglobin. In the ER, labs showed Hgb: 7.4. Labs showed Na: 172. patient has h/o recurrent hypernatremia.       1. Hypernatremia  due to central DI:   on sc DDAVP, half normal saline @75 cc/hr per renal.   sodium today 11/11/19= 159.   BMP every 6 hour.     2. Mild TOSHIA on CKD  fluctuating level, on fluids, nephrology following.    3. Hx of ICH aneurysm rupture   neurology exam stable.  continue to monitor.    4. Hx of seizures   on keppra.    5. hx of gastric bypass and chronic anemia   was transfused for Hg 7.4, FOBT negative.  Hg remaining stable 11/11= 8.9.    6. HTN   c/w labetalol and amlodipine.  normotensive.    7. DVT prophylaxis  on lovenox.    no family members at bedside today.  pt stable for downgrade to medical floor- will cont BMP q6h.

## 2019-11-11 NOTE — PROVIDER CONTACT NOTE (CRITICAL VALUE NOTIFICATION) - ACTION/TREATMENT ORDERED:
Hold IVF for now. will reassess with next BMP results
Continue to monitor labs Q4
DR. CORTÉS IS AWARE.   STATES SHE WILL TELL ONCOMING SHIFT AND THEY WILL MAKE ANY NEEDED CHANGES.
Increase fluids to 90 ml/hr  repeat labs in 4 hours
No new orders

## 2019-11-11 NOTE — PROGRESS NOTE ADULT - SUBJECTIVE AND OBJECTIVE BOX
KRUPA MEDINA Female 54y [id number]    Patient is a 54y old  Female who presents with a chief complaint of anemia (10 Nov 2019 11:32)    Subjective/objective:  Pt seen and examined at bedside, by Attending and PA. No over night event reported by night staff. Pt reports doing well and has no new complaints.    Review of system:  No fever, chills, nausea, vomiting, headache, dizziness, chest pain, SOB or palpitation.      PHYSICAL EXAM:    Vital Signs Last 24 Hrs  T(C): 36.6 (2019 13:34), Max: 36.6 (10 Nov 2019 23:55)  T(F): 97.9 (2019 13:34), Max: 97.9 (10 Nov 2019 23:55)  HR: 73 (2019 12:44) (60 - 75)  BP: 111/87 (2019 12:44) (111/87 - 131/78)  BP(mean): 95 (2019 12:44) (81 - 98)  RR: 12 (2019 12:44) (12 - 28)  SpO2: 98% (2019 12:44) (96% - 100%)    GENERAL: Pt lying comfortably, NAD.  ENMT: PERRL, +EOMI.  NECK: soft, Supple, No JVD,   CHEST/LUNG: Clear to auscultatation bilaterally; No wheezing.  HEART: S1S2+, Regular rate and rhythm; No murmurs.  ABDOMEN: Soft, Nontender, Nondistended; Bowel sounds present.  MUSCULOSKELETAL: Normal range of motion.  SKIN: No rashes or lesions.  NEURO: AAOX3, no focal deficits, no motor r sensory loss.  PSYCH: normal mood.          MEDICATIONS  (STANDING):  amLODIPine   Tablet 10 milliGRAM(s) Oral daily  desmopressin 0.1 milliGRAM(s) Oral <User Schedule>  enoxaparin Injectable 40 milliGRAM(s) SubCutaneous daily  folic acid 1 milliGRAM(s) Oral daily  influenza   Vaccine 0.5 milliLiter(s) IntraMuscular once  labetalol 100 milliGRAM(s) Oral two times a day  levETIRAcetam 500 milliGRAM(s) Oral two times a day  multivitamin/minerals 1 Tablet(s) Oral daily  pantoprazole    Tablet 40 milliGRAM(s) Oral before breakfast  potassium chloride    Tablet ER 20 milliEquivalent(s) Oral two times a day  sodium bicarbonate 650 milliGRAM(s) Oral three times a day  sodium chloride 0.45%. 1000 milliLiter(s) (150 mL/Hr) IV Continuous <Continuous>    MEDICATIONS  (PRN):        Labs:  LABS:                        8.9    4.82  )-----------( 131      ( 2019 13:50 )             31.2         159<H>  |  127<H>  |  24.0<H>  ----------------------------<  69<L>  4.4   |  21.0<L>  |  1.20    Ca    8.4<L>      2019 13:50            Urinalysis Basic - ( 10 Nov 2019 11:08 )    Color: Yellow / Appearance: Clear / S.020 / pH: x  Gluc: x / Ketone: Negative  / Bili: Negative / Urobili: Negative mg/dL   Blood: x / Protein: 30 mg/dL / Nitrite: Negative   Leuk Esterase: Trace / RBC: 3-5 /HPF / WBC 6-10   Sq Epi: x / Non Sq Epi: Few / Bacteria: Moderate KRUPA MEDINA Female 54y [id number]    Patient is a 54y old  Female who presents with a chief complaint of anemia (10 Nov 2019 11:32)    Subjective/objective:  Pt seen and examined at bedside, by Attending and PA. No over night event reported by night staff. Pt reports doing well and has no new complaints.    Review of system:  No fever, chills, nausea, vomiting, headache, dizziness, chest pain, SOB or palpitation.      PHYSICAL EXAM:    Vital Signs Last 24 Hrs  T(C): 36.6 (2019 13:34), Max: 36.6 (10 Nov 2019 23:55)  T(F): 97.9 (2019 13:34), Max: 97.9 (10 Nov 2019 23:55)  HR: 73 (2019 12:44) (60 - 75)  BP: 111/87 (2019 12:44) (111/87 - 131/78)  BP(mean): 95 (2019 12:44) (81 - 98)  RR: 12 (2019 12:44) (12 - 28)  SpO2: 98% (2019 12:44) (96% - 100%)    GENERAL: Pt lying comfortably, NAD.  ENMT: NC/AT.  NECK: soft, Supple, No JVD,   CHEST/LUNG: Clear to auscultatation bilaterally; No wheezing.  HEART: S1S2+, Regular rate and rhythm; No murmurs.  ABDOMEN: Soft, Nontender, Nondistended; Bowel sounds present.  MUSCULOSKELETAL: No obvious deformities.  SKIN: No rashes or lesions.  NEURO: AAOX3, grossly intact.  PSYCH: normal mood.      MEDICATIONS  (STANDING):  amLODIPine   Tablet 10 milliGRAM(s) Oral daily  desmopressin 0.1 milliGRAM(s) Oral <User Schedule>  enoxaparin Injectable 40 milliGRAM(s) SubCutaneous daily  folic acid 1 milliGRAM(s) Oral daily  influenza   Vaccine 0.5 milliLiter(s) IntraMuscular once  labetalol 100 milliGRAM(s) Oral two times a day  levETIRAcetam 500 milliGRAM(s) Oral two times a day  multivitamin/minerals 1 Tablet(s) Oral daily  pantoprazole    Tablet 40 milliGRAM(s) Oral before breakfast  potassium chloride    Tablet ER 20 milliEquivalent(s) Oral two times a day  sodium bicarbonate 650 milliGRAM(s) Oral three times a day  sodium chloride 0.45%. 1000 milliLiter(s) (150 mL/Hr) IV Continuous <Continuous>    MEDICATIONS  (PRN):        Labs:  LABS:                        8.9    4.82  )-----------( 131      ( 2019 13:50 )             31.2         159<H>  |  127<H>  |  24.0<H>  ----------------------------<  69<L>  4.4   |  21.0<L>  |  1.20    Ca    8.4<L>      2019 13:50        Urinalysis Basic - ( 10 Nov 2019 11:08 )    Color: Yellow / Appearance: Clear / S.020 / pH: x  Gluc: x / Ketone: Negative  / Bili: Negative / Urobili: Negative mg/dL   Blood: x / Protein: 30 mg/dL / Nitrite: Negative   Leuk Esterase: Trace / RBC: 3-5 /HPF / WBC 6-10   Sq Epi: x / Non Sq Epi: Few / Bacteria: Moderate

## 2019-11-12 LAB
ANION GAP SERPL CALC-SCNC: 10 MMOL/L — SIGNIFICANT CHANGE UP (ref 5–17)
ANION GAP SERPL CALC-SCNC: 11 MMOL/L — SIGNIFICANT CHANGE UP (ref 5–17)
ANION GAP SERPL CALC-SCNC: 9 MMOL/L — SIGNIFICANT CHANGE UP (ref 5–17)
APPEARANCE UR: CLEAR — SIGNIFICANT CHANGE UP
BACTERIA # UR AUTO: ABNORMAL
BILIRUB UR-MCNC: NEGATIVE — SIGNIFICANT CHANGE UP
BUN SERPL-MCNC: 19 MG/DL — SIGNIFICANT CHANGE UP (ref 8–20)
BUN SERPL-MCNC: 19 MG/DL — SIGNIFICANT CHANGE UP (ref 8–20)
BUN SERPL-MCNC: 21 MG/DL — HIGH (ref 8–20)
CALCIUM SERPL-MCNC: 8.1 MG/DL — LOW (ref 8.6–10.2)
CALCIUM SERPL-MCNC: 8.1 MG/DL — LOW (ref 8.6–10.2)
CALCIUM SERPL-MCNC: 8.2 MG/DL — LOW (ref 8.6–10.2)
CHLORIDE SERPL-SCNC: 117 MMOL/L — HIGH (ref 98–107)
CHLORIDE SERPL-SCNC: 118 MMOL/L — HIGH (ref 98–107)
CHLORIDE SERPL-SCNC: 122 MMOL/L — HIGH (ref 98–107)
CO2 SERPL-SCNC: 20 MMOL/L — LOW (ref 22–29)
CO2 SERPL-SCNC: 21 MMOL/L — LOW (ref 22–29)
CO2 SERPL-SCNC: 21 MMOL/L — LOW (ref 22–29)
COLOR SPEC: YELLOW — SIGNIFICANT CHANGE UP
CREAT SERPL-MCNC: 1.08 MG/DL — SIGNIFICANT CHANGE UP (ref 0.5–1.3)
CREAT SERPL-MCNC: 1.09 MG/DL — SIGNIFICANT CHANGE UP (ref 0.5–1.3)
CREAT SERPL-MCNC: 1.12 MG/DL — SIGNIFICANT CHANGE UP (ref 0.5–1.3)
DIFF PNL FLD: NEGATIVE — SIGNIFICANT CHANGE UP
EPI CELLS # UR: SIGNIFICANT CHANGE UP
GLUCOSE SERPL-MCNC: 77 MG/DL — SIGNIFICANT CHANGE UP (ref 70–115)
GLUCOSE SERPL-MCNC: 78 MG/DL — SIGNIFICANT CHANGE UP (ref 70–115)
GLUCOSE SERPL-MCNC: 80 MG/DL — SIGNIFICANT CHANGE UP (ref 70–115)
GLUCOSE UR QL: NEGATIVE MG/DL — SIGNIFICANT CHANGE UP
HCT VFR BLD CALC: 29.9 % — LOW (ref 34.5–45)
HGB BLD-MCNC: 8.6 G/DL — LOW (ref 11.5–15.5)
KETONES UR-MCNC: NEGATIVE — SIGNIFICANT CHANGE UP
LEUKOCYTE ESTERASE UR-ACNC: ABNORMAL
MCHC RBC-ENTMCNC: 26.6 PG — LOW (ref 27–34)
MCHC RBC-ENTMCNC: 28.8 GM/DL — LOW (ref 32–36)
MCV RBC AUTO: 92.6 FL — SIGNIFICANT CHANGE UP (ref 80–100)
NITRITE UR-MCNC: NEGATIVE — SIGNIFICANT CHANGE UP
PH UR: 6 — SIGNIFICANT CHANGE UP (ref 5–8)
PLATELET # BLD AUTO: 138 K/UL — LOW (ref 150–400)
POTASSIUM SERPL-MCNC: 4.5 MMOL/L — SIGNIFICANT CHANGE UP (ref 3.5–5.3)
POTASSIUM SERPL-MCNC: 4.8 MMOL/L — SIGNIFICANT CHANGE UP (ref 3.5–5.3)
POTASSIUM SERPL-MCNC: 5.2 MMOL/L — SIGNIFICANT CHANGE UP (ref 3.5–5.3)
POTASSIUM SERPL-SCNC: 4.5 MMOL/L — SIGNIFICANT CHANGE UP (ref 3.5–5.3)
POTASSIUM SERPL-SCNC: 4.8 MMOL/L — SIGNIFICANT CHANGE UP (ref 3.5–5.3)
POTASSIUM SERPL-SCNC: 5.2 MMOL/L — SIGNIFICANT CHANGE UP (ref 3.5–5.3)
PROT UR-MCNC: 15 MG/DL
RBC # BLD: 3.23 M/UL — LOW (ref 3.8–5.2)
RBC # FLD: 17.1 % — HIGH (ref 10.3–14.5)
RBC CASTS # UR COMP ASSIST: SIGNIFICANT CHANGE UP /HPF (ref 0–4)
SODIUM SERPL-SCNC: 148 MMOL/L — HIGH (ref 135–145)
SODIUM SERPL-SCNC: 148 MMOL/L — HIGH (ref 135–145)
SODIUM SERPL-SCNC: 153 MMOL/L — HIGH (ref 135–145)
SP GR SPEC: 1.01 — SIGNIFICANT CHANGE UP (ref 1.01–1.02)
UROBILINOGEN FLD QL: NEGATIVE MG/DL — SIGNIFICANT CHANGE UP
WBC # BLD: 5.16 K/UL — SIGNIFICANT CHANGE UP (ref 3.8–10.5)
WBC # FLD AUTO: 5.16 K/UL — SIGNIFICANT CHANGE UP (ref 3.8–10.5)
WBC UR QL: SIGNIFICANT CHANGE UP

## 2019-11-12 PROCEDURE — 99232 SBSQ HOSP IP/OBS MODERATE 35: CPT

## 2019-11-12 RX ORDER — CHLORHEXIDINE GLUCONATE 213 G/1000ML
1 SOLUTION TOPICAL
Refills: 0 | Status: DISCONTINUED | OUTPATIENT
Start: 2019-11-12 | End: 2019-11-14

## 2019-11-12 RX ORDER — FERROUS SULFATE 325(65) MG
325 TABLET ORAL DAILY
Refills: 0 | Status: DISCONTINUED | OUTPATIENT
Start: 2019-11-12 | End: 2019-11-14

## 2019-11-12 RX ORDER — POTASSIUM CHLORIDE 20 MEQ
20 PACKET (EA) ORAL DAILY
Refills: 0 | Status: DISCONTINUED | OUTPATIENT
Start: 2019-11-12 | End: 2019-11-14

## 2019-11-12 RX ORDER — SODIUM CHLORIDE 9 MG/ML
1000 INJECTION INTRAMUSCULAR; INTRAVENOUS; SUBCUTANEOUS
Refills: 0 | Status: DISCONTINUED | OUTPATIENT
Start: 2019-11-12 | End: 2019-11-12

## 2019-11-12 RX ADMIN — Medication 1 TABLET(S): at 13:45

## 2019-11-12 RX ADMIN — Medication 650 MILLIGRAM(S): at 21:26

## 2019-11-12 RX ADMIN — Medication 325 MILLIGRAM(S): at 14:36

## 2019-11-12 RX ADMIN — Medication 100 MILLIGRAM(S): at 17:11

## 2019-11-12 RX ADMIN — DESMOPRESSIN ACETATE 0.1 MILLIGRAM(S): 0.1 TABLET ORAL at 21:26

## 2019-11-12 RX ADMIN — Medication 1 MILLIGRAM(S): at 11:22

## 2019-11-12 RX ADMIN — LEVETIRACETAM 500 MILLIGRAM(S): 250 TABLET, FILM COATED ORAL at 05:40

## 2019-11-12 RX ADMIN — DESMOPRESSIN ACETATE 0.1 MILLIGRAM(S): 0.1 TABLET ORAL at 05:41

## 2019-11-12 RX ADMIN — AMLODIPINE BESYLATE 10 MILLIGRAM(S): 2.5 TABLET ORAL at 05:40

## 2019-11-12 RX ADMIN — Medication 650 MILLIGRAM(S): at 13:45

## 2019-11-12 RX ADMIN — PANTOPRAZOLE SODIUM 40 MILLIGRAM(S): 20 TABLET, DELAYED RELEASE ORAL at 05:40

## 2019-11-12 RX ADMIN — ENOXAPARIN SODIUM 40 MILLIGRAM(S): 100 INJECTION SUBCUTANEOUS at 11:22

## 2019-11-12 RX ADMIN — DESMOPRESSIN ACETATE 0.1 MILLIGRAM(S): 0.1 TABLET ORAL at 14:37

## 2019-11-12 RX ADMIN — Medication 20 MILLIEQUIVALENT(S): at 11:22

## 2019-11-12 RX ADMIN — Medication 20 MILLIEQUIVALENT(S): at 05:40

## 2019-11-12 RX ADMIN — Medication 100 MILLIGRAM(S): at 05:40

## 2019-11-12 RX ADMIN — Medication 650 MILLIGRAM(S): at 05:39

## 2019-11-12 RX ADMIN — SODIUM CHLORIDE 125 MILLILITER(S): 9 INJECTION INTRAMUSCULAR; INTRAVENOUS; SUBCUTANEOUS at 11:47

## 2019-11-12 RX ADMIN — SODIUM CHLORIDE 150 MILLILITER(S): 9 INJECTION, SOLUTION INTRAVENOUS at 05:41

## 2019-11-12 RX ADMIN — LEVETIRACETAM 500 MILLIGRAM(S): 250 TABLET, FILM COATED ORAL at 17:11

## 2019-11-12 NOTE — PROGRESS NOTE ADULT - ASSESSMENT
53 y/o female with h/o gastric bypass surgery, HTN, chronic anemia, diabetes insipidus after an episode of ICH 2nd to brain aneurysm chronic PICC line for home iv hydration (1000 cc D5 every otherday), was referred to the ER as labs showed low hemoglobin. In the ER, labs showed Hgb: 7.4. Labs showed Na: 172. patient has h/o recurrent hypernatremia.       1. 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   on sc DDAVP, half normal saline @75 cc/hr per renal.   sodium today 11/12/19= 148, will monitor BMP closely.     2. Mild TOSHIA on CKD  on fluids, alomost at the baseline, nephrology following.    3. Hx of ICH aneurysm rupture   neurology exam stable.  continue to monitor.    4. Hx of seizures   on keppra.    5. hx of gastric bypass and chronic anemia   was transfused for Hg 7.4, FOBT negative.  Hg remaining stable 11/11= 8.9.    6. HTN   c/w labetalol and amlodipine.  normotensive.    7. DVT prophylaxis  on lovenox. 53 y/o female with h/o gastric bypass surgery, HTN, chronic anemia, diabetes insipidus after an episode of ICH 2nd to brain aneurysm chronic PICC line for home iv hydration (1000 cc D5 every otherday), was referred to the ER as labs showed low hemoglobin. In the ER, labs showed Hgb: 7.4. Labs showed Na: 172. patient has h/o recurrent hypernatremia.       1. Hypernatremia  due to central DI:   on sc DDAVP, half normal saline @75 cc/hr per renal.   sodium today 11/12/19= 148, will monitor BMP closely.     2. Mild TOSHIA on CKD  on fluids, alomost at the baseline, nephrology following.    3. Hx of ICH aneurysm rupture   neurology exam stable.  continue to monitor.    4. Hx of seizures   on keppra.    5. hx of gastric bypass and chronic anemia   was transfused for Hg 7.4, FOBT negative.  Hg remaining stable 11/11= 8.9.    6. HTN   c/w labetalol and amlodipine.  normotensive.    7. DVT prophylaxis  on lovenox sc

## 2019-11-12 NOTE — PROGRESS NOTE ADULT - ATTENDING COMMENTS
IV fluid changed, oral iron, follow up labs.
IV fluid, chems q 4 h; iron panel, keppra level. DDAVP sq.
iv adjusted, decrease labs to q6h, start po med tomorrow.
Pt is seen and evaluated, case discussed with PA, agree with assessment and plan.   Patient sodium level was going from 158 to 161 again, fluid adjustment being done  Patient is aox3, no seizure like activity.   will continue to monitor bmp closely.

## 2019-11-12 NOTE — PROVIDER CONTACT NOTE (OTHER) - BACKGROUND
Pt. admitted for anemia. Noted to be hypernatremic, IVF given. Sodium 148 this AM, fluids given, family requesting fluids be dc'd. Per family pt's sodium should be maintained at 147-148

## 2019-11-12 NOTE — PROGRESS NOTE ADULT - SUBJECTIVE AND OBJECTIVE BOX
NEPHROLOGY INTERVAL HPI/OVERNIGHT EVENTS: No new events, oob in chair, feels well, very pleasant .    MEDICATIONS  (STANDING):  amLODIPine   Tablet 10 milliGRAM(s) Oral daily  desmopressin Injectable 1 MICROGram(s) SubCutaneous every 12 hours  enoxaparin Injectable 40 milliGRAM(s) SubCutaneous daily  folic acid 1 milliGRAM(s) Oral daily  influenza   Vaccine 0.5 milliLiter(s) IntraMuscular once  labetalol 100 milliGRAM(s) Oral two times a day  levETIRAcetam 500 milliGRAM(s) Oral two times a day  multivitamin/minerals 1 Tablet(s) Oral daily  pantoprazole    Tablet 40 milliGRAM(s) Oral before breakfast  sodium chloride 0.45%. 1000 milliLiter(s) (75 mL/Hr) IV Continuous <Continuous>    MEDICATIONS  (PRN):      Allergies    No Known Allergies        Vital Signs Last 24 Hrs  T(C): 36.9 (2019 08:15), Max: 37.1 (2019 19:36)  T(F): 98.4 (2019 08:15), Max: 98.8 (2019 19:36)  HR: 69 (2019 08:15) (69 - 82)  BP: 134/92 (2019 08:15) (104/75 - 134/92)  BP(mean): 94 (2019 20:14) (86 - 95)  RR: 18 (2019 08:15) (12 - 18)  SpO2: 96% (2019 08:15) (96% - 100%)  T(C): 36.2 (10 Nov 2019 08:00), Max: 36.9 (2019 20:31)  T(F): 97.2 (10 Nov 2019 08:00), Max: 98.4 (2019 20:31)  HR: 63 (10 Nov 2019 07:00) (63 - 81)  BP: 118/82 (10 Nov 2019 07:00) (101/70 - 138/76)  BP(mean): 96 (10 Nov 2019 07:00) (82 - 107)  ABP: --  ABP(mean): --  RR: 13 (10 Nov 2019 07:00) (12 - 29)  SpO2: 100% (10 Nov 2019 07:00) (87% - 100%)      PHYSICAL EXAM:    GENERAL: Comfortable.  HEAD:  same  EYES: wnl  ENMT:   NECK: veins not distended  NERVOUS SYSTEM:  alert, oriented, verbal  CHEST/LUNG: clear  HEART: no gallop or rub  ABDOMEN: soft, not tender  EXTREMITIES:  same  LYMPH:   SKIN: no rash    LABS:        153<H>  |  122<H>  |  21.0<H>  ----------------------------<  77  4.5   |  20.0<L>  |  1.12    Ca    8.1<L>      2019 06:48        11-10    156<H>  |  125<H>  |  29.0<H>  ----------------------------<  87  3.7   |  21.0<L>  |  1.20    Ca    8.2<L>      10 Nov 2019 08:34  Phos  2.6     11-  Mg     2.2     -    TPro  6.7  /  Alb  3.8  /  TBili  0.5  /  DBili  x   /  AST  12  /  ALT  10  /  AlkPhos  102                            9.2    5.66  )-----------( 131      ( 2019 05:13 )             32.7         162<HH>  |  132<H>  |  32.0<H>  ----------------------------<  72  4.0   |  21.0<L>  |  1.27    Ca    8.3<L>      2019 07:06  Phos  2.6     11-  Mg     2.2     -    TPro  6.7  /  Alb  3.8  /  TBili  0.5  /  DBili  x   /  AST  12  /  ALT  10  /  AlkPhos  102        Urinalysis Basic - ( 2019 23:48 )    Color: Yellow / Appearance: Clear / S.020 / pH: x  Gluc: x / Ketone: Negative  / Bili: Negative / Urobili: Negative mg/dL   Blood: x / Protein: 30 mg/dL / Nitrite: Negative   Leuk Esterase: Trace / RBC: 0-2 /HPF / WBC 3-5   Sq Epi: x / Non Sq Epi: Occasional / Bacteria: Occasional      Magnesium, Serum: 2.2 mg/dL ( @ 05:14)  Phosphorus Level, Serum: 2.6 mg/dL ( @ 05:14)          RADIOLOGY & ADDITIONAL TESTS:

## 2019-11-12 NOTE — PROGRESS NOTE ADULT - SUBJECTIVE AND OBJECTIVE BOX
KRUPA MEDINA    [id number]    54y      Female    Patient is a 54y old  Female who presents with a chief complaint of anemia (2019 11:31)      INTERVAL HPI/OVERNIGHT EVENTS:    patient has nom complains, her sodium is trending down, denies fever, chills, chest pain, nausea, vomiting.     REVIEW OF SYSTEMS:    CONSTITUTIONAL: No fever, some fatigue  RESPIRATORY: No cough, No shortness of breath  CARDIOVASCULAR: No chest pain, palpitations  GASTROINTESTINAL: No abdominal, No nausea, vomiting  NEUROLOGICAL: No headaches,  loss of strength.  MISCELLANEOUS: No joint swelling or pain       Vital Signs Last 24 Hrs  T(C): 36.9 (2019 08:15), Max: 37.1 (2019 19:36)  T(F): 98.4 (2019 08:15), Max: 98.8 (2019 19:36)  HR: 69 (2019 08:15) (69 - 82)  BP: 134/92 (2019 08:15) (104/75 - 134/92)  BP(mean): 94 (2019 20:14) (86 - 94)  RR: 18 (2019 08:15) (16 - 18)  SpO2: 96% (2019 08:15) (96% - 100%)    PHYSICAL EXAM:    GENERAL: Middle age female looking comfortable   HEENT: PERRL, +EOMI  NECK: soft, Supple, No JVD,   CHEST/LUNG: Clear to auscultate bilaterally; No wheezing  HEART: S1S2+, Regular rate and rhythm; No murmurs  ABDOMEN: Soft, Nontender, Nondistended; Bowel sounds present  EXTREMITIES:  1+ Peripheral Pulses, No edema  SKIN: No rashes or lesions  NEURO: AAOX3, no focal deficits, no motor r sensory loss  PSYCH: normal mood      LABS:                        8.6    5.16  )-----------( 138      ( 2019 06:48 )             29.9     11-12    148<H>  |  118<H>  |  19.0  ----------------------------<  78  4.8   |  21.0<L>  |  1.08    Ca    8.2<L>      2019 11:18        Urinalysis Basic - ( 2019 01:28 )    Color: Yellow / Appearance: Clear / S.010 / pH: x  Gluc: x / Ketone: Negative  / Bili: Negative / Urobili: Negative mg/dL   Blood: x / Protein: 15 mg/dL / Nitrite: Negative   Leuk Esterase: Trace / RBC: 0-2 /HPF / WBC 0-2   Sq Epi: x / Non Sq Epi: Occasional / Bacteria: Occasional          I&O's Summary    2019 07:  -  2019 07:00  --------------------------------------------------------  IN: 4525 mL / OUT: 1170 mL / NET: 3355 mL    2019 07:  -  2019 15:36  --------------------------------------------------------  IN: 720 mL / OUT: 0 mL / NET: 720 mL        MEDICATIONS  (STANDING):  amLODIPine   Tablet 10 milliGRAM(s) Oral daily  desmopressin 0.1 milliGRAM(s) Oral <User Schedule>  enoxaparin Injectable 40 milliGRAM(s) SubCutaneous daily  ferrous    sulfate 325 milliGRAM(s) Oral daily  folic acid 1 milliGRAM(s) Oral daily  influenza   Vaccine 0.5 milliLiter(s) IntraMuscular once  labetalol 100 milliGRAM(s) Oral two times a day  levETIRAcetam 500 milliGRAM(s) Oral two times a day  multivitamin/minerals 1 Tablet(s) Oral daily  pantoprazole    Tablet 40 milliGRAM(s) Oral before breakfast  potassium chloride    Tablet ER 20 milliEquivalent(s) Oral daily  sodium bicarbonate 650 milliGRAM(s) Oral three times a day  sodium chloride 0.225%. 1000 milliLiter(s) (125 mL/Hr) IV Continuous <Continuous>    MEDICATIONS  (PRN):

## 2019-11-13 LAB
ALBUMIN SERPL ELPH-MCNC: 3.4 G/DL — SIGNIFICANT CHANGE UP (ref 3.3–5.2)
ALP SERPL-CCNC: 103 U/L — SIGNIFICANT CHANGE UP (ref 40–120)
ALT FLD-CCNC: 14 U/L — SIGNIFICANT CHANGE UP
ANION GAP SERPL CALC-SCNC: 11 MMOL/L — SIGNIFICANT CHANGE UP (ref 5–17)
ANION GAP SERPL CALC-SCNC: 12 MMOL/L — SIGNIFICANT CHANGE UP (ref 5–17)
AST SERPL-CCNC: 17 U/L — SIGNIFICANT CHANGE UP
BILIRUB SERPL-MCNC: 0.4 MG/DL — SIGNIFICANT CHANGE UP (ref 0.4–2)
BUN SERPL-MCNC: 17 MG/DL — SIGNIFICANT CHANGE UP (ref 8–20)
BUN SERPL-MCNC: 21 MG/DL — HIGH (ref 8–20)
CALCIUM SERPL-MCNC: 8.5 MG/DL — LOW (ref 8.6–10.2)
CALCIUM SERPL-MCNC: 8.6 MG/DL — SIGNIFICANT CHANGE UP (ref 8.6–10.2)
CHLORIDE SERPL-SCNC: 113 MMOL/L — HIGH (ref 98–107)
CHLORIDE SERPL-SCNC: 117 MMOL/L — HIGH (ref 98–107)
CO2 SERPL-SCNC: 20 MMOL/L — LOW (ref 22–29)
CO2 SERPL-SCNC: 20 MMOL/L — LOW (ref 22–29)
CREAT SERPL-MCNC: 1.09 MG/DL — SIGNIFICANT CHANGE UP (ref 0.5–1.3)
CREAT SERPL-MCNC: 1.21 MG/DL — SIGNIFICANT CHANGE UP (ref 0.5–1.3)
FERRITIN SERPL-MCNC: 379 NG/ML — HIGH (ref 15–150)
GLUCOSE SERPL-MCNC: 84 MG/DL — SIGNIFICANT CHANGE UP (ref 70–115)
GLUCOSE SERPL-MCNC: 99 MG/DL — SIGNIFICANT CHANGE UP (ref 70–115)
IRON SATN MFR SERPL: 37 % — SIGNIFICANT CHANGE UP (ref 14–50)
IRON SATN MFR SERPL: 66 UG/DL — SIGNIFICANT CHANGE UP (ref 37–145)
POTASSIUM SERPL-MCNC: 5 MMOL/L — SIGNIFICANT CHANGE UP (ref 3.5–5.3)
POTASSIUM SERPL-MCNC: 5.4 MMOL/L — HIGH (ref 3.5–5.3)
POTASSIUM SERPL-SCNC: 5 MMOL/L — SIGNIFICANT CHANGE UP (ref 3.5–5.3)
POTASSIUM SERPL-SCNC: 5.4 MMOL/L — HIGH (ref 3.5–5.3)
PROT SERPL-MCNC: 6.3 G/DL — LOW (ref 6.6–8.7)
SODIUM SERPL-SCNC: 145 MMOL/L — SIGNIFICANT CHANGE UP (ref 135–145)
SODIUM SERPL-SCNC: 148 MMOL/L — HIGH (ref 135–145)
TIBC SERPL-MCNC: 177 UG/DL — LOW (ref 220–430)
TRANSFERRIN SERPL-MCNC: 124 MG/DL — LOW (ref 192–382)

## 2019-11-13 PROCEDURE — 99232 SBSQ HOSP IP/OBS MODERATE 35: CPT

## 2019-11-13 RX ADMIN — Medication 325 MILLIGRAM(S): at 12:21

## 2019-11-13 RX ADMIN — Medication 1 MILLIGRAM(S): at 12:21

## 2019-11-13 RX ADMIN — LEVETIRACETAM 500 MILLIGRAM(S): 250 TABLET, FILM COATED ORAL at 06:00

## 2019-11-13 RX ADMIN — CHLORHEXIDINE GLUCONATE 1 APPLICATION(S): 213 SOLUTION TOPICAL at 05:59

## 2019-11-13 RX ADMIN — DESMOPRESSIN ACETATE 0.1 MILLIGRAM(S): 0.1 TABLET ORAL at 14:10

## 2019-11-13 RX ADMIN — AMLODIPINE BESYLATE 10 MILLIGRAM(S): 2.5 TABLET ORAL at 06:00

## 2019-11-13 RX ADMIN — Medication 1 TABLET(S): at 12:21

## 2019-11-13 RX ADMIN — DESMOPRESSIN ACETATE 0.1 MILLIGRAM(S): 0.1 TABLET ORAL at 06:00

## 2019-11-13 RX ADMIN — PANTOPRAZOLE SODIUM 40 MILLIGRAM(S): 20 TABLET, DELAYED RELEASE ORAL at 06:00

## 2019-11-13 RX ADMIN — Medication 650 MILLIGRAM(S): at 06:00

## 2019-11-13 RX ADMIN — Medication 20 MILLIEQUIVALENT(S): at 12:21

## 2019-11-13 RX ADMIN — DESMOPRESSIN ACETATE 0.1 MILLIGRAM(S): 0.1 TABLET ORAL at 22:59

## 2019-11-13 RX ADMIN — LEVETIRACETAM 500 MILLIGRAM(S): 250 TABLET, FILM COATED ORAL at 17:06

## 2019-11-13 RX ADMIN — Medication 100 MILLIGRAM(S): at 17:06

## 2019-11-13 RX ADMIN — Medication 650 MILLIGRAM(S): at 14:10

## 2019-11-13 RX ADMIN — ENOXAPARIN SODIUM 40 MILLIGRAM(S): 100 INJECTION SUBCUTANEOUS at 12:21

## 2019-11-13 RX ADMIN — Medication 100 MILLIGRAM(S): at 06:00

## 2019-11-13 RX ADMIN — Medication 650 MILLIGRAM(S): at 22:58

## 2019-11-13 NOTE — PROGRESS NOTE ADULT - SUBJECTIVE AND OBJECTIVE BOX
NEPHROLOGY INTERVAL HPI/OVERNIGHT EVENTS:  pt doing well and comfortable  no acute distress noted    MEDICATIONS  (STANDING):  amLODIPine   Tablet 10 milliGRAM(s) Oral daily  chlorhexidine 2% Cloths 1 Application(s) Topical <User Schedule>  desmopressin 0.1 milliGRAM(s) Oral <User Schedule>  enoxaparin Injectable 40 milliGRAM(s) SubCutaneous daily  ferrous    sulfate 325 milliGRAM(s) Oral daily  folic acid 1 milliGRAM(s) Oral daily  influenza   Vaccine 0.5 milliLiter(s) IntraMuscular once  labetalol 100 milliGRAM(s) Oral two times a day  levETIRAcetam 500 milliGRAM(s) Oral two times a day  multivitamin/minerals 1 Tablet(s) Oral daily  pantoprazole    Tablet 40 milliGRAM(s) Oral before breakfast  potassium chloride    Tablet ER 20 milliEquivalent(s) Oral daily  sodium bicarbonate 650 milliGRAM(s) Oral three times a day    MEDICATIONS  (PRN):      Allergies    No Known Allergies          Vital Signs Last 24 Hrs  T(C): 37.3 (2019 07:56), Max: 37.3 (2019 07:56)  T(F): 99.1 (2019 07:56), Max: 99.1 (2019 07:56)  HR: 75 (2019 07:56) (4 - 78)  BP: 123/84 (2019 07:56) (121/84 - 128/81)  BP(mean): --  RR: 20 (2019 07:56) (18 - 20)  SpO2: 96% (2019 00:00) (96% - 97%)    PHYSICAL EXAM:  GENERAL: NAD  ENMT: Moist mucous membranes  NECK: No jvd  NERVOUS SYSTEM: Awake, alert and at baseline  CHEST/LUNG: Clear bilaterally  HEART: no rub  ABDOMEN: soft, not tender +BS  EXTREMITIES: No edema  SKIN: no rash    LABS:                        8.6    5.16  )-----------( 138      ( 2019 06:48 )             29.9     11-13    148<H>  |  117<H>  |  17.0  ----------------------------<  84  5.0   |  20.0<L>  |  1.21        Ca    8.6      2019 07:54    TPro  6.3<L>  /  Alb  3.4  /  TBili  0.4  /  DBili  x   /  AST  17  /  ALT  14  /  AlkPhos  103  11-13      Urinalysis Basic - ( 2019 01:28 )    Color: Yellow / Appearance: Clear / S.010 / pH: x  Gluc: x / Ketone: Negative  / Bili: Negative / Urobili: Negative mg/dL   Blood: x / Protein: 15 mg/dL / Nitrite: Negative   Leuk Esterase: Trace / RBC: 0-2 /HPF / WBC 0-2   Sq Epi: x / Non Sq Epi: Occasional / Bacteria: Occasional          RADIOLOGY & ADDITIONAL TESTS:

## 2019-11-13 NOTE — PROGRESS NOTE ADULT - ASSESSMENT
55 y/o female with h/o gastric bypass surgery, HTN, chronic anemia, diabetes insipidus after an episode of ICH 2nd to brain aneurysm chronic PICC line for home iv hydration (1000 cc D5 every otherday), was referred to the ER as labs showed low hemoglobin. In the ER, labs showed Hgb: 7.4. Labs showed Na: 172. patient has h/o recurrent hypernatremia.       1. 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      2. Mild TOSHIA on CKD  on fluids, at the baseline, nephrology following.    3. Hx of ICH aneurysm rupture   neurology exam stable.  continue to monitor.    4. Hx of seizures   on keppra.    5. hx of gastric bypass and chronic anemia   was transfused for Hg 7.4, FOBT negative.  Hg remaining stable 11/11= 8.9.    6. HTN   c/w labetalol and amlodipine.  normotensive.    7. DVT prophylaxis  on lovenox sc     Dispo: doing well, walking around in the hallway, will monitor sodium off IV fluids and will d/c in am if sodium is stable

## 2019-11-13 NOTE — PROGRESS NOTE ADULT - ASSESSMENT
Central DI: hypernatremia is improving, serum Na+ stable  - cont Desmopressin at current dose  - encouraged increased oral fluid intake  - will monitor off IVF today; plan for IVF tomorrow to transition back to her outpatient regimen

## 2019-11-13 NOTE — CHART NOTE - NSCHARTNOTEFT_GEN_A_CORE
Called by RN reporting critical value K+ 5.4. Patient BMP is being closely monitored. Pt is asymptomatic. Vital signs stable. No intervention at this time. Continue to monitor. Call PA if condition changes.

## 2019-11-13 NOTE — PROGRESS NOTE ADULT - SUBJECTIVE AND OBJECTIVE BOX
KRUPA MEDINA    [id number]    54y      Female    Patient is a 54y old  Female who presents with a chief complaint of anemia (2019 10:08)      INTERVAL HPI/OVERNIGHT EVENTS:    Patient has nom complains, her sodium has trended down to 148 and has been stable, denies fever, chills, chest pain, nausea, vomiting.     REVIEW OF SYSTEMS:    CONSTITUTIONAL: No fever, some fatigue  RESPIRATORY: No cough, No shortness of breath  CARDIOVASCULAR: No chest pain, palpitations  GASTROINTESTINAL: No abdominal, No nausea, vomiting  NEUROLOGICAL: No headaches,  loss of strength.  MISCELLANEOUS: No joint swelling or pain        Vital Signs Last 24 Hrs  T(C): 37.3 (2019 07:56), Max: 37.3 (2019 07:56)  T(F): 99.1 (2019 07:56), Max: 99.1 (2019 07:56)  HR: 75 (2019 07:56) (4 - 78)  BP: 123/84 (2019 07:56) (121/84 - 128/81)  RR: 20 (2019 07:56) (18 - 20)  SpO2: 96% (2019 00:00) (96% - 97%)    PHYSICAL EXAM:    GENERAL: Middle age female looking comfortable   HEENT: PERRL, +EOMI  NECK: soft, Supple, No JVD,   CHEST/LUNG: Clear to auscultate bilaterally; No wheezing  HEART: S1S2+, Regular rate and rhythm; No murmurs  ABDOMEN: Soft, Nontender, Nondistended; Bowel sounds present  EXTREMITIES:  1+ Peripheral Pulses, No edema  SKIN: No rashes or lesions  NEURO: AAOX3, no focal deficits, no motor r sensory loss  PSYCH: normal mood        LABS:                        8.6    5.16  )-----------( 138      ( 2019 06:48 )             29.9     11-13    148<H>  |  117<H>  |  17.0  ----------------------------<  84  5.0   |  20.0<L>  |  1.21    Ca    8.6      2019 07:54    TPro  6.3<L>  /  Alb  3.4  /  TBili  0.4  /  DBili  x   /  AST  17  /  ALT  14  /  AlkPhos  103  11-13      Urinalysis Basic - ( 2019 01:28 )    Color: Yellow / Appearance: Clear / S.010 / pH: x  Gluc: x / Ketone: Negative  / Bili: Negative / Urobili: Negative mg/dL   Blood: x / Protein: 15 mg/dL / Nitrite: Negative   Leuk Esterase: Trace / RBC: 0-2 /HPF / WBC 0-2   Sq Epi: x / Non Sq Epi: Occasional / Bacteria: Occasional          I&O's Summary    2019 07:01  -  2019 07:00  --------------------------------------------------------  IN: 720 mL / OUT: 0 mL / NET: 720 mL        MEDICATIONS  (STANDING):  amLODIPine   Tablet 10 milliGRAM(s) Oral daily  chlorhexidine 2% Cloths 1 Application(s) Topical <User Schedule>  desmopressin 0.1 milliGRAM(s) Oral <User Schedule>  enoxaparin Injectable 40 milliGRAM(s) SubCutaneous daily  ferrous    sulfate 325 milliGRAM(s) Oral daily  folic acid 1 milliGRAM(s) Oral daily  influenza   Vaccine 0.5 milliLiter(s) IntraMuscular once  labetalol 100 milliGRAM(s) Oral two times a day  levETIRAcetam 500 milliGRAM(s) Oral two times a day  multivitamin/minerals 1 Tablet(s) Oral daily  pantoprazole    Tablet 40 milliGRAM(s) Oral before breakfast  potassium chloride    Tablet ER 20 milliEquivalent(s) Oral daily  sodium bicarbonate 650 milliGRAM(s) Oral three times a day    MEDICATIONS  (PRN):

## 2019-11-14 ENCOUNTER — APPOINTMENT (OUTPATIENT)
Dept: HEMATOLOGY ONCOLOGY | Facility: CLINIC | Age: 54
End: 2019-11-14

## 2019-11-14 ENCOUNTER — TRANSCRIPTION ENCOUNTER (OUTPATIENT)
Age: 54
End: 2019-11-14

## 2019-11-14 VITALS
TEMPERATURE: 98 F | RESPIRATION RATE: 20 BRPM | SYSTOLIC BLOOD PRESSURE: 123 MMHG | DIASTOLIC BLOOD PRESSURE: 83 MMHG | HEART RATE: 73 BPM

## 2019-11-14 LAB
ANION GAP SERPL CALC-SCNC: 10 MMOL/L — SIGNIFICANT CHANGE UP (ref 5–17)
BUN SERPL-MCNC: 18 MG/DL — SIGNIFICANT CHANGE UP (ref 8–20)
CALCIUM SERPL-MCNC: 8.5 MG/DL — LOW (ref 8.6–10.2)
CHLORIDE SERPL-SCNC: 113 MMOL/L — HIGH (ref 98–107)
CO2 SERPL-SCNC: 22 MMOL/L — SIGNIFICANT CHANGE UP (ref 22–29)
CREAT SERPL-MCNC: 1.1 MG/DL — SIGNIFICANT CHANGE UP (ref 0.5–1.3)
GLUCOSE SERPL-MCNC: 82 MG/DL — SIGNIFICANT CHANGE UP (ref 70–115)
POTASSIUM SERPL-MCNC: 5 MMOL/L — SIGNIFICANT CHANGE UP (ref 3.5–5.3)
POTASSIUM SERPL-SCNC: 5 MMOL/L — SIGNIFICANT CHANGE UP (ref 3.5–5.3)
SODIUM SERPL-SCNC: 145 MMOL/L — SIGNIFICANT CHANGE UP (ref 135–145)

## 2019-11-14 PROCEDURE — 82272 OCCULT BLD FECES 1-3 TESTS: CPT

## 2019-11-14 PROCEDURE — 84133 ASSAY OF URINE POTASSIUM: CPT

## 2019-11-14 PROCEDURE — 85027 COMPLETE CBC AUTOMATED: CPT

## 2019-11-14 PROCEDURE — 83540 ASSAY OF IRON: CPT

## 2019-11-14 PROCEDURE — 84100 ASSAY OF PHOSPHORUS: CPT

## 2019-11-14 PROCEDURE — P9016: CPT

## 2019-11-14 PROCEDURE — 86901 BLOOD TYPING SEROLOGIC RH(D): CPT

## 2019-11-14 PROCEDURE — 82728 ASSAY OF FERRITIN: CPT

## 2019-11-14 PROCEDURE — 80177 DRUG SCRN QUAN LEVETIRACETAM: CPT

## 2019-11-14 PROCEDURE — 83930 ASSAY OF BLOOD OSMOLALITY: CPT

## 2019-11-14 PROCEDURE — 36415 COLL VENOUS BLD VENIPUNCTURE: CPT

## 2019-11-14 PROCEDURE — 83550 IRON BINDING TEST: CPT

## 2019-11-14 PROCEDURE — 86923 COMPATIBILITY TEST ELECTRIC: CPT

## 2019-11-14 PROCEDURE — 99285 EMERGENCY DEPT VISIT HI MDM: CPT | Mod: 25

## 2019-11-14 PROCEDURE — 81001 URINALYSIS AUTO W/SCOPE: CPT

## 2019-11-14 PROCEDURE — 86900 BLOOD TYPING SEROLOGIC ABO: CPT

## 2019-11-14 PROCEDURE — 84466 ASSAY OF TRANSFERRIN: CPT

## 2019-11-14 PROCEDURE — 83735 ASSAY OF MAGNESIUM: CPT

## 2019-11-14 PROCEDURE — 99239 HOSP IP/OBS DSCHRG MGMT >30: CPT

## 2019-11-14 PROCEDURE — 86803 HEPATITIS C AB TEST: CPT

## 2019-11-14 PROCEDURE — 80048 BASIC METABOLIC PNL TOTAL CA: CPT

## 2019-11-14 PROCEDURE — 80053 COMPREHEN METABOLIC PANEL: CPT

## 2019-11-14 PROCEDURE — 86850 RBC ANTIBODY SCREEN: CPT

## 2019-11-14 PROCEDURE — 84300 ASSAY OF URINE SODIUM: CPT

## 2019-11-14 PROCEDURE — 83935 ASSAY OF URINE OSMOLALITY: CPT

## 2019-11-14 PROCEDURE — 36430 TRANSFUSION BLD/BLD COMPNT: CPT

## 2019-11-14 RX ORDER — DESMOPRESSIN ACETATE 0.1 MG/1
1 TABLET ORAL
Qty: 0 | Refills: 0 | DISCHARGE
Start: 2019-11-14

## 2019-11-14 RX ORDER — DESMOPRESSIN ACETATE 0.1 MG/1
1 TABLET ORAL
Qty: 0 | Refills: 0 | DISCHARGE

## 2019-11-14 RX ORDER — AMLODIPINE BESYLATE 2.5 MG/1
1 TABLET ORAL
Qty: 0 | Refills: 0 | DISCHARGE
Start: 2019-11-14

## 2019-11-14 RX ORDER — LEVETIRACETAM 250 MG/1
1 TABLET, FILM COATED ORAL
Qty: 0 | Refills: 0 | DISCHARGE
Start: 2019-11-14

## 2019-11-14 RX ORDER — LEVETIRACETAM 250 MG/1
1 TABLET, FILM COATED ORAL
Qty: 0 | Refills: 0 | DISCHARGE

## 2019-11-14 RX ORDER — LABETALOL HCL 100 MG
1 TABLET ORAL
Qty: 0 | Refills: 0 | DISCHARGE
Start: 2019-11-14

## 2019-11-14 RX ADMIN — Medication 325 MILLIGRAM(S): at 11:36

## 2019-11-14 RX ADMIN — ENOXAPARIN SODIUM 40 MILLIGRAM(S): 100 INJECTION SUBCUTANEOUS at 11:36

## 2019-11-14 RX ADMIN — CHLORHEXIDINE GLUCONATE 1 APPLICATION(S): 213 SOLUTION TOPICAL at 06:07

## 2019-11-14 RX ADMIN — Medication 1 MILLIGRAM(S): at 11:36

## 2019-11-14 RX ADMIN — PANTOPRAZOLE SODIUM 40 MILLIGRAM(S): 20 TABLET, DELAYED RELEASE ORAL at 06:15

## 2019-11-14 RX ADMIN — Medication 100 MILLIGRAM(S): at 06:15

## 2019-11-14 RX ADMIN — Medication 650 MILLIGRAM(S): at 06:15

## 2019-11-14 RX ADMIN — LEVETIRACETAM 500 MILLIGRAM(S): 250 TABLET, FILM COATED ORAL at 06:15

## 2019-11-14 RX ADMIN — DESMOPRESSIN ACETATE 0.1 MILLIGRAM(S): 0.1 TABLET ORAL at 06:15

## 2019-11-14 RX ADMIN — AMLODIPINE BESYLATE 10 MILLIGRAM(S): 2.5 TABLET ORAL at 06:15

## 2019-11-14 RX ADMIN — Medication 1 TABLET(S): at 11:36

## 2019-11-14 NOTE — DISCHARGE NOTE PROVIDER - NSDCMRMEDTOKEN_GEN_ALL_CORE_FT
amLODIPine 10 mg oral tablet: 1 tab(s) orally once a day  desmopressin 0.1 mg oral tablet: 1 tab(s) orally   ferrous sulfate 325 mg (65 mg elemental iron) oral tablet: 1 tab(s) orally 2 times a day  folic acid 1 mg oral tablet: 1 tab(s) orally once a day  labetalol 100 mg oral tablet: 1 tab(s) orally 2 times a day  levETIRAcetam 500 mg oral tablet: 1 tab(s) orally 2 times a day  Multiple Vitamins with Minerals oral tablet: 1 tab(s) orally once a day  pantoprazole 40 mg oral delayed release tablet: 1 tab(s) orally once a day

## 2019-11-14 NOTE — DISCHARGE NOTE NURSING/CASE MANAGEMENT/SOCIAL WORK - NSDCFUADDAPPT_GEN_ALL_CORE_FT
RESUMPTION OF SERVICES  [facility] [phone]  [facility] JESUS/SUPERVISOR [phone]

## 2019-11-14 NOTE — DISCHARGE NOTE NURSING/CASE MANAGEMENT/SOCIAL WORK - PATIENT PORTAL LINK FT
You can access the FollowMyHealth Patient Portal offered by [facility] by registering at the following website: [email or web address]. By joining [facility]’s FollowMyHealth portal, you will also be able to view your health information using other applications (apps) compatible with our system.

## 2019-11-14 NOTE — DISCHARGE NOTE PROVIDER - HOSPITAL COURSE
53 y/o female with h/o gastric bypass surgery, HTN, chronic anemia, diabetes insipidus after an episode of ICH 2nd to brain aneurysm chronic PICC line for home iv hydration (1000 cc D5 every other day), was referred to the ER as labs showed low hemoglobin. In the ER, labs showed Hgb: 7.4. Labs showed Na: 172. 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     patient was also noted to have Mild TOSHIA on CKD, her kidney function improved and now at baseline.         patient has Hx of ICH aneurysm rupture, neurology exam stable, she was continued with Keppra over the course for Hx of seizure disorder.     She has Hx of gastric bypass and chronic anemia, she was was transfused for Hg 7.4, FOBT negative, Hg remaining stable 11/12= 8.6.     She has Hx of HTN, she was continued on labetalol and amlodipine, she remained normotensive over the course.         She is doing well, she is drinking and eating ok, walking around in the hallway without any problem, she is being discharged home in a stable condition.         Vital Signs Last 24 Hrs    T(C): 36.9 (14 Nov 2019 07:57), Max: 36.9 (14 Nov 2019 00:00)    T(F): 98.4 (14 Nov 2019 07:57), Max: 98.5 (14 Nov 2019 00:00)    HR: 70 (14 Nov 2019 07:57) (70 - 80)    BP: 114/77 (14 Nov 2019 07:57) (114/77 - 127/87)    RR: 20 (14 Nov 2019 07:57) (20 - 20)    SpO2: 96% (14 Nov 2019 00:00) (96% - 97%)            PHYSICAL EXAM:        GENERAL: Middle age female looking comfortable     HEENT: PERRL, +EOMI    NECK: soft, Supple, No JVD,     CHEST/LUNG: Clear to auscultate bilaterally; No wheezing    HEART: S1S2+, Regular rate and rhythm; No murmurs    ABDOMEN: Soft, Nontender, Nondistended; Bowel sounds present    EXTREMITIES:  1+ Peripheral Pulses, No edema    SKIN: No rashes or lesions    NEURO: AAOX3, no focal deficits, no motor r sensory loss    PSYCH: normal mood        Total time spent 37minutes.

## 2019-11-14 NOTE — PROGRESS NOTE ADULT - SUBJECTIVE AND OBJECTIVE BOX
NEPHROLOGY INTERVAL HPI/OVERNIGHT EVENTS:  pt doing well when seen earlier  no acute distress  no complaints offered    MEDICATIONS  (STANDING):  amLODIPine   Tablet 10 milliGRAM(s) Oral daily  chlorhexidine 2% Cloths 1 Application(s) Topical <User Schedule>  desmopressin 0.1 milliGRAM(s) Oral <User Schedule>  enoxaparin Injectable 40 milliGRAM(s) SubCutaneous daily  ferrous    sulfate 325 milliGRAM(s) Oral daily  folic acid 1 milliGRAM(s) Oral daily  influenza   Vaccine 0.5 milliLiter(s) IntraMuscular once  labetalol 100 milliGRAM(s) Oral two times a day  levETIRAcetam 500 milliGRAM(s) Oral two times a day  multivitamin/minerals 1 Tablet(s) Oral daily  pantoprazole    Tablet 40 milliGRAM(s) Oral before breakfast  sodium bicarbonate 650 milliGRAM(s) Oral three times a day    MEDICATIONS  (PRN):      Allergies    No Known Allergies          Vital Signs Last 24 Hrs  T(C): 36.9 (14 Nov 2019 07:57), Max: 36.9 (14 Nov 2019 00:00)  T(F): 98.4 (14 Nov 2019 07:57), Max: 98.5 (14 Nov 2019 00:00)  HR: 70 (14 Nov 2019 07:57) (70 - 80)  BP: 114/77 (14 Nov 2019 07:57) (114/77 - 127/87)  BP(mean): --  RR: 20 (14 Nov 2019 07:57) (20 - 20)  SpO2: 96% (14 Nov 2019 00:00) (96% - 97%)    PHYSICAL EXAM:  GENERAL: Comfortable  ENMT: Moist mucous membranes  NECK: No jvd  NERVOUS SYSTEM: Awake, alert and at baseline  CHEST/LUNG: Clear bilaterally  HEART: no rub  ABDOMEN: soft, not tender +BS  EXTREMITIES: No edema  SKIN: no rash    LABS:    11-14    145  |  113<H>  |  18.0  ----------------------------<  82  5.0   |  22.0  |  1.10    Ca    8.5<L>      14 Nov 2019 07:07    TPro  6.3<L>  /  Alb  3.4  /  TBili  0.4  /  DBili  x   /  AST  17  /  ALT  14  /  AlkPhos  103  11-13            RADIOLOGY & ADDITIONAL TESTS:

## 2019-11-14 NOTE — DISCHARGE NOTE PROVIDER - CARE PROVIDER_API CALL
Robert Chaidez)  Internal Medicine; Nephrology  [address], [place] [id number]  Phone: [phone]  Fax: [phone]  Follow Up Time:     PMD,   in 1 week, please call his office and get an appiontment  Phone: (   )    -  Fax: (   )    -  Follow Up Time:

## 2019-11-14 NOTE — PROGRESS NOTE ADULT - ASSESSMENT
Central DI: hypernatremia continues to improve  - cont Desmopressin at current dose  - encouraged increased oral fluid intake  - further IVF as needed but for now will monitor

## 2019-11-14 NOTE — DISCHARGE NOTE PROVIDER - PROVIDER TOKENS
PROVIDER:[TOKEN:[[id number]]],FREE:[LAST:[PMD],PHONE:[(   )    -],FAX:[(   )    -],ADDRESS:[in 1 week, please call his office and get an appiontment]]

## 2019-11-14 NOTE — DISCHARGE NOTE PROVIDER - NSDCCPCAREPLAN_GEN_ALL_CORE_FT
PRINCIPAL DISCHARGE DIAGNOSIS  Diagnosis: Anemia, unspecified type  Assessment and Plan of Treatment:       SECONDARY DISCHARGE DIAGNOSES  Diagnosis: Primary central diabetes insipidus  Assessment and Plan of Treatment:     Diagnosis: Hypernatremia  Assessment and Plan of Treatment:

## 2019-11-21 ENCOUNTER — APPOINTMENT (OUTPATIENT)
Dept: HEMATOLOGY ONCOLOGY | Facility: CLINIC | Age: 54
End: 2019-11-21

## 2019-12-20 NOTE — ED ADULT TRIAGE NOTE - LOCATION:
See Allergy Immunotherapy for injection flow sheet for documentation of injection(s). Questionnaire reviewed with patient and all answers are within normal limits. Allergy injections were administered and Kizzy waited in the clinic waiting room for 30 minutes following administration and was discharged without adverse reactions noted.    
Left arm;

## 2020-01-27 NOTE — PATIENT PROFILE ADULT. - PMH
Patient Demographics     Patient Name  Barbara Agrawal  [id number] Sex  Male   1944 Address  [address] [place] [address] Phone  [phone] (Home) *Preferred*  [phone] (Mobile)   CSN:   [id number]   Admit Date: Admit Time Room Bed   2020  5:36 PM 0761 30 00 40 01 [[id number]]   Attending Providers     Provider Pager From To   Paulina Ziegler MD  20   Pranav Mahmood MD  20   Katie Hurst MD  20    Emergency Contact(s)     Name Relation Home Work Mobile   Violeta Russo [phone]     Utilization Reviews         Sepsis and Other Febrile Illness, without Focal Infection - Care Day 3 (2020) by Renuka Serrano RN         Review Entered Review Status   2020 12:56 Completed       Criteria Review      Care Day: 3 Care Date: 2020 Level of Care: Inpatient Floor    Guideline Day 3    Level Of Care    (X) Floor    Clinical Status    (X) * Mental status at baseline    (X) * Fever absent or reduced    Routes    (X) * Oral hydration    (X) Diet as tolerated    (X) Parenteral or oral medication    2020 12:56:26 EST by Yoel Sheikh      D50W 25G IV X 1    Interventions    (X) WBC    2020 12:56:26 EST by Yoel Sheikh      5.0    Medications    (X) Possible antimicrobial treatment    2020 12:56:26 EST by Griselda Llanos 3.375G Q8H IV    (X) Possible DVT prophylaxis    2020 12:56:26 EST by Yoel Sheikh      LOVENOX 40 MG QD SQ    * Milestone   Additional Notes   2020   Episode of hypoglycemia earlier this morning   Presently glucose at 197   Says doing good       General:          Well nourished.  Alert.     HEENT-normal   CV:                  RRR.  No murmur, rub, or gallop. Lungs:             Clear to auscultation bilaterally.  No wheezing, rhonchi, or rales. Abdomen:        Soft, nontender, nondistended. CNS-no focal neurologic deficit   Extremities:     Warm and dry.  No cyanosis. Minimal pitting edema left elbow region improving, nontender. Skin:                No rashes or jaundice      PLAN:     Sepsis   Reports recent antibiotic therapy for pneumonia   CXR: No new consolidation   Elevated prolactin.    DC vancomycin, continue Zosyn for now.       Hypoglycemia/diabetes mellitus   Continue Lantus, held sliding scale for this morning.           Bradycardia   Resolved       COPD       CKD stage III-normal kidney function.       Lung cancer   -Continued outpatient follow-up       Anxiety       DVT ppx ordered   Code status:  Full   Estimated LOS:  Greater than 2 midnights   Risk:  high               /60, TEMP 99.2, HR 98, RR 19, O2 99 ON RA      HGB 10.8      F GLUC 36, 41, 197, 271, 279, 222      NO IMAGING      CARD MONITORING, DIAB DIET          Sepsis and Other Febrile Illness, without Focal Infection - Care Day 2 (1/25/2020) by Virgil Iglesias RN         Review Entered Review Status   1/27/2020 12:44 Completed       Criteria Review      Care Day: 2 Care Date: 1/25/2020 Level of Care: Inpatient Floor    Guideline Day 2    Level Of Care    (X) ICU or floor    Clinical Status    (X) * Hemodynamic stability    1/27/2020 12:44:32 EST by Tressa Onofre      /68, TEMP 98.4, HR 64    (X) * Hypoxemia absent    1/27/2020 12:44:32 EST by Tressa Onofre      O2 HIGH 90'S ON RA    (X) * Tachypnea absent    1/27/2020 12:44:32 EST by Tressa Onofre      RR 18    Routes    (X) Possible IV fluids    (X) Parenteral or oral medications    (X) Diet as tolerated    1/27/2020 12:44:32 EST by Tressa Onofre      DIAB DIET    Medications    (X) Possible antimicrobial treatment    1/27/2020 12:44:32 EST by Anil Henderson 3.375G Q8H IV, VANC 2000 MG IV X 1 THEN 1250 MG Q18H IV    (X) Possible DVT prophylaxis    1/27/2020 12:44:32 EST by Tressa Onofre      LOVENOX 40 MG QD SQ    * Milestone   Additional Notes   1/25/2020   Off D10 earlier this morning   Sugars improving     General:          Well nourished.  Alert.     HEENT-normal   CV:                  RRR.  No murmur, rub, or gallop. Lungs:             Clear to auscultation bilaterally.  No wheezing, rhonchi, or rales. Abdomen:        Soft, nontender, nondistended. CNS-no focal neurologic deficit   Extremities:     Warm and dry.  No cyanosis. Minimal pitting edema left elbow region nontender   Skin:                No rashes or jaundice. PLAN:     Sepsis   Reports recent antibiotic therapy for pneumonia   CXR: No new consolidation   Elevated prolactin.    Continue vancomycin Zosyn for now.       Hypoglycemia/diabetes mellitus   Improving glucose, off D10.  Continue sliding scale and Lantus           Bradycardia   Resolved       COPD       CKD stage III-normal kidney function.       Lung cancer   -Continued outpatient follow-up       Anxiety       DVT ppx ordered   Code status:  Full   Estimated LOS:  Greater than 2 midnights   Risk:  high                     U/A: GLUC >1000      F GLUC 324, 155, 300, 259, 280            EKG: Normal sinus rhythm with sinus arrhythmia    Left axis deviation    Incomplete right bundle branch block    Cannot rule out Anterior infarct (cited on or before 30-NOV-2010)          CARD MONITORING, DIAB Ebonie LUONG Brain aneurysm    Diabetes insipidus    HTN (Hypertension)    Memory loss, short term    Subarachnoid Hemorrhage

## 2020-05-18 ENCOUNTER — APPOINTMENT (OUTPATIENT)
Dept: ENDOCRINOLOGY | Facility: CLINIC | Age: 55
End: 2020-05-18

## 2020-05-28 NOTE — PROGRESS NOTE ADULT - PROBLEM SELECTOR PLAN 1
- as high as 165 on admission  - hx of hypernatremia in past  - Endo and nephro appreciated  - now resolved  PLAN  - low salt diet  - DDAVP BID  - cont to trend Na  - fu consultants, poss discharge home today normal...

## 2020-06-08 NOTE — ED ADULT NURSE NOTE - HARM RISK FACTORS
Quality 226: Preventive Care And Screening: Tobacco Use: Screening And Cessation Intervention: Patient screened for tobacco use and is an ex/non-smoker no

## 2020-10-01 ENCOUNTER — OUTPATIENT (OUTPATIENT)
Dept: OUTPATIENT SERVICES | Facility: HOSPITAL | Age: 55
LOS: 1 days | End: 2020-10-01
Payer: MEDICAID

## 2020-10-01 DIAGNOSIS — Z98.84 BARIATRIC SURGERY STATUS: Chronic | ICD-10-CM

## 2020-10-02 ENCOUNTER — INPATIENT (INPATIENT)
Facility: HOSPITAL | Age: 55
LOS: 2 days | Discharge: HOME | DRG: 918 | End: 2020-10-05
Attending: HOSPITALIST | Admitting: HOSPITALIST
Payer: MEDICAID

## 2020-10-02 VITALS
OXYGEN SATURATION: 98 % | HEIGHT: 60 IN | RESPIRATION RATE: 18 BRPM | HEART RATE: 81 BPM | SYSTOLIC BLOOD PRESSURE: 82 MMHG | TEMPERATURE: 99 F | DIASTOLIC BLOOD PRESSURE: 52 MMHG

## 2020-10-02 DIAGNOSIS — Z98.84 BARIATRIC SURGERY STATUS: Chronic | ICD-10-CM

## 2020-10-02 DIAGNOSIS — E87.0 HYPEROSMOLALITY AND HYPERNATREMIA: ICD-10-CM

## 2020-10-02 LAB
ALBUMIN SERPL ELPH-MCNC: 3.8 G/DL — SIGNIFICANT CHANGE UP (ref 3.3–5.2)
ALP SERPL-CCNC: 95 U/L — SIGNIFICANT CHANGE UP (ref 40–120)
ALT FLD-CCNC: 15 U/L — SIGNIFICANT CHANGE UP
ANION GAP SERPL CALC-SCNC: 11 MMOL/L — SIGNIFICANT CHANGE UP (ref 5–17)
ANISOCYTOSIS BLD QL: SLIGHT — SIGNIFICANT CHANGE UP
APPEARANCE UR: CLEAR — SIGNIFICANT CHANGE UP
APTT BLD: 32.7 SEC — SIGNIFICANT CHANGE UP (ref 27.5–35.5)
AST SERPL-CCNC: 18 U/L — SIGNIFICANT CHANGE UP
BASOPHILS # BLD AUTO: 0 K/UL — SIGNIFICANT CHANGE UP (ref 0–0.2)
BASOPHILS NFR BLD AUTO: 0 % — SIGNIFICANT CHANGE UP (ref 0–2)
BILIRUB SERPL-MCNC: 0.3 MG/DL — LOW (ref 0.4–2)
BILIRUB UR-MCNC: NEGATIVE — SIGNIFICANT CHANGE UP
BUN SERPL-MCNC: 44 MG/DL — HIGH (ref 8–20)
CALCIUM SERPL-MCNC: 9 MG/DL — SIGNIFICANT CHANGE UP (ref 8.6–10.2)
CHLORIDE SERPL-SCNC: 133 MMOL/L — HIGH (ref 98–107)
CO2 SERPL-SCNC: 20 MMOL/L — LOW (ref 22–29)
COLOR SPEC: YELLOW — SIGNIFICANT CHANGE UP
CREAT SERPL-MCNC: 1.54 MG/DL — HIGH (ref 0.5–1.3)
DIFF PNL FLD: NEGATIVE — SIGNIFICANT CHANGE UP
ELLIPTOCYTES BLD QL SMEAR: SLIGHT — SIGNIFICANT CHANGE UP
EOSINOPHIL # BLD AUTO: 0.21 K/UL — SIGNIFICANT CHANGE UP (ref 0–0.5)
EOSINOPHIL NFR BLD AUTO: 4.4 % — SIGNIFICANT CHANGE UP (ref 0–6)
EPI CELLS # UR: SIGNIFICANT CHANGE UP
GIANT PLATELETS BLD QL SMEAR: PRESENT — SIGNIFICANT CHANGE UP
GLUCOSE SERPL-MCNC: 100 MG/DL — HIGH (ref 70–99)
GLUCOSE UR QL: NEGATIVE MG/DL — SIGNIFICANT CHANGE UP
HCT VFR BLD CALC: 32.7 % — LOW (ref 34.5–45)
HGB BLD-MCNC: 8.9 G/DL — LOW (ref 11.5–15.5)
INR BLD: 1.09 RATIO — SIGNIFICANT CHANGE UP (ref 0.88–1.16)
KETONES UR-MCNC: NEGATIVE — SIGNIFICANT CHANGE UP
LEUKOCYTE ESTERASE UR-ACNC: ABNORMAL
LYMPHOCYTES # BLD AUTO: 0.66 K/UL — LOW (ref 1–3.3)
LYMPHOCYTES # BLD AUTO: 14 % — SIGNIFICANT CHANGE UP (ref 13–44)
MACROCYTES BLD QL: SLIGHT — SIGNIFICANT CHANGE UP
MAGNESIUM SERPL-MCNC: 2.2 MG/DL — SIGNIFICANT CHANGE UP (ref 1.6–2.6)
MANUAL SMEAR VERIFICATION: SIGNIFICANT CHANGE UP
MCHC RBC-ENTMCNC: 25.8 PG — LOW (ref 27–34)
MCHC RBC-ENTMCNC: 27.2 GM/DL — LOW (ref 32–36)
MCV RBC AUTO: 94.8 FL — SIGNIFICANT CHANGE UP (ref 80–100)
MICROCYTES BLD QL: SLIGHT — SIGNIFICANT CHANGE UP
MONOCYTES # BLD AUTO: 0 K/UL — SIGNIFICANT CHANGE UP (ref 0–0.9)
MONOCYTES NFR BLD AUTO: 0 % — LOW (ref 2–14)
NEUTROPHILS # BLD AUTO: 3.84 K/UL — SIGNIFICANT CHANGE UP (ref 1.8–7.4)
NEUTROPHILS NFR BLD AUTO: 71.9 % — SIGNIFICANT CHANGE UP (ref 43–77)
NEUTS BAND # BLD: 9.7 % — HIGH (ref 0–8)
NITRITE UR-MCNC: NEGATIVE — SIGNIFICANT CHANGE UP
PH UR: 5 — SIGNIFICANT CHANGE UP (ref 5–8)
PLAT MORPH BLD: NORMAL — SIGNIFICANT CHANGE UP
PLATELET # BLD AUTO: 121 K/UL — LOW (ref 150–400)
POIKILOCYTOSIS BLD QL AUTO: SLIGHT — SIGNIFICANT CHANGE UP
POTASSIUM SERPL-MCNC: 4.3 MMOL/L — SIGNIFICANT CHANGE UP (ref 3.5–5.3)
POTASSIUM SERPL-SCNC: 4.3 MMOL/L — SIGNIFICANT CHANGE UP (ref 3.5–5.3)
PROT SERPL-MCNC: 7.3 G/DL — SIGNIFICANT CHANGE UP (ref 6.6–8.7)
PROT UR-MCNC: 30 MG/DL
PROTHROM AB SERPL-ACNC: 12.6 SEC — SIGNIFICANT CHANGE UP (ref 10.6–13.6)
RBC # BLD: 3.45 M/UL — LOW (ref 3.8–5.2)
RBC # FLD: 14.6 % — HIGH (ref 10.3–14.5)
RBC BLD AUTO: ABNORMAL
RBC CASTS # UR COMP ASSIST: SIGNIFICANT CHANGE UP /HPF (ref 0–4)
SODIUM SERPL-SCNC: 164 MMOL/L — CRITICAL HIGH (ref 135–145)
SP GR SPEC: 1.02 — SIGNIFICANT CHANGE UP (ref 1.01–1.02)
TROPONIN T SERPL-MCNC: <0.01 NG/ML — SIGNIFICANT CHANGE UP (ref 0–0.06)
UROBILINOGEN FLD QL: NEGATIVE MG/DL — SIGNIFICANT CHANGE UP
WBC # BLD: 4.7 K/UL — SIGNIFICANT CHANGE UP (ref 3.8–10.5)
WBC # FLD AUTO: 4.7 K/UL — SIGNIFICANT CHANGE UP (ref 3.8–10.5)
WBC UR QL: SIGNIFICANT CHANGE UP

## 2020-10-02 PROCEDURE — 71045 X-RAY EXAM CHEST 1 VIEW: CPT | Mod: 26

## 2020-10-02 PROCEDURE — 93010 ELECTROCARDIOGRAM REPORT: CPT

## 2020-10-02 PROCEDURE — 70450 CT HEAD/BRAIN W/O DYE: CPT | Mod: 26

## 2020-10-02 PROCEDURE — 99285 EMERGENCY DEPT VISIT HI MDM: CPT

## 2020-10-02 PROCEDURE — 99223 1ST HOSP IP/OBS HIGH 75: CPT | Mod: GC

## 2020-10-02 RX ORDER — ENOXAPARIN SODIUM 100 MG/ML
40 INJECTION SUBCUTANEOUS DAILY
Refills: 0 | Status: DISCONTINUED | OUTPATIENT
Start: 2020-10-02 | End: 2020-10-05

## 2020-10-02 RX ORDER — SODIUM CHLORIDE 9 MG/ML
500 INJECTION INTRAMUSCULAR; INTRAVENOUS; SUBCUTANEOUS ONCE
Refills: 0 | Status: COMPLETED | OUTPATIENT
Start: 2020-10-02 | End: 2020-10-02

## 2020-10-02 RX ORDER — SODIUM CHLORIDE 9 MG/ML
1000 INJECTION, SOLUTION INTRAVENOUS
Refills: 0 | Status: DISCONTINUED | OUTPATIENT
Start: 2020-10-02 | End: 2020-10-04

## 2020-10-02 RX ORDER — DESMOPRESSIN ACETATE 0.1 MG/1
2 TABLET ORAL ONCE
Refills: 0 | Status: COMPLETED | OUTPATIENT
Start: 2020-10-02 | End: 2020-10-02

## 2020-10-02 RX ADMIN — SODIUM CHLORIDE 100 MILLILITER(S): 9 INJECTION, SOLUTION INTRAVENOUS at 22:55

## 2020-10-02 RX ADMIN — SODIUM CHLORIDE 500 MILLILITER(S): 9 INJECTION INTRAMUSCULAR; INTRAVENOUS; SUBCUTANEOUS at 21:08

## 2020-10-02 NOTE — ED PROVIDER NOTE - CLINICAL SUMMARY MEDICAL DECISION MAKING FREE TEXT BOX
55y F w/ hx brain aneurysm, central DI, HTN, anemia, presenting after syncopal episode tonight.  Pt noted to be hypotensive to 82/52 on arrival.  Will check CT head, EKG, CXR, labs, orthostatics.  Treat with IV fluids and reassess.

## 2020-10-02 NOTE — ED PROVIDER NOTE - CARE PLAN
Principal Discharge DX:	Syncope   Principal Discharge DX:	Hypernatremia  Secondary Diagnosis:	Syncope

## 2020-10-02 NOTE — ED ADULT TRIAGE NOTE - CHIEF COMPLAINT QUOTE
patient from home as per ems patient was found on kitchen floor, daughter caught patient prior to falling, patient does not remember what occurred tonight. denies any complaints of pain or discomfort. ems states that patient may have taken 2 doses of desmopressin today

## 2020-10-02 NOTE — ED PROVIDER NOTE - ATTENDING CONTRIBUTION TO CARE
54yo female with pmh of aneurysm s/p clipping, central diabetes insipidus - on desmopressin with chronic PICC line for IV hydration M/W/F, gastric bypass surgery, HTN, chronic anemia presents for syncope. Per son and EMS, pt was at home washing the dishes when she had brief loss of consciousness. No seizure like activity or change in mental status noted (pt has chronic memory problems).  Pt complains of some persistent lightheadedness at this time; denies headache, neck pain, vomiting or other complaints.  Pt complained of some chest discomfort to EMS en route to ED, now resolved.  Denies having chest pain, SOB, palpitations prior to syncopal episode.  No fever, chills, cough, n/v/d, urinary complaints.  Denies prior cardiac hx or previous syncopal episodes.  No blood thinners.  Const: Awake, alert and oriented. In no acute distress. Well appearing.  HEENT: NC/AT. Moist mucous membranes.  Eyes: No scleral icterus. EOMI.  Neck:. Soft and supple. Full ROM without pain.  Cardiac: Regular rate and regular rhythm. +S1/S2. Peripheral pulses 2+ and symmetric. No LE edema.  Resp: Speaking in full sentences. No evidence of respiratory distress. No wheezes, rales or rhonchi.  Abd: Soft, non-tender, non-distended. Normal bowel sounds in all 4 quadrants. No guarding or rebound.  Back: Spine midline and non-tender. No CVAT.  Skin: No rashes, abrasions or lacerations.  Lymph: No cervical lymphadenopathy.  Neuro: Awake, alert & oriented x 3. Moves all extremities symmetrically.  Pt found to be hypernatremic, will admit for further management and also further eval of syncopal episode

## 2020-10-02 NOTE — ED PROVIDER NOTE - PHYSICAL EXAMINATION
Constitutional: Awake, alert, in no acute distress  Eyes: no scleral icterus  HENT: normocephalic, atraumatic, moist oral mucosa  Neck: no tenderness  CV: RRR, no murmur  Pulm: non-labored respirations, CTAB  Abdomen: soft, non-tender, non-distended  Extremities: no edema, no deformity  Skin: no rash, no jaundice  Neuro: AAOx3, moving all extremities equally

## 2020-10-02 NOTE — ED PROVIDER NOTE - NS ED ROS FT
Constitutional: no fever, no chills  Eyes: no vision changes  ENT: no nasal congestion, no sore throat  CV: +chest pain  Resp: no cough, no shortness of breath  GI: no abdominal pain, no vomiting, no diarrhea  : no dysuria  MSK: no joint pain  Skin: no rash  Neuro: no headache, no weakness, no paresthesias, +syncope

## 2020-10-02 NOTE — H&P ADULT - HISTORY OF PRESENT ILLNESS
55 female with h/o gastric bypass surgery, HTN, chronic anemia, diabetes insipidus after an episode of ICH 2nd to brain aneurysm chronic PICC line for home iv hydration (1000 cc D5 every otherday), was referred to the ER as labs showed low hemoglobin. In the ER, labs showed Hgb: 7.4. patient denies bleeding per orifices. menopause. no chest pain , palpitations or SOB. no headache or blurry vision. no fatigue or body aches. no h/o hereditary anemias.  Labs also showed S. Na: 172. patient has h/o recurrent hypernatremia.     50 yo F with h/o HTN, DI, SAH, cerebral aneurysm s/p clipping 2011, multiple readmissions for BRITTLE hypernatremia presented with lethargy and hypernatremia. Lab work done as outpatient revealed hypernatremia. On prior admission in March, pt developed seizure after acute drop in sodium level to 131, whereby thought process was that her new baseline cerebral osmolstat has been reset and is elevated relative to normal, thus reducing her seizure threshold. In the ED, sodium was noted to b 160 and was started on D5NS. Per renal, IVF changed to 1/4NS at 83cc/hr as pt with known history of symptoms from rapid reversal. Sodium continued to trend up and rate of fluid infusion was increased. DDAVP increased to tid. On 9/19, sodium level trended down to 140 and pt was cleared for discharge. Later that evening, pt was noted by  to have worsening memory. Repeat sodium level showed 136 and discharge was held. Desmopressin held. 56 y/o female with h/o gastric bypass surgery (2004), HTN, chronic anemia, diabetes insipidus (dx in 2012) after an episode of ICH 2nd to brain aneurysm (2011/[facility]) chronic PICC line for home iv hydration (1000 cc D5 every otherday) presented to ER for near syncopal episode. Per family, patient at baseline is poor historian and has no insight into medical history due to short term memory loss since SAH in 2012. Per daughter, patient was her normal self washing dishes at 6pm, when daughter returned home and gave mother her evening BP and seizure meds. Did not know mother had already taken meds 10 mins prior. After giving meds, few minutes later mother felt lightheaded and dizzy and fell but daughter was able to catch her before hitting floor. Never lost conciousness, didnt hit her head. Denied palpitations, CP, HA, seizure like activity. No urinary or fecal incontinence after. No post ictal episode. No hx of hypoglycemia. Denies recent stress. Was not standing for long periods of time.    No recent illness, sick contacts, or covid exposure.  Tolerating PO, voiding, and having BMs at baseline.      Patients has been compliant with all meds, follows with endocrine monthly.

## 2020-10-02 NOTE — ED PROVIDER NOTE - OBJECTIVE STATEMENT
55y F w/ hx brain aneurysm s/p clipping, central diabetes insipidus - on desmopressin with chronic PICC line for IV hydration M/W/F, gastric bypass surgery, HTN, chronic anemia, presenting for syncopal episode.  Per son and EMS, pt was at home washing the dishes when she had brief loss of consciousness.  No seizure activity noted.  Pt complains of some persistent lightheadedness at this time; denies headache, neck pain, vomiting or other complaints.  Pt complained of some chest discomfort to EMS en route to ED, now resolved.  Denies having chest pain, SOB, palpitations prior to syncopal episode.  No fever, chills, cough, n/v/d, urinary complaints.  Denies prior cardiac hx or previous syncopal episodes.  No blood thinners. 55y F w/ hx brain aneurysm s/p clipping, central diabetes insipidus - on desmopressin with chronic PICC line for IV hydration M/W/F, gastric bypass surgery, HTN, chronic anemia, presenting for syncopal episode.  Per son and EMS, pt was at home washing the dishes when she had brief loss of consciousness.  No seizure activity or change in mental status noted (pt has chronic memory problems).  Pt complains of some persistent lightheadedness at this time; denies headache, neck pain, vomiting or other complaints.  Pt complained of some chest discomfort to EMS en route to ED, now resolved.  Denies having chest pain, SOB, palpitations prior to syncopal episode.  No fever, chills, cough, n/v/d, urinary complaints.  Denies prior cardiac hx or previous syncopal episodes.  No blood thinners.

## 2020-10-02 NOTE — H&P ADULT - NSHPLABSRESULTS_GEN_ALL_CORE
8.9    4.70  )-----------( 121      ( 02 Oct 2020 20:51 )             32.7     10-02    164<HH>  |  133<H>  |  44.0<H>  ----------------------------<  100<H>  4.3   |  20.0<L>  |  1.54<H>    Ca    9.0      02 Oct 2020 20:51  Mg     2.2     10-02    TPro  7.3  /  Alb  3.8  /  TBili  0.3<L>  /  DBili  x   /  AST  18  /  ALT  15  /  AlkPhos  95  10-02    LIVER FUNCTIONS - ( 02 Oct 2020 20:51 )  Alb: 3.8 g/dL / Pro: 7.3 g/dL / ALK PHOS: 95 U/L / ALT: 15 U/L / AST: 18 U/L / GGT: x

## 2020-10-02 NOTE — H&P ADULT - NSHPPHYSICALEXAM_GEN_ALL_CORE
GENERAL: Middle age female looking comfortable   HEENT: PERRL, +EOMI  NECK: soft, Supple, No JVD,   CHEST/LUNG: Clear to auscultate bilaterally; No wheezing  HEART: S1S2+, Regular rate and rhythm; No murmurs  ABDOMEN: Soft, Nontender, Nondistended; Bowel sounds present  EXTREMITIES:  2+ Peripheral Pulses, No edema. L upper arm with picc line. No e/o infection  SKIN: No rashes or lesions  NEURO: AAOX3, no focal deficits, no motor r sensory loss. Short and long term memory loss  PSYCH: normal mood GENERAL: Middle age female looking comfortable   HEENT: PERRL, +EOMI  NECK: soft, Supple, No JVD,   CHEST/LUNG: Clear to auscultate bilaterally; No wheezing  HEART: S1S2+, Regular rate and rhythm; No murmurs  ABDOMEN: Soft, Nontender, Nondistended; Bowel sounds present  EXTREMITIES:  2+ Peripheral Pulses, No edema. L upper arm with picc line. No e/o infection  SKIN: No rashes or lesions  NEURO: AAOX3, no focal deficits, no motor r sensory loss. Short and long term memory loss  PSYCH: normal mood    Vital Signs Last 24 Hrs  T(C): 37 (02 Oct 2020 23:20), Max: 37.4 (02 Oct 2020 19:39)  T(F): 98.6 (02 Oct 2020 23:20), Max: 99.3 (02 Oct 2020 19:39)  HR: 84 (02 Oct 2020 23:20) (79 - 84)  BP: 90/60 (02 Oct 2020 23:20) (82/52 - 109/75)  RR: 18 (02 Oct 2020 23:20) (18 - 18)  SpO2: 98% (02 Oct 2020 23:20) (97% - 98%)

## 2020-10-02 NOTE — H&P ADULT - NSHPSOCIALHISTORY_GEN_ALL_CORE
no smoking, quit 10 yrs ago. Smoked about 10 cigs x 2 yrs.  no alcohol or drug use  lives with children who are young adults   ambulates normally  performs acts of daily living by her self  short term memory loss since SAH

## 2020-10-02 NOTE — H&P ADULT - ASSESSMENT
54 y/o female with h/o gastric bypass surgery (2004), HTN, chronic anemia, diabetes insipidus (dx in 2012) after an episode of ICH 2nd to brain aneurysm (2011/[facility]) chronic PICC line for home iv hydration (1000 cc D5 every otherday) presented to ER for near syncopal episode likely due to accidental antihypertensive overdose and hypernatremia in setting of Diabetes insipidis. In the ER, noted hypotensive and hypernatremic to 164.      1. 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      2. Mild TOSHIA on CKD  on fluids, at the baseline, nephrology following.    3. Hx of ICH aneurysm rupture   neurology exam stable.  continue to monitor.    4. Hx of seizures   on keppra.    5. hx of gastric bypass and chronic anemia   was transfused for Hg 7.4, FOBT negative.  Hg remaining stable 11/11= 8.9.    6. HTN   c/w labetalol and amlodipine.  normotensive.    7. DVT prophylaxis  on lovenox sc     Dispo: doing well, walking around in the hallway, will monitor sodium off IV fluids and will d/c in am if sodium is stable     53 y/o female with h/o gastric bypass surgery, HTN, chronic anemia, diabetes insipidus after an episode of ICH 2nd to brain aneurysm chronic PICC line for home iv hydration (1000 cc D5 every otherday), was referred to the ER as labs showed low hemoglobin. In the ER, labs showed Hgb: 7.4. Labs showed Na: 172. patient has h/o recurrent hypernatremia.       1. Hypernatremia  due to central DI:   on sc DDAVP, half normal saline @75 cc/hr per renal.   sodium today 11/11/19= 159.   BMP every 6 hour.     2. Mild TOSHIA on CKD  fluctuating level, on fluids, nephrology following.    3. Hx of ICH aneurysm rupture   neurology exam stable.  continue to monitor.    4. Hx of seizures   on keppra.    5. hx of gastric bypass and chronic anemia   was transfused for Hg 7.4, FOBT negative.  Hg remaining stable 11/11= 8.9.    6. HTN   c/w labetalol and amlodipine.  normotensive.    7. DVT prophylaxis  on lovenox. 56 y/o female with h/o gastric bypass surgery (2004), HTN, chronic anemia, diabetes insipidus (dx in 2012) after an episode of ICH 2nd to brain aneurysm (2011/[facility]) chronic PICC line for home iv hydration (1000 cc D5 every otherday) presented to ER for near syncopal episode likely due to accidental antihypertensive overdose and hypernatremia in setting of Diabetes insipidis. In the ER, noted hypotensive and hypernatremic to 164.      Hypernatremia due to central DI and likely volume depletion  sodium level 164, asymptomatic  free water deficit 6.2L   takes home dose desmopressin 0.1mg TID and 1L IVF M,W,F (likely d5)  s/p 2 puffs intranasal desmopressin once (20mcg equivalent)   c/w 0.45NS at 100cc/hr  increase to desmopressin 0.2mg TID in AM  serum/urine osm w/ lytes ordered  renal consulted  consider endo consult  monitor BNP q4 hrs  per daughter, hx of seizures requiring ICU stay here in past when Na reduced to rapidly, her baseline is ~145          On sc DDAVP, D/c IV fluids, sodium level is 148, as per renal will monitor her BMP off IV fluids.      2. Mild TOSHIA on CKD  on fluids, at the baseline, nephrology following.    3. Hx of ICH aneurysm rupture   neurology exam stable.  continue to monitor.    4. Hx of seizures   on keppra.    5. hx of gastric bypass and chronic anemia   was transfused for Hg 7.4, FOBT negative.  Hg remaining stable 11/11= 8.9.    6. HTN   c/w labetalol and amlodipine.  normotensive.    7. DVT prophylaxis  on lovenox sc     Dispo: doing well, walking around in the hallway, will monitor sodium off IV fluids and will d/c in am if sodium is stable     55 y/o female with h/o gastric bypass surgery, HTN, chronic anemia, diabetes insipidus after an episode of ICH 2nd to brain aneurysm chronic PICC line for home iv hydration (1000 cc D5 every otherday), was referred to the ER as labs showed low hemoglobin. In the ER, labs showed Hgb: 7.4. Labs showed Na: 172. patient has h/o recurrent hypernatremia.       1. Hypernatremia  due to central DI:   on sc DDAVP, half normal saline @75 cc/hr per renal.   sodium today 11/11/19= 159.   BMP every 6 hour.     2. Mild TOSHIA on CKD  fluctuating level, on fluids, nephrology following.    3. Hx of ICH aneurysm rupture   neurology exam stable.  continue to monitor.    4. Hx of seizures   on keppra.    5. hx of gastric bypass and chronic anemia   was transfused for Hg 7.4, FOBT negative.  Hg remaining stable 11/11= 8.9.    6. HTN   c/w labetalol and amlodipine.  normotensive.    7. DVT prophylaxis  on lovenox. 56 y/o female with h/o gastric bypass surgery (2004), HTN, chronic anemia, diabetes insipidus (dx in 2012) after an episode of ICH 2nd to brain aneurysm (2011/[facility]) chronic PICC line for home iv hydration (1000 cc D5 every otherday) presented to ER for near syncopal episode likely due to accidental antihypertensive overdose and hypernatremia in setting of Diabetes insipidis. In the ER, noted hypotensive and hypernatremic to 164.      Hypernatremia due to central DI and likely volume depletion  sodium level 164, asymptomatic  free water deficit 6.2L   takes home dose desmopressin 0.1mg TID and 1L IVF M,W,F (likely d5)  s/p 2 puffs intranasal desmopressin once (20mcg equivalent)   c/w 0.45NS at 100cc/hr  increase to desmopressin 0.2mg TID in AM  serum/urine osm w/ lytes ordered  renal consulted  consider endo consult  monitor BNP q4 hrs  per daughter, hx of seizures requiring ICU stay here in past when Na reduced to rapidly, her baseline is ~145    Near syncopal episode likely 2/2 hypotension 2/2 BP medication overdose  pt s/p accidental doubling of antihypertensives this evening, no LOC  CT head neg for acute bleed  hold BP meds for now due to hypotension and normal HR  c/w IVFs for now, monitor BP    TOSHIA (Cr 1.5) likely prerenal  baseline 1.1-1.2  c/w IVF hydration  monitor AM BNP    Hx of ICH aneurysm rupture   neurology exam stable  short and long term memory deficits  monitor    Hx of seizures   stable, no recent seizures  c/w home dose lamyctal 500mg BID    Hx of gastric bypass  c/w home dose iron tabs and folic acid      6. HTN   c/w labetalol and amlodipine.  normotensive.    7. DVT prophylaxis  on lovenox sc     Dispo: doing well, walking around in the hallway, will monitor sodium off IV fluids and will d/c in am if sodium is stable     53 y/o female with h/o gastric bypass surgery, HTN, chronic anemia, diabetes insipidus after an episode of ICH 2nd to brain aneurysm chronic PICC line for home iv hydration (1000 cc D5 every otherday), was referred to the ER as labs showed low hemoglobin. In the ER, labs showed Hgb: 7.4. Labs showed Na: 172. patient has h/o recurrent hypernatremia.       1. Hypernatremia  due to central DI:   on sc DDAVP, half normal saline @75 cc/hr per renal.   sodium today 11/11/19= 159.   BMP every 6 hour.     2. Mild TOSHIA on CKD  fluctuating level, on fluids, nephrology following.    3. Hx of ICH aneurysm rupture   neurology exam stable.  continue to monitor.    4. Hx of seizures   on keppra.    5. hx of gastric bypass and chronic anemia   was transfused for Hg 7.4, FOBT negative.  Hg remaining stable 11/11= 8.9.    6. HTN   c/w labetalol and amlodipine.  normotensive.    7. DVT prophylaxis  on lovenox. 54 y/o female with h/o gastric bypass surgery (2004), HTN, chronic anemia, diabetes insipidus (dx in 2012) after an episode of ICH 2nd to brain aneurysm (2011/[facility]) chronic PICC line for home iv hydration (1000 cc D5 every otherday) presented to ER for near syncopal episode likely due to accidental antihypertensive overdose and hypernatremia in setting of Diabetes insipidis. In the ER, noted hypotensive and hypernatremic to 164.      Hypernatremia due to central DI and likely volume depletion  sodium level 164, asymptomatic  free water deficit 6.2L   takes home dose desmopressin 0.1mg TID and 1L IVF M,W,F (likely d5)  s/p 2 puffs intranasal desmopressin once (20mcg equivalent)   c/w 0.45NS at 100cc/hr  increase to desmopressin 0.2mg TID in AM  serum/urine osm w/ lytes ordered  renal consulted  consider endo consult  monitor BNP q4 hrs  per daughter, hx of seizures requiring ICU stay here in past when Na reduced to rapidly, her baseline is ~145    Near syncopal episode likely 2/2 hypotension 2/2 BP medication overdose  pt s/p accidental doubling of antihypertensives this evening, no LOC  CT head neg for acute bleed  hold BP meds for now due to hypotension and normal HR  c/w IVFs for now, monitor BP  orthostatics negative  ekg wnl  tele mon    TOSHIA (Cr 1.5) likely prerenal  baseline 1.1-1.2  c/w IVF hydration  monitor AM BNP    HTN   BP currently soft, accidental doubling of BP meds at home  c/w IVFs, held antihypertensives, restart in AM  dash/tlc diet    Hx of ICH aneurysm rupture   neurology exam stable  short and long term memory deficits  monitor    Hx of seizures   stable, no recent seizures  c/w home dose lamyctal 500mg BID    Hx of gastric bypass  c/w home dose iron tabs and folic acid    DVT prophylaxis- lovenox 40mg       54 y/o female with h/o gastric bypass surgery (2004), HTN, chronic anemia, diabetes insipidus (dx in 2012) after an episode of ICH 2nd to brain aneurysm (2011/[facility]) chronic PICC line for home iv hydration (1000 cc D5 every otherday) presented to ER for near syncopal episode likely due to accidental antihypertensive overdose and hypernatremia in setting of Diabetes insipidis. In the ER, noted hypotensive and hypernatremic to 164.    Hypernatremia due to central DI and likely volume depletion  sodium level 164, asymptomatic  free water deficit 6.2L, hypotensive  takes home dose desmopressin 0.1mg TID and 1L IVF M,W,F (likely d5)  s/p 2 puffs intranasal desmopressin once (20mcg equivalent)   c/w 0.45NS at 100cc/hr  increase to desmopressin 0.2mg TID in AM  serum/urine osm w/ lytes ordered  renal consulted  consider endo consult  monitor BNP q4 hrs  per daughter, hx of seizures requiring ICU stay here in past when Na reduced to rapidly, her baseline is ~145    Near syncopal episode likely 2/2 hypotension 2/2 BP medication overdose  pt s/p accidental doubling of antihypertensives this evening, no LOC  CT head neg for acute bleed  hold BP meds for now due to hypotension and normal HR  c/w IVFs for now, monitor BP  orthostatics negative  ekg wnl  tele mon    TOSHIA (Cr 1.5) likely prerenal  baseline 1.1-1.2  c/w IVF hydration  monitor AM BNP    HTN   BP currently soft, accidental doubling of BP meds at home  c/w IVFs, held antihypertensives, restart in AM  dash/tlc diet    Hx of ICH aneurysm rupture   neurology exam stable  short and long term memory deficits  monitor    Hx of seizures   stable, no recent seizures  c/w home dose lamyctal 500mg BID    Hx of gastric bypass  c/w home dose iron tabs and folic acid    DVT prophylaxis- lovenox 40mg       56 y/o female with h/o gastric bypass surgery (2004), HTN, chronic anemia, diabetes insipidus (dx in 2012) after an episode of ICH 2nd to brain aneurysm (2011/[facility]) chronic PICC line for home iv hydration (1000 cc D5 every otherday) presented to ER for near syncopal episode likely due to accidental antihypertensive overdose and hypernatremia in setting of Diabetes insipidis. In the ER, noted hypotensive and hypernatremic to 164.    Hypernatremia due to central DI and likely volume depletion  sodium level 164, asymptomatic  free water deficit 6.2L, hypotensive  takes home dose desmopressin 0.1mg TID and 1L IVF M,W,F (likely d5)  s/p 2 puffs intranasal desmopressin once (20mcg equivalent)   c/w 0.45NS at 100cc/hr  increase to desmopressin 0.2mg TID in AM  serum/urine osm w/ lytes ordered  renal consulted  consider endo consult  monitor BNP q4 hrs  per daughter, hx of seizures requiring ICU stay here in past when Na reduced to rapidly, her baseline is ~145    Near syncopal episode likely 2/2 hypotension 2/2 BP medication overdose  pt s/p accidental doubling of antihypertensives this evening, no LOC  CT head neg for acute bleed  hold BP meds for now due to hypotension and normal HR  c/w IVFs for now, monitor BP  orthostatics negative  ekg wnl  tele mon    TOSHIA (Cr 1.5) likely prerenal  baseline 1.1-1.2  c/w IVF hydration  monitor AM BNP    HTN   BP currently soft, accidental doubling of BP meds at home  c/w IVFs, held antihypertensives, restart in AM pending BP improvement   dash/tlc diet    Hx of ICH aneurysm rupture   neurology exam stable  short and long term memory deficits  monitor    Hx of seizures   stable, no recent seizures  c/w home dose lamyctal 500mg BID    Hx of gastric bypass  c/w home dose iron tabs and folic acid    DVT prophylaxis- lovenox 40mg       54 y/o female with h/o gastric bypass surgery (2004), HTN, chronic anemia, diabetes insipidus (dx in 2012) after an episode of ICH 2nd to brain aneurysm (2011/[facility]) chronic PICC line for home iv hydration (1000 cc D5 every otherday) presented to ER for near syncopal episode likely due to accidental antihypertensive overdose and hypernatremia in setting of Diabetes insipidis. In the ER, noted hypotensive and hypernatremic to 164.    Hypernatremia due to central DI and likely volume depletion  sodium level 164, asymptomatic  free water deficit 6.2L, hypotensive  takes home dose desmopressin 0.1mg TID and 1L IVF M,W,F (likely d5)  s/p 2 puffs intranasal desmopressin once (20mcg equivalent)   c/w 0.45NS at 100cc/hr  increase to desmopressin 0.2mg TID in AM  serum/urine osm w/ lytes ordered  renal consulted  consider endo consult  monitor BNP q4 hrs  per daughter, hx of seizures requiring ICU stay here in past due to Na reduced to rapidly, her baseline Na is ~145    Near syncopal episode likely 2/2 hypotension 2/2 BP medication overdose  pt s/p accidental doubling of antihypertensives this evening, no LOC  CT head neg for acute bleed  hold BP meds for now due to hypotension and normal HR  c/w IVFs for now, monitor BP  orthostatics negative  ekg wnl  tele mon    TOSHIA (Cr 1.5) likely prerenal  baseline 1.1-1.2  c/w IVF hydration  monitor AM BNP    HTN   BP currently soft, accidental doubling of BP meds at home  c/w IVFs, held antihypertensives, restart in AM pending BP improvement   dash/tlc diet    Hx of ICH aneurysm rupture   neurology exam stable  short and long term memory deficits  monitor    Hx of seizures   stable, no recent seizures  c/w home dose lamyctal 500mg BID    Hx of gastric bypass  c/w home dose iron tabs and folic acid    DVT prophylaxis- lovenox 40mg       56 y/o female with h/o gastric bypass surgery (2004), HTN, chronic anemia, diabetes insipidus (dx in 2012) after an episode of ICH 2nd to brain aneurysm (2011/[facility]) chronic PICC line for home iv hydration (1000 cc D5 every otherday) presented to ER for near syncopal episode likely due to accidental antihypertensive overdose and hypernatremia in setting of Diabetes insipidis. In the ER, noted hypotensive and hypernatremic to 164.    Hypernatremia due to central DI and likely volume depletion  sodium level 164, asymptomatic, has been as high as 172  free water deficit 6.2L, hypotensive likely cause of syncope, currently awake, alert with baseline memory loss   takes home dose desmopressin 0.1mg TID and 1L IVF M,W,F (likely d5)  s/p 2 puffs intranasal desmopressin once (20mcg equivalent)   c/w 0.45NS at 100cc/hr  increase to desmopressin 0.2mg TID in AM  serum/urine osm w/ lytes ordered  renal consulted- [facility]   endo consult-follows her when admitted   monitor BNP q4 hrs and avoid rapid correction   per daughter, hx of seizures requiring ICU stay here in past due to Na reduced to rapidly, her baseline Na is ~145    Near syncopal episode likely 2/2 hypotension 2/2 BP medication overdose  pt s/p accidental doubling of antihypertensives this evening, no LOC  CT head neg for acute bleed  hold BP meds for now due to hypotension and normal HR  c/w IVFs for now, monitor BP  orthostatics negative  ekg wnl  tele mon    TOSHIA (Cr 1.5) likely prerenal  baseline 1.1-1.2  c/w IVF hydration  monitor AM BNP    HTN   BP currently soft, accidental doubling of BP meds at home  c/w IVFs, held antihypertensives, restart in AM pending BP improvement   dash/tlc diet    Hx of ICH aneurysm rupture   neurology exam stable  short and long term memory deficits  monitor    Hx of seizures   stable, no recent seizures  c/w home dose Lamictal 500mg BID    Chronic Anemia:  baseline Hbg 8-9, no reports of bleeding  cont. to monitor    Hx of gastric bypass  c/w home dose iron tabs and folic acid    DVT prophylaxis- lovenox 40mg

## 2020-10-03 LAB
ALBUMIN SERPL ELPH-MCNC: 3.5 G/DL — SIGNIFICANT CHANGE UP (ref 3.3–5.2)
ALBUMIN SERPL ELPH-MCNC: 3.5 G/DL — SIGNIFICANT CHANGE UP (ref 3.3–5.2)
ALBUMIN SERPL ELPH-MCNC: 3.6 G/DL — SIGNIFICANT CHANGE UP (ref 3.3–5.2)
ALBUMIN SERPL ELPH-MCNC: 3.8 G/DL — SIGNIFICANT CHANGE UP (ref 3.3–5.2)
ALBUMIN SERPL ELPH-MCNC: 3.8 G/DL — SIGNIFICANT CHANGE UP (ref 3.3–5.2)
ALBUMIN SERPL ELPH-MCNC: 3.9 G/DL — SIGNIFICANT CHANGE UP (ref 3.3–5.2)
ALP SERPL-CCNC: 104 U/L — SIGNIFICANT CHANGE UP (ref 40–120)
ALP SERPL-CCNC: 106 U/L — SIGNIFICANT CHANGE UP (ref 40–120)
ALP SERPL-CCNC: 110 U/L — SIGNIFICANT CHANGE UP (ref 40–120)
ALP SERPL-CCNC: 117 U/L — SIGNIFICANT CHANGE UP (ref 40–120)
ALP SERPL-CCNC: 87 U/L — SIGNIFICANT CHANGE UP (ref 40–120)
ALP SERPL-CCNC: 93 U/L — SIGNIFICANT CHANGE UP (ref 40–120)
ALT FLD-CCNC: 15 U/L — SIGNIFICANT CHANGE UP
ALT FLD-CCNC: 17 U/L — SIGNIFICANT CHANGE UP
ALT FLD-CCNC: 23 U/L — SIGNIFICANT CHANGE UP
ALT FLD-CCNC: 27 U/L — SIGNIFICANT CHANGE UP
ALT FLD-CCNC: 28 U/L — SIGNIFICANT CHANGE UP
ALT FLD-CCNC: 45 U/L — HIGH
ANION GAP SERPL CALC-SCNC: 10 MMOL/L — SIGNIFICANT CHANGE UP (ref 5–17)
ANION GAP SERPL CALC-SCNC: 13 MMOL/L — SIGNIFICANT CHANGE UP (ref 5–17)
ANION GAP SERPL CALC-SCNC: 13 MMOL/L — SIGNIFICANT CHANGE UP (ref 5–17)
ANION GAP SERPL CALC-SCNC: 14 MMOL/L — SIGNIFICANT CHANGE UP (ref 5–17)
ANION GAP SERPL CALC-SCNC: 14 MMOL/L — SIGNIFICANT CHANGE UP (ref 5–17)
ANION GAP SERPL CALC-SCNC: 15 MMOL/L — SIGNIFICANT CHANGE UP (ref 5–17)
ANISOCYTOSIS BLD QL: SLIGHT — SIGNIFICANT CHANGE UP
AST SERPL-CCNC: 14 U/L — SIGNIFICANT CHANGE UP
AST SERPL-CCNC: 23 U/L — SIGNIFICANT CHANGE UP
AST SERPL-CCNC: 28 U/L — SIGNIFICANT CHANGE UP
AST SERPL-CCNC: 29 U/L — SIGNIFICANT CHANGE UP
AST SERPL-CCNC: 32 U/L — HIGH
AST SERPL-CCNC: 58 U/L — HIGH
BASOPHILS # BLD AUTO: 0.06 K/UL — SIGNIFICANT CHANGE UP (ref 0–0.2)
BASOPHILS NFR BLD AUTO: 0.9 % — SIGNIFICANT CHANGE UP (ref 0–2)
BILIRUB SERPL-MCNC: 0.3 MG/DL — LOW (ref 0.4–2)
BILIRUB SERPL-MCNC: 0.4 MG/DL — SIGNIFICANT CHANGE UP (ref 0.4–2)
BILIRUB SERPL-MCNC: 0.4 MG/DL — SIGNIFICANT CHANGE UP (ref 0.4–2)
BUN SERPL-MCNC: 35 MG/DL — HIGH (ref 8–20)
BUN SERPL-MCNC: 38 MG/DL — HIGH (ref 8–20)
BUN SERPL-MCNC: 39 MG/DL — HIGH (ref 8–20)
BUN SERPL-MCNC: 40 MG/DL — HIGH (ref 8–20)
BUN SERPL-MCNC: 42 MG/DL — HIGH (ref 8–20)
BUN SERPL-MCNC: 45 MG/DL — HIGH (ref 8–20)
CALCIUM SERPL-MCNC: 8.5 MG/DL — LOW (ref 8.6–10.2)
CALCIUM SERPL-MCNC: 8.7 MG/DL — SIGNIFICANT CHANGE UP (ref 8.6–10.2)
CALCIUM SERPL-MCNC: 8.7 MG/DL — SIGNIFICANT CHANGE UP (ref 8.6–10.2)
CALCIUM SERPL-MCNC: 9 MG/DL — SIGNIFICANT CHANGE UP (ref 8.6–10.2)
CALCIUM SERPL-MCNC: 9.1 MG/DL — SIGNIFICANT CHANGE UP (ref 8.6–10.2)
CALCIUM SERPL-MCNC: 9.2 MG/DL — SIGNIFICANT CHANGE UP (ref 8.6–10.2)
CHLORIDE SERPL-SCNC: 126 MMOL/L — HIGH (ref 98–107)
CHLORIDE SERPL-SCNC: 127 MMOL/L — HIGH (ref 98–107)
CHLORIDE SERPL-SCNC: 128 MMOL/L — HIGH (ref 98–107)
CHLORIDE SERPL-SCNC: 129 MMOL/L — HIGH (ref 98–107)
CHLORIDE SERPL-SCNC: 131 MMOL/L — HIGH (ref 98–107)
CHLORIDE SERPL-SCNC: 131 MMOL/L — HIGH (ref 98–107)
CO2 SERPL-SCNC: 16 MMOL/L — LOW (ref 22–29)
CO2 SERPL-SCNC: 17 MMOL/L — LOW (ref 22–29)
CO2 SERPL-SCNC: 18 MMOL/L — LOW (ref 22–29)
CO2 SERPL-SCNC: 18 MMOL/L — LOW (ref 22–29)
CO2 SERPL-SCNC: 19 MMOL/L — LOW (ref 22–29)
CO2 SERPL-SCNC: 20 MMOL/L — LOW (ref 22–29)
CREAT SERPL-MCNC: 1.22 MG/DL — SIGNIFICANT CHANGE UP (ref 0.5–1.3)
CREAT SERPL-MCNC: 1.34 MG/DL — HIGH (ref 0.5–1.3)
CREAT SERPL-MCNC: 1.37 MG/DL — HIGH (ref 0.5–1.3)
CREAT SERPL-MCNC: 1.39 MG/DL — HIGH (ref 0.5–1.3)
CREAT SERPL-MCNC: 1.41 MG/DL — HIGH (ref 0.5–1.3)
CREAT SERPL-MCNC: 1.55 MG/DL — HIGH (ref 0.5–1.3)
DACRYOCYTES BLD QL SMEAR: SLIGHT — SIGNIFICANT CHANGE UP
ELLIPTOCYTES BLD QL SMEAR: SLIGHT — SIGNIFICANT CHANGE UP
EOSINOPHIL # BLD AUTO: 0.19 K/UL — SIGNIFICANT CHANGE UP (ref 0–0.5)
EOSINOPHIL NFR BLD AUTO: 2.6 % — SIGNIFICANT CHANGE UP (ref 0–6)
GIANT PLATELETS BLD QL SMEAR: PRESENT — SIGNIFICANT CHANGE UP
GLUCOSE SERPL-MCNC: 134 MG/DL — HIGH (ref 70–99)
GLUCOSE SERPL-MCNC: 174 MG/DL — HIGH (ref 70–99)
GLUCOSE SERPL-MCNC: 302 MG/DL — HIGH (ref 70–99)
GLUCOSE SERPL-MCNC: 70 MG/DL — SIGNIFICANT CHANGE UP (ref 70–99)
GLUCOSE SERPL-MCNC: 79 MG/DL — SIGNIFICANT CHANGE UP (ref 70–99)
GLUCOSE SERPL-MCNC: 97 MG/DL — SIGNIFICANT CHANGE UP (ref 70–99)
HCT VFR BLD CALC: 35.9 % — SIGNIFICANT CHANGE UP (ref 34.5–45)
HGB BLD-MCNC: 9.5 G/DL — LOW (ref 11.5–15.5)
HYPOCHROMIA BLD QL: SLIGHT — SIGNIFICANT CHANGE UP
LYMPHOCYTES # BLD AUTO: 0.69 K/UL — LOW (ref 1–3.3)
LYMPHOCYTES # BLD AUTO: 9.6 % — LOW (ref 13–44)
MANUAL SMEAR VERIFICATION: SIGNIFICANT CHANGE UP
MCHC RBC-ENTMCNC: 25.5 PG — LOW (ref 27–34)
MCHC RBC-ENTMCNC: 26.5 GM/DL — LOW (ref 32–36)
MCV RBC AUTO: 96.2 FL — SIGNIFICANT CHANGE UP (ref 80–100)
MICROCYTES BLD QL: SLIGHT — SIGNIFICANT CHANGE UP
MONOCYTES # BLD AUTO: 0 K/UL — SIGNIFICANT CHANGE UP (ref 0–0.9)
MONOCYTES NFR BLD AUTO: 0 % — LOW (ref 2–14)
NEUTROPHILS # BLD AUTO: 6.27 K/UL — SIGNIFICANT CHANGE UP (ref 1.8–7.4)
NEUTROPHILS NFR BLD AUTO: 80 % — HIGH (ref 43–77)
NEUTS BAND # BLD: 6.9 % — SIGNIFICANT CHANGE UP (ref 0–8)
OSMOLALITY SERPL: 365 MOSMOL/KG — HIGH (ref 275–300)
OVALOCYTES BLD QL SMEAR: SLIGHT — SIGNIFICANT CHANGE UP
PLAT MORPH BLD: NORMAL — SIGNIFICANT CHANGE UP
PLATELET # BLD AUTO: 121 K/UL — LOW (ref 150–400)
PLATELET COUNT - ESTIMATE: ABNORMAL
POIKILOCYTOSIS BLD QL AUTO: SLIGHT — SIGNIFICANT CHANGE UP
POLYCHROMASIA BLD QL SMEAR: SIGNIFICANT CHANGE UP
POTASSIUM SERPL-MCNC: 3.8 MMOL/L — SIGNIFICANT CHANGE UP (ref 3.5–5.3)
POTASSIUM SERPL-MCNC: 4 MMOL/L — SIGNIFICANT CHANGE UP (ref 3.5–5.3)
POTASSIUM SERPL-MCNC: 4 MMOL/L — SIGNIFICANT CHANGE UP (ref 3.5–5.3)
POTASSIUM SERPL-MCNC: 4.2 MMOL/L — SIGNIFICANT CHANGE UP (ref 3.5–5.3)
POTASSIUM SERPL-MCNC: 4.2 MMOL/L — SIGNIFICANT CHANGE UP (ref 3.5–5.3)
POTASSIUM SERPL-MCNC: 4.7 MMOL/L — SIGNIFICANT CHANGE UP (ref 3.5–5.3)
POTASSIUM SERPL-SCNC: 3.8 MMOL/L — SIGNIFICANT CHANGE UP (ref 3.5–5.3)
POTASSIUM SERPL-SCNC: 4 MMOL/L — SIGNIFICANT CHANGE UP (ref 3.5–5.3)
POTASSIUM SERPL-SCNC: 4 MMOL/L — SIGNIFICANT CHANGE UP (ref 3.5–5.3)
POTASSIUM SERPL-SCNC: 4.2 MMOL/L — SIGNIFICANT CHANGE UP (ref 3.5–5.3)
POTASSIUM SERPL-SCNC: 4.2 MMOL/L — SIGNIFICANT CHANGE UP (ref 3.5–5.3)
POTASSIUM SERPL-SCNC: 4.7 MMOL/L — SIGNIFICANT CHANGE UP (ref 3.5–5.3)
PROT SERPL-MCNC: 6.8 G/DL — SIGNIFICANT CHANGE UP (ref 6.6–8.7)
PROT SERPL-MCNC: 6.9 G/DL — SIGNIFICANT CHANGE UP (ref 6.6–8.7)
PROT SERPL-MCNC: 7 G/DL — SIGNIFICANT CHANGE UP (ref 6.6–8.7)
PROT SERPL-MCNC: 7 G/DL — SIGNIFICANT CHANGE UP (ref 6.6–8.7)
PROT SERPL-MCNC: 7.2 G/DL — SIGNIFICANT CHANGE UP (ref 6.6–8.7)
PROT SERPL-MCNC: 7.4 G/DL — SIGNIFICANT CHANGE UP (ref 6.6–8.7)
RBC # BLD: 3.73 M/UL — LOW (ref 3.8–5.2)
RBC # FLD: 14.4 % — SIGNIFICANT CHANGE UP (ref 10.3–14.5)
RBC BLD AUTO: ABNORMAL
SARS-COV-2 RNA SPEC QL NAA+PROBE: SIGNIFICANT CHANGE UP
SODIUM SERPL-SCNC: 156 MMOL/L — HIGH (ref 135–145)
SODIUM SERPL-SCNC: 157 MMOL/L — HIGH (ref 135–145)
SODIUM SERPL-SCNC: 160 MMOL/L — CRITICAL HIGH (ref 135–145)
SODIUM SERPL-SCNC: 161 MMOL/L — CRITICAL HIGH (ref 135–145)
SODIUM SERPL-SCNC: 162 MMOL/L — CRITICAL HIGH (ref 135–145)
SODIUM SERPL-SCNC: 163 MMOL/L — CRITICAL HIGH (ref 135–145)
WBC # BLD: 7.21 K/UL — SIGNIFICANT CHANGE UP (ref 3.8–10.5)
WBC # FLD AUTO: 7.21 K/UL — SIGNIFICANT CHANGE UP (ref 3.8–10.5)

## 2020-10-03 PROCEDURE — 99223 1ST HOSP IP/OBS HIGH 75: CPT

## 2020-10-03 PROCEDURE — 99233 SBSQ HOSP IP/OBS HIGH 50: CPT

## 2020-10-03 PROCEDURE — 99358 PROLONG SERVICE W/O CONTACT: CPT

## 2020-10-03 RX ORDER — DESMOPRESSIN ACETATE 0.1 MG/1
0.1 TABLET ORAL
Refills: 0 | Status: DISCONTINUED | OUTPATIENT
Start: 2020-10-03 | End: 2020-10-05

## 2020-10-03 RX ORDER — FOLIC ACID 0.8 MG
1 TABLET ORAL
Qty: 0 | Refills: 0 | DISCHARGE

## 2020-10-03 RX ORDER — FOLIC ACID 0.8 MG
1 TABLET ORAL DAILY
Refills: 0 | Status: DISCONTINUED | OUTPATIENT
Start: 2020-10-03 | End: 2020-10-05

## 2020-10-03 RX ORDER — PANTOPRAZOLE SODIUM 20 MG/1
40 TABLET, DELAYED RELEASE ORAL
Refills: 0 | Status: DISCONTINUED | OUTPATIENT
Start: 2020-10-03 | End: 2020-10-05

## 2020-10-03 RX ORDER — DESMOPRESSIN ACETATE 0.1 MG/1
0.2 TABLET ORAL
Refills: 0 | Status: DISCONTINUED | OUTPATIENT
Start: 2020-10-03 | End: 2020-10-03

## 2020-10-03 RX ORDER — LEVETIRACETAM 250 MG/1
500 TABLET, FILM COATED ORAL
Refills: 0 | Status: DISCONTINUED | OUTPATIENT
Start: 2020-10-03 | End: 2020-10-05

## 2020-10-03 RX ORDER — FERROUS SULFATE 325(65) MG
325 TABLET ORAL
Refills: 0 | Status: DISCONTINUED | OUTPATIENT
Start: 2020-10-03 | End: 2020-10-05

## 2020-10-03 RX ORDER — MULTIVIT-MIN/FERROUS GLUCONATE 9 MG/15 ML
1 LIQUID (ML) ORAL DAILY
Refills: 0 | Status: DISCONTINUED | OUTPATIENT
Start: 2020-10-03 | End: 2020-10-05

## 2020-10-03 RX ADMIN — LEVETIRACETAM 500 MILLIGRAM(S): 250 TABLET, FILM COATED ORAL at 05:36

## 2020-10-03 RX ADMIN — Medication 1 MILLIGRAM(S): at 13:58

## 2020-10-03 RX ADMIN — SODIUM CHLORIDE 125 MILLILITER(S): 9 INJECTION, SOLUTION INTRAVENOUS at 03:45

## 2020-10-03 RX ADMIN — Medication 325 MILLIGRAM(S): at 18:51

## 2020-10-03 RX ADMIN — PANTOPRAZOLE SODIUM 40 MILLIGRAM(S): 20 TABLET, DELAYED RELEASE ORAL at 05:36

## 2020-10-03 RX ADMIN — LEVETIRACETAM 500 MILLIGRAM(S): 250 TABLET, FILM COATED ORAL at 18:51

## 2020-10-03 RX ADMIN — Medication 325 MILLIGRAM(S): at 05:36

## 2020-10-03 RX ADMIN — ENOXAPARIN SODIUM 40 MILLIGRAM(S): 100 INJECTION SUBCUTANEOUS at 13:57

## 2020-10-03 RX ADMIN — DESMOPRESSIN ACETATE 0.2 MILLIGRAM(S): 0.1 TABLET ORAL at 09:37

## 2020-10-03 RX ADMIN — DESMOPRESSIN ACETATE 2 SPRAY(S): 0.1 TABLET ORAL at 00:09

## 2020-10-03 RX ADMIN — DESMOPRESSIN ACETATE 0.2 MILLIGRAM(S): 0.1 TABLET ORAL at 18:51

## 2020-10-03 RX ADMIN — Medication 1 TABLET(S): at 13:58

## 2020-10-03 RX ADMIN — SODIUM CHLORIDE 125 MILLILITER(S): 9 INJECTION, SOLUTION INTRAVENOUS at 13:59

## 2020-10-03 RX ADMIN — DESMOPRESSIN ACETATE 0.2 MILLIGRAM(S): 0.1 TABLET ORAL at 13:58

## 2020-10-03 RX ADMIN — SODIUM CHLORIDE 125 MILLILITER(S): 9 INJECTION, SOLUTION INTRAVENOUS at 16:54

## 2020-10-03 NOTE — CONSULT NOTE ADULT - SUBJECTIVE AND OBJECTIVE BOX
Patient is a 55y old  Female who presents with a chief complaint of syncopal episode (03 Oct 2020 09:52)      HPI:  56 y/o female with h/o gastric bypass surgery (), HTN, chronic anemia, diabetes insipidus (dx in ) after an episode of ICH 2nd to brain aneurysm () chronic PICC line for home iv hydration (1000 cc D5 every otherday) presented to ER for near syncopal episode. Per family, patient at baseline is poor historian and has no insight into medical history due to short term memory loss since SAH in . Per daughter, patient was her normal self washing dishes at 6pm, when daughter returned home and gave mother her evening BP and seizure meds. Did not know mother had already taken meds 10 mins prior. After giving meds, few minutes later mother felt lightheaded and dizzy and fell but daughter was able to catch her before hitting floor. Never lost conciousness, didnt hit her head. Denied palpitations, CP, HA, seizure like activity. No urinary or fecal incontinence after. No post ictal episode. No hx of hypoglycemia. Denies recent stress. Was not standing for long periods of time.    No recent illness, sick contacts, or covid exposure.  Tolerating PO, voiding, and having BMs at baseline.      Patients has been compliant with all meds, follows with endocrine monthly.   Above noted   Started on IVF in ER   Feels better      (02 Oct 2020 23:02)      PAST MEDICAL & SURGICAL HISTORY:  Memory loss, short term    Brain aneurysm    Diabetes insipidus    HTN (Hypertension)    Subarachnoid Hemorrhage    H/O gastric bypass    S/P Cerebral Aneurysm Repair    S/P Craniotomy        FAMILY HISTORY:  Family history of hypertension        Social History:    MEDICATIONS  (STANDING):  desmopressin 0.2 milliGRAM(s) Oral <User Schedule>  enoxaparin Injectable 40 milliGRAM(s) SubCutaneous daily  ferrous    sulfate 325 milliGRAM(s) Oral two times a day  folic acid 1 milliGRAM(s) Oral daily  levETIRAcetam 500 milliGRAM(s) Oral two times a day  multivitamin/minerals 1 Tablet(s) Oral daily  pantoprazole    Tablet 40 milliGRAM(s) Oral before breakfast  sodium chloride 0.45%. 1000 milliLiter(s) (125 mL/Hr) IV Continuous <Continuous>    MEDICATIONS  (PRN):      Allergies    No Known Allergies    Intolerances        Vital Signs Last 24 Hrs  T(C): 37.6 (03 Oct 2020 08:59), Max: 37.6 (03 Oct 2020 08:59)  T(F): 99.7 (03 Oct 2020 08:59), Max: 99.7 (03 Oct 2020 08:59)  HR: 97 (03 Oct 2020 08:59) (79 - 97)  BP: 105/71 (03 Oct 2020 08:59) (82/52 - 109/75)  BP(mean): --  RR: 17 (03 Oct 2020 08:59) (17 - 18)  SpO2: 99% (03 Oct 2020 08:59) (97% - 99%)  Daily Height in cm: 152.4 (02 Oct 2020 19:39)    Daily   I&O's Detail    I&O's Summary      PHYSICAL EXAM:  HEAD:  anicteric , no edema   NECK: Supple, No JVD,  CHEST/LUNG: Clear to percussion bilaterally; No rales, rhonchi, wheezing, or rubs  HEART: Regular rate and rhythm; No murmurs, rubs, or gallops  ABDOMEN: Soft, Nontender, Nondistended; Bowel sounds present  EXTREMITIES:  2+ Peripheral Pulses, No clubbing, cyanosis, or edema        LABS:                        9.5    7.21  )-----------( 121      ( 03 Oct 2020 07:54 )             35.9     10-03    161<HH>  |  128<H>  |  40.0<H>  ----------------------------<  70  4.2   |  19.0<L>  |  1.37<H>    Ca    9.1      03 Oct 2020 10:05  Mg     2.2     10-02    TPro  7.4  /  Alb  3.9  /  TBili  0.4  /  DBili  x   /  AST  28  /  ALT  23  /  AlkPhos  104  10-03    PT/INR - ( 02 Oct 2020 20:51 )   PT: 12.6 sec;   INR: 1.09 ratio         PTT - ( 02 Oct 2020 20:51 )  PTT:32.7 sec  Urinalysis Basic - ( 02 Oct 2020 22:44 )    Color: Yellow / Appearance: Clear / S.020 / pH: x  Gluc: x / Ketone: Negative  / Bili: Negative / Urobili: Negative mg/dL   Blood: x / Protein: 30 mg/dL / Nitrite: Negative   Leuk Esterase: Moderate / RBC: 0-2 /HPF / WBC 3-5   Sq Epi: x / Non Sq Epi: Occasional / Bacteria: x      Magnesium, Serum: 2.2 mg/dL (10-02 @ 20:51)          RADIOLOGY & ADDITIONAL TESTS:

## 2020-10-03 NOTE — CONSULT NOTE ADULT - ASSESSMENT
55 yr old female with hx HTN, DI, SAH, cerebral aneurysm s/p clipping 2011 multiple admissions for hypernatremia     Exacerbation of Chronic hypernatremia / Pre- Renal azotemia   known CDI   Known H/O symptoms  from rapid reversal   Continue the current measures   Interestingly , her urine SG is 1020  Desmopressin adjusted   Serial labs       HTN - Watch on meds

## 2020-10-03 NOTE — PROGRESS NOTE ADULT - ASSESSMENT
56 y/o female with h/o gastric bypass surgery (2004), HTN, chronic anemia, diabetes insipidus (dx in 2012) after an episode of ICH 2nd to brain aneurysm (2011/[facility]) chronic PICC line for home iv hydration (1000 cc D5 every otherday) presented to ER for near syncopal episode likely due to accidental antihypertensive overdose and hypernatremia in setting of Diabetes insipidus. In the ER, noted hypotensive and hypernatremic to 164.    Hypernatremia due to central DI and likely volume depletion  sodium level 164, asymptomatic, has been as high as 172  free water deficit 6.2L, hypotensive likely cause of syncope, currently awake, alert with baseline memory loss   takes home dose desmopressin 0.1mg TID and 1L IVF M,W,F (likely d5)  s/p 2 puffs intranasal desmopressin once (20mcg equivalent)   c/w 0.45NS at 100cc/hr  increase to desmopressin 0.2mg TID in AM  serum/urine osm w/ lytes ordered  renal consulted- [facility]   endo consult-follows her when admitted   monitor BNP q4 hrs and avoid rapid correction   per daughter, hx of seizures requiring ICU stay here in past due to Na reduced to rapidly, her baseline Na is ~145    Near syncopal episode likely 2/2 hypotension 2/2 BP medication overdose  pt s/p accidental doubling of antihypertensives this evening, no LOC  CT head neg for acute bleed  hold BP meds for now due to hypotension and normal HR  c/w IVFs for now, monitor BP  orthostatics negative  ekg wnl  tele mon    TOSHIA  likely prerenal  baseline 1.1-1.2, today 1.4 slightly imprived  c/w IVF hydration  monitor cr daily    HTN   BP currently soft, accidental doubling of BP meds at home  c/w IVFs, bp stil soft this am with hold antihypertensives, and  restart pending BP improvement   dash/tlc diet    Hx of ICH aneurysm rupture   neurology exam stable  short and long term memory deficits  monitor    Hx of seizures   stable, no recent seizures  c/w home dose Lamictal 500mg BID    Chronic Anemia:  baseline Hbg 8-9, no reports of bleeding  cont. to monitor    Hx of gastric bypass  c/w home dose iron tabs and folic acid    DVT prophylaxis- Lovenox 40mg  Dispo: remain in patient until bp and sodium improves.

## 2020-10-03 NOTE — CONSULT NOTE ADULT - ASSESSMENT
Hypernatremia in setting of central DI  -multifactorial- hypotension/volume depletion/central DI  -nephrology also following  -likely chronic rather than acute  -will need baseline labs from outpatient endocrinology  -on 1/2NS at 100cc/hr, okay to continue  -avoid rapid correction, limit to 10point decrease in a 24hour time frame  -continue DDAVP at 0.1mg TID  -labs Q4-6hrs, will monitor closely and if no improvement in serum Na, can cautiously increase DDAVP    Obesity s/p gastric bypass- should be on multiple vitamins, but can check labs- iron/ferritin/vitamin D/A/K/E/selenium/folate/b12/thiamine/zinc    Hx of seizures- on lamictal 55F with PMH short term memory loss due to SAH 2012, obesity s/p gastric bypass surgery (2004), HTN, chronic anemia, central diabetes insipidus (dx in 2012) after an episode of ICH 2nd to brain aneurysm (2011/[facility]), chronic PICC line for home iv hydration (1000 cc D5 every other day) presented to ER for near syncopal episode. Per family, patient at baseline is poor historian and has no insight into medical history due to short term memory loss since SAH in 2012. Per daughter, patient was her normal self washing dishes at 6pm, when daughter returned home and gave mother her evening BP and seizure meds. Did not know mother had already taken meds 10 mins prior. After giving meds, few minutes later mother felt lightheaded and dizzy and fell but daughter was able to catch her before hitting floor. Never lost conciousness, didnt hit her head. Denied palpitations, CP, HA, seizure like activity. No urinary or fecal incontinence after. No post ictal episode. No hx of hypoglycemia. Denies recent stress. Was not standing for long periods of time. consult for hypernatremia to 163 and DI     Hypernatremia in setting of central DI  -multifactorial- hypotension/volume depletion/central DI/TOSHIA  -nephrology also following  -likely chronic rather than acute  -will need baseline labs from outpatient endocrinology  -on 1/2NS at 100cc/hr, okay to continue  -avoid rapid correction, limit to 10point decrease in a 24hour time frame  -s/p 4 doses of DDAVP 0.2mg, changed to DDAVP at 0.1mg TID  -labs Q4-6hrs, will monitor closely and if no improvement in serum Na, can cautiously increase DDAVP    TOSHIA- improving on fluids. nephrology following    Obesity s/p gastric bypass- should be on multiple vitamins, but can check labs- iron/ferritin/vitamin D/A/K/E/selenium/folate/b12/thiamine/zinc    Hx of seizures- on lamictal

## 2020-10-03 NOTE — PROGRESS NOTE ADULT - SUBJECTIVE AND OBJECTIVE BOX
[facility] Division of Hospital Medicine    Chief Complaint:  Syncopal episode      SUBJECTIVE / OVERNIGHT EVENTS: Patient seen and examined in ED. She has no complaints. Ambulates to bathroom.     Patient denies chest pain, SOB, abd pain, N/V, fever, chills, dysuria or any other complaints. All remainder ROS negative.     MEDICATIONS  (STANDING):  desmopressin 0.2 milliGRAM(s) Oral <User Schedule>  enoxaparin Injectable 40 milliGRAM(s) SubCutaneous daily  ferrous    sulfate 325 milliGRAM(s) Oral two times a day  folic acid 1 milliGRAM(s) Oral daily  levETIRAcetam 500 milliGRAM(s) Oral two times a day  multivitamin/minerals 1 Tablet(s) Oral daily  pantoprazole    Tablet 40 milliGRAM(s) Oral before breakfast  sodium chloride 0.45%. 1000 milliLiter(s) (125 mL/Hr) IV Continuous <Continuous>    MEDICATIONS  (PRN):        I&O's Summary      PHYSICAL EXAM:  Vital Signs Last 24 Hrs  T(C): 37.6 (03 Oct 2020 08:59), Max: 37.6 (03 Oct 2020 08:59)  T(F): 99.7 (03 Oct 2020 08:59), Max: 99.7 (03 Oct 2020 08:59)  HR: 97 (03 Oct 2020 08:59) (79 - 97)  BP: 105/71 (03 Oct 2020 08:59) (82/52 - 109/75)  BP(mean): --  RR: 17 (03 Oct 2020 08:59) (17 - 18)  SpO2: 99% (03 Oct 2020 08:59) (97% - 99%)      CONSTITUTIONAL: NAD, well-developed, well-groomed  ENMT: Moist oral mucosa, no pharyngeal injection or exudates; normal dentition  RESPIRATORY: Normal respiratory effort; lungs are clear to auscultation bilaterally  CARDIOVASCULAR: Regular rate and rhythm, normal S1 and S2, no murmur/rub/gallop; No lower extremity edema;  ABDOMEN: Nontender to palpation, normoactive bowel sounds, no rebound/guarding;  MUSCLOSKELETAL:  no clubbing or cyanosis of digits; no joint swelling or tenderness to palpation  PSYCH: A+O to person, place, but not to  time; affect appropriate  NEUROLOGY: CN 2-12 are intact and symmetric; no gross sensory deficits;   SKIN: No rashes; no palpable lesions    LABS:                        9.5    7.21  )-----------( 121      ( 03 Oct 2020 07:54 )             35.9     10-03    163<HH>  |  131<H>  |  42.0<H>  ----------------------------<  134<H>  4.2   |  17.0<L>  |  1.41<H>    Ca    9.2      03 Oct 2020 07:54  Mg     2.2     10-    TPro  7.2  /  Alb  3.8  /  TBili  0.4  /  DBili  x   /  AST  14  /  ALT  15  /  AlkPhos  93  10-03    PT/INR - ( 02 Oct 2020 20:51 )   PT: 12.6 sec;   INR: 1.09 ratio         PTT - ( 02 Oct 2020 20:51 )  PTT:32.7 sec  CARDIAC MARKERS ( 02 Oct 2020 20:51 )  x     / <0.01 ng/mL / x     / x     / x          Urinalysis Basic - ( 02 Oct 2020 22:44 )    Color: Yellow / Appearance: Clear / S.020 / pH: x  Gluc: x / Ketone: Negative  / Bili: Negative / Urobili: Negative mg/dL   Blood: x / Protein: 30 mg/dL / Nitrite: Negative   Leuk Esterase: Moderate / RBC: 0-2 /HPF / WBC 3-5   Sq Epi: x / Non Sq Epi: Occasional / Bacteria: x        CAPILLARY BLOOD GLUCOSE      POCT Blood Glucose.: 90 mg/dL (02 Oct 2020 19:53)        RADIOLOGY & ADDITIONAL TESTS:  Results Reviewed:   Imaging Personally Reviewed:  Electrocardiogram Personally Reviewed:

## 2020-10-03 NOTE — CONSULT NOTE ADULT - SUBJECTIVE AND OBJECTIVE BOX
Patient is a 55y old  Female who presents with a chief complaint of syncopal episode (03 Oct 2020 11:13)    HPI:  55F with PMH short term memory loss due to SAH 2012, obesity s/p gastric bypass surgery (2004), HTN, chronic anemia, central diabetes insipidus (dx in 2012) after an episode of ICH 2nd to brain aneurysm (2011/[facility]), chronic PICC line for home iv hydration (1000 cc D5 every other day) presented to ER for near syncopal episode. Per family, patient at baseline is poor historian and has no insight into medical history due to short term memory loss since SAH in 2012. Per daughter, patient was her normal self washing dishes at 6pm, when daughter returned home and gave mother her evening BP and seizure meds. Did not know mother had already taken meds 10 mins prior. After giving meds, few minutes later mother felt lightheaded and dizzy and fell but daughter was able to catch her before hitting floor. Never lost conciousness, didnt hit her head. Denied palpitations, CP, HA, seizure like activity. No urinary or fecal incontinence after. No post ictal episode. No hx of hypoglycemia. Denies recent stress. Was not standing for long periods of time. consult for hypernatremia to 163 and DI    Patients has been compliant with all meds, follows with endocrine monthly.     patient is a poor historian  chart reviewed    PAST MEDICAL & SURGICAL HISTORY:  Memory loss, short term    Brain aneurysm    Diabetes insipidus    HTN (Hypertension)    Subarachnoid Hemorrhage    H/O gastric bypass    S/P Cerebral Aneurysm Repair    S/P Craniotomy        Social History:  no smoking, quit 10 yrs ago. Smoked about 10 cigs x 2 yrs.  no alcohol or drug use  lives with children who are young adults   ambulates normally  performs acts of daily living by her self  short term memory loss since SAH (02 Oct 2020 23:02)      FAMILY HISTORY:  Family history of hypertension          Allergies    No Known Allergies    Intolerances        REVIEW OF SYSTEMS:    CONSTITUTIONAL: No fever, weight loss, or fatigue  EYES: No eye pain, visual disturbances, or discharge  ENMT:  No difficulty hearing, tinnitus, vertigo; No sinus or throat pain  NECK: No pain or stiffness  RESPIRATORY: No cough, wheezing, chills or hemoptysis; No shortness of breath  CARDIOVASCULAR: No chest pain, palpitations, dizziness, or leg swelling  GASTROINTESTINAL: No abdominal or epigastric pain. No nausea, vomiting, or hematemesis; No diarrhea or constipation. No melena or hematochezia.  NEUROLOGICAL: No headaches, memory loss, loss of strength, numbness, or tremors  SKIN: No itching, burning, rashes, or lesions   MUSCULOSKELETAL: No joint pain or swelling; No muscle, back, or extremity pain  PSYCHIATRIC: No depression, anxiety, mood swings, or difficulty sleeping        MEDICATIONS  (STANDING):  desmopressin 0.2 milliGRAM(s) Oral <User Schedule>  enoxaparin Injectable 40 milliGRAM(s) SubCutaneous daily  ferrous    sulfate 325 milliGRAM(s) Oral two times a day  folic acid 1 milliGRAM(s) Oral daily  levETIRAcetam 500 milliGRAM(s) Oral two times a day  multivitamin/minerals 1 Tablet(s) Oral daily  pantoprazole    Tablet 40 milliGRAM(s) Oral before breakfast  sodium chloride 0.45%. 1000 milliLiter(s) (125 mL/Hr) IV Continuous <Continuous>    MEDICATIONS  (PRN):      Vital Signs Last 24 Hrs  T(C): 37.6 (03 Oct 2020 08:59), Max: 37.6 (03 Oct 2020 08:59)  T(F): 99.7 (03 Oct 2020 08:59), Max: 99.7 (03 Oct 2020 08:59)  HR: 97 (03 Oct 2020 08:59) (79 - 97)  BP: 105/71 (03 Oct 2020 08:59) (82/52 - 109/75)  BP(mean): --  RR: 17 (03 Oct 2020 08:59) (17 - 18)  SpO2: 99% (03 Oct 2020 08:59) (97% - 99%)    Physical Exam:    Constitutional: NAD, well-developed  HEENT: EOMI, no exophalmos  Neck: trachea midline, no thyroid enlargement  Respiratory: CTAB, normal respirations  Cardiovascular: S1 and S2, RRR  Gastrointestinal: BS+, soft, ntnd  Extremities: No peripheral edema  Neurological: AOx3, no focal deficits  Psychiatric: Normal mood and normal affect  Skin: no rashes, no acanthosis    LABS  10-03    161<HH>  |  128<H>  |  40.0<H>  ----------------------------<  70  4.2   |  19.0<L>  |  1.37<H>    Ca    9.1      03 Oct 2020 10:05  Mg     2.2     10-02    TPro  7.4  /  Alb  3.9  /  TBili  0.4  /  DBili  x   /  AST  28  /  ALT  23  /  AlkPhos  104  10-03                          9.5    7.21  )-----------( 121      ( 03 Oct 2020 07:54 )             35.9             Ketone - Urine: Negative (10-02 @ 22:44)    Alanine Aminotransferase (ALT/SGPT): 23 U/L (10-03-20 @ 10:05)  Albumin, Serum: 3.9 g/dL (10-03-20 @ 10:05)  Aspartate Aminotransferase (AST/SGOT): 28 U/L (10-03-20 @ 10:05)  Alkaline Phosphatase, Serum: 104 U/L (10-03-20 @ 10:05)  Aspartate Aminotransferase (AST/SGOT): 14 U/L (10-03-20 @ 07:54)  Alkaline Phosphatase, Serum: 93 U/L (10-03-20 @ 07:54)  Alanine Aminotransferase (ALT/SGPT): 15 U/L (10-03-20 @ 07:54)  Albumin, Serum: 3.8 g/dL (10-03-20 @ 07:54)  Alanine Aminotransferase (ALT/SGPT): 17 U/L (10-03-20 @ 01:21)  Albumin, Serum: 3.6 g/dL (10-03-20 @ 01:21)  Aspartate Aminotransferase (AST/SGOT): 23 U/L (10-03-20 @ 01:21)  Alkaline Phosphatase, Serum: 87 U/L (10-03-20 @ 01:21)  Albumin, Serum: 3.8 g/dL (10-02-20 @ 20:51)  Alkaline Phosphatase, Serum: 95 U/L (10-02-20 @ 20:51)  Alanine Aminotransferase (ALT/SGPT): 15 U/L (10-02-20 @ 20:51)  Aspartate Aminotransferase (AST/SGOT): 18 U/L (10-02-20 @ 20:51)      CAPILLARY BLOOD GLUCOSE      POCT Blood Glucose.: 90 mg/dL (02 Oct 2020 19:53)     Patient is a 55y old  Female who presents with a chief complaint of syncopal episode (03 Oct 2020 11:13)    HPI:  55F with PMH short term memory loss due to SAH 2012, obesity s/p gastric bypass surgery (2004), HTN, chronic anemia, central diabetes insipidus (dx in 2012) after an episode of ICH 2nd to brain aneurysm (2011/[facility]), chronic PICC line for home iv hydration (1000 cc D5 every other day) presented to ER for near syncopal episode. Per family, patient at baseline is poor historian and has no insight into medical history due to short term memory loss since SAH in 2012. Per daughter, patient was her normal self washing dishes at 6pm, when daughter returned home and gave mother her evening BP and seizure meds. Did not know mother had already taken meds 10 mins prior. After giving meds, few minutes later mother felt lightheaded and dizzy and fell but daughter was able to catch her before hitting floor. Never lost conciousness, didnt hit her head. Denied palpitations, CP, HA, seizure like activity. No urinary or fecal incontinence after. No post ictal episode. No hx of hypoglycemia. Denies recent stress. Was not standing for long periods of time. consult for hypernatremia to 163 and DI   patient is a poor historian  chart reviewed  feeling better    PAST MEDICAL & SURGICAL HISTORY:  Memory loss, short term    Brain aneurysm    Diabetes insipidus    HTN (Hypertension)    Subarachnoid Hemorrhage    H/O gastric bypass    S/P Cerebral Aneurysm Repair    S/P Craniotomy        Social History:  no smoking, quit 10 yrs ago. Smoked about 10 cigs x 2 yrs.  no alcohol or drug use  lives with children who are young adults   ambulates normally  performs acts of daily living by her self  short term memory loss since SAH (02 Oct 2020 23:02)      FAMILY HISTORY:  Family history of hypertension          Allergies    No Known Allergies    Intolerances        REVIEW OF SYSTEMS:    CONSTITUTIONAL: No fever, weight loss, or fatigue  EYES: No eye pain, visual disturbances, or discharge  ENMT:  No difficulty hearing, tinnitus, vertigo; No sinus or throat pain  NECK: No pain or stiffness  RESPIRATORY: No cough, wheezing, chills or hemoptysis; No shortness of breath  CARDIOVASCULAR: No chest pain, palpitations, dizziness, or leg swelling  GASTROINTESTINAL: No abdominal or epigastric pain. No nausea, vomiting, or hematemesis; No diarrhea or constipation. No melena or hematochezia.  NEUROLOGICAL: No headaches, memory loss, loss of strength, numbness, or tremors  SKIN: No itching, burning, rashes, or lesions   MUSCULOSKELETAL: No joint pain or swelling; No muscle, back, or extremity pain  PSYCHIATRIC: No depression, anxiety, mood swings, or difficulty sleeping        MEDICATIONS  (STANDING):  desmopressin 0.2 milliGRAM(s) Oral <User Schedule>  enoxaparin Injectable 40 milliGRAM(s) SubCutaneous daily  ferrous    sulfate 325 milliGRAM(s) Oral two times a day  folic acid 1 milliGRAM(s) Oral daily  levETIRAcetam 500 milliGRAM(s) Oral two times a day  multivitamin/minerals 1 Tablet(s) Oral daily  pantoprazole    Tablet 40 milliGRAM(s) Oral before breakfast  sodium chloride 0.45%. 1000 milliLiter(s) (125 mL/Hr) IV Continuous <Continuous>    MEDICATIONS  (PRN):      Vital Signs Last 24 Hrs  T(C): 37.6 (03 Oct 2020 08:59), Max: 37.6 (03 Oct 2020 08:59)  T(F): 99.7 (03 Oct 2020 08:59), Max: 99.7 (03 Oct 2020 08:59)  HR: 97 (03 Oct 2020 08:59) (79 - 97)  BP: 105/71 (03 Oct 2020 08:59) (82/52 - 109/75)  BP(mean): --  RR: 17 (03 Oct 2020 08:59) (17 - 18)  SpO2: 99% (03 Oct 2020 08:59) (97% - 99%)    Physical Exam:    Constitutional: NAD, well-developed, AA  HEENT: EOMI, no exophalmos  Neck: trachea midline, no thyroid enlargement  Respiratory: CTAB, normal respirations  Cardiovascular: S1 and S2, RRR  Gastrointestinal: BS+, soft, ntnd, abdominal scar  Extremities: No peripheral edema  Neurological: AOx3, no focal deficits  Psychiatric: Normal mood and normal affect  Skin: no rashes, no acanthosis    R PICC line    LABS  10-03    161<HH>  |  128<H>  |  40.0<H>  ----------------------------<  70  4.2   |  19.0<L>  |  1.37<H>    Ca    9.1      03 Oct 2020 10:05  Mg     2.2     10-02    TPro  7.4  /  Alb  3.9  /  TBili  0.4  /  DBili  x   /  AST  28  /  ALT  23  /  AlkPhos  104  10-03                          9.5    7.21  )-----------( 121      ( 03 Oct 2020 07:54 )             35.9             Ketone - Urine: Negative (10-02 @ 22:44)    Alanine Aminotransferase (ALT/SGPT): 23 U/L (10-03-20 @ 10:05)  Albumin, Serum: 3.9 g/dL (10-03-20 @ 10:05)  Aspartate Aminotransferase (AST/SGOT): 28 U/L (10-03-20 @ 10:05)  Alkaline Phosphatase, Serum: 104 U/L (10-03-20 @ 10:05)  Aspartate Aminotransferase (AST/SGOT): 14 U/L (10-03-20 @ 07:54)  Alkaline Phosphatase, Serum: 93 U/L (10-03-20 @ 07:54)  Alanine Aminotransferase (ALT/SGPT): 15 U/L (10-03-20 @ 07:54)  Albumin, Serum: 3.8 g/dL (10-03-20 @ 07:54)  Alanine Aminotransferase (ALT/SGPT): 17 U/L (10-03-20 @ 01:21)  Albumin, Serum: 3.6 g/dL (10-03-20 @ 01:21)  Aspartate Aminotransferase (AST/SGOT): 23 U/L (10-03-20 @ 01:21)  Alkaline Phosphatase, Serum: 87 U/L (10-03-20 @ 01:21)  Albumin, Serum: 3.8 g/dL (10-02-20 @ 20:51)  Alkaline Phosphatase, Serum: 95 U/L (10-02-20 @ 20:51)  Alanine Aminotransferase (ALT/SGPT): 15 U/L (10-02-20 @ 20:51)  Aspartate Aminotransferase (AST/SGOT): 18 U/L (10-02-20 @ 20:51)      CAPILLARY BLOOD GLUCOSE      POCT Blood Glucose.: 90 mg/dL (02 Oct 2020 19:53)

## 2020-10-04 LAB
ALBUMIN SERPL ELPH-MCNC: 3.2 G/DL — LOW (ref 3.3–5.2)
ALP SERPL-CCNC: 120 U/L — SIGNIFICANT CHANGE UP (ref 40–120)
ALT FLD-CCNC: 64 U/L — HIGH
ANION GAP SERPL CALC-SCNC: 12 MMOL/L — SIGNIFICANT CHANGE UP (ref 5–17)
ANION GAP SERPL CALC-SCNC: 13 MMOL/L — SIGNIFICANT CHANGE UP (ref 5–17)
ANION GAP SERPL CALC-SCNC: 13 MMOL/L — SIGNIFICANT CHANGE UP (ref 5–17)
APTT BLD: 36.8 SEC — HIGH (ref 27.5–35.5)
AST SERPL-CCNC: 67 U/L — HIGH
BASOPHILS # BLD AUTO: 0.03 K/UL — SIGNIFICANT CHANGE UP (ref 0–0.2)
BASOPHILS NFR BLD AUTO: 0.5 % — SIGNIFICANT CHANGE UP (ref 0–2)
BILIRUB SERPL-MCNC: 0.3 MG/DL — LOW (ref 0.4–2)
BUN SERPL-MCNC: 24 MG/DL — HIGH (ref 8–20)
BUN SERPL-MCNC: 27 MG/DL — HIGH (ref 8–20)
BUN SERPL-MCNC: 30 MG/DL — HIGH (ref 8–20)
CALCIUM SERPL-MCNC: 7.8 MG/DL — LOW (ref 8.6–10.2)
CALCIUM SERPL-MCNC: 8.1 MG/DL — LOW (ref 8.6–10.2)
CALCIUM SERPL-MCNC: 8.3 MG/DL — LOW (ref 8.6–10.2)
CHLORIDE SERPL-SCNC: 121 MMOL/L — HIGH (ref 98–107)
CHLORIDE SERPL-SCNC: 124 MMOL/L — HIGH (ref 98–107)
CHLORIDE SERPL-SCNC: 125 MMOL/L — HIGH (ref 98–107)
CO2 SERPL-SCNC: 16 MMOL/L — LOW (ref 22–29)
CO2 SERPL-SCNC: 17 MMOL/L — LOW (ref 22–29)
CO2 SERPL-SCNC: 18 MMOL/L — LOW (ref 22–29)
CREAT SERPL-MCNC: 1.13 MG/DL — SIGNIFICANT CHANGE UP (ref 0.5–1.3)
CREAT SERPL-MCNC: 1.21 MG/DL — SIGNIFICANT CHANGE UP (ref 0.5–1.3)
CREAT SERPL-MCNC: 1.28 MG/DL — SIGNIFICANT CHANGE UP (ref 0.5–1.3)
EOSINOPHIL # BLD AUTO: 0.14 K/UL — SIGNIFICANT CHANGE UP (ref 0–0.5)
EOSINOPHIL NFR BLD AUTO: 2.2 % — SIGNIFICANT CHANGE UP (ref 0–6)
GLUCOSE SERPL-MCNC: 150 MG/DL — HIGH (ref 70–99)
GLUCOSE SERPL-MCNC: 163 MG/DL — HIGH (ref 70–99)
GLUCOSE SERPL-MCNC: 81 MG/DL — SIGNIFICANT CHANGE UP (ref 70–99)
HCT VFR BLD CALC: 28.9 % — LOW (ref 34.5–45)
HGB BLD-MCNC: 7.9 G/DL — LOW (ref 11.5–15.5)
IMM GRANULOCYTES NFR BLD AUTO: 0.8 % — SIGNIFICANT CHANGE UP (ref 0–1.5)
INR BLD: 1.12 RATIO — SIGNIFICANT CHANGE UP (ref 0.88–1.16)
LYMPHOCYTES # BLD AUTO: 1.43 K/UL — SIGNIFICANT CHANGE UP (ref 1–3.3)
LYMPHOCYTES # BLD AUTO: 22.9 % — SIGNIFICANT CHANGE UP (ref 13–44)
MAGNESIUM SERPL-MCNC: 1.9 MG/DL — SIGNIFICANT CHANGE UP (ref 1.8–2.6)
MCHC RBC-ENTMCNC: 25.5 PG — LOW (ref 27–34)
MCHC RBC-ENTMCNC: 27.3 GM/DL — LOW (ref 32–36)
MCV RBC AUTO: 93.2 FL — SIGNIFICANT CHANGE UP (ref 80–100)
MONOCYTES # BLD AUTO: 0.39 K/UL — SIGNIFICANT CHANGE UP (ref 0–0.9)
MONOCYTES NFR BLD AUTO: 6.2 % — SIGNIFICANT CHANGE UP (ref 2–14)
NEUTROPHILS # BLD AUTO: 4.21 K/UL — SIGNIFICANT CHANGE UP (ref 1.8–7.4)
NEUTROPHILS NFR BLD AUTO: 67.4 % — SIGNIFICANT CHANGE UP (ref 43–77)
PHOSPHATE SERPL-MCNC: 1.9 MG/DL — LOW (ref 2.4–4.7)
PLATELET # BLD AUTO: 98 K/UL — LOW (ref 150–400)
POTASSIUM SERPL-MCNC: 3.7 MMOL/L — SIGNIFICANT CHANGE UP (ref 3.5–5.3)
POTASSIUM SERPL-MCNC: 4 MMOL/L — SIGNIFICANT CHANGE UP (ref 3.5–5.3)
POTASSIUM SERPL-MCNC: 4.3 MMOL/L — SIGNIFICANT CHANGE UP (ref 3.5–5.3)
POTASSIUM SERPL-SCNC: 3.7 MMOL/L — SIGNIFICANT CHANGE UP (ref 3.5–5.3)
POTASSIUM SERPL-SCNC: 4 MMOL/L — SIGNIFICANT CHANGE UP (ref 3.5–5.3)
POTASSIUM SERPL-SCNC: 4.3 MMOL/L — SIGNIFICANT CHANGE UP (ref 3.5–5.3)
PROT SERPL-MCNC: 6.2 G/DL — LOW (ref 6.6–8.7)
PROTHROM AB SERPL-ACNC: 12.9 SEC — SIGNIFICANT CHANGE UP (ref 10.6–13.6)
RBC # BLD: 3.1 M/UL — LOW (ref 3.8–5.2)
RBC # FLD: 14.4 % — SIGNIFICANT CHANGE UP (ref 10.3–14.5)
SARS-COV-2 IGG SERPL QL IA: NEGATIVE — SIGNIFICANT CHANGE UP
SARS-COV-2 IGM SERPL IA-ACNC: <0.1 INDEX — SIGNIFICANT CHANGE UP
SODIUM SERPL-SCNC: 149 MMOL/L — HIGH (ref 135–145)
SODIUM SERPL-SCNC: 154 MMOL/L — HIGH (ref 135–145)
SODIUM SERPL-SCNC: 155 MMOL/L — HIGH (ref 135–145)
WBC # BLD: 6.25 K/UL — SIGNIFICANT CHANGE UP (ref 3.8–10.5)
WBC # FLD AUTO: 6.25 K/UL — SIGNIFICANT CHANGE UP (ref 3.8–10.5)

## 2020-10-04 PROCEDURE — 99233 SBSQ HOSP IP/OBS HIGH 50: CPT

## 2020-10-04 PROCEDURE — 99232 SBSQ HOSP IP/OBS MODERATE 35: CPT

## 2020-10-04 RX ORDER — SODIUM BICARBONATE 1 MEQ/ML
0.07 SYRINGE (ML) INTRAVENOUS
Qty: 50 | Refills: 0 | Status: DISCONTINUED | OUTPATIENT
Start: 2020-10-04 | End: 2020-10-05

## 2020-10-04 RX ORDER — CHLORHEXIDINE GLUCONATE 213 G/1000ML
1 SOLUTION TOPICAL DAILY
Refills: 0 | Status: DISCONTINUED | OUTPATIENT
Start: 2020-10-04 | End: 2020-10-05

## 2020-10-04 RX ORDER — POTASSIUM PHOSPHATE, MONOBASIC POTASSIUM PHOSPHATE, DIBASIC 236; 224 MG/ML; MG/ML
30 INJECTION, SOLUTION INTRAVENOUS ONCE
Refills: 0 | Status: COMPLETED | OUTPATIENT
Start: 2020-10-04 | End: 2020-10-04

## 2020-10-04 RX ADMIN — LEVETIRACETAM 500 MILLIGRAM(S): 250 TABLET, FILM COATED ORAL at 17:01

## 2020-10-04 RX ADMIN — DESMOPRESSIN ACETATE 0.1 MILLIGRAM(S): 0.1 TABLET ORAL at 17:00

## 2020-10-04 RX ADMIN — DESMOPRESSIN ACETATE 0.1 MILLIGRAM(S): 0.1 TABLET ORAL at 06:08

## 2020-10-04 RX ADMIN — Medication 1 TABLET(S): at 11:24

## 2020-10-04 RX ADMIN — DESMOPRESSIN ACETATE 0.1 MILLIGRAM(S): 0.1 TABLET ORAL at 11:34

## 2020-10-04 RX ADMIN — PANTOPRAZOLE SODIUM 40 MILLIGRAM(S): 20 TABLET, DELAYED RELEASE ORAL at 06:08

## 2020-10-04 RX ADMIN — ENOXAPARIN SODIUM 40 MILLIGRAM(S): 100 INJECTION SUBCUTANEOUS at 11:24

## 2020-10-04 RX ADMIN — POTASSIUM PHOSPHATE, MONOBASIC POTASSIUM PHOSPHATE, DIBASIC 83.33 MILLIMOLE(S): 236; 224 INJECTION, SOLUTION INTRAVENOUS at 11:28

## 2020-10-04 RX ADMIN — Medication 325 MILLIGRAM(S): at 06:07

## 2020-10-04 RX ADMIN — Medication 100 MEQ/KG/HR: at 17:33

## 2020-10-04 RX ADMIN — SODIUM CHLORIDE 125 MILLILITER(S): 9 INJECTION, SOLUTION INTRAVENOUS at 06:06

## 2020-10-04 RX ADMIN — Medication 325 MILLIGRAM(S): at 17:01

## 2020-10-04 RX ADMIN — Medication 1 MILLIGRAM(S): at 11:24

## 2020-10-04 RX ADMIN — LEVETIRACETAM 500 MILLIGRAM(S): 250 TABLET, FILM COATED ORAL at 06:07

## 2020-10-04 RX ADMIN — CHLORHEXIDINE GLUCONATE 1 APPLICATION(S): 213 SOLUTION TOPICAL at 16:52

## 2020-10-04 NOTE — PROGRESS NOTE ADULT - SUBJECTIVE AND OBJECTIVE BOX
[facility] Division of Hospital Medicine    Chief Complaint:   Syncopal episode    SUBJECTIVE / OVERNIGHT EVENTS: Patient seen and examined. reports she is feeling better. No issues or overnight events     Patient denies chest pain, SOB, abd pain, N/V, fever, chills, dysuria or any other complaints. All remainder ROS negative.     MEDICATIONS  (STANDING):  desmopressin 0.1 milliGRAM(s) Oral <User Schedule>  enoxaparin Injectable 40 milliGRAM(s) SubCutaneous daily  ferrous    sulfate 325 milliGRAM(s) Oral two times a day  folic acid 1 milliGRAM(s) Oral daily  levETIRAcetam 500 milliGRAM(s) Oral two times a day  multivitamin/minerals 1 Tablet(s) Oral daily  pantoprazole    Tablet 40 milliGRAM(s) Oral before breakfast  potassium phosphate IVPB 30 milliMole(s) IV Intermittent once    MEDICATIONS  (PRN):        I&O's Summary    03 Oct 2020 07:01  -  04 Oct 2020 07:00  --------------------------------------------------------  IN: 1525 mL / OUT: 1 mL / NET: 1524 mL        PHYSICAL EXAM:  Vital Signs Last 24 Hrs  T(C): 36.8 (04 Oct 2020 07:41), Max: 38.3 (03 Oct 2020 15:14)  T(F): 98.3 (04 Oct 2020 07:41), Max: 100.9 (03 Oct 2020 15:14)  HR: 84 (04 Oct 2020 07:41) (84 - 96)  BP: 126/87 (04 Oct 2020 07:41) (114/73 - 135/84)  BP(mean): --  RR: 18 (04 Oct 2020 07:41) (18 - 18)  SpO2: 98% (04 Oct 2020 07:41) (95% - 99%)    CONSTITUTIONAL: NAD, well-developed, well-groomed  ENMT: Moist oral mucosa, no pharyngeal injection or exudates; normal dentition  RESPIRATORY: Normal respiratory effort; lungs are clear to auscultation bilaterally  CARDIOVASCULAR: Regular rate and rhythm, normal S1 and S2, no murmur/rub/gallop; No lower extremity edema;  ABDOMEN: Nontender to palpation, normoactive bowel sounds, no rebound/guarding;  MUSCLOSKELETAL:  no clubbing or cyanosis of digits; no joint swelling or tenderness to palpation  PSYCH: A+O to person, place, but not to  time; affect appropriate  NEUROLOGY: CN 2-12 are intact and symmetric; no gross sensory deficits;   SKIN: No rashes; no palpable lesions    LABS:                        7.9    6.25  )-----------( 98       ( 04 Oct 2020 06:24 )             28.9     10-04    154<H>  |  124<H>  |  30.0<H>  ----------------------------<  81  3.7   |  18.0<L>  |  1.13    Ca    8.1<L>      04 Oct 2020 06:20  Phos  1.9     10-04  Mg     1.9     10-04    TPro  6.2<L>  /  Alb  3.2<L>  /  TBili  0.3<L>  /  DBili  x   /  AST  67<H>  /  ALT  64<H>  /  AlkPhos  120  10-04    PT/INR - ( 04 Oct 2020 06:21 )   PT: 12.9 sec;   INR: 1.12 ratio         PTT - ( 04 Oct 2020 06:21 )  PTT:36.8 sec  CARDIAC MARKERS ( 02 Oct 2020 20:51 )  x     / <0.01 ng/mL / x     / x     / x          Urinalysis Basic - ( 02 Oct 2020 22:44 )    Color: Yellow / Appearance: Clear / S.020 / pH: x  Gluc: x / Ketone: Negative  / Bili: Negative / Urobili: Negative mg/dL   Blood: x / Protein: 30 mg/dL / Nitrite: Negative   Leuk Esterase: Moderate / RBC: 0-2 /HPF / WBC 3-5   Sq Epi: x / Non Sq Epi: Occasional / Bacteria: x        CAPILLARY BLOOD GLUCOSE            RADIOLOGY & ADDITIONAL TESTS:  Results Reviewed:   Imaging Personally Reviewed:  Electrocardiogram Personally Reviewed:

## 2020-10-04 NOTE — PROGRESS NOTE ADULT - SUBJECTIVE AND OBJECTIVE BOX
Interval Events:  no overnight events  follow up on DI    patient seen and examined at bedside.  patient feels well, no complaints      REVIEW OF SYSTEMS:    CONSTITUTIONAL: No fever, weight loss, or fatigue  EYES: No eye pain, visual disturbances, or discharge  ENMT:  No difficulty hearing, tinnitus, vertigo; No sinus or throat pain  NECK: No pain or stiffness  RESPIRATORY: No cough, wheezing, chills or hemoptysis; No shortness of breath  CARDIOVASCULAR: No chest pain, palpitations, dizziness, or leg swelling  GASTROINTESTINAL: No abdominal or epigastric pain. No nausea, vomiting, or hematemesis; No diarrhea or constipation. No melena or hematochezia.  NEUROLOGICAL: No headaches, memory loss, loss of strength, numbness, or tremors  SKIN: No itching, burning, rashes, or lesions   MUSCULOSKELETAL: No joint pain or swelling; No muscle, back, or extremity pain  PSYCHIATRIC: No depression, anxiety, mood swings, or difficulty sleeping        No Known Allergies      MEDICATIONS  (STANDING):  chlorhexidine 2% Cloths 1 Application(s) Topical daily  desmopressin 0.1 milliGRAM(s) Oral <User Schedule>  enoxaparin Injectable 40 milliGRAM(s) SubCutaneous daily  ferrous    sulfate 325 milliGRAM(s) Oral two times a day  folic acid 1 milliGRAM(s) Oral daily  levETIRAcetam 500 milliGRAM(s) Oral two times a day  multivitamin/minerals 1 Tablet(s) Oral daily  pantoprazole    Tablet 40 milliGRAM(s) Oral before breakfast  sodium bicarbonate  Infusion 0.068 mEq/kG/Hr (100 mL/Hr) IV Continuous <Continuous>    MEDICATIONS  (PRN):      Vital Signs Last 24 Hrs  T(C): 36.8 (04 Oct 2020 07:41), Max: 38.3 (03 Oct 2020 15:14)  T(F): 98.3 (04 Oct 2020 07:41), Max: 100.9 (03 Oct 2020 15:14)  HR: 84 (04 Oct 2020 07:41) (84 - 96)  BP: 126/87 (04 Oct 2020 07:41) (114/73 - 135/84)  BP(mean): --  RR: 18 (04 Oct 2020 07:41) (18 - 18)  SpO2: 98% (04 Oct 2020 07:41) (95% - 99%)    Physical Exam:    Constitutional: NAD, well-developed  HEENT: EOMI, no exophalmos  Neck: trachea midline, no thyroid enlargement  Respiratory: CTAB, normal respirations  Cardiovascular: S1 and S2, RRR  Gastrointestinal: BS+, soft, ntnd  Extremities: No peripheral edema  Neurological: alert, awake  Psychiatric: Normal mood and normal affect  Skin: no rashes, no acanthosis    LABS  10-04    155<H>  |  125<H>  |  27.0<H>  ----------------------------<  163<H>  4.0   |  17.0<L>  |  1.21    Ca    8.3<L>      04 Oct 2020 11:58  Phos  1.9     10-04  Mg     1.9     10-04    TPro  6.2<L>  /  Alb  3.2<L>  /  TBili  0.3<L>  /  DBili  x   /  AST  67<H>  /  ALT  64<H>  /  AlkPhos  120  10-04                          7.9    6.25  )-----------( 98       ( 04 Oct 2020 06:24 )             28.9         Alanine Aminotransferase (ALT/SGPT): 64 U/L (10-04-20 @ 06:20)  Albumin, Serum: 3.2 g/dL (10-04-20 @ 06:20)  Aspartate Aminotransferase (AST/SGOT): 67 U/L (10-04-20 @ 06:20)  Alkaline Phosphatase, Serum: 120 U/L (10-04-20 @ 06:20)  Aspartate Aminotransferase (AST/SGOT): 58 U/L (10-03-20 @ 21:49)  Alkaline Phosphatase, Serum: 117 U/L (10-03-20 @ 21:49)  Alanine Aminotransferase (ALT/SGPT): 45 U/L (10-03-20 @ 21:49)  Albumin, Serum: 3.5 g/dL (10-03-20 @ 21:49)  Aspartate Aminotransferase (AST/SGOT): 29 U/L (10-03-20 @ 18:19)  Alkaline Phosphatase, Serum: 110 U/L (10-03-20 @ 18:19)  Alanine Aminotransferase (ALT/SGPT): 27 U/L (10-03-20 @ 18:19)  Albumin, Serum: 3.5 g/dL (10-03-20 @ 18:19)  Albumin, Serum: 3.8 g/dL (10-03-20 @ 15:17)  Aspartate Aminotransferase (AST/SGOT): 32 U/L (10-03-20 @ 15:17)  Alkaline Phosphatase, Serum: 106 U/L (10-03-20 @ 15:17)  Alanine Aminotransferase (ALT/SGPT): 28 U/L (10-03-20 @ 15:17)  Alanine Aminotransferase (ALT/SGPT): 23 U/L (10-03-20 @ 10:05)  Albumin, Serum: 3.9 g/dL (10-03-20 @ 10:05)  Aspartate Aminotransferase (AST/SGOT): 28 U/L (10-03-20 @ 10:05)  Alkaline Phosphatase, Serum: 104 U/L (10-03-20 @ 10:05)  Aspartate Aminotransferase (AST/SGOT): 14 U/L (10-03-20 @ 07:54)  Alkaline Phosphatase, Serum: 93 U/L (10-03-20 @ 07:54)  Alanine Aminotransferase (ALT/SGPT): 15 U/L (10-03-20 @ 07:54)  Albumin, Serum: 3.8 g/dL (10-03-20 @ 07:54)  Alanine Aminotransferase (ALT/SGPT): 17 U/L (10-03-20 @ 01:21)  Albumin, Serum: 3.6 g/dL (10-03-20 @ 01:21)  Aspartate Aminotransferase (AST/SGOT): 23 U/L (10-03-20 @ 01:21)  Alkaline Phosphatase, Serum: 87 U/L (10-03-20 @ 01:21)  Albumin, Serum: 3.8 g/dL (10-02-20 @ 20:51)  Alkaline Phosphatase, Serum: 95 U/L (10-02-20 @ 20:51)  Alanine Aminotransferase (ALT/SGPT): 15 U/L (10-02-20 @ 20:51)  Aspartate Aminotransferase (AST/SGOT): 18 U/L (10-02-20 @ 20:51)

## 2020-10-04 NOTE — PROGRESS NOTE ADULT - ASSESSMENT
55 yr old female with hx HTN, DI, SAH, cerebral aneurysm s/p clipping 2011 multiple admissions for hypernatremia     Exacerbation of Chronic hypernatremia / Pre- Renal azotemia   known CDI   Known H/O symptoms  from rapid reversal   Continue the current measures   Interestingly , her urine SG is 1020  Desmopressin adjusted   Serial labs     Will add bicarb to IVF       HTN - Watch on meds      55 yr old female with hx HTN, DI, SAH, cerebral aneurysm s/p clipping 2011 multiple admissions for hypernatremia     Exacerbation of Chronic hypernatremia / Pre- Renal azotemia   known CDI   Known H/O symptoms  from rapid reversal   Continue the current measures   Interestingly , her urine SG is 1020  Desmopressin adjusted   Serial labs     Will add bicarb to IVF   BMP at 8 pm       HTN - Watch on meds

## 2020-10-04 NOTE — PROGRESS NOTE ADULT - SUBJECTIVE AND OBJECTIVE BOX
NEPHROLOGY INTERVAL HPI/OVERNIGHT EVENTS:    Feels better   Getting IV K phos     MEDICATIONS  (STANDING):  desmopressin 0.1 milliGRAM(s) Oral <User Schedule>  enoxaparin Injectable 40 milliGRAM(s) SubCutaneous daily  ferrous    sulfate 325 milliGRAM(s) Oral two times a day  folic acid 1 milliGRAM(s) Oral daily  levETIRAcetam 500 milliGRAM(s) Oral two times a day  multivitamin/minerals 1 Tablet(s) Oral daily  pantoprazole    Tablet 40 milliGRAM(s) Oral before breakfast    MEDICATIONS  (PRN):      Allergies    No Known Allergies    Intolerances      Vital Signs Last 24 Hrs  T(C): 36.8 (04 Oct 2020 07:41), Max: 38.3 (03 Oct 2020 15:14)  T(F): 98.3 (04 Oct 2020 07:41), Max: 100.9 (03 Oct 2020 15:14)  HR: 84 (04 Oct 2020 07:41) (84 - 96)  BP: 126/87 (04 Oct 2020 07:41) (114/73 - 135/84)  BP(mean): --  RR: 18 (04 Oct 2020 07:41) (18 - 18)  SpO2: 98% (04 Oct 2020 07:41) (95% - 99%)  Daily     Daily   I&O's Detail    03 Oct 2020 07:01  -  04 Oct 2020 07:00  --------------------------------------------------------  IN:    Oral Fluid: 400 mL    sodium chloride 0.45%: 1125 mL  Total IN: 1525 mL    OUT:    Voided (mL): 1 mL  Total OUT: 1 mL    Total NET: 1524 mL        I&O's Summary    03 Oct 2020 07:01  -  04 Oct 2020 07:00  --------------------------------------------------------  IN: 1525 mL / OUT: 1 mL / NET: 1524 mL        PHYSICAL EXAM  :HEAD:  anicteric , no edema   NECK: Supple, No JVD,  CHEST/LUNG: Clear to percussion bilaterally; No rales, rhonchi, wheezing, or rubs  HEART: Regular rate and rhythm; No murmurs, rubs, or gallops  ABDOMEN: Soft, Nontender, Nondistended; Bowel sounds present  EXTREMITIES:  2+ Peripheral Pulses, No clubbing, cyanosis, or edema    LABS:                        7.9    6.25  )-----------( 98       ( 04 Oct 2020 06:24 )             28.9     10-04    155<H>  |  125<H>  |  27.0<H>  ----------------------------<  163<H>  4.0   |  17.0<L>  |  1.21    Ca    8.3<L>      04 Oct 2020 11:58  Phos  1.9     10-04  Mg     1.9     10-04    TPro  6.2<L>  /  Alb  3.2<L>  /  TBili  0.3<L>  /  DBili  x   /  AST  67<H>  /  ALT  64<H>  /  AlkPhos  120  10-04    PT/INR - ( 04 Oct 2020 06:21 )   PT: 12.9 sec;   INR: 1.12 ratio         PTT - ( 04 Oct 2020 06:21 )  PTT:36.8 sec  Urinalysis Basic - ( 02 Oct 2020 22:44 )    Color: Yellow / Appearance: Clear / S.020 / pH: x  Gluc: x / Ketone: Negative  / Bili: Negative / Urobili: Negative mg/dL   Blood: x / Protein: 30 mg/dL / Nitrite: Negative   Leuk Esterase: Moderate / RBC: 0-2 /HPF / WBC 3-5   Sq Epi: x / Non Sq Epi: Occasional / Bacteria: x      Magnesium, Serum: 1.9 mg/dL (10-04 @ 06:20)  Phosphorus Level, Serum: 1.9 mg/dL (10-04 @ 06:20)          RADIOLOGY & ADDITIONAL TESTS:

## 2020-10-04 NOTE — PROGRESS NOTE ADULT - ASSESSMENT
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 Per family, patient at baseline is poor historian and has no insight into medical history due to short term memory loss since SAH in 2012. Per daughter, patient was her normal self washing dishes at 6pm, when daughter returned home and gave mother her evening BP and seizure meds. Did not know mother had already taken meds 10 mins prior. After giving meds, few minutes later mother felt lightheaded and dizzy and fell but daughter was able to catch her before hitting floor. Never lost conciousness, didnt hit her head. Denied palpitations, CP, HA, seizure like activity. No urinary or fecal incontinence after. No post ictal episode. No hx of hypoglycemia. Denies recent stress. Was not standing for long periods of time. consult for hypernatremia to 163 and DI     Hypernatremia in setting of central DI- improved  -multifactorial- hypotension/volume depletion/central DI/TOSHIA  -likely chronic rather than acute  -will need baseline labs from outpatient endocrinology  -nephrology also following- appreciated- on D5 with bicarb  -avoid rapid correction, limit to 10point decrease in a 24hour time frame  -s/p 4 doses of DDAVP 0.2mg, changed to DDAVP at 0.1mg TID, if no change in the next 2 sNa, increase DDAVP to 0.2/0.1/0.1mg daily  -labs Q4-6hrs, monitor closely, suggest cautiously increase DDAVP    TOSHIA- improving on fluids. nephrology following    Obesity s/p gastric bypass- should be on multiple vitamins, but can check labs- iron/ferritin/vitamin D/A/K/E/selenium/folate/b12/thiamine/zinc    Hx of seizures- on lamictal

## 2020-10-04 NOTE — PROGRESS NOTE ADULT - ASSESSMENT
54 y/o female with h/o gastric bypass surgery (2004), HTN, chronic anemia, diabetes insipidus (dx in 2012) after an episode of ICH 2nd to brain aneurysm (2011/[facility]) chronic PICC line for home iv hydration (1000 cc D5 every otherday) presented to ER for near syncopal episode likely due to accidental antihypertensive overdose and hypernatremia in setting of Diabetes insipidus. In the ER, noted hypotensive and hypernatremic to 164.She was started on IVFs and desmopressin was increased Endocrine and Nephrology are following.     Hypernatremia due to central DI and likely volume depletion  sodium level 164, asymptomatic, has been as high as 172  free water deficit 6.2L, hypotensive likely cause of syncope, currently awake, alert with baseline memory loss   takes home dose desmopressin 0.1mg TID and 1L IVF M,W,F (likely d5)  s/p 2 puffs intranasal desmopressin once (20mcg equivalent)   increase to desmopressin 0.2mg TID  renal consulted- [place] [facility] Appreciate recs  endo consult-follows her when admitted   monitor BNP q4 hrs and avoid rapid correction   per daughter, hx of seizures requiring ICU stay here in past due to Na reduced to rapidly, her baseline Na is ~145  hold IVFs for now as Na 154, will discuss with nephro additional fluid needs and rate.     Near syncopal episode likely 2/2 hypotension 2/2 BP medication overdose  pt s/p accidental doubling of antihypertensives this evening, no LOC  CT head neg for acute bleed  hold BP meds for now due to hypotension and normal HR   monitor BP  orthostatics negative  ekg wnl  no tele event    TOSHIA  likely prerenal  baseline 1.1-1.2, today 1.13 now at baseline  monitor cr daily    HTN   BP currently soft, accidental doubling of BP meds at home   hold antihypertensives, and  restart pending BP improvement   dash/tlc diet    Hx of ICH aneurysm rupture   neurology exam stable  short and long term memory deficits  monitor    Hx of seizures   stable, no recent seizures  c/w home dose Lamictal 500mg BID    Chronic Anemia:  baseline Hbg 8-9, no reports of bleeding  cont. to monitor    Hx of gastric bypass  c/w home dose iron tabs and folic acid    Hypophosphatemia  - IV phos replacement    DVT prophylaxis- Lovenox 40mg  Dispo: remain in patient until bp and sodium improves.

## 2020-10-05 ENCOUNTER — TRANSCRIPTION ENCOUNTER (OUTPATIENT)
Age: 55
End: 2020-10-05

## 2020-10-05 VITALS
RESPIRATION RATE: 18 BRPM | DIASTOLIC BLOOD PRESSURE: 89 MMHG | SYSTOLIC BLOOD PRESSURE: 123 MMHG | HEART RATE: 85 BPM | TEMPERATURE: 98 F | OXYGEN SATURATION: 97 %

## 2020-10-05 LAB
ANION GAP SERPL CALC-SCNC: 10 MMOL/L — SIGNIFICANT CHANGE UP (ref 5–17)
BASOPHILS # BLD AUTO: 0.02 K/UL — SIGNIFICANT CHANGE UP (ref 0–0.2)
BASOPHILS NFR BLD AUTO: 0.4 % — SIGNIFICANT CHANGE UP (ref 0–2)
BUN SERPL-MCNC: 20 MG/DL — SIGNIFICANT CHANGE UP (ref 8–20)
CALCIUM SERPL-MCNC: 7.9 MG/DL — LOW (ref 8.6–10.2)
CHLORIDE SERPL-SCNC: 120 MMOL/L — HIGH (ref 98–107)
CO2 SERPL-SCNC: 20 MMOL/L — LOW (ref 22–29)
CREAT SERPL-MCNC: 1.06 MG/DL — SIGNIFICANT CHANGE UP (ref 0.5–1.3)
EOSINOPHIL # BLD AUTO: 0.18 K/UL — SIGNIFICANT CHANGE UP (ref 0–0.5)
EOSINOPHIL NFR BLD AUTO: 3.6 % — SIGNIFICANT CHANGE UP (ref 0–6)
GLUCOSE SERPL-MCNC: 85 MG/DL — SIGNIFICANT CHANGE UP (ref 70–99)
HCT VFR BLD CALC: 28.7 % — LOW (ref 34.5–45)
HGB BLD-MCNC: 8.2 G/DL — LOW (ref 11.5–15.5)
IMM GRANULOCYTES NFR BLD AUTO: 1.2 % — SIGNIFICANT CHANGE UP (ref 0–1.5)
LYMPHOCYTES # BLD AUTO: 1.37 K/UL — SIGNIFICANT CHANGE UP (ref 1–3.3)
LYMPHOCYTES # BLD AUTO: 27.3 % — SIGNIFICANT CHANGE UP (ref 13–44)
MAGNESIUM SERPL-MCNC: 1.9 MG/DL — SIGNIFICANT CHANGE UP (ref 1.6–2.6)
MCHC RBC-ENTMCNC: 25.8 PG — LOW (ref 27–34)
MCHC RBC-ENTMCNC: 28.6 GM/DL — LOW (ref 32–36)
MCV RBC AUTO: 90.3 FL — SIGNIFICANT CHANGE UP (ref 80–100)
MONOCYTES # BLD AUTO: 0.29 K/UL — SIGNIFICANT CHANGE UP (ref 0–0.9)
MONOCYTES NFR BLD AUTO: 5.8 % — SIGNIFICANT CHANGE UP (ref 2–14)
NEUTROPHILS # BLD AUTO: 3.1 K/UL — SIGNIFICANT CHANGE UP (ref 1.8–7.4)
NEUTROPHILS NFR BLD AUTO: 61.7 % — SIGNIFICANT CHANGE UP (ref 43–77)
PHOSPHATE SERPL-MCNC: 2.5 MG/DL — SIGNIFICANT CHANGE UP (ref 2.4–4.7)
PLATELET # BLD AUTO: 114 K/UL — LOW (ref 150–400)
POTASSIUM SERPL-MCNC: 3.9 MMOL/L — SIGNIFICANT CHANGE UP (ref 3.5–5.3)
POTASSIUM SERPL-SCNC: 3.9 MMOL/L — SIGNIFICANT CHANGE UP (ref 3.5–5.3)
RBC # BLD: 3.18 M/UL — LOW (ref 3.8–5.2)
RBC # FLD: 14.5 % — SIGNIFICANT CHANGE UP (ref 10.3–14.5)
SODIUM SERPL-SCNC: 150 MMOL/L — HIGH (ref 135–145)
WBC # BLD: 5.02 K/UL — SIGNIFICANT CHANGE UP (ref 3.8–10.5)
WBC # FLD AUTO: 5.02 K/UL — SIGNIFICANT CHANGE UP (ref 3.8–10.5)

## 2020-10-05 PROCEDURE — 99239 HOSP IP/OBS DSCHRG MGMT >30: CPT

## 2020-10-05 RX ORDER — ACETAMINOPHEN 500 MG
650 TABLET ORAL EVERY 6 HOURS
Refills: 0 | Status: DISCONTINUED | OUTPATIENT
Start: 2020-10-05 | End: 2020-10-05

## 2020-10-05 RX ADMIN — Medication 1 MILLIGRAM(S): at 11:15

## 2020-10-05 RX ADMIN — LEVETIRACETAM 500 MILLIGRAM(S): 250 TABLET, FILM COATED ORAL at 04:53

## 2020-10-05 RX ADMIN — PANTOPRAZOLE SODIUM 40 MILLIGRAM(S): 20 TABLET, DELAYED RELEASE ORAL at 04:53

## 2020-10-05 RX ADMIN — Medication 1 TABLET(S): at 12:22

## 2020-10-05 RX ADMIN — CHLORHEXIDINE GLUCONATE 1 APPLICATION(S): 213 SOLUTION TOPICAL at 12:02

## 2020-10-05 RX ADMIN — Medication 650 MILLIGRAM(S): at 12:44

## 2020-10-05 RX ADMIN — ENOXAPARIN SODIUM 40 MILLIGRAM(S): 100 INJECTION SUBCUTANEOUS at 11:15

## 2020-10-05 RX ADMIN — DESMOPRESSIN ACETATE 0.1 MILLIGRAM(S): 0.1 TABLET ORAL at 12:22

## 2020-10-05 RX ADMIN — Medication 325 MILLIGRAM(S): at 04:53

## 2020-10-05 RX ADMIN — DESMOPRESSIN ACETATE 0.1 MILLIGRAM(S): 0.1 TABLET ORAL at 04:53

## 2020-10-05 NOTE — DISCHARGE NOTE PROVIDER - NSDCFUADDAPPT_GEN_ALL_CORE_FT
Please Follow up with Endocrinologist in 2-5 days  Please have labs drawn within 2 days to check Sodium level.

## 2020-10-05 NOTE — DISCHARGE NOTE PROVIDER - NSDCCPCAREPLAN_GEN_ALL_CORE_FT
PRINCIPAL DISCHARGE DIAGNOSIS  Diagnosis: Hypernatremia  Assessment and Plan of Treatment: continue desmipressin  Have labs drown to check Sodium within 2 days  Follow up with Nephrology and Endocrinology      SECONDARY DISCHARGE DIAGNOSES  Diagnosis: Syncope  Assessment and Plan of Treatment: Hold Blood pressure medication   Check blood pressure daily  Follow up with PCP

## 2020-10-05 NOTE — DISCHARGE NOTE PROVIDER - NSDCMRMEDTOKEN_GEN_ALL_CORE_FT
amLODIPine 10 mg oral tablet: 1 tab(s) orally once a day  desmopressin 0.1 mg oral tablet: 1 tab(s) orally 3 times a day  ferrous sulfate 325 mg (65 mg elemental iron) oral tablet: 1 tab(s) orally 2 times a day  folic acid 1 mg oral tablet: 1 tab(s) orally once a day  labetalol 100 mg oral tablet: 1 tab(s) orally 2 times a day  levETIRAcetam 500 mg oral tablet: 1 tab(s) orally 2 times a day  Multiple Vitamins with Minerals oral tablet: 1 tab(s) orally once a day  pantoprazole 40 mg oral delayed release tablet: 1 tab(s) orally once a day   desmopressin 0.1 mg oral tablet: 1 tab(s) orally 3 times a day  ferrous sulfate 325 mg (65 mg elemental iron) oral tablet: 1 tab(s) orally 2 times a day  folic acid 1 mg oral tablet: 1 tab(s) orally once a day  levETIRAcetam 500 mg oral tablet: 1 tab(s) orally 2 times a day  Multiple Vitamins with Minerals oral tablet: 1 tab(s) orally once a day  pantoprazole 40 mg oral delayed release tablet: 1 tab(s) orally once a day

## 2020-10-05 NOTE — DISCHARGE NOTE NURSING/CASE MANAGEMENT/SOCIAL WORK - PATIENT PORTAL LINK FT
You can access the FollowMyHealth Patient Portal offered by [facility] by registering at the following website: [email or web address]. By joining [facility]’s FollowMyHealth portal, you will also be able to view your health information using other applications (apps) compatible with our system.

## 2020-10-05 NOTE — DISCHARGE NOTE PROVIDER - PROVIDER TOKENS
PROVIDER:[TOKEN:[[id number]],FOLLOWUP:[2 weeks]],PROVIDER:[TOKEN:[[id number]],FOLLOWUP:[1 week]]

## 2020-10-05 NOTE — DISCHARGE NOTE NURSING/CASE MANAGEMENT/SOCIAL WORK - NSDCFUADDAPPT_GEN_ALL_CORE_FT
Please Follow up with Endocrinologist office Dr. Mark Reyes [address] [place] [address] [phone]  in 2-5 days.    Please have labs drawn within 2 days to check Sodium level.

## 2020-10-05 NOTE — DISCHARGE NOTE PROVIDER - CARE PROVIDER_API CALL
Nicole Carvajal  [facility]  [address] [place] [address]  Phone: [phone]  Fax: [phone]  Follow Up Time: 2 weeks    [place] [address]  Phone: [phone]  Fax: [phone]  Follow Up Time: 1 week

## 2020-10-05 NOTE — DISCHARGE NOTE PROVIDER - HOSPITAL COURSE
54 y/o female with h/o gastric bypass surgery (2004), HTN, chronic anemia, diabetes insipidus (dx in 2012) after an episode of ICH 2nd to brain aneurysm (2011/[facility]) chronic PICC line for home iv hydration (1000 cc D5 every otherday) presented to ER for near syncopal episode likely due to accidental antihypertensive overdose and hypernatremia in setting of Diabetes insipidus. In the ER, noted hypotensive and hypernatremic to 164.She was started on IVFs and desmopressin was increased Endocrine and Nephrology are following. We held her BP meds and her blood pressure improved. Sodium improved. Patient requires sodium to be on the higher side due to prior episodes of seizing when sodium drops below 145. She is discharged home with close outpatient follow up and to have her labs drawn in 2 days which will be followed by Endocrinology.     Vital Signs Last 24 Hrs  T(C): 36.9 (05 Oct 2020 07:09), Max: 37 (04 Oct 2020 20:07)  T(F): 98.4 (05 Oct 2020 07:09), Max: 98.6 (04 Oct 2020 20:07)  HR: 85 (05 Oct 2020 07:09) (76 - 85)  BP: 123/89 (05 Oct 2020 07:09) (115/72 - 123/89)  BP(mean): --  RR: 18 (05 Oct 2020 07:09) (18 - 19)  SpO2: 97% (05 Oct 2020 07:09) (95% - 97%)    PHYSICAL EXAM:  Constitutional: No acute distress, alert and oriented by 2  HEENT: AT/NC, EOMI, PERRLA, Normal conjunctiva, no pharyngeal erythema, moist oral mucosa  Respiratory: CTA BL, equal breath sounds, no crackles or wheezing  Cardiovascular: RRR, no edema  Gastrointestinal: soft, Non-tender, Non-distended + Bowel sounds, no rebound or guarding  Musculoskeletal: No joint edema  Neurological: CN 2-12 grossly intact, no focal deficits  Skin: warm, dry and intact  Psychiatric: normal mood and affect    35 minutes spent on discharge.      54 y/o female with h/o gastric bypass surgery (2004), HTN, chronic anemia, diabetes insipidus (dx in 2012) after an episode of ICH 2nd to brain aneurysm (2011/[facility]) chronic PICC line for home iv hydration (1000 cc D5 every otherday) presented to ER for near syncopal episode likely due to accidental antihypertensive overdose and hypernatremia in setting of Diabetes insipidus. In the ER, noted hypotensive and hypernatremic to 164.She was started on IVFs and desmopressin was increased Endocrine and Nephrology are following. We held her BP meds and her blood pressure improved. Sodium improved. Patient requires sodium to be on the higher side due to prior episodes of seizing when sodium drops below 145. She is discharged home with close outpatient follow up and to have her labs drawn in 2 days which will be followed by Endocrinology.     Patient needs resumption of home IV infusion of IVFs per her outpt Endocrinologist Dr. Nichols.     Vital Signs Last 24 Hrs  T(C): 36.9 (05 Oct 2020 07:09), Max: 37 (04 Oct 2020 20:07)  T(F): 98.4 (05 Oct 2020 07:09), Max: 98.6 (04 Oct 2020 20:07)  HR: 85 (05 Oct 2020 07:09) (76 - 85)  BP: 123/89 (05 Oct 2020 07:09) (115/72 - 123/89)  BP(mean): --  RR: 18 (05 Oct 2020 07:09) (18 - 19)  SpO2: 97% (05 Oct 2020 07:09) (95% - 97%)    PHYSICAL EXAM:  Constitutional: No acute distress, alert and oriented by 2  HEENT: AT/NC, EOMI, PERRLA, Normal conjunctiva, no pharyngeal erythema, moist oral mucosa  Respiratory: CTA BL, equal breath sounds, no crackles or wheezing  Cardiovascular: RRR, no edema  Gastrointestinal: soft, Non-tender, Non-distended + Bowel sounds, no rebound or guarding  Musculoskeletal: No joint edema  Neurological: CN 2-12 grossly intact, no focal deficits  Skin: warm, dry and intact  Psychiatric: normal mood and affect    35 minutes spent on discharge.      56 y/o female with h/o gastric bypass surgery (2004), HTN, chronic anemia, diabetes insipidus (dx in 2012) after an episode of ICH 2nd to brain aneurysm (2011/[facility]) chronic PICC line for home iv hydration (1000 cc D5 every otherday) presented to ER for near syncopal episode likely due to accidental antihypertensive overdose and hypernatremia in setting of Diabetes insipidus. In the ER, noted hypotensive and hypernatremic to 164.She was started on IVFs and desmopressin was increased Endocrine and Nephrology are following. We held her BP meds and her blood pressure improved. Sodium improved. Patient requires sodium to be on the higher side due to prior episodes of seizing when sodium drops below 145. She is discharged home with close outpatient follow up and to have her labs drawn in 2 days which will be followed by Endocrinology.    Patient needs resumption of home IV infusion of IVFs per her outpt Endocrinologist Dr. Nichols. she will follow up with them in 1-2 days.     Vital Signs Last 24 Hrs  T(C): 36.9 (05 Oct 2020 07:09), Max: 37 (04 Oct 2020 20:07)  T(F): 98.4 (05 Oct 2020 07:09), Max: 98.6 (04 Oct 2020 20:07)  HR: 85 (05 Oct 2020 07:09) (76 - 85)  BP: 123/89 (05 Oct 2020 07:09) (115/72 - 123/89)  BP(mean): --  RR: 18 (05 Oct 2020 07:09) (18 - 19)  SpO2: 97% (05 Oct 2020 07:09) (95% - 97%)    PHYSICAL EXAM:  Constitutional: No acute distress, alert and oriented by 2  HEENT: AT/NC, EOMI, PERRLA, Normal conjunctiva, no pharyngeal erythema, moist oral mucosa  Respiratory: CTA BL, equal breath sounds, no crackles or wheezing  Cardiovascular: RRR, no edema  Gastrointestinal: soft, Non-tender, Non-distended + Bowel sounds, no rebound or guarding  Musculoskeletal: No joint edema  Neurological: CN 2-12 grossly intact, no focal deficits  Skin: warm, dry and intact  Psychiatric: normal mood and affect    35 minutes spent on discharge.      54 y/o female with h/o gastric bypass surgery (2004), HTN, chronic anemia, diabetes insipidus (dx in 2012) after an episode of ICH 2nd to brain aneurysm (2011/[facility]) chronic PICC line for home iv hydration (1000 cc D5 every otherday) presented to ER for near syncopal episode likely due to accidental antihypertensive overdose and hypernatremia in setting of Diabetes insipidus. In the ER, noted hypotensive and hypernatremic to 164.She was started on IVFs and desmopressin was increased Endocrine and Nephrology are following. We held her BP meds and her blood pressure improved. Sodium improved. Patient requires sodium to be on the higher side due to prior episodes of seizing when sodium drops below 145. She is discharged home with close outpatient follow up and to have her labs drawn in 2 days which will be followed by Endocrinology.    Patient needs resumption of home IV infusion of IVFs per her outpt Endocrinologist Dr. Nichols. she will follow up with them in 1-2 days.     Daughter updated on follow up needed and reviewed meds.     Vital Signs Last 24 Hrs  T(C): 36.9 (05 Oct 2020 07:09), Max: 37 (04 Oct 2020 20:07)  T(F): 98.4 (05 Oct 2020 07:09), Max: 98.6 (04 Oct 2020 20:07)  HR: 85 (05 Oct 2020 07:09) (76 - 85)  BP: 123/89 (05 Oct 2020 07:09) (115/72 - 123/89)  BP(mean): --  RR: 18 (05 Oct 2020 07:09) (18 - 19)  SpO2: 97% (05 Oct 2020 07:09) (95% - 97%)    PHYSICAL EXAM:  Constitutional: No acute distress, alert and oriented by 2  HEENT: AT/NC, EOMI, PERRLA, Normal conjunctiva, no pharyngeal erythema, moist oral mucosa  Respiratory: CTA BL, equal breath sounds, no crackles or wheezing  Cardiovascular: RRR, no edema  Gastrointestinal: soft, Non-tender, Non-distended + Bowel sounds, no rebound or guarding  Musculoskeletal: No joint edema  Neurological: CN 2-12 grossly intact, no focal deficits  Skin: warm, dry and intact  Psychiatric: normal mood and affect    35 minutes spent on discharge.      54 y/o female with h/o gastric bypass surgery (2004), HTN, chronic anemia, diabetes insipidus (dx in 2012) after an episode of ICH 2nd to brain aneurysm (2011/[facility]) chronic PICC line for home iv hydration (1000 cc D5 every otherday) presented to ER for near syncopal episode likely due to accidental antihypertensive overdose and hypernatremia in setting of Diabetes insipidus. In the ER, noted hypotensive and hypernatremic to 164.She was started on IVFs and desmopressin was increased Endocrine and Nephrology are following. We held her BP meds and her blood pressure improved. Sodium improved. Patient requires sodium to be on the higher side due to prior episodes of seizing when sodium drops below 145. She is discharged home with close outpatient follow up and to have her labs drawn in 2 days which will be followed by Endocrinology.    Patient needs resumption of home IV infusion of IVFs per her outpt Endocrinologist Dr. Nichols. she will follow up with them in 1-2 days.     Daughter updated on follow up needed and reviewed meds.     Vital Signs Last 24 Hrs  T(C): 36.9 (05 Oct 2020 07:09), Max: 37 (04 Oct 2020 20:07)  T(F): 98.4 (05 Oct 2020 07:09), Max: 98.6 (04 Oct 2020 20:07)  HR: 85 (05 Oct 2020 07:09) (76 - 85)  BP: 123/89 (05 Oct 2020 07:09) (115/72 - 123/89)  BP(mean): --  RR: 18 (05 Oct 2020 07:09) (18 - 19)  SpO2: 97% (05 Oct 2020 07:09) (95% - 97%)      PHYSICAL EXAM:  Constitutional: No acute distress, alert and oriented by 2  HEENT: AT/NC, EOMI, PERRLA, Normal conjunctiva, no pharyngeal erythema, moist oral mucosa  Respiratory: CTA BL, equal breath sounds, no crackles or wheezing  Cardiovascular: RRR, no edema  Gastrointestinal: soft, Non-tender, Non-distended + Bowel sounds, no rebound or guarding  Musculoskeletal: No joint edema  Neurological: CN 2-12 grossly intact, no focal deficits  Skin: warm, dry and intact  Psychiatric: normal mood and affect    35 minutes spent on discharge.

## 2020-10-13 DIAGNOSIS — Z71.89 OTHER SPECIFIED COUNSELING: ICD-10-CM

## 2020-10-29 PROCEDURE — 93005 ELECTROCARDIOGRAM TRACING: CPT

## 2020-10-29 PROCEDURE — 85730 THROMBOPLASTIN TIME PARTIAL: CPT

## 2020-10-29 PROCEDURE — 71045 X-RAY EXAM CHEST 1 VIEW: CPT

## 2020-10-29 PROCEDURE — 80048 BASIC METABOLIC PNL TOTAL CA: CPT

## 2020-10-29 PROCEDURE — 99285 EMERGENCY DEPT VISIT HI MDM: CPT | Mod: 25

## 2020-10-29 PROCEDURE — 84484 ASSAY OF TROPONIN QUANT: CPT

## 2020-10-29 PROCEDURE — 85610 PROTHROMBIN TIME: CPT

## 2020-10-29 PROCEDURE — 83735 ASSAY OF MAGNESIUM: CPT

## 2020-10-29 PROCEDURE — 81001 URINALYSIS AUTO W/SCOPE: CPT

## 2020-10-29 PROCEDURE — 85025 COMPLETE CBC W/AUTO DIFF WBC: CPT

## 2020-10-29 PROCEDURE — 86769 SARS-COV-2 COVID-19 ANTIBODY: CPT

## 2020-10-29 PROCEDURE — 70450 CT HEAD/BRAIN W/O DYE: CPT

## 2020-10-29 PROCEDURE — 36415 COLL VENOUS BLD VENIPUNCTURE: CPT

## 2020-10-29 PROCEDURE — 83930 ASSAY OF BLOOD OSMOLALITY: CPT

## 2020-10-29 PROCEDURE — U0003: CPT

## 2020-10-29 PROCEDURE — 82962 GLUCOSE BLOOD TEST: CPT

## 2020-10-29 PROCEDURE — 80053 COMPREHEN METABOLIC PANEL: CPT

## 2020-10-29 PROCEDURE — 84100 ASSAY OF PHOSPHORUS: CPT

## 2020-12-29 ENCOUNTER — INPATIENT (INPATIENT)
Facility: HOSPITAL | Age: 55
LOS: 22 days | Discharge: HOME | DRG: 981 | End: 2021-01-21
Attending: STUDENT IN AN ORGANIZED HEALTH CARE EDUCATION/TRAINING PROGRAM | Admitting: FAMILY MEDICINE
Payer: MEDICAID

## 2020-12-29 VITALS
DIASTOLIC BLOOD PRESSURE: 60 MMHG | TEMPERATURE: 99 F | HEIGHT: 60 IN | RESPIRATION RATE: 16 BRPM | HEART RATE: 87 BPM | WEIGHT: 125 LBS | SYSTOLIC BLOOD PRESSURE: 92 MMHG | OXYGEN SATURATION: 96 %

## 2020-12-29 DIAGNOSIS — B96.1 KLEBSIELLA PNEUMONIAE [K. PNEUMONIAE] AS THE CAUSE OF DISEASES CLASSIFIED ELSEWHERE: ICD-10-CM

## 2020-12-29 DIAGNOSIS — D72.829 ELEVATED WHITE BLOOD CELL COUNT, UNSPECIFIED: ICD-10-CM

## 2020-12-29 DIAGNOSIS — N17.9 ACUTE KIDNEY FAILURE, UNSPECIFIED: ICD-10-CM

## 2020-12-29 DIAGNOSIS — U07.1 COVID-19: ICD-10-CM

## 2020-12-29 DIAGNOSIS — I10 ESSENTIAL (PRIMARY) HYPERTENSION: ICD-10-CM

## 2020-12-29 DIAGNOSIS — E87.0 HYPEROSMOLALITY AND HYPERNATREMIA: ICD-10-CM

## 2020-12-29 DIAGNOSIS — Z98.84 BARIATRIC SURGERY STATUS: Chronic | ICD-10-CM

## 2020-12-29 DIAGNOSIS — Z98.84 BARIATRIC SURGERY STATUS: ICD-10-CM

## 2020-12-29 DIAGNOSIS — E23.2 DIABETES INSIPIDUS: ICD-10-CM

## 2020-12-29 DIAGNOSIS — R41.3 OTHER AMNESIA: ICD-10-CM

## 2020-12-29 DIAGNOSIS — T82.7XXA INFECTION AND INFLAMMATORY REACTION DUE TO OTHER CARDIAC AND VASCULAR DEVICES, IMPLANTS AND GRAFTS, INITIAL ENCOUNTER: ICD-10-CM

## 2020-12-29 DIAGNOSIS — R50.9 FEVER, UNSPECIFIED: ICD-10-CM

## 2020-12-29 DIAGNOSIS — D64.9 ANEMIA, UNSPECIFIED: ICD-10-CM

## 2020-12-29 DIAGNOSIS — T80.219A UNSPECIFIED INFECTION DUE TO CENTRAL VENOUS CATHETER, INITIAL ENCOUNTER: ICD-10-CM

## 2020-12-29 DIAGNOSIS — E87.5 HYPERKALEMIA: ICD-10-CM

## 2020-12-29 DIAGNOSIS — R78.81 BACTEREMIA: ICD-10-CM

## 2020-12-29 DIAGNOSIS — E86.0 DEHYDRATION: ICD-10-CM

## 2020-12-29 DIAGNOSIS — I95.9 HYPOTENSION, UNSPECIFIED: ICD-10-CM

## 2020-12-29 DIAGNOSIS — I31.3 PERICARDIAL EFFUSION (NONINFLAMMATORY): ICD-10-CM

## 2020-12-29 DIAGNOSIS — D63.8 ANEMIA IN OTHER CHRONIC DISEASES CLASSIFIED ELSEWHERE: ICD-10-CM

## 2020-12-29 LAB
ALBUMIN SERPL ELPH-MCNC: 3.1 G/DL — LOW (ref 3.3–5.2)
ALP SERPL-CCNC: 57 U/L — SIGNIFICANT CHANGE UP (ref 40–120)
ALT FLD-CCNC: 7 U/L — SIGNIFICANT CHANGE UP
ANION GAP SERPL CALC-SCNC: 8 MMOL/L — SIGNIFICANT CHANGE UP (ref 5–17)
APTT BLD: 37.3 SEC — HIGH (ref 27.5–35.5)
AST SERPL-CCNC: 18 U/L — SIGNIFICANT CHANGE UP
BASOPHILS # BLD AUTO: 0 K/UL — SIGNIFICANT CHANGE UP (ref 0–0.2)
BASOPHILS NFR BLD AUTO: 0 % — SIGNIFICANT CHANGE UP (ref 0–2)
BILIRUB SERPL-MCNC: 0.2 MG/DL — LOW (ref 0.4–2)
BUN SERPL-MCNC: 37 MG/DL — HIGH (ref 8–20)
CALCIUM SERPL-MCNC: 6.8 MG/DL — LOW (ref 8.6–10.2)
CHLORIDE SERPL-SCNC: 133 MMOL/L — HIGH (ref 98–107)
CO2 SERPL-SCNC: 15 MMOL/L — LOW (ref 22–29)
CREAT SERPL-MCNC: 1.4 MG/DL — HIGH (ref 0.5–1.3)
EOSINOPHIL # BLD AUTO: 0 K/UL — SIGNIFICANT CHANGE UP (ref 0–0.5)
EOSINOPHIL NFR BLD AUTO: 0 % — SIGNIFICANT CHANGE UP (ref 0–6)
FLU A RESULT: SIGNIFICANT CHANGE UP
FLU A RESULT: SIGNIFICANT CHANGE UP
FLUAV AG NPH QL: SIGNIFICANT CHANGE UP
FLUBV AG NPH QL: SIGNIFICANT CHANGE UP
GLUCOSE SERPL-MCNC: 82 MG/DL — SIGNIFICANT CHANGE UP (ref 70–99)
HCG SERPL-ACNC: <4 MIU/ML — SIGNIFICANT CHANGE UP
HCG SERPL-ACNC: <4 MIU/ML — SIGNIFICANT CHANGE UP
HCT VFR BLD CALC: 31 % — LOW (ref 34.5–45)
HGB BLD-MCNC: 8.4 G/DL — LOW (ref 11.5–15.5)
INR BLD: 1.13 RATIO — SIGNIFICANT CHANGE UP (ref 0.88–1.16)
LYMPHOCYTES # BLD AUTO: 0.58 K/UL — LOW (ref 1–3.3)
LYMPHOCYTES # BLD AUTO: 7.4 % — LOW (ref 13–44)
MAGNESIUM SERPL-MCNC: 1.9 MG/DL — SIGNIFICANT CHANGE UP (ref 1.6–2.6)
MANUAL SMEAR VERIFICATION: SIGNIFICANT CHANGE UP
MCHC RBC-ENTMCNC: 25.3 PG — LOW (ref 27–34)
MCHC RBC-ENTMCNC: 27.1 GM/DL — LOW (ref 32–36)
MCV RBC AUTO: 93.4 FL — SIGNIFICANT CHANGE UP (ref 80–100)
METAMYELOCYTES # FLD: 1.8 % — HIGH (ref 0–0)
MONOCYTES # BLD AUTO: 0 K/UL — SIGNIFICANT CHANGE UP (ref 0–0.9)
MONOCYTES NFR BLD AUTO: 0 % — LOW (ref 2–14)
NEUTROPHILS # BLD AUTO: 7.1 K/UL — SIGNIFICANT CHANGE UP (ref 1.8–7.4)
NEUTROPHILS NFR BLD AUTO: 80.6 % — HIGH (ref 43–77)
NEUTS BAND # BLD: 10.2 % — HIGH (ref 0–8)
OSMOLALITY SERPL: 354 MOSMOL/KG — HIGH (ref 275–300)
OVALOCYTES BLD QL SMEAR: SLIGHT — SIGNIFICANT CHANGE UP
PHOSPHATE SERPL-MCNC: 2.4 MG/DL — SIGNIFICANT CHANGE UP (ref 2.4–4.7)
PLAT MORPH BLD: NORMAL — SIGNIFICANT CHANGE UP
PLATELET # BLD AUTO: 173 K/UL — SIGNIFICANT CHANGE UP (ref 150–400)
POIKILOCYTOSIS BLD QL AUTO: SLIGHT — SIGNIFICANT CHANGE UP
POLYCHROMASIA BLD QL SMEAR: SLIGHT — SIGNIFICANT CHANGE UP
POTASSIUM SERPL-MCNC: 5 MMOL/L — SIGNIFICANT CHANGE UP (ref 3.5–5.3)
POTASSIUM SERPL-SCNC: 5 MMOL/L — SIGNIFICANT CHANGE UP (ref 3.5–5.3)
PROT SERPL-MCNC: 6.1 G/DL — LOW (ref 6.6–8.7)
PROTHROM AB SERPL-ACNC: 13 SEC — SIGNIFICANT CHANGE UP (ref 10.6–13.6)
RBC # BLD: 3.32 M/UL — LOW (ref 3.8–5.2)
RBC # FLD: 14.5 % — SIGNIFICANT CHANGE UP (ref 10.3–14.5)
RBC BLD AUTO: ABNORMAL
RSV RESULT: SIGNIFICANT CHANGE UP
RSV RNA RESP QL NAA+PROBE: SIGNIFICANT CHANGE UP
SODIUM SERPL-SCNC: 156 MMOL/L — HIGH (ref 135–145)
TROPONIN T SERPL-MCNC: <0.01 NG/ML — SIGNIFICANT CHANGE UP (ref 0–0.06)
WBC # BLD: 7.82 K/UL — SIGNIFICANT CHANGE UP (ref 3.8–10.5)
WBC # FLD AUTO: 7.82 K/UL — SIGNIFICANT CHANGE UP (ref 3.8–10.5)

## 2020-12-29 PROCEDURE — 99223 1ST HOSP IP/OBS HIGH 75: CPT

## 2020-12-29 PROCEDURE — 99291 CRITICAL CARE FIRST HOUR: CPT

## 2020-12-29 PROCEDURE — 93010 ELECTROCARDIOGRAM REPORT: CPT

## 2020-12-29 PROCEDURE — 71045 X-RAY EXAM CHEST 1 VIEW: CPT | Mod: 26

## 2020-12-29 PROCEDURE — 93880 EXTRACRANIAL BILAT STUDY: CPT | Mod: 26

## 2020-12-29 RX ORDER — FOLIC ACID 0.8 MG
1 TABLET ORAL DAILY
Refills: 0 | Status: DISCONTINUED | OUTPATIENT
Start: 2020-12-29 | End: 2021-01-21

## 2020-12-29 RX ORDER — SODIUM CHLORIDE 9 MG/ML
500 INJECTION INTRAMUSCULAR; INTRAVENOUS; SUBCUTANEOUS ONCE
Refills: 0 | Status: COMPLETED | OUTPATIENT
Start: 2020-12-29 | End: 2020-12-29

## 2020-12-29 RX ORDER — LEVETIRACETAM 250 MG/1
500 TABLET, FILM COATED ORAL
Refills: 0 | Status: DISCONTINUED | OUTPATIENT
Start: 2020-12-29 | End: 2021-01-21

## 2020-12-29 RX ORDER — DESMOPRESSIN ACETATE 0.1 MG/1
0.1 TABLET ORAL
Refills: 0 | Status: DISCONTINUED | OUTPATIENT
Start: 2020-12-29 | End: 2021-01-02

## 2020-12-29 RX ORDER — FERROUS SULFATE 325(65) MG
325 TABLET ORAL DAILY
Refills: 0 | Status: DISCONTINUED | OUTPATIENT
Start: 2020-12-29 | End: 2021-01-01

## 2020-12-29 RX ORDER — SODIUM CHLORIDE 9 MG/ML
1000 INJECTION, SOLUTION INTRAVENOUS
Refills: 0 | Status: COMPLETED | OUTPATIENT
Start: 2020-12-29 | End: 2020-12-29

## 2020-12-29 RX ORDER — PANTOPRAZOLE SODIUM 20 MG/1
40 TABLET, DELAYED RELEASE ORAL
Refills: 0 | Status: DISCONTINUED | OUTPATIENT
Start: 2020-12-29 | End: 2021-01-21

## 2020-12-29 RX ADMIN — SODIUM CHLORIDE 500 MILLILITER(S): 9 INJECTION INTRAMUSCULAR; INTRAVENOUS; SUBCUTANEOUS at 19:27

## 2020-12-29 RX ADMIN — SODIUM CHLORIDE 70 MILLILITER(S): 9 INJECTION, SOLUTION INTRAVENOUS at 22:24

## 2020-12-29 RX ADMIN — DESMOPRESSIN ACETATE 0.1 MILLIGRAM(S): 0.1 TABLET ORAL at 21:45

## 2020-12-29 RX ADMIN — SODIUM CHLORIDE 500 MILLILITER(S): 9 INJECTION INTRAMUSCULAR; INTRAVENOUS; SUBCUTANEOUS at 19:28

## 2020-12-29 NOTE — ED ADULT TRIAGE NOTE - CHIEF COMPLAINT QUOTE
pt BIBA near syncope while using bathroom. denies falling or LOC, states just felt like she was going to pass out. a&ox3, denies headache, dizziness. pt has RUE PICC line for hydration due to DM. EKG obtained, BS

## 2020-12-29 NOTE — H&P ADULT - HISTORY OF PRESENT ILLNESS
54 y/o female with h/o SAH, diabetes insipidus was BIBA for a near syncopal episode after coming out of bathroom, no syncope, no fall, no trauma, no cp, no sob. pt. reports appetite has been ok, no abd. pain, no n/v/d. no fever. no sick contact. As per pt. she has a PICC line and every other day gets D5W through that for h/o hypernatremia.  56 y/o female with h/o SAH, diabetes insipidus was BIBA for a near syncopal episode after coming out of bathroom, no syncope, no fall, no trauma, no focal weakness, no palpitations, no cp, no sob. pt. reports appetite has been ok, no abd. pain, no n/v/d. no fever. no sick contact. As per pt. she has a PICC line and every other day gets D5W through that for h/o hypernatremia.  54 y/o female with h/o SAH, diabetes insipidus was BIBA for a near syncopal episode after coming out of bathroom, no syncope, no fall, no trauma, no focal weakness, no palpitations, no cp, no sob. pt. reports appetite has been ok, no abd. pain, no n/v/d. no fever. appetite is good. no sick contact. As per pt. she has a PICC line and every other day gets D5W through that for h/o hypernatremia.  56 y/o female with h/o SAH, diabetes insipidus, chronic hypernatremia was BIBA for a near syncopal episode after coming out of bathroom, no syncope, no fall, no trauma, no focal weakness, no palpitations, no cp, no sob. pt. reports appetite has been ok, no abd. pain, no n/v/d. no fever. appetite is good. no sick contact. As per pt. she has a PICC line and every other day gets D5W through that for h/o hypernatremia. pt. bp 92/60 , hr 87 upon arrival.  56 y/o female with h/o SAH, diabetes insipidus, chronic hypernatremia was BIBA for as pt. felt a little dizzy after coming out of bathroom, no syncope, no fall, no trauma, no focal weakness, no palpitations, no cp, no sob. pt. reports appetite has been ok, no abd. pain, no n/v/d. no fever. appetite is good. no sick contact. As per pt. she has a PICC line and every other day gets D5W through that for h/o hypernatremia. pt. bp 92/60 , hr 87 upon arrival.  54 y/o female with h/o SAH, diabetes insipidus, chronic hypernatremia was BIBA for as pt. felt a little dizzy after coming out of bathroom, no syncope, no fall, no trauma, no focal weakness, no palpitations, no cp, no sob. pt. reports appetite has been ok, no abd. pain, no n/v/d. no fever. appetite is good. no sick contact. As per pt. she has a PICC line and every other day gets D5W through that for h/o hypernatremia. pt. bp 92/60 , hr 87 upon arrival. As per pt. she runs bp on the low side.

## 2020-12-29 NOTE — CONSULT NOTE ADULT - SUBJECTIVE AND OBJECTIVE BOX
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  Patient is a poor historian, but denies any prodrome of symptoms of CP, headache, palpitations or dyspnea.  On arrival to ED, she was found to be moderately hypernatremic with Na of 156 with TOSHIA (Cr 1.4).  Also found to be mildly hypotensive on admission to ED.      Most of history is obtained through review of chart as patient is a poor historian due to memory loss.  Patient does not have good insight into her medical history and it appears that her daughter helps with administration of her medications.  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  Since then, she has been on ddAVP, but her hypernatremia has been difficult to control as it seems that the patient does not have an intact thirst mechanism.  Patient has a PICC line and will have IVFs given every other day for management of her chronic hypernatremia.  She currently denies any associated polyuria or polydipsia.  She denies missing any doses of ddAVP lately (at home she is prescribed 0.1 mg 3XD).    PAST MEDICAL & SURGICAL HISTORY:  Memory loss, short term  Brain aneurysm  Diabetes insipidus  HTN (Hypertension)  Subarachnoid Hemorrhage  H/O gastric bypass  S/P Cerebral Aneurysm Repair  S/P Craniotomy    Allergies  No Known Allergies    MEDICATIONS  (STANDING):  desmopressin 0.1 milliGRAM(s) Oral <User Schedule>  ferrous    sulfate 325 milliGRAM(s) Oral daily  folic acid 1 milliGRAM(s) Oral daily  levETIRAcetam 500 milliGRAM(s) Oral two times a day  pantoprazole    Tablet 40 milliGRAM(s) Oral before breakfast    SH:  lives with daughter, no smoking, unemployed    FH:  no known DM    ROS:  as per HPI, otherwise 10 point ROS negative    Vital Signs Last 24 Hrs  T(C): 37.6 (29 Dec 2020 14:27), Max: 37.6 (29 Dec 2020 14:27)  T(F): 99.6 (29 Dec 2020 14:27), Max: 99.6 (29 Dec 2020 14:27)  HR: 83 (29 Dec 2020 14:27) (83 - 87)  BP: 97/65 (29 Dec 2020 14:27) (92/60 - 97/65)  RR: 16 (29 Dec 2020 14:20) (16 - 16)  SpO2: 100% (29 Dec 2020 14:27) (96% - 100%)    PE:  Gen: AAO, NAD  HEENT:  sclera anicteric, MMM  Neck:  no thyromegaly, no LAD  CV:  nl S1 + S2, RRR, no m/r/g  Resp:  nl respiratory effort, CTA b/l  GI/ Abd: soft, NT/ ND, BS +  Neuro:  No tremor, CN grossly intact  MS:  no c/c/e, nl muscle tone  Skin:  no acanthosis nigricans, no rashes  Psych: affect appropriate    LABS:  12-29    156<H>  |  133<H>  |  37.0<H>  ----------------------------<  82  5.0   |  15.0<L>  |  1.40<H>    Ca    6.8<L>      29 Dec 2020 15:16  Phos  2.4     12-29  Mg     1.9     12-29    TPro  6.1<L>  /  Alb  3.1<L>  /  TBili  0.2<L>  /  DBili  x   /  AST  18  /  ALT  7   /  AlkPhos  57  12-29                          8.4    7.82  )-----------( 173      ( 29 Dec 2020 16:41 )             31.0

## 2020-12-29 NOTE — ED ADULT NURSE NOTE - OBJECTIVE STATEMENT
54 y/o female with h/o gastric bypass surgery (2004), HTN, chronic anemia, diabetes insipidus (dx in 2012) after an episode of ICH 2nd to brain aneurysm (2011/[facility]) chronic PICC line for home iv hydration (1000 cc D5 every otherday) present with syncope at home, similar to prior presentations per daughter (via phone). Pt did not get her hydration IV yesterday, got some today. Pt poor historian, denies other complaints. remains pleasantly confused

## 2020-12-29 NOTE — ED PROVIDER NOTE - BIRTH SEX
Requested Prescriptions     Pending Prescriptions Disp Refills   • potassium chloride SA (KDUR) 20 MEQ Tab CR [Pharmacy Med Name: POTASSIUM CL ER 20 MEQ TABLET] 30 Tab 1     Sig: TAKE 1 TABLET BY MOUTH EVERY DAY       BRITNEY Vaca.     Female

## 2020-12-29 NOTE — H&P ADULT - ASSESSMENT
In summary 54 y/o female with h/o SAH, diabetes insipidus, chronic hypernatremia was BIBA for as pt. felt a little dizzy after coming out of bathroom, no syncope, no fall, no trauma, no focal weakness, no palpitations, no cp, no sob. pt. reports appetite has been ok, no abd. pain, no n/v/d. no fever. appetite is good. no sick contact. As per pt. she has a PICC line and every other day gets D5W through that for h/o hypernatremia. pt. bp 92/60 , hr 87 upon arrival. pt. will be admitted for hypernatremia, spoke to nephrologist dr. Bone and agrees with 1 L NS and then 0.45 % NS  close f/u on bmp, will repeat in 6 hrs .     - hypernatremia, chronic , clinically appears somewhat dehydrated, NS 1 L and then 0.45 %. close f/u on bmp. nephrology dr. Bone following.     - acute kidney injury, likely pre renal and dehydration contributing. iv fluids, encourage po intake.    - near syncope,  vasovagal ?  was hypotensive on arrival, on tele, non focal, will get ct head, trop, echo, carotid doppler, cardio south side consulted.    - hypotension unspecified type, will do orthostatic vitals.    - Dehydration, pt. clinically somewhat dehydrated. got NS, good po intake in the ER. 1/2 NS after that.    - H/O diabetes insipidus continue desmopressin, discussed with endocrinologist Dr. Boothe.  In summary 56 y/o female with h/o SAH, diabetes insipidus, chronic hypernatremia was BIBA for as pt. felt a little dizzy after coming out of bathroom, no syncope, no fall, no trauma, no focal weakness, no palpitations, no cp, no sob. pt. reports appetite has been ok, no abd. pain, no n/v/d. no fever. appetite is good. no sick contact. As per pt. she has a PICC line and every other day gets D5W through that for h/o hypernatremia. pt. bp 92/60 , hr 87 upon arrival. pt. will be admitted for hypernatremia, spoke to nephrologist dr. Bone and agrees with 1 L NS and then 0.45 % NS  close f/u on bmp, will repeat in 6 hrs .     - hypernatremia, chronic , clinically appears somewhat dehydrated, NS 1 L and then 0.45 %. close f/u on bmp. nephrology dr. Bone following.     - acute kidney injury, likely pre renal and dehydration contributing. iv fluids, encourage po intake.    - near syncope,  vasovagal ?  was hypotensive on arrival, on tele, non focal, will get ct head, trop, echo, carotid doppler, cardio south side consulted.    - hypotension unspecified type, will do orthostatic vitals.    - Dehydration, pt. clinically somewhat dehydrated. got NS, good po intake in the ER. 1/2 NS after that.    - H/O diabetes insipidus continue desmopressin, discussed with endocrinologist Dr. Boothe.     - anemia , appears chronic when compared to old labs. Hb close to her baseline. anemia work up requested. In summary 56 y/o female with h/o SAH, diabetes insipidus, chronic hypernatremia was BIBA for as pt. felt a little dizzy after coming out of bathroom, no syncope, no fall, no trauma, no focal weakness, no palpitations, no cp, no sob. pt. reports appetite has been ok, no abd. pain, no n/v/d. no fever. appetite is good. no sick contact. As per pt. she has a PICC line and every other day gets D5W through that for h/o hypernatremia. pt. bp 92/60 , hr 87 upon arrival. pt. will be admitted for hypernatremia, spoke to nephrologist dr. Bone and agrees with 1 L NS and then 0.45 % NS  close f/u on bmp, will repeat in 6 hrs .     - hypernatremia, chronic , clinically appears somewhat dehydrated, NS 1 L and then 0.45 %. close f/u on bmp. nephrology dr. Bone following.     - acute kidney injury, likely pre renal and dehydration contributing. iv fluids, encourage po intake.    - near syncope,  vasovagal ?  was hypotensive on arrival, on tele, non focal, will get ct head, trop, echo, carotid doppler, cardio south side consulted.    - hypotension unspecified type, normal white count, no fever, will do orthostatic vitals. cxr pending initially, later reviewed by me, no infiltrate noted, picc line appears intact. in the ER around 11:30 pm 12/29/20, pt . had low grade fever 100.9, blood , urine cx sent and empiric zosyn started.     - Dehydration, pt. clinically somewhat dehydrated. got NS, good po intake in the ER. 1/2 NS after that.    - H/O diabetes insipidus continue desmopressin, discussed with endocrinologist Dr. Boothe.     - anemia , appears chronic when compared to old labs. Hb close to her baseline. anemia work up requested. In summary 56 y/o female with h/o SAH, diabetes insipidus, chronic hypernatremia was BIBA for as pt. felt a little dizzy after coming out of bathroom, no syncope, no fall, no trauma, no focal weakness, no palpitations, no cp, no sob. pt. reports appetite has been ok, no abd. pain, no n/v/d. no fever. appetite is good. no sick contact. As per pt. she has a PICC line and every other day gets D5W through that for h/o hypernatremia. pt. bp 92/60 , hr 87 upon arrival. pt. will be admitted for hypernatremia, spoke to nephrologist dr. Bone and agrees with 1 L NS and then 0.45 % NS  close f/u on bmp, will repeat in 6 hrs .     - hypernatremia, chronic , clinically appears somewhat dehydrated, NS 1 L and then 0.45 %. close f/u on bmp. nephrology dr. Bone following.     - acute kidney injury, likely pre renal and dehydration contributing. iv fluids, encourage po intake.    - near syncope,  vasovagal ?  was hypotensive on arrival, on tele, non focal, will get ct head, trop, echo, carotid doppler, cardio south side consulted.    - hypotension unspecified type, normal white count, no fever, will do orthostatic vitals. cxr pending initially, later reviewed by me, no infiltrate noted, picc line appears intact. in the ER around 11:30 pm 12/29/20, pt . had low grade fever 100.9, blood , urine cx sent and empiric zosyn started. cbc and bmp to follow, will request ID consult considering has PICC line.     - Dehydration, pt. clinically somewhat dehydrated. got NS, good po intake in the ER. 1/2 NS after that.    - H/O diabetes insipidus continue desmopressin, discussed with endocrinologist Dr. Boothe.     - anemia , appears chronic when compared to old labs. Hb close to her baseline. anemia work up requested. In summary 54 y/o female with h/o SAH, diabetes insipidus, chronic hypernatremia was BIBA for as pt. felt a little dizzy after coming out of bathroom, no syncope, no fall, no trauma, no focal weakness, no palpitations, no cp, no sob. pt. reports appetite has been ok, no abd. pain, no n/v/d. no fever. appetite is good. no sick contact. As per pt. she has a PICC line and every other day gets D5W through that for h/o hypernatremia. pt. bp 92/60 , hr 87 upon arrival. pt. will be admitted for hypernatremia, spoke to nephrologist dr. Bone and agrees with 1 L NS and then 0.45 % NS  close f/u on bmp, will repeat in 6 hrs .     - hypernatremia, chronic , clinically appears somewhat dehydrated, NS 1 L and then 0.45 %. close f/u on bmp. nephrology dr. Bone following.     - acute kidney injury, likely pre renal and dehydration contributing. iv fluids, encourage po intake.    - near syncope,  vasovagal ?  was hypotensive on arrival, on tele, non focal, will get ct head, trop, echo, carotid doppler, cardio south side consulted.    - hypotension unspecified type, normal white count, no fever, will do orthostatic vitals. cxr pending initially, later reviewed by me, no infiltrate noted, picc line appears intact. in the ER around 11:30 pm 12/29/20, pt . had low grade fever 100.9, blood , urine cx sent and empiric zosyn  and vancomycin started. cbc and bmp to follow, will request ID consult considering has PICC line.     - Dehydration, pt. clinically somewhat dehydrated. got NS, good po intake in the ER. 1/2 NS after that.    - H/O diabetes insipidus continue desmopressin, discussed with endocrinologist Dr. Boothe.     - anemia , appears chronic when compared to old labs. Hb close to her baseline. anemia work up requested. In summary 56 y/o female with h/o SAH, diabetes insipidus, chronic hypernatremia was BIBA for as pt. felt a little dizzy after coming out of bathroom, no syncope, no fall, no trauma, no focal weakness, no palpitations, no cp, no sob. pt. reports appetite has been ok, no abd. pain, no n/v/d. no fever. appetite is good. no sick contact. As per pt. she has a PICC line and every other day gets D5W through that for h/o hypernatremia. pt. bp 92/60 , hr 87 upon arrival. pt. will be admitted for hypernatremia, spoke to nephrologist dr. Bone and agrees with 1 L NS and then 0.45 % NS  close f/u on bmp, will repeat in 6 hrs .     - hypernatremia, chronic , clinically appears somewhat dehydrated, NS 1 L and then 0.45 %. close f/u on bmp. nephrology dr. Bone following.     - acute kidney injury, likely pre renal and dehydration contributing. iv fluids, encourage po intake.    - near syncope,  vasovagal ?  was hypotensive on arrival, on tele, non focal, will get ct head, trop, echo, carotid doppler, cardio south side consulted.    - hypotension unspecified type, normal white count, no fever, will do orthostatic vitals. cxr pending initially, later reviewed by me, no infiltrate noted, picc line appears intact. in the ER around 11:30 pm 12/29/20, pt . had low grade fever 100.9, blood , urine cx sent and empiric zosyn  and vancomycin started. covid-19 pending, cbc and bmp to follow, will request ID consult considering has PICC line.     - Dehydration, pt. clinically somewhat dehydrated. got NS, good po intake in the ER. 1/2 NS after that.    - H/O diabetes insipidus continue desmopressin, discussed with endocrinologist Dr. Boothe.     - anemia , appears chronic when compared to old labs. Hb close to her baseline. anemia work up requested.

## 2020-12-29 NOTE — H&P ADULT - NSHPPHYSICALEXAM_GEN_ALL_CORE
General: A female sitting up in bed not in distress,  HEENT: AT, NC. PERRL. intact EOM. mucosa somewhat dry, no throat erythema or exudate.   Neck: supple. no JVD.   Chest: CTA bilaterally  Heart: S1,S2. RRR. no heart murmur. no edema.  Abdomen: soft. non-tender. non-distended. + BS.   Ext: no calf tenderness. ROM of all ext. intact. distal pulses 2 +.  Neuro: AAO x3. no focal weakness. speech clear. CNs ii to xii intact. motor 5/5, sensory intact b/L. reflexes 2 +.  Skin: no rash noted, warm and dry.   Psych : normal affect, pleasant, no si/hi. General: A female sitting up in bed not in distress,  HEENT: AT, NC. PERRL. intact EOM. mucosa somewhat dry, no throat erythema or exudate.   Neck: supple. no JVD.   Chest: CTA bilaterally  Heart: S1,S2. RRR. no heart murmur. no edema.  Abdomen: soft. non-tender. non-distended. + BS.   Ext: no calf tenderness. ROM of all ext. intact.  RUE picc line appears intact, clean , distal pulses 2 +.  Neuro: AAO x3. no focal weakness. speech clear. CNs ii to xii intact. motor 5/5, sensory intact b/L. reflexes 2 +.  Skin: no rash noted, warm and dry.   Psych : normal affect, pleasant, no si/hi.

## 2020-12-29 NOTE — ED PROVIDER NOTE - CLINICAL SUMMARY MEDICAL DECISION MAKING FREE TEXT BOX
hypernatremic/hyperosmolar. pt gets seizures when sodium below 145 per charts/family. initial bp ~90-60, gentle isotonic hydration. renal/endo consulted. bps improved. admission to Dr. Ivy.

## 2020-12-29 NOTE — CONSULT NOTE ADULT - ASSESSMENT
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    1.  Hypernatremia-  I suspect this may be related to free water losses as she does not appear to have an intact thirst mechanism in setting of DI.  Her current free water deficit is over 2L.  As per renal would replace with isotonic saline to start as she appears volume depleted.  Will likely need transition to hypotonic fluids once she is euvolemic, but further fluid management as per renal.  2.  DI-  would continue home dose ddAVP.  Will need to encourage PO intake and careful regulation of IVFs as she does not appear to have an intact thirst mechanism (normally a patient can drink to thirst to maintain normal serum sodium in DI).    3.  Syncope-  ? if related to orthostatic hypotension.  Further work up as per medicine.    4.  HTN-  hold antihypertensives in setting of hypotension.

## 2020-12-29 NOTE — H&P ADULT - NSICDXFAMILYHX_GEN_ALL_CORE_FT
FAMILY HISTORY:  Family history of hypertension     FAMILY HISTORY:  Family history of hypertension, mother

## 2020-12-29 NOTE — ED PROVIDER NOTE - OBJECTIVE STATEMENT
54 y/o female with h/o gastric bypass surgery (2004), HTN, chronic anemia, diabetes insipidus (dx in 2012) after an episode of ICH 2nd to brain aneurysm (2011/[facility]) chronic PICC line for home iv hydration (1000 cc D5 every otherday) present with syncope at home, similar to prior presenations per daughter (via phone). Pt did not get her hydration IV yesterday, got some today. Pt poor historian, denies other complaints.     ROS: No fever/chills. No eye pain/changes in vision, No ear pain/sore throat/dysphagia, No chest pain/palpitations. No SOB/cough/. No abdominal pain, N/V/D, no black/bloody bm. No dysuria/frequency/discharge, No headache. No Dizziness.    No rashes or breaks in skin. No numbness/tingling/weakness.

## 2020-12-29 NOTE — PROGRESS NOTE ADULT - ASSESSMENT
Vital Signs Last 24 Hrs  T(C): 37.6 (29 Dec 2020 14:27), Max: 37.6 (29 Dec 2020 14:27)  T(F): 99.6 (29 Dec 2020 14:27), Max: 99.6 (29 Dec 2020 14:27)  HR: 83 (29 Dec 2020 14:27) (83 - 87)  BP: 97/65 (29 Dec 2020 14:27) (92/60 - 97/65)  RR: 16 (29 Dec 2020 14:20) (16 - 16)  SpO2: 100% (29 Dec 2020 14:) (96% - 100%)    156<H>  |  133<H>  |  37.0<H>  ----------------------------<  82     Ca:6.8<L> (29 Dec 2020 15:16)  5.0     |  15.0<L>  |  1.40<H>    eGFR if Non : 42 <L>  eGFR if : 48 <L>    TPro  6.1<L>  /  Alb  3.1<L>  /  TBili  0.2<L>  /  DBili  x      /  AST  18     /  ALT  7      /  AlkPhos  57     29 Dec 2020 15:16                        8.4<L>  7.82  )-----------( 173      ( 29 Dec 2020 16:41 )             31.0<L>    Phos:2.4 mg/dL M.9 mg/dL., Serum Osm:354 mosmol/kg<H>  ( @ 15:16)    Rec :    Improve Hemodynamics    Isotonic Normal Saline ( Which Is still hypotonic, Relative to her SNA+ Level )    DI W.U , when more stable,    D/W Dr. Walden,

## 2020-12-30 LAB
ALBUMIN SERPL ELPH-MCNC: 3.5 G/DL — SIGNIFICANT CHANGE UP (ref 3.3–5.2)
ALP SERPL-CCNC: 69 U/L — SIGNIFICANT CHANGE UP (ref 40–120)
ALT FLD-CCNC: 10 U/L — SIGNIFICANT CHANGE UP
ANION GAP SERPL CALC-SCNC: 11 MMOL/L — SIGNIFICANT CHANGE UP (ref 5–17)
ANION GAP SERPL CALC-SCNC: 9 MMOL/L — SIGNIFICANT CHANGE UP (ref 5–17)
ANION GAP SERPL CALC-SCNC: 9 MMOL/L — SIGNIFICANT CHANGE UP (ref 5–17)
ANISOCYTOSIS BLD QL: SLIGHT — SIGNIFICANT CHANGE UP
APPEARANCE UR: CLEAR — SIGNIFICANT CHANGE UP
AST SERPL-CCNC: 18 U/L — SIGNIFICANT CHANGE UP
BACTERIA # UR AUTO: ABNORMAL
BASOPHILS # BLD AUTO: 0 K/UL — SIGNIFICANT CHANGE UP (ref 0–0.2)
BASOPHILS NFR BLD AUTO: 0 % — SIGNIFICANT CHANGE UP (ref 0–2)
BILIRUB SERPL-MCNC: 0.3 MG/DL — LOW (ref 0.4–2)
BILIRUB UR-MCNC: NEGATIVE — SIGNIFICANT CHANGE UP
BUN SERPL-MCNC: 32 MG/DL — HIGH (ref 8–20)
BUN SERPL-MCNC: 39 MG/DL — HIGH (ref 8–20)
BUN SERPL-MCNC: 39 MG/DL — HIGH (ref 8–20)
CALCIUM SERPL-MCNC: 8.4 MG/DL — LOW (ref 8.6–10.2)
CALCIUM SERPL-MCNC: 8.4 MG/DL — LOW (ref 8.6–10.2)
CALCIUM SERPL-MCNC: 8.5 MG/DL — LOW (ref 8.6–10.2)
CHLORIDE SERPL-SCNC: 121 MMOL/L — HIGH (ref 98–107)
CHLORIDE SERPL-SCNC: 129 MMOL/L — HIGH (ref 98–107)
CHLORIDE SERPL-SCNC: 130 MMOL/L — HIGH (ref 98–107)
CO2 SERPL-SCNC: 17 MMOL/L — LOW (ref 22–29)
CO2 SERPL-SCNC: 17 MMOL/L — LOW (ref 22–29)
CO2 SERPL-SCNC: 18 MMOL/L — LOW (ref 22–29)
COLOR SPEC: YELLOW — SIGNIFICANT CHANGE UP
COMMENT - URINE: SIGNIFICANT CHANGE UP
CREAT ?TM UR-MCNC: 130 MG/DL — SIGNIFICANT CHANGE UP
CREAT SERPL-MCNC: 1.6 MG/DL — HIGH (ref 0.5–1.3)
CREAT SERPL-MCNC: 1.64 MG/DL — HIGH (ref 0.5–1.3)
CREAT SERPL-MCNC: 1.66 MG/DL — HIGH (ref 0.5–1.3)
DIFF PNL FLD: NEGATIVE — SIGNIFICANT CHANGE UP
ELLIPTOCYTES BLD QL SMEAR: SLIGHT — SIGNIFICANT CHANGE UP
EOSINOPHIL # BLD AUTO: 0 K/UL — SIGNIFICANT CHANGE UP (ref 0–0.5)
EOSINOPHIL NFR BLD AUTO: 0 % — SIGNIFICANT CHANGE UP (ref 0–6)
EPI CELLS # UR: SIGNIFICANT CHANGE UP
FERRITIN SERPL-MCNC: 753 NG/ML — HIGH (ref 15–150)
GLUCOSE SERPL-MCNC: 100 MG/DL — HIGH (ref 70–99)
GLUCOSE SERPL-MCNC: 130 MG/DL — HIGH (ref 70–99)
GLUCOSE SERPL-MCNC: 131 MG/DL — HIGH (ref 70–99)
GLUCOSE UR QL: NEGATIVE MG/DL — SIGNIFICANT CHANGE UP
HCT VFR BLD CALC: 29 % — LOW (ref 34.5–45)
HCT VFR BLD CALC: 30.5 % — LOW (ref 34.5–45)
HGB BLD-MCNC: 8.2 G/DL — LOW (ref 11.5–15.5)
HGB BLD-MCNC: 8.5 G/DL — LOW (ref 11.5–15.5)
IRON SATN MFR SERPL: 14 UG/DL — LOW (ref 37–145)
IRON SATN MFR SERPL: 14 UG/DL — LOW (ref 37–145)
IRON SATN MFR SERPL: 8 % — LOW (ref 14–50)
KETONES UR-MCNC: NEGATIVE — SIGNIFICANT CHANGE UP
LACTATE SERPL-SCNC: 1.4 MMOL/L — SIGNIFICANT CHANGE UP (ref 0.5–2)
LEUKOCYTE ESTERASE UR-ACNC: ABNORMAL
LYMPHOCYTES # BLD AUTO: 0.91 K/UL — LOW (ref 1–3.3)
LYMPHOCYTES # BLD AUTO: 5.3 % — LOW (ref 13–44)
MANUAL SMEAR VERIFICATION: SIGNIFICANT CHANGE UP
MCHC RBC-ENTMCNC: 25.5 PG — LOW (ref 27–34)
MCHC RBC-ENTMCNC: 25.9 PG — LOW (ref 27–34)
MCHC RBC-ENTMCNC: 27.9 GM/DL — LOW (ref 32–36)
MCHC RBC-ENTMCNC: 28.3 GM/DL — LOW (ref 32–36)
MCV RBC AUTO: 91.6 FL — SIGNIFICANT CHANGE UP (ref 80–100)
MCV RBC AUTO: 91.8 FL — SIGNIFICANT CHANGE UP (ref 80–100)
METAMYELOCYTES # FLD: 2.7 % — HIGH (ref 0–0)
MICROCYTES BLD QL: SLIGHT — SIGNIFICANT CHANGE UP
MONOCYTES # BLD AUTO: 0.31 K/UL — SIGNIFICANT CHANGE UP (ref 0–0.9)
MONOCYTES NFR BLD AUTO: 1.8 % — LOW (ref 2–14)
NEUTROPHILS # BLD AUTO: 15.55 K/UL — HIGH (ref 1.8–7.4)
NEUTROPHILS NFR BLD AUTO: 75.2 % — SIGNIFICANT CHANGE UP (ref 43–77)
NEUTS BAND # BLD: 15 % — HIGH (ref 0–8)
NITRITE UR-MCNC: NEGATIVE — SIGNIFICANT CHANGE UP
OSMOLALITY UR: 642 MOSM/KG — SIGNIFICANT CHANGE UP (ref 300–1000)
OVALOCYTES BLD QL SMEAR: SLIGHT — SIGNIFICANT CHANGE UP
PH UR: 5 — SIGNIFICANT CHANGE UP (ref 5–8)
PLAT MORPH BLD: NORMAL — SIGNIFICANT CHANGE UP
PLATELET # BLD AUTO: 145 K/UL — LOW (ref 150–400)
PLATELET # BLD AUTO: 189 K/UL — SIGNIFICANT CHANGE UP (ref 150–400)
POIKILOCYTOSIS BLD QL AUTO: SLIGHT — SIGNIFICANT CHANGE UP
POLYCHROMASIA BLD QL SMEAR: SLIGHT — SIGNIFICANT CHANGE UP
POTASSIUM SERPL-MCNC: 3.9 MMOL/L — SIGNIFICANT CHANGE UP (ref 3.5–5.3)
POTASSIUM SERPL-MCNC: 4.1 MMOL/L — SIGNIFICANT CHANGE UP (ref 3.5–5.3)
POTASSIUM SERPL-MCNC: 4.1 MMOL/L — SIGNIFICANT CHANGE UP (ref 3.5–5.3)
POTASSIUM SERPL-SCNC: 3.9 MMOL/L — SIGNIFICANT CHANGE UP (ref 3.5–5.3)
POTASSIUM SERPL-SCNC: 4.1 MMOL/L — SIGNIFICANT CHANGE UP (ref 3.5–5.3)
POTASSIUM SERPL-SCNC: 4.1 MMOL/L — SIGNIFICANT CHANGE UP (ref 3.5–5.3)
PROT SERPL-MCNC: 7.2 G/DL — SIGNIFICANT CHANGE UP (ref 6.6–8.7)
PROT UR-MCNC: 30 MG/DL
RBC # BLD: 3.16 M/UL — LOW (ref 3.8–5.2)
RBC # BLD: 3.16 M/UL — LOW (ref 3.8–5.2)
RBC # BLD: 3.33 M/UL — LOW (ref 3.8–5.2)
RBC # FLD: 14.3 % — SIGNIFICANT CHANGE UP (ref 10.3–14.5)
RBC # FLD: 14.3 % — SIGNIFICANT CHANGE UP (ref 10.3–14.5)
RBC BLD AUTO: ABNORMAL
RBC CASTS # UR COMP ASSIST: SIGNIFICANT CHANGE UP /HPF (ref 0–4)
RETICS #: 15.8 K/UL — LOW (ref 25–125)
RETICS/RBC NFR: 0.5 % — SIGNIFICANT CHANGE UP (ref 0.5–2.5)
SARS-COV-2 IGG SERPL QL IA: NEGATIVE — SIGNIFICANT CHANGE UP
SARS-COV-2 IGM SERPL IA-ACNC: 0.06 INDEX — SIGNIFICANT CHANGE UP
SARS-COV-2 RNA SPEC QL NAA+PROBE: DETECTED
SCHISTOCYTES BLD QL AUTO: SLIGHT — SIGNIFICANT CHANGE UP
SMUDGE CELLS # BLD: PRESENT — SIGNIFICANT CHANGE UP
SODIUM SERPL-SCNC: 150 MMOL/L — HIGH (ref 135–145)
SODIUM SERPL-SCNC: 155 MMOL/L — HIGH (ref 135–145)
SODIUM SERPL-SCNC: 156 MMOL/L — HIGH (ref 135–145)
SODIUM UR-SCNC: 94 MMOL/L — SIGNIFICANT CHANGE UP
SP GR SPEC: 1.01 — SIGNIFICANT CHANGE UP (ref 1.01–1.02)
TIBC SERPL-MCNC: 180 UG/DL — LOW (ref 220–430)
TRANSFERRIN SERPL-MCNC: 126 MG/DL — LOW (ref 192–382)
TROPONIN T SERPL-MCNC: <0.01 NG/ML — SIGNIFICANT CHANGE UP (ref 0–0.06)
UROBILINOGEN FLD QL: NEGATIVE MG/DL — SIGNIFICANT CHANGE UP
WBC # BLD: 13.73 K/UL — HIGH (ref 3.8–10.5)
WBC # BLD: 17.24 K/UL — HIGH (ref 3.8–10.5)
WBC # FLD AUTO: 13.73 K/UL — HIGH (ref 3.8–10.5)
WBC # FLD AUTO: 17.24 K/UL — HIGH (ref 3.8–10.5)
WBC UR QL: SIGNIFICANT CHANGE UP

## 2020-12-30 PROCEDURE — 99223 1ST HOSP IP/OBS HIGH 75: CPT

## 2020-12-30 PROCEDURE — 70450 CT HEAD/BRAIN W/O DYE: CPT | Mod: 26

## 2020-12-30 PROCEDURE — 99233 SBSQ HOSP IP/OBS HIGH 50: CPT

## 2020-12-30 RX ORDER — SODIUM CHLORIDE 9 MG/ML
500 INJECTION INTRAMUSCULAR; INTRAVENOUS; SUBCUTANEOUS ONCE
Refills: 0 | Status: COMPLETED | OUTPATIENT
Start: 2020-12-30 | End: 2020-12-30

## 2020-12-30 RX ORDER — SODIUM CHLORIDE 9 MG/ML
1000 INJECTION, SOLUTION INTRAVENOUS
Refills: 0 | Status: DISCONTINUED | OUTPATIENT
Start: 2020-12-30 | End: 2020-12-30

## 2020-12-30 RX ORDER — ACETAMINOPHEN 500 MG
650 TABLET ORAL EVERY 6 HOURS
Refills: 0 | Status: DISCONTINUED | OUTPATIENT
Start: 2020-12-30 | End: 2021-01-21

## 2020-12-30 RX ORDER — VANCOMYCIN HCL 1 G
750 VIAL (EA) INTRAVENOUS EVERY 24 HOURS
Refills: 0 | Status: DISCONTINUED | OUTPATIENT
Start: 2020-12-31 | End: 2021-01-02

## 2020-12-30 RX ORDER — ENOXAPARIN SODIUM 100 MG/ML
40 INJECTION SUBCUTANEOUS DAILY
Refills: 0 | Status: DISCONTINUED | OUTPATIENT
Start: 2020-12-30 | End: 2021-01-21

## 2020-12-30 RX ORDER — SODIUM CHLORIDE 9 MG/ML
1000 INJECTION, SOLUTION INTRAVENOUS
Refills: 0 | Status: DISCONTINUED | OUTPATIENT
Start: 2020-12-30 | End: 2021-01-01

## 2020-12-30 RX ORDER — VANCOMYCIN HCL 1 G
750 VIAL (EA) INTRAVENOUS EVERY 12 HOURS
Refills: 0 | Status: DISCONTINUED | OUTPATIENT
Start: 2020-12-30 | End: 2020-12-30

## 2020-12-30 RX ORDER — ACETAMINOPHEN 500 MG
650 TABLET ORAL ONCE
Refills: 0 | Status: COMPLETED | OUTPATIENT
Start: 2020-12-30 | End: 2020-12-30

## 2020-12-30 RX ORDER — PIPERACILLIN AND TAZOBACTAM 4; .5 G/20ML; G/20ML
3.38 INJECTION, POWDER, LYOPHILIZED, FOR SOLUTION INTRAVENOUS ONCE
Refills: 0 | Status: COMPLETED | OUTPATIENT
Start: 2020-12-30 | End: 2020-12-30

## 2020-12-30 RX ORDER — LACTOBACILLUS ACIDOPHILUS 100MM CELL
1 CAPSULE ORAL
Refills: 0 | Status: DISCONTINUED | OUTPATIENT
Start: 2020-12-30 | End: 2020-12-30

## 2020-12-30 RX ORDER — PIPERACILLIN AND TAZOBACTAM 4; .5 G/20ML; G/20ML
3.38 INJECTION, POWDER, LYOPHILIZED, FOR SOLUTION INTRAVENOUS EVERY 8 HOURS
Refills: 0 | Status: DISCONTINUED | OUTPATIENT
Start: 2020-12-30 | End: 2021-01-04

## 2020-12-30 RX ADMIN — PIPERACILLIN AND TAZOBACTAM 200 GRAM(S): 4; .5 INJECTION, POWDER, LYOPHILIZED, FOR SOLUTION INTRAVENOUS at 02:04

## 2020-12-30 RX ADMIN — LEVETIRACETAM 500 MILLIGRAM(S): 250 TABLET, FILM COATED ORAL at 18:45

## 2020-12-30 RX ADMIN — Medication 650 MILLIGRAM(S): at 20:34

## 2020-12-30 RX ADMIN — Medication 1 TABLET(S): at 10:56

## 2020-12-30 RX ADMIN — DESMOPRESSIN ACETATE 0.1 MILLIGRAM(S): 0.1 TABLET ORAL at 23:25

## 2020-12-30 RX ADMIN — PANTOPRAZOLE SODIUM 40 MILLIGRAM(S): 20 TABLET, DELAYED RELEASE ORAL at 05:46

## 2020-12-30 RX ADMIN — Medication 650 MILLIGRAM(S): at 01:10

## 2020-12-30 RX ADMIN — LEVETIRACETAM 500 MILLIGRAM(S): 250 TABLET, FILM COATED ORAL at 05:42

## 2020-12-30 RX ADMIN — Medication 650 MILLIGRAM(S): at 02:06

## 2020-12-30 RX ADMIN — SODIUM CHLORIDE 80 MILLILITER(S): 9 INJECTION, SOLUTION INTRAVENOUS at 14:49

## 2020-12-30 RX ADMIN — DESMOPRESSIN ACETATE 0.1 MILLIGRAM(S): 0.1 TABLET ORAL at 05:41

## 2020-12-30 RX ADMIN — SODIUM CHLORIDE 500 MILLILITER(S): 9 INJECTION INTRAMUSCULAR; INTRAVENOUS; SUBCUTANEOUS at 03:18

## 2020-12-30 RX ADMIN — PIPERACILLIN AND TAZOBACTAM 25 GRAM(S): 4; .5 INJECTION, POWDER, LYOPHILIZED, FOR SOLUTION INTRAVENOUS at 10:56

## 2020-12-30 RX ADMIN — PIPERACILLIN AND TAZOBACTAM 25 GRAM(S): 4; .5 INJECTION, POWDER, LYOPHILIZED, FOR SOLUTION INTRAVENOUS at 22:20

## 2020-12-30 RX ADMIN — Medication 325 MILLIGRAM(S): at 10:56

## 2020-12-30 RX ADMIN — PIPERACILLIN AND TAZOBACTAM 25 GRAM(S): 4; .5 INJECTION, POWDER, LYOPHILIZED, FOR SOLUTION INTRAVENOUS at 14:48

## 2020-12-30 RX ADMIN — DESMOPRESSIN ACETATE 0.1 MILLIGRAM(S): 0.1 TABLET ORAL at 14:48

## 2020-12-30 RX ADMIN — SODIUM CHLORIDE 70 MILLILITER(S): 9 INJECTION, SOLUTION INTRAVENOUS at 01:10

## 2020-12-30 RX ADMIN — Medication 250 MILLIGRAM(S): at 03:19

## 2020-12-30 RX ADMIN — ENOXAPARIN SODIUM 40 MILLIGRAM(S): 100 INJECTION SUBCUTANEOUS at 10:55

## 2020-12-30 RX ADMIN — Medication 1 MILLIGRAM(S): at 10:56

## 2020-12-30 RX ADMIN — Medication 650 MILLIGRAM(S): at 19:45

## 2020-12-30 RX ADMIN — SODIUM CHLORIDE 80 MILLILITER(S): 9 INJECTION, SOLUTION INTRAVENOUS at 04:30

## 2020-12-30 NOTE — PROGRESS NOTE ADULT - SUBJECTIVE AND OBJECTIVE BOX
CC: hypernatremia (30 Dec 2020 18:39)    HPI:  54 y/o female with h/o SAH, diabetes insipidus, chronic hypernatremia was BIBA for as pt. felt a little dizzy after coming out of bathroom, no syncope, no fall, no trauma, no focal weakness, no palpitations, no cp, no sob. pt. reports appetite has been ok, no abd. pain, no n/v/d. no fever. appetite is good. no sick contact. As per pt. she has a PICC line and every other day gets D5W through that for h/o hypernatremia. pt. bp 92/60 , hr 87 upon arrival. As per pt. she runs bp on the low side.  (29 Dec 2020 18:08)    INTERVAL HPI/OVERNIGHT EVENTS:    Vital Signs Last 24 Hrs  T(C): 37.4 (31 Dec 2020 08:00), Max: 39.3 (30 Dec 2020 17:26)  T(F): 99.3 (31 Dec 2020 08:00), Max: 102.8 (30 Dec 2020 17:26)  HR: 84 (31 Dec 2020 08:00) (84 - 99)  BP: 114/79 (31 Dec 2020 08:00) (98/63 - 117/80)  BP(mean): --  RR: 19 (31 Dec 2020 08:00) (18 - 19)  SpO2: 99% (31 Dec 2020 08:00) (97% - 99%)  PHYSICAL EXAM:  General: Well developed; well nourished; in no acute distress  Eyes: PERRLA, EOMI; conjunctiva and sclera clear  Head: Normocephalic; atraumatic  ENMT: No nasal discharge; airway clear  Neck: Supple; non tender; no masses  Respiratory: No wheezes, rales or rhonchi  Cardiovascular: Regular rate and rhythm. S1 and S2 Normal; No murmurs, gallops or rubs  Gastrointestinal: Soft non-tender non-distended; Normal bowel sounds  Genitourinary: No costovertebral angle tenderness  Extremities: Normal range of motion, No clubbing, cyanosis or edema  Vascular: Peripheral pulses palpable 2+ bilaterally  Neurological: Alert and oriented x4  Skin: Warm and dry. No acute rash  Lymph Nodes: No acute cervical adenopathy  Musculoskeletal: Normal gait, tone, without deformities  Psychiatric: Cooperative and appropriate    I&O's Detail    30 Dec 2020 07:01  -  31 Dec 2020 07:00  --------------------------------------------------------  IN:    dextrose 5%: 720 mL  Total IN: 720 mL    OUT:  Total OUT: 0 mL    Total NET: 720 mL          CARDIAC MARKERS ( 30 Dec 2020 01:51 )  x     / <0.01 ng/mL / x     / x     / x      CARDIAC MARKERS ( 29 Dec 2020 23:20 )  x     / <0.01 ng/mL / x     / x     / x                                8.5    13.73 )-----------( 145      ( 30 Dec 2020 13:29 )             30.5     30 Dec 2020 13:29    150    |  121    |  32.0   ----------------------------<  100    3.9     |  18.0   |  1.60     Ca    8.4        30 Dec 2020 13:29  Phos  2.4       29 Dec 2020 15:16  Mg     1.9       29 Dec 2020 15:16    TPro  7.2    /  Alb  3.5    /  TBili  0.3    /  DBili  x      /  AST  18     /  ALT  10     /  AlkPhos  69     30 Dec 2020 01:51    PT/INR - ( 29 Dec 2020 23:20 )   PT: 13.0 sec;   INR: 1.13 ratio         PTT - ( 29 Dec 2020 23:20 )  PTT:37.3 sec  CAPILLARY BLOOD GLUCOSE        LIVER FUNCTIONS - ( 30 Dec 2020 01:51 )  Alb: 3.5 g/dL / Pro: 7.2 g/dL / ALK PHOS: 69 U/L / ALT: 10 U/L / AST: 18 U/L / GGT: x           Urinalysis Basic - ( 30 Dec 2020 01:07 )    Color: Yellow / Appearance: Clear / S.015 / pH: x  Gluc: x / Ketone: Negative  / Bili: Negative / Urobili: Negative mg/dL   Blood: x / Protein: 30 mg/dL / Nitrite: Negative   Leuk Esterase: Trace / RBC: 0-2 /HPF / WBC 0-2   Sq Epi: x / Non Sq Epi: Occasional / Bacteria: Occasional        MEDICATIONS  (STANDING):  desmopressin 0.1 milliGRAM(s) Oral <User Schedule>  dextrose 5%. 1000 milliLiter(s) (80 mL/Hr) IV Continuous <Continuous>  enoxaparin Injectable 40 milliGRAM(s) SubCutaneous daily  ferrous    sulfate 325 milliGRAM(s) Oral daily  folic acid 1 milliGRAM(s) Oral daily  levETIRAcetam 500 milliGRAM(s) Oral two times a day  multivitamin 1 Tablet(s) Oral daily  pantoprazole    Tablet 40 milliGRAM(s) Oral before breakfast  piperacillin/tazobactam IVPB.. 3.375 Gram(s) IV Intermittent every 8 hours  vancomycin  IVPB 750 milliGRAM(s) IV Intermittent every 24 hours    MEDICATIONS  (PRN):  acetaminophen   Tablet .. 650 milliGRAM(s) Oral every 6 hours PRN Temp greater or equal to 38.5C (101.3F)      RADIOLOGY & ADDITIONAL TESTS:

## 2020-12-30 NOTE — CONSULT NOTE ADULT - ASSESSMENT
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    1) Hypernatremia  2) Diabetes insipidus  3) HTN  4) CoVID    Would continue home dose ddAVP 0.1mg TID  Currently on D5 @ 80cc/hr  Trend BMP q12h  dw Dr Randall

## 2020-12-30 NOTE — PROGRESS NOTE ADULT - SUBJECTIVE AND OBJECTIVE BOX
CC:  follow up DI and hypernatremia    Interval HPI/ Overnight Events:  Patient's COVID PCR came back + last night and she is now on isolation.  Had fever overnight and cultures were taken and started on empiric antibiotics due to indwelling PICC line.  IVFs were changed to d5W at 80 cc/hr overnight, now off.   Currently denies fevers, headaches, cough or dyspnea.  Has been stable on RA.  Has been eating, but not drinking much.      MEDICATIONS  (STANDING):  desmopressin 0.1 milliGRAM(s) Oral <User Schedule>  dextrose 5%. 1000 milliLiter(s) (80 mL/Hr) IV Continuous <Continuous>  enoxaparin Injectable 40 milliGRAM(s) SubCutaneous daily  ferrous    sulfate 325 milliGRAM(s) Oral daily  folic acid 1 milliGRAM(s) Oral daily  levETIRAcetam 500 milliGRAM(s) Oral two times a day  multivitamin 1 Tablet(s) Oral daily  pantoprazole    Tablet 40 milliGRAM(s) Oral before breakfast  piperacillin/tazobactam IVPB.. 3.375 Gram(s) IV Intermittent every 8 hours    ICU Vital Signs Last 24 Hrs  T(C): 39.3 (30 Dec 2020 17:26), Max: 39.3 (30 Dec 2020 17:26)  T(F): 102.8 (30 Dec 2020 17:26), Max: 102.8 (30 Dec 2020 17:26)  HR: 99 (30 Dec 2020 17:26) (78 - 99)  BP: 98/63 (30 Dec 2020 17:26) (98/63 - 125/85)  RR: 18 (30 Dec 2020 17:26) (17 - 20)  SpO2: 98% (30 Dec 2020 17:26) (98% - 99%)    PE:  Gen: AAO, NAD  HEENT:  sclera anicteric, MMM  Neck:  no thyromegaly, no LAD  CV:  nl S1 + S2, RRR, no m/r/g  Resp:  nl respiratory effort, CTA b/l  GI/ Abd: soft, NT/ ND, BS +  Neuro:  No tremor   MS:  no c/c/e, nl muscle tone  Skin:  no acanthosis  Psych: affect appropriate    LABS:  12-30    150<H>  |  121<H>  |  32.0<H>  ----------------------------<  100<H>  3.9   |  18.0<L>  |  1.60<H>    Ca    8.4<L>      30 Dec 2020 13:29  Phos  2.4     12-29  Mg     1.9     12-29    TPro  7.2  /  Alb  3.5  /  TBili  0.3<L>  /  DBili  x   /  AST  18  /  ALT  10  /  AlkPhos  69  12-30                          8.5    13.73 )-----------( 145      ( 30 Dec 2020 13:29 )             30.5

## 2020-12-30 NOTE — PROGRESS NOTE ADULT - ASSESSMENT
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  She is now found to be COVID-19 positive and has fevers.      1.  Hypernatremia-  Likely dehydrated due to lack of an intact thirst mechanism in setting of DI.   Continue hypotonic fluid replacement to correct free water deficit as per renal.    2.  DI-  continue home dose ddAVP.   Encourage PO intake of fluids.    3.  Syncope-  possibly related to orthostatic hypotension.   No other source found.     4.  Fevers-  cultures are pending and on empiric antibiotics, although could be related to COVID-19.  5.  COVID-19-   respiratory status is stable.  Continue supportive care.  ID is seeing patient.

## 2020-12-30 NOTE — CONSULT NOTE ADULT - ASSESSMENT
56 y/o female with h/o SAH, diabetes insipidus, chronic hypernatremia was BIBA for as pt. felt a little dizzy after coming out of bathroom, no syncope, no fall, no trauma, no focal weakness, no palpitations, no cp, no sob. pt. reports appetite has been ok, no abd. pain, no n/v/d. no fever. appetite is good. no sick contact. As per pt. she has a PICC line and every other day gets D5W through that for h/o hypernatremia. pt. bp 92/60 , hr 87 upon arrival. As per pt. she runs bp on the low side.     Leukocytosis   Fever  TOSHIA  Hypernatremia    - + fever  - COVID PCR + but asymptomatic and not requiring supplemental O2, COVID IgG (-)  - UA (-) and CXr (-)  - f/u BCX  - f/u MRSA screen  - unclear how long this PICC has been in place,  will likely have to be removed, reports history of PICC line infections in the past  - Check CT c/ap  - Continue empiric zosyn and vancomycin adjusted for renal function, monitor trough per pharmacy protocol  - Trend Fever  - Trend Leukocytosis      Will Follow

## 2020-12-30 NOTE — CONSULT NOTE ADULT - SUBJECTIVE AND OBJECTIVE BOX
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  Patient is a poor historian, but denies any prodrome of symptoms of CP, headache, palpitations or dyspnea.  On arrival to ED, she was found to be moderately hypernatremic with Na of 156 with TOSHIA (Cr 1.4).  Also found to be mildly hypotensive on admission to ED.      Most of history is obtained through review of chart as patient is a poor historian due to memory loss.  Patient does not have good insight into her medical history and it appears that her daughter helps with administration of her medications.  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  Since then, she has been on ddAVP, but her hypernatremia has been difficult to control as it seems that the patient does not have an intact thirst mechanism.  Patient has a PICC line and will have IVFs given every other day for management of her chronic hypernatremia.  She currently denies any associated polyuria or polydipsia.  She denies missing any doses of ddAVP lately (at home she is prescribed 0.1 mg 3XD).    Pt in CoVID isolation; on DDAVP; Na 156; Cr 1.66; currently on D5 @ 80cc/hr.      PAST HISTORY  --------------------------------------------------------------------------------  PAST MEDICAL & SURGICAL HISTORY:  Memory loss, short term    Brain aneurysm    Diabetes insipidus    HTN (Hypertension)    Subarachnoid Hemorrhage    H/O gastric bypass    S/P Cerebral Aneurysm Repair    S/P Craniotomy      FAMILY HISTORY:  Family history of hypertension  mother      PAST SOCIAL HISTORY:    ALLERGIES & MEDICATIONS  --------------------------------------------------------------------------------  Allergies    No Known Allergies    Intolerances      Standing Inpatient Medications  desmopressin 0.1 milliGRAM(s) Oral <User Schedule>  dextrose 5%. 1000 milliLiter(s) IV Continuous <Continuous>  enoxaparin Injectable 40 milliGRAM(s) SubCutaneous daily  ferrous    sulfate 325 milliGRAM(s) Oral daily  folic acid 1 milliGRAM(s) Oral daily  levETIRAcetam 500 milliGRAM(s) Oral two times a day  multivitamin 1 Tablet(s) Oral daily  pantoprazole    Tablet 40 milliGRAM(s) Oral before breakfast  piperacillin/tazobactam IVPB.. 3.375 Gram(s) IV Intermittent every 8 hours  vancomycin  IVPB 750 milliGRAM(s) IV Intermittent every 12 hours    PRN Inpatient Medications      REVIEW OF SYSTEMS  --------------------------------------------------------------------------------  Unable to obtain; 2/2 CoVID isolation status;    VITALS/PHYSICAL EXAM  --------------------------------------------------------------------------------  T(C): 38.3 (12-30-20 @ 07:56), Max: 38.3 (12-29-20 @ 23:32)  HR: 97 (12-30-20 @ 07:56) (78 - 97)  BP: 125/85 (12-30-20 @ 07:56) (92/60 - 125/85)  RR: 20 (12-30-20 @ 07:56) (16 - 20)  SpO2: 98% (12-30-20 @ 07:56) (96% - 100%)  Wt(kg): --  Height (cm): 152.4 (12-29-20 @ 14:20)  Weight (kg): 56.7 (12-29-20 @ 14:20)  BMI (kg/m2): 24.4 (12-29-20 @ 14:20)  BSA (m2): 1.53 (12-29-20 @ 14:20)      12-29-20 @ 07:01  -  12-30-20 @ 07:00  --------------------------------------------------------  IN: 1120 mL / OUT: 0 mL / NET: 1120 mL      Physical Exam per medicine; on CoVID isolation;    Gen: AAO, NAD  HEENT:  sclera anicteric, MMM  Neck:  no thyromegaly, no LAD  CV:  nl S1 + S2, RRR, no m/r/g  Resp:  nl respiratory effort, CTA b/l  GI/ Abd: soft, NT/ ND, BS +  Neuro:  No tremor, CN grossly intact  MS:  no c/c/e, nl muscle tone  Skin:  no acanthosis nigricans, no rashes  Psych: affect appropriate    LABS/STUDIES  --------------------------------------------------------------------------------              8.2    17.24 >-----------<  189      [12-30-20 @ 01:51]              29.0     156  |  130  |  39.0  ----------------------------<  130      [12-30-20 @ 01:51]  4.1   |  17.0  |  1.66        Ca     8.4     [12-30-20 @ 01:51]      Mg     1.9     [12-29-20 @ 15:16]      Phos  2.4     [12-29-20 @ 15:16]    TPro  7.2  /  Alb  3.5  /  TBili  0.3  /  DBili  x   /  AST  18  /  ALT  10  /  AlkPhos  69  [12-30-20 @ 01:51]    PT/INR: PT 13.0 , INR 1.13       [12-29-20 @ 23:20]  PTT: 37.3       [12-29-20 @ 23:20]    Troponin <0.01      [12-30-20 @ 01:51]  Serum Osmolality 354      [12-29-20 @ 15:16]    Creatinine Trend:  SCr 1.66 [12-30 @ 01:51]  SCr 1.40 [12-29 @ 15:16]    Urinalysis - [12-30-20 @ 01:07]      Color Yellow / Appearance Clear / SG 1.015 / pH 5.0      Gluc Negative / Ketone Negative  / Bili Negative / Urobili Negative       Blood Negative / Protein 30 / Leuk Est Trace / Nitrite Negative      RBC 0-2 / WBC 0-2 / Hyaline  / Gran  / Sq Epi  / Non Sq Epi Occasional / Bacteria Occasional    Urine Creatinine 130      [12-30-20 @ 01:08]  Urine Sodium 94      [12-30-20 @ 01:08]  Urine Osmolality 642      [12-30-20 @ 01:08]    Iron 14, TIBC 180, %sat 8      [12-30-20 @ 01:51]  Ferritin 753      [12-30-20 @ 01:51]    HBsAg Nonreact      [08-16-16 @ 12:42]  HCV 0.13, Nonreact      [11-09-19 @ 21:00]

## 2020-12-30 NOTE — PROGRESS NOTE ADULT - ASSESSMENT
In summary 56 y/o female with h/o SAH, diabetes insipidus, chronic hypernatremia was BIBA for as pt. felt a little dizzy after coming out of bathroom, no syncope, no fall, no trauma, no focal weakness, no palpitations, no cp, no sob. pt. reports appetite has been ok, no abd. pain, no n/v/d. no fever. appetite is good. no sick contact. As per pt. she has a PICC line and every other day gets D5W through that for h/o hypernatremia. pt. bp 92/60 , hr 87 upon arrival. pt. will be admitted for hypernatremia, spoke to nephrologist dr. Bone and agrees with 1 L NS and then 0.45 % NS  close f/u on bmp, will repeat in 6 hrs .     - COIVD positive  - asymptomatic without oxygen at this time  - ID to see    - hypernatremia, chronic  - maintain above 145  - continue fluids  - follow up with nephrology recs  - PICC in place    - acute kidney injury, likely pre renal and dehydration contributing. iv fluids, encourage po intake.  - improving    - near syncope,  vasovagal and related to COVID likely   - feels better    - hypotension - baseline    - H/O diabetes insipidus continue desmopressin, discussed with endocrinologist Dr. Boothe.   - endocrine following    - anemia , appears chronic when compared to old labs. Hb close to her baseline. anemia work up requested.    -DISPO  - possibly discharge this morning.

## 2020-12-30 NOTE — PATIENT PROFILE ADULT. - ABILITY TO HEAR (WITH HEARING AID OR HEARING APPLIANCE IF NORMALLY USED):
Adequate: hears normal conversation without difficulty Additional Notes: Pt to have sutures removed in 2 weeks at home Detail Level: Simple

## 2020-12-30 NOTE — CHART NOTE - NSCHARTNOTEFT_GEN_A_CORE
After review of most recent labs,  12-30    156<H>  |  130<H>  |  39.0<H>  ----------------------------<  130<H>  4.1   |  17.0<L>  |  1.66<H>    Ca    8.4<L>      30 Dec 2020 01:51  Phos  2.4     12-29  Mg     1.9     12-29    TPro  7.2  /  Alb  3.5  /  TBili  0.3<L>  /  DBili  x   /  AST  18  /  ALT  10  /  AlkPhos  69  12-30    IVF fluids changed from 0.45%NS to D5W @80cc/hr as per conversation with Dr. Ivy.   Will repeat BMP at 6am.  Continue to monitor patient, notify PA of any changes in patient status.

## 2020-12-30 NOTE — CONSULT NOTE ADULT - SUBJECTIVE AND OBJECTIVE BOX
[facility]  INFECTIOUS DISEASES AND INTERNAL MEDICINE at [place]  =======================================================  Mj Lee MD  Diplomates American Board of Internal Medicine and Infectious Diseases  Tel: [phone]      Fax: [phone]  =======================================================      [id number]  KRUPA MEDINA    CC: Patient is a 55y old  Female who presents with a chief complaint of hypernatremia (30 Dec 2020 11:30)      54 y/o female with h/o SAH, diabetes insipidus, chronic hypernatremia was BIBA for as pt. felt a little dizzy after coming out of bathroom, no syncope, no fall, no trauma, no focal weakness, no palpitations, no cp, no sob. pt. reports appetite has been ok, no abd. pain, no n/v/d. no fever. appetite is good. no sick contact. As per pt. she has a PICC line and every other day gets D5W through that for h/o hypernatremia. pt. bp 92/60 , hr 87 upon arrival. As per pt. she runs bp on the low side.     Past Medical & Surgical Hx:  PAST MEDICAL & SURGICAL HISTORY:  Memory loss, short term    Brain aneurysm    Diabetes insipidus    HTN (Hypertension)    Subarachnoid Hemorrhage    H/O gastric bypass    S/P Cerebral Aneurysm Repair    S/P Craniotomy            Social Hx:    FAMILY HISTORY:  Family history of hypertension  mother        Allergies    No Known Allergies    Intolerances             REVIEW OF SYSTEMS:  CONSTITUTIONAL:  No Fever or chills  HEENT:  No diplopia or blurred vision.  No earache, sore throat or runny nose.  CARDIOVASCULAR:  No pressure, squeezing, strangling, tightness, heaviness or aching about the chest, neck, axilla or epigastrium.  RESPIRATORY:  No cough, shortness of breath  GASTROINTESTINAL:  No nausea, vomiting or diarrhea.  GENITOURINARY:  No dysuria, frequency or urgency. No Blood in urine  MUSCULOSKELETAL:  no joint aches, no muscle pain  SKIN:  No change in skin, hair or nails.  NEUROLOGIC:  No Headaches, seizures or weakness.  PSYCHIATRIC:  No disorder of thought or mood.  ENDOCRINE:  No heat or cold intolerance  HEMATOLOGICAL:  No easy bruising or bleeding.       Physical Exam:    GEN: NAD, pleasant  HEENT: normocephalic and atraumatic. EOMI. PERRL.  Anicteric  NECK: Supple.   LUNGS: Clear to auscultation.  HEART: Regular rate and rhythm without murmur.  ABDOMEN: Soft, nontender, and nondistended.  Positive bowel sounds.    : No CVA tenderness  EXTREMITIES: Without any edema.  MSK: No joint swelling  NEUROLOGIC: Cranial nerves II through XII are grossly intact. No Focal Deficits  PSYCHIATRIC: Appropriate affect .  SKIN: No Rash        Vitals:    T(F): 101 (30 Dec 2020 07:56), Max: 101 (30 Dec 2020 07:56)  HR: 98 (30 Dec 2020 14:54)  BP: 99/69 (30 Dec 2020 14:54)  RR: 18 (30 Dec 2020 14:54)  SpO2: 98% (30 Dec 2020 14:54) (98% - 99%)  temp max in last 48H T(F): , Max: 101 (12-30-20 @ 07:56)      Current Antibiotics:  piperacillin/tazobactam IVPB.. 3.375 Gram(s) IV Intermittent every 8 hours    Other medications:  desmopressin 0.1 milliGRAM(s) Oral <User Schedule>  dextrose 5%. 1000 milliLiter(s) IV Continuous <Continuous>  enoxaparin Injectable 40 milliGRAM(s) SubCutaneous daily  ferrous    sulfate 325 milliGRAM(s) Oral daily  folic acid 1 milliGRAM(s) Oral daily  levETIRAcetam 500 milliGRAM(s) Oral two times a day  multivitamin 1 Tablet(s) Oral daily  pantoprazole    Tablet 40 milliGRAM(s) Oral before breakfast        Labs:                        8.5    13.73 )-----------( 145      ( 30 Dec 2020 13:29 )             30.5      12-30    150<H>  |  121<H>  |  32.0<H>  ----------------------------<  100<H>  3.9   |  18.0<L>  |  1.60<H>    Ca    8.4<L>      30 Dec 2020 13:29  Phos  2.4     12-29  Mg     1.9     12-29    TPro  7.2  /  Alb  3.5  /  TBili  0.3<L>  /  DBili  x   /  AST  18  /  ALT  10  /  AlkPhos  69  12-30      WBC Count: 13.73 K/uL (12-30-20 @ 13:29)  WBC Count: 17.24 K/uL (12-30-20 @ 01:51)  WBC Count: 7.82 K/uL (12-29-20 @ 16:41)    Creatinine, Serum: 1.60 mg/dL (12-30-20 @ 13:29)  Creatinine, Serum: 1.66 mg/dL (12-30-20 @ 01:51)  Creatinine, Serum: 1.64 mg/dL (12-30-20 @ 01:51)  Creatinine, Serum: 1.40 mg/dL (12-29-20 @ 15:16)      Ferritin, Serum: 753 ng/mL (12-30-20 @ 01:51)           COVID-19 IgG Antibody Interpretation: Negative (12-30-20 @ 06:49)  COVID-19 IgG Antibody Index: 0.06 Index (12-30-20 @ 06:49)  COVID-19 PCR: Detected (12-29-20 @ 22:59)   [facility]  INFECTIOUS DISEASES AND INTERNAL MEDICINE at [place]  =======================================================  Mj Lee MD  Diplomates American Board of Internal Medicine and Infectious Diseases  Tel: [phone]      Fax: [phone]  =======================================================      [id number]  KRUPA MEDINA    CC: Patient is a 55y old  Female who presents with a chief complaint of hypernatremia (30 Dec 2020 11:30)      56 y/o female with h/o SAH, diabetes insipidus, chronic hypernatremia was BIBA for as pt. felt a little dizzy after coming out of bathroom, no syncope, no fall, no trauma, no focal weakness, no palpitations, no cp, no sob. pt. reports appetite has been ok, no abd. pain, no n/v/d. no fever. appetite is good. no sick contact. As per pt. she has a PICC line and every other day gets D5W through that for h/o hypernatremia. pt. bp 92/60 , hr 87 upon arrival. As per pt. she runs bp on the low side.     Past Medical & Surgical Hx:  PAST MEDICAL & SURGICAL HISTORY:  Memory loss, short term    Brain aneurysm    Diabetes insipidus    HTN (Hypertension)    Subarachnoid Hemorrhage    H/O gastric bypass    S/P Cerebral Aneurysm Repair    S/P Craniotomy            Social Hx:    FAMILY HISTORY:  Family history of hypertension  mother        Allergies    No Known Allergies    Intolerances             REVIEW OF SYSTEMS:  CONSTITUTIONAL:  + Fever no chills  HEENT:  No diplopia or blurred vision.  No earache, sore throat or runny nose.  CARDIOVASCULAR:  No pressure, squeezing, strangling, tightness, heaviness or aching about the chest, neck, axilla or epigastrium.  RESPIRATORY:  No cough, shortness of breath  GASTROINTESTINAL:  No nausea, vomiting or diarrhea.  GENITOURINARY:  No dysuria, frequency or urgency. No Blood in urine  MUSCULOSKELETAL:  no joint aches, no muscle pain  SKIN:  No change in skin, hair or nails.  NEUROLOGIC:  No Headaches, seizures or weakness.  PSYCHIATRIC:  No disorder of thought or mood.  ENDOCRINE:  No heat or cold intolerance  HEMATOLOGICAL:  No easy bruising or bleeding.       Physical Exam:    GEN: NAD, pleasant  HEENT: normocephalic and atraumatic. EOMI. PERRL.  Anicteric  NECK: Supple.   LUNGS: Clear to auscultation.  HEART: Regular rate and rhythm without murmur.  ABDOMEN: Soft, nontender, and nondistended.  Positive bowel sounds.    : No CVA tenderness  EXTREMITIES: Without any edema. rue picc intact  MSK: No joint swelling  NEUROLOGIC: Cranial nerves II through XII are grossly intact. No Focal Deficits  PSYCHIATRIC: Appropriate affect .  SKIN: No Rash        Vitals:    T(F): 101 (30 Dec 2020 07:56), Max: 101 (30 Dec 2020 07:56)  HR: 98 (30 Dec 2020 14:54)  BP: 99/69 (30 Dec 2020 14:54)  RR: 18 (30 Dec 2020 14:54)  SpO2: 98% (30 Dec 2020 14:54) (98% - 99%)  temp max in last 48H T(F): , Max: 101 (12-30-20 @ 07:56)      Current Antibiotics:  piperacillin/tazobactam IVPB.. 3.375 Gram(s) IV Intermittent every 8 hours    Other medications:  desmopressin 0.1 milliGRAM(s) Oral <User Schedule>  dextrose 5%. 1000 milliLiter(s) IV Continuous <Continuous>  enoxaparin Injectable 40 milliGRAM(s) SubCutaneous daily  ferrous    sulfate 325 milliGRAM(s) Oral daily  folic acid 1 milliGRAM(s) Oral daily  levETIRAcetam 500 milliGRAM(s) Oral two times a day  multivitamin 1 Tablet(s) Oral daily  pantoprazole    Tablet 40 milliGRAM(s) Oral before breakfast        Labs:                        8.5    13.73 )-----------( 145      ( 30 Dec 2020 13:29 )             30.5      12-30    150<H>  |  121<H>  |  32.0<H>  ----------------------------<  100<H>  3.9   |  18.0<L>  |  1.60<H>    Ca    8.4<L>      30 Dec 2020 13:29  Phos  2.4     12-29  Mg     1.9     12-29    TPro  7.2  /  Alb  3.5  /  TBili  0.3<L>  /  DBili  x   /  AST  18  /  ALT  10  /  AlkPhos  69  12-30      WBC Count: 13.73 K/uL (12-30-20 @ 13:29)  WBC Count: 17.24 K/uL (12-30-20 @ 01:51)  WBC Count: 7.82 K/uL (12-29-20 @ 16:41)    Creatinine, Serum: 1.60 mg/dL (12-30-20 @ 13:29)  Creatinine, Serum: 1.66 mg/dL (12-30-20 @ 01:51)  Creatinine, Serum: 1.64 mg/dL (12-30-20 @ 01:51)  Creatinine, Serum: 1.40 mg/dL (12-29-20 @ 15:16)      Ferritin, Serum: 753 ng/mL (12-30-20 @ 01:51)           COVID-19 IgG Antibody Interpretation: Negative (12-30-20 @ 06:49)  COVID-19 IgG Antibody Index: 0.06 Index (12-30-20 @ 06:49)  COVID-19 PCR: Detected (12-29-20 @ 22:59)      < from: CT Head No Cont (12.30.20 @ 00:41) >  IMPRESSION:    No acute intracranial hemorrhage or mass effect. Additional findings as described. If clinically indicated, short-term follow-up or MRI may be obtained for further evaluation.    < end of copied text >    < from: Xray Chest 1 View- PORTABLE-Routine (Xray Chest 1 View- PORTABLE-Routine .) (12.29.20 @ 22:47) >    Findings/  impression:  Right PICC tip projects in the region of the distal SVC.  The cardiomediastinal silhouette is normal in size.  There is no focal pulmonary consolidation, pleural effusion or pneumothorax.  There is right basilar linear atelectasis.      < end of copied text >

## 2020-12-31 LAB
-  K. PNEUMONIAE GROUP: SIGNIFICANT CHANGE UP
GRAM STN FLD: SIGNIFICANT CHANGE UP
GRAM STN FLD: SIGNIFICANT CHANGE UP
METHOD TYPE: SIGNIFICANT CHANGE UP
ORGANISM # SPEC MICROSCOPIC CNT: SIGNIFICANT CHANGE UP
SPECIMEN SOURCE: SIGNIFICANT CHANGE UP
SPECIMEN SOURCE: SIGNIFICANT CHANGE UP

## 2020-12-31 PROCEDURE — 99233 SBSQ HOSP IP/OBS HIGH 50: CPT

## 2020-12-31 PROCEDURE — 71250 CT THORAX DX C-: CPT | Mod: 26

## 2020-12-31 PROCEDURE — 99232 SBSQ HOSP IP/OBS MODERATE 35: CPT

## 2020-12-31 PROCEDURE — 74176 CT ABD & PELVIS W/O CONTRAST: CPT | Mod: 26

## 2020-12-31 RX ADMIN — Medication 325 MILLIGRAM(S): at 11:55

## 2020-12-31 RX ADMIN — ENOXAPARIN SODIUM 40 MILLIGRAM(S): 100 INJECTION SUBCUTANEOUS at 11:54

## 2020-12-31 RX ADMIN — Medication 250 MILLIGRAM(S): at 02:17

## 2020-12-31 RX ADMIN — DESMOPRESSIN ACETATE 0.1 MILLIGRAM(S): 0.1 TABLET ORAL at 05:08

## 2020-12-31 RX ADMIN — DESMOPRESSIN ACETATE 0.1 MILLIGRAM(S): 0.1 TABLET ORAL at 14:09

## 2020-12-31 RX ADMIN — LEVETIRACETAM 500 MILLIGRAM(S): 250 TABLET, FILM COATED ORAL at 18:33

## 2020-12-31 RX ADMIN — PIPERACILLIN AND TAZOBACTAM 25 GRAM(S): 4; .5 INJECTION, POWDER, LYOPHILIZED, FOR SOLUTION INTRAVENOUS at 05:08

## 2020-12-31 RX ADMIN — DESMOPRESSIN ACETATE 0.1 MILLIGRAM(S): 0.1 TABLET ORAL at 23:07

## 2020-12-31 RX ADMIN — PANTOPRAZOLE SODIUM 40 MILLIGRAM(S): 20 TABLET, DELAYED RELEASE ORAL at 05:08

## 2020-12-31 RX ADMIN — Medication 1 MILLIGRAM(S): at 11:55

## 2020-12-31 RX ADMIN — SODIUM CHLORIDE 80 MILLILITER(S): 9 INJECTION, SOLUTION INTRAVENOUS at 11:53

## 2020-12-31 RX ADMIN — PIPERACILLIN AND TAZOBACTAM 25 GRAM(S): 4; .5 INJECTION, POWDER, LYOPHILIZED, FOR SOLUTION INTRAVENOUS at 14:08

## 2020-12-31 RX ADMIN — Medication 1 TABLET(S): at 11:55

## 2020-12-31 RX ADMIN — LEVETIRACETAM 500 MILLIGRAM(S): 250 TABLET, FILM COATED ORAL at 05:08

## 2020-12-31 RX ADMIN — PIPERACILLIN AND TAZOBACTAM 25 GRAM(S): 4; .5 INJECTION, POWDER, LYOPHILIZED, FOR SOLUTION INTRAVENOUS at 21:16

## 2020-12-31 NOTE — PROGRESS NOTE ADULT - SUBJECTIVE AND OBJECTIVE BOX
[facility]  INFECTIOUS DISEASES AND INTERNAL MEDICINE at [place]  =======================================================  Mj Lee MD  Diplomates American Board of Internal Medicine and Infectious Diseases  Tel: [phone]      Fax: [phone]  =======================================================    RKUPA MEDINA [id number]    Follow up: bacteremia, covid 19  patient feels well  blood cultures are positive      Allergies:  No Known Allergies           REVIEW OF SYSTEMS:  CONSTITUTIONAL:  No Fever or chills  HEENT:   No diplopia or blurred vision.  No earache, sore throat or runny nose.  CARDIOVASCULAR:  No pressure, squeezing, strangling, tightness, heaviness or aching about the chest, neck, axilla or epigastrium.  RESPIRATORY:  No cough, shortness of breath  GASTROINTESTINAL:  No nausea, vomiting or diarrhea.  GENITOURINARY:  No dysuria, frequency or urgency. No Blood in urine  MUSCULOSKELETAL:  no joint aches, no muscle pain  SKIN:  No change in skin, hair or nails.  NEUROLOGIC:  No Headaches, seizures or weakness.  PSYCHIATRIC:  No disorder of thought or mood.  ENDOCRINE:  No heat or cold intolerance  HEMATOLOGICAL:  No easy bruising or bleeding.       Physical Exam:  GEN: NAD, pleasant  HEENT: normocephalic and atraumatic. EOMI. PERRL.  Anicteric   NECK: Supple.   LUNGS: Clear to auscultation.  HEART: Regular rate and rhythm without murmur.  ABDOMEN: Soft, nontender, and nondistended.  Positive bowel sounds.    : No CVA tenderness  EXTREMITIES: Without any edema.  MSK: no joint swelling  NEUROLOGIC: Cranial nerves II through XII are grossly intact. No focal deficits  PSYCHIATRIC: Appropriate affect .  SKIN: No Rash      Vitals:    T(F): 98.7 (31 Dec 2020 15:09), Max: 101.9 (30 Dec 2020 18:44)  HR: 85 (31 Dec 2020 15:09)  BP: 132/67 (31 Dec 2020 15:09)  RR: 22 (31 Dec 2020 15:09)  SpO2: 100% (31 Dec 2020 15:09) (97% - 100%)  temp max in last 48H T(F): , Max: 102.8 (12-30-20 @ 17:26)      Current Antibiotics:  piperacillin/tazobactam IVPB.. 3.375 Gram(s) IV Intermittent every 8 hours  vancomycin  IVPB 750 milliGRAM(s) IV Intermittent every 24 hours    Other medications:  desmopressin 0.1 milliGRAM(s) Oral <User Schedule>  dextrose 5%. 1000 milliLiter(s) IV Continuous <Continuous>  enoxaparin Injectable 40 milliGRAM(s) SubCutaneous daily  ferrous    sulfate 325 milliGRAM(s) Oral daily  folic acid 1 milliGRAM(s) Oral daily  levETIRAcetam 500 milliGRAM(s) Oral two times a day  multivitamin 1 Tablet(s) Oral daily  pantoprazole    Tablet 40 milliGRAM(s) Oral before breakfast        Labs:                        8.5    13.73 )-----------( 145      ( 30 Dec 2020 13:29 )             30.5      12-30    150<H>  |  121<H>  |  32.0<H>  ----------------------------<  100<H>  3.9   |  18.0<L>  |  1.60<H>    Ca    8.4<L>      30 Dec 2020 13:29    TPro  7.2  /  Alb  3.5  /  TBili  0.3<L>  /  DBili  x   /  AST  18  /  ALT  10  /  AlkPhos  69  12-30      Culture - Blood (collected 12-30-20 @ 01:52)  Source: .Blood Blood-Peripheral  Gram Stain (12-31-20 @ 14:47):    Growth in anaerobic bottle: Gram Positive Rods    Gram Stain performed by:    [facility]    [address] [place] [address]    .    TYPE: (C=Critical, N=Notification, A=Abnormal) C    TESTS:  _     DATE/TIME CALLED: _ 12/31/2020 14:45:54    CALLED TO: Davis Tavarez RN    READ BACK (2 Patient Identifiers)(Y/N): _ Y    READ BACK VALUES (Y/N): _ Y    CALLED BY: Davis Rodriguez    Culture - Blood (collected 12-30-20 @ 01:52)  Source: .Blood Blood-Peripheral  Gram Stain (12-31-20 @ 09:27):    Growth in aerobic bottle: Gram Positive Rods    Growth in anaerobic bottle: Gram Negative Rods    ***Blood Panel PCR results on this specimen are available    approximately 3 hours after the Gram stain result.***    Gram stain, PCR, and/or culture results may not always    correspond due to difference in methodologies.    ************************************************************    This PCR assay was performed using [employer].    The following targets are tested for: Enterococcus,    vancomycin resistant enterococci, Listeria monocytogenes,    coagulase negative staphylococci, S. aureus,    methicillin resistant S. aureus, Streptococcus agalactiae    (Group B), S. pneumoniae, S. pyogenes (Group A),    Acinetobacter baumannii, Enterobacter cloacae, E. coli,    Klebsiella oxytoca, K. pneumoniae, Proteus sp.,    Serratia marcescens, Haemophilus influenzae,    Neisseria meningitidis, Pseudomonas aeruginosa, Candida    albicans, C. glabrata, C krusei, C parapsilosis,    C. tropicalis and the KPC resistance gene.    Gram Stain and BCID performed by:    [facility]    [address] [place] [address]    .    TYPE: (C=Critical, N=Notification, A=Abnormal) C    TESTS:  _ GS    DATE/TIME CALLED: _ 12/31/2020 09:26:34    CALLED TO: _ Mariana Hanson    READ BACK (2 Patient Identifiers)(Y/N): _ Y    READ BACK VALUES (Y/N): _ Y    CALLED BY: Davis Rodriguez  Organism: Blood Culture PCR (12-31-20 @ 11:05)  Organism: Blood Culture PCR (12-31-20 @ 11:05)    Sensitivities:      -  Klebsiella pneumoniae: Detec      Method Type: PCR      WBC Count: 13.73 K/uL (12-30-20 @ 13:29)  WBC Count: 17.24 K/uL (12-30-20 @ 01:51)  WBC Count: 7.82 K/uL (12-29-20 @ 16:41)    Creatinine, Serum: 1.60 mg/dL (12-30-20 @ 13:29)  Creatinine, Serum: 1.66 mg/dL (12-30-20 @ 01:51)  Creatinine, Serum: 1.64 mg/dL (12-30-20 @ 01:51)  Creatinine, Serum: 1.40 mg/dL (12-29-20 @ 15:16)      Ferritin, Serum: 753 ng/mL (12-30-20 @ 01:51)           COVID-19 IgG Antibody Interpretation: Negative (12-30-20 @ 06:49)  COVID-19 IgG Antibody Index: 0.06 Index (12-30-20 @ 06:49)  COVID-19 PCR: Detected (12-29-20 @ 22:59)    < from: CT Chest No Cont (12.31.20 @ 02:04) >  IMPRESSION:    Pulmonary opacities as described, suspicious for COVID-19 pneumonia.    No bowel obstruction.    Age-indeterminate endplate depression of T1-T9 as described. Recommend comparison to interval outside study. Cortical irregularity and subchondral lucencies at L2-L3 level, which is new since 9/14/2013 and felt to be degenerative in etiology. Recommend clinical correlation to assess infection/inflammation. Follow-up MRI maybe obtained as clinically indicated.    Additional findings as described.    < end of copied text >

## 2020-12-31 NOTE — PROGRESS NOTE ADULT - ASSESSMENT
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  She is COVID-19 positive and has Klebsiella bacteremia in setting of chronic PICC line.      1.  Hypernatremia-  Likely dehydrated due to lack of an intact thirst mechanism in setting of DI.   Continue hypotonic fluid replacement to correct free water deficit.  Needs repeat labs.    2.  DI-  continue home dose ddAVP.   Encourage PO intake of fluids.    3.  Syncope-  possibly related to orthostatic hypotension.   No other source found.     4.  Bacteremia-  continue abx as per ID, will need PICC D/C'ed.    5.  COVID-19-   respiratory status is stable.  Continue supportive care.

## 2020-12-31 NOTE — PROGRESS NOTE ADULT - SUBJECTIVE AND OBJECTIVE BOX
[facility] DIVISION OF KIDNEY DISEASES AND HYPERTENSION -- FOLLOW UP NOTE  --------------------------------------------------------------------------------  Chief Complaint:  DI    24 hour events/subjective:  Pt on CoVID isolation  On IVF; home dose of ddAVP  Na improving 156-->150      PAST HISTORY  --------------------------------------------------------------------------------  No significant changes to PMH, PSH, FHx, SHx, unless otherwise noted    ALLERGIES & MEDICATIONS  --------------------------------------------------------------------------------  Allergies    No Known Allergies    Intolerances      Standing Inpatient Medications  desmopressin 0.1 milliGRAM(s) Oral <User Schedule>  dextrose 5%. 1000 milliLiter(s) IV Continuous <Continuous>  enoxaparin Injectable 40 milliGRAM(s) SubCutaneous daily  ferrous    sulfate 325 milliGRAM(s) Oral daily  folic acid 1 milliGRAM(s) Oral daily  levETIRAcetam 500 milliGRAM(s) Oral two times a day  multivitamin 1 Tablet(s) Oral daily  pantoprazole    Tablet 40 milliGRAM(s) Oral before breakfast  piperacillin/tazobactam IVPB.. 3.375 Gram(s) IV Intermittent every 8 hours  vancomycin  IVPB 750 milliGRAM(s) IV Intermittent every 24 hours    PRN Inpatient Medications  acetaminophen   Tablet .. 650 milliGRAM(s) Oral every 6 hours PRN           VITALS/PHYSICAL EXAM  --------------------------------------------------------------------------------  T(C): 37.4 (12-31-20 @ 08:00), Max: 39.3 (12-30-20 @ 17:26)  HR: 84 (12-31-20 @ 08:00) (84 - 99)  BP: 114/79 (12-31-20 @ 08:00) (98/63 - 117/80)  RR: 19 (12-31-20 @ 08:00) (18 - 19)  SpO2: 99% (12-31-20 @ 08:00) (97% - 99%)  Wt(kg): --  Height (cm): 152.4 (12-29-20 @ 14:20)  Weight (kg): 56.7 (12-29-20 @ 14:20)  BMI (kg/m2): 24.4 (12-29-20 @ 14:20)  BSA (m2): 1.53 (12-29-20 @ 14:20)      12-30-20 @ 07:01  -  12-31-20 @ 07:00  --------------------------------------------------------  IN: 720 mL / OUT: 0 mL / NET: 720 mL      REVIEW OF SYSTEMS  --------------------------------------------------------------------------------  Unable to obtain; 2/2 CoVID isolation status;    VITALS/PHYSICAL EXAM  --------------------------------------------------------------------------------  T(C): 38.3 (12-30-20 @ 07:56), Max: 38.3 (12-29-20 @ 23:32)  HR: 97 (12-30-20 @ 07:56) (78 - 97)  BP: 125/85 (12-30-20 @ 07:56) (92/60 - 125/85)  RR: 20 (12-30-20 @ 07:56) (16 - 20)  SpO2: 98% (12-30-20 @ 07:56) (96% - 100%)  Wt(kg): --  Height (cm): 152.4 (12-29-20 @ 14:20)  Weight (kg): 56.7 (12-29-20 @ 14:20)  BMI (kg/m2): 24.4 (12-29-20 @ 14:20)  BSA (m2): 1.53 (12-29-20 @ 14:20)  LABS/STUDIES  --------------------------------------------------------------------------------              8.5    13.73 >-----------<  145      [12-30-20 @ 13:29]              30.5     150  |  121  |  32.0  ----------------------------<  100      [12-30-20 @ 13:29]  3.9   |  18.0  |  1.60        Ca     8.4     [12-30-20 @ 13:29]      Mg     1.9     [12-29-20 @ 15:16]      Phos  2.4     [12-29-20 @ 15:16]    TPro  7.2  /  Alb  3.5  /  TBili  0.3  /  DBili  x   /  AST  18  /  ALT  10  /  AlkPhos  69  [12-30-20 @ 01:51]    PT/INR: PT 13.0 , INR 1.13       [12-29-20 @ 23:20]  PTT: 37.3       [12-29-20 @ 23:20]    Troponin <0.01      [12-30-20 @ 01:51]  Serum Osmolality 354      [12-29-20 @ 15:16]    Creatinine Trend:  SCr 1.60 [12-30 @ 13:29]  SCr 1.66 [12-30 @ 01:51]  SCr 1.40 [12-29 @ 15:16]    Urinalysis - [12-30-20 @ 01:07]      Color Yellow / Appearance Clear / SG 1.015 / pH 5.0      Gluc Negative / Ketone Negative  / Bili Negative / Urobili Negative       Blood Negative / Protein 30 / Leuk Est Trace / Nitrite Negative      RBC 0-2 / WBC 0-2 / Hyaline  / Gran  / Sq Epi  / Non Sq Epi Occasional / Bacteria Occasional    Urine Creatinine 130      [12-30-20 @ 01:08]  Urine Sodium 94      [12-30-20 @ 01:08]  Urine Osmolality 642      [12-30-20 @ 01:08]    Iron 14, TIBC 180, %sat 8      [12-30-20 @ 01:51]  Ferritin 753      [12-30-20 @ 01:51]

## 2020-12-31 NOTE — PROGRESS NOTE ADULT - SUBJECTIVE AND OBJECTIVE BOX
CC: hypernatremia (30 Dec 2020 18:39)    HPI:  56 y/o female with h/o SAH, diabetes insipidus, chronic hypernatremia was BIBA for as pt. felt a little dizzy after coming out of bathroom, no syncope, no fall, no trauma, no focal weakness, no palpitations, no cp, no sob. pt. reports appetite has been ok, no abd. pain, no n/v/d. no fever. appetite is good. no sick contact. As per pt. she has a PICC line and every other day gets D5W through that for h/o hypernatremia. pt. bp 92/60 , hr 87 upon arrival. As per pt. she runs bp on the low side.  (29 Dec 2020 18:08)    INTERVAL HPI/OVERNIGHT EVENTS:  no acute events overnight  patient without complaints  she grew positive cultures in her blood culture from yesterday GPR and GNR  likely PICC will have to be removed - has been in for 3 months and receives weekly blood draw from it (single lumen)    Vital Signs Last 24 Hrs  T(C): 37.4 (31 Dec 2020 08:00), Max: 39.3 (30 Dec 2020 17:26)  T(F): 99.3 (31 Dec 2020 08:00), Max: 102.8 (30 Dec 2020 17:26)  HR: 84 (31 Dec 2020 08:00) (84 - 99)  BP: 114/79 (31 Dec 2020 08:00) (98/63 - 117/80)  BP(mean): --  RR: 19 (31 Dec 2020 08:00) (18 - 19)  SpO2: 99% (31 Dec 2020 08:00) (97% - 99%)    PHYSICAL EXAM:  General: in no acute distress  Eyes: PERRLA, EOMI; conjunctiva and sclera clear  Head: Normocephalic; atraumatic  ENMT: No nasal discharge; airway clear  Neck: Supple; non tender; no masses  Respiratory: No wheezes, rales or rhonchi  Cardiovascular: Regular rate and rhythm. S1 and S2 Normal; No murmurs, gallops or rubs  Gastrointestinal: Soft non-tender non-distended; Normal bowel sounds  Genitourinary: No costovertebral angle tenderness  Extremities: Normal range of motion, No clubbing, cyanosis or edema; PICC in place and non-tender  Vascular: Peripheral pulses palpable 2+ bilaterally  Neurological: Alert and oriented x4  Skin: Warm and dry. No acute rash  Lymph Nodes: No acute cervical adenopathy  Musculoskeletal: Normal gait, tone, without deformities  Psychiatric: Cooperative and appropriate    I&O's Detail    30 Dec 2020 07:01  -  31 Dec 2020 07:00  --------------------------------------------------------  IN:    dextrose 5%: 720 mL  Total IN: 720 mL    OUT:  Total OUT: 0 mL    Total NET: 720 mL    CARDIAC MARKERS ( 30 Dec 2020 01:51 )  x     / <0.01 ng/mL / x     / x     / x      CARDIAC MARKERS ( 29 Dec 2020 23:20 )  x     / <0.01 ng/mL / x     / x     / x                            8.5    13.73 )-----------( 145      ( 30 Dec 2020 13:29 )             30.5     30 Dec 2020 13:29    150    |  121    |  32.0   ----------------------------<  100    3.9     |  18.0   |  1.60     Ca    8.4        30 Dec 2020 13:29  Phos  2.4       29 Dec 2020 15:16  Mg     1.9       29 Dec 2020 15:16    TPro  7.2    /  Alb  3.5    /  TBili  0.3    /  DBili  x      /  AST  18     /  ALT  10     /  AlkPhos  69     30 Dec 2020 01:51    PT/INR - ( 29 Dec 2020 23:20 )   PT: 13.0 sec;   INR: 1.13 ratio         PTT - ( 29 Dec 2020 23:20 )  PTT:37.3 sec  CAPILLARY BLOOD GLUCOSE    LIVER FUNCTIONS - ( 30 Dec 2020 01:51 )  Alb: 3.5 g/dL / Pro: 7.2 g/dL / ALK PHOS: 69 U/L / ALT: 10 U/L / AST: 18 U/L / GGT: x           Urinalysis Basic - ( 30 Dec 2020 01:07 )    Color: Yellow / Appearance: Clear / S.015 / pH: x  Gluc: x / Ketone: Negative  / Bili: Negative / Urobili: Negative mg/dL   Blood: x / Protein: 30 mg/dL / Nitrite: Negative   Leuk Esterase: Trace / RBC: 0-2 /HPF / WBC 0-2   Sq Epi: x / Non Sq Epi: Occasional / Bacteria: Occasional    MEDICATIONS  (STANDING):  desmopressin 0.1 milliGRAM(s) Oral <User Schedule>  dextrose 5%. 1000 milliLiter(s) (80 mL/Hr) IV Continuous <Continuous>  enoxaparin Injectable 40 milliGRAM(s) SubCutaneous daily  ferrous    sulfate 325 milliGRAM(s) Oral daily  folic acid 1 milliGRAM(s) Oral daily  levETIRAcetam 500 milliGRAM(s) Oral two times a day  multivitamin 1 Tablet(s) Oral daily  pantoprazole    Tablet 40 milliGRAM(s) Oral before breakfast  piperacillin/tazobactam IVPB.. 3.375 Gram(s) IV Intermittent every 8 hours  vancomycin  IVPB 750 milliGRAM(s) IV Intermittent every 24 hours    MEDICATIONS  (PRN):  acetaminophen   Tablet .. 650 milliGRAM(s) Oral every 6 hours PRN Temp greater or equal to 38.5C (101.3F)      RADIOLOGY & ADDITIONAL TESTS:

## 2020-12-31 NOTE — PROGRESS NOTE ADULT - ASSESSMENT
In summary 56 y/o female with h/o SAH, diabetes insipidus, chronic hypernatremia was BIBA for as pt. felt a little dizzy after coming out of bathroom, no syncope, no fall, no trauma, no focal weakness, no palpitations, no cp, no sob. pt. reports appetite has been ok, no abd. pain, no n/v/d. no fever. appetite is good. no sick contact. As per pt. she has a PICC line and every other day gets D5W through that for h/o hypernatremia. pt. bp 92/60 , hr 87 upon arrival. pt. will be admitted for hypernatremia, spoke to nephrologist dr. Bone and agrees with 1 L NS and then 0.45 % NS  close f/u on bmp, will repeat in 6 hrs .     #COIVD positive with infiltrates  - asymptomatic without oxygen at this time  - at this time hold off on specific treatment    #bacteremia  - on empiric zosyn/vanco  - repeat cultures  - may have to remove PICC line  - place additional IV site    #hypernatremia, chronic  - maintain above 145  - continue fluids for now  - follow up with nephrology recs    #acute kidney injury - improving slightly    #near syncope,  vasovagal and related to COVID likely   - feels better    #hypotension - baseline    #H/O diabetes insipidus continue desmopressin, discussed with endocrinologist Dr. Boothe.   - endocrine following    #anemia , appears chronic when compared to old labs. Hb close to her baseline. anemia work up requested.    #DISPO  - will have to repeat cultures; remove PICC line for a line holiday; can discharge home once bacteremia clears  - may need echo

## 2020-12-31 NOTE — PROGRESS NOTE ADULT - ASSESSMENT
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    1) Hypernatremia  2) Diabetes insipidus  3) HTN  4) CoVID    Would continue home dose ddAVP 0.1mg TID  Currently on D5 @ 80cc/hr; continue  Trend BMP q12h  Na improving; 150 today;    dw Dr Randall

## 2020-12-31 NOTE — PROGRESS NOTE ADULT - SUBJECTIVE AND OBJECTIVE BOX
CC:  follow up DI, hypernatremia    Interval HPI/ Overnight Events:  Patient denies any headache, nausea  Remains on d5W at 80 cc/hr.  Labs not yet done today.  Blood culture shows Klebsiella     MEDICATIONS  (STANDING):  desmopressin 0.1 milliGRAM(s) Oral <User Schedule>  dextrose 5%. 1000 milliLiter(s) (80 mL/Hr) IV Continuous <Continuous>  enoxaparin Injectable 40 milliGRAM(s) SubCutaneous daily  ferrous    sulfate 325 milliGRAM(s) Oral daily  folic acid 1 milliGRAM(s) Oral daily  levETIRAcetam 500 milliGRAM(s) Oral two times a day  multivitamin 1 Tablet(s) Oral daily  pantoprazole    Tablet 40 milliGRAM(s) Oral before breakfast  piperacillin/tazobactam IVPB.. 3.375 Gram(s) IV Intermittent every 8 hours  vancomycin  IVPB 750 milliGRAM(s) IV Intermittent every 24 hours    Vital Signs Last 24 Hrs  T(C): 37.7 (31 Dec 2020 11:01), Max: 39.3 (30 Dec 2020 17:26)  T(F): 99.8 (31 Dec 2020 11:01), Max: 102.8 (30 Dec 2020 17:26)  HR: 84 (31 Dec 2020 08:00) (84 - 99)  BP: 114/79 (31 Dec 2020 08:00) (98/63 - 117/80)  RR: 19 (31 Dec 2020 08:00) (18 - 19)  SpO2: 99% (31 Dec 2020 08:00) (97% - 99%)    PE:  Gen: AAO, NAD  HEENT:  sclera anicteric, MMM  Neck:  no thyromegaly, no LAD  CV:  nl S1 + S2, RRR, no m/r/g  Resp:  nl respiratory effort, CTA b/l  GI/ Abd: soft, NT/ ND, BS +  Neuro:  No tremor   MS:  no c/c/e, nl muscle tone  Skin:  no acanthosis  Psych: affect appropriate    LABS:  12-30    150<H>  |  121<H>  |  32.0<H>  ----------------------------<  100<H>  3.9   |  18.0<L>  |  1.60<H>    Ca    8.4<L>      30 Dec 2020 13:29    TPro  7.2  /  Alb  3.5  /  TBili  0.3<L>  /  DBili  x   /  AST  18  /  ALT  10  /  AlkPhos  69  12-30                          8.5    13.73 )-----------( 145      ( 30 Dec 2020 13:29 )             30.5

## 2020-12-31 NOTE — PROGRESS NOTE ADULT - ASSESSMENT
54 y/o female with h/o SAH, diabetes insipidus, chronic hypernatremia was BIBA for as pt. felt a little dizzy after coming out of bathroom, no syncope, no fall, no trauma, no focal weakness, no palpitations, no cp, no sob. pt. reports appetite has been ok, no abd. pain, no n/v/d. no fever. appetite is good. no sick contact. As per pt. she has a PICC line and every other day gets D5W through that for h/o hypernatremia. pt. bp 92/60 , hr 87 upon arrival. As per pt. she runs bp on the low side.     Leukocytosis   Fever  TOSHIA  Hypernatremia    - now afebrile  - COVID PCR + but asymptomatic and not requiring supplemental O2, COVID IgG (-)  - UA (-) and CXr (-)  - f/u BCX klebsiella and GPR  - f/u MRSA screen  - likely PICC infection, Dc picc and send tip for CX  - repeat BCX in AM  - Continue empiric zosyn and vancomycin adjusted for renal function, monitor trough per pharmacy protocol  - Trend Fever  - Trend Leukocytosis    d/w attending, pharmacy  Will Follow

## 2021-01-01 LAB
ALBUMIN SERPL ELPH-MCNC: 2.8 G/DL — LOW (ref 3.3–5.2)
ALP SERPL-CCNC: 89 U/L — SIGNIFICANT CHANGE UP (ref 40–120)
ALT FLD-CCNC: 18 U/L — SIGNIFICANT CHANGE UP
ANION GAP SERPL CALC-SCNC: 12 MMOL/L — SIGNIFICANT CHANGE UP (ref 5–17)
AST SERPL-CCNC: 18 U/L — SIGNIFICANT CHANGE UP
BILIRUB SERPL-MCNC: 0.4 MG/DL — SIGNIFICANT CHANGE UP (ref 0.4–2)
BUN SERPL-MCNC: 17 MG/DL — SIGNIFICANT CHANGE UP (ref 8–20)
CALCIUM SERPL-MCNC: 8.4 MG/DL — LOW (ref 8.6–10.2)
CHLORIDE SERPL-SCNC: 110 MMOL/L — HIGH (ref 98–107)
CHLORIDE UR-SCNC: 118 MMOL/L — SIGNIFICANT CHANGE UP
CO2 SERPL-SCNC: 18 MMOL/L — LOW (ref 22–29)
CREAT SERPL-MCNC: 1.73 MG/DL — HIGH (ref 0.5–1.3)
CULTURE RESULTS: SIGNIFICANT CHANGE UP
GLUCOSE SERPL-MCNC: 99 MG/DL — SIGNIFICANT CHANGE UP (ref 70–99)
HCT VFR BLD CALC: 25.2 % — LOW (ref 34.5–45)
HGB BLD-MCNC: 7.5 G/DL — LOW (ref 11.5–15.5)
MCHC RBC-ENTMCNC: 25.4 PG — LOW (ref 27–34)
MCHC RBC-ENTMCNC: 29.8 GM/DL — LOW (ref 32–36)
MCV RBC AUTO: 85.4 FL — SIGNIFICANT CHANGE UP (ref 80–100)
MRSA PCR RESULT.: SIGNIFICANT CHANGE UP
OSMOLALITY UR: 656 MOSM/KG — SIGNIFICANT CHANGE UP (ref 300–1000)
PLATELET # BLD AUTO: 123 K/UL — LOW (ref 150–400)
POTASSIUM SERPL-MCNC: 4 MMOL/L — SIGNIFICANT CHANGE UP (ref 3.5–5.3)
POTASSIUM SERPL-SCNC: 4 MMOL/L — SIGNIFICANT CHANGE UP (ref 3.5–5.3)
POTASSIUM UR-SCNC: 23 MMOL/L — SIGNIFICANT CHANGE UP
PROT SERPL-MCNC: 6.7 G/DL — SIGNIFICANT CHANGE UP (ref 6.6–8.7)
RBC # BLD: 2.95 M/UL — LOW (ref 3.8–5.2)
RBC # FLD: 13.8 % — SIGNIFICANT CHANGE UP (ref 10.3–14.5)
S AUREUS DNA NOSE QL NAA+PROBE: SIGNIFICANT CHANGE UP
SODIUM SERPL-SCNC: 140 MMOL/L — SIGNIFICANT CHANGE UP (ref 135–145)
SODIUM UR-SCNC: 109 MMOL/L — SIGNIFICANT CHANGE UP
VANCOMYCIN TROUGH SERPL-MCNC: 7.8 UG/ML — LOW (ref 10–20)
WBC # BLD: 5.9 K/UL — SIGNIFICANT CHANGE UP (ref 3.8–10.5)
WBC # FLD AUTO: 5.9 K/UL — SIGNIFICANT CHANGE UP (ref 3.8–10.5)

## 2021-01-01 PROCEDURE — 71045 X-RAY EXAM CHEST 1 VIEW: CPT | Mod: 26

## 2021-01-01 PROCEDURE — 99232 SBSQ HOSP IP/OBS MODERATE 35: CPT

## 2021-01-01 PROCEDURE — 99233 SBSQ HOSP IP/OBS HIGH 50: CPT

## 2021-01-01 RX ORDER — SODIUM CHLORIDE 9 MG/ML
1000 INJECTION, SOLUTION INTRAVENOUS
Refills: 0 | Status: DISCONTINUED | OUTPATIENT
Start: 2021-01-01 | End: 2021-01-01

## 2021-01-01 RX ORDER — CHLORHEXIDINE GLUCONATE 213 G/1000ML
1 SOLUTION TOPICAL
Refills: 0 | Status: DISCONTINUED | OUTPATIENT
Start: 2021-01-01 | End: 2021-01-21

## 2021-01-01 RX ORDER — SODIUM BICARBONATE 1 MEQ/ML
1300 SYRINGE (ML) INTRAVENOUS
Refills: 0 | Status: DISCONTINUED | OUTPATIENT
Start: 2021-01-01 | End: 2021-01-18

## 2021-01-01 RX ORDER — IRON SUCROSE 20 MG/ML
100 INJECTION, SOLUTION INTRAVENOUS EVERY 24 HOURS
Refills: 0 | Status: COMPLETED | OUTPATIENT
Start: 2021-01-01 | End: 2021-01-10

## 2021-01-01 RX ORDER — SODIUM CHLORIDE 9 MG/ML
10 INJECTION INTRAMUSCULAR; INTRAVENOUS; SUBCUTANEOUS
Refills: 0 | Status: DISCONTINUED | OUTPATIENT
Start: 2021-01-01 | End: 2021-01-21

## 2021-01-01 RX ADMIN — PIPERACILLIN AND TAZOBACTAM 25 GRAM(S): 4; .5 INJECTION, POWDER, LYOPHILIZED, FOR SOLUTION INTRAVENOUS at 06:08

## 2021-01-01 RX ADMIN — DESMOPRESSIN ACETATE 0.1 MILLIGRAM(S): 0.1 TABLET ORAL at 08:31

## 2021-01-01 RX ADMIN — Medication 1 MILLIGRAM(S): at 11:35

## 2021-01-01 RX ADMIN — DESMOPRESSIN ACETATE 0.1 MILLIGRAM(S): 0.1 TABLET ORAL at 22:43

## 2021-01-01 RX ADMIN — Medication 1300 MILLIGRAM(S): at 12:07

## 2021-01-01 RX ADMIN — PANTOPRAZOLE SODIUM 40 MILLIGRAM(S): 20 TABLET, DELAYED RELEASE ORAL at 06:08

## 2021-01-01 RX ADMIN — LEVETIRACETAM 500 MILLIGRAM(S): 250 TABLET, FILM COATED ORAL at 18:21

## 2021-01-01 RX ADMIN — LEVETIRACETAM 500 MILLIGRAM(S): 250 TABLET, FILM COATED ORAL at 08:31

## 2021-01-01 RX ADMIN — PIPERACILLIN AND TAZOBACTAM 25 GRAM(S): 4; .5 INJECTION, POWDER, LYOPHILIZED, FOR SOLUTION INTRAVENOUS at 21:19

## 2021-01-01 RX ADMIN — Medication 250 MILLIGRAM(S): at 04:22

## 2021-01-01 RX ADMIN — IRON SUCROSE 210 MILLIGRAM(S): 20 INJECTION, SOLUTION INTRAVENOUS at 11:35

## 2021-01-01 RX ADMIN — DESMOPRESSIN ACETATE 0.1 MILLIGRAM(S): 0.1 TABLET ORAL at 18:10

## 2021-01-01 RX ADMIN — SODIUM CHLORIDE 10 MILLILITER(S): 9 INJECTION INTRAMUSCULAR; INTRAVENOUS; SUBCUTANEOUS at 21:15

## 2021-01-01 RX ADMIN — Medication 1 TABLET(S): at 11:35

## 2021-01-01 RX ADMIN — ENOXAPARIN SODIUM 40 MILLIGRAM(S): 100 INJECTION SUBCUTANEOUS at 11:35

## 2021-01-01 NOTE — PROGRESS NOTE ADULT - ASSESSMENT
In summary 54 y/o female with h/o SAH, diabetes insipidus, chronic hypernatremia was BIBA for as pt. felt a little dizzy after coming out of bathroom, no syncope, no fall, no trauma, no focal weakness, no palpitations, no cp, no sob. pt. reports appetite has been ok, no abd. pain, no n/v/d. no fever. appetite is good. no sick contact. As per pt. she has a PICC line and every other day gets D5W through that for h/o hypernatremia. pt. bp 92/60 , hr 87 upon arrival. pt. will be admitted for hypernatremia, spoke to nephrologist dr. Bone and agrees with 1 L NS and then 0.45 % NS  close f/u on bmp, will repeat in 6 hrs .     #COIVD positive with infiltrates  - asymptomatic without oxygen at this time  - at this time hold off on specific treatment    #bacteremia  - on empiric zosyn/vanco  - repeat cultures pending  - removed PICC    #hypernatremia, chronic 2/2 CDI  - maintain above 145 - today 140  - discontinue D5 - discussed with ID and endocrine  - repeat BMP tonite  - follow up with nephrology recs  - likely with impaired thirst mechanism  - would encourage family to ensure she is drinking set amount of fluid a day  - this way can avoid PICC line  - endocrine following    #acute kidney injury - improving slightly  - nephrology following    #near syncope,  vasovagal and related to COVID likely   - feels better    #hypotension - baseline    #anemia , appears chronic when compared to old labs. Hb close to her baseline. anemia work up requested.    #DISPO - if remains without fever and bacteremia clears likely replace PICC on Monday and discharge home.

## 2021-01-01 NOTE — PROGRESS NOTE ADULT - SUBJECTIVE AND OBJECTIVE BOX
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    1) Hypernatremia  2) Diabetes insipidus  3) HTN  4) COVID    Would continue home dose ddAVP 0.1mg TID  Currently on D5 @ 80cc/hr; continue  Trend BMP q12h  Na+ improving;     150<H>  |  121<H>  |  32.0<H>  ----------------------------<  100<H>  Ca:8.4<L> (30 Dec 2020 13:29)  3.9     |  18.0<L>  |  1.60<H>    TPro  7.2    /  Alb  3.5    /  TBili  0.3<L>  /  DBili  x      /  AST  18     /  ALT  10     /  AlkPhos  69     30 Dec 2020 01:51                        7.5<L>  5.90  )-----------( 123<L>    ( 2021 08:32 )             25.2<L>    Ferritin:753 ng/mL<H> Iron:14 ug/dL<L> TIBC:180 ug/dL<L> Tsat:8 %<L>( @ 01:51)    Urinalysis Basic - ( 30 Dec 2020 01:07 )  Color: Yellow / Appearance: Clear / S.015 / pH: x  Gluc: x / Ketone: Negative  / Bili: Negative / Urobili: Negative mg/dL   Blood: x / Protein: 30 mg/dL<!> / Nitrite: Negative   Leuk Esterase: Trace<!> / RBC: 0-2 /HPF / WBC 0-2   Sq Epi: x / Non Sq Epi: Occasional / Bacteria: Occasional<!>    UCr:130 mg/dL  Vy:94 mmol/L UOsm:642 mosm/kg ( @ 01:08)    Continue current Management,

## 2021-01-01 NOTE — PROGRESS NOTE ADULT - ASSESSMENT
54 y/o female with h/o SAH, diabetes insipidus, chronic hypernatremia was BIBA for as pt. felt a little dizzy after coming out of bathroom, no syncope, no fall, no trauma, no focal weakness, no palpitations, no cp, no sob. pt. reports appetite has been ok, no abd. pain, no n/v/d. no fever. appetite is good. no sick contact. As per pt. she has a PICC line and every other day gets D5W through that for h/o hypernatremia. pt. bp 92/60 , hr 87 upon arrival. As per pt. she runs bp on the low side.     Leukocytosis   Fever  TOSHIA  Hypernatremia    - now afebrile  - COVID PCR + but asymptomatic and not requiring supplemental O2, COVID IgG (-)  - UA (-) and CXr (-)  - f/u BCX klebsiella and GPR  - f/u MRSA screen  - likely PICC infection, Dc'd picc and sent tip for CX  - repeat BCX   - Continue empiric zosyn and vancomycin adjusted for renal function, monitor trough per pharmacy protocol  -TTE  - Trend Fever  - Trend Leukocytosis      Will Follow

## 2021-01-01 NOTE — PROGRESS NOTE ADULT - SUBJECTIVE AND OBJECTIVE BOX
[facility]  INFECTIOUS DISEASES AND INTERNAL MEDICINE at [place]  =======================================================  Mj Lee MD  Diplomates American Board of Internal Medicine and Infectious Diseases  Tel: [phone]      Fax: [phone]  =======================================================    [place] [id number]    Follow up:  bacteremia  feels well  low grade fever this am    Allergies:  No Known Allergies           REVIEW OF SYSTEMS:  CONSTITUTIONAL:  No Fever or chills  HEENT:   No diplopia or blurred vision.  No earache, sore throat or runny nose.  CARDIOVASCULAR:  No pressure, squeezing, strangling, tightness, heaviness or aching about the chest, neck, axilla or epigastrium.  RESPIRATORY:  No cough, shortness of breath  GASTROINTESTINAL:  No nausea, vomiting or diarrhea.  GENITOURINARY:  No dysuria, frequency or urgency. No Blood in urine  MUSCULOSKELETAL:  no joint aches, no muscle pain  SKIN:  No change in skin, hair or nails.  NEUROLOGIC:  No Headaches, seizures or weakness.  PSYCHIATRIC:  No disorder of thought or mood.  ENDOCRINE:  No heat or cold intolerance  HEMATOLOGICAL:  No easy bruising or bleeding.       Physical Exam:  GEN: NAD, pleasant  HEENT: normocephalic and atraumatic. EOMI. PERRL.  Anicteric   NECK: Supple.   LUNGS: Clear to auscultation.  HEART: Regular rate and rhythm without murmur.  ABDOMEN: Soft, nontender, and nondistended.  Positive bowel sounds.    : No CVA tenderness  EXTREMITIES: Without any edema.  MSK: no joint swelling  NEUROLOGIC: Cranial nerves II through XII are grossly intact. No focal deficits  PSYCHIATRIC: Appropriate affect .  SKIN: No Rash      Vitals:    T(F): 100.3 (01 Jan 2021 11:46), Max: 100.5 (01 Jan 2021 08:08)  HR: 91 (01 Jan 2021 12:00)  BP: 128/68 (01 Jan 2021 12:00)  RR: 20 (01 Jan 2021 12:00)  SpO2: 97% (01 Jan 2021 12:00) (97% - 100%)  temp max in last 48H T(F): , Max: 102.8 (12-30-20 @ 17:26)      Current Antibiotics:  piperacillin/tazobactam IVPB.. 3.375 Gram(s) IV Intermittent every 8 hours  vancomycin  IVPB 750 milliGRAM(s) IV Intermittent every 24 hours    Other medications:  desmopressin 0.1 milliGRAM(s) Oral <User Schedule>  enoxaparin Injectable 40 milliGRAM(s) SubCutaneous daily  folic acid 1 milliGRAM(s) Oral daily  iron sucrose IVPB 100 milliGRAM(s) IV Intermittent every 24 hours  levETIRAcetam 500 milliGRAM(s) Oral two times a day  multivitamin 1 Tablet(s) Oral daily  pantoprazole    Tablet 40 milliGRAM(s) Oral before breakfast  sodium bicarbonate 1300 milliGRAM(s) Oral two times a day        Labs:                        7.5    5.90  )-----------( 123      ( 01 Jan 2021 08:32 )             25.2      01-01    140  |  110<H>  |  17.0  ----------------------------<  99  4.0   |  18.0<L>  |  1.73<H>    Ca    8.4<L>      01 Jan 2021 08:33    TPro  6.7  /  Alb  2.8<L>  /  TBili  0.4  /  DBili  x   /  AST  18  /  ALT  18  /  AlkPhos  89  01-01      Culture - Blood (collected 12-30-20 @ 01:52)  Source: .Blood Blood-Peripheral  Gram Stain (12-31-20 @ 14:47):    Growth in anaerobic bottle: Gram Positive Rods    Gram Stain performed by:    [facility]    [address] [place] [address]    .    TYPE: (C=Critical, N=Notification, A=Abnormal) C    TESTS:  _     DATE/TIME CALLED: _ 12/31/2020 14:45:54    CALLED TO: _ Kacey Corby RN    READ BACK (2 Patient Identifiers)(Y/N): _ Y    READ BACK VALUES (Y/N): _ Y    CALLED BY: Davis Rodriguez    Culture - Blood (collected 12-30-20 @ 01:52)  Source: .Blood Blood-Peripheral  Gram Stain (12-31-20 @ 09:27):    Growth in aerobic bottle: Gram Positive Rods    Growth in anaerobic bottle: Gram Negative Rods    ***Blood Panel PCR results on this specimen are available    approximately 3 hours after the Gram stain result.***    Gram stain, PCR, and/or culture results may not always    correspond due to difference in methodologies.    ************************************************************    This PCR assay was performed using [employer].    The following targets are tested for: Enterococcus,    vancomycin resistant enterococci, Listeria monocytogenes,    coagulase negative staphylococci, S. aureus,    methicillin resistant S. aureus, Streptococcus agalactiae    (Group B), S. pneumoniae, S. pyogenes (Group A),    Acinetobacter baumannii, Enterobacter cloacae, E. coli,    Klebsiella oxytoca, K. pneumoniae, Proteus sp.,    Serratia marcescens, Haemophilus influenzae,    Neisseria meningitidis, Pseudomonas aeruginosa, Candida    albicans, C. glabrata, C krusei, C parapsilosis,    C. tropicalis and the KPC resistance gene.    Gram Stain and BCID performed by:    [facility]    [address] [place] [id number]    .    TYPE: (C=Critical, N=Notification, A=Abnormal) C    TESTS:  _ GS    DATE/TIME CALLED: _ 12/31/2020 09:26:34    CALLED TO: _ Mariana Hanson    READ BACK (2 Patient Identifiers)(Y/N): _ Y    READ BACK VALUES (Y/N): _ Y    CALLED BY: Davis Rodriguez  Organism: Blood Culture PCR (01-01-21 @ 14:12)  Organism: Blood Culture PCR (12-31-20 @ 11:05)    Sensitivities:      -  Klebsiella pneumoniae: Detec      Method Type: PCR      WBC Count: 5.90 K/uL (01-01-21 @ 08:32)  WBC Count: 13.73 K/uL (12-30-20 @ 13:29)  WBC Count: 17.24 K/uL (12-30-20 @ 01:51)  WBC Count: 7.82 K/uL (12-29-20 @ 16:41)    Creatinine, Serum: 1.73 mg/dL (01-01-21 @ 08:33)  Creatinine, Serum: 1.60 mg/dL (12-30-20 @ 13:29)  Creatinine, Serum: 1.66 mg/dL (12-30-20 @ 01:51)  Creatinine, Serum: 1.64 mg/dL (12-30-20 @ 01:51)  Creatinine, Serum: 1.40 mg/dL (12-29-20 @ 15:16)      Ferritin, Serum: 753 ng/mL (12-30-20 @ 01:51)           COVID-19 IgG Antibody Interpretation: Negative (12-30-20 @ 06:49)  COVID-19 IgG Antibody Index: 0.06 Index (12-30-20 @ 06:49)  COVID-19 PCR: Detected (12-29-20 @ 22:59)

## 2021-01-01 NOTE — PROGRESS NOTE ADULT - SUBJECTIVE AND OBJECTIVE BOX
CC: hypernatremia (30 Dec 2020 18:39)    HPI:  54 y/o female with h/o SAH, diabetes insipidus, chronic hypernatremia was BIBA for as pt. felt a little dizzy after coming out of bathroom, no syncope, no fall, no trauma, no focal weakness, no palpitations, no cp, no sob. pt. reports appetite has been ok, no abd. pain, no n/v/d. no fever. appetite is good. no sick contact. As per pt. she has a PICC line and every other day gets D5W through that for h/o hypernatremia. pt. bp 92/60 , hr 87 upon arrival. As per pt. she runs bp on the low side.  (29 Dec 2020 18:08)    INTERVAL HPI/OVERNIGHT EVENTS:  no acute events overnight  patient without complaints  feels well  no fevers but low grade temp this morning    Vital Signs Last 24 Hrs  T(C): 37.9 (01 Jan 2021 11:46), Max: 38.1 (01 Jan 2021 08:08)  T(F): 100.3 (01 Jan 2021 11:46), Max: 100.5 (01 Jan 2021 08:08)  HR: 85 (01 Jan 2021 08:00) (79 - 85)  BP: 135/70 (01 Jan 2021 08:00) (124/63 - 135/70)  BP(mean): 68 (01 Jan 2021 08:00) (68 - 68)  RR: 20 (01 Jan 2021 08:00) (20 - 22)  SpO2: 98% (01 Jan 2021 08:00) (98% - 100%)    PHYSICAL EXAM:  General: in no acute distress  Eyes: PERRLA, EOMI; conjunctiva and sclera clear  Head: Normocephalic; atraumatic  ENMT: No nasal discharge; airway clear  Neck: Supple; non tender; no masses  Respiratory: No wheezes, rales or rhonchi  Cardiovascular: Regular rate and rhythm. S1 and S2 Normal; No murmurs, gallops or rubs  Gastrointestinal: Soft non-tender non-distended; Normal bowel sounds  Genitourinary: No costovertebral angle tenderness  Extremities: Normal range of motion, No clubbing, cyanosis or edema; PICC site non-tender  Vascular: Peripheral pulses palpable 2+ bilaterally  Neurological: Alert and oriented x4  Skin: Warm and dry. No acute rash  Psychiatric: Cooperative and appropriate    01-01    140  |  110<H>  |  17.0  ----------------------------<  99  4.0   |  18.0<L>  |  1.73<H>    Ca    8.4<L>      01 Jan 2021 08:33    TPro  6.7  /  Alb  2.8<L>  /  TBili  0.4  /  DBili  x   /  AST  18  /  ALT  18  /  AlkPhos  89  01-01                          7.5    5.90  )-----------( 123      ( 01 Jan 2021 08:32 )             25.2       MEDICATIONS  (STANDING):  desmopressin 0.1 milliGRAM(s) Oral <User Schedule>  dextrose 5%. 1000 milliLiter(s) (80 mL/Hr) IV Continuous <Continuous>  enoxaparin Injectable 40 milliGRAM(s) SubCutaneous daily  ferrous    sulfate 325 milliGRAM(s) Oral daily  folic acid 1 milliGRAM(s) Oral daily  levETIRAcetam 500 milliGRAM(s) Oral two times a day  multivitamin 1 Tablet(s) Oral daily  pantoprazole    Tablet 40 milliGRAM(s) Oral before breakfast  piperacillin/tazobactam IVPB.. 3.375 Gram(s) IV Intermittent every 8 hours  vancomycin  IVPB 750 milliGRAM(s) IV Intermittent every 24 hours    MEDICATIONS  (PRN):  acetaminophen   Tablet .. 650 milliGRAM(s) Oral every 6 hours PRN Temp greater or equal to 38.5C (101.3F)      RADIOLOGY & ADDITIONAL TESTS:

## 2021-01-02 LAB
-  AMIKACIN: SIGNIFICANT CHANGE UP
-  AMPICILLIN/SULBACTAM: SIGNIFICANT CHANGE UP
-  AMPICILLIN: SIGNIFICANT CHANGE UP
-  AZTREONAM: SIGNIFICANT CHANGE UP
-  CEFAZOLIN: SIGNIFICANT CHANGE UP
-  CEFEPIME: SIGNIFICANT CHANGE UP
-  CEFOXITIN: SIGNIFICANT CHANGE UP
-  CEFTRIAXONE: SIGNIFICANT CHANGE UP
-  CIPROFLOXACIN: SIGNIFICANT CHANGE UP
-  ERTAPENEM: SIGNIFICANT CHANGE UP
-  GENTAMICIN: SIGNIFICANT CHANGE UP
-  IMIPENEM: SIGNIFICANT CHANGE UP
-  LEVOFLOXACIN: SIGNIFICANT CHANGE UP
-  MEROPENEM: SIGNIFICANT CHANGE UP
-  PIPERACILLIN/TAZOBACTAM: SIGNIFICANT CHANGE UP
-  TOBRAMYCIN: SIGNIFICANT CHANGE UP
-  TRIMETHOPRIM/SULFAMETHOXAZOLE: SIGNIFICANT CHANGE UP
ALBUMIN SERPL ELPH-MCNC: 3.4 G/DL — SIGNIFICANT CHANGE UP (ref 3.3–5.2)
ANION GAP SERPL CALC-SCNC: 13 MMOL/L — SIGNIFICANT CHANGE UP (ref 5–17)
ANION GAP SERPL CALC-SCNC: 14 MMOL/L — SIGNIFICANT CHANGE UP (ref 5–17)
ANION GAP SERPL CALC-SCNC: 9 MMOL/L — SIGNIFICANT CHANGE UP (ref 5–17)
APPEARANCE UR: CLEAR — SIGNIFICANT CHANGE UP
BACTERIA # UR AUTO: ABNORMAL
BILIRUB UR-MCNC: NEGATIVE — SIGNIFICANT CHANGE UP
BUN SERPL-MCNC: 17 MG/DL — SIGNIFICANT CHANGE UP (ref 8–20)
BUN SERPL-MCNC: 18 MG/DL — SIGNIFICANT CHANGE UP (ref 8–20)
BUN SERPL-MCNC: 19 MG/DL — SIGNIFICANT CHANGE UP (ref 8–20)
CALCIUM SERPL-MCNC: 8 MG/DL — LOW (ref 8.6–10.2)
CALCIUM SERPL-MCNC: 8.9 MG/DL — SIGNIFICANT CHANGE UP (ref 8.6–10.2)
CALCIUM SERPL-MCNC: 9 MG/DL — SIGNIFICANT CHANGE UP (ref 8.6–10.2)
CHLORIDE SERPL-SCNC: 106 MMOL/L — SIGNIFICANT CHANGE UP (ref 98–107)
CHLORIDE SERPL-SCNC: 108 MMOL/L — HIGH (ref 98–107)
CHLORIDE SERPL-SCNC: 110 MMOL/L — HIGH (ref 98–107)
CO2 SERPL-SCNC: 16 MMOL/L — LOW (ref 22–29)
CO2 SERPL-SCNC: 18 MMOL/L — LOW (ref 22–29)
CO2 SERPL-SCNC: 20 MMOL/L — LOW (ref 22–29)
COLOR SPEC: YELLOW — SIGNIFICANT CHANGE UP
CREAT SERPL-MCNC: 1.69 MG/DL — HIGH (ref 0.5–1.3)
CREAT SERPL-MCNC: 1.7 MG/DL — HIGH (ref 0.5–1.3)
CREAT SERPL-MCNC: 1.79 MG/DL — HIGH (ref 0.5–1.3)
CULTURE RESULTS: NO GROWTH — SIGNIFICANT CHANGE UP
CULTURE RESULTS: SIGNIFICANT CHANGE UP
DIFF PNL FLD: ABNORMAL
EPI CELLS # UR: SIGNIFICANT CHANGE UP
GLUCOSE SERPL-MCNC: 83 MG/DL — SIGNIFICANT CHANGE UP (ref 70–99)
GLUCOSE SERPL-MCNC: 87 MG/DL — SIGNIFICANT CHANGE UP (ref 70–99)
GLUCOSE SERPL-MCNC: 91 MG/DL — SIGNIFICANT CHANGE UP (ref 70–99)
GLUCOSE UR QL: NEGATIVE MG/DL — SIGNIFICANT CHANGE UP
KETONES UR-MCNC: NEGATIVE — SIGNIFICANT CHANGE UP
LEUKOCYTE ESTERASE UR-ACNC: ABNORMAL
METHOD TYPE: SIGNIFICANT CHANGE UP
NITRITE UR-MCNC: NEGATIVE — SIGNIFICANT CHANGE UP
ORGANISM # SPEC MICROSCOPIC CNT: SIGNIFICANT CHANGE UP
OSMOLALITY UR: 584 MOSM/KG — SIGNIFICANT CHANGE UP (ref 300–1000)
PH UR: 5 — SIGNIFICANT CHANGE UP (ref 5–8)
PHOSPHATE SERPL-MCNC: 2.4 MG/DL — SIGNIFICANT CHANGE UP (ref 2.4–4.7)
POTASSIUM SERPL-MCNC: 3.8 MMOL/L — SIGNIFICANT CHANGE UP (ref 3.5–5.3)
POTASSIUM SERPL-MCNC: 3.8 MMOL/L — SIGNIFICANT CHANGE UP (ref 3.5–5.3)
POTASSIUM SERPL-MCNC: 3.9 MMOL/L — SIGNIFICANT CHANGE UP (ref 3.5–5.3)
POTASSIUM SERPL-SCNC: 3.8 MMOL/L — SIGNIFICANT CHANGE UP (ref 3.5–5.3)
POTASSIUM SERPL-SCNC: 3.8 MMOL/L — SIGNIFICANT CHANGE UP (ref 3.5–5.3)
POTASSIUM SERPL-SCNC: 3.9 MMOL/L — SIGNIFICANT CHANGE UP (ref 3.5–5.3)
POTASSIUM UR-SCNC: 20 MMOL/L — SIGNIFICANT CHANGE UP
PROT UR-MCNC: 30 MG/DL
SODIUM SERPL-SCNC: 137 MMOL/L — SIGNIFICANT CHANGE UP (ref 135–145)
SODIUM SERPL-SCNC: 138 MMOL/L — SIGNIFICANT CHANGE UP (ref 135–145)
SODIUM SERPL-SCNC: 139 MMOL/L — SIGNIFICANT CHANGE UP (ref 135–145)
SODIUM UR-SCNC: 120 MMOL/L — SIGNIFICANT CHANGE UP
SP GR SPEC: 1.02 — SIGNIFICANT CHANGE UP (ref 1.01–1.02)
SPECIMEN SOURCE: SIGNIFICANT CHANGE UP
SPECIMEN SOURCE: SIGNIFICANT CHANGE UP
UROBILINOGEN FLD QL: NEGATIVE MG/DL — SIGNIFICANT CHANGE UP
VANCOMYCIN TROUGH SERPL-MCNC: 4.6 UG/ML — LOW (ref 10–20)

## 2021-01-02 PROCEDURE — 99233 SBSQ HOSP IP/OBS HIGH 50: CPT

## 2021-01-02 RX ORDER — DESMOPRESSIN ACETATE 0.1 MG/1
0.1 TABLET ORAL
Refills: 0 | Status: DISCONTINUED | OUTPATIENT
Start: 2021-01-02 | End: 2021-01-18

## 2021-01-02 RX ORDER — VANCOMYCIN HCL 1 G
500 VIAL (EA) INTRAVENOUS EVERY 12 HOURS
Refills: 0 | Status: DISCONTINUED | OUTPATIENT
Start: 2021-01-02 | End: 2021-01-03

## 2021-01-02 RX ORDER — SODIUM CHLORIDE 5 G/100ML
1000 INJECTION, SOLUTION INTRAVENOUS
Refills: 0 | Status: DISCONTINUED | OUTPATIENT
Start: 2021-01-02 | End: 2021-01-03

## 2021-01-02 RX ADMIN — LEVETIRACETAM 500 MILLIGRAM(S): 250 TABLET, FILM COATED ORAL at 17:02

## 2021-01-02 RX ADMIN — PANTOPRAZOLE SODIUM 40 MILLIGRAM(S): 20 TABLET, DELAYED RELEASE ORAL at 05:52

## 2021-01-02 RX ADMIN — Medication 1 MILLIGRAM(S): at 11:21

## 2021-01-02 RX ADMIN — PIPERACILLIN AND TAZOBACTAM 25 GRAM(S): 4; .5 INJECTION, POWDER, LYOPHILIZED, FOR SOLUTION INTRAVENOUS at 21:00

## 2021-01-02 RX ADMIN — PIPERACILLIN AND TAZOBACTAM 25 GRAM(S): 4; .5 INJECTION, POWDER, LYOPHILIZED, FOR SOLUTION INTRAVENOUS at 05:52

## 2021-01-02 RX ADMIN — Medication 250 MILLIGRAM(S): at 03:50

## 2021-01-02 RX ADMIN — Medication 1 TABLET(S): at 11:21

## 2021-01-02 RX ADMIN — IRON SUCROSE 210 MILLIGRAM(S): 20 INJECTION, SOLUTION INTRAVENOUS at 11:21

## 2021-01-02 RX ADMIN — PIPERACILLIN AND TAZOBACTAM 25 GRAM(S): 4; .5 INJECTION, POWDER, LYOPHILIZED, FOR SOLUTION INTRAVENOUS at 21:10

## 2021-01-02 RX ADMIN — DESMOPRESSIN ACETATE 0.1 MILLIGRAM(S): 0.1 TABLET ORAL at 21:09

## 2021-01-02 RX ADMIN — LEVETIRACETAM 500 MILLIGRAM(S): 250 TABLET, FILM COATED ORAL at 05:52

## 2021-01-02 RX ADMIN — DESMOPRESSIN ACETATE 0.1 MILLIGRAM(S): 0.1 TABLET ORAL at 06:11

## 2021-01-02 RX ADMIN — PIPERACILLIN AND TAZOBACTAM 25 GRAM(S): 4; .5 INJECTION, POWDER, LYOPHILIZED, FOR SOLUTION INTRAVENOUS at 14:19

## 2021-01-02 RX ADMIN — CHLORHEXIDINE GLUCONATE 1 APPLICATION(S): 213 SOLUTION TOPICAL at 08:37

## 2021-01-02 RX ADMIN — Medication 100 MILLIGRAM(S): at 14:19

## 2021-01-02 RX ADMIN — Medication 1300 MILLIGRAM(S): at 11:21

## 2021-01-02 RX ADMIN — SODIUM CHLORIDE 60 MILLILITER(S): 5 INJECTION, SOLUTION INTRAVENOUS at 09:36

## 2021-01-02 RX ADMIN — Medication 1300 MILLIGRAM(S): at 00:28

## 2021-01-02 RX ADMIN — ENOXAPARIN SODIUM 40 MILLIGRAM(S): 100 INJECTION SUBCUTANEOUS at 11:21

## 2021-01-02 RX ADMIN — Medication 1300 MILLIGRAM(S): at 17:02

## 2021-01-02 NOTE — PROGRESS NOTE ADULT - ASSESSMENT
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  She is COVID-19 positive and has Klebsiella bacteremia in setting of chronic PICC line.      1.  Hypernatremia-  Resolved after replacement of Free water Deficit likely due to inadequate oral intake of fluids in setting of impaired thirst mechanism and DI.    2.  DI-   Agree with decrease in dose of ddAVP to twice daily.  Would advise that family strictly monitors patient at home to ensure she is drinking at least 2L of water per day to prevent the need for future IV hydration.    3.  Syncope-  possibly related to orthostatic hypotension.   No other source found.     4.  Bacteremia-  continue abx as per ID.  5.  COVID-19-   respiratory status is stable.  Continue supportive care.

## 2021-01-02 NOTE — PROGRESS NOTE ADULT - ASSESSMENT
In summary 54 y/o female with h/o SAH, diabetes insipidus, chronic hypernatremia was BIBA for as pt. felt a little dizzy after coming out of bathroom, no syncope, no fall, no trauma, no focal weakness, no palpitations, no cp, no sob. pt. reports appetite has been ok, no abd. pain, no n/v/d. no fever. appetite is good. no sick contact. As per pt. she has a PICC line and every other day gets D5W through that for h/o hypernatremia. pt. bp 92/60 , hr 87 upon arrival. pt. will be admitted for hypernatremia, spoke to nephrologist dr. Bone and agrees with 1 L NS and then 0.45 % NS  close f/u on bmp, will repeat in 6 hrs .     #COIVD positive with infiltrates  - asymptomatic without oxygen at this time  - at this time hold off on specific treatment    #bacteremia  - growing Klebsiella pneumoniae and Bacillus species not anthracis  - susceptibilities performed only on klebsiella pneumoniae  - on empiric zosyn/vanco  - repeat cultures pending - due back tomorrow  - removed PICC - no growth from tip    #hypernatremia, chronic 2/2 CDI  - maintain above ~145 - today 139  - decreased dose of desmopressin (was her originally home dose; now BID)  - repeat BMP with some improvement  - started on 2% by nephrology today  - likely with impaired thirst mechanism  - would encourage family to ensure she is drinking set amount of fluid a day  - this way can avoid PICC line  - endocrine following    #acute kidney injury - improving slightly  - nephrology following    #near syncope,  vasovagal and related to COVID likely   - feels better    #hypotension - baseline    #anemia , appears chronic when compared to old labs. Hb close to her baseline. anemia work up requested.    #DISPO - c/w low grade fever; if remains without fever and bacteremia clears likely replace PICC on Monday and discharge home.

## 2021-01-02 NOTE — PROGRESS NOTE ADULT - SUBJECTIVE AND OBJECTIVE BOX
KRUPA MEDINA [id number]    Follow up:  Bacteremia ,      DI - On DDAVP,   feels well  low grade fever this am    Allergies:  No Known Allergies     REVIEW OF SYSTEMS:  CONSTITUTIONAL:  No Fever or chills  HEENT:   No diplopia or blurred vision.  No earache, sore throat or runny nose.  CARDIOVASCULAR:  No pressure, squeezing, strangling, tightness, heaviness or aching about the chest, neck, axilla or epigastrium.  RESPIRATORY:  No cough, shortness of breath  GASTROINTESTINAL:  No nausea, vomiting or diarrhea.  GENITOURINARY:  No dysuria, frequency or urgency. No Blood in urine  MUSCULOSKELETAL:  no joint aches, no muscle pain  SKIN:  No change in skin, hair or nails.  NEUROLOGIC:  No Headaches, seizures or weakness.  PSYCHIATRIC:  No disorder of thought or mood.  ENDOCRINE:  No heat or cold intolerance  HEMATOLOGICAL:  No easy bruising or bleeding.     Physical Exam:  GEN: NAD, pleasant  HEENT: normocephalic and atraumatic. EOMI. PERRL.  Anicteric   NECK: Supple.   LUNGS: Clear to auscultation.  HEART: Regular rate and rhythm without murmur.  ABDOMEN: Soft, nontender, and nondistended.  Positive bowel sounds.    : No CVA tenderness  EXTREMITIES: Without any edema.  MSK: no joint swelling  NEUROLOGIC: Cranial nerves II through XII are grossly intact. No focal deficits  PSYCHIATRIC: Appropriate affect .  SKIN: No Rash    Vitals:    T(F): 100.3 (01 Jan 2021 11:46), Max: 100.5 (01 Jan 2021 08:08)  HR: 91 (01 Jan 2021 12:00)  BP: 128/68 (01 Jan 2021 12:00)  RR: 20 (01 Jan 2021 12:00)  SpO2: 97% (01 Jan 2021 12:00) (97% - 100%)  temp max in last 48H T(F): , Max: 102.8 (12-30-20 @ 17:26)    Current Antibiotics:  piperacillin/tazobactam IVPB.. 3.375 Gram(s) IV Intermittent every 8 hours  vancomycin  IVPB 750 milliGRAM(s) IV Intermittent every 24 hours    Other medications:  desmopressin 0.1 milliGRAM(s) Oral <User Schedule>  enoxaparin Injectable 40 milliGRAM(s) SubCutaneous daily  folic acid 1 milliGRAM(s) Oral daily  iron sucrose IVPB 100 milliGRAM(s) IV Intermittent every 24 hours  levETIRAcetam 500 milliGRAM(s) Oral two times a day  multivitamin 1 Tablet(s) Oral daily  pantoprazole    Tablet 40 milliGRAM(s) Oral before breakfast  sodium bicarbonate 1300 milliGRAM(s) Oral two times a day    Labs:                        7.5    5.90  )-----------( 123      ( 01 Jan 2021 08:32 )             25.2      01-01    140  |  110<H>  |  17.0  ----------------------------<  99  4.0   |  18.0<L>  |  1.73<H>    Ca    8.4<L>      01 Jan 2021 08:33    TPro  6.7  /  Alb  2.8<L>  /  TBili  0.4  /  DBili  x   /  AST  18  /  ALT  18  /  AlkPhos  89  01-01      Culture - Blood (collected 12-30-20 @ 01:52)  Source: .Blood Blood-Peripheral  Gram Stain (12-31-20 @ 14:47):    Growth in anaerobic bottle: Gram Positive Rods    Gram Stain performed by:    [facility]    [address] [place] [id number]    .    TYPE: (C=Critical, N=Notification, A=Abnormal) C    TESTS:  _ GS    DATE/TIME CALLED: _ 12/31/2020 14:45:54    CALLED TO: Davis Tavarez RN    READ BACK (2 Patient Identifiers)(Y/N): _ Y    READ BACK VALUES (Y/N): _ Y    CALLED BY: Davis Rodriguez    Culture - Blood (collected 12-30-20 @ 01:52)  Source: .Blood Blood-Peripheral  Gram Stain (12-31-20 @ 09:27):    Growth in aerobic bottle: Gram Positive Rods    Growth in anaerobic bottle: Gram Negative Rods    ***Blood Panel PCR results on this specimen are available    approximately 3 hours after the Gram stain result.***    Gram stain, PCR, and/or culture results may not always    correspond due to difference in methodologies.    ************************************************************    This PCR assay was performed using [employer].    The following targets are tested for: Enterococcus,    vancomycin resistant enterococci, Listeria monocytogenes,    coagulase negative staphylococci, S. aureus,    methicillin resistant S. aureus, Streptococcus agalactiae    (Group B), S. pneumoniae, S. pyogenes (Group A),    Acinetobacter baumannii, Enterobacter cloacae, E. coli,    Klebsiella oxytoca, K. pneumoniae, Proteus sp.,    Serratia marcescens, Haemophilus influenzae,    Neisseria meningitidis, Pseudomonas aeruginosa, Candida    albicans, C. glabrata, C krusei, C parapsilosis,    C. tropicalis and the KPC resistance gene.    Gram Stain and BCID performed by:    [facility]    [address] [place] [address]    .    TYPE: (C=Critical, N=Notification, A=Abnormal) C    TESTS:  _ GS    DATE/TIME CALLED: _ 12/31/2020 09:26:34    CALLED TO: Davis Hanson    READ BACK (2 Patient Identifiers)(Y/N): _ Y    READ BACK VALUES (Y/N): _ Y    CALLED BY: Davis Rodriguez  Organism: Blood Culture PCR (01-01-21 @ 14:12)  Organism: Blood Culture PCR (12-31-20 @ 11:05)    Sensitivities:      -  Klebsiella pneumoniae: Detec      Method Type: PCR    WBC Count: 5.90 K/uL (01-01-21 @ 08:32)  WBC Count: 13.73 K/uL (12-30-20 @ 13:29)  WBC Count: 17.24 K/uL (12-30-20 @ 01:51)  WBC Count: 7.82 K/uL (12-29-20 @ 16:41)    Creatinine, Serum: 1.73 mg/dL (01-01-21 @ 08:33)  Creatinine, Serum: 1.60 mg/dL (12-30-20 @ 13:29)  Creatinine, Serum: 1.66 mg/dL (12-30-20 @ 01:51)  Creatinine, Serum: 1.64 mg/dL (12-30-20 @ 01:51)  Creatinine, Serum: 1.40 mg/dL (12-29-20 @ 15:16)    Ferritin, Serum: 753 ng/mL (12-30-20 @ 01:51)    COVID-19 IgG Antibody Interpretation: Negative (12-30-20 @ 06:49)  COVID-19 IgG Antibody Index: 0.06 Index (12-30-20 @ 06:49)  COVID-19 PCR: Detected (12-29-20 @ 22:59)    54 y/o female with h/o SAH, diabetes insipidus, chronic hypernatremia was BIBA for as pt. felt a little dizzy after coming out of bathroom, no syncope, no fall, no trauma, no focal weakness, no palpitations, no cp, no sob. pt. reports appetite has been ok, no abd. pain, no n/v/d. no fever. appetite is good. no sick contact. As per pt. she has a PICC line and every other day gets D5W through that for h/o hypernatremia. pt. bp 92/60 , hr 87 upon arrival. As per pt. she runs bp on the low side.     Leukocytosis   Fever  TOSHIA    Per ID :   - now afebrile  - COVID PCR + but asymptomatic and not requiring supplemental O2, COVID IgG (-)  - UA (-) and CXr (-)  - f/u BCX klebsiella and GPR  - F/U MRSA screen  - likely PICC infection, Dc'd picc and sent tip for CX  - repeat BCX   - Continue empiric zosyn and vancomycin adjusted for renal function, monitor trough per pharmacy protocol  - TTE  - Trend Fever * Leukocytosis,     Raise SNa+      Will Follow

## 2021-01-02 NOTE — PROGRESS NOTE ADULT - SUBJECTIVE AND OBJECTIVE BOX
CC:  follow up DI    Interval HPI/ Overnight Events:  IVFs have been D/C'ed.  ddACVP dose now reduced to twice daily.  PICC line has been removed.  Patient denies any HA, polyuria or polydipsia.     MEDICATIONS  (STANDING):  chlorhexidine 4% Liquid 1 Application(s) Topical <User Schedule>  desmopressin 0.1 milliGRAM(s) Oral two times a day  enoxaparin Injectable 40 milliGRAM(s) SubCutaneous daily  folic acid 1 milliGRAM(s) Oral daily  iron sucrose IVPB 100 milliGRAM(s) IV Intermittent every 24 hours  levETIRAcetam 500 milliGRAM(s) Oral two times a day  multivitamin 1 Tablet(s) Oral daily  pantoprazole    Tablet 40 milliGRAM(s) Oral before breakfast  piperacillin/tazobactam IVPB.. 3.375 Gram(s) IV Intermittent every 8 hours  sodium bicarbonate 1300 milliGRAM(s) Oral two times a day  sodium chloride 2% . 1000 milliLiter(s) (60 mL/Hr) IV Continuous <Continuous>  vancomycin  IVPB 500 milliGRAM(s) IV Intermittent every 12 hours    Vital Signs Last 24 Hrs  T(C): 38.1 (02 Jan 2021 07:51), Max: 38.1 (02 Jan 2021 07:51)  T(F): 100.5 (02 Jan 2021 07:51), Max: 100.5 (02 Jan 2021 07:51)  HR: 82 (02 Jan 2021 06:07) (82 - 100)  BP: 122/87 (02 Jan 2021 06:07) (122/87 - 132/91)  RR: 20 (02 Jan 2021 06:07) (20 - 20)  SpO2: 94% (02 Jan 2021 06:07) (94% - 95%)    PE:  Gen: AAO, NAD  HEENT:  sclera anicteric, MMM  Neck:  no thyromegaly, no LAD  CV:  nl S1 + S2, RRR, no m/r/g  Resp:  nl respiratory effort, CTA b/l  GI/ Abd: soft, NT/ ND, BS +  Neuro:  No tremor  MS:  no c/c/e, nl muscle tone  Skin:  no acanthosis  Psych: affect appropriate    LABS:  01-02    137  |  106  |  19.0  ----------------------------<  87  3.8   |  18.0<L>  |  1.69<H>    Ca    8.9      02 Jan 2021 02:32  Phos  2.4     01-02    TPro  x   /  Alb  3.4  /  TBili  x   /  DBili  x   /  AST  x   /  ALT  x   /  AlkPhos  x   01-02                          7.5    5.90  )-----------( 123      ( 01 Jan 2021 08:32 )             25.2

## 2021-01-02 NOTE — PROGRESS NOTE ADULT - SUBJECTIVE AND OBJECTIVE BOX
CC: hypernatremia (30 Dec 2020 18:39)    INTERVAL HPI/OVERNIGHT EVENTS:  no acute events overnight  patient without complaints  feels well  tolerated central line placement yesterday - placed due to poor access    Vital Signs Last 24 Hrs  T(C): 37.9 (02 Jan 2021 21:06), Max: 38.2 (02 Jan 2021 16:15)  T(F): 100.2 (02 Jan 2021 21:06), Max: 100.8 (02 Jan 2021 16:15)  HR: 76 (02 Jan 2021 21:06) (76 - 82)  BP: 120/80 (02 Jan 2021 21:06) (120/80 - 122/87)  BP(mean): --  RR: 17 (02 Jan 2021 21:06) (17 - 20)  SpO2: 94% (02 Jan 2021 06:07) (94% - 94%)    PHYSICAL EXAM:  General: in no acute distress  Eyes: PERRLA, EOMI; conjunctiva and sclera clear  Head: Normocephalic; atraumatic  ENMT: No nasal discharge; airway clear  Neck: Supple; non tender; no masses  Respiratory: No wheezes, rales or rhonchi  Cardiovascular: Regular rate and rhythm. S1 and S2 Normal; No murmurs, gallops or rubs  Gastrointestinal: Soft non-tender non-distended; Normal bowel sounds  Genitourinary: No costovertebral angle tenderness  Extremities: Normal range of motion, No clubbing, cyanosis or edema; PICC site non-tender  Vascular: Peripheral pulses palpable 2+ bilaterally  Neurological: Alert and oriented x4  Skin: Warm and dry. No acute rash  Psychiatric: Cooperative and appropriate    01-02    139  |  110<H>  |  17.0  ----------------------------<  91  3.8   |  20.0<L>  |  1.79<H>    Ca    8.0<L>      02 Jan 2021 17:23  Phos  2.4     01-02    TPro  x   /  Alb  3.4  /  TBili  x   /  DBili  x   /  AST  x   /  ALT  x   /  AlkPhos  x   01-02    MEDICATIONS  (STANDING):  chlorhexidine 4% Liquid 1 Application(s) Topical <User Schedule>  desmopressin 0.1 milliGRAM(s) Oral two times a day  enoxaparin Injectable 40 milliGRAM(s) SubCutaneous daily  folic acid 1 milliGRAM(s) Oral daily  iron sucrose IVPB 100 milliGRAM(s) IV Intermittent every 24 hours  levETIRAcetam 500 milliGRAM(s) Oral two times a day  multivitamin 1 Tablet(s) Oral daily  pantoprazole    Tablet 40 milliGRAM(s) Oral before breakfast  piperacillin/tazobactam IVPB.. 3.375 Gram(s) IV Intermittent every 8 hours  sodium bicarbonate 1300 milliGRAM(s) Oral two times a day  sodium chloride 2% . 1000 milliLiter(s) (60 mL/Hr) IV Continuous <Continuous>  vancomycin  IVPB 500 milliGRAM(s) IV Intermittent every 12 hours    MEDICATIONS  (PRN):  acetaminophen   Tablet .. 650 milliGRAM(s) Oral every 6 hours PRN Temp greater or equal to 38.5C (101.3F)  sodium chloride 0.9% lock flush 10 milliLiter(s) IV Push every 1 hour PRN Pre/post blood products, medications, blood draw, and to maintain line patency    RADIOLOGY & ADDITIONAL TESTS:

## 2021-01-02 NOTE — PROGRESS NOTE ADULT - ASSESSMENT
Desired Goal SNa+ 145 Meq.,  Sodium, Serum: 140 mmol/L (01.01.21 @ 08:33)  Historical Values  Sodium, Serum: 137 mmol/L (01.02.21 @ 02:32)   Sodium, Serum: 140 mmol/L (01.01.21 @ 08:33)   Sodium, Serum: 150 mmol/L (12.30.20 @ 13:29)   Sodium, Serum: 155 mmol/L (12.30.20 @ 01:51)   P : Reduce DDAVP,  HTS, Briefly,   Trend SNa+ & USIMON HUANG.W Dr. Randall,

## 2021-01-03 LAB
ANION GAP SERPL CALC-SCNC: 10 MMOL/L — SIGNIFICANT CHANGE UP (ref 5–17)
APPEARANCE UR: CLEAR — SIGNIFICANT CHANGE UP
BACTERIA # UR AUTO: ABNORMAL
BILIRUB UR-MCNC: NEGATIVE — SIGNIFICANT CHANGE UP
BUN SERPL-MCNC: 16 MG/DL — SIGNIFICANT CHANGE UP (ref 8–20)
CALCIUM SERPL-MCNC: 8.3 MG/DL — LOW (ref 8.6–10.2)
CALCIUM UR-MCNC: 1.3 MG/DL — SIGNIFICANT CHANGE UP
CHLORIDE SERPL-SCNC: 109 MMOL/L — HIGH (ref 98–107)
CO2 SERPL-SCNC: 18 MMOL/L — LOW (ref 22–29)
COLOR SPEC: YELLOW — SIGNIFICANT CHANGE UP
CREAT SERPL-MCNC: 1.68 MG/DL — HIGH (ref 0.5–1.3)
DIFF PNL FLD: ABNORMAL
EPI CELLS # UR: SIGNIFICANT CHANGE UP
GLUCOSE SERPL-MCNC: 84 MG/DL — SIGNIFICANT CHANGE UP (ref 70–99)
GLUCOSE UR QL: NEGATIVE MG/DL — SIGNIFICANT CHANGE UP
KETONES UR-MCNC: NEGATIVE — SIGNIFICANT CHANGE UP
LEUKOCYTE ESTERASE UR-ACNC: NEGATIVE — SIGNIFICANT CHANGE UP
NITRITE UR-MCNC: NEGATIVE — SIGNIFICANT CHANGE UP
OSMOLALITY UR: 585 MOSM/KG — SIGNIFICANT CHANGE UP (ref 300–1000)
PH UR: 5 — SIGNIFICANT CHANGE UP (ref 5–8)
POTASSIUM SERPL-MCNC: 3.7 MMOL/L — SIGNIFICANT CHANGE UP (ref 3.5–5.3)
POTASSIUM SERPL-SCNC: 3.7 MMOL/L — SIGNIFICANT CHANGE UP (ref 3.5–5.3)
POTASSIUM UR-SCNC: 19 MMOL/L — SIGNIFICANT CHANGE UP
PROT UR-MCNC: 30 MG/DL
RBC CASTS # UR COMP ASSIST: SIGNIFICANT CHANGE UP /HPF (ref 0–4)
SODIUM SERPL-SCNC: 137 MMOL/L — SIGNIFICANT CHANGE UP (ref 135–145)
SODIUM UR-SCNC: 116 MMOL/L — SIGNIFICANT CHANGE UP
SP GR SPEC: 1.02 — SIGNIFICANT CHANGE UP (ref 1.01–1.02)
UROBILINOGEN FLD QL: NEGATIVE MG/DL — SIGNIFICANT CHANGE UP
VANCOMYCIN TROUGH SERPL-MCNC: 14.3 UG/ML — SIGNIFICANT CHANGE UP (ref 10–20)
WBC UR QL: NEGATIVE — SIGNIFICANT CHANGE UP

## 2021-01-03 PROCEDURE — 99233 SBSQ HOSP IP/OBS HIGH 50: CPT

## 2021-01-03 RX ORDER — POLYETHYLENE GLYCOL 3350 17 G/17G
17 POWDER, FOR SOLUTION ORAL ONCE
Refills: 0 | Status: COMPLETED | OUTPATIENT
Start: 2021-01-03 | End: 2021-01-03

## 2021-01-03 RX ORDER — VANCOMYCIN HCL 1 G
1000 VIAL (EA) INTRAVENOUS EVERY 24 HOURS
Refills: 0 | Status: DISCONTINUED | OUTPATIENT
Start: 2021-01-04 | End: 2021-01-11

## 2021-01-03 RX ORDER — SODIUM CHLORIDE 5 G/100ML
1000 INJECTION, SOLUTION INTRAVENOUS
Refills: 0 | Status: DISCONTINUED | OUTPATIENT
Start: 2021-01-03 | End: 2021-01-05

## 2021-01-03 RX ADMIN — Medication 1 MILLIGRAM(S): at 12:05

## 2021-01-03 RX ADMIN — POLYETHYLENE GLYCOL 3350 17 GRAM(S): 17 POWDER, FOR SOLUTION ORAL at 21:49

## 2021-01-03 RX ADMIN — DESMOPRESSIN ACETATE 0.1 MILLIGRAM(S): 0.1 TABLET ORAL at 05:32

## 2021-01-03 RX ADMIN — IRON SUCROSE 210 MILLIGRAM(S): 20 INJECTION, SOLUTION INTRAVENOUS at 12:06

## 2021-01-03 RX ADMIN — SODIUM CHLORIDE 60 MILLILITER(S): 5 INJECTION, SOLUTION INTRAVENOUS at 15:48

## 2021-01-03 RX ADMIN — PIPERACILLIN AND TAZOBACTAM 25 GRAM(S): 4; .5 INJECTION, POWDER, LYOPHILIZED, FOR SOLUTION INTRAVENOUS at 21:49

## 2021-01-03 RX ADMIN — LEVETIRACETAM 500 MILLIGRAM(S): 250 TABLET, FILM COATED ORAL at 05:31

## 2021-01-03 RX ADMIN — Medication 1 TABLET(S): at 12:05

## 2021-01-03 RX ADMIN — PANTOPRAZOLE SODIUM 40 MILLIGRAM(S): 20 TABLET, DELAYED RELEASE ORAL at 05:32

## 2021-01-03 RX ADMIN — PIPERACILLIN AND TAZOBACTAM 25 GRAM(S): 4; .5 INJECTION, POWDER, LYOPHILIZED, FOR SOLUTION INTRAVENOUS at 05:32

## 2021-01-03 RX ADMIN — Medication 1300 MILLIGRAM(S): at 05:32

## 2021-01-03 RX ADMIN — DESMOPRESSIN ACETATE 0.1 MILLIGRAM(S): 0.1 TABLET ORAL at 18:01

## 2021-01-03 RX ADMIN — Medication 100 MILLIGRAM(S): at 02:55

## 2021-01-03 RX ADMIN — LEVETIRACETAM 500 MILLIGRAM(S): 250 TABLET, FILM COATED ORAL at 18:01

## 2021-01-03 RX ADMIN — CHLORHEXIDINE GLUCONATE 1 APPLICATION(S): 213 SOLUTION TOPICAL at 05:31

## 2021-01-03 RX ADMIN — PIPERACILLIN AND TAZOBACTAM 25 GRAM(S): 4; .5 INJECTION, POWDER, LYOPHILIZED, FOR SOLUTION INTRAVENOUS at 13:30

## 2021-01-03 RX ADMIN — Medication 1300 MILLIGRAM(S): at 18:01

## 2021-01-03 RX ADMIN — ENOXAPARIN SODIUM 40 MILLIGRAM(S): 100 INJECTION SUBCUTANEOUS at 12:05

## 2021-01-03 RX ADMIN — SODIUM CHLORIDE 60 MILLILITER(S): 5 INJECTION, SOLUTION INTRAVENOUS at 18:01

## 2021-01-03 NOTE — PROGRESS NOTE ADULT - ASSESSMENT
In summary 56 y/o female with h/o SAH, diabetes insipidus, chronic hypernatremia was BIBA for as pt. felt a little dizzy after coming out of bathroom, no syncope, no fall, no trauma, no focal weakness, no palpitations, no cp, no sob. pt. reports appetite has been ok, no abd. pain, no n/v/d. no fever. appetite is good. no sick contact. As per pt. she has a PICC line and every other day gets D5W through that for h/o hypernatremia. pt. bp 92/60 , hr 87 upon arrival. pt. will be admitted for hypernatremia, spoke to nephrologist dr. Bone and agrees with 1 L NS and then 0.45 % NS  close f/u on bmp, will repeat in 6 hrs .       1. Bacteremia  Afebrile, low grade fever overnight   BC (+)  Klebsiella pneumoniae and Bacillus species not anthracis  - susceptibilities performed only on klebsiella pneumoniae  - removed PICC - no growth from tip  - on empiric zosyn/vancomycing   - repeat cultures pending     2. COIVD positive with infiltrates  - asymptomatic without oxygen at this time  - at this time hold off on specific treatment        3. Hypernatremia, chronic 2/2 CDI  - maintain above ~145 - today 139  - decreased dose of desmopressin (was her originally home dose; now BID)  - repeat BMP with some improvement  - started on 2% by nephrology today  - likely with impaired thirst mechanism  - would encourage family to ensure she is drinking set amount of fluid a day  - this way can avoid PICC line  - endocrine following    4. Acute kidney injury - improving slightly  - nephrology following    5. Near syncope,  vasovagal and related to COVID likely   - feels better    6. Hypotension - baseline    7. Normocytic anemia likely chronic     #DISPO - c/w low grade fever; if remains without fever and bacteremia clears likely replace PICC on Monday and discharge home.

## 2021-01-03 NOTE — PROGRESS NOTE ADULT - SUBJECTIVE AND OBJECTIVE BOX
Patient is a 55y old  Female who presents with a chief complaint of hypernatremia (2021 09:19)      INTERVAL HPI/OVERNIGHT EVENTS:  no complains, no events overnight. Repeat BC pending     MEDICATIONS  (STANDING):  chlorhexidine 4% Liquid 1 Application(s) Topical <User Schedule>  desmopressin 0.1 milliGRAM(s) Oral two times a day  enoxaparin Injectable 40 milliGRAM(s) SubCutaneous daily  folic acid 1 milliGRAM(s) Oral daily  iron sucrose IVPB 100 milliGRAM(s) IV Intermittent every 24 hours  levETIRAcetam 500 milliGRAM(s) Oral two times a day  multivitamin 1 Tablet(s) Oral daily  pantoprazole    Tablet 40 milliGRAM(s) Oral before breakfast  piperacillin/tazobactam IVPB.. 3.375 Gram(s) IV Intermittent every 8 hours  sodium bicarbonate 1300 milliGRAM(s) Oral two times a day  sodium chloride 2% . 1000 milliLiter(s) (60 mL/Hr) IV Continuous <Continuous>    MEDICATIONS  (PRN):  acetaminophen   Tablet .. 650 milliGRAM(s) Oral every 6 hours PRN Temp greater or equal to 38.5C (101.3F)  sodium chloride 0.9% lock flush 10 milliLiter(s) IV Push every 1 hour PRN Pre/post blood products, medications, blood draw, and to maintain line patency      Allergies    No Known Allergies    Intolerances        REVIEW OF SYSTEMS:  CONSTITUTIONAL: No fever, weight loss, or fatigue  RESPIRATORY: No cough, wheezing, chills or hemoptysis; No shortness of breath  CARDIOVASCULAR: No chest pain, palpitations, dizziness, or leg swelling  GASTROINTESTINAL: No abdominal or epigastric pain. No nausea, vomiting, or hematemesis; No diarrhea or constipation. No melena or hematochezia.  NEUROLOGICAL: No headaches, memory loss, loss of strength, numbness, or tremors  MUSCULOSKELETAL: No joint pain or swelling; No muscle, back, or extremity pain      Vital Signs Last 24 Hrs  T(C): 37.3 (2021 08:35), Max: 38.2 (2021 16:15)  T(F): 99.1 (2021 08:35), Max: 100.8 (2021 16:15)  HR: 64 (2021 08:35) (64 - 88)  BP: 133/90 (2021 08:35) (120/80 - 145/95)  BP(mean): --  RR: 18 (2021 08:35) (15 - 18)  SpO2: 94% (2021 08:35) (94% - 98%)    PHYSICAL EXAM:  GENERAL: NAD, well-groomed, well-developed  HEAD:  Atraumatic, Normocephalic  EYES: EOMI, PERRLA, conjunctiva and sclera clear  NECK: right sided central line in place   NERVOUS SYSTEM:  Alert & Oriented X3, No gross focal deficits  CHEST/LUNG: Clear to percussion bilaterally; No rales, rhonchi, wheezing, or rubs  HEART: Regular rate and rhythm; No murmurs, rubs, or gallops  ABDOMEN: Soft, Nontender, Nondistended; Bowel sounds present  EXTREMITIES:  No clubbing, cyanosis, or edema  SKIN: No rashes or lesions    LABS:        137  |  109<H>  |  16.0  ----------------------------<  84  3.7   |  18.0<L>  |  1.68<H>    Ca    8.3<L>      2021 08:30  Phos  2.4         TPro  x   /  Alb  3.4  /  TBili  x   /  DBili  x   /  AST  x   /  ALT  x   /  AlkPhos  x         Urinalysis Basic - ( 2021 10:13 )    Color: Yellow / Appearance: Clear / S.020 / pH: x  Gluc: x / Ketone: Negative  / Bili: Negative / Urobili: Negative mg/dL   Blood: x / Protein: 30 mg/dL / Nitrite: Negative   Leuk Esterase: Trace / RBC: x / WBC x   Sq Epi: x / Non Sq Epi: Occasional / Bacteria: Few      CAPILLARY BLOOD GLUCOSE          RADIOLOGY & ADDITIONAL TESTS:    Imaging Personally Reviewed:  [ ] YES  [ ] NO    Consultant(s) Notes Reviewed:  [ ] YES  [ ] NO    Care Discussed with Consultants/Other Providers [ ] YES  [ ] NO    Plan of Care discussed with Housestaff [ ]YES [ ] NO

## 2021-01-03 NOTE — PROGRESS NOTE ADULT - SUBJECTIVE AND OBJECTIVE BOX
CC:  follow up DI    Interval HPI/ Overnight Events:  IVFs have been D/C'ed.  ddACVP dose now reduced to twice daily.  PICC line has been removed.  Patient denies any HA, polyuria or polydipsia.     MEDICATIONS  (STANDING):  chlorhexidine 4% Liquid 1 Application(s) Topical <User Schedule>  desmopressin 0.1 milliGRAM(s) Oral two times a day  enoxaparin Injectable 40 milliGRAM(s) SubCutaneous daily  folic acid 1 milliGRAM(s) Oral daily  iron sucrose IVPB 100 milliGRAM(s) IV Intermittent every 24 hours  levETIRAcetam 500 milliGRAM(s) Oral two times a day  multivitamin 1 Tablet(s) Oral daily  pantoprazole    Tablet 40 milliGRAM(s) Oral before breakfast  piperacillin/tazobactam IVPB.. 3.375 Gram(s) IV Intermittent every 8 hours  sodium bicarbonate 1300 milliGRAM(s) Oral two times a day  sodium chloride 2% . 1000 milliLiter(s) (60 mL/Hr) IV Continuous <Continuous>  vancomycin  IVPB 500 milliGRAM(s) IV Intermittent every 12 hours    Vital Signs Last 24 Hrs  T(C): 38.1 (02 Jan 2021 07:51), Max: 38.1 (02 Jan 2021 07:51)  T(F): 100.5 (02 Jan 2021 07:51), Max: 100.5 (02 Jan 2021 07:51)  HR: 82 (02 Jan 2021 06:07) (82 - 100)  BP: 122/87 (02 Jan 2021 06:07) (122/87 - 132/91)  RR: 20 (02 Jan 2021 06:07) (20 - 20)  SpO2: 94% (02 Jan 2021 06:07) (94% - 95%)    PE:  Gen: AAO, NAD  HEENT:  sclera anicteric, MMM  Neck:  no thyromegaly, no LAD  CV:  nl S1 + S2, RRR, no m/r/g  Resp:  nl respiratory effort, CTA b/l  GI/ Abd: soft, NT/ ND, BS +  Neuro:  No tremor  MS:  no c/c/e, nl muscle tone  Skin:  no acanthosis  Psych: affect appropriate    LABS:  01-02    137  |  106  |  19.0  ----------------------------<  87  3.8   |  18.0<L>  |  1.69<H>    Ca    8.9      02 Jan 2021 02:32  Phos  2.4     01-02    TPro  x   /  Alb  3.4  /  TBili  x   /  DBili  x   /  AST  x   /  ALT  x   /  AlkPhos  x   01-02                          7.5    5.90  )-----------( 123      ( 01 Jan 2021 08:32 )             25.2       55 year old female with PMH of 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  She is COVID-19 positive and has Klebsiella bacteremia in setting of chronic PICC line.      1.  Hypernatremia-  Resolved after replacement of Free water Deficit likely due to inadequate oral intake of fluids in setting of impaired thirst mechanism and DI.    2.  DI-   Decrease  dose of ddAVP to twice daily.  Would advise that family strictly monitors patient at home to ensure she is drinking at least 2L of water per day to prevent the need for future IV hydration.    3.  Syncope-  possibly related to orthostatic hypotension.   No other source found.     4.  Bacteremia-  continue abx as per ID.  5.  COVID-19-   respiratory status is stable.  Continue supportive care.

## 2021-01-03 NOTE — DIETITIAN INITIAL EVALUATION ADULT. - ADD RECOMMEND
Add Ensure Enlive TID to optimize po intake and provide an additional 350kcal, 20g protein per serving. Continue MVI daily. Encourage po intake and HBV protein. Monitor wts and labs.

## 2021-01-03 NOTE — DIETITIAN INITIAL EVALUATION ADULT. - PERTINENT LABORATORY DATA
01-02 Na139 mmol/L Glu 91 mg/dL K+ 3.8 mmol/L Cr  1.79 mg/dL<H> BUN 17.0 mg/dL Phos n/a   Alb n/a   PAB n/a

## 2021-01-03 NOTE — DIETITIAN INITIAL EVALUATION ADULT. - OTHER INFO
54 y/o female with h/o SAH, diabetes insipidus, chronic hypernatremia was BIBA for as pt. felt a little dizzy after coming out of bathroom, no syncope, no fall, no trauma, no focal weakness, no palpitations, no cp, no sob. pt. reports appetite has been ok, no abd. pain, no n/v/d. no fever. appetite is good. no sick contact. As per pt. she has a PICC line and every other day gets D5W through that for h/o hypernatremia. Pt with good po intake, consuming 75-95% of meals per documentation. Last documented BM 1/2.

## 2021-01-04 LAB
ANION GAP SERPL CALC-SCNC: 9 MMOL/L — SIGNIFICANT CHANGE UP (ref 5–17)
BUN SERPL-MCNC: 16 MG/DL — SIGNIFICANT CHANGE UP (ref 8–20)
CALCIUM SERPL-MCNC: 8.4 MG/DL — LOW (ref 8.6–10.2)
CHLORIDE SERPL-SCNC: 113 MMOL/L — HIGH (ref 98–107)
CO2 SERPL-SCNC: 24 MMOL/L — SIGNIFICANT CHANGE UP (ref 22–29)
CREAT SERPL-MCNC: 1.62 MG/DL — HIGH (ref 0.5–1.3)
GLUCOSE SERPL-MCNC: 99 MG/DL — SIGNIFICANT CHANGE UP (ref 70–99)
HCT VFR BLD CALC: 24.8 % — LOW (ref 34.5–45)
HGB BLD-MCNC: 7.3 G/DL — LOW (ref 11.5–15.5)
MCHC RBC-ENTMCNC: 25.3 PG — LOW (ref 27–34)
MCHC RBC-ENTMCNC: 29.4 GM/DL — LOW (ref 32–36)
MCV RBC AUTO: 85.8 FL — SIGNIFICANT CHANGE UP (ref 80–100)
PLATELET # BLD AUTO: 191 K/UL — SIGNIFICANT CHANGE UP (ref 150–400)
POTASSIUM SERPL-MCNC: 4.1 MMOL/L — SIGNIFICANT CHANGE UP (ref 3.5–5.3)
POTASSIUM SERPL-SCNC: 4.1 MMOL/L — SIGNIFICANT CHANGE UP (ref 3.5–5.3)
RBC # BLD: 2.89 M/UL — LOW (ref 3.8–5.2)
RBC # FLD: 13.3 % — SIGNIFICANT CHANGE UP (ref 10.3–14.5)
SODIUM SERPL-SCNC: 146 MMOL/L — HIGH (ref 135–145)
WBC # BLD: 4.79 K/UL — SIGNIFICANT CHANGE UP (ref 3.8–10.5)
WBC # FLD AUTO: 4.79 K/UL — SIGNIFICANT CHANGE UP (ref 3.8–10.5)

## 2021-01-04 PROCEDURE — 99233 SBSQ HOSP IP/OBS HIGH 50: CPT

## 2021-01-04 PROCEDURE — 99232 SBSQ HOSP IP/OBS MODERATE 35: CPT

## 2021-01-04 RX ORDER — CEFTRIAXONE 500 MG/1
2000 INJECTION, POWDER, FOR SOLUTION INTRAMUSCULAR; INTRAVENOUS EVERY 24 HOURS
Refills: 0 | Status: COMPLETED | OUTPATIENT
Start: 2021-01-04 | End: 2021-01-11

## 2021-01-04 RX ORDER — ACETAMINOPHEN 500 MG
650 TABLET ORAL ONCE
Refills: 0 | Status: COMPLETED | OUTPATIENT
Start: 2021-01-04 | End: 2021-01-04

## 2021-01-04 RX ADMIN — DESMOPRESSIN ACETATE 0.1 MILLIGRAM(S): 0.1 TABLET ORAL at 18:20

## 2021-01-04 RX ADMIN — Medication 650 MILLIGRAM(S): at 22:20

## 2021-01-04 RX ADMIN — Medication 250 MILLIGRAM(S): at 02:00

## 2021-01-04 RX ADMIN — Medication 1300 MILLIGRAM(S): at 18:20

## 2021-01-04 RX ADMIN — IRON SUCROSE 210 MILLIGRAM(S): 20 INJECTION, SOLUTION INTRAVENOUS at 12:47

## 2021-01-04 RX ADMIN — Medication 650 MILLIGRAM(S): at 23:20

## 2021-01-04 RX ADMIN — DESMOPRESSIN ACETATE 0.1 MILLIGRAM(S): 0.1 TABLET ORAL at 05:01

## 2021-01-04 RX ADMIN — PIPERACILLIN AND TAZOBACTAM 25 GRAM(S): 4; .5 INJECTION, POWDER, LYOPHILIZED, FOR SOLUTION INTRAVENOUS at 12:47

## 2021-01-04 RX ADMIN — PIPERACILLIN AND TAZOBACTAM 25 GRAM(S): 4; .5 INJECTION, POWDER, LYOPHILIZED, FOR SOLUTION INTRAVENOUS at 05:02

## 2021-01-04 RX ADMIN — LEVETIRACETAM 500 MILLIGRAM(S): 250 TABLET, FILM COATED ORAL at 18:20

## 2021-01-04 RX ADMIN — Medication 1 TABLET(S): at 12:47

## 2021-01-04 RX ADMIN — LEVETIRACETAM 500 MILLIGRAM(S): 250 TABLET, FILM COATED ORAL at 05:01

## 2021-01-04 RX ADMIN — Medication 1300 MILLIGRAM(S): at 05:01

## 2021-01-04 RX ADMIN — ENOXAPARIN SODIUM 40 MILLIGRAM(S): 100 INJECTION SUBCUTANEOUS at 12:47

## 2021-01-04 RX ADMIN — CHLORHEXIDINE GLUCONATE 1 APPLICATION(S): 213 SOLUTION TOPICAL at 05:01

## 2021-01-04 RX ADMIN — CEFTRIAXONE 100 MILLIGRAM(S): 500 INJECTION, POWDER, FOR SOLUTION INTRAMUSCULAR; INTRAVENOUS at 18:19

## 2021-01-04 RX ADMIN — Medication 1 MILLIGRAM(S): at 12:47

## 2021-01-04 RX ADMIN — PANTOPRAZOLE SODIUM 40 MILLIGRAM(S): 20 TABLET, DELAYED RELEASE ORAL at 05:01

## 2021-01-04 NOTE — PROGRESS NOTE ADULT - ASSESSMENT
54 y/o female with h/o SAH, diabetes insipidus, chronic hypernatremia was BIBA as pt. felt a little dizzy after coming out of bathroom, no syncope/fall/trauma, no focal weakness, no palpitations, no cp, no sob. Patient came in with a PICC line and reports that every other day gets D5W through that for h/o hypernatremia. Pt admitted for hypernatremia, was started on IVF and desmopressin. Patient also found to be COVID+ with positive blood cultures, PICC line was removed, and patient was started on vanco/zosyn. Repeat blood cultures pending.     1. Bacteremia  - BC (+) Klebsiella pneumoniae and Bacillus species (not anthracis)  - PICC removed, no growth from tip  - c/w Vanco/Zosyn  - Repeat cultures pending  - Remains afebrile    2. COVID positive with infiltrates  - Maintaining oxygenation on RA  - Continue to monitor off specific treatment    3. Hypernatremia, chronic 2/2 CDI  - Goal Na ~145  - F/u repeat labs   - Currently on Desmopressin 0.1 mg PO BID  - IVF as per nephro, currently on NS 2%  - Continue to encourage PO fluid intake  - If repeat bcx negative will likely replace PICC  - Nephrology and Endocrine following    4. Acute kidney injury   - Improving slightly  - Nephrology following    5. Near syncope, vasovagal  - Likely related to COVID infection  - Currently with improvement    6. Hypotension @ baseline  - BP's remain stable    7. Normocytic anemia likely chronic   - No signs of active bleeding  - Monitor H/H    #DISPO - Repeat labs. Pending blood culture results; if negative and pt remains stable will likely need PICC replaced and discharge home.

## 2021-01-04 NOTE — PROGRESS NOTE ADULT - SUBJECTIVE AND OBJECTIVE BOX
[facility] DIVISION OF KIDNEY DISEASES AND HYPERTENSION -- FOLLOW UP NOTE  --------------------------------------------------------------------------------  Chief Complaint: hypernatremia    24 hour events/subjective:  no acute event        PAST HISTORY  --------------------------------------------------------------------------------  No significant changes to PMH, PSH, FHx, SHx, unless otherwise noted    ALLERGIES & MEDICATIONS  --------------------------------------------------------------------------------  Allergies    No Known Allergies    Intolerances      Standing Inpatient Medications  cefTRIAXone   IVPB 2000 milliGRAM(s) IV Intermittent every 24 hours  chlorhexidine 4% Liquid 1 Application(s) Topical <User Schedule>  desmopressin 0.1 milliGRAM(s) Oral two times a day  enoxaparin Injectable 40 milliGRAM(s) SubCutaneous daily  folic acid 1 milliGRAM(s) Oral daily  iron sucrose IVPB 100 milliGRAM(s) IV Intermittent every 24 hours  levETIRAcetam 500 milliGRAM(s) Oral two times a day  multivitamin 1 Tablet(s) Oral daily  pantoprazole    Tablet 40 milliGRAM(s) Oral before breakfast  sodium bicarbonate 1300 milliGRAM(s) Oral two times a day  sodium chloride 2% . 1000 milliLiter(s) IV Continuous <Continuous>  vancomycin  IVPB 1000 milliGRAM(s) IV Intermittent every 24 hours    PRN Inpatient Medications  acetaminophen   Tablet .. 650 milliGRAM(s) Oral every 6 hours PRN  sodium chloride 0.9% lock flush 10 milliLiter(s) IV Push every 1 hour PRN      REVIEW OF SYSTEMS  --------------------------------------------------------------------------------  Gen: No weight changes, fatigue, fevers/chills, weakness  Skin: No rashes  Head/Eyes/Ears/Mouth: No headache; Normal hearing; Normal vision w/o blurriness; No sinus pain/discomfort, sore throat  Respiratory: No dyspnea, cough, wheezing, hemoptysis  CV: No chest pain, PND, orthopnea  GI: No abdominal pain, diarrhea, constipation, nausea, vomiting, melena, hematochezia  : No increased frequency, dysuria, hematuria, nocturia  MSK: No joint pain/swelling; no back pain; no edema  Neuro: No dizziness/lightheadedness, weakness, seizures, numbness, tingling  Heme: No easy bruising or bleeding  Endo: No heat/cold intolerance  Psych: No significant nervousness, anxiety, stress, depression    All other systems were reviewed and are negative, except as noted.    VITALS/PHYSICAL EXAM  --------------------------------------------------------------------------------  T(C): 37.3 (01-05-21 @ 08:53), Max: 37.7 (01-05-21 @ 05:50)  HR: 81 (01-05-21 @ 08:53) (81 - 89)  BP: 142/94 (01-05-21 @ 08:53) (130/78 - 142/94)  RR: 19 (01-05-21 @ 08:53) (18 - 19)  SpO2: 92% (01-05-21 @ 08:53) (92% - 93%)  Wt(kg): --        Physical Exam:  	Gen: NAD, well-appearing  	HEENT: PERRL, supple neck, clear oropharynx  	Pulm: CTA B/L  	CV: RRR, S1S2; no rub  	Back: No spinal or CVA tenderness; no sacral edema  	Abd: +BS, soft, nontender/nondistended  	: No suprapubic tenderness  	UE: Warm, FROM, no clubbing, intact strength; no edema; no asterixis  	LE: Warm, FROM, no clubbing, intact strength; no edema  	Neuro: No focal deficits  	Psych: Normal affect and mood  	Skin: Warm  LABS/STUDIES  --------------------------------------------------------------------------------              7.2    4.70  >-----------<  227      [01-05-21 @ 07:50]              24.2     146  |  113  |  15.0  ----------------------------<  79      [01-05-21 @ 07:50]  3.6   |  23.0  |  1.47        Ca     8.1     [01-05-21 @ 07:50]            Creatinine Trend:  SCr 1.47 [01-05 @ 07:50]  SCr 1.62 [01-04 @ 14:40]  SCr 1.68 [01-03 @ 08:30]  SCr 1.79 [01-02 @ 17:23]  SCr 1.69 [01-02 @ 02:32]    Urinalysis - [01-03-21 @ 14:38]      Color Yellow / Appearance Clear / SG 1.020 / pH 5.0      Gluc Negative / Ketone Negative  / Bili Negative / Urobili Negative       Blood Trace / Protein 30 / Leuk Est Negative / Nitrite Negative      RBC 0-2 / WBC Negative / Hyaline  / Gran  / Sq Epi  / Non Sq Epi Occasional / Bacteria Few    Urine Creatinine 130      [12-30-20 @ 01:08]  Urine Sodium 116      [01-03-21 @ 14:38]  Urine Potassium 19      [01-03-21 @ 14:38]  Urine Chloride 118      [01-01-21 @ 17:09]  Urine Osmolality 585      [01-03-21 @ 14:38]    Iron 14, TIBC 180, %sat 8      [12-30-20 @ 01:51]  Ferritin 753      [12-30-20 @ 01:51]

## 2021-01-04 NOTE — PROGRESS NOTE ADULT - ASSESSMENT
56 y/o female with h/o SAH, diabetes insipidus, chronic hypernatremia was BIBA for as pt. felt a little dizzy after coming out of bathroom, no syncope, no fall, no trauma, no focal weakness, no palpitations, no cp, no sob. pt. reports appetite has been ok, no abd. pain, no n/v/d. no fever. appetite is good. no sick contact. As per pt. she has a PICC line and every other day gets D5W through that for h/o hypernatremia. pt. bp 92/60 , hr 87 upon arrival. As per pt. she runs bp on the low side.     Leukocytosis   Fever  TOSHIA  Hypernatremia    - now afebrile  - COVID PCR + but asymptomatic and not requiring supplemental O2, COVID IgG (-)  - UA (-) and CXr (-)  - BCX klebsiella and Bacillus sp  - PICC tip CX ngtd  - repeat BCX ngtd  - Dc zosyn  - ceftriaxone 2 g IV daily  - c/w vancomycin last trough 14.3  -TTE  - Trend Fever  - Trend Leukocytosis    d/w attending  Will Follow

## 2021-01-04 NOTE — PROGRESS NOTE ADULT - SUBJECTIVE AND OBJECTIVE BOX
[facility]  INFECTIOUS DISEASES AND INTERNAL MEDICINE at [place]  =======================================================  Mj Lee MD  Diplomates American Board of Internal Medicine and Infectious Diseases  Tel: [phone]      Fax: [phone]  =======================================================    K[facility] MEDINA [id number]    Follow up: bacteremia  feels well      Allergies:  No Known Allergies           REVIEW OF SYSTEMS:  CONSTITUTIONAL:  No Fever or chills  HEENT:   No diplopia or blurred vision.  No earache, sore throat or runny nose.  CARDIOVASCULAR:  No pressure, squeezing, strangling, tightness, heaviness or aching about the chest, neck, axilla or epigastrium.  RESPIRATORY:  No cough, shortness of breath  GASTROINTESTINAL:  No nausea, vomiting or diarrhea.  GENITOURINARY:  No dysuria, frequency or urgency. No Blood in urine  MUSCULOSKELETAL:  no joint aches, no muscle pain  SKIN:  No change in skin, hair or nails.  NEUROLOGIC:  No Headaches, seizures or weakness.  PSYCHIATRIC:  No disorder of thought or mood.  ENDOCRINE:  No heat or cold intolerance  HEMATOLOGICAL:  No easy bruising or bleeding.       Physical Exam:  GEN: NAD, pleasant  HEENT: normocephalic and atraumatic. EOMI. PERRL.  Anicteric   NECK: Supple.   LUNGS: Clear to auscultation.  HEART: Regular rate and rhythm without murmur.  ABDOMEN: Soft, nontender, and nondistended.  Positive bowel sounds.    : No CVA tenderness  EXTREMITIES: Without any edema.  MSK: no joint swelling  NEUROLOGIC: Cranial nerves II through XII are grossly intact. No focal deficits  PSYCHIATRIC: Appropriate affect .  SKIN: No Rash      Vitals:    T(F): 100.3 (2021 11:46), Max: 100.5 (2021 08:08)  HR: 91 (2021 12:00)  BP: 128/68 (2021 12:00)  RR: 20 (2021 12:00)  SpO2: 97% (2021 12:00) (97% - 100%)  temp max in last 48H T(F): , Max: 102.8 (20 @ 17:26)      Current Antibiotics:  piperacillin/tazobactam IVPB.. 3.375 Gram(s) IV Intermittent every 8 hours  vancomycin  IVPB 750 milliGRAM(s) IV Intermittent every 24 hours    Other medications:  desmopressin 0.1 milliGRAM(s) Oral <User Schedule>  enoxaparin Injectable 40 milliGRAM(s) SubCutaneous daily  folic acid 1 milliGRAM(s) Oral daily  iron sucrose IVPB 100 milliGRAM(s) IV Intermittent every 24 hours  levETIRAcetam 500 milliGRAM(s) Oral two times a day  multivitamin 1 Tablet(s) Oral daily  pantoprazole    Tablet 40 milliGRAM(s) Oral before breakfast  sodium bicarbonate 1300 milliGRAM(s) Oral two times a day        Labs:                                   7.3    4.79  )-----------( 191      ( 2021 14:40 )             24.8       01-    146<H>  |  113<H>  |  16.0  ----------------------------<  99  4.1   |  24.0  |  1.62<H>    Ca    8.4<L>      2021 14:40                Urinalysis Basic - ( 2021 14:38 )    Color: Yellow / Appearance: Clear / S.020 / pH: x  Gluc: x / Ketone: Negative  / Bili: Negative / Urobili: Negative mg/dL   Blood: x / Protein: 30 mg/dL / Nitrite: Negative   Leuk Esterase: Negative / RBC: 0-2 /HPF / WBC Negative   Sq Epi: x / Non Sq Epi: Occasional / Bacteria: Few                  CAPILLARY BLOOD GLUCOSE                  Culture - Blood (collected 20 @ 01:52)  Source: .Blood Blood-Peripheral  Gram Stain (20 @ 14:47):    Growth in anaerobic bottle: Gram Positive Rods    Gram Stain performed by:    [facility]    [address] [place] [address]    .    TYPE: (C=Critical, N=Notification, A=Abnormal) C    TESTS:  _ GS    DATE/TIME CALLED: _ 2020 14:45:54    CALLED TO: Davis Tavarez RN    READ BACK (2 Patient Identifiers)(Y/N): _ Y    READ BACK VALUES (Y/N): _ Y    CALLED BY: Davis Rodriguez    Culture - Blood (collected 20 @ 01:52)  Source: .Blood Blood-Peripheral  Gram Stain (20 @ 09:27):    Growth in aerobic bottle: Gram Positive Rods    Growth in anaerobic bottle: Gram Negative Rods    ***Blood Panel PCR results on this specimen are available    approximately 3 hours after the Gram stain result.***    Gram stain, PCR, and/or culture results may not always    correspond due to difference in methodologies.    ************************************************************    This PCR assay was performed using [employer].    The following targets are tested for: Enterococcus,    vancomycin resistant enterococci, Listeria monocytogenes,    coagulase negative staphylococci, S. aureus,    methicillin resistant S. aureus, Streptococcus agalactiae    (Group B), S. pneumoniae, S. pyogenes (Group A),    Acinetobacter baumannii, Enterobacter cloacae, E. coli,    Klebsiella oxytoca, K. pneumoniae, Proteus sp.,    Serratia marcescens, Haemophilus influenzae,    Neisseria meningitidis, Pseudomonas aeruginosa, Candida    albicans, C. glabrata, C krusei, C parapsilosis,    C. tropicalis and the KPC resistance gene.    Gram Stain and BCID performed by:    [facility]    [address] [place] [address]    .    TYPE: (C=Critical, N=Notification, A=Abnormal) C    TESTS:  _ GS    DATE/TIME CALLED: _ 2020 09:26:34    CALLED TO: Davis Hanson    READ BACK (2 Patient Identifiers)(Y/N): _ Y    READ BACK VALUES (Y/N): _ Y    CALLED BY: Davis Rodriguez  Organism: Blood Culture PCR (21 @ 14:12)  Organism: Blood Culture PCR (20 @ 11:05)    Sensitivities:      -  Klebsiella pneumoniae: Detec      Method Type: PCR      WBC Count: 5.90 K/uL (21 @ 08:32)  WBC Count: 13.73 K/uL (20 @ 13:29)  WBC Count: 17.24 K/uL (20 @ 01:51)  WBC Count: 7.82 K/uL (20 @ 16:41)    Creatinine, Serum: 1.73 mg/dL (21 @ 08:33)  Creatinine, Serum: 1.60 mg/dL (20 @ 13:29)  Creatinine, Serum: 1.66 mg/dL (20 @ 01:51)  Creatinine, Serum: 1.64 mg/dL (20 @ 01:51)  Creatinine, Serum: 1.40 mg/dL (20 @ 15:16)      Ferritin, Serum: 753 ng/mL (20 @ 01:51)           COVID-19 IgG Antibody Interpretation: Negative (20 @ 06:49)  COVID-19 IgG Antibody Index: 0.06 Index (20 @ 06:49)  COVID-19 PCR: Detected (20 @ 22:59)

## 2021-01-04 NOTE — PROGRESS NOTE ADULT - SUBJECTIVE AND OBJECTIVE BOX
CC: Hypernatremia    INTERVAL HPI/OVERNIGHT EVENTS:  Patient seen and examined at bedside.  No overnight events reported by staff.  Patient at this time is without complaints.  Patient denies chills, dizziness, headache, CP, SOB, abdominal pain, N/V.    PHYSICAL EXAM:  Vital Signs Last 24 Hrs  T(C): 37.6 (2021 10:54), Max: 37.7 (2021 18:17)  T(F): 99.7 (2021 10:54), Max: 99.9 (2021 18:17)  HR: 93 (2021 10:54) (75 - 93)  BP: 130/84 (2021 10:54) (116/81 - 136/83)  BP(mean): 89 (2021 12:00) (89 - 89)  RR: 18 (2021 10:54) (18 - 20)  SpO2: 96% (2021 10:54) (92% - 96%)    GENERAL: NAD, sitting comfortably  HEAD: Atraumatic, Normocephalic  EYES: EOMI, PERRLA, conjunctiva and sclera clear  ENMT: Moist mucous membranes  NECK: Supple, No JVD  NERVOUS SYSTEM: A&OX3, Good concentration  CHEST/LUNG: Clear to auscultation b/l; Unlabored respirations  HEART: S1S2+, Regular rate and rhythm  ABDOMEN: Soft, Nontender, Nondistended; BS+  EXTREMITIES:  2+ Peripheral Pulses, No LE edema  SKIN: Warm and dry    LABS:        137  |  109<H>  |  16.0  ----------------------------<  84  3.7   |  18.0<L>  |  1.68<H>    Ca    8.3<L>      2021 08:30    Urinalysis Basic - ( 2021 14:38 )  Color: Yellow / Appearance: Clear / S.020 / pH: x  Gluc: x / Ketone: Negative  / Bili: Negative / Urobili: Negative mg/dL   Blood: x / Protein: 30 mg/dL / Nitrite: Negative   Leuk Esterase: Negative / RBC: 0-2 /HPF / WBC Negative   Sq Epi: x / Non Sq Epi: Occasional / Bacteria: Few    MEDICATIONS  (STANDING):  chlorhexidine 4% Liquid 1 Application(s) Topical <User Schedule>  desmopressin 0.1 milliGRAM(s) Oral two times a day  enoxaparin Injectable 40 milliGRAM(s) SubCutaneous daily  folic acid 1 milliGRAM(s) Oral daily  iron sucrose IVPB 100 milliGRAM(s) IV Intermittent every 24 hours  levETIRAcetam 500 milliGRAM(s) Oral two times a day  multivitamin 1 Tablet(s) Oral daily  pantoprazole    Tablet 40 milliGRAM(s) Oral before breakfast  piperacillin/tazobactam IVPB.. 3.375 Gram(s) IV Intermittent every 8 hours  sodium bicarbonate 1300 milliGRAM(s) Oral two times a day  sodium chloride 2% . 1000 milliLiter(s) (60 mL/Hr) IV Continuous <Continuous>  vancomycin  IVPB 1000 milliGRAM(s) IV Intermittent every 24 hours    MEDICATIONS  (PRN):  acetaminophen   Tablet .. 650 milliGRAM(s) Oral every 6 hours PRN Temp greater or equal to 38.5C (101.3F)  sodium chloride 0.9% lock flush 10 milliLiter(s) IV Push every 1 hour PRN Pre/post blood products, medications, blood draw, and to maintain line patency

## 2021-01-04 NOTE — PROGRESS NOTE ADULT - ASSESSMENT
Central DI- on DDAVP  SNa+ stable  Continue DDAVP at current dose  nephrology follow up outpt  d/w Dr. Huang

## 2021-01-05 LAB
ANION GAP SERPL CALC-SCNC: 10 MMOL/L — SIGNIFICANT CHANGE UP (ref 5–17)
BUN SERPL-MCNC: 15 MG/DL — SIGNIFICANT CHANGE UP (ref 8–20)
CALCIUM SERPL-MCNC: 8.1 MG/DL — LOW (ref 8.6–10.2)
CHLORIDE SERPL-SCNC: 113 MMOL/L — HIGH (ref 98–107)
CO2 SERPL-SCNC: 23 MMOL/L — SIGNIFICANT CHANGE UP (ref 22–29)
CREAT SERPL-MCNC: 1.47 MG/DL — HIGH (ref 0.5–1.3)
GLUCOSE SERPL-MCNC: 79 MG/DL — SIGNIFICANT CHANGE UP (ref 70–99)
GRAM STN FLD: SIGNIFICANT CHANGE UP
HCT VFR BLD CALC: 24.2 % — LOW (ref 34.5–45)
HGB BLD-MCNC: 7.2 G/DL — LOW (ref 11.5–15.5)
MCHC RBC-ENTMCNC: 25.2 PG — LOW (ref 27–34)
MCHC RBC-ENTMCNC: 29.8 GM/DL — LOW (ref 32–36)
MCV RBC AUTO: 84.6 FL — SIGNIFICANT CHANGE UP (ref 80–100)
PLATELET # BLD AUTO: 227 K/UL — SIGNIFICANT CHANGE UP (ref 150–400)
POTASSIUM SERPL-MCNC: 3.6 MMOL/L — SIGNIFICANT CHANGE UP (ref 3.5–5.3)
POTASSIUM SERPL-SCNC: 3.6 MMOL/L — SIGNIFICANT CHANGE UP (ref 3.5–5.3)
RBC # BLD: 2.86 M/UL — LOW (ref 3.8–5.2)
RBC # FLD: 13.3 % — SIGNIFICANT CHANGE UP (ref 10.3–14.5)
SODIUM SERPL-SCNC: 146 MMOL/L — HIGH (ref 135–145)
WBC # BLD: 4.7 K/UL — SIGNIFICANT CHANGE UP (ref 3.8–10.5)
WBC # FLD AUTO: 4.7 K/UL — SIGNIFICANT CHANGE UP (ref 3.8–10.5)

## 2021-01-05 PROCEDURE — 99233 SBSQ HOSP IP/OBS HIGH 50: CPT

## 2021-01-05 PROCEDURE — 99232 SBSQ HOSP IP/OBS MODERATE 35: CPT

## 2021-01-05 RX ADMIN — Medication 1 TABLET(S): at 15:37

## 2021-01-05 RX ADMIN — PANTOPRAZOLE SODIUM 40 MILLIGRAM(S): 20 TABLET, DELAYED RELEASE ORAL at 05:48

## 2021-01-05 RX ADMIN — DESMOPRESSIN ACETATE 0.1 MILLIGRAM(S): 0.1 TABLET ORAL at 18:16

## 2021-01-05 RX ADMIN — Medication 1300 MILLIGRAM(S): at 05:49

## 2021-01-05 RX ADMIN — DESMOPRESSIN ACETATE 0.1 MILLIGRAM(S): 0.1 TABLET ORAL at 05:48

## 2021-01-05 RX ADMIN — LEVETIRACETAM 500 MILLIGRAM(S): 250 TABLET, FILM COATED ORAL at 18:16

## 2021-01-05 RX ADMIN — ENOXAPARIN SODIUM 40 MILLIGRAM(S): 100 INJECTION SUBCUTANEOUS at 15:37

## 2021-01-05 RX ADMIN — CHLORHEXIDINE GLUCONATE 1 APPLICATION(S): 213 SOLUTION TOPICAL at 05:48

## 2021-01-05 RX ADMIN — IRON SUCROSE 210 MILLIGRAM(S): 20 INJECTION, SOLUTION INTRAVENOUS at 11:05

## 2021-01-05 RX ADMIN — Medication 250 MILLIGRAM(S): at 02:43

## 2021-01-05 RX ADMIN — Medication 1300 MILLIGRAM(S): at 18:16

## 2021-01-05 RX ADMIN — Medication 1 MILLIGRAM(S): at 15:37

## 2021-01-05 RX ADMIN — CEFTRIAXONE 100 MILLIGRAM(S): 500 INJECTION, POWDER, FOR SOLUTION INTRAMUSCULAR; INTRAVENOUS at 15:37

## 2021-01-05 RX ADMIN — LEVETIRACETAM 500 MILLIGRAM(S): 250 TABLET, FILM COATED ORAL at 05:48

## 2021-01-05 NOTE — PROGRESS NOTE ADULT - SUBJECTIVE AND OBJECTIVE BOX
[facility]  INFECTIOUS DISEASES AND INTERNAL MEDICINE at [place]  =======================================================  Mj eLe MD  Diplomates American Board of Internal Medicine and Infectious Diseases  Tel: [phone]      Fax: [phone]  =======================================================    KRUPA MEDINA [id number]    Follow up: bacteremia, COVID 19  patient feels well  BCx now + GPR      Allergies:  No Known Allergies           REVIEW OF SYSTEMS:  CONSTITUTIONAL:  No Fever or chills  HEENT:   No diplopia or blurred vision.  No earache, sore throat or runny nose.  CARDIOVASCULAR:  No pressure, squeezing, strangling, tightness, heaviness or aching about the chest, neck, axilla or epigastrium.  RESPIRATORY:  No cough, shortness of breath  GASTROINTESTINAL:  No nausea, vomiting or diarrhea.  GENITOURINARY:  No dysuria, frequency or urgency. No Blood in urine  MUSCULOSKELETAL:  no joint aches, no muscle pain  SKIN:  No change in skin, hair or nails.  NEUROLOGIC:  No Headaches, seizures or weakness.  PSYCHIATRIC:  No disorder of thought or mood.  ENDOCRINE:  No heat or cold intolerance  HEMATOLOGICAL:  No easy bruising or bleeding.       Physical Exam:  GEN: NAD, pleasant rt ij  HEENT: normocephalic and atraumatic. EOMI. PERRL.  Anicteric   NECK: Supple.   LUNGS: Clear to auscultation.  HEART: Regular rate and rhythm without murmur.  ABDOMEN: Soft, nontender, and nondistended.  Positive bowel sounds.    : No CVA tenderness  EXTREMITIES: Without any edema.  MSK: no joint swelling  NEUROLOGIC: Cranial nerves II through XII are grossly intact. No focal deficits  PSYCHIATRIC: Appropriate affect .  SKIN: No Rash      Vitals:    T(F): 99.1 (05 Jan 2021 08:53), Max: 99.8 (05 Jan 2021 05:50)  HR: 81 (05 Jan 2021 08:53)  BP: 142/94 (05 Jan 2021 08:53)  RR: 19 (05 Jan 2021 08:53)  SpO2: 92% (05 Jan 2021 08:53) (92% - 93%)  temp max in last 48H T(F): , Max: 99.9 (01-03-21 @ 18:17)      Current Antibiotics:  cefTRIAXone   IVPB 2000 milliGRAM(s) IV Intermittent every 24 hours  vancomycin  IVPB 1000 milliGRAM(s) IV Intermittent every 24 hours    Other medications:  chlorhexidine 4% Liquid 1 Application(s) Topical <User Schedule>  desmopressin 0.1 milliGRAM(s) Oral two times a day  enoxaparin Injectable 40 milliGRAM(s) SubCutaneous daily  folic acid 1 milliGRAM(s) Oral daily  iron sucrose IVPB 100 milliGRAM(s) IV Intermittent every 24 hours  levETIRAcetam 500 milliGRAM(s) Oral two times a day  multivitamin 1 Tablet(s) Oral daily  pantoprazole    Tablet 40 milliGRAM(s) Oral before breakfast  sodium bicarbonate 1300 milliGRAM(s) Oral two times a day        Labs:                        7.2    4.70  )-----------( 227      ( 05 Jan 2021 07:50 )             24.2      01-05    146<H>  |  113<H>  |  15.0  ----------------------------<  79  3.6   |  23.0  |  1.47<H>    Ca    8.1<L>      05 Jan 2021 07:50        Culture - Blood (collected 01-01-21 @ 12:54)  Source: .Blood Blood  Gram Stain (01-05-21 @ 09:30):    Growth in anaerobic bottle: Gram Positive Rods    Gram Stain performed by:    [facility]    [address] [place] [id number]    ,    TYPE: (C=Critical, N=Notification, A=Abnormal) c    TESTS:  _ gs    DATE/TIME CALLED: _ 01/05/2021 09:30:08    CALLED TO: _ gale laird rn    READ BACK (2 Patient Identifiers)(Y/N): _ y    READ BACK VALUES (Y/N): _ y    CALLED BY: Davis sinclair    Culture - Blood (collected 01-01-21 @ 12:54)  Source: .Blood Blood    Culture - Catheter (collected 12-31-20 @ 22:24)  Source: .Catheter PICC Tip  Final Report (01-02-21 @ 16:11):    No growth    Culture - Blood (collected 12-30-20 @ 01:52)  Source: .Blood Blood-Peripheral  Gram Stain (12-31-20 @ 14:47):    Growth in anaerobic bottle: Gram Positive Rods    Gram Stain performed by:    [facility]    [address] [place] [id number]    .    TYPE: (C=Critical, N=Notification, A=Abnormal) C    TESTS:  _     DATE/TIME CALLED: _ 12/31/2020 14:45:54    CALLED TO: Davis Tavarez RN    READ BACK (2 Patient Identifiers)(Y/N): _ Y    READ BACK VALUES (Y/N): _ Y    CALLED BY: Davis Rodriguez  Final Report (01-01-21 @ 17:55):    Growth in anaerobic bottle: Bacillus species not anthracis    "Susceptibilities not performed"    Culture - Blood (collected 12-30-20 @ 01:52)  Source: .Blood Blood-Peripheral  Gram Stain (12-31-20 @ 09:27):    Growth in aerobic bottle: Gram Positive Rods    Growth in anaerobic bottle: Gram Negative Rods    ***Blood Panel PCR results on this specimen are available    approximately 3 hours after the Gram stain result.***    Gram stain, PCR, and/or culture results may not always    correspond due to difference in methodologies.    ************************************************************    This PCR assay was performed using [employer].    The following targets are tested for: Enterococcus,    vancomycin resistant enterococci, Listeria monocytogenes,    coagulase negative staphylococci, S. aureus,    methicillin resistant S. aureus, Streptococcus agalactiae    (Group B), S. pneumoniae, S. pyogenes (Group A),    Acinetobacter baumannii, Enterobacter cloacae, E. coli,    Klebsiella oxytoca, K. pneumoniae, Proteus sp.,    Serratia marcescens, Haemophilus influenzae,    Neisseria meningitidis, Pseudomonas aeruginosa, Candida    albicans, C. glabrata, C krusei, C parapsilosis,    C. tropicalis and the KPC resistance gene.    Gram Stain and BCID performed by:    [facility]    [address] [place] [id number]    .    TYPE: (C=Critical, N=Notification, A=Abnormal) C    TESTS:  _ GS    DATE/TIME CALLED: _ 12/31/2020 09:26:34    CALLED TO: Davis Hanson    READ BACK (2 Patient Identifiers)(Y/N): _ Y    READ BACK VALUES (Y/N): _ Y    CALLED BY: Davis Rodriguez  Final Report (01-02-21 @ 15:14):    Growth in anaerobic bottle: Klebsiella pneumoniae    Growth in aerobic bottle: Bacillus species not anthracis    "Susceptibilities not performed"  Organism: Blood Culture PCR  Klebsiella pneumoniae (01-02-21 @ 15:14)  Organism: Klebsiella pneumoniae (01-02-21 @ 15:14)    Sensitivities:      -  Amikacin: S <=16      -  Ampicillin: R 16 These ampicillin results predict results for amoxicillin      -  Ampicillin/Sulbactam: S <=4/2 Enterobacter, Citrobacter, and Serratia may develop resistance during prolonged therapy (3-4 days)      -  Aztreonam: S <=4      -  Cefazolin: S <=2 Enterobacter, Citrobacter, and Serratia may develop resistance during prolonged therapy (3-4 days)      -  Cefepime: S <=2      -  Cefoxitin: S <=8      -  Ceftriaxone: S <=1 Enterobacter, Citrobacter, and Serratia may develop resistance during prolonged therapy      -  Ciprofloxacin: S <=0.25      -  Ertapenem: S <=0.5      -  Gentamicin: S <=2      -  Imipenem: S <=1      -  Levofloxacin: S <=0.5      -  Meropenem: S <=1      -  Piperacillin/Tazobactam: S <=8      -  Tobramycin: S <=2      -  Trimethoprim/Sulfamethoxazole: S <=0.5/9.5      Method Type: AWA  Organism: Blood Culture PCR (01-02-21 @ 15:14)    Sensitivities:      -  Klebsiella pneumoniae: Detec      Method Type: PCR      WBC Count: 4.70 K/uL (01-05-21 @ 07:50)  WBC Count: 4.79 K/uL (01-04-21 @ 14:40)  WBC Count: 5.90 K/uL (01-01-21 @ 08:32)    Creatinine, Serum: 1.47 mg/dL (01-05-21 @ 07:50)  Creatinine, Serum: 1.62 mg/dL (01-04-21 @ 14:40)  Creatinine, Serum: 1.68 mg/dL (01-03-21 @ 08:30)  Creatinine, Serum: 1.79 mg/dL (01-02-21 @ 17:23)  Creatinine, Serum: 1.69 mg/dL (01-02-21 @ 02:32)  Creatinine, Serum: 1.70 mg/dL (01-02-21 @ 02:32)  Creatinine, Serum: 1.73 mg/dL (01-01-21 @ 08:33)      Ferritin, Serum: 753 ng/mL (12-30-20 @ 01:51)           COVID-19 IgG Antibody Interpretation: Negative (12-30-20 @ 06:49)  COVID-19 IgG Antibody Index: 0.06 Index (12-30-20 @ 06:49)  COVID-19 PCR: Detected (12-29-20 @ 22:59)

## 2021-01-05 NOTE — PROGRESS NOTE ADULT - SUBJECTIVE AND OBJECTIVE BOX
Chief Complaint: Hypernatremia, Central DI,    24 hour events/subjective:  no acute events,    PAST HISTORY  --------------------------------------------------------------------------------  No significant changes to PMH, PSH, FHx, SHx, unless otherwise noted    ALLERGIES & MEDICATIONS  --------------------------------------------------------------------------------  Allergies    No Known Allergies    Intolerances    Standing Inpatient Medications  cefTRIAXone   IVPB 2000 milliGRAM(s) IV Intermittent every 24 hours  chlorhexidine 4% Liquid 1 Application(s) Topical <User Schedule>  desmopressin 0.1 milliGRAM(s) Oral two times a day  enoxaparin Injectable 40 milliGRAM(s) SubCutaneous daily  folic acid 1 milliGRAM(s) Oral daily  iron sucrose IVPB 100 milliGRAM(s) IV Intermittent every 24 hours  levETIRAcetam 500 milliGRAM(s) Oral two times a day  multivitamin 1 Tablet(s) Oral daily  pantoprazole    Tablet 40 milliGRAM(s) Oral before breakfast  sodium bicarbonate 1300 milliGRAM(s) Oral two times a day  sodium chloride 2% . 1000 milliLiter(s) IV Continuous <Continuous>  vancomycin  IVPB 1000 milliGRAM(s) IV Intermittent every 24 hours    PRN Inpatient Medications  acetaminophen   Tablet .. 650 milliGRAM(s) Oral every 6 hours PRN  sodium chloride 0.9% lock flush 10 milliLiter(s) IV Push every 1 hour PRN    REVIEW OF SYSTEMS  --------------------------------------------------------------------------------  Gen: No weight changes, fatigue, fevers/chills, weakness  Skin: No rashes  Head/Eyes/Ears/Mouth: No headache; Normal hearing; Normal vision w/o blurriness; No sinus pain/discomfort, sore throat  Respiratory: No dyspnea, cough, wheezing, hemoptysis  CV: No chest pain, PND, orthopnea  GI: No abdominal pain, diarrhea, constipation, nausea, vomiting, melena, hematochezia  : No increased frequency, dysuria, hematuria, nocturia  MSK: No joint pain/swelling; no back pain; no edema  Neuro: No dizziness/lightheadedness, weakness, seizures, numbness, tingling  Heme: No easy bruising or bleeding  Endo: No heat/cold intolerance  Psych: No significant nervousness, anxiety, stress, depression    All other systems were reviewed and are negative, except as noted.    VITALS/PHYSICAL EXAM:    ICU Vital Signs Last 24 Hrs,    T(C): 37.3 (2021 08:53), Max: 37.7 (2021 05:50)  T(F): 99.1 (2021 08:53), Max: 99.8 (2021 05:50)  HR: 81 (2021 08:53) (81 - 89)  BP: 142/94 (2021 08:53) (130/78 - 142/94)  RR: 19 (2021 08:53) (18 - 19)  SpO2: 92% (2021 08:53) (92% - 93%)  --------------------------------------------------------------------------------  T(C): 37.3 (21 @ 08:53), Max: 37.7 (21 @ 05:50)  HR: 81 (21 @ 08:53) (81 - 89)  BP: 142/94 (21 @ 08:53) (130/78 - 142/94)  RR: 19 (21 @ 08:53) (18 - 19)  SpO2: 92% (21 @ 08:53) (92% - 93%)    Physical Exam:  	Gen: NAD, well-appearing  	HEENT: PERRL, supple neck, clear oropharynx  	Pulm: CTA B/L  	CV: RRR, S1S2; no rub  	Back: No spinal or CVA tenderness; no sacral edema  	Abd: +BS, soft, nontender/nondistended  	: No suprapubic tenderness  	UE: Warm, FROM, no clubbing, intact strength; no edema; no asterixis  	LE: Warm, FROM, no clubbing, intact strength; no edema  	Neuro: No focal deficits  	Psych: Normal affect and mood  	Skin: Warm  LABS/STUDIES :    146<H>  |  113<H>  |  15.0   ----------------------------<  79     Ca:8.1<L> (2021 07:50)  3.6     |  23.0   |  1.47<H>    eGFR if Non : 39 <L>  eGFR if : 45 <L>                       7.2<L>  4.70  )-----------( 227      ( 2021 07:50 )             24.2<L>    Phos:2.4 mg/dL  ( @ 02:32)    Urinalysis Basic - ( 2021 14:38 )  Color: Yellow / Appearance: Clear / S.020 / pH: x  Gluc: x / Ketone: Negative  / Bili: Negative / Urobili: Negative mg/dL   Blood: x / Protein: 30 mg/dL<!> / Nitrite: Negative   Leuk Esterase: Negative / RBC: 0-2 /HPF / WBC Negative   Sq Epi: x / Non Sq Epi: Occasional / Bacteria: Few<!>    Vy:116 mmol/L UOsm:585 mosm/kg UK:19 mmol/L ( @ 14:38)  -------------------------------------------------------------------------------              7.2    4.70  >-----------<  227      [21 07:50]              24.2     146  |  113  |  15.0  ----------------------------<  79      [21 07:50]  3.6   |  23.0  |  1.47        Ca     8.1     [21 07:50]    Creatinine Trend:  SCr 1.47 [ 07:50]  SCr 1.62 [ 14:40]  SCr 1.68 [ 08:30]  SCr 1.79 [ 17:23]  SCr 1.69 [ 02:32]    Urinalysis - [21 @ 14:38]      Color Yellow / Appearance Clear / SG 1.020 / pH 5.0      Gluc Negative / Ketone Negative  / Bili Negative / Urobili Negative       Blood Trace / Protein 30 / Leuk Est Negative / Nitrite Negative      RBC 0-2 / WBC Negative / Hyaline  / Gran  / Sq Epi  / Non Sq Epi Occasional / Bacteria Few    Urine Creatinine 130      [20 @ 01:08]  Urine Sodium 116      [21 @ 14:38]  Urine Potassium 19      [21 @ 14:38]  Urine Chloride 118      [21 @ 17:09]  Urine Osmolality 585      [21 @ 14:38]    Iron 14, TIBC 180, %sat 8      [20 @ 01:51]  Ferritin 753      [20 @ 01:51]    Central DI- on DDAVP  SNa+ stable, @ 145 Meq.,   Continue DDAVP at current dose  nephrology follow up outpt  TY,

## 2021-01-05 NOTE — PROGRESS NOTE ADULT - SUBJECTIVE AND OBJECTIVE BOX
CC: Hypernatremia    INTERVAL HPI/OVERNIGHT EVENTS:  Patient seen and examined at bedside.  No overnight events reported by staff.  Patient is without complaints.  Patient denies chills, dizziness, headache, CP, SOB, abdominal pain, N/V.    PHYSICAL EXAM:  Vital Signs Last 24 Hrs  T(C): 36.9 (05 Jan 2021 17:36), Max: 37.7 (05 Jan 2021 05:50)  T(F): 98.5 (05 Jan 2021 17:36), Max: 99.8 (05 Jan 2021 05:50)  HR: 89 (05 Jan 2021 17:36) (81 - 89)  BP: 151/82 (05 Jan 2021 17:36) (130/78 - 151/82)  BP(mean): --  RR: 19 (05 Jan 2021 17:36) (18 - 19)  SpO2: 95% (05 Jan 2021 17:36) (92% - 95%)    GENERAL: NAD, lying comfortably  HEAD: Atraumatic, Normocephalic  EYES: EOMI, PERRLA, conjunctiva and sclera clear  ENMT: Moist mucous membranes  NECK: Supple, No JVD  NERVOUS SYSTEM: A&OX3, Good concentration  CHEST/LUNG: Clear to auscultation b/l; Unlabored respirations  HEART: S1S2+, Regular rate and rhythm  ABDOMEN: Soft, Nontender, Nondistended; BS+  EXTREMITIES:  2+ Peripheral Pulses, No LE edema  SKIN: Warm and dry    LABS:                        7.2    4.70  )-----------( 227      ( 05 Jan 2021 07:50 )             24.2     01-05    146<H>  |  113<H>  |  15.0  ----------------------------<  79  3.6   |  23.0  |  1.47<H>    Ca    8.1<L>      05 Jan 2021 07:50      MEDICATIONS  (STANDING):  cefTRIAXone   IVPB 2000 milliGRAM(s) IV Intermittent every 24 hours  chlorhexidine 4% Liquid 1 Application(s) Topical <User Schedule>  desmopressin 0.1 milliGRAM(s) Oral two times a day  enoxaparin Injectable 40 milliGRAM(s) SubCutaneous daily  folic acid 1 milliGRAM(s) Oral daily  iron sucrose IVPB 100 milliGRAM(s) IV Intermittent every 24 hours  levETIRAcetam 500 milliGRAM(s) Oral two times a day  multivitamin 1 Tablet(s) Oral daily  pantoprazole    Tablet 40 milliGRAM(s) Oral before breakfast  sodium bicarbonate 1300 milliGRAM(s) Oral two times a day  vancomycin  IVPB 1000 milliGRAM(s) IV Intermittent every 24 hours    MEDICATIONS  (PRN):  acetaminophen   Tablet .. 650 milliGRAM(s) Oral every 6 hours PRN Temp greater or equal to 38.5C (101.3F)  sodium chloride 0.9% lock flush 10 milliLiter(s) IV Push every 1 hour PRN Pre/post blood products, medications, blood draw, and to maintain line patency

## 2021-01-05 NOTE — PROGRESS NOTE ADULT - ASSESSMENT
56 y/o female with h/o SAH, diabetes insipidus, chronic hypernatremia was BIBA for as pt. felt a little dizzy after coming out of bathroom, no syncope, no fall, no trauma, no focal weakness, no palpitations, no cp, no sob. pt. reports appetite has been ok, no abd. pain, no n/v/d. no fever. appetite is good. no sick contact. As per pt. she has a PICC line and every other day gets D5W through that for h/o hypernatremia. pt. bp 92/60 , hr 87 upon arrival. As per pt. she runs bp on the low side.     Leukocytosis   Fever  TOSHIA  Hypernatremia    - now afebrile  - COVID PCR + but asymptomatic and not requiring supplemental O2, COVID IgG (-), repeat COVID PCr today  - UA (-) and CXr (-)  - BCX klebsiella and Bacillus sp  - PICC tip CX ngtd  - repeat BCX 1/1 are now + GPR  - repeat BCX 1/5  - c/w ceftriaxone 2 g IV daily  - c/w vancomycin last trough 14.3  - TTE  - Trend Fever  - Trend Leukocytosis    d/w attending  Will Follow

## 2021-01-05 NOTE — PROGRESS NOTE ADULT - ASSESSMENT
56 y/o female with h/o SAH, diabetes insipidus, chronic hypernatremia was BIBA as pt. felt a little dizzy after coming out of bathroom, no syncope/fall/trauma, no focal weakness, no palpitations, no cp, no sob. Patient came in with a PICC line and reports that every other day gets D5W through that for h/o hypernatremia. Pt admitted for hypernatremia, was started on IVF and desmopressin. Patient also found to be COVID+ with positive blood cultures, PICC line was removed, and patient was started on vanco/zosyn. Repeat blood cultures positive for gram positive rods in anaerobic bottle.     1. Bacteremia  - BC (+) Klebsiella pneumoniae and Bacillus species (not anthracis)  - Repeat cultures 1/1 +GPR in anaerobic bottle  - Repeat cultures sent again 1/5  - PICC removed, no growth from tip  - c/w Vanco and Rocephin  - Remains afebrile  - ID following, recs appreciated    2. COVID positive with infiltrates  - Maintaining oxygenation on RA  - Continue to monitor off specific treatment    3. Hypernatremia, chronic 2/2 CDI  - Goal Na ~145  - Na remains stable, today 146  - c/w Desmopressin 0.1 mg PO BID  - Continue to encourage PO fluid intake  - Nephrology and Endocrine following    4. Acute kidney injury   - Slowly improving  - Nephrology following    5. Near syncope, vasovagal  - Likely related to COVID infection  - Currently with improvement    6. Hypotension @ baseline  - BP's remain stable    7. Normocytic anemia likely chronic   - No signs of active bleeding  - Monitor H/H    #DISPO - Pending negative blood cultures.

## 2021-01-06 LAB
CULTURE RESULTS: SIGNIFICANT CHANGE UP
CULTURE RESULTS: SIGNIFICANT CHANGE UP
SARS-COV-2 RNA SPEC QL NAA+PROBE: DETECTED
SPECIMEN SOURCE: SIGNIFICANT CHANGE UP
SPECIMEN SOURCE: SIGNIFICANT CHANGE UP
VANCOMYCIN TROUGH SERPL-MCNC: 16.6 UG/ML — SIGNIFICANT CHANGE UP (ref 10–20)

## 2021-01-06 PROCEDURE — 93306 TTE W/DOPPLER COMPLETE: CPT | Mod: 26

## 2021-01-06 PROCEDURE — 99232 SBSQ HOSP IP/OBS MODERATE 35: CPT

## 2021-01-06 RX ADMIN — DESMOPRESSIN ACETATE 0.1 MILLIGRAM(S): 0.1 TABLET ORAL at 05:24

## 2021-01-06 RX ADMIN — Medication 1 MILLIGRAM(S): at 12:05

## 2021-01-06 RX ADMIN — ENOXAPARIN SODIUM 40 MILLIGRAM(S): 100 INJECTION SUBCUTANEOUS at 12:05

## 2021-01-06 RX ADMIN — Medication 1300 MILLIGRAM(S): at 05:23

## 2021-01-06 RX ADMIN — Medication 1300 MILLIGRAM(S): at 18:42

## 2021-01-06 RX ADMIN — CEFTRIAXONE 100 MILLIGRAM(S): 500 INJECTION, POWDER, FOR SOLUTION INTRAMUSCULAR; INTRAVENOUS at 16:33

## 2021-01-06 RX ADMIN — Medication 1 TABLET(S): at 12:05

## 2021-01-06 RX ADMIN — CHLORHEXIDINE GLUCONATE 1 APPLICATION(S): 213 SOLUTION TOPICAL at 05:23

## 2021-01-06 RX ADMIN — Medication 250 MILLIGRAM(S): at 03:18

## 2021-01-06 RX ADMIN — LEVETIRACETAM 500 MILLIGRAM(S): 250 TABLET, FILM COATED ORAL at 18:42

## 2021-01-06 RX ADMIN — IRON SUCROSE 210 MILLIGRAM(S): 20 INJECTION, SOLUTION INTRAVENOUS at 12:04

## 2021-01-06 RX ADMIN — LEVETIRACETAM 500 MILLIGRAM(S): 250 TABLET, FILM COATED ORAL at 05:24

## 2021-01-06 RX ADMIN — DESMOPRESSIN ACETATE 0.1 MILLIGRAM(S): 0.1 TABLET ORAL at 18:42

## 2021-01-06 RX ADMIN — PANTOPRAZOLE SODIUM 40 MILLIGRAM(S): 20 TABLET, DELAYED RELEASE ORAL at 05:23

## 2021-01-06 NOTE — PROGRESS NOTE ADULT - SUBJECTIVE AND OBJECTIVE BOX
[facility]  INFECTIOUS DISEASES AND INTERNAL MEDICINE at [place]  =======================================================  Mj Lee MD  Diplomates American Board of Internal Medicine and Infectious Diseases  Tel: [phone]      Fax: [phone]  =======================================================    [place] [id number]    Follow up: covid 19, bacteremia  no complaints apart from feeling hungry      Allergies:  No Known Allergies           REVIEW OF SYSTEMS:  CONSTITUTIONAL:  No Fever or chills  HEENT:   No diplopia or blurred vision.  No earache, sore throat or runny nose.  CARDIOVASCULAR:  No pressure, squeezing, strangling, tightness, heaviness or aching about the chest, neck, axilla or epigastrium.  RESPIRATORY:  No cough, shortness of breath  GASTROINTESTINAL:  No nausea, vomiting or diarrhea.  GENITOURINARY:  No dysuria, frequency or urgency. No Blood in urine  MUSCULOSKELETAL:  no joint aches, no muscle pain  SKIN:  No change in skin, hair or nails.  NEUROLOGIC:  No Headaches, seizures or weakness.  PSYCHIATRIC:  No disorder of thought or mood.  ENDOCRINE:  No heat or cold intolerance  HEMATOLOGICAL:  No easy bruising or bleeding.       Physical Exam:  GEN: NAD, pleasant  HEENT: normocephalic and atraumatic. EOMI. PERRL.  Anicteric   NECK: Supple. right IJ  LUNGS: Clear to auscultation.  HEART: Regular rate and rhythm without murmur.  ABDOMEN: Soft, nontender, and nondistended.  Positive bowel sounds.    : No CVA tenderness  EXTREMITIES: Without any edema.  MSK: no joint swelling  NEUROLOGIC: Cranial nerves II through XII are grossly intact. No focal deficits  PSYCHIATRIC: Appropriate affect .  SKIN: No Rash      Vitals:    T(F): 98.7 (06 Jan 2021 09:00), Max: 99.8 (06 Jan 2021 04:35)  HR: 79 (06 Jan 2021 09:00)  BP: 130/92 (06 Jan 2021 09:00)  RR: 18 (06 Jan 2021 09:00)  SpO2: 93% (06 Jan 2021 09:00) (93% - 96%)  temp max in last 48H T(F): , Max: 99.8 (01-05-21 @ 05:50)      Current Antibiotics:  cefTRIAXone   IVPB 2000 milliGRAM(s) IV Intermittent every 24 hours  vancomycin  IVPB 1000 milliGRAM(s) IV Intermittent every 24 hours    Other medications:  chlorhexidine 4% Liquid 1 Application(s) Topical <User Schedule>  desmopressin 0.1 milliGRAM(s) Oral two times a day  enoxaparin Injectable 40 milliGRAM(s) SubCutaneous daily  folic acid 1 milliGRAM(s) Oral daily  iron sucrose IVPB 100 milliGRAM(s) IV Intermittent every 24 hours  levETIRAcetam 500 milliGRAM(s) Oral two times a day  multivitamin 1 Tablet(s) Oral daily  pantoprazole    Tablet 40 milliGRAM(s) Oral before breakfast  sodium bicarbonate 1300 milliGRAM(s) Oral two times a day        Labs:                        7.2    4.70  )-----------( 227      ( 05 Jan 2021 07:50 )             24.2      01-05    146<H>  |  113<H>  |  15.0  ----------------------------<  79  3.6   |  23.0  |  1.47<H>    Ca    8.1<L>      05 Jan 2021 07:50        Culture - Blood (collected 01-01-21 @ 12:54)  Source: .Blood Blood  Gram Stain (01-05-21 @ 09:30):    Growth in anaerobic bottle: Gram Positive Rods    Gram Stain performed by:    [facility]    [address] [place] [id number]    ,    TYPE: (C=Critical, N=Notification, A=Abnormal) c    TESTS:  _ gs    DATE/TIME CALLED: _ 01/05/2021 09:30:08    CALLED TO: _ gale laird rn    READ BACK (2 Patient Identifiers)(Y/N): _ y    READ BACK VALUES (Y/N): _ y    CALLED BY: Davis sinclair  Final Report (01-06-21 @ 13:16):    Growth in anaerobic bottle: Bacillus species not anthracis    "Susceptibilities not performed"    Culture - Blood (collected 01-01-21 @ 12:54)  Source: .Blood Blood  Final Report (01-06-21 @ 13:00):    No growth at 5 days.    Culture - Catheter (collected 12-31-20 @ 22:24)  Source: .Catheter PICC Tip  Final Report (01-02-21 @ 16:11):    No growth    Culture - Blood (collected 12-30-20 @ 01:52)  Source: .Blood Blood-Peripheral  Gram Stain (12-31-20 @ 14:47):    Growth in anaerobic bottle: Gram Positive Rods    Gram Stain performed by:    [facility]    [address] [place] [id number]    .    TYPE: (C=Critical, N=Notification, A=Abnormal) C    TESTS:  _ GS    DATE/TIME CALLED: _ 12/31/2020 14:45:54    CALLED TO: Davis Tavarez RN    READ BACK (2 Patient Identifiers)(Y/N): _ Y    READ BACK VALUES (Y/N): _ Y    CALLED BY: Davis Rodriguez  Final Report (01-01-21 @ 17:55):    Growth in anaerobic bottle: Bacillus species not anthracis    "Susceptibilities not performed"    Culture - Blood (collected 12-30-20 @ 01:52)  Source: .Blood Blood-Peripheral  Gram Stain (12-31-20 @ 09:27):    Growth in aerobic bottle: Gram Positive Rods    Growth in anaerobic bottle: Gram Negative Rods    ***Blood Panel PCR results on this specimen are available    approximately 3 hours after the Gram stain result.***    Gram stain, PCR, and/or culture results may not always    correspond due to difference in methodologies.    ************************************************************    This PCR assay was performed using [employer].    The following targets are tested for: Enterococcus,    vancomycin resistant enterococci, Listeria monocytogenes,    coagulase negative staphylococci, S. aureus,    methicillin resistant S. aureus, Streptococcus agalactiae    (Group B), S. pneumoniae, S. pyogenes (Group A),    Acinetobacter baumannii, Enterobacter cloacae, E. coli,    Klebsiella oxytoca, K. pneumoniae, Proteus sp.,    Serratia marcescens, Haemophilus influenzae,    Neisseria meningitidis, Pseudomonas aeruginosa, Candida    albicans, C. glabrata, C krusei, C parapsilosis,    C. tropicalis and the KPC resistance gene.    Gram Stain and BCID performed by:    [facility]    [address] [place] [id number]    .    TYPE: (C=Critical, N=Notification, A=Abnormal) C    TESTS:  _ GS    DATE/TIME CALLED: _ 12/31/2020 09:26:34    CALLED TO: Davis Hanson    READ BACK (2 Patient Identifiers)(Y/N): _ Y    READ BACK VALUES (Y/N): _ Y    CALLED BY: Davis Rodriguez  Final Report (01-02-21 @ 15:14):    Growth in anaerobic bottle: Klebsiella pneumoniae    Growth in aerobic bottle: Bacillus species not anthracis    "Susceptibilities not performed"  Organism: Blood Culture PCR  Klebsiella pneumoniae (01-02-21 @ 15:14)  Organism: Klebsiella pneumoniae (01-02-21 @ 15:14)    Sensitivities:      -  Amikacin: S <=16      -  Ampicillin: R 16 These ampicillin results predict results for amoxicillin      -  Ampicillin/Sulbactam: S <=4/2 Enterobacter, Citrobacter, and Serratia may develop resistance during prolonged therapy (3-4 days)      -  Aztreonam: S <=4      -  Cefazolin: S <=2 Enterobacter, Citrobacter, and Serratia may develop resistance during prolonged therapy (3-4 days)      -  Cefepime: S <=2      -  Cefoxitin: S <=8      -  Ceftriaxone: S <=1 Enterobacter, Citrobacter, and Serratia may develop resistance during prolonged therapy      -  Ciprofloxacin: S <=0.25      -  Ertapenem: S <=0.5      -  Gentamicin: S <=2      -  Imipenem: S <=1      -  Levofloxacin: S <=0.5      -  Meropenem: S <=1      -  Piperacillin/Tazobactam: S <=8      -  Tobramycin: S <=2      -  Trimethoprim/Sulfamethoxazole: S <=0.5/9.5      Method Type: AWA  Organism: Blood Culture PCR (01-02-21 @ 15:14)    Sensitivities:      -  Klebsiella pneumoniae: Detec      Method Type: PCR      WBC Count: 4.70 K/uL (01-05-21 @ 07:50)  WBC Count: 4.79 K/uL (01-04-21 @ 14:40)    Creatinine, Serum: 1.47 mg/dL (01-05-21 @ 07:50)  Creatinine, Serum: 1.62 mg/dL (01-04-21 @ 14:40)  Creatinine, Serum: 1.68 mg/dL (01-03-21 @ 08:30)  Creatinine, Serum: 1.79 mg/dL (01-02-21 @ 17:23)  Creatinine, Serum: 1.69 mg/dL (01-02-21 @ 02:32)  Creatinine, Serum: 1.70 mg/dL (01-02-21 @ 02:32)      Ferritin, Serum: 753 ng/mL (12-30-20 @ 01:51)           COVID-19 PCR: Detected (01-06-21 @ 08:23)  COVID-19 IgG Antibody Interpretation: Negative (12-30-20 @ 06:49)  COVID-19 IgG Antibody Index: 0.06 Index (12-30-20 @ 06:49)  COVID-19 PCR: Detected (12-29-20 @ 22:59)

## 2021-01-06 NOTE — PROGRESS NOTE ADULT - ASSESSMENT
54 y/o female with h/o SAH, diabetes insipidus, chronic hypernatremia was BIBA for as pt. felt a little dizzy after coming out of bathroom, no syncope, no fall, no trauma, no focal weakness, no palpitations, no cp, no sob. pt. reports appetite has been ok, no abd. pain, no n/v/d. no fever. appetite is good. no sick contact. As per pt. she has a PICC line and every other day gets D5W through that for h/o hypernatremia. pt. bp 92/60 , hr 87 upon arrival. As per pt. she runs bp on the low side.     Leukocytosis   Fever  TOSHIA  Hypernatremia    - now afebrile  - COVID PCR + but asymptomatic and not requiring supplemental O2, COVID IgG (-), repeat COVID PCr +  - UA (-) and CXr (-)  - BCX klebsiella and Bacillus sp  - PICC tip CX ngtd  - repeat BCX 1/1 are now + GPR  - repeat BCX 1/5  - c/w ceftriaxone 2 g IV daily  - c/w vancomycin last trough 16.6  - TTE pending  - Trend Fever  - Trend Leukocytosis      Will Follow

## 2021-01-07 LAB
ANION GAP SERPL CALC-SCNC: 10 MMOL/L — SIGNIFICANT CHANGE UP (ref 5–17)
BLD GP AB SCN SERPL QL: SIGNIFICANT CHANGE UP
BUN SERPL-MCNC: 14 MG/DL — SIGNIFICANT CHANGE UP (ref 8–20)
CALCIUM SERPL-MCNC: 8.2 MG/DL — LOW (ref 8.6–10.2)
CHLORIDE SERPL-SCNC: 112 MMOL/L — HIGH (ref 98–107)
CO2 SERPL-SCNC: 27 MMOL/L — SIGNIFICANT CHANGE UP (ref 22–29)
CREAT SERPL-MCNC: 1.27 MG/DL — SIGNIFICANT CHANGE UP (ref 0.5–1.3)
GLUCOSE SERPL-MCNC: 76 MG/DL — SIGNIFICANT CHANGE UP (ref 70–99)
HCT VFR BLD CALC: 23.5 % — LOW (ref 34.5–45)
HGB BLD-MCNC: 6.8 G/DL — CRITICAL LOW (ref 11.5–15.5)
MCHC RBC-ENTMCNC: 25.1 PG — LOW (ref 27–34)
MCHC RBC-ENTMCNC: 28.9 GM/DL — LOW (ref 32–36)
MCV RBC AUTO: 86.7 FL — SIGNIFICANT CHANGE UP (ref 80–100)
OXALATE RANDOM URINE CREATININE: 160 MG/DL — SIGNIFICANT CHANGE UP (ref 20–275)
OXALATE RANDOM URINE: 25 MG/G CREAT — SIGNIFICANT CHANGE UP (ref 3–40)
PLATELET # BLD AUTO: 306 K/UL — SIGNIFICANT CHANGE UP (ref 150–400)
POTASSIUM SERPL-MCNC: 3.9 MMOL/L — SIGNIFICANT CHANGE UP (ref 3.5–5.3)
POTASSIUM SERPL-SCNC: 3.9 MMOL/L — SIGNIFICANT CHANGE UP (ref 3.5–5.3)
RBC # BLD: 2.71 M/UL — LOW (ref 3.8–5.2)
RBC # FLD: 14 % — SIGNIFICANT CHANGE UP (ref 10.3–14.5)
SODIUM SERPL-SCNC: 149 MMOL/L — HIGH (ref 135–145)
WBC # BLD: 5.37 K/UL — SIGNIFICANT CHANGE UP (ref 3.8–10.5)
WBC # FLD AUTO: 5.37 K/UL — SIGNIFICANT CHANGE UP (ref 3.8–10.5)

## 2021-01-07 PROCEDURE — 99232 SBSQ HOSP IP/OBS MODERATE 35: CPT

## 2021-01-07 RX ADMIN — Medication 1 TABLET(S): at 11:05

## 2021-01-07 RX ADMIN — CEFTRIAXONE 100 MILLIGRAM(S): 500 INJECTION, POWDER, FOR SOLUTION INTRAMUSCULAR; INTRAVENOUS at 21:35

## 2021-01-07 RX ADMIN — Medication 1 MILLIGRAM(S): at 11:05

## 2021-01-07 RX ADMIN — Medication 1300 MILLIGRAM(S): at 05:06

## 2021-01-07 RX ADMIN — ENOXAPARIN SODIUM 40 MILLIGRAM(S): 100 INJECTION SUBCUTANEOUS at 11:05

## 2021-01-07 RX ADMIN — PANTOPRAZOLE SODIUM 40 MILLIGRAM(S): 20 TABLET, DELAYED RELEASE ORAL at 05:06

## 2021-01-07 RX ADMIN — LEVETIRACETAM 500 MILLIGRAM(S): 250 TABLET, FILM COATED ORAL at 05:06

## 2021-01-07 RX ADMIN — CHLORHEXIDINE GLUCONATE 1 APPLICATION(S): 213 SOLUTION TOPICAL at 05:07

## 2021-01-07 RX ADMIN — DESMOPRESSIN ACETATE 0.1 MILLIGRAM(S): 0.1 TABLET ORAL at 05:06

## 2021-01-07 RX ADMIN — IRON SUCROSE 210 MILLIGRAM(S): 20 INJECTION, SOLUTION INTRAVENOUS at 11:05

## 2021-01-07 RX ADMIN — Medication 250 MILLIGRAM(S): at 03:01

## 2021-01-07 RX ADMIN — DESMOPRESSIN ACETATE 0.1 MILLIGRAM(S): 0.1 TABLET ORAL at 16:35

## 2021-01-07 RX ADMIN — Medication 1300 MILLIGRAM(S): at 16:35

## 2021-01-07 RX ADMIN — LEVETIRACETAM 500 MILLIGRAM(S): 250 TABLET, FILM COATED ORAL at 16:35

## 2021-01-07 NOTE — PROGRESS NOTE ADULT - ASSESSMENT
54 y/o female with h/o SAH, diabetes insipidus, chronic hypernatremia was BIBA as pt. felt a little dizzy after coming out of bathroom, no syncope/fall/trauma, no focal weakness, no palpitations, no cp, no sob. Patient came in with a PICC line and reports that every other day gets D5W through that for h/o hypernatremia. Pt admitted for hypernatremia, was started on IVF and desmopressin. Patient also found to be COVID+ with positive blood cultures, PICC line was removed, and patient was started on vanco/zosyn. Repeat blood cultures positive for gram positive rods in anaerobic bottle.     1. Bacteremia  - BC (+) Klebsiella pneumoniae and Bacillus species (not anthracis)  - Repeat cultures 1/1 +GPR in anaerobic bottle  - Repeat cultures sent again 1/5 and pending results   TTE results reviewed.   - PICC removed, no growth from tip  - c/w Vanco and Rocephin  If BC come positive again, she would need SAADIA      2. COVID positive with infiltrates  - Maintaining oxygenation on RA  - Continue to monitor off specific treatment    3. Hypernatremia, chronic 2/2 CDI  - Na remains stable, today 149  - c/w Desmopressin 0.1 mg PO BID  - Continue to encourage PO fluid intake      4. Acute kidney injury - improving, almost normal   - Slowly improving      5. Near syncope, vasovagal  - Likely related to COVID infection  - Currently with improvement    6. Hypotension @ baseline  - BP's remain stable    7. Normocytic anemia likely chronic: H/H is 6.8/23. GIve on unit of PRBC today   - No signs of active bleeding  - Monitor H/H    #DISPO - Pending negative blood cultures.

## 2021-01-07 NOTE — CHART NOTE - NSCHARTNOTEFT_GEN_A_CORE
S Critical H/H value 6.8/23.5 reported by RN. patient seen and assessed at bedside. Pt c/o " a little feeling tired, otherwise no other complaints. Denies nausea, vomiting, sob, chest pain, dizziness, headache, abdominal pain. Pt asymptomatic.  VS: T99.6, HR87, 150/92, RR18, sPO2 94% RA.     Plan:   Type and Screen stat  -PRBCs 1 unit IV  -Consent obtained via phone# [phone] from daughter, Millie Dickson.   -Educated patient on clinical status re critical H/H levels and treatment options as well as, Risks and benefits of treatment options  Discussed with Attending, Dr Ji.

## 2021-01-07 NOTE — PROGRESS NOTE ADULT - ASSESSMENT
56 y/o female with h/o SAH, diabetes insipidus, chronic hypernatremia was BIBA for as pt. felt a little dizzy after coming out of bathroom, no syncope, no fall, no trauma, no focal weakness, no palpitations, no cp, no sob. pt. reports appetite has been ok, no abd. pain, no n/v/d. no fever. appetite is good. no sick contact. As per pt. she has a PICC line and every other day gets D5W through that for h/o hypernatremia. pt. bp 92/60 , hr 87 upon arrival. As per pt. she runs bp on the low side.     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

## 2021-01-07 NOTE — PROGRESS NOTE ADULT - SUBJECTIVE AND OBJECTIVE BOX
Patient is a 55y old  Female who presents with a chief complaint of hypernatremia (06 Jan 2021 13:22)      INTERVAL HPI/OVERNIGHT EVENTS:  No complains.   Repeat BC sent and pending results.     MEDICATIONS  (STANDING):  cefTRIAXone   IVPB 2000 milliGRAM(s) IV Intermittent every 24 hours  chlorhexidine 4% Liquid 1 Application(s) Topical <User Schedule>  desmopressin 0.1 milliGRAM(s) Oral two times a day  enoxaparin Injectable 40 milliGRAM(s) SubCutaneous daily  folic acid 1 milliGRAM(s) Oral daily  iron sucrose IVPB 100 milliGRAM(s) IV Intermittent every 24 hours  levETIRAcetam 500 milliGRAM(s) Oral two times a day  multivitamin 1 Tablet(s) Oral daily  pantoprazole    Tablet 40 milliGRAM(s) Oral before breakfast  sodium bicarbonate 1300 milliGRAM(s) Oral two times a day  vancomycin  IVPB 1000 milliGRAM(s) IV Intermittent every 24 hours    MEDICATIONS  (PRN):  acetaminophen   Tablet .. 650 milliGRAM(s) Oral every 6 hours PRN Temp greater or equal to 38.5C (101.3F)  sodium chloride 0.9% lock flush 10 milliLiter(s) IV Push every 1 hour PRN Pre/post blood products, medications, blood draw, and to maintain line patency      Allergies    No Known Allergies    Intolerances        REVIEW OF SYSTEMS:  CONSTITUTIONAL: No fever, weight loss, or fatigue  RESPIRATORY: No cough, wheezing, chills or hemoptysis; No shortness of breath  CARDIOVASCULAR: No chest pain, palpitations, dizziness, or leg swelling  GASTROINTESTINAL: No abdominal or epigastric pain. No nausea, vomiting, or hematemesis; No diarrhea or constipation. No melena or hematochezia.  NEUROLOGICAL: No headaches, memory loss, loss of strength, numbness, or tremors  MUSCULOSKELETAL: No joint pain or swelling; No muscle, back, or extremity pain      Vital Signs Last 24 Hrs  T(C): 37.6 (07 Jan 2021 08:52), Max: 37.7 (06 Jan 2021 21:50)  T(F): 99.6 (07 Jan 2021 08:52), Max: 99.9 (06 Jan 2021 21:50)  HR: 87 (07 Jan 2021 08:52) (69 - 87)  BP: 150/92 (07 Jan 2021 08:52) (143/93 - 153/96)  BP(mean): --  RR: 18 (07 Jan 2021 08:52) (18 - 20)  SpO2: 94% (07 Jan 2021 08:52) (93% - 97%)    PHYSICAL EXAM:  GENERAL: NAD, well-groomed, well-developed  HEAD:  Atraumatic, Normocephalic  EYES: EOMI, PERRLA, conjunctiva and sclera clear  NECK: Supple, No JVD, central on the right side   NERVOUS SYSTEM:  Alert & Oriented X3, No gross focal deficits  CHEST/LUNG: Clear to percussion bilaterally; No rales, rhonchi, wheezing, or rubs  HEART: Regular rate and rhythm; No murmurs, rubs, or gallops  ABDOMEN: Soft, Nontender, Nondistended; Bowel sounds present  EXTREMITIES:  No clubbing, cyanosis, or edema  SKIN: No rashes or lesions    LABS:                        6.8    5.37  )-----------( 306      ( 07 Jan 2021 08:19 )             23.5     01-07    149<H>  |  112<H>  |  14.0  ----------------------------<  76  3.9   |  27.0  |  1.27    Ca    8.2<L>      07 Jan 2021 08:19          CAPILLARY BLOOD GLUCOSE          RADIOLOGY & ADDITIONAL TESTS:    Imaging Personally Reviewed:  [ ] YES  [ ] NO    Consultant(s) Notes Reviewed:  [ x] YES  [ ] NO    Care Discussed with Consultants/Other Providers [ ] YES  [ ] NO    Plan of Care discussed with Housestaff [ ]YES [ ] NO

## 2021-01-07 NOTE — CHART NOTE - NSCHARTNOTEFT_GEN_A_CORE
IJ Catheter removed without incident or complication. Tape removed, and sutures x3 removed. Area cleaned with chlorhexidine, compression dressing with 4x4 gauze placed over catheter insertion point and tubing removed, No bleed, no hematoma noted. Compression dressing placed over insertion site, and secured with tape. Catheter appears intact with no sign of breakage.  Pt denies, chest discomfort, sob, headache, weakness, dizziness, palpitation, chest pain. visual changes. Patient asymptomatic, and hemodynamically stable.

## 2021-01-07 NOTE — PROGRESS NOTE ADULT - SUBJECTIVE AND OBJECTIVE BOX
[facility]  INFECTIOUS DISEASES AND INTERNAL MEDICINE at [place]  =======================================================  Mj Lee MD  Diplomates American Board of Internal Medicine and Infectious Diseases  Tel: [phone]      Fax: [phone]  =======================================================    KRUPA MEDINA [id number]    Follow up: bacteremia  feels well  HB low-concerned about PRBC tx      Allergies:  No Known Allergies           REVIEW OF SYSTEMS:  CONSTITUTIONAL:  No Fever or chills  HEENT:   No diplopia or blurred vision.  No earache, sore throat or runny nose.  CARDIOVASCULAR:  No pressure, squeezing, strangling, tightness, heaviness or aching about the chest, neck, axilla or epigastrium.  RESPIRATORY:  No cough, shortness of breath  GASTROINTESTINAL:  No nausea, vomiting or diarrhea.  GENITOURINARY:  No dysuria, frequency or urgency. No Blood in urine  MUSCULOSKELETAL:  no joint aches, no muscle pain  SKIN:  No change in skin, hair or nails.  NEUROLOGIC:  No Headaches, seizures or weakness.  PSYCHIATRIC:  No disorder of thought or mood.  ENDOCRINE:  No heat or cold intolerance  HEMATOLOGICAL:  No easy bruising or bleeding.       Physical Exam:  GEN: NAD, pleasant  HEENT: normocephalic and atraumatic. EOMI. PERRL.  Anicteric   NECK: Supple.   LUNGS: Clear to auscultation.  HEART: Regular rate and rhythm without murmur.  ABDOMEN: Soft, nontender, and nondistended.  Positive bowel sounds.    : No CVA tenderness  EXTREMITIES: Without any edema.  MSK: no joint swelling  NEUROLOGIC: Cranial nerves II through XII are grossly intact. No focal deficits  PSYCHIATRIC: Appropriate affect .  SKIN: No Rash      Vitals:    T(F): 99.1 (07 Jan 2021 16:51), Max: 99.9 (06 Jan 2021 21:50)  HR: 86 (07 Jan 2021 16:51)  BP: 136/94 (07 Jan 2021 16:51)  RR: 18 (07 Jan 2021 16:51)  SpO2: 94% (07 Jan 2021 16:51) (93% - 97%)  temp max in last 48H T(F): , Max: 99.9 (01-06-21 @ 21:50)      Current Antibiotics:  cefTRIAXone   IVPB 2000 milliGRAM(s) IV Intermittent every 24 hours  vancomycin  IVPB 1000 milliGRAM(s) IV Intermittent every 24 hours    Other medications:  chlorhexidine 4% Liquid 1 Application(s) Topical <User Schedule>  desmopressin 0.1 milliGRAM(s) Oral two times a day  enoxaparin Injectable 40 milliGRAM(s) SubCutaneous daily  folic acid 1 milliGRAM(s) Oral daily  iron sucrose IVPB 100 milliGRAM(s) IV Intermittent every 24 hours  levETIRAcetam 500 milliGRAM(s) Oral two times a day  multivitamin 1 Tablet(s) Oral daily  pantoprazole    Tablet 40 milliGRAM(s) Oral before breakfast  sodium bicarbonate 1300 milliGRAM(s) Oral two times a day        Labs:                        6.8    5.37  )-----------( 306      ( 07 Jan 2021 08:19 )             23.5      01-07    149<H>  |  112<H>  |  14.0  ----------------------------<  76  3.9   |  27.0  |  1.27    Ca    8.2<L>      07 Jan 2021 08:19        Culture - Blood (collected 01-01-21 @ 12:54)  Source: .Blood Blood  Gram Stain (01-05-21 @ 09:30):    Growth in anaerobic bottle: Gram Positive Rods    Gram Stain performed by:    [facility]    [address] [place] [id number]    ,    TYPE: (C=Critical, N=Notification, A=Abnormal) c    TESTS:  _ gs    DATE/TIME CALLED: _ 01/05/2021 09:30:08    CALLED TO: _ gale laird rn    READ BACK (2 Patient Identifiers)(Y/N): _ y    READ BACK VALUES (Y/N): _ y    CALLED BY: Davis sinclair  Final Report (01-06-21 @ 13:16):    Growth in anaerobic bottle: Bacillus species not anthracis    "Susceptibilities not performed"    Culture - Blood (collected 01-01-21 @ 12:54)  Source: .Blood Blood  Final Report (01-06-21 @ 13:00):    No growth at 5 days.    Culture - Catheter (collected 12-31-20 @ 22:24)  Source: .Catheter PICC Tip  Final Report (01-02-21 @ 16:11):    No growth    Culture - Blood (collected 12-30-20 @ 01:52)  Source: .Blood Blood-Peripheral  Gram Stain (12-31-20 @ 14:47):    Growth in anaerobic bottle: Gram Positive Rods    Gram Stain performed by:    [facility]    [address] [place] [address]    .    TYPE: (C=Critical, N=Notification, A=Abnormal) C    TESTS:  _ GS    DATE/TIME CALLED: _ 12/31/2020 14:45:54    CALLED TO: Davis Tavarez RN    READ BACK (2 Patient Identifiers)(Y/N): _ Y    READ BACK VALUES (Y/N): _ Y    CALLED BY: Davis Rodriguez  Final Report (01-01-21 @ 17:55):    Growth in anaerobic bottle: Bacillus species not anthracis    "Susceptibilities not performed"    Culture - Blood (collected 12-30-20 @ 01:52)  Source: .Blood Blood-Peripheral  Gram Stain (12-31-20 @ 09:27):    Growth in aerobic bottle: Gram Positive Rods    Growth in anaerobic bottle: Gram Negative Rods    ***Blood Panel PCR results on this specimen are available    approximately 3 hours after the Gram stain result.***    Gram stain, PCR, and/or culture results may not always    correspond due to difference in methodologies.    ************************************************************    This PCR assay was performed using [employer].    The following targets are tested for: Enterococcus,    vancomycin resistant enterococci, Listeria monocytogenes,    coagulase negative staphylococci, S. aureus,    methicillin resistant S. aureus, Streptococcus agalactiae    (Group B), S. pneumoniae, S. pyogenes (Group A),    Acinetobacter baumannii, Enterobacter cloacae, E. coli,    Klebsiella oxytoca, K. pneumoniae, Proteus sp.,    Serratia marcescens, Haemophilus influenzae,    Neisseria meningitidis, Pseudomonas aeruginosa, Candida    albicans, C. glabrata, C krusei, C parapsilosis,    C. tropicalis and the KPC resistance gene.    Gram Stain and BCID performed by:    [facility]    [address] [place] [id number]    .    TYPE: (C=Critical, N=Notification, A=Abnormal) C    TESTS:  _ GS    DATE/TIME CALLED: _ 12/31/2020 09:26:34    CALLED TO: Davis Hanson    READ BACK (2 Patient Identifiers)(Y/N): _ Y    READ BACK VALUES (Y/N): _ Y    CALLED BY: Davis Rodriguez  Final Report (01-02-21 @ 15:14):    Growth in anaerobic bottle: Klebsiella pneumoniae    Growth in aerobic bottle: Bacillus species not anthracis    "Susceptibilities not performed"  Organism: Blood Culture PCR  Klebsiella pneumoniae (01-02-21 @ 15:14)  Organism: Klebsiella pneumoniae (01-02-21 @ 15:14)    Sensitivities:      -  Amikacin: S <=16      -  Ampicillin: R 16 These ampicillin results predict results for amoxicillin      -  Ampicillin/Sulbactam: S <=4/2 Enterobacter, Citrobacter, and Serratia may develop resistance during prolonged therapy (3-4 days)      -  Aztreonam: S <=4      -  Cefazolin: S <=2 Enterobacter, Citrobacter, and Serratia may develop resistance during prolonged therapy (3-4 days)      -  Cefepime: S <=2      -  Cefoxitin: S <=8      -  Ceftriaxone: S <=1 Enterobacter, Citrobacter, and Serratia may develop resistance during prolonged therapy      -  Ciprofloxacin: S <=0.25      -  Ertapenem: S <=0.5      -  Gentamicin: S <=2      -  Imipenem: S <=1      -  Levofloxacin: S <=0.5      -  Meropenem: S <=1      -  Piperacillin/Tazobactam: S <=8      -  Tobramycin: S <=2      -  Trimethoprim/Sulfamethoxazole: S <=0.5/9.5      Method Type: AWA  Organism: Blood Culture PCR (01-02-21 @ 15:14)    Sensitivities:      -  Klebsiella pneumoniae: Detec      Method Type: PCR      WBC Count: 5.37 K/uL (01-07-21 @ 08:19)  WBC Count: 4.70 K/uL (01-05-21 @ 07:50)  WBC Count: 4.79 K/uL (01-04-21 @ 14:40)    Creatinine, Serum: 1.27 mg/dL (01-07-21 @ 08:19)  Creatinine, Serum: 1.47 mg/dL (01-05-21 @ 07:50)  Creatinine, Serum: 1.62 mg/dL (01-04-21 @ 14:40)  Creatinine, Serum: 1.68 mg/dL (01-03-21 @ 08:30)      Ferritin, Serum: 753 ng/mL (12-30-20 @ 01:51)           COVID-19 PCR: Detected (01-06-21 @ 08:23)  COVID-19 IgG Antibody Interpretation: Negative (12-30-20 @ 06:49)  COVID-19 IgG Antibody Index: 0.06 Index (12-30-20 @ 06:49)  COVID-19 PCR: Detected (12-29-20 @ 22:59)    < from: TTE Echo Complete w/o Contrast w/ Doppler (01.06.21 @ 12:24) >  Summary:   1. Technically adequatestudy.   2. Normal global left ventricular systolic function.   3. Left ventricular ejection fraction, by visual estimation, is 70 to 75%.   4. Spectral Doppler shows impaired relaxation pattern of left ventricular myocardial filling (Grade I diastolic dysfunction).   5. There is severe concentric left ventricular hypertrophy.   6. Mildly enlarged left atrium.   7. Mild thickening of the anterior and posterior mitral valve leaflets.   8. Mild mitral valve regurgitation.   9. Sclerotic aortic valve with normal opening.  10. Mild aortic regurgitation.  11. Trivial pericardial effusion.  12. There is a mobile echo density attached to the aortic valve leaflet. This is suspicious of aortic valve fibroelastoma. Recommend SAADIA for further evaluation.    MD Comfort Electronically signed on 1/6/2021 at 6:06:27 PM    < end of copied text >

## 2021-01-07 NOTE — PROVIDER CONTACT NOTE (CRITICAL VALUE NOTIFICATION) - TEST AND RESULT REPORTED:
Positive blood cultures anaerobic bottle gram positive rods
Anaerobic bottle B/C  Gram positive rods
anaerobic bolltle B/C gram negative jill  Aerobic bottle B/C gram positive rtods
hgb hct 6.8/23.5

## 2021-01-08 LAB
ANION GAP SERPL CALC-SCNC: 10 MMOL/L — SIGNIFICANT CHANGE UP (ref 5–17)
BUN SERPL-MCNC: 16 MG/DL — SIGNIFICANT CHANGE UP (ref 8–20)
CALCIUM SERPL-MCNC: 8.8 MG/DL — SIGNIFICANT CHANGE UP (ref 8.6–10.2)
CHLORIDE SERPL-SCNC: 107 MMOL/L — SIGNIFICANT CHANGE UP (ref 98–107)
CO2 SERPL-SCNC: 29 MMOL/L — SIGNIFICANT CHANGE UP (ref 22–29)
CREAT SERPL-MCNC: 1.3 MG/DL — SIGNIFICANT CHANGE UP (ref 0.5–1.3)
GLUCOSE SERPL-MCNC: 75 MG/DL — SIGNIFICANT CHANGE UP (ref 70–99)
HCT VFR BLD CALC: 28.1 % — LOW (ref 34.5–45)
HGB BLD-MCNC: 8.4 G/DL — LOW (ref 11.5–15.5)
MCHC RBC-ENTMCNC: 25.5 PG — LOW (ref 27–34)
MCHC RBC-ENTMCNC: 29.9 GM/DL — LOW (ref 32–36)
MCV RBC AUTO: 85.4 FL — SIGNIFICANT CHANGE UP (ref 80–100)
PLATELET # BLD AUTO: 353 K/UL — SIGNIFICANT CHANGE UP (ref 150–400)
POTASSIUM SERPL-MCNC: 4.3 MMOL/L — SIGNIFICANT CHANGE UP (ref 3.5–5.3)
POTASSIUM SERPL-SCNC: 4.3 MMOL/L — SIGNIFICANT CHANGE UP (ref 3.5–5.3)
RBC # BLD: 3.29 M/UL — LOW (ref 3.8–5.2)
RBC # FLD: 14.6 % — HIGH (ref 10.3–14.5)
SODIUM SERPL-SCNC: 146 MMOL/L — HIGH (ref 135–145)
VANCOMYCIN TROUGH SERPL-MCNC: 13 UG/ML — SIGNIFICANT CHANGE UP (ref 10–20)
WBC # BLD: 6.55 K/UL — SIGNIFICANT CHANGE UP (ref 3.8–10.5)
WBC # FLD AUTO: 6.55 K/UL — SIGNIFICANT CHANGE UP (ref 3.8–10.5)

## 2021-01-08 PROCEDURE — 99232 SBSQ HOSP IP/OBS MODERATE 35: CPT

## 2021-01-08 PROCEDURE — 99233 SBSQ HOSP IP/OBS HIGH 50: CPT

## 2021-01-08 RX ADMIN — CHLORHEXIDINE GLUCONATE 1 APPLICATION(S): 213 SOLUTION TOPICAL at 05:11

## 2021-01-08 RX ADMIN — DESMOPRESSIN ACETATE 0.1 MILLIGRAM(S): 0.1 TABLET ORAL at 17:29

## 2021-01-08 RX ADMIN — Medication 1 TABLET(S): at 11:23

## 2021-01-08 RX ADMIN — LEVETIRACETAM 500 MILLIGRAM(S): 250 TABLET, FILM COATED ORAL at 17:28

## 2021-01-08 RX ADMIN — Medication 250 MILLIGRAM(S): at 08:52

## 2021-01-08 RX ADMIN — Medication 1300 MILLIGRAM(S): at 17:28

## 2021-01-08 RX ADMIN — PANTOPRAZOLE SODIUM 40 MILLIGRAM(S): 20 TABLET, DELAYED RELEASE ORAL at 05:10

## 2021-01-08 RX ADMIN — Medication 1 MILLIGRAM(S): at 11:23

## 2021-01-08 RX ADMIN — CEFTRIAXONE 100 MILLIGRAM(S): 500 INJECTION, POWDER, FOR SOLUTION INTRAMUSCULAR; INTRAVENOUS at 15:27

## 2021-01-08 RX ADMIN — LEVETIRACETAM 500 MILLIGRAM(S): 250 TABLET, FILM COATED ORAL at 05:10

## 2021-01-08 RX ADMIN — ENOXAPARIN SODIUM 40 MILLIGRAM(S): 100 INJECTION SUBCUTANEOUS at 11:23

## 2021-01-08 RX ADMIN — DESMOPRESSIN ACETATE 0.1 MILLIGRAM(S): 0.1 TABLET ORAL at 05:10

## 2021-01-08 RX ADMIN — Medication 1300 MILLIGRAM(S): at 05:11

## 2021-01-08 RX ADMIN — IRON SUCROSE 210 MILLIGRAM(S): 20 INJECTION, SOLUTION INTRAVENOUS at 11:59

## 2021-01-08 NOTE — PROGRESS NOTE ADULT - SUBJECTIVE AND OBJECTIVE BOX
INTERVAL HPI/OVERNIGHT EVENTS:  followup for DI     MEDICATIONS  (STANDING):  cefTRIAXone   IVPB 2000 milliGRAM(s) IV Intermittent every 24 hours  chlorhexidine 4% Liquid 1 Application(s) Topical <User Schedule>  desmopressin 0.1 milliGRAM(s) Oral two times a day  enoxaparin Injectable 40 milliGRAM(s) SubCutaneous daily  folic acid 1 milliGRAM(s) Oral daily  iron sucrose IVPB 100 milliGRAM(s) IV Intermittent every 24 hours  levETIRAcetam 500 milliGRAM(s) Oral two times a day  multivitamin 1 Tablet(s) Oral daily  pantoprazole    Tablet 40 milliGRAM(s) Oral before breakfast  sodium bicarbonate 1300 milliGRAM(s) Oral two times a day  vancomycin  IVPB 1000 milliGRAM(s) IV Intermittent every 24 hours    MEDICATIONS  (PRN):  acetaminophen   Tablet .. 650 milliGRAM(s) Oral every 6 hours PRN Temp greater or equal to 38.5C (101.3F)  sodium chloride 0.9% lock flush 10 milliLiter(s) IV Push every 1 hour PRN Pre/post blood products, medications, blood draw, and to maintain line patency    Allergies  No Known Allergies    Review of systems: no PC, no thirst, no HA, no dizziness     Vital Signs Last 24 Hrs  T(C): 37.5 (08 Jan 2021 08:30), Max: 37.5 (08 Jan 2021 08:30)  T(F): 99.5 (08 Jan 2021 08:30), Max: 99.5 (08 Jan 2021 08:30)  HR: 81 (08 Jan 2021 08:30) (81 - 91)  BP: 158/102 (08 Jan 2021 08:30) (136/94 - 158/102)  BP(mean): --  RR: 19 (08 Jan 2021 08:30) (18 - 19)  SpO2: 95% (08 Jan 2021 08:30) (94% - 95%)    PHYSICAL EXAM:  GENERAL: NAD, well-groomed, well-developed  EYES: EOMI, PERRLA, conjunctiva and sclera clear  NECK: Supple, No JVD  NERVOUS SYSTEM:  Alert & Oriented X3, No gross focal deficits  CHEST/LUNG: Clear to percussion bilaterally; No rales, rhonchi, wheezing, or rubs  HEART: Regular rate and rhythm; No murmurs, rubs, or gallops  ABDOMEN: Soft, Nontender, Nondistended; Bowel sounds present  EXTREMITIES:  No cyanosis, or edema  SKIN: No rashes       LABS:                        8.4    6.55  )-----------( 353      ( 08 Jan 2021 07:17 )             28.1     01-08    146<H>  |  107  |  16.0  ----------------------------<  75  4.3   |  29.0  |  1.30    Ca    8.8      08 Jan 2021 07:17

## 2021-01-08 NOTE — PROGRESS NOTE ADULT - ASSESSMENT
54 y/o female with h/o SAH, diabetes insipidus, chronic hypernatremia was BIBA as pt  Patient has PICC and reports that every other day gets D5W through that for h/o hypernatremia. Pt admitted for hypernatremia, was started on IVF and desmopressin. Patient also found to be COVID+ with positive blood cultures    Hypernatremia, chronic 2/2 CDI  - Na remains stable  - c/w Desmopressin 0.1 mg PO BID  - Continue to encourage PO fluid intake    Bacteremia  - BC (+) Klebsiella pneumoniae and Bacillus species (not anthracis)  - PICC removed, no growth from tip  - c/w Vanco and Rocephin  If BC come positive again, she would need SAADIA      COVID infection : maintains good oxygenation on RA    Acute kidney injury - improving, almost normal

## 2021-01-08 NOTE — PROGRESS NOTE ADULT - SUBJECTIVE AND OBJECTIVE BOX
[facility]  INFECTIOUS DISEASES AND INTERNAL MEDICINE at [place]  =======================================================  Mj Lee MD  Diplomates American Board of Internal Medicine and Infectious Diseases  Tel: [phone]      Fax: [phone]  =======================================================    KRUPA MEDINA [id number]    Follow up: bacteremia  covid  feels well  IJ removed      Allergies:  No Known Allergies           REVIEW OF SYSTEMS:  CONSTITUTIONAL:  No Fever or chills  HEENT:   No diplopia or blurred vision.  No earache, sore throat or runny nose.  CARDIOVASCULAR:  No pressure, squeezing, strangling, tightness, heaviness or aching about the chest, neck, axilla or epigastrium.  RESPIRATORY:  No cough, shortness of breath  GASTROINTESTINAL:  No nausea, vomiting or diarrhea.  GENITOURINARY:  No dysuria, frequency or urgency. No Blood in urine  MUSCULOSKELETAL:  no joint aches, no muscle pain  SKIN:  No change in skin, hair or nails.  NEUROLOGIC:  No Headaches, seizures or weakness.  PSYCHIATRIC:  No disorder of thought or mood.  ENDOCRINE:  No heat or cold intolerance  HEMATOLOGICAL:  No easy bruising or bleeding.       Physical Exam:  GEN: NAD, pleasant  HEENT: normocephalic and atraumatic. EOMI. PERRL.  Anicteric   NECK: Supple.   LUNGS: Clear to auscultation.  HEART: Regular rate and rhythm without murmur.  ABDOMEN: Soft, nontender, and nondistended.  Positive bowel sounds.    : No CVA tenderness  EXTREMITIES: Without any edema.  MSK: no joint swelling  NEUROLOGIC: Cranial nerves II through XII are grossly intact. No focal deficits  PSYCHIATRIC: Appropriate affect .  SKIN: No Rash      Vitals:    T(F): 98.4 (08 Jan 2021 16:22), Max: 99.5 (08 Jan 2021 08:30)  HR: 80 (08 Jan 2021 16:22)  BP: 159/101 (08 Jan 2021 16:22)  RR: 18 (08 Jan 2021 16:22)  SpO2: 96% (08 Jan 2021 16:22) (94% - 96%)  temp max in last 48H T(F): , Max: 99.9 (01-06-21 @ 21:50)      Current Antibiotics:  cefTRIAXone   IVPB 2000 milliGRAM(s) IV Intermittent every 24 hours  vancomycin  IVPB 1000 milliGRAM(s) IV Intermittent every 24 hours    Other medications:  chlorhexidine 4% Liquid 1 Application(s) Topical <User Schedule>  desmopressin 0.1 milliGRAM(s) Oral two times a day  enoxaparin Injectable 40 milliGRAM(s) SubCutaneous daily  folic acid 1 milliGRAM(s) Oral daily  iron sucrose IVPB 100 milliGRAM(s) IV Intermittent every 24 hours  levETIRAcetam 500 milliGRAM(s) Oral two times a day  multivitamin 1 Tablet(s) Oral daily  pantoprazole    Tablet 40 milliGRAM(s) Oral before breakfast  sodium bicarbonate 1300 milliGRAM(s) Oral two times a day        Labs:                        8.4    6.55  )-----------( 353      ( 08 Jan 2021 07:17 )             28.1      01-08    146<H>  |  107  |  16.0  ----------------------------<  75  4.3   |  29.0  |  1.30    Ca    8.8      08 Jan 2021 07:17        Culture - Blood (collected 01-05-21 @ 17:59)  Source: .Blood Blood-Venous    Culture - Blood (collected 01-05-21 @ 17:59)  Source: .Blood Blood-Peripheral    Culture - Blood (collected 01-01-21 @ 12:54)  Source: .Blood Blood  Gram Stain (01-05-21 @ 09:30):    Growth in anaerobic bottle: Gram Positive Rods    Gram Stain performed by:    [facility]    [address] [place] [id number]    ,    TYPE: (C=Critical, N=Notification, A=Abnormal) c    TESTS:  _ gs    DATE/TIME CALLED: _ 01/05/2021 09:30:08    CALLED TO: Davis laird rn    READ BACK (2 Patient Identifiers)(Y/N): _ y    READ BACK VALUES (Y/N): _ y    CALLED BY: Davis sinclair  Final Report (01-06-21 @ 13:16):    Growth in anaerobic bottle: Bacillus species not anthracis    "Susceptibilities not performed"    Culture - Blood (collected 01-01-21 @ 12:54)  Source: .Blood Blood  Final Report (01-06-21 @ 13:00):    No growth at 5 days.    Culture - Catheter (collected 12-31-20 @ 22:24)  Source: .Catheter PICC Tip  Final Report (01-02-21 @ 16:11):    No growth    Culture - Blood (collected 12-30-20 @ 01:52)  Source: .Blood Blood-Peripheral  Gram Stain (12-31-20 @ 14:47):    Growth in anaerobic bottle: Gram Positive Rods    Gram Stain performed by:    [facility]    [address] [place] [address]    .    TYPE: (C=Critical, N=Notification, A=Abnormal) C    TESTS:  _ GS    DATE/TIME CALLED: _ 12/31/2020 14:45:54    CALLED TO: Davis Tavarez RN    READ BACK (2 Patient Identifiers)(Y/N): _ Y    READ BACK VALUES (Y/N): _ Y    CALLED BY: Davis Rodriguez  Final Report (01-01-21 @ 17:55):    Growth in anaerobic bottle: Bacillus species not anthracis    "Susceptibilities not performed"    Culture - Blood (collected 12-30-20 @ 01:52)  Source: .Blood Blood-Peripheral  Gram Stain (12-31-20 @ 09:27):    Growth in aerobic bottle: Gram Positive Rods    Growth in anaerobic bottle: Gram Negative Rods    ***Blood Panel PCR results on this specimen are available    approximately 3 hours after the Gram stain result.***    Gram stain, PCR, and/or culture results may not always    correspond due to difference in methodologies.    ************************************************************    This PCR assay was performed using [employer].    The following targets are tested for: Enterococcus,    vancomycin resistant enterococci, Listeria monocytogenes,    coagulase negative staphylococci, S. aureus,    methicillin resistant S. aureus, Streptococcus agalactiae    (Group B), S. pneumoniae, S. pyogenes (Group A),    Acinetobacter baumannii, Enterobacter cloacae, E. coli,    Klebsiella oxytoca, K. pneumoniae, Proteus sp.,    Serratia marcescens, Haemophilus influenzae,    Neisseria meningitidis, Pseudomonas aeruginosa, Candida    albicans, C. glabrata, C krusei, C parapsilosis,    C. tropicalis and the KPC resistance gene.    Gram Stain and BCID performed by:    [facility]    [address] [place] [address]    .    TYPE: (C=Critical, N=Notification, A=Abnormal) C    TESTS:  _ GS    DATE/TIME CALLED: _ 12/31/2020 09:26:34    CALLED TO: Davis Hanson    READ BACK (2 Patient Identifiers)(Y/N): _ Y    READ BACK VALUES (Y/N): _ Y    CALLED BY: Davis Rodriguez  Final Report (01-02-21 @ 15:14):    Growth in anaerobic bottle: Klebsiella pneumoniae    Growth in aerobic bottle: Bacillus species not anthracis    "Susceptibilities not performed"  Organism: Blood Culture PCR  Klebsiella pneumoniae (01-02-21 @ 15:14)  Organism: Klebsiella pneumoniae (01-02-21 @ 15:14)    Sensitivities:      -  Amikacin: S <=16      -  Ampicillin: R 16 These ampicillin results predict results for amoxicillin      -  Ampicillin/Sulbactam: S <=4/2 Enterobacter, Citrobacter, and Serratia may develop resistance during prolonged therapy (3-4 days)      -  Aztreonam: S <=4      -  Cefazolin: S <=2 Enterobacter, Citrobacter, and Serratia may develop resistance during prolonged therapy (3-4 days)      -  Cefepime: S <=2      -  Cefoxitin: S <=8      -  Ceftriaxone: S <=1 Enterobacter, Citrobacter, and Serratia may develop resistance during prolonged therapy      -  Ciprofloxacin: S <=0.25      -  Ertapenem: S <=0.5      -  Gentamicin: S <=2      -  Imipenem: S <=1      -  Levofloxacin: S <=0.5      -  Meropenem: S <=1      -  Piperacillin/Tazobactam: S <=8      -  Tobramycin: S <=2      -  Trimethoprim/Sulfamethoxazole: S <=0.5/9.5      Method Type: AWA  Organism: Blood Culture PCR (01-02-21 @ 15:14)    Sensitivities:      -  Klebsiella pneumoniae: Detec      Method Type: PCR      WBC Count: 6.55 K/uL (01-08-21 @ 07:17)  WBC Count: 5.37 K/uL (01-07-21 @ 08:19)  WBC Count: 4.70 K/uL (01-05-21 @ 07:50)  WBC Count: 4.79 K/uL (01-04-21 @ 14:40)    Creatinine, Serum: 1.30 mg/dL (01-08-21 @ 07:17)  Creatinine, Serum: 1.27 mg/dL (01-07-21 @ 08:19)  Creatinine, Serum: 1.47 mg/dL (01-05-21 @ 07:50)  Creatinine, Serum: 1.62 mg/dL (01-04-21 @ 14:40)      Ferritin, Serum: 753 ng/mL (12-30-20 @ 01:51)           COVID-19 PCR: Detected (01-06-21 @ 08:23)  COVID-19 IgG Antibody Interpretation: Negative (12-30-20 @ 06:49)  COVID-19 IgG Antibody Index: 0.06 Index (12-30-20 @ 06:49)  COVID-19 PCR: Detected (12-29-20 @ 22:59)

## 2021-01-08 NOTE — PROGRESS NOTE ADULT - ASSESSMENT
56 y/o female with h/o SAH, diabetes insipidus, chronic hypernatremia was BIBA for as pt. felt a little dizzy after coming out of bathroom, no syncope, no fall, no trauma, no focal weakness, no palpitations, no cp, no sob. pt. reports appetite has been ok, no abd. pain, no n/v/d. no fever. appetite is good. no sick contact. As per pt. she has a PICC line and every other day gets D5W through that for h/o hypernatremia. pt. bp 92/60 , hr 87 upon arrival. As per pt. she runs bp on the low side.     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 Will need IV abx at home  - Trend Fever  - Trend Leukocytosis    d/w Dr Borrego  Will Follow

## 2021-01-08 NOTE — PROGRESS NOTE ADULT - ASSESSMENT
54 y/o female with h/o SAH, diabetes insipidus, chronic hypernatremia was BIBA as pt. felt a little dizzy after coming out of bathroom, no syncope/fall/trauma, no focal weakness, no palpitations, no cp, no sob. Patient came in with a PICC line and reports that every other day gets D5W through that for h/o hypernatremia. Pt admitted for hypernatremia, was started on IVF and desmopressin. Patient also found to be COVID+ with positive blood cultures, PICC line was removed, and patient was started on vanco/zosyn. Repeat blood cultures positive for gram positive rods in anaerobic bottle.     1. Bacteremia  - BC (+) Klebsiella pneumoniae and Bacillus species (not anthracis)  - Repeat cultures 1/1 +GPR in anaerobic bottle  - Repeat cultures sent again 1/5, no growth to date, TTE results reviewed.   - PICC removed, no growth from tip  - c/w Vanco and Rocephin  If BC come positive again, she would need SAADIA      2. COVID positive with infiltrates  - Maintaining oxygenation on RA  - Continue to monitor off specific treatment    3. Hypernatremia, chronic 2/2 CDI  - Na remains stable, today 149  - c/w Desmopressin 0.1 mg PO BID  - Continue to encourage PO fluid intake  - patient will need to resume PICC line for home infusions (d5w 1L QOD). Home care agency reports patient is difficult stick for PICC placement. PICC team and IR not available today. Will need new PICC for homegoing.    4. Acute kidney injury   - GFR Slowly improving, nearly WNL. Cr WNL    5. Near syncope, vasovagal  - Likely related to COVID infection  - No new episodes, will monitor    6. Hypotension @ baseline  - BP's remain stable    7. Normocytic anemia likely chronic:   - s/p 1u pRBC  - No signs of active bleeding on followup CBC  - Monitor H/H    #DISPO - Pending negative blood cultures and PICC placement.

## 2021-01-08 NOTE — PROGRESS NOTE ADULT - SUBJECTIVE AND OBJECTIVE BOX
Patient is a 55y old  Female who presents with a chief complaint of hypernatremia (08 Jan 2021 17:47)      INTERVAL HPI/OVERNIGHT EVENTS:  Patient seen awake in bed. Denies fever or chills or SOB. Denies chest pain, headache or blurred vision. Reports eating well, denies N/V/D. No overnight events.    MEDICATIONS  (STANDING):  cefTRIAXone   IVPB 2000 milliGRAM(s) IV Intermittent every 24 hours  chlorhexidine 4% Liquid 1 Application(s) Topical <User Schedule>  desmopressin 0.1 milliGRAM(s) Oral two times a day  enoxaparin Injectable 40 milliGRAM(s) SubCutaneous daily  folic acid 1 milliGRAM(s) Oral daily  iron sucrose IVPB 100 milliGRAM(s) IV Intermittent every 24 hours  levETIRAcetam 500 milliGRAM(s) Oral two times a day  multivitamin 1 Tablet(s) Oral daily  pantoprazole    Tablet 40 milliGRAM(s) Oral before breakfast  sodium bicarbonate 1300 milliGRAM(s) Oral two times a day  vancomycin  IVPB 1000 milliGRAM(s) IV Intermittent every 24 hours    MEDICATIONS  (PRN):  acetaminophen   Tablet .. 650 milliGRAM(s) Oral every 6 hours PRN Temp greater or equal to 38.5C (101.3F)  sodium chloride 0.9% lock flush 10 milliLiter(s) IV Push every 1 hour PRN Pre/post blood products, medications, blood draw, and to maintain line patency      Allergies    No Known Allergies    Intolerances        REVIEW OF SYSTEMS:  CONSTITUTIONAL: No fever, weight loss, or fatigue  EYES: No eye pain, visual disturbances, or discharge  ENMT:  No difficulty hearing, tinnitus, vertigo; No sinus or throat pain  NECK: No pain or stiffness  RESPIRATORY: No cough, wheezing, chills or hemoptysis; No shortness of breath  CARDIOVASCULAR: No chest pain, palpitations, or lightheadedness  GASTROINTESTINAL: No abdominal or epigastric pain. No nausea, vomiting, or hematemesis; No diarrhea or constipation. No melena or hematochezia.  GENITOURINARY: No dysuria, frequency, hematuria, or incontinence  NEUROLOGICAL: No headaches, vertigo, memory loss, loss of strength, numbness, or tremors  SKIN: No itching, burning, rashes, or lesions   LYMPH NODES: No enlarged glands  ENDOCRINE: No heat or cold intolerance; No hair loss; No polydipsia or polyuria  MUSCULOSKELETAL: No back pain      PHYSICAL EXAM:  GENERAL: NAD, well-groomed, well-developed  HEAD:  Atraumatic, Normocephalic  EYES: EOMI, PERRLA, conjunctiva and sclera clear  ENMT: Moist mucous membranes, Good dentition, No lesions   NECK: Supple  NERVOUS SYSTEM:  Alert & Oriented X3, Good concentration; Bilateral LE mobile, sensation to light touch intact  CHEST/LUNG: Clear to auscultation bilaterally; No rales, rhonchi, wheezing, or rubs  HEART: Regular rate and rhythm; No murmurs, rubs, or gallops  ABDOMEN: Soft, Nontender, Nondistended; Bowel sounds present  EXTREMITIES:  2+ Peripheral Pulses, No clubbing or cyanosis  LYMPH: No lymphadenopathy noted  SKIN: Dressing over R neck no bleeding or discharge appreciated.      Vital Signs Last 24 Hrs  T(C): 37.1 (08 Jan 2021 22:00), Max: 37.5 (08 Jan 2021 08:30)  T(F): 98.7 (08 Jan 2021 22:00), Max: 99.5 (08 Jan 2021 08:30)  HR: 96 (08 Jan 2021 22:00) (80 - 96)  BP: 158/100 (08 Jan 2021 22:00) (158/100 - 159/101)  BP(mean): --  RR: 18 (08 Jan 2021 22:00) (18 - 19)  SpO2: 94% (08 Jan 2021 22:00) (94% - 96%)    LABS:                        8.4    6.55  )-----------( 353      ( 08 Jan 2021 07:17 )             28.1     08 Jan 2021 07:17    146    |  107    |  16.0   ----------------------------<  75     4.3     |  29.0   |  1.30     Ca    8.8        08 Jan 2021 07:17          CAPILLARY BLOOD GLUCOSE               [x] Consultant(s) Notes Reviewed

## 2021-01-09 LAB
ANION GAP SERPL CALC-SCNC: 9 MMOL/L — SIGNIFICANT CHANGE UP (ref 5–17)
BUN SERPL-MCNC: 19 MG/DL — SIGNIFICANT CHANGE UP (ref 8–20)
CALCIUM SERPL-MCNC: 8.7 MG/DL — SIGNIFICANT CHANGE UP (ref 8.6–10.2)
CHLORIDE SERPL-SCNC: 105 MMOL/L — SIGNIFICANT CHANGE UP (ref 98–107)
CO2 SERPL-SCNC: 29 MMOL/L — SIGNIFICANT CHANGE UP (ref 22–29)
CREAT SERPL-MCNC: 1.42 MG/DL — HIGH (ref 0.5–1.3)
GLUCOSE SERPL-MCNC: 70 MG/DL — SIGNIFICANT CHANGE UP (ref 70–99)
HCT VFR BLD CALC: 29.9 % — LOW (ref 34.5–45)
HGB BLD-MCNC: 8.8 G/DL — LOW (ref 11.5–15.5)
MAGNESIUM SERPL-MCNC: 1.8 MG/DL — SIGNIFICANT CHANGE UP (ref 1.6–2.6)
MCHC RBC-ENTMCNC: 25.1 PG — LOW (ref 27–34)
MCHC RBC-ENTMCNC: 29.4 GM/DL — LOW (ref 32–36)
MCV RBC AUTO: 85.2 FL — SIGNIFICANT CHANGE UP (ref 80–100)
PLATELET # BLD AUTO: 411 K/UL — HIGH (ref 150–400)
POTASSIUM SERPL-MCNC: 4.4 MMOL/L — SIGNIFICANT CHANGE UP (ref 3.5–5.3)
POTASSIUM SERPL-SCNC: 4.4 MMOL/L — SIGNIFICANT CHANGE UP (ref 3.5–5.3)
RBC # BLD: 3.51 M/UL — LOW (ref 3.8–5.2)
RBC # FLD: 14.6 % — HIGH (ref 10.3–14.5)
SODIUM SERPL-SCNC: 143 MMOL/L — SIGNIFICANT CHANGE UP (ref 135–145)
WBC # BLD: 7.16 K/UL — SIGNIFICANT CHANGE UP (ref 3.8–10.5)
WBC # FLD AUTO: 7.16 K/UL — SIGNIFICANT CHANGE UP (ref 3.8–10.5)

## 2021-01-09 PROCEDURE — 99232 SBSQ HOSP IP/OBS MODERATE 35: CPT

## 2021-01-09 RX ORDER — MAGNESIUM OXIDE 400 MG ORAL TABLET 241.3 MG
400 TABLET ORAL
Refills: 0 | Status: COMPLETED | OUTPATIENT
Start: 2021-01-09 | End: 2021-01-10

## 2021-01-09 RX ORDER — AMLODIPINE BESYLATE 2.5 MG/1
5 TABLET ORAL DAILY
Refills: 0 | Status: DISCONTINUED | OUTPATIENT
Start: 2021-01-09 | End: 2021-01-12

## 2021-01-09 RX ADMIN — Medication 250 MILLIGRAM(S): at 07:52

## 2021-01-09 RX ADMIN — Medication 1 TABLET(S): at 10:30

## 2021-01-09 RX ADMIN — Medication 1300 MILLIGRAM(S): at 16:54

## 2021-01-09 RX ADMIN — AMLODIPINE BESYLATE 5 MILLIGRAM(S): 2.5 TABLET ORAL at 16:54

## 2021-01-09 RX ADMIN — PANTOPRAZOLE SODIUM 40 MILLIGRAM(S): 20 TABLET, DELAYED RELEASE ORAL at 04:16

## 2021-01-09 RX ADMIN — LEVETIRACETAM 500 MILLIGRAM(S): 250 TABLET, FILM COATED ORAL at 16:54

## 2021-01-09 RX ADMIN — Medication 1 MILLIGRAM(S): at 10:30

## 2021-01-09 RX ADMIN — IRON SUCROSE 210 MILLIGRAM(S): 20 INJECTION, SOLUTION INTRAVENOUS at 11:28

## 2021-01-09 RX ADMIN — ENOXAPARIN SODIUM 40 MILLIGRAM(S): 100 INJECTION SUBCUTANEOUS at 10:30

## 2021-01-09 RX ADMIN — Medication 1300 MILLIGRAM(S): at 04:16

## 2021-01-09 RX ADMIN — DESMOPRESSIN ACETATE 0.1 MILLIGRAM(S): 0.1 TABLET ORAL at 16:54

## 2021-01-09 RX ADMIN — CEFTRIAXONE 100 MILLIGRAM(S): 500 INJECTION, POWDER, FOR SOLUTION INTRAMUSCULAR; INTRAVENOUS at 14:03

## 2021-01-09 RX ADMIN — DESMOPRESSIN ACETATE 0.1 MILLIGRAM(S): 0.1 TABLET ORAL at 04:16

## 2021-01-09 RX ADMIN — CHLORHEXIDINE GLUCONATE 1 APPLICATION(S): 213 SOLUTION TOPICAL at 04:16

## 2021-01-09 RX ADMIN — MAGNESIUM OXIDE 400 MG ORAL TABLET 400 MILLIGRAM(S): 241.3 TABLET ORAL at 18:26

## 2021-01-09 RX ADMIN — LEVETIRACETAM 500 MILLIGRAM(S): 250 TABLET, FILM COATED ORAL at 04:16

## 2021-01-09 NOTE — PROGRESS NOTE ADULT - SUBJECTIVE AND OBJECTIVE BOX
KRUPA MEDINA    [id number]    55y      Female    CC: hypernatremia     INTERVAL HPI/OVERNIGHT EVENTS: pt seen and examined. no complaints     REVIEW OF SYSTEMS:    CONSTITUTIONAL: No fever, weight loss, or fatigue  RESPIRATORY: No cough, wheezing, hemoptysis; No shortness of breath  CARDIOVASCULAR: No chest pain, palpitations  GASTROINTESTINAL: No abdominal or epigastric pain. No nausea, vomiting  NEUROLOGICAL: No headaches, memory loss, loss of strength.    Vital Signs Last 24 Hrs  T(C): 36.8 (09 Jan 2021 16:41), Max: 37.2 (09 Jan 2021 07:56)  T(F): 98.2 (09 Jan 2021 16:41), Max: 99 (09 Jan 2021 07:56)  HR: 73 (09 Jan 2021 16:41) (73 - 96)  BP: 157/100 (09 Jan 2021 17:04) (154/107 - 158/125)  BP(mean): --  RR: 18 (09 Jan 2021 16:41) (18 - 18)  SpO2: 97% (09 Jan 2021 16:41) (93% - 97%)    PHYSICAL EXAM:    GENERAL: NAD  HEENT: PERRL, +EOMI  NECK: soft, supple  CHEST/LUNG: Clear to auscultation bilaterally; No wheezing  HEART: S1S2+, Regular rate and rhythm; No murmurs, rubs, or gallops  ABDOMEN: Soft, Nontender, Nondistended; Bowel sounds present  SKIN: No rashes or lesions  NEURO: AAOX3, no focal deficits, no motor or sensory loss  PSYCH: normal mood    LABS:                        8.8    7.16  )-----------( 411      ( 09 Jan 2021 09:03 )             29.9     01-09    143  |  105  |  19.0  ----------------------------<  70  4.4   |  29.0  |  1.42<H>    Ca    8.7      09 Jan 2021 09:03  Mg     1.8     01-09              MEDICATIONS  (STANDING):  amLODIPine   Tablet 5 milliGRAM(s) Oral daily  cefTRIAXone   IVPB 2000 milliGRAM(s) IV Intermittent every 24 hours  chlorhexidine 4% Liquid 1 Application(s) Topical <User Schedule>  desmopressin 0.1 milliGRAM(s) Oral two times a day  enoxaparin Injectable 40 milliGRAM(s) SubCutaneous daily  folic acid 1 milliGRAM(s) Oral daily  iron sucrose IVPB 100 milliGRAM(s) IV Intermittent every 24 hours  levETIRAcetam 500 milliGRAM(s) Oral two times a day  multivitamin 1 Tablet(s) Oral daily  pantoprazole    Tablet 40 milliGRAM(s) Oral before breakfast  sodium bicarbonate 1300 milliGRAM(s) Oral two times a day  vancomycin  IVPB 1000 milliGRAM(s) IV Intermittent every 24 hours    MEDICATIONS  (PRN):  acetaminophen   Tablet .. 650 milliGRAM(s) Oral every 6 hours PRN Temp greater or equal to 38.5C (101.3F)  sodium chloride 0.9% lock flush 10 milliLiter(s) IV Push every 1 hour PRN Pre/post blood products, medications, blood draw, and to maintain line patency      RADIOLOGY & ADDITIONAL TESTS:    < from: TTE Echo Complete w/o Contrast w/ Doppler (01.06.21 @ 12:24) >  Summary:   1. Technically adequatestudy.   2. Normal global left ventricular systolic function.   3. Left ventricular ejection fraction, by visual estimation, is 70 to 75%.   4. Spectral Doppler shows impaired relaxation pattern of left ventricular myocardial filling (Grade I diastolic dysfunction).   5. There is severe concentric left ventricular hypertrophy.   6. Mildly enlarged left atrium.   7. Mild thickening of the anterior and posterior mitral valve leaflets.   8. Mild mitral valve regurgitation.   9. Sclerotic aortic valve with normal opening.  10. Mild aortic regurgitation.  11. Trivial pericardial effusion.  12. There is a mobile echo density attached to the aortic valve leaflet. This is suspicious of aortic valve fibroelastoma. Recommend SAADIA for further evaluation.    < end of copied text >

## 2021-01-09 NOTE — PROGRESS NOTE ADULT - ASSESSMENT
55y/oF PMH SAH, DI, chronic hypernatremia BIBA with dizziness. Pt came in with PICC line, reports getting D5W every other day for hypernatremia. Pt admitted with hypernatremia, started on IVF and desmopressin. Also found to be COVID+ with positive blood cultures. PICC line removed, pt started vanc/zosyn. Repeat blood cultures (1/1) with gram positive rods. Additional repeat blood cultures (1/5) NGTD.     Bacteremia   -Klebsiella pneumoniae and bacillus sp (not anthracis)   -repeat cx 1/1 +GPR  -repeat cx 1/5 NGTD   -PICC removed, tip cx ngtd   -cont vanc, rocephin as per ID   -TTE with ?aortic valve fibroelastoma, SAADIA recommended   -pt will need PICC for home IV abx     COVID -19 infection  -not requiring supplemental O2  -cont supportive treatment     Hypernatremia, chronic 2/2 Diabetes insipidus   -cont desmopressin  -encourage PO intake     Hypertension  -bp increasing, hypotensive on arrival, restart amlodipine, cont to monitor     anemia of chronic disease   -s/p 1u PRBC   -cont to monitor, no signs of active bleeding     pt declined for family updates

## 2021-01-10 LAB
ALBUMIN SERPL ELPH-MCNC: 3 G/DL — LOW (ref 3.3–5.2)
ALP SERPL-CCNC: 87 U/L — SIGNIFICANT CHANGE UP (ref 40–120)
ALT FLD-CCNC: 25 U/L — SIGNIFICANT CHANGE UP
ANION GAP SERPL CALC-SCNC: 9 MMOL/L — SIGNIFICANT CHANGE UP (ref 5–17)
AST SERPL-CCNC: 22 U/L — SIGNIFICANT CHANGE UP
BILIRUB SERPL-MCNC: 0.2 MG/DL — LOW (ref 0.4–2)
BUN SERPL-MCNC: 19 MG/DL — SIGNIFICANT CHANGE UP (ref 8–20)
CALCIUM SERPL-MCNC: 8.8 MG/DL — SIGNIFICANT CHANGE UP (ref 8.6–10.2)
CHLORIDE SERPL-SCNC: 104 MMOL/L — SIGNIFICANT CHANGE UP (ref 98–107)
CO2 SERPL-SCNC: 29 MMOL/L — SIGNIFICANT CHANGE UP (ref 22–29)
CREAT SERPL-MCNC: 1.09 MG/DL — SIGNIFICANT CHANGE UP (ref 0.5–1.3)
CULTURE RESULTS: SIGNIFICANT CHANGE UP
CULTURE RESULTS: SIGNIFICANT CHANGE UP
GLUCOSE SERPL-MCNC: 73 MG/DL — SIGNIFICANT CHANGE UP (ref 70–99)
MAGNESIUM SERPL-MCNC: 1.8 MG/DL — SIGNIFICANT CHANGE UP (ref 1.8–2.6)
POTASSIUM SERPL-MCNC: 4 MMOL/L — SIGNIFICANT CHANGE UP (ref 3.5–5.3)
POTASSIUM SERPL-SCNC: 4 MMOL/L — SIGNIFICANT CHANGE UP (ref 3.5–5.3)
PROT SERPL-MCNC: 6.5 G/DL — LOW (ref 6.6–8.7)
SODIUM SERPL-SCNC: 142 MMOL/L — SIGNIFICANT CHANGE UP (ref 135–145)
SPECIMEN SOURCE: SIGNIFICANT CHANGE UP
SPECIMEN SOURCE: SIGNIFICANT CHANGE UP

## 2021-01-10 PROCEDURE — 99232 SBSQ HOSP IP/OBS MODERATE 35: CPT

## 2021-01-10 RX ORDER — MAGNESIUM OXIDE 400 MG ORAL TABLET 241.3 MG
400 TABLET ORAL
Refills: 0 | Status: COMPLETED | OUTPATIENT
Start: 2021-01-10 | End: 2021-01-11

## 2021-01-10 RX ADMIN — CHLORHEXIDINE GLUCONATE 1 APPLICATION(S): 213 SOLUTION TOPICAL at 04:48

## 2021-01-10 RX ADMIN — DESMOPRESSIN ACETATE 0.1 MILLIGRAM(S): 0.1 TABLET ORAL at 16:40

## 2021-01-10 RX ADMIN — MAGNESIUM OXIDE 400 MG ORAL TABLET 400 MILLIGRAM(S): 241.3 TABLET ORAL at 09:04

## 2021-01-10 RX ADMIN — MAGNESIUM OXIDE 400 MG ORAL TABLET 400 MILLIGRAM(S): 241.3 TABLET ORAL at 16:40

## 2021-01-10 RX ADMIN — Medication 1300 MILLIGRAM(S): at 04:46

## 2021-01-10 RX ADMIN — LEVETIRACETAM 500 MILLIGRAM(S): 250 TABLET, FILM COATED ORAL at 16:40

## 2021-01-10 RX ADMIN — Medication 1 MILLIGRAM(S): at 10:32

## 2021-01-10 RX ADMIN — LEVETIRACETAM 500 MILLIGRAM(S): 250 TABLET, FILM COATED ORAL at 04:46

## 2021-01-10 RX ADMIN — IRON SUCROSE 210 MILLIGRAM(S): 20 INJECTION, SOLUTION INTRAVENOUS at 10:32

## 2021-01-10 RX ADMIN — Medication 1300 MILLIGRAM(S): at 16:40

## 2021-01-10 RX ADMIN — PANTOPRAZOLE SODIUM 40 MILLIGRAM(S): 20 TABLET, DELAYED RELEASE ORAL at 04:46

## 2021-01-10 RX ADMIN — CEFTRIAXONE 100 MILLIGRAM(S): 500 INJECTION, POWDER, FOR SOLUTION INTRAMUSCULAR; INTRAVENOUS at 16:40

## 2021-01-10 RX ADMIN — ENOXAPARIN SODIUM 40 MILLIGRAM(S): 100 INJECTION SUBCUTANEOUS at 10:32

## 2021-01-10 RX ADMIN — Medication 250 MILLIGRAM(S): at 09:04

## 2021-01-10 RX ADMIN — AMLODIPINE BESYLATE 5 MILLIGRAM(S): 2.5 TABLET ORAL at 04:46

## 2021-01-10 RX ADMIN — DESMOPRESSIN ACETATE 0.1 MILLIGRAM(S): 0.1 TABLET ORAL at 04:46

## 2021-01-10 RX ADMIN — MAGNESIUM OXIDE 400 MG ORAL TABLET 400 MILLIGRAM(S): 241.3 TABLET ORAL at 10:32

## 2021-01-10 RX ADMIN — Medication 1 TABLET(S): at 10:33

## 2021-01-10 NOTE — PROGRESS NOTE ADULT - ASSESSMENT
55y/oF PMH SAH, DI, chronic hypernatremia BIBA with dizziness. Pt came in with PICC line, reports getting D5W every other day for hypernatremia. Pt admitted with hypernatremia, started on IVF and desmopressin. Also found to be COVID+ with positive blood cultures. PICC line removed, pt started vanc/zosyn. Repeat blood cultures (1/1) with gram positive rods. Additional repeat blood cultures (1/5) NGTD.     Bacteremia   -Klebsiella pneumoniae and bacillus sp (not anthracis)   -repeat cx 1/1 +GPR  -repeat cx 1/5 NGTD   -PICC removed, tip cx ngtd   -cont vanc, rocephin as per ID   -TTE with ?aortic valve fibroelastoma  -pt will need PICC for home IV abx     COVID -19 infection  -not requiring supplemental O2  -cont supportive treatment     Hypernatremia, chronic 2/2 Diabetes insipidus   -cont desmopressin  -encourage PO intake     Hypertension  -bp increasing, hypotensive on arrival, restart amlodipine, cont to monitor     anemia of chronic disease   -s/p 1u PRBC   -cont to monitor, no signs of active bleeding     pt declined for family updates     dispo; poss home with PICC line next 24hrs

## 2021-01-11 LAB
ANION GAP SERPL CALC-SCNC: 7 MMOL/L — SIGNIFICANT CHANGE UP (ref 5–17)
BUN SERPL-MCNC: 17 MG/DL — SIGNIFICANT CHANGE UP (ref 8–20)
CALCIUM SERPL-MCNC: 8.8 MG/DL — SIGNIFICANT CHANGE UP (ref 8.6–10.2)
CHLORIDE SERPL-SCNC: 102 MMOL/L — SIGNIFICANT CHANGE UP (ref 98–107)
CO2 SERPL-SCNC: 29 MMOL/L — SIGNIFICANT CHANGE UP (ref 22–29)
CREAT SERPL-MCNC: 1.01 MG/DL — SIGNIFICANT CHANGE UP (ref 0.5–1.3)
GLUCOSE SERPL-MCNC: 76 MG/DL — SIGNIFICANT CHANGE UP (ref 70–99)
HCT VFR BLD CALC: 28.4 % — LOW (ref 34.5–45)
HGB BLD-MCNC: 8.3 G/DL — LOW (ref 11.5–15.5)
MAGNESIUM SERPL-MCNC: 2 MG/DL — SIGNIFICANT CHANGE UP (ref 1.6–2.6)
MCHC RBC-ENTMCNC: 25.2 PG — LOW (ref 27–34)
MCHC RBC-ENTMCNC: 29.2 GM/DL — LOW (ref 32–36)
MCV RBC AUTO: 86.3 FL — SIGNIFICANT CHANGE UP (ref 80–100)
PLATELET # BLD AUTO: 422 K/UL — HIGH (ref 150–400)
POTASSIUM SERPL-MCNC: 4.1 MMOL/L — SIGNIFICANT CHANGE UP (ref 3.5–5.3)
POTASSIUM SERPL-SCNC: 4.1 MMOL/L — SIGNIFICANT CHANGE UP (ref 3.5–5.3)
RBC # BLD: 3.29 M/UL — LOW (ref 3.8–5.2)
RBC # FLD: 14.7 % — HIGH (ref 10.3–14.5)
SODIUM SERPL-SCNC: 138 MMOL/L — SIGNIFICANT CHANGE UP (ref 135–145)
VANCOMYCIN TROUGH SERPL-MCNC: 12.6 UG/ML — SIGNIFICANT CHANGE UP (ref 10–20)
WBC # BLD: 7.25 K/UL — SIGNIFICANT CHANGE UP (ref 3.8–10.5)
WBC # FLD AUTO: 7.25 K/UL — SIGNIFICANT CHANGE UP (ref 3.8–10.5)

## 2021-01-11 PROCEDURE — 71045 X-RAY EXAM CHEST 1 VIEW: CPT | Mod: 26

## 2021-01-11 PROCEDURE — 99232 SBSQ HOSP IP/OBS MODERATE 35: CPT

## 2021-01-11 PROCEDURE — 99222 1ST HOSP IP/OBS MODERATE 55: CPT

## 2021-01-11 PROCEDURE — 36569 INSJ PICC 5 YR+ W/O IMAGING: CPT

## 2021-01-11 PROCEDURE — 76942 ECHO GUIDE FOR BIOPSY: CPT | Mod: 26,59

## 2021-01-11 RX ORDER — VANCOMYCIN HCL 1 G
500 VIAL (EA) INTRAVENOUS EVERY 12 HOURS
Refills: 0 | Status: DISCONTINUED | OUTPATIENT
Start: 2021-01-11 | End: 2021-01-13

## 2021-01-11 RX ORDER — CHLORHEXIDINE GLUCONATE 213 G/1000ML
1 SOLUTION TOPICAL
Refills: 0 | Status: DISCONTINUED | OUTPATIENT
Start: 2021-01-11 | End: 2021-01-21

## 2021-01-11 RX ADMIN — DESMOPRESSIN ACETATE 0.1 MILLIGRAM(S): 0.1 TABLET ORAL at 06:57

## 2021-01-11 RX ADMIN — Medication 1300 MILLIGRAM(S): at 16:53

## 2021-01-11 RX ADMIN — LEVETIRACETAM 500 MILLIGRAM(S): 250 TABLET, FILM COATED ORAL at 16:54

## 2021-01-11 RX ADMIN — Medication 100 MILLIGRAM(S): at 10:52

## 2021-01-11 RX ADMIN — DESMOPRESSIN ACETATE 0.1 MILLIGRAM(S): 0.1 TABLET ORAL at 16:54

## 2021-01-11 RX ADMIN — MAGNESIUM OXIDE 400 MG ORAL TABLET 400 MILLIGRAM(S): 241.3 TABLET ORAL at 06:58

## 2021-01-11 RX ADMIN — CHLORHEXIDINE GLUCONATE 1 APPLICATION(S): 213 SOLUTION TOPICAL at 06:59

## 2021-01-11 RX ADMIN — MAGNESIUM OXIDE 400 MG ORAL TABLET 400 MILLIGRAM(S): 241.3 TABLET ORAL at 10:23

## 2021-01-11 RX ADMIN — PANTOPRAZOLE SODIUM 40 MILLIGRAM(S): 20 TABLET, DELAYED RELEASE ORAL at 06:57

## 2021-01-11 RX ADMIN — Medication 1 MILLIGRAM(S): at 10:23

## 2021-01-11 RX ADMIN — LEVETIRACETAM 500 MILLIGRAM(S): 250 TABLET, FILM COATED ORAL at 06:57

## 2021-01-11 RX ADMIN — AMLODIPINE BESYLATE 5 MILLIGRAM(S): 2.5 TABLET ORAL at 06:57

## 2021-01-11 RX ADMIN — Medication 1300 MILLIGRAM(S): at 06:57

## 2021-01-11 RX ADMIN — Medication 100 MILLIGRAM(S): at 20:35

## 2021-01-11 RX ADMIN — Medication 1 TABLET(S): at 10:23

## 2021-01-11 RX ADMIN — CEFTRIAXONE 100 MILLIGRAM(S): 500 INJECTION, POWDER, FOR SOLUTION INTRAMUSCULAR; INTRAVENOUS at 16:54

## 2021-01-11 RX ADMIN — ENOXAPARIN SODIUM 40 MILLIGRAM(S): 100 INJECTION SUBCUTANEOUS at 10:23

## 2021-01-11 NOTE — CONSULT NOTE ADULT - SUBJECTIVE AND OBJECTIVE BOX
[facility]                                                               Office:  [address] [place] [address]                                                              Telephone: [phone]. Fax:[phone]                                                                        CARDIOLOGY CONSULTATION NOTE                                                                                             Consult requested by:  Dr. Arauz  Reason for Consultation: Aortic Valve Mobile Echodensity  History obtained by: Patient and medical record   obtained: No    Chief complaint:    Patient is a 55y old  Female who presents with a chief complaint of hypernatremia (11 Jan 2021 12:58)      HPI: 54 y/o F with a PMHx of SAH, diabetes insipidus, chronic hypernatremia who was BIBA for dizziness, and was found to be hypotensive with Covid 19 infection. Patient has a PICC line in place that she gets D5W through every other day for hypernatremia. Patient was found to have klebsiella pna/bacillus species bacteremia, with a mass on the AV ?vegetation vs. fibroelastoma. Patient states she is feeling well, and wants to go home. Due to active Covid 19 infection at this time, defer SAADIA. Discussed with patient the need for SAADIA as an outpatient in 2-3 weeks when covid infection subsides, and patient is in agreement.     REVIEW OF SYMPTOMS:     CONSTITUTIONAL: AS PER HPI  ENMT:  No difficulty hearing, tinnitus, vertigo; No sinus or throat pain  NECK: No pain or stiffness  CARDIOVASCULAR: AS PER HPI  RESPIRATORY: Dyspnea on exertion, Shortness of breath, cough, wheezing  : No dysuria, no hematuria   GI: No dark color stool, no melena, no diarrhea, no constipation, no abdominal pain   NEURO: No headache, no dizziness, no slurred speech   MUSCULOSKELETAL: No joint pain or swelling; No muscle, back, or extremity pain  PSYCH: No agitation, no anxiety.    ALL OTHER REVIEW OF SYSTEMS ARE NEGATIVE.      PREVIOUS DIAGNOSTIC TESTING  ECHO FINDINGS:  < from: TTE Echo Complete w/o Contrast w/ Doppler (01.06.21 @ 12:24) >  PHYSICIAN INTERPRETATION:  Left Ventricle: The left ventricular internal cavity size is normal.  Global LV systolic function was normal. Left ventricular ejection fraction, by visual estimation, is 70 to 75%. Spectral Doppler shows impaired relaxation pattern of left ventricular myocardial filling (Grade I diastolic dysfunction).  Right Ventricle: Normal right ventricular size and function.  Left Atrium: Mildly enlarged left atrium.  Right Atrium: The right atrium is normal in size.  Pericardium: Trivial pericardial effusion is present. The pericardial effusion is globally located around the entire heart.  Mitral Valve:Mild thickening of the anterior and posterior mitral valve leaflets. Mild mitral valve regurgitation is seen.  Tricuspid Valve: Trivial tricuspid regurgitation is visualized. Adequate TR velocity was not obtained to accurately assess RVSP.  Aortic Valve: The aortic valve is trileaflet. Sclerotic aortic valve with normal opening. Mild aortic valve regurgitation is seen.  Pulmonic Valve: Structurally normal pulmonic valve, with normal leaflet excursion. Trace pulmonic valve regurgitation.  Aorta: The aortic root is normal in size and structure.  Pulmonary Artery: The main pulmonary artery is normal in size.  Venous: The inferior vena cava was normal sized, with respiratory size variation greater than 50%.      Summary:   1. Technically adequatestudy.   2. Normal global left ventricular systolic function.   3. Left ventricular ejection fraction, by visual estimation, is 70 to 75%.   4. Spectral Doppler shows impaired relaxation pattern of left ventricular myocardial filling (Grade I diastolic dysfunction).   5. There is severe concentric left ventricular hypertrophy.   6. Mildly enlarged left atrium.   7. Mild thickening of the anterior and posterior mitral valve leaflets.   8. Mild mitral valve regurgitation.   9. Sclerotic aortic valve with normal opening.  10. Mild aortic regurgitation.  11. Trivial pericardial effusion.  12. There is a mobile echo density attached to the aortic valve leaflet. This is suspicious of aortic valve fibroelastoma. Recommend SAADIA for further evaluation.    MD Comfort Electronically signed on 1/6/2021 at 6:06:27 PM    < end of copied text >      ALLERGIES: Allergies    No Known Allergies    Intolerances      PAST MEDICAL HISTORY  Memory loss, short term    Brain aneurysm    Diabetes insipidus    DM (Diabetes Mellitus)    HTN (Hypertension)    Subarachnoid Hemorrhage        PAST SURGICAL HISTORY  H/O gastric bypass    S/P Cerebral Aneurysm Repair    S/P Craniotomy        FAMILY HISTORY:  Family history of hypertension  mother        SOCIAL HISTORY:  Denies smoking/alcohol/drugs      CURRENT MEDICATIONS:  amLODIPine   Tablet 5 milliGRAM(s) Oral daily     levETIRAcetam  pantoprazole    Tablet  cefTRIAXone   IVPB  chlorhexidine 2% Cloths  chlorhexidine 4% Liquid  desmopressin  enoxaparin Injectable  folic acid  multivitamin  sodium bicarbonate  vancomycin  IVPB      HOME MEDICATIONS:  Home Medications:  desmopressin 0.1 mg oral tablet: 1 tab(s) orally 3 times a day (03 Oct 2020 00:53)  folic acid 1 mg oral tablet: 1 tab(s) orally once a day (03 Oct 2020 00:53)  levETIRAcetam 500 mg oral tablet: 1 tab(s) orally 2 times a day (03 Oct 2020 00:53)      Vital Signs Last 24 Hrs  T(C): 37.2 (11 Jan 2021 09:42), Max: 37.3 (10 Jose C 2021 20:35)  T(F): 99 (11 Jan 2021 09:42), Max: 99.2 (10 Jose C 2021 20:35)  HR: 85 (11 Jan 2021 09:42) (71 - 85)  BP: 138/90 (11 Jan 2021 09:42) (106/66 - 138/90)  RR: 18 (11 Jan 2021 09:42) (18 - 18)  SpO2: 93% (11 Jan 2021 09:42) (93% - 95%)      PHYSICAL EXAM:  Due to the nature of this patient's COVID-19 isolation status, no bedside physical exam done to limit spread of infection.  Examination highlights were provided by bedside nurse. Objective data were reviewed in detail.     Intake and output:     LABS:                        8.3    7.25  )-----------( 422      ( 11 Jan 2021 07:50 )             28.4     01-11    138  |  102  |  17.0  ----------------------------<  76  4.1   |  29.0  |  1.01    Ca    8.8      11 Jan 2021 07:50  Mg     2.0     01-11    TPro  6.5<L>  /  Alb  3.0<L>  /  TBili  0.2<L>  /  DBili  x   /  AST  22  /  ALT  25  /  AlkPhos  87  01-10      ;p-BNP=        INTERPRETATION OF TELEMETRY: Reviewed by me. SR  ECG: Reviewed by me. SR, low voltage QRS, PRWP, NSST/T wave abnormalities     RADIOLOGY & ADDITIONAL STUDIES:    X-ray:  reviewed by me.   < from: Xray Chest 1 View- PORTABLE-Urgent (Xray Chest 1 View- PORTABLE-Urgent .) (01.11.21 @ 09:33) >   EXAM:  XR CHEST PORTABLE URGENT 1V                          PROCEDURE DATE:  01/11/2021          INTERPRETATION:  Chest X-ray    History: PICC placement    Frontal view of the chest obtained after PICC placement shows left PICC reaching the level of the superior vena cava.    The lungs show right base atelectasis or developing infiltrate.    IMPRESSION: Left PICC line to SVC. Right base as noted.    Thank you for the courtesy of this referral.    < end of copied text >    CT scan:   < from: CT Chest No Cont (12.31.20 @ 02:04) >  CHEST:    LUNGS AND AIRWAYS: Patent central airways. Groundglass/patchy opacities in the left upper lobe. Linear/patchy/groundglass opacities at the lung bases. Nonspecific 0.3 cm nodule in the right middle lobe. Question nonspecific interlobular septal thickening. Small cysts scattered at the lung bases.  PLEURA: No pneumothorax. Trace right pleural effusion.  HEART: Stable size and small pericardial fluid.  VESSELS: Atherosclerosis. Normal caliber of the thoracic aorta. Borderline main pulmonary artery (3.0 cm).  MEDIASTINUM AND MYRNA: Subcentimeter mediastinal lymph nodes without lymphadenopathy.  CHEST WALL AND LOWER NECK: Right PICC line terminating in the SVC. Nonspecific bilateral breast soft tissue calcifications.    ABDOMEN/PELVIS:    LIVER: Unremarkable.  GALLBLADDER/BILE DUCTS: No intrahepatic or extrahepatic biliary dilatation. No radiopaque gallstone.  PANCREAS: Fatty atrophy.  SPLEEN: Unremarkable.    ADRENALS: Unremarkable.  KIDNEYS/URETERS: No hydronephrosis, hydroureter or significant perinephric stranding. No radiopaque urinary tract stone. Again noted, right renal cysts.  BLADDER: Partially distended.  REPRODUCTIVE ORGANS: Postsurgical changes in the uterus. No obvious adnexal mass.    BOWEL: Nupur-en-Y gastric bypass. No bowel obstruction. Unremarkable appendix. Stool throughout the colon. Small bowel small bowel anastomosis in the left abdomen again noted.  PERITONEUM: No drainable fluid collection or free air.  VESSELS: Atherosclerosis. Normal caliber of the abdominal aorta.  RETROPERITONEUM: No lymphadenopathy.  ABDOMINAL WALL/SOFT TISSUES: Post surgical changes along the anterior abdominal and pelvic wall soft tissue. Small fat-containing umbilical hernia, supra/infraumbilical ventral hernias and inguinal hernias again noted.  BONES: Degenerative changes of the spine and acromioclavicular joints. Age-indeterminate endplate depression of T1-T9 (new at T1-T8 since 5/13/2012). Cortical irregularity and subchondral lucencies at L2-L3 level. NewT12 superior endplate subchondral lucency or Schmorl's node.    IMPRESSION:    Pulmonary opacities as described, suspicious for COVID-19 pneumonia.    No bowel obstruction.    Age-indeterminate endplate depression of T1-T9 as described. Recommend comparison to interval outside study. Cortical irregularity and subchondral lucencies at L2-L3 level, which is new since 9/14/2013 and felt to be degenerative in etiology. Recommend clinical correlation to assess infection/inflammation. Follow-up MRI maybe obtained as clinically indicated.    Additional findings as described.      < end of copied text >    MRI:

## 2021-01-11 NOTE — PROCEDURE NOTE - NSPOSTCAREGUIDE_GEN_A_CORE
Verbal/written post procedure instructions were given to patient/caregiver/Instructed patient/caregiver regarding signs and symptoms of infection/Care for catheter as per unit/ICU protocols
Verbal/written post procedure instructions were given to patient/caregiver/Instructed patient/caregiver to follow-up with primary care physician/Instructed patient/caregiver regarding signs and symptoms of infection/Keep the cast/splint/dressing clean and dry/Care for catheter as per unit/ICU protocols

## 2021-01-11 NOTE — CHART NOTE - NSCHARTNOTEFT_GEN_A_CORE
Source: Patient [ ]  Family [ ]   other [x ]EMR    Current Diet: Dash    PO intake:  < 50% [ ]   50-75%  [x ]   %  [ ]  other :    Source for PO intake [ ] Patient [ ] family [x ] chart [ ] staff [ ] other    Enteral /Parenteral Nutrition:     Current Weight: 125#  no edema    % Weight Change     Pertinent Medications: MEDICATIONS  (STANDING):  amLODIPine   Tablet 5 milliGRAM(s) Oral daily  cefTRIAXone   IVPB 2000 milliGRAM(s) IV Intermittent every 24 hours  chlorhexidine 2% Cloths 1 Application(s) Topical <User Schedule>  chlorhexidine 4% Liquid 1 Application(s) Topical <User Schedule>  desmopressin 0.1 milliGRAM(s) Oral two times a day  enoxaparin Injectable 40 milliGRAM(s) SubCutaneous daily  folic acid 1 milliGRAM(s) Oral daily  levETIRAcetam 500 milliGRAM(s) Oral two times a day  magnesium oxide 400 milliGRAM(s) Oral three times a day with meals  multivitamin 1 Tablet(s) Oral daily  pantoprazole    Tablet 40 milliGRAM(s) Oral before breakfast  sodium bicarbonate 1300 milliGRAM(s) Oral two times a day  vancomycin  IVPB 500 milliGRAM(s) IV Intermittent every 12 hours    MEDICATIONS  (PRN):  acetaminophen   Tablet .. 650 milliGRAM(s) Oral every 6 hours PRN Temp greater or equal to 38.5C (101.3F)  sodium chloride 0.9% lock flush 10 milliLiter(s) IV Push every 1 hour PRN Pre/post blood products, medications, blood draw, and to maintain line patency    Pertinent Labs: CBC Full  -  ( 11 Jan 2021 07:50 )  WBC Count : 7.25 K/uL  RBC Count : 3.29 M/uL  Hemoglobin : 8.3 g/dL  Hematocrit : 28.4 %  Platelet Count - Automated : 422 K/uL  Mean Cell Volume : 86.3 fl  Mean Cell Hemoglobin : 25.2 pg  Mean Cell Hemoglobin Concentration : 29.2 gm/dL  Auto Neutrophil # : x  Auto Lymphocyte # : x  Auto Monocyte # : x  Auto Eosinophil # : x  Auto Basophil # : x  Auto Neutrophil % : x  Auto Lymphocyte % : x  Auto Monocyte % : x  Auto Eosinophil % : x  Auto Basophil % : x      01-11 Na138 mmol/L Glu 76 mg/dL K+ 4.1 mmol/L Cr  1.01 mg/dL BUN 17.0 mg/dL Phos n/a   Alb n/a   PAB n/a           Skin:     Nutrition focused physical exam not conducted - found signs of malnutrition [ ]absent [ ]present    Subcutaneous fat loss: [ ] Orbital fat pads region, [ ]Buccal fat region, [ ]Triceps region,  [ ]Ribs region    Muscle wasting: [ ]Temples region, [ ]Clavicle region, [ ]Shoulder region, [ ]Scapula region, [ ]Interosseous region,  [ ]thigh region, [ ]Calf region    Estimated Needs:   [x ] no change since previous assessment  [ ] recalculated:     Current Nutrition Diagnosis: Increased Nutrient Needs related to increased physiological demand to promote healing as evidenced by COVID+. Plan is home with picc line.    Recommendations: Rec Ensure TID. Continue MVI, folic acid    Monitoring and Evaluation:   [ x] PO intake [ x] Tolerance to diet prescription [X] Weights  [X] Follow up per protocol [X] Labs:

## 2021-01-11 NOTE — CONSULT NOTE ADULT - ATTENDING COMMENTS
55F with bacteremia and concern for aortic valve mass    - calcification versus small non-mobile fibroelastoma versus calcified vegetation on ventricular aspect of aortic valve on prior TTE  - can repeat TTE now to reassess size and mobility  - unable to perform SAADIA currently due to ongoing COVID19 precautions; follow up with Cardiology as outpatient for SAADIA when COVID19 cleared  - antimicrobial therapy per ID and primary team

## 2021-01-11 NOTE — PROGRESS NOTE ADULT - ASSESSMENT
55y/oF PMH SAH, DI, chronic hypernatremia BIBA with dizziness. Pt came in with PICC line, reports getting D5W every other day for hypernatremia. Pt admitted with hypernatremia, started on IVF and desmopressin. Also found to be COVID+ with positive blood cultures. PICC line removed, pt started vanc/zosyn. Repeat blood cultures (1/1) with gram positive rods. Additional repeat blood cultures (1/5) NGTD.     Bacteremia   -Klebsiella pneumoniae and bacillus sp (not anthracis)   -repeat cx 1/1 +GPR  -repeat cx 1/5 NGTD   -PICC removed, tip cx ngtd   -cont vanc, rocephin as per ID   -TTE with ?aortic valve fibroelastoma, SAADIA recommended  -D/w Dr. Steen, SAADIA deferred 2weeks, repeat TTE to reassess density    COVID -19 infection  -not requiring supplemental O2  -cont supportive treatment     Hypernatremia, chronic 2/2 Diabetes insipidus   -cont desmopressin  -encourage PO intake     Hypertension  -bp increasing, hypotensive on arrival, restart amlodipine, cont to monitor     anemia of chronic disease   -s/p 1u PRBC   -cont to monitor, no signs of active bleeding     pt declined for family updates     dispo; poss home with PICC line next 24hrs

## 2021-01-11 NOTE — CONSULT NOTE ADULT - ASSESSMENT
A/P:  54 y/o F with a PMHx of SAH, diabetes insipidus, chronic hypernatremia who was BIBA for dizziness, and was found to be hypotensive with Covid 19 infection. Patient has a PICC line in place that she gets D5W through every other day for hypernatremia. Patient was found to have klebsiella pna/bacillus species bacteremia, with a mass on the AV ?vegetation vs. fibroelastoma. Patient states she is feeling well, and wants to go home. Due to active Covid 19 infection at this time, defer SAADIA. Discussed with patient the need for SAADIA as an outpatient in 2-3 weeks when covid infection subsides, and patient is in agreement.     Bacteremia  - Klebsiella pna/bacillus species bacteremia.   - Echo with aortic valve mobile echodensity concerning for vegetation vs. fibroelastoma.   - Repeat limited echo to re-evaluate AV.  - Due to active Covid 19 infection will defer SAADIA at this time for 2 weeks until covid infection resolves.   - Continue IV abx per ID.     Covid -19 Infection  - Management per ID and PMT.     HTN  -Continue home meds.     Assessment and recommendations are final when note is signed by the attending.   A/P:  54 y/o F with a PMHx of SAH, diabetes insipidus, chronic hypernatremia who was BIBA for dizziness, and was found to be hypotensive with Covid 19 infection. Patient has a PICC line in place that she gets D5W through every other day for hypernatremia. Patient was found to have klebsiella pna/bacillus species bacteremia, with a mass on the AV ?vegetation vs. fibroelastoma. Patient states she is feeling well, and wants to go home. Due to active Covid 19 infection at this time, defer SAADIA. Discussed with patient the need for SAADIA as an outpatient in 2-3 weeks when covid infection subsides, and patient is in agreement.     Bacteremia  - Klebsiella pna/bacillus species bacteremia.   - Echo with aortic valve mobile echodensity concerning for vegetation vs. fibroelastoma.   - Repeat limited echo to re-evaluate AV.  - Due to active Covid 19 infection will defer SAADIA at this time for 2 weeks until covid infection resolves.   - Continue IV abx per ID.     Covid -19 Infection  - Management per ID and PMT.     HTN  -Continue home meds.

## 2021-01-11 NOTE — PROGRESS NOTE ADULT - ASSESSMENT
Pt iwth Di - managed as out pt with DDAVOP and intermittent IVF as pt has lack of thirst sensation  + COVID     pt Na has been stable   will cont curretn   RX    pt states she  has outpt Endo  helps to manage her serum  sodium

## 2021-01-11 NOTE — PROCEDURE NOTE - NSPOSTPRCRAD_GEN_A_CORE
post-procedure radiography performed
chest radiograph/depth of insertion/line adjusted to depth of insertion/line in appropriate postion/postion of catheter/ultrasound

## 2021-01-11 NOTE — PROGRESS NOTE ADULT - ASSESSMENT
54 y/o female with h/o SAH, diabetes insipidus, chronic hypernatremia was BIBA for as pt. felt a little dizzy after coming out of bathroom, no syncope, no fall, no trauma, no focal weakness, no palpitations, no cp, no sob. pt. reports appetite has been ok, no abd. pain, no n/v/d. no fever. appetite is good. no sick contact. As per pt. she has a PICC line and every other day gets D5W through that for h/o hypernatremia. pt. bp 92/60 , hr 87 upon arrival. As per pt. she runs bp on the low side.     Leukocytosis   Fever  TOSHIA  Hypernatremia    - now afebrile  - COVID PCR + but asymptomatic and not requiring supplemental O2, COVID IgG (-), repeat COVID PCr +  - UA (-) and CXr (-)  - BCX klebsiella and Bacillus sp  - PICC tip CX ngtd  - repeat BCX 1/1 + Bacillus sp  - repeat BCX 1/5 NGTD  - c/w ceftriaxone 2 g IV daily  - c/w vancomycin last trough 12.6, dose adjusted 500 mg iv q12h  - TTE ?aortic valve fibroelastoma, SAADIA recommended, d/w Dr Bolton  - PICC placed for IV abx  - clarify with renal re need for long term PICC for IVF  - Trend Fever  - Trend Leukocytosis    d/w Dr Arauz  Will Follow

## 2021-01-11 NOTE — PROGRESS NOTE ADULT - SUBJECTIVE AND OBJECTIVE BOX
KRUPA MEDINA    [id number]    55y      Female    CC: hypernatremia     INTERVAL HPI/OVERNIGHT EVENTS: pt seen and examined. no acute events o/n. picc placed     REVIEW OF SYSTEMS:    CONSTITUTIONAL: No fever, weight loss, or fatigue  RESPIRATORY: No cough, wheezing, hemoptysis; No shortness of breath  CARDIOVASCULAR: No chest pain, palpitations  GASTROINTESTINAL: No abdominal or epigastric pain. No nausea, vomiting  NEUROLOGICAL: No headaches, memory loss, loss of strength.    Vital Signs Last 24 Hrs  T(C): 37.2 (11 Jan 2021 09:42), Max: 37.3 (10 Jose C 2021 20:35)  T(F): 99 (11 Jan 2021 09:42), Max: 99.2 (10 Jose C 2021 20:35)  HR: 85 (11 Jan 2021 09:42) (71 - 85)  BP: 138/90 (11 Jan 2021 09:42) (106/66 - 138/90)  BP(mean): --  RR: 18 (11 Jan 2021 09:42) (18 - 18)  SpO2: 93% (11 Jan 2021 09:42) (93% - 95%)    PHYSICAL EXAM:    GENERAL: NAD  HEENT: PERRL, +EOMI  NECK: soft, supple  CHEST/LUNG: Clear to auscultation bilaterally; No wheezing  HEART: S1S2+, Regular rate and rhythm; No murmurs, rubs, or gallops  ABDOMEN: Soft, Nontender, Nondistended; Bowel sounds present  SKIN: warm, dry  NEURO: AAOX3, no focal deficits  PSYCH: normal affect    LABS:                        8.3    7.25  )-----------( 422      ( 11 Jan 2021 07:50 )             28.4     01-11    138  |  102  |  17.0  ----------------------------<  76  4.1   |  29.0  |  1.01    Ca    8.8      11 Jan 2021 07:50  Mg     2.0     01-11    TPro  6.5<L>  /  Alb  3.0<L>  /  TBili  0.2<L>  /  DBili  x   /  AST  22  /  ALT  25  /  AlkPhos  87  01-10            MEDICATIONS  (STANDING):  amLODIPine   Tablet 5 milliGRAM(s) Oral daily  cefTRIAXone   IVPB 2000 milliGRAM(s) IV Intermittent every 24 hours  chlorhexidine 2% Cloths 1 Application(s) Topical <User Schedule>  chlorhexidine 4% Liquid 1 Application(s) Topical <User Schedule>  desmopressin 0.1 milliGRAM(s) Oral two times a day  enoxaparin Injectable 40 milliGRAM(s) SubCutaneous daily  folic acid 1 milliGRAM(s) Oral daily  levETIRAcetam 500 milliGRAM(s) Oral two times a day  multivitamin 1 Tablet(s) Oral daily  pantoprazole    Tablet 40 milliGRAM(s) Oral before breakfast  sodium bicarbonate 1300 milliGRAM(s) Oral two times a day  vancomycin  IVPB 500 milliGRAM(s) IV Intermittent every 12 hours    MEDICATIONS  (PRN):  acetaminophen   Tablet .. 650 milliGRAM(s) Oral every 6 hours PRN Temp greater or equal to 38.5C (101.3F)  sodium chloride 0.9% lock flush 10 milliLiter(s) IV Push every 1 hour PRN Pre/post blood products, medications, blood draw, and to maintain line patency      RADIOLOGY & ADDITIONAL TESTS:

## 2021-01-11 NOTE — PROGRESS NOTE ADULT - SUBJECTIVE AND OBJECTIVE BOX
[facility]  INFECTIOUS DISEASES AND INTERNAL MEDICINE at [place]  =======================================================  Mj Lee MD  Diplomates American Board of Internal Medicine and Infectious Diseases  Tel: [phone]      Fax: [phone]  =======================================================    KRUPA MEDINA [id number]    Follow up:      Allergies:  No Known Allergies           REVIEW OF SYSTEMS:  CONSTITUTIONAL:  No Fever or chills  HEENT:   No diplopia or blurred vision.  No earache, sore throat or runny nose.  CARDIOVASCULAR:  No pressure, squeezing, strangling, tightness, heaviness or aching about the chest, neck, axilla or epigastrium.  RESPIRATORY:  No cough, shortness of breath  GASTROINTESTINAL:  No nausea, vomiting or diarrhea.  GENITOURINARY:  No dysuria, frequency or urgency. No Blood in urine  MUSCULOSKELETAL:  no joint aches, no muscle pain  SKIN:  No change in skin, hair or nails.  NEUROLOGIC:  No Headaches, seizures or weakness.  PSYCHIATRIC:  No disorder of thought or mood.  ENDOCRINE:  No heat or cold intolerance  HEMATOLOGICAL:  No easy bruising or bleeding.       Physical Exam:  GEN: NAD, pleasant  HEENT: normocephalic and atraumatic. EOMI. PERRL.  Anicteric   NECK: Supple.   LUNGS: Clear to auscultation.  HEART: Regular rate and rhythm without murmur.  ABDOMEN: Soft, nontender, and nondistended.  Positive bowel sounds.    : No CVA tenderness  EXTREMITIES: Without any edema. lue picc with dressing saturated with blood  MSK: no joint swelling  NEUROLOGIC: Cranial nerves II through XII are grossly intact. No focal deficits  PSYCHIATRIC: Appropriate affect .  SKIN: No Rash      Vitals:    T(F): 99 (11 Jan 2021 09:42), Max: 99.2 (10 Jose C 2021 20:35)  HR: 85 (11 Jan 2021 09:42)  BP: 138/90 (11 Jan 2021 09:42)  RR: 18 (11 Jan 2021 09:42)  SpO2: 93% (11 Jan 2021 09:42) (93% - 95%)  temp max in last 48H T(F): , Max: 99.2 (01-10-21 @ 20:35)      Current Antibiotics:  cefTRIAXone   IVPB 2000 milliGRAM(s) IV Intermittent every 24 hours  vancomycin  IVPB 500 milliGRAM(s) IV Intermittent every 12 hours    Other medications:  amLODIPine   Tablet 5 milliGRAM(s) Oral daily  chlorhexidine 2% Cloths 1 Application(s) Topical <User Schedule>  chlorhexidine 4% Liquid 1 Application(s) Topical <User Schedule>  desmopressin 0.1 milliGRAM(s) Oral two times a day  enoxaparin Injectable 40 milliGRAM(s) SubCutaneous daily  folic acid 1 milliGRAM(s) Oral daily  levETIRAcetam 500 milliGRAM(s) Oral two times a day  multivitamin 1 Tablet(s) Oral daily  pantoprazole    Tablet 40 milliGRAM(s) Oral before breakfast  sodium bicarbonate 1300 milliGRAM(s) Oral two times a day        Labs:                        8.3    7.25  )-----------( 422      ( 11 Jan 2021 07:50 )             28.4      01-11    138  |  102  |  17.0  ----------------------------<  76  4.1   |  29.0  |  1.01    Ca    8.8      11 Jan 2021 07:50  Mg     2.0     01-11    TPro  6.5<L>  /  Alb  3.0<L>  /  TBili  0.2<L>  /  DBili  x   /  AST  22  /  ALT  25  /  AlkPhos  87  01-10      Culture - Blood (collected 01-05-21 @ 17:59)  Source: .Blood Blood-Venous  Final Report (01-10-21 @ 19:01):    No growth at 5 days.    Culture - Blood (collected 01-05-21 @ 17:59)  Source: .Blood Blood-Peripheral  Final Report (01-10-21 @ 19:01):    No growth at 5 days.    Culture - Blood (collected 01-01-21 @ 12:54)  Source: .Blood Blood  Gram Stain (01-05-21 @ 09:30):    Growth in anaerobic bottle: Gram Positive Rods    Gram Stain performed by:    [facility]    [address] [place] [address]    ,    TYPE: (C=Critical, N=Notification, A=Abnormal) c    TESTS:  _ gs    DATE/TIME CALLED: _ 01/05/2021 09:30:08    CALLED TO: _ gale laird rn    READ BACK (2 Patient Identifiers)(Y/N): _ y    READ BACK VALUES (Y/N): _ y    CALLED BY: _ dimitri sinclair  Final Report (01-06-21 @ 13:16):    Growth in anaerobic bottle: Bacillus species not anthracis    "Susceptibilities not performed"    Culture - Blood (collected 01-01-21 @ 12:54)  Source: .Blood Blood  Final Report (01-06-21 @ 13:00):    No growth at 5 days.    Culture - Catheter (collected 12-31-20 @ 22:24)  Source: .Catheter PICC Tip  Final Report (01-02-21 @ 16:11):    No growth    Culture - Blood (collected 12-30-20 @ 01:52)  Source: .Blood Blood-Peripheral  Gram Stain (12-31-20 @ 14:47):    Growth in anaerobic bottle: Gram Positive Rods    Gram Stain performed by:    [facility]    [address] [place] [address]    .    TYPE: (C=Critical, N=Notification, A=Abnormal) C    TESTS:  _ GS    DATE/TIME CALLED: _ 12/31/2020 14:45:54    CALLED TO: _ Kacey Tavarez RN    READ BACK (2 Patient Identifiers)(Y/N): _ Y    READ BACK VALUES (Y/N): _ Y    CALLED BY: Davis Rodriguez  Final Report (01-01-21 @ 17:55):    Growth in anaerobic bottle: Bacillus species not anthracis    "Susceptibilities not performed"    Culture - Blood (collected 12-30-20 @ 01:52)  Source: .Blood Blood-Peripheral  Gram Stain (12-31-20 @ 09:27):    Growth in aerobic bottle: Gram Positive Rods    Growth in anaerobic bottle: Gram Negative Rods    ***Blood Panel PCR results on this specimen are available    approximately 3 hours after the Gram stain result.***    Gram stain, PCR, and/or culture results may not always    correspond due to difference in methodologies.    ************************************************************    This PCR assay was performed using [employer].    The following targets are tested for: Enterococcus,    vancomycin resistant enterococci, Listeria monocytogenes,    coagulase negative staphylococci, S. aureus,    methicillin resistant S. aureus, Streptococcus agalactiae    (Group B), S. pneumoniae, S. pyogenes (Group A),    Acinetobacter baumannii, Enterobacter cloacae, E. coli,    Klebsiella oxytoca, K. pneumoniae, Proteus sp.,    Serratia marcescens, Haemophilus influenzae,    Neisseria meningitidis, Pseudomonas aeruginosa, Candida    albicans, C. glabrata, C krusei, C parapsilosis,    C. tropicalis and the KPC resistance gene.    Gram Stain and BCID performed by:    [facility]    [address] [place] [address]    .    TYPE: (C=Critical, N=Notification, A=Abnormal) C    TESTS:  _ GS    DATE/TIME CALLED: _ 12/31/2020 09:26:34    CALLED TO: Davis Hanson    READ BACK (2 Patient Identifiers)(Y/N): _ Y    READ BACK VALUES (Y/N): _ Y    CALLED BY: Davis Rodriguez  Final Report (01-02-21 @ 15:14):    Growth in anaerobic bottle: Klebsiella pneumoniae    Growth in aerobic bottle: Bacillus species not anthracis    "Susceptibilities not performed"  Organism: Blood Culture PCR  Klebsiella pneumoniae (01-02-21 @ 15:14)  Organism: Klebsiella pneumoniae (01-02-21 @ 15:14)    Sensitivities:      -  Amikacin: S <=16      -  Ampicillin: R 16 These ampicillin results predict results for amoxicillin      -  Ampicillin/Sulbactam: S <=4/2 Enterobacter, Citrobacter, and Serratia may develop resistance during prolonged therapy (3-4 days)      -  Aztreonam: S <=4      -  Cefazolin: S <=2 Enterobacter, Citrobacter, and Serratia may develop resistance during prolonged therapy (3-4 days)      -  Cefepime: S <=2      -  Cefoxitin: S <=8      -  Ceftriaxone: S <=1 Enterobacter, Citrobacter, and Serratia may develop resistance during prolonged therapy      -  Ciprofloxacin: S <=0.25      -  Ertapenem: S <=0.5      -  Gentamicin: S <=2      -  Imipenem: S <=1      -  Levofloxacin: S <=0.5      -  Meropenem: S <=1      -  Piperacillin/Tazobactam: S <=8      -  Tobramycin: S <=2      -  Trimethoprim/Sulfamethoxazole: S <=0.5/9.5      Method Type: AWA  Organism: Blood Culture PCR (01-02-21 @ 15:14)    Sensitivities:      -  Klebsiella pneumoniae: Detec      Method Type: PCR      WBC Count: 7.25 K/uL (01-11-21 @ 07:50)  WBC Count: 7.16 K/uL (01-09-21 @ 09:03)  WBC Count: 6.55 K/uL (01-08-21 @ 07:17)  WBC Count: 5.37 K/uL (01-07-21 @ 08:19)    Creatinine, Serum: 1.01 mg/dL (01-11-21 @ 07:50)  Creatinine, Serum: 1.09 mg/dL (01-10-21 @ 09:07)  Creatinine, Serum: 1.42 mg/dL (01-09-21 @ 09:03)  Creatinine, Serum: 1.30 mg/dL (01-08-21 @ 07:17)  Creatinine, Serum: 1.27 mg/dL (01-07-21 @ 08:19)      Ferritin, Serum: 753 ng/mL (12-30-20 @ 01:51)           COVID-19 PCR: Detected (01-06-21 @ 08:23)  COVID-19 IgG Antibody Interpretation: Negative (12-30-20 @ 06:49)  COVID-19 IgG Antibody Index: 0.06 Index (12-30-20 @ 06:49)  COVID-19 PCR: Detected (12-29-20 @ 22:59)      < from: Xray Chest 1 View- PORTABLE-Urgent (Xray Chest 1 View- PORTABLE-Urgent .) (01.11.21 @ 09:33) >  IMPRESSION: Left PICC line to SVC. Right base as noted.    Thank you for the courtesy of this referral.    < end of copied text >    < from: TTE Echo Complete w/o Contrast w/ Doppler (01.06.21 @ 12:24) >  Summary:   1. Technically adequatestudy.   2. Normal global left ventricular systolic function.   3. Left ventricular ejection fraction, by visual estimation, is 70 to 75%.   4. Spectral Doppler shows impaired relaxation pattern of left ventricular myocardial filling (Grade I diastolic dysfunction).   5. There is severe concentric left ventricular hypertrophy.   6. Mildly enlarged left atrium.   7. Mild thickening of the anterior and posterior mitral valve leaflets.   8. Mild mitral valve regurgitation.   9. Sclerotic aortic valve with normal opening.  10. Mild aortic regurgitation.  11. Trivial pericardial effusion.  12. There is a mobile echo density attached to the aortic valve leaflet. This is suspicious of aortic valve fibroelastoma. Recommend SAADIA for further evaluation.    MD Comfort Electronically signed on 1/6/2021 at 6:06:27 PM      < end of copied text >

## 2021-01-12 LAB
SARS-COV-2 RNA SPEC QL NAA+PROBE: SIGNIFICANT CHANGE UP
VANCOMYCIN TROUGH SERPL-MCNC: 12.8 UG/ML — SIGNIFICANT CHANGE UP (ref 10–20)

## 2021-01-12 PROCEDURE — 99232 SBSQ HOSP IP/OBS MODERATE 35: CPT

## 2021-01-12 RX ORDER — AMLODIPINE BESYLATE 2.5 MG/1
10 TABLET ORAL DAILY
Refills: 0 | Status: DISCONTINUED | OUTPATIENT
Start: 2021-01-13 | End: 2021-01-21

## 2021-01-12 RX ADMIN — CHLORHEXIDINE GLUCONATE 1 APPLICATION(S): 213 SOLUTION TOPICAL at 04:31

## 2021-01-12 RX ADMIN — Medication 1300 MILLIGRAM(S): at 04:30

## 2021-01-12 RX ADMIN — Medication 1 MILLIGRAM(S): at 10:49

## 2021-01-12 RX ADMIN — LEVETIRACETAM 500 MILLIGRAM(S): 250 TABLET, FILM COATED ORAL at 04:29

## 2021-01-12 RX ADMIN — AMLODIPINE BESYLATE 5 MILLIGRAM(S): 2.5 TABLET ORAL at 04:29

## 2021-01-12 RX ADMIN — Medication 100 MILLIGRAM(S): at 23:18

## 2021-01-12 RX ADMIN — LEVETIRACETAM 500 MILLIGRAM(S): 250 TABLET, FILM COATED ORAL at 17:19

## 2021-01-12 RX ADMIN — Medication 1 TABLET(S): at 10:49

## 2021-01-12 RX ADMIN — DESMOPRESSIN ACETATE 0.1 MILLIGRAM(S): 0.1 TABLET ORAL at 04:29

## 2021-01-12 RX ADMIN — ENOXAPARIN SODIUM 40 MILLIGRAM(S): 100 INJECTION SUBCUTANEOUS at 10:49

## 2021-01-12 RX ADMIN — DESMOPRESSIN ACETATE 0.1 MILLIGRAM(S): 0.1 TABLET ORAL at 17:19

## 2021-01-12 RX ADMIN — PANTOPRAZOLE SODIUM 40 MILLIGRAM(S): 20 TABLET, DELAYED RELEASE ORAL at 04:29

## 2021-01-12 RX ADMIN — Medication 100 MILLIGRAM(S): at 10:49

## 2021-01-12 RX ADMIN — Medication 1300 MILLIGRAM(S): at 17:19

## 2021-01-12 NOTE — PROGRESS NOTE ADULT - SUBJECTIVE AND OBJECTIVE BOX
KRUPA MEDINA    [id number]    55y      Female    CC: hypernatremia     INTERVAL HPI/OVERNIGHT EVENTS: pt seen and examined. no complaints     REVIEW OF SYSTEMS:    CONSTITUTIONAL: No fever, weight loss  RESPIRATORY: No cough, wheezing, hemoptysis; No shortness of breath  CARDIOVASCULAR: No chest pain, palpitations  GASTROINTESTINAL: No abdominal or epigastric pain. No nausea, vomiting  NEUROLOGICAL: No headaches, memory loss    Vital Signs Last 24 Hrs  T(C): 37.1 (12 Jan 2021 08:21), Max: 37.2 (12 Jan 2021 04:09)  T(F): 98.7 (12 Jan 2021 08:21), Max: 98.9 (12 Jan 2021 04:09)  HR: 76 (12 Jan 2021 08:21) (76 - 85)  BP: 158/99 (12 Jan 2021 08:21) (130/83 - 158/99)  BP(mean): --  RR: 18 (12 Jan 2021 08:21) (18 - 19)  SpO2: 94% (12 Jan 2021 08:21) (94% - 95%)    PHYSICAL EXAM:  GENERAL: NAD  HEENT: PERRL, +EOMI  NECK: soft, supple  CHEST/LUNG: Clear to auscultation bilaterally; No wheezing  HEART: S1S2+, Regular rate and rhythm; No murmurs, rubs, or gallops  ABDOMEN: Soft, Nontender, Nondistended; Bowel sounds present  SKIN: warm, dry  NEURO: AAOX3, no focal deficits  PSYCH: normal affect    LABS:                        8.3    7.25  )-----------( 422      ( 11 Jan 2021 07:50 )             28.4     01-11    138  |  102  |  17.0  ----------------------------<  76  4.1   |  29.0  |  1.01    Ca    8.8      11 Jan 2021 07:50  Mg     2.0     01-11              MEDICATIONS  (STANDING):  amLODIPine   Tablet 5 milliGRAM(s) Oral daily  chlorhexidine 2% Cloths 1 Application(s) Topical <User Schedule>  chlorhexidine 4% Liquid 1 Application(s) Topical <User Schedule>  desmopressin 0.1 milliGRAM(s) Oral two times a day  enoxaparin Injectable 40 milliGRAM(s) SubCutaneous daily  folic acid 1 milliGRAM(s) Oral daily  levETIRAcetam 500 milliGRAM(s) Oral two times a day  multivitamin 1 Tablet(s) Oral daily  pantoprazole    Tablet 40 milliGRAM(s) Oral before breakfast  sodium bicarbonate 1300 milliGRAM(s) Oral two times a day  vancomycin  IVPB 500 milliGRAM(s) IV Intermittent every 12 hours    MEDICATIONS  (PRN):  acetaminophen   Tablet .. 650 milliGRAM(s) Oral every 6 hours PRN Temp greater or equal to 38.5C (101.3F)  sodium chloride 0.9% lock flush 10 milliLiter(s) IV Push every 1 hour PRN Pre/post blood products, medications, blood draw, and to maintain line patency      RADIOLOGY & ADDITIONAL TESTS:

## 2021-01-12 NOTE — PROGRESS NOTE ADULT - ASSESSMENT
54 y/o female with h/o SAH, diabetes insipidus, chronic hypernatremia was BIBA for as pt. felt a little dizzy after coming out of bathroom, no syncope, no fall, no trauma, no focal weakness, no palpitations, no cp, no sob. pt. reports appetite has been ok, no abd. pain, no n/v/d. no fever. appetite is good. no sick contact. As per pt. she has a PICC line and every other day gets D5W through that for h/o hypernatremia. pt. bp 92/60 , hr 87 upon arrival. As per pt. she runs bp on the low side.     Leukocytosis   Fever  TOSHIA  Hypernatremia    - now afebrile  - COVID PCR + but asymptomatic and not requiring supplemental O2, COVID IgG (-), repeat COVID PCr +, likely inactive virus  - UA (-) and CXr (-)  - BCX klebsiella and Bacillus sp  - PICC tip CX ngtd  - repeat BCX 1/1 + Bacillus sp  - repeat BCX 1/5 NGTD  - c/w ceftriaxone 2 g IV daily  - c/w vancomycin last trough 12.6, dose adjusted 500 mg iv q12h, trough to be repeated tonight  - TTE ?aortic valve fibroelastoma, SAADIA recommended but deferred by cardiology due to COVID status and repeat TTE suggested with outpatient f/u for SAADIA. Repeat Covid PCR today  - PICC placed for IV abx  - clarify with renal re need for long term PICC for IVF  - Trend Fever  - Trend Leukocytosis    d/w Dr Arauz, RN, pharmacy  Will Follow

## 2021-01-12 NOTE — PROGRESS NOTE ADULT - SUBJECTIVE AND OBJECTIVE BOX
[facility]  INFECTIOUS DISEASES AND INTERNAL MEDICINE at [place]  =======================================================  Mj Lee MD  Diplomates American Board of Internal Medicine and Infectious Diseases  Tel: [phone]      Fax: [phone]  =======================================================    KRUPA MEDINA [id number]    Follow up: bacteremia, covid 19  feels well  seen by cardiology-SAADIA deferred due to covid status, suggested TTE      Allergies:  No Known Allergies           REVIEW OF SYSTEMS:  CONSTITUTIONAL:  No Fever or chills  HEENT:   No diplopia or blurred vision.  No earache, sore throat or runny nose.  CARDIOVASCULAR:  No pressure, squeezing, strangling, tightness, heaviness or aching about the chest, neck, axilla or epigastrium.  RESPIRATORY:  No cough, shortness of breath  GASTROINTESTINAL:  No nausea, vomiting or diarrhea.  GENITOURINARY:  No dysuria, frequency or urgency. No Blood in urine  MUSCULOSKELETAL:  no joint aches, no muscle pain  SKIN:  No change in skin, hair or nails.  NEUROLOGIC:  No Headaches, seizures or weakness.  PSYCHIATRIC:  No disorder of thought or mood.  ENDOCRINE:  No heat or cold intolerance  HEMATOLOGICAL:  No easy bruising or bleeding.       Physical Exam:  GEN: NAD, pleasant  HEENT: normocephalic and atraumatic. EOMI. PERRL.  Anicteric   NECK: Supple.   LUNGS: Clear to auscultation.  HEART: Regular rate and rhythm without murmur.  ABDOMEN: Soft, nontender, and nondistended.  Positive bowel sounds.    : No CVA tenderness  EXTREMITIES: Without any edema.  MSK: no joint swelling  NEUROLOGIC: Cranial nerves II through XII are grossly intact. No focal deficits  PSYCHIATRIC: Appropriate affect .  SKIN: No Rash      Vitals:    T(F): 98.9 (12 Jan 2021 17:32), Max: 98.9 (12 Jan 2021 04:09)  HR: 80 (12 Jan 2021 17:32)  BP: 154/96 (12 Jan 2021 17:32)  RR: 18 (12 Jan 2021 17:32)  SpO2: 96% (12 Jan 2021 17:32) (94% - 96%)  temp max in last 48H T(F): , Max: 99.2 (01-10-21 @ 20:35)      Current Antibiotics:  vancomycin  IVPB 500 milliGRAM(s) IV Intermittent every 12 hours    Other medications:  chlorhexidine 2% Cloths 1 Application(s) Topical <User Schedule>  chlorhexidine 4% Liquid 1 Application(s) Topical <User Schedule>  desmopressin 0.1 milliGRAM(s) Oral two times a day  enoxaparin Injectable 40 milliGRAM(s) SubCutaneous daily  folic acid 1 milliGRAM(s) Oral daily  levETIRAcetam 500 milliGRAM(s) Oral two times a day  multivitamin 1 Tablet(s) Oral daily  pantoprazole    Tablet 40 milliGRAM(s) Oral before breakfast  sodium bicarbonate 1300 milliGRAM(s) Oral two times a day        Labs:                        8.3    7.25  )-----------( 422      ( 11 Jan 2021 07:50 )             28.4      01-11    138  |  102  |  17.0  ----------------------------<  76  4.1   |  29.0  |  1.01    Ca    8.8      11 Jan 2021 07:50  Mg     2.0     01-11        Culture - Blood (collected 01-05-21 @ 17:59)  Source: .Blood Blood-Venous  Final Report (01-10-21 @ 19:01):    No growth at 5 days.    Culture - Blood (collected 01-05-21 @ 17:59)  Source: .Blood Blood-Peripheral  Final Report (01-10-21 @ 19:01):    No growth at 5 days.    Culture - Blood (collected 01-01-21 @ 12:54)  Source: .Blood Blood  Gram Stain (01-05-21 @ 09:30):    Growth in anaerobic bottle: Gram Positive Rods    Gram Stain performed by:    [facility]    [address] [place] [address]    ,    TYPE: (C=Critical, N=Notification, A=Abnormal) c    TESTS:  _ gs    DATE/TIME CALLED: _ 01/05/2021 09:30:08    CALLED TO: Davis laird rn    READ BACK (2 Patient Identifiers)(Y/N): _ y    READ BACK VALUES (Y/N): _ y    CALLED BY: _ dimitri sinclair  Final Report (01-06-21 @ 13:16):    Growth in anaerobic bottle: Bacillus species not anthracis    "Susceptibilities not performed"    Culture - Blood (collected 01-01-21 @ 12:54)  Source: .Blood Blood  Final Report (01-06-21 @ 13:00):    No growth at 5 days.    Culture - Catheter (collected 12-31-20 @ 22:24)  Source: .Catheter PICC Tip  Final Report (01-02-21 @ 16:11):    No growth    Culture - Blood (collected 12-30-20 @ 01:52)  Source: .Blood Blood-Peripheral  Gram Stain (12-31-20 @ 14:47):    Growth in anaerobic bottle: Gram Positive Rods    Gram Stain performed by:    [facility]    [address] [place] [address]    .    TYPE: (C=Critical, N=Notification, A=Abnormal) C    TESTS:  _ GS    DATE/TIME CALLED: _ 12/31/2020 14:45:54    CALLED TO: Davis Tavarez RN    READ BACK (2 Patient Identifiers)(Y/N): _ Y    READ BACK VALUES (Y/N): _ Y    CALLED BY: Davis Rodriguez  Final Report (01-01-21 @ 17:55):    Growth in anaerobic bottle: Bacillus species not anthracis    "Susceptibilities not performed"    Culture - Blood (collected 12-30-20 @ 01:52)  Source: .Blood Blood-Peripheral  Gram Stain (12-31-20 @ 09:27):    Growth in aerobic bottle: Gram Positive Rods    Growth in anaerobic bottle: Gram Negative Rods    ***Blood Panel PCR results on this specimen are available    approximately 3 hours after the Gram stain result.***    Gram stain, PCR, and/or culture results may not always    correspond due to difference in methodologies.    ************************************************************    This PCR assay was performed using [employer].    The following targets are tested for: Enterococcus,    vancomycin resistant enterococci, Listeria monocytogenes,    coagulase negative staphylococci, S. aureus,    methicillin resistant S. aureus, Streptococcus agalactiae    (Group B), S. pneumoniae, S. pyogenes (Group A),    Acinetobacter baumannii, Enterobacter cloacae, E. coli,    Klebsiella oxytoca, K. pneumoniae, Proteus sp.,    Serratia marcescens, Haemophilus influenzae,    Neisseria meningitidis, Pseudomonas aeruginosa, Candida    albicans, C. glabrata, C krusei, C parapsilosis,    C. tropicalis and the KPC resistance gene.    Gram Stain and BCID performed by:    [facility]    [address] [place] [address]    .    TYPE: (C=Critical, N=Notification, A=Abnormal) C    TESTS:  _ GS    DATE/TIME CALLED: _ 12/31/2020 09:26:34    CALLED TO: Davis Hanson    READ BACK (2 Patient Identifiers)(Y/N): _ Y    READ BACK VALUES (Y/N): _ Y    CALLED BY: Davis Rodriguez  Final Report (01-02-21 @ 15:14):    Growth in anaerobic bottle: Klebsiella pneumoniae    Growth in aerobic bottle: Bacillus species not anthracis    "Susceptibilities not performed"  Organism: Blood Culture PCR  Klebsiella pneumoniae (01-02-21 @ 15:14)  Organism: Klebsiella pneumoniae (01-02-21 @ 15:14)    Sensitivities:      -  Amikacin: S <=16      -  Ampicillin: R 16 These ampicillin results predict results for amoxicillin      -  Ampicillin/Sulbactam: S <=4/2 Enterobacter, Citrobacter, and Serratia may develop resistance during prolonged therapy (3-4 days)      -  Aztreonam: S <=4      -  Cefazolin: S <=2 Enterobacter, Citrobacter, and Serratia may develop resistance during prolonged therapy (3-4 days)      -  Cefepime: S <=2      -  Cefoxitin: S <=8      -  Ceftriaxone: S <=1 Enterobacter, Citrobacter, and Serratia may develop resistance during prolonged therapy      -  Ciprofloxacin: S <=0.25      -  Ertapenem: S <=0.5      -  Gentamicin: S <=2      -  Imipenem: S <=1      -  Levofloxacin: S <=0.5      -  Meropenem: S <=1      -  Piperacillin/Tazobactam: S <=8      -  Tobramycin: S <=2      -  Trimethoprim/Sulfamethoxazole: S <=0.5/9.5      Method Type: AWA  Organism: Blood Culture PCR (01-02-21 @ 15:14)    Sensitivities:      -  Klebsiella pneumoniae: Detec      Method Type: PCR      WBC Count: 7.25 K/uL (01-11-21 @ 07:50)  WBC Count: 7.16 K/uL (01-09-21 @ 09:03)  WBC Count: 6.55 K/uL (01-08-21 @ 07:17)    Creatinine, Serum: 1.01 mg/dL (01-11-21 @ 07:50)  Creatinine, Serum: 1.09 mg/dL (01-10-21 @ 09:07)  Creatinine, Serum: 1.42 mg/dL (01-09-21 @ 09:03)  Creatinine, Serum: 1.30 mg/dL (01-08-21 @ 07:17)      Ferritin, Serum: 753 ng/mL (12-30-20 @ 01:51)           COVID-19 PCR: Detected (01-06-21 @ 08:23)  COVID-19 IgG Antibody Interpretation: Negative (12-30-20 @ 06:49)  COVID-19 IgG Antibody Index: 0.06 Index (12-30-20 @ 06:49)  COVID-19 PCR: Detected (12-29-20 @ 22:59)

## 2021-01-12 NOTE — PROGRESS NOTE ADULT - ASSESSMENT
55y/oF PMH SAH, DI, chronic hypernatremia BIBA with dizziness. Pt came in with PICC line, reports getting D5W every other day for hypernatremia. Pt admitted with hypernatremia, started on IVF and desmopressin. Also found to be COVID+ with positive blood cultures. PICC line removed, pt started vanc/zosyn. Repeat blood cultures (1/1) with gram positive rods. Additional repeat blood cultures (1/5) NGTD.     Bacteremia   -Klebsiella pneumoniae and bacillus sp (not anthracis)   -repeat cx 1/1 +GPR  -repeat cx 1/5 NGTD   -PICC removed, tip cx ngtd   -cont vanc, rocephin as per ID   -TTE with ?aortic valve fibroelastoma, SAADIA recommended  -D/w Dr. Steen, SAADIA deferred 2weeks, repeat TTE to reassess density    COVID -19 infection  -not requiring supplemental O2  -cont supportive treatment     Hypernatremia, chronic 2/2 Diabetes insipidus   -cont desmopressin  -encourage PO intake     Hypertension  -bp increasing, hypotensive on arrival, restart amlodipine, cont to monitor     anemia of chronic disease   -s/p 1u PRBC   -cont to monitor, no signs of active bleeding     pt declined for family updates     dispo: home with PICC for IVF, IV abx pending repeat TTE, set up of IV abx home infusions

## 2021-01-13 LAB — SARS-COV-2 RNA SPEC QL NAA+PROBE: SIGNIFICANT CHANGE UP

## 2021-01-13 PROCEDURE — 99232 SBSQ HOSP IP/OBS MODERATE 35: CPT

## 2021-01-13 RX ORDER — VANCOMYCIN HCL 1 G
750 VIAL (EA) INTRAVENOUS EVERY 12 HOURS
Refills: 0 | Status: DISCONTINUED | OUTPATIENT
Start: 2021-01-13 | End: 2021-01-15

## 2021-01-13 RX ADMIN — CHLORHEXIDINE GLUCONATE 1 APPLICATION(S): 213 SOLUTION TOPICAL at 04:10

## 2021-01-13 RX ADMIN — Medication 1 MILLIGRAM(S): at 11:32

## 2021-01-13 RX ADMIN — Medication 1300 MILLIGRAM(S): at 04:10

## 2021-01-13 RX ADMIN — AMLODIPINE BESYLATE 10 MILLIGRAM(S): 2.5 TABLET ORAL at 04:10

## 2021-01-13 RX ADMIN — Medication 100 MILLIGRAM(S): at 08:35

## 2021-01-13 RX ADMIN — DESMOPRESSIN ACETATE 0.1 MILLIGRAM(S): 0.1 TABLET ORAL at 16:23

## 2021-01-13 RX ADMIN — DESMOPRESSIN ACETATE 0.1 MILLIGRAM(S): 0.1 TABLET ORAL at 04:10

## 2021-01-13 RX ADMIN — LEVETIRACETAM 500 MILLIGRAM(S): 250 TABLET, FILM COATED ORAL at 16:23

## 2021-01-13 RX ADMIN — Medication 250 MILLIGRAM(S): at 21:24

## 2021-01-13 RX ADMIN — LEVETIRACETAM 500 MILLIGRAM(S): 250 TABLET, FILM COATED ORAL at 04:10

## 2021-01-13 RX ADMIN — Medication 1 TABLET(S): at 11:32

## 2021-01-13 RX ADMIN — Medication 1300 MILLIGRAM(S): at 16:23

## 2021-01-13 RX ADMIN — PANTOPRAZOLE SODIUM 40 MILLIGRAM(S): 20 TABLET, DELAYED RELEASE ORAL at 04:10

## 2021-01-13 RX ADMIN — ENOXAPARIN SODIUM 40 MILLIGRAM(S): 100 INJECTION SUBCUTANEOUS at 11:32

## 2021-01-13 NOTE — PROGRESS NOTE ADULT - SUBJECTIVE AND OBJECTIVE BOX
KRUPA MEDINA    [id number]    55y      Female    CC: hypernatremia    INTERVAL HPI/OVERNIGHT EVENTS: pt seen and examined. feeling well, no complaints     REVIEW OF SYSTEMS:    CONSTITUTIONAL: No fever, weight loss  RESPIRATORY: No cough, wheezing, hemoptysis; No shortness of breath  CARDIOVASCULAR: No chest pain, palpitations  GASTROINTESTINAL: No abdominal or epigastric pain. No nausea, vomiting  NEUROLOGICAL: No headaches, memory loss, loss of strength.    Vital Signs Last 24 Hrs  T(C): 36.6 (13 Jan 2021 09:32), Max: 37.2 (12 Jan 2021 17:32)  T(F): 97.8 (13 Jan 2021 09:32), Max: 98.9 (12 Jan 2021 17:32)  HR: 75 (13 Jan 2021 09:32) (70 - 86)  BP: 125/90 (13 Jan 2021 09:32) (115/77 - 154/96)  BP(mean): --  RR: 20 (13 Jan 2021 09:32) (18 - 20)  SpO2: 98% (13 Jan 2021 09:32) (94% - 98%)    PHYSICAL EXAM:    GENERAL: NAD  HEENT: PERRL, +EOMI  NECK: soft, supple  CHEST/LUNG: Clear to auscultation bilaterally; No wheezing  HEART: S1S2+, Regular rate and rhythm; No murmurs, rubs, or gallops  ABDOMEN: Soft, Nontender, Nondistended; Bowel sounds present  SKIN: warm, dry  NEURO: AAOX3, no focal deficits  PSYCH: normal affect     LABS:      MEDICATIONS  (STANDING):  amLODIPine   Tablet 10 milliGRAM(s) Oral daily  chlorhexidine 2% Cloths 1 Application(s) Topical <User Schedule>  chlorhexidine 4% Liquid 1 Application(s) Topical <User Schedule>  desmopressin 0.1 milliGRAM(s) Oral two times a day  enoxaparin Injectable 40 milliGRAM(s) SubCutaneous daily  folic acid 1 milliGRAM(s) Oral daily  levETIRAcetam 500 milliGRAM(s) Oral two times a day  multivitamin 1 Tablet(s) Oral daily  pantoprazole    Tablet 40 milliGRAM(s) Oral before breakfast  sodium bicarbonate 1300 milliGRAM(s) Oral two times a day  vancomycin  IVPB 750 milliGRAM(s) IV Intermittent every 12 hours    MEDICATIONS  (PRN):  acetaminophen   Tablet .. 650 milliGRAM(s) Oral every 6 hours PRN Temp greater or equal to 38.5C (101.3F)  sodium chloride 0.9% lock flush 10 milliLiter(s) IV Push every 1 hour PRN Pre/post blood products, medications, blood draw, and to maintain line patency      RADIOLOGY & ADDITIONAL TESTS:

## 2021-01-13 NOTE — PROGRESS NOTE ADULT - ASSESSMENT
55y/oF PMH SAH, DI, chronic hypernatremia BIBA with dizziness. Pt came in with PICC line, reports getting D5W every other day for hypernatremia. Pt admitted with hypernatremia, started on IVF and desmopressin. Also found to be COVID+ with positive blood cultures. PICC line removed, pt started vanc/zosyn. Repeat blood cultures (1/1) with gram positive rods. Additional repeat blood cultures (1/5) NGTD.     Bacteremia   -Klebsiella pneumoniae and bacillus sp (not anthracis)   -repeat cx 1/1 +GPR  -repeat cx 1/5 NGTD   -PICC removed, tip cx ngtd   -cont vanc, rocephin as per ID   -TTE with ?aortic valve fibroelastoma, SAADIA recommended  -D/w Dr. Steen, SAADIA deferred 2weeks  -now given negative COVID PCR, if 2nd PCR negative, can dc isolation precautions and would be able to proceed with SAADIA   -if unable to get SAADIA, plan for IV vancomycin 750mg q12 through 2/1/21 with weekly CBC, CMP, vanc trough with outpt ID and cardio f/u    COVID -19 infection  -not requiring supplemental O2  -cont supportive treatment     Hypernatremia, chronic 2/2 Diabetes insipidus   -cont desmopressin  -encourage PO intake     Hypertension  -bp increasing, hypotensive on arrival, restart amlodipine, cont to monitor     anemia of chronic disease   -s/p 1u PRBC   -cont to monitor, no signs of active bleeding     pt's daughter, Millie [phone]    dispo: home with PICC for IVF, IV abx pending poss SAADIA, set up of IV abx home infusions

## 2021-01-13 NOTE — PROGRESS NOTE ADULT - ASSESSMENT
54 y/o female with h/o SAH, diabetes insipidus, chronic hypernatremia was BIBA for as pt. felt a little dizzy after coming out of bathroom, no syncope, no fall, no trauma, no focal weakness, no palpitations, no cp, no sob. pt. reports appetite has been ok, no abd. pain, no n/v/d. no fever. appetite is good. no sick contact. As per pt. she has a PICC line and every other day gets D5W through that for h/o hypernatremia. pt. bp 92/60 , hr 87 upon arrival. As per pt. she runs bp on the low side.     Leukocytosis   Fever  TOSHIA  Hypernatremia    - now afebrile  - COVID PCR + but asymptomatic and not requiring supplemental O2, COVID IgG (-), repeat COVID PCr +, likely inactive virus  - UA (-) and CXr (-)  - BCX klebsiella and Bacillus sp  - PICC tip CX ngtd  - repeat BCX 1/1 + Bacillus sp  - repeat BCX 1/5 NGTD  - c/w ceftriaxone 2 g IV daily  - c/w vancomycin last trough 12.8 1/12, dose adjusted to 750 mg Iv q12h today  - TTE ?aortic valve fibroelastoma, SAADIA recommended but deferred by cardiology due to COVID status and repeat TTE suggested with outpatient f/u for SAADIA.  Repeat TTE 1/12 with +mobile echodensity on aortic valve. Repeat Covid PCR 1/12 (-), per cardiology will perform SAADIA if isolation can be discontinued, per infection control need 2nd (-) PCR for isolation to be discontinued. COVID PCR ordered and RN notified to send asap. Will keep NPO tentatively  - PICC placed for IV abx  - clarify with renal re need for long term PICC for IVF for hypernatremia  - if no plan for SAADIA-plan for Iv vancomycin 750 mg Iv q12h via PICC line through 2/1/21 with weekly CBC CMP vanco trough with outpatient ID and cardiology f/u  - will remain at risk for recurrent picc line infections, should be discontinued thereafter if no longer needed for IVF  - Trend Fever  - Trend Leukocytosis    d/w Dr Arauz, RN, pharmacy, infection control, , [place] infusion  Will Follow

## 2021-01-13 NOTE — PROGRESS NOTE ADULT - ASSESSMENT
A/P:  54 y/o F with a PMHx of SAH, diabetes insipidus, chronic hypernatremia who was BIBA for dizziness, and was found to be hypotensive with Covid 19 infection. Patient has a PICC line in place that she gets D5W through every other day for hypernatremia. Patient was found to have klebsiella pna/bacillus species bacteremia, with a mass on the AV ?vegetation vs. fibroelastoma. Patient states she is feeling well, and wants to go home. Due to active Covid 19 infection at this time, defer SAADIA. Discussed with patient the need for SAADIA as an outpatient in 2-3 weeks when covid infection subsides, and patient is in agreement.     Bacteremia  - Klebsiella pna/bacillus species bacteremia.   - Echo with aortic valve mobile echodensity concerning for vegetation vs. fibroelastoma.   - Repeat echo still with echodensity possible vegetation.   - Due to active Covid 19 infection will defer SAADIA at this time for 2 weeks until covid infection resolves.   - Continue IV abx per ID.     Covid -19 Infection  - Management per ID and PMT.     HTN  -Continue home meds.     Assessment and recommendations are final when note is signed by the attending.   A/P:  56 y/o F with a PMHx of SAH, diabetes insipidus, chronic hypernatremia who was BIBA for dizziness, and was found to be hypotensive with Covid 19 infection. Patient has a PICC line in place that she gets D5W through every other day for hypernatremia. Patient was found to have klebsiella pna/bacillus species bacteremia, with a mass on the AV ?vegetation vs. fibroelastoma. Patient states she is feeling well, and wants to go home. Due to active Covid 19 infection at this time, defer SAADIA. Discussed with patient the need for SAADIA as an outpatient in 2-3 weeks when covid infection subsides, and patient is in agreement.     Bacteremia  - Klebsiella pna/bacillus species bacteremia.   - Echo with aortic valve mobile echodensity concerning for vegetation vs. fibroelastoma.   - Repeat echo still with echodensity possible vegetation.   - Due to active Covid 19 infection will defer SAADIA at this time for 2 weeks until covid infection resolves.   - Continue IV abx per ID.     Covid -19 Infection  - Management per ID and PMT.     HTN  -Continue home meds.

## 2021-01-13 NOTE — PROGRESS NOTE ADULT - SUBJECTIVE AND OBJECTIVE BOX
[facility]                                                               Office: [address] [address]                                                              Telephone: [phone]. Fax:[phone]                                                                             PROGRESS NOTE  Reason for follow up: Bacteremia  Overnight: No new events.   Update: Patient's Covid PCR is now negative, ID still following. Repeat echo with mobile echodensity on AV, will eventually need SAADIA, but defer at this time. Patient feels well. Due to the nature of this patient's COVID-19 isolation status, no bedside physical exam done to limit spread of infection.  Examination highlights were provided by bedside nurse. Objective data were reviewed in detail.      Past medical history: No updates.   	  Vitals:  T(C): 36.6 (01-13-21 @ 09:32), Max: 37.2 (01-12-21 @ 17:32)  HR: 75 (01-13-21 @ 09:32) (70 - 86)  BP: 125/90 (01-13-21 @ 09:32) (115/77 - 154/96)  RR: 20 (01-13-21 @ 09:32) (18 - 20)  SpO2: 98% (01-13-21 @ 09:32) (94% - 98%)  Wt(kg): --  I&O's Summary      PHYSICAL EXAM:  Due to the nature of this patient's COVID-19 isolation status, no bedside physical exam done to limit spread of infection.  Examination highlights were provided by bedside nurse. Objective data were reviewed in detail.        CURRENT MEDICATIONS:  amLODIPine   Tablet 10 milliGRAM(s) Oral daily    vancomycin  IVPB  levETIRAcetam  pantoprazole    Tablet  desmopressin  chlorhexidine 2% Cloths  chlorhexidine 4% Liquid  enoxaparin Injectable  folic acid  multivitamin  sodium bicarbonate      DIAGNOSTIC TESTING:  [ ] Echocardiogram:   < from: TTE Echo Limited or F/U (01.12.21 @ 19:12) >  PHYSICIAN INTERPRETATION:  Left Ventricle: The left ventricular internal cavity size is normal.  Left ventricular ejection fraction, by visual estimation, is 70 to 75%. Spectral Doppler shows impaired relaxation pattern of left ventricular myocardial filling (Grade I diastolic dysfunction).  Right Ventricle: Normal right ventricular size and function.  Pericardium: Trivial pericardial effusion is present. The pericardial effusion is globally located around the entire heart.  Mitral Valve: The mitral valve is normal in structure.  Tricuspid Valve: The tricuspid valve is normal in structure.  Aortic Valve: The aortic valve is trileaflet. Mild aortic valve regurgitation is seen. Small (0.41 cm) mobile echogenicity visualized on the ventricular side of aortic valve which appears to be associated with a small regurgitation jet. Consider SAADIA for further elucidation.  Pulmonic Valve: Structurally normal pulmonic valve, with normal leaflet excursion.      Summary:   1. Left ventricular ejection fraction, by visual estimation, is 70 to 75%.   2. Spectral Doppler shows impaired relaxation pattern of left ventricular myocardial filling (Grade I diastolic dysfunction).   3. There is moderate concentric left ventricular hypertrophy.   4. Mild aortic regurgitation.   5. Small (0.41 cm) mobile echogenicity visualized on the ventricular side of aortic valve which appears to be associated with a small regurgitation jet. Consider SAADIA for further elucidation.    MD Humble Electronically signed on 1/13/2021 at 11:22:56 AM    < end of copied text >      OTHER: 	      LABS:	 	              proBNP:   Lipid Profile:   HgA1c:   TSH:

## 2021-01-13 NOTE — PROGRESS NOTE ADULT - SUBJECTIVE AND OBJECTIVE BOX
[facility]  INFECTIOUS DISEASES AND INTERNAL MEDICINE at [place]  =======================================================  Mj Lee MD  Diplomates American Board of Internal Medicine and Infectious Diseases  Tel: [phone]      Fax: [phone]  =======================================================    K[place] MEDINA [id number]    Follow up: bacteremia, covid 19  feels well  COVID 10 pcr now negative      Allergies:  No Known Allergies           REVIEW OF SYSTEMS:  CONSTITUTIONAL:  No Fever or chills  HEENT:   No diplopia or blurred vision.  No earache, sore throat or runny nose.  CARDIOVASCULAR:  No pressure, squeezing, strangling, tightness, heaviness or aching about the chest, neck, axilla or epigastrium.  RESPIRATORY:  No cough, shortness of breath  GASTROINTESTINAL:  No nausea, vomiting or diarrhea.  GENITOURINARY:  No dysuria, frequency or urgency. No Blood in urine  MUSCULOSKELETAL:  no joint aches, no muscle pain  SKIN:  No change in skin, hair or nails.  NEUROLOGIC:  No Headaches, seizures or weakness.  PSYCHIATRIC:  No disorder of thought or mood.  ENDOCRINE:  No heat or cold intolerance  HEMATOLOGICAL:  No easy bruising or bleeding.       Physical Exam:  GEN: NAD, pleasant  HEENT: normocephalic and atraumatic. EOMI. PERRL.  Anicteric   NECK: Supple.   LUNGS: Clear to auscultation.  HEART: Regular rate and rhythm without murmur.  ABDOMEN: Soft, nontender, and nondistended.  Positive bowel sounds.    : No CVA tenderness  EXTREMITIES: Without any edema.  MSK: no joint swelling  NEUROLOGIC: Cranial nerves II through XII are grossly intact. No focal deficits  PSYCHIATRIC: Appropriate affect .  SKIN: No Rash      Vitals:    T(F): 97.8 (13 Jan 2021 09:32), Max: 98.9 (12 Jan 2021 17:32)  HR: 75 (13 Jan 2021 09:32)  BP: 125/90 (13 Jan 2021 09:32)  RR: 20 (13 Jan 2021 09:32)  SpO2: 98% (13 Jan 2021 09:32) (94% - 98%)  temp max in last 48H T(F): , Max: 98.9 (01-12-21 @ 04:09)      Current Antibiotics:  vancomycin  IVPB 750 milliGRAM(s) IV Intermittent every 12 hours    Other medications:  amLODIPine   Tablet 10 milliGRAM(s) Oral daily  chlorhexidine 2% Cloths 1 Application(s) Topical <User Schedule>  chlorhexidine 4% Liquid 1 Application(s) Topical <User Schedule>  desmopressin 0.1 milliGRAM(s) Oral two times a day  enoxaparin Injectable 40 milliGRAM(s) SubCutaneous daily  folic acid 1 milliGRAM(s) Oral daily  levETIRAcetam 500 milliGRAM(s) Oral two times a day  multivitamin 1 Tablet(s) Oral daily  pantoprazole    Tablet 40 milliGRAM(s) Oral before breakfast  sodium bicarbonate 1300 milliGRAM(s) Oral two times a day        Labs:             Culture - Blood (collected 01-05-21 @ 17:59)  Source: .Blood Blood-Venous  Final Report (01-10-21 @ 19:01):    No growth at 5 days.    Culture - Blood (collected 01-05-21 @ 17:59)  Source: .Blood Blood-Peripheral  Final Report (01-10-21 @ 19:01):    No growth at 5 days.    Culture - Blood (collected 01-01-21 @ 12:54)  Source: .Blood Blood  Gram Stain (01-05-21 @ 09:30):    Growth in anaerobic bottle: Gram Positive Rods    Gram Stain performed by:    [facility]    [address] [place] [address]    ,    TYPE: (C=Critical, N=Notification, A=Abnormal) c    TESTS:  _ gs    DATE/TIME CALLED: _ 01/05/2021 09:30:08    CALLED TO: _ gale laird rn    READ BACK (2 Patient Identifiers)(Y/N): _ y    READ BACK VALUES (Y/N): _ y    CALLED BY: _ m welge  Final Report (01-06-21 @ 13:16):    Growth in anaerobic bottle: Bacillus species not anthracis    "Susceptibilities not performed"    Culture - Blood (collected 01-01-21 @ 12:54)  Source: .Blood Blood  Final Report (01-06-21 @ 13:00):    No growth at 5 days.    Culture - Catheter (collected 12-31-20 @ 22:24)  Source: .Catheter PICC Tip  Final Report (01-02-21 @ 16:11):    No growth      WBC Count: 7.25 K/uL (01-11-21 @ 07:50)  WBC Count: 7.16 K/uL (01-09-21 @ 09:03)    Creatinine, Serum: 1.01 mg/dL (01-11-21 @ 07:50)  Creatinine, Serum: 1.09 mg/dL (01-10-21 @ 09:07)  Creatinine, Serum: 1.42 mg/dL (01-09-21 @ 09:03)      Ferritin, Serum: 753 ng/mL (12-30-20 @ 01:51)           COVID-19 PCR: NotDetec (01-12-21 @ 16:06)  COVID-19 PCR: Detected (01-06-21 @ 08:23)  COVID-19 IgG Antibody Interpretation: Negative (12-30-20 @ 06:49)  COVID-19 IgG Antibody Index: 0.06 Index (12-30-20 @ 06:49)  COVID-19 PCR: Detected (12-29-20 @ 22:59)    < from: TTE Echo Limited or F/U (01.12.21 @ 19:12) >  Summary:   1. Left ventricular ejection fraction, by visual estimation, is 70 to 75%.   2. Spectral Doppler shows impaired relaxation pattern of left ventricular myocardial filling (Grade I diastolic dysfunction).   3. There is moderate concentric left ventricular hypertrophy.   4. Mild aortic regurgitation.   5. Small (0.41 cm) mobile echogenicity visualized on the ventricular side of aortic valve which appears to be associated with a small regurgitation jet. Consider SAADIA for further elucidation.    < end of copied text >

## 2021-01-14 LAB
ANION GAP SERPL CALC-SCNC: 11 MMOL/L — SIGNIFICANT CHANGE UP (ref 5–17)
BUN SERPL-MCNC: 26 MG/DL — HIGH (ref 8–20)
CALCIUM SERPL-MCNC: 9.9 MG/DL — SIGNIFICANT CHANGE UP (ref 8.6–10.2)
CHLORIDE SERPL-SCNC: 101 MMOL/L — SIGNIFICANT CHANGE UP (ref 98–107)
CO2 SERPL-SCNC: 26 MMOL/L — SIGNIFICANT CHANGE UP (ref 22–29)
CREAT SERPL-MCNC: 1.23 MG/DL — SIGNIFICANT CHANGE UP (ref 0.5–1.3)
GLUCOSE SERPL-MCNC: 113 MG/DL — HIGH (ref 70–99)
HCT VFR BLD CALC: 29.3 % — LOW (ref 34.5–45)
HCT VFR BLD CALC: 37.3 % — SIGNIFICANT CHANGE UP (ref 34.5–45)
HGB BLD-MCNC: 10.8 G/DL — LOW (ref 11.5–15.5)
HGB BLD-MCNC: 8.7 G/DL — LOW (ref 11.5–15.5)
MCHC RBC-ENTMCNC: 25.6 PG — LOW (ref 27–34)
MCHC RBC-ENTMCNC: 25.7 PG — LOW (ref 27–34)
MCHC RBC-ENTMCNC: 29 GM/DL — LOW (ref 32–36)
MCHC RBC-ENTMCNC: 29.7 GM/DL — LOW (ref 32–36)
MCV RBC AUTO: 86.7 FL — SIGNIFICANT CHANGE UP (ref 80–100)
MCV RBC AUTO: 88.4 FL — SIGNIFICANT CHANGE UP (ref 80–100)
PLATELET # BLD AUTO: 413 K/UL — HIGH (ref 150–400)
PLATELET # BLD AUTO: 438 K/UL — HIGH (ref 150–400)
POTASSIUM SERPL-MCNC: 4.7 MMOL/L — SIGNIFICANT CHANGE UP (ref 3.5–5.3)
POTASSIUM SERPL-SCNC: 4.7 MMOL/L — SIGNIFICANT CHANGE UP (ref 3.5–5.3)
RBC # BLD: 3.38 M/UL — LOW (ref 3.8–5.2)
RBC # BLD: 4.22 M/UL — SIGNIFICANT CHANGE UP (ref 3.8–5.2)
RBC # FLD: 14.8 % — HIGH (ref 10.3–14.5)
RBC # FLD: 15.1 % — HIGH (ref 10.3–14.5)
SODIUM SERPL-SCNC: 138 MMOL/L — SIGNIFICANT CHANGE UP (ref 135–145)
WBC # BLD: 7.78 K/UL — SIGNIFICANT CHANGE UP (ref 3.8–10.5)
WBC # BLD: 8.05 K/UL — SIGNIFICANT CHANGE UP (ref 3.8–10.5)
WBC # FLD AUTO: 7.78 K/UL — SIGNIFICANT CHANGE UP (ref 3.8–10.5)
WBC # FLD AUTO: 8.05 K/UL — SIGNIFICANT CHANGE UP (ref 3.8–10.5)

## 2021-01-14 PROCEDURE — 99232 SBSQ HOSP IP/OBS MODERATE 35: CPT

## 2021-01-14 RX ORDER — CEFTRIAXONE 500 MG/1
2000 INJECTION, POWDER, FOR SOLUTION INTRAMUSCULAR; INTRAVENOUS EVERY 24 HOURS
Refills: 0 | Status: COMPLETED | OUTPATIENT
Start: 2021-01-14 | End: 2021-01-18

## 2021-01-14 RX ADMIN — ENOXAPARIN SODIUM 40 MILLIGRAM(S): 100 INJECTION SUBCUTANEOUS at 11:20

## 2021-01-14 RX ADMIN — CEFTRIAXONE 100 MILLIGRAM(S): 500 INJECTION, POWDER, FOR SOLUTION INTRAMUSCULAR; INTRAVENOUS at 09:26

## 2021-01-14 RX ADMIN — Medication 1 TABLET(S): at 11:20

## 2021-01-14 RX ADMIN — CHLORHEXIDINE GLUCONATE 1 APPLICATION(S): 213 SOLUTION TOPICAL at 05:22

## 2021-01-14 RX ADMIN — Medication 1300 MILLIGRAM(S): at 16:18

## 2021-01-14 RX ADMIN — DESMOPRESSIN ACETATE 0.1 MILLIGRAM(S): 0.1 TABLET ORAL at 16:18

## 2021-01-14 RX ADMIN — Medication 250 MILLIGRAM(S): at 09:26

## 2021-01-14 RX ADMIN — LEVETIRACETAM 500 MILLIGRAM(S): 250 TABLET, FILM COATED ORAL at 16:18

## 2021-01-14 RX ADMIN — Medication 1 MILLIGRAM(S): at 11:20

## 2021-01-14 RX ADMIN — Medication 250 MILLIGRAM(S): at 21:49

## 2021-01-14 NOTE — PROGRESS NOTE ADULT - ASSESSMENT
55y/oF PMH SAH, DI, chronic hypernatremia BIBA with dizziness. Pt came in with PICC line, reports getting D5W every other day for hypernatremia. Pt admitted with hypernatremia, started on IVF and desmopressin. Also found to be COVID+ with positive blood cultures. PICC line removed, pt started vanc/zosyn. Repeat blood cultures (1/1) with gram positive rods. Additional repeat blood cultures (1/5) NGTD.     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 Isolation precautions d/c'ed as per ID and infection control. D/w Dr. Lee, Dr. Steen, plan for SAADIA on 1/15. NPO after midnight     COVID -19 infection, now negative PCR x2  -not requiring supplemental O2  -cont supportive treatment     Hypernatremia, chronic 2/2 Diabetes insipidus   -cont desmopressin  -encourage PO intake     Hypertension  -bp increasing, hypotensive on arrival, restart amlodipine, cont to monitor     anemia of chronic disease   -s/p 1u PRBC   -cont to monitor, no signs of active bleeding       dispo: home with PICC for IVF, IV abx pending SAADIA, set up of IV abx home infusions

## 2021-01-14 NOTE — CHART NOTE - NSCHARTNOTEFT_GEN_A_CORE
Repeat COVID19 PCR negative x2.  Contact and airborne isolation precautions discontinued.  Discussed case with ID, and confirmation of intracardiac vegetation would change duration of antimicrobial therapy.      Will plan for SAADIA on 1/15; NPO after midnight please.

## 2021-01-14 NOTE — PROGRESS NOTE ADULT - ASSESSMENT
54 y/o female with h/o SAH, diabetes insipidus, chronic hypernatremia was BIBA for as pt. felt a little dizzy after coming out of bathroom, no syncope, no fall, no trauma, no focal weakness, no palpitations, no cp, no sob. pt. reports appetite has been ok, no abd. pain, no n/v/d. no fever. appetite is good. no sick contact. As per pt. she has a PICC line and every other day gets D5W through that for h/o hypernatremia. pt. bp 92/60 , hr 87 upon arrival. As per pt. she runs bp on the low side.     Leukocytosis   Fever  TOSHIA  Hypernatremia    - now afebrile  - COVID PCR + but asymptomatic and not requiring supplemental O2, COVID IgG (-), repeat COVID PCr (-) x 2, can DC isolation  - UA (-) and CXr (-)  - BCX klebsiella and Bacillus sp  - PICC tip CX ngtd  - repeat BCX 1/1 + Bacillus sp  - repeat BCX 1/5 NGTD  - c/w ceftriaxone 2 g IV daily  - c/w vancomycin last trough 12.8 1/12, dose adjusted to 750 mg Iv q12h   - TTE ?aortic valve fibroelastoma, SAADIA recommended but deferred by cardiology due to COVID status and repeat TTE suggested with outpatient f/u for SAADIA.  Repeat TTE 1/12 with +mobile echodensity on aortic valve.   - Patient is now COVID (-) x 2 and off isolation, can be moved to non COVID unit. SAADIA should ideally be performed prior to discharge. Cath lab manager/anesthesia to determine how soon SAADIA can be performed as patient is currently NPO  - PICC placed for IV abx  - clarify with renal re need for long term PICC for IVF for hypernatremia  - if no plan for SAADIA-plan for empiric treatment of presumed endocarditis x 4 weeks- IV vancomycin 750 mg Iv q12h via PICC line through 2/1/21 with weekly CBC CMP vanco trough with outpatient ID and cardiology f/u-if SAADIA performed can potentially shorten duration based on SAADIA findings  - will remain at risk for recurrent picc line infections, should be discontinued thereafter if no longer needed for IVF  - Trend Fever  - Trend Leukocytosis    d/w Dr Arauz, RN, infection control, admin, cardiology  Will Follow   56 y/o female with h/o SAH, diabetes insipidus, chronic hypernatremia was BIBA for as pt. felt a little dizzy after coming out of bathroom, no syncope, no fall, no trauma, no focal weakness, no palpitations, no cp, no sob. pt. reports appetite has been ok, no abd. pain, no n/v/d. no fever. appetite is good. no sick contact. As per pt. she has a PICC line and every other day gets D5W through that for h/o hypernatremia. pt. bp 92/60 , hr 87 upon arrival. As per pt. she runs bp on the low side.     Leukocytosis   Fever  TOSHIA  Hypernatremia    - now afebrile  - COVID PCR + but asymptomatic and not requiring supplemental O2, COVID IgG (-), repeat COVID PCr (-) x 2, can DC isolation  - UA (-) and CXr (-)  - BCX klebsiella and Bacillus sp  - PICC tip CX ngtd  - repeat BCX 1/1 + Bacillus sp  - repeat BCX 1/5 NGTD  - c/w ceftriaxone 2 g IV daily  - c/w vancomycin last trough 12.8 1/12, dose adjusted to 750 mg Iv q12h   - TTE ?aortic valve fibroelastoma, SAADIA recommended but deferred by cardiology due to COVID status and repeat TTE suggested with outpatient f/u for SAADIA.  Repeat TTE 1/12 with +mobile echodensity on aortic valve.   - Patient is now COVID (-) x 2 and off isolation, can be moved to non COVID unit. SAADIA should ideally be performed prior to discharge. Cath lab manager/anesthesia to determine how soon SAADIA can be performed as patient is currently NPO  - PICC placed for IV abx  - clarify with renal re need for long term PICC for IVF for hypernatremia  - if no plan for SAADIA-plan for empiric treatment of presumed endocarditis x 4 weeks- IV vancomycin 750 mg Iv q12h via PICC line through 2/1/21 with weekly CBC CMP vanco trough with outpatient ID and cardiology f/u-if SAADIA performed can potentially shorten duration based on SAADIA findings  - will remain at risk for recurrent picc line infections, should be discontinued thereafter if no longer needed for IVF  - Trend Fever  - Trend Leukocytosis    d/w Dr Arauz, RN, infection control, admin, cardiology, pharmacy  Will Follow

## 2021-01-14 NOTE — PROGRESS NOTE ADULT - SUBJECTIVE AND OBJECTIVE BOX
[facility]  INFECTIOUS DISEASES AND INTERNAL MEDICINE at [place]  =======================================================  Mj Lee MD  Diplomates American Board of Internal Medicine and Infectious Diseases  Tel: [phone]      Fax: [phone]  =======================================================    K[place] MEDINA [id number]    Follow up:      Allergies:  No Known Allergies           REVIEW OF SYSTEMS:  CONSTITUTIONAL:  No Fever or chills  HEENT:   No diplopia or blurred vision.  No earache, sore throat or runny nose.  CARDIOVASCULAR:  No pressure, squeezing, strangling, tightness, heaviness or aching about the chest, neck, axilla or epigastrium.  RESPIRATORY:  No cough, shortness of breath  GASTROINTESTINAL:  No nausea, vomiting or diarrhea.  GENITOURINARY:  No dysuria, frequency or urgency. No Blood in urine  MUSCULOSKELETAL:  no joint aches, no muscle pain  SKIN:  No change in skin, hair or nails.  NEUROLOGIC:  No Headaches, seizures or weakness.  PSYCHIATRIC:  No disorder of thought or mood.  ENDOCRINE:  No heat or cold intolerance  HEMATOLOGICAL:  No easy bruising or bleeding.       Physical Exam:  GEN: NAD, pleasant  HEENT: normocephalic and atraumatic. EOMI. PERRL.  Anicteric   NECK: Supple.   LUNGS: Clear to auscultation.  HEART: Regular rate and rhythm without murmur.  ABDOMEN: Soft, nontender, and nondistended.  Positive bowel sounds.    : No CVA tenderness  EXTREMITIES: Without any edema.  MSK: no joint swelling  NEUROLOGIC: Cranial nerves II through XII are grossly intact. No focal deficits  PSYCHIATRIC: Appropriate affect .  SKIN: No Rash      Vitals:    T(F): 97.7 (14 Jan 2021 05:09), Max: 98.5 (13 Jan 2021 17:25)  HR: 72 (14 Jan 2021 05:09)  BP: 117/77 (14 Jan 2021 05:09)  RR: 19 (14 Jan 2021 05:09)  SpO2: 97% (14 Jan 2021 05:09) (94% - 97%)  temp max in last 48H T(F): , Max: 98.9 (01-12-21 @ 17:32)      Current Antibiotics:  cefTRIAXone   IVPB 2000 milliGRAM(s) IV Intermittent every 24 hours  vancomycin  IVPB 750 milliGRAM(s) IV Intermittent every 12 hours    Other medications:  amLODIPine   Tablet 10 milliGRAM(s) Oral daily  chlorhexidine 2% Cloths 1 Application(s) Topical <User Schedule>  chlorhexidine 4% Liquid 1 Application(s) Topical <User Schedule>  desmopressin 0.1 milliGRAM(s) Oral two times a day  enoxaparin Injectable 40 milliGRAM(s) SubCutaneous daily  folic acid 1 milliGRAM(s) Oral daily  levETIRAcetam 500 milliGRAM(s) Oral two times a day  multivitamin 1 Tablet(s) Oral daily  pantoprazole    Tablet 40 milliGRAM(s) Oral before breakfast  sodium bicarbonate 1300 milliGRAM(s) Oral two times a day        Labs:                        8.7    7.78  )-----------( 438      ( 14 Jan 2021 11:07 )             29.3              Culture - Blood (collected 01-05-21 @ 17:59)  Source: .Blood Blood-Venous  Final Report (01-10-21 @ 19:01):    No growth at 5 days.    Culture - Blood (collected 01-05-21 @ 17:59)  Source: .Blood Blood-Peripheral  Final Report (01-10-21 @ 19:01):    No growth at 5 days.    Culture - Blood (collected 01-01-21 @ 12:54)  Source: .Blood Blood  Gram Stain (01-05-21 @ 09:30):    Growth in anaerobic bottle: Gram Positive Rods    Gram Stain performed by:    [facility]    [address] [place] [id number]    ,    TYPE: (C=Critical, N=Notification, A=Abnormal) c    TESTS:  _ gs    DATE/TIME CALLED: _ 01/05/2021 09:30:08    CALLED TO: _ gale martusciello rn    READ BACK (2 Patient Identifiers)(Y/N): _ y    READ BACK VALUES (Y/N): _ y    CALLED BY: Davis sinclair  Final Report (01-06-21 @ 13:16):    Growth in anaerobic bottle: Bacillus species not anthracis    "Susceptibilities not performed"    Culture - Blood (collected 01-01-21 @ 12:54)  Source: .Blood Blood  Final Report (01-06-21 @ 13:00):    No growth at 5 days.    Culture - Catheter (collected 12-31-20 @ 22:24)  Source: .Catheter PICC Tip  Final Report (01-02-21 @ 16:11):    No growth      WBC Count: 7.78 K/uL (01-14-21 @ 11:07)  WBC Count: 7.25 K/uL (01-11-21 @ 07:50)    Creatinine, Serum: 1.01 mg/dL (01-11-21 @ 07:50)  Creatinine, Serum: 1.09 mg/dL (01-10-21 @ 09:07)      Ferritin, Serum: 753 ng/mL (12-30-20 @ 01:51)           COVID-19 PCR: NotDetec (01-13-21 @ 13:51)  COVID-19 PCR: NotDetec (01-12-21 @ 16:06)  COVID-19 PCR: Detected (01-06-21 @ 08:23)  COVID-19 IgG Antibody Interpretation: Negative (12-30-20 @ 06:49)  COVID-19 IgG Antibody Index: 0.06 Index (12-30-20 @ 06:49)  COVID-19 PCR: Detected (12-29-20 @ 22:59)     [facility]  INFECTIOUS DISEASES AND INTERNAL MEDICINE at [place]  =======================================================  Mj Lee MD  Diplomates American Board of Internal Medicine and Infectious Diseases  Tel: [phone]      Fax: [phone]  =======================================================    [place] MEDINA [id number]    Follow up: bacteremia  feels well  NPO and feeling hungry  explained need for SAADIA and all questions addressed      Allergies:  No Known Allergies           REVIEW OF SYSTEMS:  CONSTITUTIONAL:  No Fever or chills  HEENT:   No diplopia or blurred vision.  No earache, sore throat or runny nose.  CARDIOVASCULAR:  No pressure, squeezing, strangling, tightness, heaviness or aching about the chest, neck, axilla or epigastrium.  RESPIRATORY:  No cough, shortness of breath  GASTROINTESTINAL:  No nausea, vomiting or diarrhea.  GENITOURINARY:  No dysuria, frequency or urgency. No Blood in urine  MUSCULOSKELETAL:  no joint aches, no muscle pain  SKIN:  No change in skin, hair or nails.  NEUROLOGIC:  No Headaches, seizures or weakness.  PSYCHIATRIC:  No disorder of thought or mood.  ENDOCRINE:  No heat or cold intolerance  HEMATOLOGICAL:  No easy bruising or bleeding.       Physical Exam:  GEN: NAD, pleasant  HEENT: normocephalic and atraumatic. EOMI. PERRL.  Anicteric   NECK: Supple.   LUNGS: Clear to auscultation.  HEART: Regular rate and rhythm without murmur.  ABDOMEN: Soft, nontender, and nondistended.  Positive bowel sounds.    : No CVA tenderness  EXTREMITIES: Without any edema. lue picc intact  MSK: no joint swelling  NEUROLOGIC: Cranial nerves II through XII are grossly intact. No focal deficits  PSYCHIATRIC: Appropriate affect .  SKIN: No Rash      Vitals:    T(F): 97.7 (14 Jan 2021 05:09), Max: 98.5 (13 Jan 2021 17:25)  HR: 72 (14 Jan 2021 05:09)  BP: 117/77 (14 Jan 2021 05:09)  RR: 19 (14 Jan 2021 05:09)  SpO2: 97% (14 Jan 2021 05:09) (94% - 97%)  temp max in last 48H T(F): , Max: 98.9 (01-12-21 @ 17:32)      Current Antibiotics:  cefTRIAXone   IVPB 2000 milliGRAM(s) IV Intermittent every 24 hours  vancomycin  IVPB 750 milliGRAM(s) IV Intermittent every 12 hours    Other medications:  amLODIPine   Tablet 10 milliGRAM(s) Oral daily  chlorhexidine 2% Cloths 1 Application(s) Topical <User Schedule>  chlorhexidine 4% Liquid 1 Application(s) Topical <User Schedule>  desmopressin 0.1 milliGRAM(s) Oral two times a day  enoxaparin Injectable 40 milliGRAM(s) SubCutaneous daily  folic acid 1 milliGRAM(s) Oral daily  levETIRAcetam 500 milliGRAM(s) Oral two times a day  multivitamin 1 Tablet(s) Oral daily  pantoprazole    Tablet 40 milliGRAM(s) Oral before breakfast  sodium bicarbonate 1300 milliGRAM(s) Oral two times a day        Labs:                        8.7    7.78  )-----------( 438      ( 14 Jan 2021 11:07 )             29.3              Culture - Blood (collected 01-05-21 @ 17:59)  Source: .Blood Blood-Venous  Final Report (01-10-21 @ 19:01):    No growth at 5 days.    Culture - Blood (collected 01-05-21 @ 17:59)  Source: .Blood Blood-Peripheral  Final Report (01-10-21 @ 19:01):    No growth at 5 days.    Culture - Blood (collected 01-01-21 @ 12:54)  Source: .Blood Blood  Gram Stain (01-05-21 @ 09:30):    Growth in anaerobic bottle: Gram Positive Rods    Gram Stain performed by:    [facility]    [address] [place] [id number]    ,    TYPE: (C=Critical, N=Notification, A=Abnormal) c    TESTS:  _ gs    DATE/TIME CALLED: _ 01/05/2021 09:30:08    CALLED TO: _ gale laird rn    READ BACK (2 Patient Identifiers)(Y/N): _ y    READ BACK VALUES (Y/N): _ y    CALLED BY: Davis sinclair  Final Report (01-06-21 @ 13:16):    Growth in anaerobic bottle: Bacillus species not anthracis    "Susceptibilities not performed"    Culture - Blood (collected 01-01-21 @ 12:54)  Source: .Blood Blood  Final Report (01-06-21 @ 13:00):    No growth at 5 days.    Culture - Catheter (collected 12-31-20 @ 22:24)  Source: .Catheter PICC Tip  Final Report (01-02-21 @ 16:11):    No growth      WBC Count: 7.78 K/uL (01-14-21 @ 11:07)  WBC Count: 7.25 K/uL (01-11-21 @ 07:50)    Creatinine, Serum: 1.01 mg/dL (01-11-21 @ 07:50)  Creatinine, Serum: 1.09 mg/dL (01-10-21 @ 09:07)      Ferritin, Serum: 753 ng/mL (12-30-20 @ 01:51)           COVID-19 PCR: NotDetec (01-13-21 @ 13:51)  COVID-19 PCR: NotDetec (01-12-21 @ 16:06)  COVID-19 PCR: Detected (01-06-21 @ 08:23)  COVID-19 IgG Antibody Interpretation: Negative (12-30-20 @ 06:49)  COVID-19 IgG Antibody Index: 0.06 Index (12-30-20 @ 06:49)  COVID-19 PCR: Detected (12-29-20 @ 22:59)

## 2021-01-14 NOTE — PROGRESS NOTE ADULT - SUBJECTIVE AND OBJECTIVE BOX
KRUPA MEDINA    [id number]    55y      Female    CC: hypernatremia     INTERVAL HPI/OVERNIGHT EVENTS: pt seen and examined.      REVIEW OF SYSTEMS:    CONSTITUTIONAL: No fever, weight loss  RESPIRATORY: No cough, wheezing, hemoptysis; No shortness of breath  CARDIOVASCULAR: No chest pain, palpitations  GASTROINTESTINAL: No abdominal or epigastric pain. No nausea, vomiting  NEUROLOGICAL: No headaches, memory loss, loss of strength.    Vital Signs Last 24 Hrs  T(C): 36.7 (14 Jan 2021 16:34), Max: 36.7 (14 Jan 2021 12:01)  T(F): 98 (14 Jan 2021 16:34), Max: 98 (14 Jan 2021 12:01)  HR: 78 (14 Jan 2021 16:34) (72 - 78)  BP: 127/87 (14 Jan 2021 16:34) (117/77 - 143/93)  BP(mean): --  RR: 20 (14 Jan 2021 16:34) (19 - 20)  SpO2: 96% (14 Jan 2021 16:34) (95% - 97%)    PHYSICAL EXAM:    GENERAL: NAD  HEENT: PERRL, +EOMI  NECK: soft, supple  CHEST/LUNG: Clear to auscultation bilaterally; No wheezing  HEART: S1S2+, Regular rate and rhythm; No murmurs, rubs, or gallops  ABDOMEN: Soft, Nontender, Nondistended; Bowel sounds present  SKIN: warm, dry  NEURO: AAOX3, no focal deficits  PSYCH: normal affect     LABS:                        10.8   8.05  )-----------( 413      ( 14 Jan 2021 13:45 )             37.3     01-14    138  |  101  |  26.0<H>  ----------------------------<  113<H>  4.7   |  26.0  |  1.23    Ca    9.9      14 Jan 2021 13:45        MEDICATIONS  (STANDING):  amLODIPine   Tablet 10 milliGRAM(s) Oral daily  cefTRIAXone   IVPB 2000 milliGRAM(s) IV Intermittent every 24 hours  chlorhexidine 2% Cloths 1 Application(s) Topical <User Schedule>  chlorhexidine 4% Liquid 1 Application(s) Topical <User Schedule>  desmopressin 0.1 milliGRAM(s) Oral two times a day  enoxaparin Injectable 40 milliGRAM(s) SubCutaneous daily  folic acid 1 milliGRAM(s) Oral daily  levETIRAcetam 500 milliGRAM(s) Oral two times a day  multivitamin 1 Tablet(s) Oral daily  pantoprazole    Tablet 40 milliGRAM(s) Oral before breakfast  sodium bicarbonate 1300 milliGRAM(s) Oral two times a day  vancomycin  IVPB 750 milliGRAM(s) IV Intermittent every 12 hours    MEDICATIONS  (PRN):  acetaminophen   Tablet .. 650 milliGRAM(s) Oral every 6 hours PRN Temp greater or equal to 38.5C (101.3F)  sodium chloride 0.9% lock flush 10 milliLiter(s) IV Push every 1 hour PRN Pre/post blood products, medications, blood draw, and to maintain line patency      RADIOLOGY & ADDITIONAL TESTS:

## 2021-01-15 ENCOUNTER — TRANSCRIPTION ENCOUNTER (OUTPATIENT)
Age: 56
End: 2021-01-15

## 2021-01-15 LAB
ANION GAP SERPL CALC-SCNC: 11 MMOL/L — SIGNIFICANT CHANGE UP (ref 5–17)
BUN SERPL-MCNC: 24 MG/DL — HIGH (ref 8–20)
CALCIUM SERPL-MCNC: 9.3 MG/DL — SIGNIFICANT CHANGE UP (ref 8.6–10.2)
CHLORIDE SERPL-SCNC: 104 MMOL/L — SIGNIFICANT CHANGE UP (ref 98–107)
CO2 SERPL-SCNC: 26 MMOL/L — SIGNIFICANT CHANGE UP (ref 22–29)
CREAT SERPL-MCNC: 1.26 MG/DL — SIGNIFICANT CHANGE UP (ref 0.5–1.3)
GLUCOSE SERPL-MCNC: 102 MG/DL — HIGH (ref 70–99)
POTASSIUM SERPL-MCNC: 4.4 MMOL/L — SIGNIFICANT CHANGE UP (ref 3.5–5.3)
POTASSIUM SERPL-SCNC: 4.4 MMOL/L — SIGNIFICANT CHANGE UP (ref 3.5–5.3)
SODIUM SERPL-SCNC: 141 MMOL/L — SIGNIFICANT CHANGE UP (ref 135–145)
VANCOMYCIN TROUGH SERPL-MCNC: 19.2 UG/ML — SIGNIFICANT CHANGE UP (ref 10–20)

## 2021-01-15 PROCEDURE — 99232 SBSQ HOSP IP/OBS MODERATE 35: CPT

## 2021-01-15 PROCEDURE — 93320 DOPPLER ECHO COMPLETE: CPT | Mod: 26

## 2021-01-15 PROCEDURE — 93312 ECHO TRANSESOPHAGEAL: CPT | Mod: 26

## 2021-01-15 PROCEDURE — 93325 DOPPLER ECHO COLOR FLOW MAPG: CPT | Mod: 26

## 2021-01-15 RX ORDER — VANCOMYCIN HCL 1 G
1000 VIAL (EA) INTRAVENOUS
Refills: 0 | Status: DISCONTINUED | OUTPATIENT
Start: 2021-01-16 | End: 2021-01-18

## 2021-01-15 RX ADMIN — Medication 1300 MILLIGRAM(S): at 16:41

## 2021-01-15 RX ADMIN — ENOXAPARIN SODIUM 40 MILLIGRAM(S): 100 INJECTION SUBCUTANEOUS at 12:41

## 2021-01-15 RX ADMIN — CHLORHEXIDINE GLUCONATE 1 APPLICATION(S): 213 SOLUTION TOPICAL at 04:44

## 2021-01-15 RX ADMIN — CEFTRIAXONE 100 MILLIGRAM(S): 500 INJECTION, POWDER, FOR SOLUTION INTRAMUSCULAR; INTRAVENOUS at 12:37

## 2021-01-15 RX ADMIN — LEVETIRACETAM 500 MILLIGRAM(S): 250 TABLET, FILM COATED ORAL at 04:44

## 2021-01-15 RX ADMIN — Medication 1 MILLIGRAM(S): at 12:37

## 2021-01-15 RX ADMIN — AMLODIPINE BESYLATE 10 MILLIGRAM(S): 2.5 TABLET ORAL at 04:44

## 2021-01-15 RX ADMIN — Medication 1 TABLET(S): at 12:37

## 2021-01-15 RX ADMIN — DESMOPRESSIN ACETATE 0.1 MILLIGRAM(S): 0.1 TABLET ORAL at 16:42

## 2021-01-15 RX ADMIN — LEVETIRACETAM 500 MILLIGRAM(S): 250 TABLET, FILM COATED ORAL at 16:42

## 2021-01-15 NOTE — DISCHARGE NOTE PROVIDER - NSDCCPCAREPLAN_GEN_ALL_CORE_FT
PRINCIPAL DISCHARGE DIAGNOSIS  Diagnosis: Hypernatremia  Assessment and Plan of Treatment:       SECONDARY DISCHARGE DIAGNOSES  Diagnosis: Bacteremia  Assessment and Plan of Treatment:     Diagnosis: Diabetes insipidus  Assessment and Plan of Treatment:     Diagnosis: COVID-19 virus infection  Assessment and Plan of Treatment:      PRINCIPAL DISCHARGE DIAGNOSIS  Diagnosis: Hypernatremia  Assessment and Plan of Treatment: resolved  PICC line for IVF home infusions      SECONDARY DISCHARGE DIAGNOSES  Diagnosis: Hyperkalemia  Assessment and Plan of Treatment: Fluid restirct 1L per Nephro   F/U Nephro OP    Diagnosis: Bacteremia  Assessment and Plan of Treatment: Abx complete  PICC line tip removed and sent for Cx- Neg.    Diagnosis: Diabetes insipidus  Assessment and Plan of Treatment: F/U Endo & Nephro OP    Diagnosis: COVID-19 virus infection  Assessment and Plan of Treatment: resolved     PRINCIPAL DISCHARGE DIAGNOSIS  Diagnosis: Hypernatremia  Assessment and Plan of Treatment: Resolved  - Please follow up with your Endocrinologist within 2-3 days. Please have BMP repeated. Please fluid restrict 1L and continue with DDAVP 0.1mg three times a day.      SECONDARY DISCHARGE DIAGNOSES  Diagnosis: Hyperkalemia  Assessment and Plan of Treatment: F/U Nephro OP    Diagnosis: Bacteremia  Assessment and Plan of Treatment: Abx complete  PICC line tip removed and sent for Cx- Neg.    Diagnosis: Diabetes insipidus  Assessment and Plan of Treatment: F/U Endo & Nephro OP    Diagnosis: COVID-19 virus infection  Assessment and Plan of Treatment: resolved

## 2021-01-15 NOTE — CHART NOTE - NSCHARTNOTEFT_GEN_A_CORE
Called by RN to remove LUE PICC line. Dressing clean, dry and intact. Pt placed in Trendelenburg, catheter removed without difficulty and pressure held for 15 minutes. Hemostasis achieved. No hematoma formation, extravagation of blood or any other complications. Pressure dressing applied. LUE PICC Line tip sent for Cx. Pt aware to call for assistance immediately if notes signs of bleeding or any other issues. Continue to observe. Advised RN to call provider w/further clinical changes and to place new Peripheral Line.

## 2021-01-15 NOTE — PROGRESS NOTE ADULT - ASSESSMENT
54 y/o female with h/o SAH, diabetes insipidus, chronic hypernatremia was BIBA for as pt. felt a little dizzy after coming out of bathroom, no syncope, no fall, no trauma, no focal weakness, no palpitations, no cp, no sob. pt. reports appetite has been ok, no abd. pain, no n/v/d. no fever. appetite is good. no sick contact. As per pt. she has a PICC line and every other day gets D5W through that for h/o hypernatremia. pt. bp 92/60 , hr 87 upon arrival. As per pt. she runs bp on the low side.     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  Repeat TTE 1/12 with +mobile echodensity on aortic valve.   - s/p SAADIA 1/15 no vegetation on valve, possible vegetation vs thrombus on tip of PICC  - Dc PICC and send tip for CX, repeat Blood cultures x 2  - c/w ceftriaxone 2 g IV daily  - c/w vancomycin -dose adjusted   - Trend Fever  - Trend Leukocytosis    d/w Dr Arauz, RN, cardiology, pharmacy  Will Follow

## 2021-01-15 NOTE — DISCHARGE NOTE PROVIDER - HOSPITAL COURSE
55y/oF PMH SAH, DI, chronic hypernatremia BIBA with dizziness. Pt came in with PICC line, reports getting D5W every other day for hypernatremia. Pt admitted with hypernatremia, started on IVF and desmopressin. Also found to be COVID+ with positive blood cultures. PICC line removed, pt started vanc/zosyn. Repeat blood cultures (1/1) with gram positive rods. Additional repeat blood cultures (1/5) NGTD. New picc line placed. 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 Pt re-tested with 2 negative COVID PCRs. SAADIA done 1/15. Pt also followed by endocrinology for chronic hypernatremia. to continue outpt IVF infusions as per her endocrine.          55y/oF PMH SAH, DI, chronic hypernatremia BIBA with dizziness. Pt came in with PICC line, reports getting D5W every other day for hypernatremia. Pt admitted with hypernatremia, started on IVF and desmopressin. Also found to be COVID-19+. Blood cultures were done and positive for (Klebsiella, Bacillus sp). ID was consulted. PICC line removed, and pt started vanc/zosyn. Repeat blood cultures (1/1) were also positive.  Additional repeat blood cultures (1/5) showed NGTD. 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 Cardiology was consulted. Pt re-tested with 2 negative COVID PCRs and then SAADIA was done on 1/15 and significant for calcification on AV, present since 2016 on review of prior TTEs, approx 4 mm oscillatory structure visualized on distal aspect of PICC line tip in the right atrium; differential for this can include intracardiac central line associated vegetation or remnant fibrinous cast from prior central intravenous catheter."  PICC line was removed. Culture from PICC tip was negative. Blood cultures were then repeated and showed no growth. Antibiotics were discontinued by ID as patient completed course for bacteremia. Endocrinology and Nephrology was consulted for chronic hypernatremia. DDAVP was adjusted to 0.1mg TID per Nephrology. PICC line reinsterted per Nephro/Endo per family wishes. Patient to continue outpatient IVF infusions as per her Endocrinologist with repeat BMP in 2-3 days.          55y/oF PMH SAH, DI, chronic hypernatremia BIBA with dizziness. Pt came in with PICC line, reports getting D5W every other day for hypernatremia. Pt admitted with hypernatremia, started on IVF and desmopressin. Also found to be COVID-19+. Blood cultures were done and positive for (Klebsiella, Bacillus sp). ID was consulted. PICC line removed, and pt started vanc/zosyn. Repeat blood cultures (1/1) were also positive.  Additional repeat blood cultures (1/5) showed NGTD. 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 Cardiology was consulted. Pt re-tested with 2 negative COVID PCRs and then SAADIA was done on 1/15 and significant for calcification on AV, present since 2016 on review of prior TTEs, approx 4 mm oscillatory structure visualized on distal aspect of PICC line tip in the right atrium; differential for this can include intracardiac central line associated vegetation or remnant fibrinous cast from prior central intravenous catheter."  PICC line was removed. Culture from PICC tip was negative. Blood cultures were then repeated and showed no growth. Antibiotics were discontinued by ID as patient completed course for bacteremia. Endocrinology and Nephrology was consulted for chronic hypernatremia. DDAVP was adjusted to 0.1mg TID per Nephrology. PICC line reinsterted per Nephro/Endo per family wishes. Patient to continue outpatient IVF infusions as per her Endocrinologist with repeat BMP in 2-3 days. Pt examined at bedside today by Hospitalist and PA. No acute overnight events to report. Pt has no current complaints. Denies SOB, cough, CP, palpitations, HA, dizziness, NVD, changes in urinary or bowel habits, fevers, chills. All other remaining ROS negative. Pt is medically stable for D/C home today.     Vital Signs Last 24 Hrs  T(C): 36.6 (21 Jan 2021 04:29), Max: 36.7 (20 Jan 2021 17:10)  T(F): 97.9 (21 Jan 2021 04:29), Max: 98.1 (20 Jan 2021 17:10)  HR: 76 (21 Jan 2021 04:29) (74 - 85)  BP: 125/82 (21 Jan 2021 04:29) (122/83 - 132/95)  BP(mean): --  ABP: --  ABP(mean): --  RR: 18 (21 Jan 2021 04:29) (18 - 18)  SpO2: 96% (21 Jan 2021 04:29) (96% - 98%)    PHYSICAL EXAM:  GENERAL: NAD, lying in bed comfortably  HEAD:  Atraumatic, Normocephalic  EENT: conjunctiva and sclera clear, moist mucous membranes, trachea midline, nares patent  NECK: Supple, No JVD  CHEST/LUNG: CTABL; No rales, rhonchi, wheezing, or rubs. Unlabored respirations  HEART: RRR; No murmurs, rubs, or gallops  ABDOMEN: +BS present in all 4 quadrants; Soft, NTTP, ND  EXTREMITIES:  2+ Peripheral Pulses, brisk capillary refill. No clubbing, cyanosis, or edema  NERVOUS SYSTEM:  A&OX3, speech clear. No facial droop, tongue protrusion midline. Answers questions appropriately. No deficits   MSK: FROM x 4 extremities   SKIN: No rashes or lesions noted               55y/oF PMH SAH, DI, chronic hypernatremia BIBA with dizziness. Pt came in with PICC line, reports getting D5W every other day for hypernatremia. Pt admitted with hypernatremia, started on IVF and desmopressin. Also found to be COVID-19+. Blood cultures were done and positive for (Klebsiella, Bacillus sp). ID was consulted. PICC line removed, and pt started vanc/zosyn. Repeat blood cultures () were also positive.  Additional repeat blood cultures () showed NGTD. 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 Cardiology was consulted. Pt re-tested with 2 negative COVID PCRs and then SAADIA was done on 1/15 and significant for calcification on AV, present since  on review of prior TTEs, approx 4 mm oscillatory structure visualized on distal aspect of PICC line tip in the right atrium; differential for this can include intracardiac central line associated vegetation or remnant fibrinous cast from prior central intravenous catheter."  PICC line was removed. Culture from PICC tip was negative. Blood cultures were then repeated and showed no growth. Antibiotics were discontinued by ID as patient completed course for bacteremia. Endocrinology and Nephrology was consulted for chronic hypernatremia. DDAVP was adjusted to 0.1mg TID per Nephrology. PICC line re-inserted per Nephro/Endo per family wishes. Patient needs resumption of home IV infusion of IVFs per her outpt Endocrinologist Dr. Nichols. she will follow up with them in 1-2 days. Patient instructed to fluid restrict to 1L until follow up. Cleared for discharge by Nephrology. Patient no longer requires inaptient hospitalization and is stable for discharge.        Vital Signs Last 24 Hrs  T(C): 36.6 (2021 04:29), Max: 36.7 (2021 17:10)  T(F): 97.9 (2021 04:29), Max: 98.1 (2021 17:10)  HR: 76 (2021 04:29) (74 - 85)  BP: 125/82 (2021 04:29) (122/83 - 132/95)  BP(mean): --  ABP: --  ABP(mean): --  RR: 18 (2021 04:29) (18 - 18)  SpO2: 96% (2021 04:29) (96% - 98%)    PHYSICAL EXAM:  GENERAL: NAD, lying in bed comfortably  HEAD:  Atraumatic, Normocephalic  EENT: conjunctiva and sclera clear, moist mucous membranes, trachea midline, nares patent  NECK: Supple, No JVD  CHEST/LUNG: CTABL; No rales, rhonchi, wheezing, or rubs. Unlabored respirations  HEART: RRR; No murmurs, rubs, or gallops  ABDOMEN: +BS present in all 4 quadrants; Soft, NTTP, ND  EXTREMITIES:  2+ Peripheral Pulses, brisk capillary refill. No clubbing, cyanosis, or edema. RUE picc  NERVOUS SYSTEM:  A&OX3, speech clear. No facial droop, tongue protrusion midline. Answers questions appropriately. No deficits   MSK: FROM x 4 extremities   SKIN: No rashes or lesions noted            D/c time spent coordinatin mins 55y/oF PMH SAH, DI, chronic hypernatremia BIBA with dizziness. Pt came in with PICC line, reports getting D5W every other day for hypernatremia. Pt admitted with hypernatremia, started on IVF and desmopressin. Also found to be COVID-19+. Blood cultures were done and positive for (Klebsiella, Bacillus sp). ID was consulted. PICC line removed, and pt started vanc/zosyn. Repeat blood cultures () were also positive.  Additional repeat blood cultures () showed NGTD. 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 Cardiology was consulted. Pt re-tested with 2 negative COVID PCRs and then SAADIA was done on 1/15 and significant for calcification on AV, present since  on review of prior TTEs, approx 4 mm oscillatory structure visualized on distal aspect of PICC line tip in the right atrium; differential for this can include intracardiac central line associated vegetation or remnant fibrinous cast from prior central intravenous catheter."  PICC line was removed. Culture from PICC tip was negative. Blood cultures were then repeated and showed no growth. Antibiotics were discontinued by ID as patient completed course for bacteremia. Endocrinology and Nephrology was consulted for chronic hypernatremia. DDAVP was adjusted to 0.1mg TID per Nephrology. PICC line re-inserted per Nephro/Endo per family wishes. Patient needs resumption of home IV infusion of IVFs per her outpt Endocrinologist Dr. Nichols based on BMP results. She should follow up with them in 1-2 days and have repeat BMP on Monday. BMP rec Patient instructed to fluid restrict to 1L until follow up. Cleared for discharge by Nephrology. Patient no longer requires inaptient hospitalization and is stable for discharge.        Vital Signs Last 24 Hrs  T(C): 36.6 (2021 04:29), Max: 36.7 (2021 17:10)  T(F): 97.9 (2021 04:29), Max: 98.1 (2021 17:10)  HR: 76 (2021 04:29) (74 - 85)  BP: 125/82 (2021 04:29) (122/83 - 132/95)  BP(mean): --  ABP: --  ABP(mean): --  RR: 18 (2021 04:29) (18 - 18)  SpO2: 96% (2021 04:29) (96% - 98%)    PHYSICAL EXAM:  GENERAL: NAD, lying in bed comfortably  HEAD:  Atraumatic, Normocephalic  EENT: conjunctiva and sclera clear, moist mucous membranes, trachea midline, nares patent  NECK: Supple, No JVD  CHEST/LUNG: CTABL; No rales, rhonchi, wheezing, or rubs. Unlabored respirations  HEART: RRR; No murmurs, rubs, or gallops  ABDOMEN: +BS present in all 4 quadrants; Soft, NTTP, ND  EXTREMITIES:  2+ Peripheral Pulses, brisk capillary refill. No clubbing, cyanosis, or edema. RUE picc  NERVOUS SYSTEM:  A&OX3, speech clear. No facial droop, tongue protrusion midline. Answers questions appropriately. No deficits   MSK: FROM x 4 extremities   SKIN: No rashes or lesions noted            D/c time spent coordinatin mins 55y/oF PMH SAH, DI, chronic hypernatremia BIBA with dizziness. Pt came in with PICC line, reports getting D5W every other day for hypernatremia. Pt admitted with hypernatremia, started on IVF and desmopressin. Also found to be COVID-19+. Blood cultures were done and positive for (Klebsiella, Bacillus sp). ID was consulted. PICC line removed, and pt started vanc/zosyn. Repeat blood cultures () were also positive.  Additional repeat blood cultures () showed NGTD. 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 Cardiology was consulted. Pt re-tested with 2 negative COVID PCRs and then SAADIA was done on 1/15 and significant for calcification on AV, present since  on review of prior TTEs, approx 4 mm oscillatory structure visualized on distal aspect of PICC line tip in the right atrium; differential for this can include intracardiac central line associated vegetation or remnant fibrinous cast from prior central intravenous catheter."  PICC line was removed. Culture from PICC tip was negative. Blood cultures were then repeated and showed no growth. Antibiotics were discontinued by ID as patient completed course for bacteremia. Endocrinology and Nephrology was consulted for chronic hypernatremia. DDAVP was adjusted to 0.1mg TID per Nephrology. PICC line re-inserted per Nephro/Endo per family wishes. Patient needs resumption of home IV infusion of IVFs per her outpt Endocrinologist Dr. Nichols based on BMP results. She should follow up with them in 1-2 days and have repeat BMP on Monday. Patient instructed to fluid restrict to 1L until follow up. Cleared for discharge by Nephrology. Patient no longer requires inaptient hospitalization and is stable for discharge.        Vital Signs Last 24 Hrs  T(C): 36.6 (2021 04:29), Max: 36.7 (2021 17:10)  T(F): 97.9 (2021 04:29), Max: 98.1 (2021 17:10)  HR: 76 (2021 04:29) (74 - 85)  BP: 125/82 (2021 04:29) (122/83 - 132/95)  BP(mean): --  ABP: --  ABP(mean): --  RR: 18 (2021 04:29) (18 - 18)  SpO2: 96% (2021 04:29) (96% - 98%)    PHYSICAL EXAM:  GENERAL: NAD, lying in bed comfortably  HEAD:  Atraumatic, Normocephalic  EENT: conjunctiva and sclera clear, moist mucous membranes, trachea midline, nares patent  NECK: Supple, No JVD  CHEST/LUNG: CTABL; No rales, rhonchi, wheezing, or rubs. Unlabored respirations  HEART: RRR; No murmurs, rubs, or gallops  ABDOMEN: +BS present in all 4 quadrants; Soft, NTTP, ND  EXTREMITIES:  2+ Peripheral Pulses, brisk capillary refill. No clubbing, cyanosis, or edema. RUE picc  NERVOUS SYSTEM:  A&OX3, speech clear. No facial droop, tongue protrusion midline. Answers questions appropriately. No deficits   MSK: FROM x 4 extremities   SKIN: No rashes or lesions noted            D/c time spent coordinatin mins 55y/oF PMH SAH, DI, chronic hypernatremia BIBA with dizziness. Pt came in with PICC line, reports getting D5W every other day for hypernatremia. Pt admitted with hypernatremia, started on IVF and desmopressin. Also found to be COVID-19+. Blood cultures were done and positive for (Klebsiella, Bacillus sp). ID was consulted. PICC line removed, and pt started vanc/zosyn. Repeat blood cultures () were also positive.  Additional repeat blood cultures () showed NGTD. 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 Cardiology was consulted. Pt re-tested with 2 negative COVID PCRs and then SAADIA was done on 1/15 and significant for calcification on AV, present since  on review of prior TTEs, approx 4 mm oscillatory structure visualized on distal aspect of PICC line tip in the right atrium; differential for this can include intracardiac central line associated vegetation or remnant fibrinous cast from prior central intravenous catheter."  PICC line was removed. Culture from PICC tip was negative. Blood cultures were then repeated and showed no growth. Antibiotics were discontinued by ID as patient completed course for bacteremia. Endocrinology and Nephrology was consulted for chronic hypernatremia. DDAVP was adjusted to 0.1mg TID per Nephrology. PICC line re-inserted per Nephro/Endo per family wishes. Patient needs resumption of home IV infusion of IVFs per her outpt Endocrinologist Dr. Nichols based on BMP results. She should follow up with them in 1-2 days and have repeat BMP on Monday. Patient and family instructed to fluid restrict to 1L until follow up. Cleared for discharge by Nephrology. Patient no longer requires inaptient hospitalization and is stable for discharge.        Vital Signs Last 24 Hrs  T(C): 36.6 (2021 04:29), Max: 36.7 (2021 17:10)  T(F): 97.9 (2021 04:29), Max: 98.1 (2021 17:10)  HR: 76 (2021 04:29) (74 - 85)  BP: 125/82 (2021 04:29) (122/83 - 132/95)  BP(mean): --  ABP: --  ABP(mean): --  RR: 18 (2021 04:29) (18 - 18)  SpO2: 96% (2021 04:29) (96% - 98%)    PHYSICAL EXAM:  GENERAL: NAD, lying in bed comfortably  HEAD:  Atraumatic, Normocephalic  EENT: conjunctiva and sclera clear, moist mucous membranes, trachea midline, nares patent  NECK: Supple, No JVD  CHEST/LUNG: CTABL; No rales, rhonchi, wheezing, or rubs. Unlabored respirations  HEART: RRR; No murmurs, rubs, or gallops  ABDOMEN: +BS present in all 4 quadrants; Soft, NTTP, ND  EXTREMITIES:  2+ Peripheral Pulses, brisk capillary refill. No clubbing, cyanosis, or edema. RUE picc  NERVOUS SYSTEM:  A&OX3, speech clear. No facial droop, tongue protrusion midline. Answers questions appropriately. No deficits   MSK: FROM x 4 extremities   SKIN: No rashes or lesions noted            D/c time spent coordinatin mins

## 2021-01-15 NOTE — DISCHARGE NOTE PROVIDER - NSDCFUADDINST_GEN_ALL_CORE_FT
- Please take medications as reconciled.  - Please follow up with your PMD within 1 week  - Please follow up with your Endocrinologist within 2-3 days, please have your BMP repeated. You are to fluid restrict to 1L daily until instructed otherwise by your Endocrinologist  - You are now currently stable for discharge, however if your condition worsens please seek immediate medical attention. - Please take medications as reconciled.  - Please follow up with your PMD within 1 week  - Please follow up with your Endocrinologist within 2-3 days, please have your BMP repeated by Monday. You are to fluid restrict to 1L daily until instructed otherwise by your Endocrinologist. He is to resume outpatient IVF infusions based on BMP results.  - You are now currently stable for discharge, however if your condition worsens please seek immediate medical attention. - Please take medications as reconciled.  - Please follow up with your PMD within 1 week  - Please follow up with your Endocrinologist within 2-3 days, please have your BMP repeated by on Monday. You are to fluid restrict to 1L daily until BMP. You are to resume outpatient IVF infusions based on BMP results per your Endocrinologist.  - You are now currently stable for discharge, however if your condition worsens please seek immediate medical attention.

## 2021-01-15 NOTE — DISCHARGE NOTE PROVIDER - CARE PROVIDERS DIRECT ADDRESSES
,DirectAddress_Unknown,DirectAddress_Unknown,[email or web address] ,DirectAddress_Unknown,DirectAddress_Unknown,[email or web address],DirectAddress_Unknown ,DirectAddress_Unknown,DirectAddress_Unknown,DirectAddress_Unknown,[email or web address]

## 2021-01-15 NOTE — PROGRESS NOTE ADULT - SUBJECTIVE AND OBJECTIVE BOX
Department of Cardiology                                                                  [facility] [place] [address] [place] [address]                                                            Telephone: [phone]. Fax:[phone]                                                                         Post-Procedure Note: SAADIA      Narrative:    54 y/o female with h/o SAH, diabetes insipidus, chronic hypernatremia and obesity, originally admitted with COVID-19 PNA with ccb Klebsiella PNA and bacteremia now s/p SAADIA with anesthesia for evaluation of AV calcification v calcified vegetation v small nonmobile fibroelastoma.       Preliminary verbal report: No vegetation      MEDICATIONS:  amLODIPine   Tablet 10 milliGRAM(s) Oral daily    cefTRIAXone   IVPB 2000 milliGRAM(s) IV Intermittent every 24 hours  vancomycin  IVPB 750 milliGRAM(s) IV Intermittent every 12 hours      acetaminophen   Tablet .. 650 milliGRAM(s) Oral every 6 hours PRN  levETIRAcetam 500 milliGRAM(s) Oral two times a day    pantoprazole    Tablet 40 milliGRAM(s) Oral before breakfast    desmopressin 0.1 milliGRAM(s) Oral two times a day    chlorhexidine 2% Cloths 1 Application(s) Topical <User Schedule>  chlorhexidine 4% Liquid 1 Application(s) Topical <User Schedule>  enoxaparin Injectable 40 milliGRAM(s) SubCutaneous daily  folic acid 1 milliGRAM(s) Oral daily  multivitamin 1 Tablet(s) Oral daily  sodium bicarbonate 1300 milliGRAM(s) Oral two times a day  sodium chloride 0.9% lock flush 10 milliLiter(s) IV Push every 1 hour PRN        PHYSICAL EXAM:    T(C): 36.4 (01-15-21 @ 00:29), Max: 36.7 (01-14-21 @ 12:01)  HR: 77 (01-15-21 @ 00:29) (73 - 78)  BP: 127/82 (01-15-21 @ 00:29) (127/82 - 143/93)  RR: 18 (01-15-21 @ 00:29) (18 - 20)  SpO2: 98% (01-15-21 @ 00:29) (95% - 98%)  Wt(kg): --    I&O's Summary      Daily     Daily     Constitutional: A & O x 3  HEENT:   Normal oral mucosa, PERRL, EOMI	  Cardiovascular: Normal S1 S2, No JVD, No murmurs, No edema  Respiratory: Lungs clear to auscultation	  Gastrointestinal:  Soft, Non-tender, + BS	  Skin: No rashes, No ecchymoses, No cyanosis  Neurologic: Non-focal  Extremities: Normal range of motion, No clubbing, cyanosis or edema  Vascular: Peripheral pulses palpable 2+ bilaterally

## 2021-01-15 NOTE — PROGRESS NOTE ADULT - ATTENDING COMMENTS
55F with bacteremia and concern for aortic valve mass    - SAADIA performed today: calcification on AV, present since 2016 on review of prior TTEs  - approx 4 mm oscillatory structure visualized on distal aspect of PICC line tip in the right atrium; differential for this can include intracardiac central line associated vegetation or remnant fibrinous cast from prior central intravenous catheter.    - further antimicrobial therapy per discretion of primary team and ID  - can consider follow up imaging with repeat SAADIA in 2-4 weeks
Agree with above   patient seen and examined by myself. No complains, no events. serum Na stable.   Discussed with Dr. Celis - nephrologist, agree with her to discharge on Desmopressin and to follow with her in two weeks   The rest as above and it discussed with patient and NATE An
55F with bacteremia and concern for aortic valve mass    - calcification versus small non-mobile fibroelastoma versus calcified vegetation on ventricular aspect of aortic valve on TTE  - unchanged size and mobility on follow up study  - unable to perform SAADIA currently due to ongoing COVID19 precautions (contact/airborne isolation); follow up with Cardiology as outpatient for SAADIA in 1-2 weeks  - antimicrobial therapy per ID and primary team .

## 2021-01-15 NOTE — PROGRESS NOTE ADULT - ASSESSMENT
56 y/o female with h/o SAH, diabetes insipidus, chronic hypernatremia and obesity, originally admitted with COVID-19 PNA with ccb Klebsiella PNA and bacteremia now presents to the cardiac cath holding area for SAADIA with anesthesia for evaluation of AV calcification v calcified vegetation v small nonmobile fibroelastoma.         -SAADIA as ordered  -Procedure discussed with patient; risks and benefits explained; questions answered  -Labs and ECG reviewed

## 2021-01-15 NOTE — DISCHARGE NOTE PROVIDER - CARE PROVIDER_API CALL
Bernadine Lee  INTERNAL MEDICINE  [address] [place] [address]  Phone: [phone]  Fax: [phone]  Follow Up Time:     Virgil Steen ()  Cardiovascular Disease; Internal Medicine  [address] [place] [address]  Phone: [phone]  Fax: [phone]  Follow Up Time:     Batsheva Miramontes)  EndocrinologyMetabDiabetes; Internal Medicine  [address] [place] [address]  Phone: [phone]  Fax: [phone]  Follow Up Time:    Bernadine Lee  INTERNAL MEDICINE  [address] [place] [address]  Phone: [phone]  Fax: [phone]  Follow Up Time:     Virgil Steen ()  Cardiovascular Disease; Internal Medicine  [address] [place] [address]  Phone: [phone]  Fax: [phone]  Follow Up Time:     Batsheva Miramontes)  EndocrinologyMetabDiabetes; Internal Medicine  [facility] [place] [address]  Phone: [phone]  Fax: [phone]  Follow Up Time:     F/U w/your PCP,   Phone: (   )    -  Fax: (   )    -  Follow Up Time: 1 week   Bernadine Lee  INTERNAL MEDICINE  [address] [place] [address]  Phone: [phone]  Fax: [phone]  Follow Up Time:     Virgil Steen ()  Cardiovascular Disease; Internal Medicine  [address] [place] [address]  Phone: [phone]  Fax: [phone]  Follow Up Time:     F/U w/your PCP,   Phone: (   )    -  Fax: (   )    -  Follow Up Time: 1 week    Wilver Mancera)  Internal Medicine; Nephrology  [address] [place] [address]  Phone: [phone]  Fax: [phone]  Follow Up Time:

## 2021-01-15 NOTE — PROGRESS NOTE ADULT - SUBJECTIVE AND OBJECTIVE BOX
[facility]  INFECTIOUS DISEASES AND INTERNAL MEDICINE at [place]  =======================================================  Mj Lee MD  Diplomates American Board of Internal Medicine and Infectious Diseases  Tel: [phone]      Fax: [phone]  =======================================================    KRUPA MEDINA [id number]    Follow up: bacteremia  feels well  s/p SAADIA this am      Allergies:  No Known Allergies           REVIEW OF SYSTEMS:  CONSTITUTIONAL:  No Fever or chills  HEENT:   No diplopia or blurred vision.  No earache, sore throat or runny nose.  CARDIOVASCULAR:  No pressure, squeezing, strangling, tightness, heaviness or aching about the chest, neck, axilla or epigastrium.  RESPIRATORY:  No cough, shortness of breath  GASTROINTESTINAL:  No nausea, vomiting or diarrhea.  GENITOURINARY:  No dysuria, frequency or urgency. No Blood in urine  MUSCULOSKELETAL:  no joint aches, no muscle pain  SKIN:  No change in skin, hair or nails.  NEUROLOGIC:  No Headaches, seizures or weakness.  PSYCHIATRIC:  No disorder of thought or mood.  ENDOCRINE:  No heat or cold intolerance  HEMATOLOGICAL:  No easy bruising or bleeding.       Physical Exam:  GEN: NAD, pleasant  HEENT: normocephalic and atraumatic. EOMI. PERRL.  Anicteric   NECK: Supple.   LUNGS: Clear to auscultation.  HEART: Regular rate and rhythm without murmur.  ABDOMEN: Soft, nontender, and nondistended.  Positive bowel sounds.    : No CVA tenderness  EXTREMITIES: Without any edema.  MSK: no joint swelling  NEUROLOGIC: Cranial nerves II through XII are grossly intact. No focal deficits  PSYCHIATRIC: Appropriate affect .  SKIN: No Rash      Vitals:    T(F): 97.9 (15 Jose C 2021 16:00), Max: 97.9 (15 Jose C 2021 16:00)  HR: 86 (15 Jose C 2021 16:00)  BP: 123/70 (15 Jose C 2021 16:00)  RR: 20 (15 Jose C 2021 16:00)  SpO2: 98% (15 Jose C 2021 16:00) (98% - 98%)  temp max in last 48H T(F): , Max: 98 (01-14-21 @ 12:01)      Current Antibiotics:  cefTRIAXone   IVPB 2000 milliGRAM(s) IV Intermittent every 24 hours    Other medications:  amLODIPine   Tablet 10 milliGRAM(s) Oral daily  chlorhexidine 2% Cloths 1 Application(s) Topical <User Schedule>  chlorhexidine 4% Liquid 1 Application(s) Topical <User Schedule>  desmopressin 0.1 milliGRAM(s) Oral two times a day  enoxaparin Injectable 40 milliGRAM(s) SubCutaneous daily  folic acid 1 milliGRAM(s) Oral daily  levETIRAcetam 500 milliGRAM(s) Oral two times a day  multivitamin 1 Tablet(s) Oral daily  pantoprazole    Tablet 40 milliGRAM(s) Oral before breakfast  sodium bicarbonate 1300 milliGRAM(s) Oral two times a day        Labs:                        10.8   8.05  )-----------( 413      ( 14 Jan 2021 13:45 )             37.3      01-15    141  |  104  |  24.0<H>  ----------------------------<  102<H>  4.4   |  26.0  |  1.26    Ca    9.3      15 Jose C 2021 13:29        Culture - Blood (collected 01-05-21 @ 17:59)  Source: .Blood Blood-Venous  Final Report (01-10-21 @ 19:01):    No growth at 5 days.    Culture - Blood (collected 01-05-21 @ 17:59)  Source: .Blood Blood-Peripheral  Final Report (01-10-21 @ 19:01):    No growth at 5 days.      WBC Count: 8.05 K/uL (01-14-21 @ 13:45)  WBC Count: 7.78 K/uL (01-14-21 @ 11:07)  WBC Count: 7.25 K/uL (01-11-21 @ 07:50)    Creatinine, Serum: 1.26 mg/dL (01-15-21 @ 13:29)  Creatinine, Serum: 1.23 mg/dL (01-14-21 @ 13:45)  Creatinine, Serum: 1.01 mg/dL (01-11-21 @ 07:50)      Ferritin, Serum: 753 ng/mL (12-30-20 @ 01:51)           COVID-19 PCR: NotDetec (01-13-21 @ 13:51)  COVID-19 PCR: NotDetec (01-12-21 @ 16:06)  COVID-19 PCR: Detected (01-06-21 @ 08:23)  COVID-19 IgG Antibody Interpretation: Negative (12-30-20 @ 06:49)  COVID-19 IgG Antibody Index: 0.06 Index (12-30-20 @ 06:49)  COVID-19 PCR: Detected (12-29-20 @ 22:59)

## 2021-01-15 NOTE — PROGRESS NOTE ADULT - ASSESSMENT
55y/oF PMH SAH, DI, chronic hypernatremia BIBA with dizziness. Pt came in with PICC line, reports getting D5W every other day for hypernatremia. Pt admitted with hypernatremia, started on IVF and desmopressin. Also found to be COVID+ with positive blood cultures. PICC line removed, pt started vanc/zosyn. Repeat blood cultures (1/1) with gram positive rods. Additional repeat blood cultures (1/5) NGTD.     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 Isolation precautions d/c'ed as per ID and infection control.   -SAADIA 1/15: mobil echo density on tip of iv catheter in R atrium, consistent with vegetation or fibrinous formation   -d/w Dr. Lee; need to remove current PICC, sent tip for culture, repeat blood cx today 1/15  -    COVID -19 infection, now negative PCR x2  -not requiring supplemental O2  -cont supportive treatment     Hypernatremia, chronic 2/2 Diabetes insipidus   -cont desmopressin  -encourage PO intake     Hypertension  -bp increasing, hypotensive on arrival, restart amlodipine, cont to monitor     anemia of chronic disease   -s/p 1u PRBC   -cont to monitor, no signs of active bleeding     dispo: home with PICC for IVF, IV abx pending SAADIA, set up of IV abx home infusions    Pt's daughter, Millie [phone], updated

## 2021-01-15 NOTE — DISCHARGE NOTE PROVIDER - PROVIDER TOKENS
PROVIDER:[TOKEN:[[id number]]],PROVIDER:[TOKEN:[[id number]]],PROVIDER:[TOKEN:[[id number]]] PROVIDER:[TOKEN:[[id number]]],PROVIDER:[TOKEN:[[id number]]],PROVIDER:[TOKEN:[[id number]]],FREE:[LAST:[F/U w/your PCP],PHONE:[(   )    -],FAX:[(   )    -],FOLLOWUP:[1 week]] PROVIDER:[TOKEN:[[id number]]],PROVIDER:[TOKEN:[[id number]]],FREE:[LAST:[F/U w/your PCP],PHONE:[(   )    -],FAX:[(   )    -],FOLLOWUP:[1 week]],PROVIDER:[TOKEN:[[id number]]]

## 2021-01-15 NOTE — PROGRESS NOTE ADULT - ASSESSMENT
56 y/o female with h/o SAH, diabetes insipidus, chronic hypernatremia and obesity, originally admitted with COVID-19 PNA with ccb Klebsiella PNA and bacteremia now s/p SAADIA with anesthesia for evaluation of AV calcification v calcified vegetation v small nonmobile fibroelastoma.       -Post SAADIA orders  -no vegetation, centricity pending

## 2021-01-15 NOTE — DISCHARGE NOTE PROVIDER - NSDCMRMEDTOKEN_GEN_ALL_CORE_FT
desmopressin 0.1 mg oral tablet: 1 tab(s) orally 3 times a day  Dextrose 5% in water: 1000mL via intravenous route over the rate of one hour every other day  Dextrose 5% with water: 1000 milliliter(s) intravenous every other day  ferrous sulfate 325 mg (65 mg elemental iron) oral tablet: 1 tab(s) orally 2 times a day  folic acid 1 mg oral tablet: 1 tab(s) orally once a day  levETIRAcetam 500 mg oral tablet: 1 tab(s) orally 2 times a day  Multiple Vitamins with Minerals oral tablet: 1 tab(s) orally once a day  pantoprazole 40 mg oral delayed release tablet: 1 tab(s) orally once a day   amLODIPine 10 mg oral tablet: 1 tab(s) orally once a day  desmopressin 0.1 mg oral tablet: 1 tab(s) orally 3 times a day  ferrous sulfate 325 mg (65 mg elemental iron) oral tablet: 1 tab(s) orally 2 times a day  folic acid 1 mg oral tablet: 1 tab(s) orally once a day  levETIRAcetam 500 mg oral tablet: 1 tab(s) orally 2 times a day  Multiple Vitamins with Minerals oral tablet: 1 tab(s) orally once a day  pantoprazole 40 mg oral delayed release tablet: 1 tab(s) orally once a day   .: Repeat BMP on 1/25/2020, follow up with your Endocrinologist with results.   amLODIPine 10 mg oral tablet: 1 tab(s) orally once a day  desmopressin 0.1 mg oral tablet: 1 tab(s) orally 3 times a day  ferrous sulfate 325 mg (65 mg elemental iron) oral tablet: 1 tab(s) orally 2 times a day  folic acid 1 mg oral tablet: 1 tab(s) orally once a day  levETIRAcetam 500 mg oral tablet: 1 tab(s) orally 2 times a day  Multiple Vitamins with Minerals oral tablet: 1 tab(s) orally once a day  pantoprazole 40 mg oral delayed release tablet: 1 tab(s) orally once a day   .: Repeat BMP on 1/25/2021, follow up with your Endocrinologist with results.   amLODIPine 10 mg oral tablet: 1 tab(s) orally once a day  desmopressin 0.1 mg oral tablet: 1 tab(s) orally 3 times a day  ferrous sulfate 325 mg (65 mg elemental iron) oral tablet: 1 tab(s) orally 2 times a day  folic acid 1 mg oral tablet: 1 tab(s) orally once a day  levETIRAcetam 500 mg oral tablet: 1 tab(s) orally 2 times a day  Multiple Vitamins with Minerals oral tablet: 1 tab(s) orally once a day  pantoprazole 40 mg oral delayed release tablet: 1 tab(s) orally once a day   .: Repeat BMP on 1/25/2021, follow up with your Endocrinologist with results.   .: Fluid restrict to 1L until specified otherwise per your Endocrinologist.   amLODIPine 10 mg oral tablet: 1 tab(s) orally once a day  desmopressin 0.1 mg oral tablet: 1 tab(s) orally 3 times a day  ferrous sulfate 325 mg (65 mg elemental iron) oral tablet: 1 tab(s) orally 2 times a day  folic acid 1 mg oral tablet: 1 tab(s) orally once a day  levETIRAcetam 500 mg oral tablet: 1 tab(s) orally 2 times a day  Multiple Vitamins with Minerals oral tablet: 1 tab(s) orally once a day  pantoprazole 40 mg oral delayed release tablet: 1 tab(s) orally once a day

## 2021-01-15 NOTE — PROGRESS NOTE ADULT - SUBJECTIVE AND OBJECTIVE BOX
Department of Cardiology                                                                  [facility] [address] [place] [address]                                                            Telephone: [phone]. Fax:[phone]                                                                                     Pre-SAADIA Note        Narrative:   56 y/o female with h/o SAH, diabetes insipidus, chronic hypernatremia and obesity, originally admitted with COVID-19 PNA with ccb Klebsiella PNA and bacteremia now presents to the cardiac cath holding area for SAADIA with anesthesia for evaluation of AV calcification v calcified vegetation v small nonmobile fibroelastoma.         ASA and Mallampati: Per Anesthesia    	  MEDICATIONS:  amLODIPine   Tablet 10 milliGRAM(s) Oral daily    cefTRIAXone   IVPB 2000 milliGRAM(s) IV Intermittent every 24 hours  vancomycin  IVPB 750 milliGRAM(s) IV Intermittent every 12 hours      acetaminophen   Tablet .. 650 milliGRAM(s) Oral every 6 hours PRN  levETIRAcetam 500 milliGRAM(s) Oral two times a day    pantoprazole    Tablet 40 milliGRAM(s) Oral before breakfast    desmopressin 0.1 milliGRAM(s) Oral two times a day    chlorhexidine 2% Cloths 1 Application(s) Topical <User Schedule>  chlorhexidine 4% Liquid 1 Application(s) Topical <User Schedule>  enoxaparin Injectable 40 milliGRAM(s) SubCutaneous daily  folic acid 1 milliGRAM(s) Oral daily  multivitamin 1 Tablet(s) Oral daily  sodium bicarbonate 1300 milliGRAM(s) Oral two times a day  sodium chloride 0.9% lock flush 10 milliLiter(s) IV Push every 1 hour PRN        PHYSICAL EXAM:    T(C): 36.4 (01-15-21 @ 00:29), Max: 36.7 (01-14-21 @ 12:01)  HR: 77 (01-15-21 @ 00:29) (73 - 78)  BP: 127/82 (01-15-21 @ 00:29) (127/82 - 143/93)  RR: 18 (01-15-21 @ 00:29) (18 - 20)  SpO2: 98% (01-15-21 @ 00:29) (95% - 98%)  Wt(kg): --    I&O's Summary      Daily     Daily     Constitutional: A & O x 3  HEENT:   Normal oral mucosa, PERRL, EOMI	  Cardiovascular: Normal S1 S2, No JVD, No murmurs, No edema  Respiratory: Lungs clear to auscultation	  Gastrointestinal:  Soft, Non-tender, + BS	  Skin: No rashes, No ecchymoses, No cyanosis  Neurologic: Non-focal  Extremities: Normal range of motion, No clubbing, cyanosis or edema  Vascular: Peripheral pulses palpable 2+ bilaterally    TELEMETRY: 	    ECG:  	  RADIOLOGY:     DIAGNOSTIC TESTING:  [ ] Echocardiogram:  < from: TTE Echo Limited or F/U (01.12.21 @ 19:12) >  Left Ventricle: The left ventricular internal cavity size is normal.  Left ventricular ejection fraction, by visual estimation, is 70 to 75%. Spectral Doppler shows impaired relaxation pattern of left ventricular myocardial filling (Grade I diastolic dysfunction).  Right Ventricle: Normal right ventricular size and function.  Pericardium: Trivial pericardial effusion is present. The pericardial effusion is globally located around the entire heart.  Mitral Valve: The mitral valve is normal in structure.  Tricuspid Valve: The tricuspid valve is normal in structure.  Aortic Valve: The aortic valve is trileaflet. Mild aortic valve regurgitation is seen. Small (0.41 cm) mobile echogenicity visualized on the ventricular side of aortic valve which appears to be associated with a small regurgitation jet. Consider SAADIA for further elucidation.  Pulmonic Valve: Structurally normal pulmonic valve, with normal leaflet excursion.      Summary:   1. Left ventricular ejection fraction, by visual estimation, is 70 to 75%.   2. Spectral Doppler shows impaired relaxation pattern of left ventricular myocardial filling (Grade I diastolic dysfunction).   3. There is moderate concentric left ventricular hypertrophy.   4. Mild aortic regurgitation.   5. Small (0.41 cm) mobile echogenicity visualized on the ventricular side of aortic valve which appears to be associated with a small regurgitation jet. Consider SAADIA for further elucidation.    LABS:	 	    CARDIAC MARKERS:                                  10.8   8.05  )-----------( 413      ( 14 Jan 2021 13:45 )             37.3     01-14    138  |  101  |  26.0<H>  ----------------------------<  113<H>  4.7   |  26.0  |  1.23    Ca    9.9      14 Jan 2021 13:45      proBNP:   Lipid Profile:   HgA1c:   TSH:

## 2021-01-15 NOTE — PROGRESS NOTE ADULT - SUBJECTIVE AND OBJECTIVE BOX
KRUPA MEDINA    [id number]    55y      Female    CC: hypernatremia     INTERVAL HPI/OVERNIGHT EVENTS: pt seen and examined. no complaints. SAADIA today    REVIEW OF SYSTEMS:    CONSTITUTIONAL: No fever, weight loss, or fatigue  RESPIRATORY: No cough, wheezing, hemoptysis; No shortness of breath  CARDIOVASCULAR: No chest pain, palpitations  GASTROINTESTINAL: No abdominal or epigastric pain. No nausea, vomiting  NEUROLOGICAL: No headaches, memory loss, loss of strength.    Vital Signs Last 24 Hrs  T(C): 36.4 (15 Jose C 2021 00:29), Max: 36.7 (14 Jan 2021 16:34)  T(F): 97.6 (15 Jose C 2021 00:29), Max: 98 (14 Jan 2021 16:34)  HR: 77 (15 Jose C 2021 00:29) (77 - 78)  BP: 127/82 (15 Jose C 2021 00:29) (127/82 - 127/87)  BP(mean): --  RR: 18 (15 Jose C 2021 00:29) (18 - 20)  SpO2: 98% (15 Jose C 2021 00:29) (96% - 98%)    PHYSICAL EXAM:    GENERAL: NAD  HEENT: PERRL, +EOMI  NECK: soft, supple  CHEST/LUNG: Clear to auscultation bilaterally; No wheezing  HEART: S1S2+, Regular rate and rhythm; No murmurs, rubs, or gallops  ABDOMEN: Soft, Nontender, Nondistended; Bowel sounds present  SKIN: warm, dry  NEURO: AAOX3, no focal deficits  PSYCH: normal affect     LABS:                        10.8   8.05  )-----------( 413      ( 14 Jan 2021 13:45 )             37.3     01-15    141  |  104  |  24.0<H>  ----------------------------<  102<H>  4.4   |  26.0  |  1.26    Ca    9.3      15 Jose C 2021 13:29        MEDICATIONS  (STANDING):  amLODIPine   Tablet 10 milliGRAM(s) Oral daily  cefTRIAXone   IVPB 2000 milliGRAM(s) IV Intermittent every 24 hours  chlorhexidine 2% Cloths 1 Application(s) Topical <User Schedule>  chlorhexidine 4% Liquid 1 Application(s) Topical <User Schedule>  desmopressin 0.1 milliGRAM(s) Oral two times a day  enoxaparin Injectable 40 milliGRAM(s) SubCutaneous daily  folic acid 1 milliGRAM(s) Oral daily  levETIRAcetam 500 milliGRAM(s) Oral two times a day  multivitamin 1 Tablet(s) Oral daily  pantoprazole    Tablet 40 milliGRAM(s) Oral before breakfast  sodium bicarbonate 1300 milliGRAM(s) Oral two times a day  vancomycin  IVPB 750 milliGRAM(s) IV Intermittent every 12 hours    MEDICATIONS  (PRN):  acetaminophen   Tablet .. 650 milliGRAM(s) Oral every 6 hours PRN Temp greater or equal to 38.5C (101.3F)  sodium chloride 0.9% lock flush 10 milliLiter(s) IV Push every 1 hour PRN Pre/post blood products, medications, blood draw, and to maintain line patency      RADIOLOGY & ADDITIONAL TESTS:    < from: SAADIA Echo Doppler (01.15.21 @ 09:20) >  Summary:   1. Left ventricular ejection fraction, by visual estimation, is 60 to 65%.   2. Normal global left ventricular systolic function.   3. Normal right ventricular size and function.   4. Normal left atrial size.   5. Color flow doppler and intravenous injection of agitated saline demonstrates the presence of an intact intra atrial septum.   6. Normal right atrial size.   7. Trace mitral valve regurgitation.   8. There is a 0.52 cm calcification on the tip of the non-coronary coronary cusp of the aortic valve. This causes trivial aortic regurgitation.   9. There is a mobile echo density on the tip of the intravenous catheter in the right atrium measuring 0.38 cm that can be consistent with a vegetation or fibrinous formation. Clinical correlation is recommended.  10. Small pericardial effusion with no echo evidence of hemodynamic compromise.    MD Sophie Electronically signed on 1/15/2021 at 11:15:23 AM    < end of copied text >

## 2021-01-15 NOTE — DISCHARGE NOTE PROVIDER - NSDCHHNEEDSERVICE_GEN_ALL_CORE
Observation and assessment Central venous access care/Observation and assessment/Teaching and training

## 2021-01-16 LAB
ANION GAP SERPL CALC-SCNC: 10 MMOL/L — SIGNIFICANT CHANGE UP (ref 5–17)
BASOPHILS # BLD AUTO: 0.07 K/UL — SIGNIFICANT CHANGE UP (ref 0–0.2)
BASOPHILS NFR BLD AUTO: 0.9 % — SIGNIFICANT CHANGE UP (ref 0–2)
BUN SERPL-MCNC: 26 MG/DL — HIGH (ref 8–20)
CALCIUM SERPL-MCNC: 9.1 MG/DL — SIGNIFICANT CHANGE UP (ref 8.6–10.2)
CHLORIDE SERPL-SCNC: 109 MMOL/L — HIGH (ref 98–107)
CO2 SERPL-SCNC: 26 MMOL/L — SIGNIFICANT CHANGE UP (ref 22–29)
CREAT SERPL-MCNC: 1.38 MG/DL — HIGH (ref 0.5–1.3)
EOSINOPHIL # BLD AUTO: 0.18 K/UL — SIGNIFICANT CHANGE UP (ref 0–0.5)
EOSINOPHIL NFR BLD AUTO: 2.2 % — SIGNIFICANT CHANGE UP (ref 0–6)
GLUCOSE SERPL-MCNC: 89 MG/DL — SIGNIFICANT CHANGE UP (ref 70–99)
HCT VFR BLD CALC: 31.9 % — LOW (ref 34.5–45)
HGB BLD-MCNC: 9.1 G/DL — LOW (ref 11.5–15.5)
IMM GRANULOCYTES NFR BLD AUTO: 1.2 % — SIGNIFICANT CHANGE UP (ref 0–1.5)
LYMPHOCYTES # BLD AUTO: 1.93 K/UL — SIGNIFICANT CHANGE UP (ref 1–3.3)
LYMPHOCYTES # BLD AUTO: 23.5 % — SIGNIFICANT CHANGE UP (ref 13–44)
MCHC RBC-ENTMCNC: 25.5 PG — LOW (ref 27–34)
MCHC RBC-ENTMCNC: 28.5 GM/DL — LOW (ref 32–36)
MCV RBC AUTO: 89.4 FL — SIGNIFICANT CHANGE UP (ref 80–100)
MONOCYTES # BLD AUTO: 0.66 K/UL — SIGNIFICANT CHANGE UP (ref 0–0.9)
MONOCYTES NFR BLD AUTO: 8 % — SIGNIFICANT CHANGE UP (ref 2–14)
NEUTROPHILS # BLD AUTO: 5.26 K/UL — SIGNIFICANT CHANGE UP (ref 1.8–7.4)
NEUTROPHILS NFR BLD AUTO: 64.2 % — SIGNIFICANT CHANGE UP (ref 43–77)
PLATELET # BLD AUTO: 429 K/UL — HIGH (ref 150–400)
POTASSIUM SERPL-MCNC: 4.2 MMOL/L — SIGNIFICANT CHANGE UP (ref 3.5–5.3)
POTASSIUM SERPL-SCNC: 4.2 MMOL/L — SIGNIFICANT CHANGE UP (ref 3.5–5.3)
RBC # BLD: 3.57 M/UL — LOW (ref 3.8–5.2)
RBC # FLD: 15.1 % — HIGH (ref 10.3–14.5)
SODIUM SERPL-SCNC: 144 MMOL/L — SIGNIFICANT CHANGE UP (ref 135–145)
WBC # BLD: 8.2 K/UL — SIGNIFICANT CHANGE UP (ref 3.8–10.5)
WBC # FLD AUTO: 8.2 K/UL — SIGNIFICANT CHANGE UP (ref 3.8–10.5)

## 2021-01-16 PROCEDURE — 99233 SBSQ HOSP IP/OBS HIGH 50: CPT

## 2021-01-16 RX ADMIN — DESMOPRESSIN ACETATE 0.1 MILLIGRAM(S): 0.1 TABLET ORAL at 16:41

## 2021-01-16 RX ADMIN — Medication 1 MILLIGRAM(S): at 08:56

## 2021-01-16 RX ADMIN — AMLODIPINE BESYLATE 10 MILLIGRAM(S): 2.5 TABLET ORAL at 05:09

## 2021-01-16 RX ADMIN — CEFTRIAXONE 100 MILLIGRAM(S): 500 INJECTION, POWDER, FOR SOLUTION INTRAMUSCULAR; INTRAVENOUS at 09:19

## 2021-01-16 RX ADMIN — ENOXAPARIN SODIUM 40 MILLIGRAM(S): 100 INJECTION SUBCUTANEOUS at 08:56

## 2021-01-16 RX ADMIN — Medication 1300 MILLIGRAM(S): at 05:09

## 2021-01-16 RX ADMIN — PANTOPRAZOLE SODIUM 40 MILLIGRAM(S): 20 TABLET, DELAYED RELEASE ORAL at 05:09

## 2021-01-16 RX ADMIN — Medication 1300 MILLIGRAM(S): at 16:41

## 2021-01-16 RX ADMIN — LEVETIRACETAM 500 MILLIGRAM(S): 250 TABLET, FILM COATED ORAL at 05:09

## 2021-01-16 RX ADMIN — CHLORHEXIDINE GLUCONATE 1 APPLICATION(S): 213 SOLUTION TOPICAL at 05:10

## 2021-01-16 RX ADMIN — DESMOPRESSIN ACETATE 0.1 MILLIGRAM(S): 0.1 TABLET ORAL at 05:09

## 2021-01-16 RX ADMIN — Medication 250 MILLIGRAM(S): at 05:12

## 2021-01-16 RX ADMIN — LEVETIRACETAM 500 MILLIGRAM(S): 250 TABLET, FILM COATED ORAL at 16:41

## 2021-01-16 RX ADMIN — Medication 1 TABLET(S): at 08:56

## 2021-01-16 NOTE — PROGRESS NOTE ADULT - SUBJECTIVE AND OBJECTIVE BOX
INTERVAL HPI/OVERNIGHT EVENTS:  follwo up DI    pt reports saud feelign ok     MEDICATIONS  (STANDING):  amLODIPine   Tablet 10 milliGRAM(s) Oral daily  cefTRIAXone   IVPB 2000 milliGRAM(s) IV Intermittent every 24 hours  chlorhexidine 2% Cloths 1 Application(s) Topical <User Schedule>  chlorhexidine 4% Liquid 1 Application(s) Topical <User Schedule>  desmopressin 0.1 milliGRAM(s) Oral two times a day  enoxaparin Injectable 40 milliGRAM(s) SubCutaneous daily  folic acid 1 milliGRAM(s) Oral daily  levETIRAcetam 500 milliGRAM(s) Oral two times a day  multivitamin 1 Tablet(s) Oral daily  pantoprazole    Tablet 40 milliGRAM(s) Oral before breakfast  sodium bicarbonate 1300 milliGRAM(s) Oral two times a day  vancomycin  IVPB 1000 milliGRAM(s) IV Intermittent <User Schedule>    MEDICATIONS  (PRN):  acetaminophen   Tablet .. 650 milliGRAM(s) Oral every 6 hours PRN Temp greater or equal to 38.5C (101.3F)  sodium chloride 0.9% lock flush 10 milliLiter(s) IV Push every 1 hour PRN Pre/post blood products, medications, blood draw, and to maintain line patency      Allergies    No Known Allergies    Intolerances        Review of systems:  NO CP no pressure     Vital Signs Last 24 Hrs  T(C): 37.4 (16 Jan 2021 23:08), Max: 37.4 (16 Jan 2021 23:08)  T(F): 99.4 (16 Jan 2021 23:08), Max: 99.4 (16 Jan 2021 23:08)  HR: 103 (16 Jan 2021 23:08) (94 - 103)  BP: 108/72 (16 Jan 2021 23:08) (103/72 - 116/76)  BP(mean): --  RR: 18 (16 Jan 2021 23:08) (18 - 19)  SpO2: 94% (16 Jan 2021 23:08) (94% - 96%)    PHYSICAL EXAM:      Constitutional: NAD, well-groomed, well-developed  HEENT: PERRLA, EOMI, no exophalmos    Respiratory: CTAB  Cardiovascular: S1 and S2, RRR, no M/G/R  Gastrointestinal: BS+, soft, no organomeglag or mass  Extremities: No peripheral edema, no pedal lesions          LABS:                        9.1    8.20  )-----------( 429      ( 16 Jan 2021 08:59 )             31.9     01-16    144  |  109<H>  |  26.0<H>  ----------------------------<  89  4.2   |  26.0  |  1.38<H>    Ca    9.1      16 Jan 2021 08:59            CAPILLARY BLOOD GLUCOSE      RADIOLOGY & ADDITIONAL TESTS:

## 2021-01-16 NOTE — PROGRESS NOTE ADULT - SUBJECTIVE AND OBJECTIVE BOX
KRUPA MEDINA    [id number]    55y      Female    CC: hypernatremia     INTERVAL HPI/OVERNIGHT EVENTS:   Patient seen and examined at bedside. No acute events reported overnight. No new complaints.    REVIEW OF SYSTEMS:    CONSTITUTIONAL: No fever, weight loss, or fatigue  RESPIRATORY: No cough, wheezing, hemoptysis; No shortness of breath  CARDIOVASCULAR: No chest pain, palpitations  GASTROINTESTINAL: No abdominal or epigastric pain. No nausea, vomiting  NEUROLOGICAL: No headaches, memory loss, loss of strength.    Vital Signs Last 24 Hrs  T(C): 36.4 (15 Jose C 2021 00:29), Max: 36.7 (14 Jan 2021 16:34)  T(F): 97.6 (15 Jose C 2021 00:29), Max: 98 (14 Jan 2021 16:34)  HR: 77 (15 Jose C 2021 00:29) (77 - 78)  BP: 127/82 (15 Jose C 2021 00:29) (127/82 - 127/87)  BP(mean): --  RR: 18 (15 Jose C 2021 00:29) (18 - 20)  SpO2: 98% (15 Jose C 2021 00:29) (96% - 98%)    PHYSICAL EXAM:    GENERAL: NAD  HEENT: PERRL, +EOMI  NECK: soft, supple  CHEST/LUNG: Clear to auscultation bilaterally; No wheezing  HEART: S1S2+, Regular rate and rhythm; No murmurs, rubs, or gallops  ABDOMEN: Soft, Nontender, Nondistended; Bowel sounds present  SKIN: warm, dry  NEURO: AAOX3, no focal deficits  PSYCH: normal affect     LABS:                        10.8   8.05  )-----------( 413      ( 14 Jan 2021 13:45 )             37.3     01-15    141  |  104  |  24.0<H>  ----------------------------<  102<H>  4.4   |  26.0  |  1.26    Ca    9.3      15 Jose C 2021 13:29        MEDICATIONS  (STANDING):  amLODIPine   Tablet 10 milliGRAM(s) Oral daily  cefTRIAXone   IVPB 2000 milliGRAM(s) IV Intermittent every 24 hours  chlorhexidine 2% Cloths 1 Application(s) Topical <User Schedule>  chlorhexidine 4% Liquid 1 Application(s) Topical <User Schedule>  desmopressin 0.1 milliGRAM(s) Oral two times a day  enoxaparin Injectable 40 milliGRAM(s) SubCutaneous daily  folic acid 1 milliGRAM(s) Oral daily  levETIRAcetam 500 milliGRAM(s) Oral two times a day  multivitamin 1 Tablet(s) Oral daily  pantoprazole    Tablet 40 milliGRAM(s) Oral before breakfast  sodium bicarbonate 1300 milliGRAM(s) Oral two times a day  vancomycin  IVPB 750 milliGRAM(s) IV Intermittent every 12 hours    MEDICATIONS  (PRN):  acetaminophen   Tablet .. 650 milliGRAM(s) Oral every 6 hours PRN Temp greater or equal to 38.5C (101.3F)  sodium chloride 0.9% lock flush 10 milliLiter(s) IV Push every 1 hour PRN Pre/post blood products, medications, blood draw, and to maintain line patency      RADIOLOGY & ADDITIONAL TESTS:    < from: [facility] Echo Doppler (01.15.21 @ 09:20) >  Summary:   1. Left ventricular ejection fraction, by visual estimation, is 60 to 65%.   2. Normal global left ventricular systolic function.   3. Normal right ventricular size and function.   4. Normal left atrial size.   5. Color flow doppler and intravenous injection of agitated saline demonstrates the presence of an intact intra atrial septum.   6. Normal right atrial size.   7. Trace mitral valve regurgitation.   8. There is a 0.52 cm calcification on the tip of the non-coronary coronary cusp of the aortic valve. This causes trivial aortic regurgitation.   9. There is a mobile echo density on the tip of the intravenous catheter in the right atrium measuring 0.38 cm that can be consistent with a vegetation or fibrinous formation. Clinical correlation is recommended.  10. Small pericardial effusion with no echo evidence of hemodynamic compromise.    MD Sophie Electronically signed on 1/15/2021 at 11:15:23 AM    < end of copied text >

## 2021-01-16 NOTE — PHARMACOTHERAPY INTERVENTION NOTE - NSPHARMCOMMASP
ASP - Dose optimization/Non-Renal dose adjustment
ASP - Lab/ test recommended
ASP - Dose optimization/Non-Renal dose adjustment
ASP - Dose optimization/Non-Renal dose adjustment
ASP - Lab/ test recommended
ASP - Lab/ test recommended
ASP - Dose optimization/Non-Renal dose adjustment

## 2021-01-16 NOTE — PROGRESS NOTE ADULT - ASSESSMENT
55y/oF PMH SAH, DI, chronic hypernatremia BIBA with dizziness. Pt came in with PICC line, reports getting D5W every other day for hypernatremia. Pt admitted with hypernatremia, started on IVF and desmopressin. Also found to be COVID+ with positive blood cultures. PICC line removed, pt started vanc/zosyn. Repeat blood cultures (1/1) with gram positive rods. Additional repeat blood cultures (1/5) NGTD.     #Bacteremia   -Klebsiella pneumoniae and bacillus sp (not anthracis)   -repeat cx 1/1 +GPR  -repeat cx 1/5 NGTD   -PICC removed, tip cx ngtd   -cont vanc, rocephin as per ID   -TTE with ?aortic valve fibroelastoma, SAADIA recommended  -COVID PCR negative x2. Isolation precautions d/c'ed as per ID and infection control.   -SAADIA 1/15: mobil echo density on tip of iv catheter in R atrium, consistent with vegetation or fibrinous formation   -ID: need to remove current PICC, sent tip for culture, repeat blood cx 1/15 - follow up.    #COVID -19 infection, now negative PCR x2  -not requiring supplemental O2  -cont supportive treatment     #Hypernatremia, chronic 2/2 Diabetes insipidus   -cont desmopressin  -encourage PO intake     #Hypertension  -bp increasing, hypotensive on arrival, restart amlodipine, cont to monitor     #anemia of chronic disease   -s/p 1u PRBC   -cont to monitor, no signs of active bleeding     dispo: Home w/ IV Abx. Pending cultures, ID clearance.   55y/oF PMH SAH, DI, chronic hypernatremia BIBA with dizziness. Pt came in with PICC line, reports getting D5W every other day for hypernatremia. Pt admitted with hypernatremia, started on IVF and desmopressin. Also found to be COVID+ with positive blood cultures. PICC line removed, pt started vanc/zosyn. Repeat blood cultures (1/1) with gram positive rods. Additional repeat blood cultures (1/5) NGTD.     #Bacteremia   -Klebsiella pneumoniae and bacillus sp (not anthracis)   -repeat cx 1/1 +GPR  -repeat cx 1/5 NGTD   -PICC removed, tip cx ngtd   -cont vanc, rocephin as per ID   -TTE with ?aortic valve fibroelastoma, SAADIA recommended  -COVID PCR negative x2. Isolation precautions d/c'ed as per ID and infection control.   -SAADIA 1/15: mobil echo density on tip of iv catheter in R atrium, consistent with vegetation or fibrinous formation   - PICC removed per ID, sent tip for culture, repeat blood cx 1/15 - follow up.    #COVID -19 infection, now negative PCR x2  -not requiring supplemental O2  -cont supportive treatment     #Hypernatremia, chronic 2/2 Diabetes insipidus   -cont desmopressin  -encourage PO intake     #Hypertension  -bp increasing, hypotensive on arrival, restart amlodipine, cont to monitor     #anemia of chronic disease   -s/p 1u PRBC   -cont to monitor, no signs of active bleeding     dispo: Home w/ IV Abx. Pending cultures, ID clearance.

## 2021-01-17 LAB
ALBUMIN SERPL ELPH-MCNC: 3.5 G/DL — SIGNIFICANT CHANGE UP (ref 3.3–5.2)
ALP SERPL-CCNC: 116 U/L — SIGNIFICANT CHANGE UP (ref 40–120)
ALT FLD-CCNC: 33 U/L — HIGH
ANION GAP SERPL CALC-SCNC: 11 MMOL/L — SIGNIFICANT CHANGE UP (ref 5–17)
AST SERPL-CCNC: 26 U/L — SIGNIFICANT CHANGE UP
BILIRUB SERPL-MCNC: <0.2 MG/DL — LOW (ref 0.4–2)
BUN SERPL-MCNC: 30 MG/DL — HIGH (ref 8–20)
CALCIUM SERPL-MCNC: 8.9 MG/DL — SIGNIFICANT CHANGE UP (ref 8.6–10.2)
CHLORIDE SERPL-SCNC: 113 MMOL/L — HIGH (ref 98–107)
CO2 SERPL-SCNC: 25 MMOL/L — SIGNIFICANT CHANGE UP (ref 22–29)
CREAT SERPL-MCNC: 1.34 MG/DL — HIGH (ref 0.5–1.3)
CULTURE RESULTS: NO GROWTH — SIGNIFICANT CHANGE UP
GLUCOSE SERPL-MCNC: 80 MG/DL — SIGNIFICANT CHANGE UP (ref 70–99)
HCT VFR BLD CALC: 30.2 % — LOW (ref 34.5–45)
HGB BLD-MCNC: 8.4 G/DL — LOW (ref 11.5–15.5)
MCHC RBC-ENTMCNC: 25.7 PG — LOW (ref 27–34)
MCHC RBC-ENTMCNC: 27.8 GM/DL — LOW (ref 32–36)
MCV RBC AUTO: 92.4 FL — SIGNIFICANT CHANGE UP (ref 80–100)
PLATELET # BLD AUTO: 358 K/UL — SIGNIFICANT CHANGE UP (ref 150–400)
POTASSIUM SERPL-MCNC: 4.5 MMOL/L — SIGNIFICANT CHANGE UP (ref 3.5–5.3)
POTASSIUM SERPL-SCNC: 4.5 MMOL/L — SIGNIFICANT CHANGE UP (ref 3.5–5.3)
PROT SERPL-MCNC: 7 G/DL — SIGNIFICANT CHANGE UP (ref 6.6–8.7)
RBC # BLD: 3.27 M/UL — LOW (ref 3.8–5.2)
RBC # FLD: 15.4 % — HIGH (ref 10.3–14.5)
SODIUM SERPL-SCNC: 149 MMOL/L — HIGH (ref 135–145)
SPECIMEN SOURCE: SIGNIFICANT CHANGE UP
VANCOMYCIN TROUGH SERPL-MCNC: 16 UG/ML — SIGNIFICANT CHANGE UP (ref 10–20)
WBC # BLD: 7.64 K/UL — SIGNIFICANT CHANGE UP (ref 3.8–10.5)
WBC # FLD AUTO: 7.64 K/UL — SIGNIFICANT CHANGE UP (ref 3.8–10.5)

## 2021-01-17 PROCEDURE — 99233 SBSQ HOSP IP/OBS HIGH 50: CPT

## 2021-01-17 RX ADMIN — LEVETIRACETAM 500 MILLIGRAM(S): 250 TABLET, FILM COATED ORAL at 06:10

## 2021-01-17 RX ADMIN — AMLODIPINE BESYLATE 10 MILLIGRAM(S): 2.5 TABLET ORAL at 06:10

## 2021-01-17 RX ADMIN — Medication 1300 MILLIGRAM(S): at 06:10

## 2021-01-17 RX ADMIN — CEFTRIAXONE 100 MILLIGRAM(S): 500 INJECTION, POWDER, FOR SOLUTION INTRAMUSCULAR; INTRAVENOUS at 13:21

## 2021-01-17 RX ADMIN — CHLORHEXIDINE GLUCONATE 1 APPLICATION(S): 213 SOLUTION TOPICAL at 06:04

## 2021-01-17 RX ADMIN — LEVETIRACETAM 500 MILLIGRAM(S): 250 TABLET, FILM COATED ORAL at 17:30

## 2021-01-17 RX ADMIN — ENOXAPARIN SODIUM 40 MILLIGRAM(S): 100 INJECTION SUBCUTANEOUS at 10:17

## 2021-01-17 RX ADMIN — Medication 250 MILLIGRAM(S): at 14:01

## 2021-01-17 RX ADMIN — DESMOPRESSIN ACETATE 0.1 MILLIGRAM(S): 0.1 TABLET ORAL at 06:10

## 2021-01-17 RX ADMIN — DESMOPRESSIN ACETATE 0.1 MILLIGRAM(S): 0.1 TABLET ORAL at 17:30

## 2021-01-17 RX ADMIN — Medication 1 MILLIGRAM(S): at 10:17

## 2021-01-17 RX ADMIN — PANTOPRAZOLE SODIUM 40 MILLIGRAM(S): 20 TABLET, DELAYED RELEASE ORAL at 06:10

## 2021-01-17 RX ADMIN — Medication 1300 MILLIGRAM(S): at 17:30

## 2021-01-17 RX ADMIN — CHLORHEXIDINE GLUCONATE 1 APPLICATION(S): 213 SOLUTION TOPICAL at 06:12

## 2021-01-17 RX ADMIN — Medication 1 TABLET(S): at 10:17

## 2021-01-17 NOTE — PROGRESS NOTE ADULT - SUBJECTIVE AND OBJECTIVE BOX
palpitations INTERVAL HPI/OVERNIGHT EVENTS:  followup DI  long standing  partiallly empty sella  s/p COvid now isolation precautions dc as p had neg PCR x 2   sodium a little higher today   pt did not drink any fluids yesterday   lacks thirst mechanism   MEDICATIONS  (STANDING):  amLODIPine   Tablet 10 milliGRAM(s) Oral daily  cefTRIAXone   IVPB 2000 milliGRAM(s) IV Intermittent every 24 hours  chlorhexidine 2% Cloths 1 Application(s) Topical <User Schedule>  chlorhexidine 4% Liquid 1 Application(s) Topical <User Schedule>  desmopressin 0.1 milliGRAM(s) Oral two times a day  enoxaparin Injectable 40 milliGRAM(s) SubCutaneous daily  folic acid 1 milliGRAM(s) Oral daily  levETIRAcetam 500 milliGRAM(s) Oral two times a day  multivitamin 1 Tablet(s) Oral daily  pantoprazole    Tablet 40 milliGRAM(s) Oral before breakfast  sodium bicarbonate 1300 milliGRAM(s) Oral two times a day  vancomycin  IVPB 1000 milliGRAM(s) IV Intermittent <User Schedule>    MEDICATIONS  (PRN):  acetaminophen   Tablet .. 650 milliGRAM(s) Oral every 6 hours PRN Temp greater or equal to 38.5C (101.3F)  sodium chloride 0.9% lock flush 10 milliLiter(s) IV Push every 1 hour PRN Pre/post blood products, medications, blood draw, and to maintain line patency      Allergies    No Known Allergies    Intolerances        Review of systems:    Vital Signs Last 24 Hrs  T(C): 37.2 (17 Jan 2021 15:00), Max: 37.4 (16 Jan 2021 23:08)  T(F): 99 (17 Jan 2021 15:00), Max: 99.4 (16 Jan 2021 23:08)  HR: 105 (17 Jan 2021 15:00) (103 - 111)  BP: 108/70 (17 Jan 2021 15:00) (108/70 - 114/71)  BP(mean): --  RR: 19 (17 Jan 2021 15:00) (18 - 19)  SpO2: 95% (17 Jan 2021 15:00) (94% - 95%)    PHYSICAL EXAM:      Constitutional: NAD, well-groomed, well-developed    Respiratory: CTAB  Cardiovascular: S1 and S2, RRR, no M/G/R  Gastrointestinal: BS+, soft, no organomeglag or mass  Extremities: No peripheral edema, no pedal lesions  Vascular: 2+ peripheral pulses  Neurological: A/O x 3, no focal deficits  Psychiatric: Normal mood, normal affect  Musculoskeletal: 5/5 strength b/l upper and lower extremities  Skin: No rashes, no acanthosis        LABS:                        8.4    7.64  )-----------( 358      ( 17 Jan 2021 09:38 )             30.2     01-17    149<H>  |  113<H>  |  30.0<H>  ----------------------------<  80  4.5   |  25.0  |  1.34<H>    Ca    8.9      17 Jan 2021 09:38    TPro  7.0  /  Alb  3.5  /  TBili  <0.2<L>  /  DBili  x   /  AST  26  /  ALT  33<H>  /  AlkPhos  116  01-17          CAPILLARY BLOOD GLUCOSE  CAPILLARY BLOOD GLUCOSE          RADIOLOGY & ADDITIONAL TESTS:

## 2021-01-17 NOTE — PROGRESS NOTE ADULT - SUBJECTIVE AND OBJECTIVE BOX
KRUPA MEDINA    [id number]    55y      Female    CC: hypernatremia     INTERVAL HPI/OVERNIGHT EVENTS:   Patient seen and examined at bedside. No acute events reported overnight. Denies any complaints, feels well.     REVIEW OF SYSTEMS:    CONSTITUTIONAL: No fever, weight loss, or fatigue  RESPIRATORY: No cough, wheezing, hemoptysis; No shortness of breath  CARDIOVASCULAR: No chest pain, palpitations  GASTROINTESTINAL: No abdominal or epigastric pain. No nausea, vomiting  NEUROLOGICAL: No headaches, memory loss, loss of strength.    Vital Signs Last 24 Hrs  T(C): 36.4 (15 Jose C 2021 00:29), Max: 36.7 (14 Jan 2021 16:34)  T(F): 97.6 (15 Jose C 2021 00:29), Max: 98 (14 Jan 2021 16:34)  HR: 77 (15 Jose C 2021 00:29) (77 - 78)  BP: 127/82 (15 Jose C 2021 00:29) (127/82 - 127/87)  BP(mean): --  RR: 18 (15 Jose C 2021 00:29) (18 - 20)  SpO2: 98% (15 Jose C 2021 00:29) (96% - 98%)    PHYSICAL EXAM:    GENERAL: NAD  HEENT: PERRL, +EOMI  NECK: soft, supple  CHEST/LUNG: Clear to auscultation bilaterally; No wheezing  HEART: S1S2+, Regular rate and rhythm; No murmurs, rubs, or gallops  ABDOMEN: Soft, Nontender, Nondistended; Bowel sounds present  SKIN: warm, dry  NEURO: AAOX3, no focal deficits  PSYCH: normal affect     LABS:                        10.8   8.05  )-----------( 413      ( 14 Jan 2021 13:45 )             37.3     01-15    141  |  104  |  24.0<H>  ----------------------------<  102<H>  4.4   |  26.0  |  1.26    Ca    9.3      15 Jose C 2021 13:29        MEDICATIONS  (STANDING):  amLODIPine   Tablet 10 milliGRAM(s) Oral daily  cefTRIAXone   IVPB 2000 milliGRAM(s) IV Intermittent every 24 hours  chlorhexidine 2% Cloths 1 Application(s) Topical <User Schedule>  chlorhexidine 4% Liquid 1 Application(s) Topical <User Schedule>  desmopressin 0.1 milliGRAM(s) Oral two times a day  enoxaparin Injectable 40 milliGRAM(s) SubCutaneous daily  folic acid 1 milliGRAM(s) Oral daily  levETIRAcetam 500 milliGRAM(s) Oral two times a day  multivitamin 1 Tablet(s) Oral daily  pantoprazole    Tablet 40 milliGRAM(s) Oral before breakfast  sodium bicarbonate 1300 milliGRAM(s) Oral two times a day  vancomycin  IVPB 750 milliGRAM(s) IV Intermittent every 12 hours    MEDICATIONS  (PRN):  acetaminophen   Tablet .. 650 milliGRAM(s) Oral every 6 hours PRN Temp greater or equal to 38.5C (101.3F)  sodium chloride 0.9% lock flush 10 milliLiter(s) IV Push every 1 hour PRN Pre/post blood products, medications, blood draw, and to maintain line patency      RADIOLOGY & ADDITIONAL TESTS:    < from: [facility] Echo Doppler (01.15.21 @ 09:20) >  Summary:   1. Left ventricular ejection fraction, by visual estimation, is 60 to 65%.   2. Normal global left ventricular systolic function.   3. Normal right ventricular size and function.   4. Normal left atrial size.   5. Color flow doppler and intravenous injection of agitated saline demonstrates the presence of an intact intra atrial septum.   6. Normal right atrial size.   7. Trace mitral valve regurgitation.   8. There is a 0.52 cm calcification on the tip of the non-coronary coronary cusp of the aortic valve. This causes trivial aortic regurgitation.   9. There is a mobile echo density on the tip of the intravenous catheter in the right atrium measuring 0.38 cm that can be consistent with a vegetation or fibrinous formation. Clinical correlation is recommended.  10. Small pericardial effusion with no echo evidence of hemodynamic compromise.    MD Sophie Electronically signed on 1/15/2021 at 11:15:23 AM    < end of copied text >

## 2021-01-17 NOTE — PROGRESS NOTE ADULT - ASSESSMENT
DI- cont DDAVP  pt willdrink more fluids- aim for 8 8 oz glasses of water per day   and keep record of  amount taken in per day

## 2021-01-17 NOTE — PROGRESS NOTE ADULT - ASSESSMENT
55y/oF PMH SAH, DI, chronic hypernatremia BIBA with dizziness. Pt came in with PICC line, reports getting D5W every other day for hypernatremia. Pt admitted with hypernatremia, started on IVF and desmopressin. Also found to be COVID+ with positive blood cultures. PICC line removed, pt started vanc/zosyn. Repeat blood cultures (1/1) with gram positive rods. Additional repeat blood cultures (1/5) NGTD.     #Bacteremia   -Klebsiella pneumoniae and bacillus sp (not anthracis)   -repeat cx 1/1 +GPR  -repeat cx 1/5 NGTD   -PICC removed, tip cx ngtd   -cont vanc, rocephin as per ID   -TTE with ?aortic valve fibroelastoma, SAADIA recommended. mTEE 1/15: mobil echo density on tip of iv catheter in R atrium, consistent with vegetation or fibrinous formation   -COVID PCR negative x2. Isolation precautions d/c'ed as per ID and infection control.   - PICC removed per ID, sent tip for culture, repeat blood cx 1/15 pending - follow up.    #COVID -19 infection, now negative PCR x2  -not requiring supplemental O2  -cont supportive treatment     #Hypernatremia, chronic 2/2 Diabetes insipidus   -cont desmopressin  -encourage PO intake     #Hypertension  -bp increasing, hypotensive on arrival, restart amlodipine, cont to monitor     #anemia of chronic disease   -s/p 1u PRBC   -cont to monitor, no signs of active bleeding     dispo: Home w/ IV Abx. Pending cultures, ID clearance.    Daughter Millie updated.

## 2021-01-18 LAB
ALBUMIN SERPL ELPH-MCNC: 3.4 G/DL — SIGNIFICANT CHANGE UP (ref 3.3–5.2)
ALBUMIN SERPL ELPH-MCNC: 3.4 G/DL — SIGNIFICANT CHANGE UP (ref 3.3–5.2)
ALP SERPL-CCNC: 115 U/L — SIGNIFICANT CHANGE UP (ref 40–120)
ALT FLD-CCNC: 33 U/L — HIGH
ANION GAP SERPL CALC-SCNC: 11 MMOL/L — SIGNIFICANT CHANGE UP (ref 5–17)
ANION GAP SERPL CALC-SCNC: 11 MMOL/L — SIGNIFICANT CHANGE UP (ref 5–17)
ANION GAP SERPL CALC-SCNC: 9 MMOL/L — SIGNIFICANT CHANGE UP (ref 5–17)
AST SERPL-CCNC: 23 U/L — SIGNIFICANT CHANGE UP
BILIRUB SERPL-MCNC: 0.2 MG/DL — LOW (ref 0.4–2)
BUN SERPL-MCNC: 30 MG/DL — HIGH (ref 8–20)
BUN SERPL-MCNC: 36 MG/DL — HIGH (ref 8–20)
BUN SERPL-MCNC: 36 MG/DL — HIGH (ref 8–20)
CALCIUM SERPL-MCNC: 9 MG/DL — SIGNIFICANT CHANGE UP (ref 8.6–10.2)
CALCIUM SERPL-MCNC: 9.3 MG/DL — SIGNIFICANT CHANGE UP (ref 8.6–10.2)
CALCIUM SERPL-MCNC: 9.3 MG/DL — SIGNIFICANT CHANGE UP (ref 8.6–10.2)
CHLORIDE SERPL-SCNC: 114 MMOL/L — HIGH (ref 98–107)
CHLORIDE SERPL-SCNC: 114 MMOL/L — HIGH (ref 98–107)
CHLORIDE SERPL-SCNC: 117 MMOL/L — HIGH (ref 98–107)
CO2 SERPL-SCNC: 25 MMOL/L — SIGNIFICANT CHANGE UP (ref 22–29)
CO2 SERPL-SCNC: 25 MMOL/L — SIGNIFICANT CHANGE UP (ref 22–29)
CO2 SERPL-SCNC: 28 MMOL/L — SIGNIFICANT CHANGE UP (ref 22–29)
CREAT SERPL-MCNC: 1.3 MG/DL — SIGNIFICANT CHANGE UP (ref 0.5–1.3)
CREAT SERPL-MCNC: 1.3 MG/DL — SIGNIFICANT CHANGE UP (ref 0.5–1.3)
CREAT SERPL-MCNC: 1.45 MG/DL — HIGH (ref 0.5–1.3)
GLUCOSE SERPL-MCNC: 154 MG/DL — HIGH (ref 70–99)
GLUCOSE SERPL-MCNC: 154 MG/DL — HIGH (ref 70–99)
GLUCOSE SERPL-MCNC: 79 MG/DL — SIGNIFICANT CHANGE UP (ref 70–99)
HCT VFR BLD CALC: 29.5 % — LOW (ref 34.5–45)
HGB BLD-MCNC: 8.3 G/DL — LOW (ref 11.5–15.5)
MCHC RBC-ENTMCNC: 25.7 PG — LOW (ref 27–34)
MCHC RBC-ENTMCNC: 28.1 GM/DL — LOW (ref 32–36)
MCV RBC AUTO: 91.3 FL — SIGNIFICANT CHANGE UP (ref 80–100)
PHOSPHATE SERPL-MCNC: 4 MG/DL — SIGNIFICANT CHANGE UP (ref 2.4–4.7)
PLATELET # BLD AUTO: 301 K/UL — SIGNIFICANT CHANGE UP (ref 150–400)
POTASSIUM SERPL-MCNC: 4.3 MMOL/L — SIGNIFICANT CHANGE UP (ref 3.5–5.3)
POTASSIUM SERPL-MCNC: 4.6 MMOL/L — SIGNIFICANT CHANGE UP (ref 3.5–5.3)
POTASSIUM SERPL-MCNC: 4.6 MMOL/L — SIGNIFICANT CHANGE UP (ref 3.5–5.3)
POTASSIUM SERPL-SCNC: 4.3 MMOL/L — SIGNIFICANT CHANGE UP (ref 3.5–5.3)
POTASSIUM SERPL-SCNC: 4.6 MMOL/L — SIGNIFICANT CHANGE UP (ref 3.5–5.3)
POTASSIUM SERPL-SCNC: 4.6 MMOL/L — SIGNIFICANT CHANGE UP (ref 3.5–5.3)
PROT SERPL-MCNC: 6.9 G/DL — SIGNIFICANT CHANGE UP (ref 6.6–8.7)
RBC # BLD: 3.23 M/UL — LOW (ref 3.8–5.2)
RBC # FLD: 15.5 % — HIGH (ref 10.3–14.5)
SODIUM SERPL-SCNC: 150 MMOL/L — HIGH (ref 135–145)
SODIUM SERPL-SCNC: 150 MMOL/L — HIGH (ref 135–145)
SODIUM SERPL-SCNC: 153 MMOL/L — HIGH (ref 135–145)
WBC # BLD: 6.46 K/UL — SIGNIFICANT CHANGE UP (ref 3.8–10.5)
WBC # FLD AUTO: 6.46 K/UL — SIGNIFICANT CHANGE UP (ref 3.8–10.5)

## 2021-01-18 PROCEDURE — 99233 SBSQ HOSP IP/OBS HIGH 50: CPT

## 2021-01-18 PROCEDURE — 36000 PLACE NEEDLE IN VEIN: CPT

## 2021-01-18 PROCEDURE — 99232 SBSQ HOSP IP/OBS MODERATE 35: CPT

## 2021-01-18 RX ORDER — SODIUM CHLORIDE 9 MG/ML
1000 INJECTION, SOLUTION INTRAVENOUS
Refills: 0 | Status: DISCONTINUED | OUTPATIENT
Start: 2021-01-18 | End: 2021-01-18

## 2021-01-18 RX ORDER — DESMOPRESSIN ACETATE 0.1 MG/1
0.1 TABLET ORAL
Refills: 0 | Status: DISCONTINUED | OUTPATIENT
Start: 2021-01-18 | End: 2021-01-18

## 2021-01-18 RX ORDER — SODIUM BICARBONATE 1 MEQ/ML
1300 SYRINGE (ML) INTRAVENOUS
Refills: 0 | Status: DISCONTINUED | OUTPATIENT
Start: 2021-01-18 | End: 2021-01-18

## 2021-01-18 RX ORDER — DESMOPRESSIN ACETATE 0.1 MG/1
0.1 TABLET ORAL
Refills: 0 | Status: DISCONTINUED | OUTPATIENT
Start: 2021-01-18 | End: 2021-01-21

## 2021-01-18 RX ORDER — SODIUM CHLORIDE 9 MG/ML
1000 INJECTION, SOLUTION INTRAVENOUS
Refills: 0 | Status: DISCONTINUED | OUTPATIENT
Start: 2021-01-18 | End: 2021-01-19

## 2021-01-18 RX ORDER — DESMOPRESSIN ACETATE 0.1 MG/1
1 TABLET ORAL THREE TIMES A DAY
Refills: 0 | Status: DISCONTINUED | OUTPATIENT
Start: 2021-01-18 | End: 2021-01-18

## 2021-01-18 RX ADMIN — CEFTRIAXONE 100 MILLIGRAM(S): 500 INJECTION, POWDER, FOR SOLUTION INTRAMUSCULAR; INTRAVENOUS at 09:10

## 2021-01-18 RX ADMIN — SODIUM CHLORIDE 125 MILLILITER(S): 9 INJECTION, SOLUTION INTRAVENOUS at 18:15

## 2021-01-18 RX ADMIN — AMLODIPINE BESYLATE 10 MILLIGRAM(S): 2.5 TABLET ORAL at 05:56

## 2021-01-18 RX ADMIN — Medication 1300 MILLIGRAM(S): at 05:56

## 2021-01-18 RX ADMIN — DESMOPRESSIN ACETATE 0.1 MILLIGRAM(S): 0.1 TABLET ORAL at 14:40

## 2021-01-18 RX ADMIN — DESMOPRESSIN ACETATE 0.1 MILLIGRAM(S): 0.1 TABLET ORAL at 20:41

## 2021-01-18 RX ADMIN — ENOXAPARIN SODIUM 40 MILLIGRAM(S): 100 INJECTION SUBCUTANEOUS at 09:11

## 2021-01-18 RX ADMIN — LEVETIRACETAM 500 MILLIGRAM(S): 250 TABLET, FILM COATED ORAL at 05:57

## 2021-01-18 RX ADMIN — LEVETIRACETAM 500 MILLIGRAM(S): 250 TABLET, FILM COATED ORAL at 16:05

## 2021-01-18 RX ADMIN — Medication 1 MILLIGRAM(S): at 09:48

## 2021-01-18 RX ADMIN — Medication 1 TABLET(S): at 09:48

## 2021-01-18 RX ADMIN — Medication 250 MILLIGRAM(S): at 09:10

## 2021-01-18 RX ADMIN — CHLORHEXIDINE GLUCONATE 1 APPLICATION(S): 213 SOLUTION TOPICAL at 05:57

## 2021-01-18 RX ADMIN — DESMOPRESSIN ACETATE 0.1 MILLIGRAM(S): 0.1 TABLET ORAL at 05:57

## 2021-01-18 RX ADMIN — PANTOPRAZOLE SODIUM 40 MILLIGRAM(S): 20 TABLET, DELAYED RELEASE ORAL at 05:56

## 2021-01-18 NOTE — PROGRESS NOTE ADULT - ASSESSMENT
55y/oF PMH SAH, DI, chronic hypernatremia BIBA with dizziness. Pt came in with PICC line, reports getting D5W every other day for hypernatremia. Pt admitted with hypernatremia, started on IVF and desmopressin. Also found to be COVID+ with positive blood cultures. PICC line removed, pt started vanc/zosyn. Repeat blood cultures (1/1) with gram positive rods. Additional repeat blood cultures (1/5) NGTD.     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 mTEE 1/15: mobil echo density on tip of iv catheter in R atrium, consistent with vegetation or fibrinous formation   - COVID PCR negative x2. Isolation precautions d/c'ed as per ID and infection control.   - PICC removed per ID, sent tip for culture (negative), repeat blood cx 1/15 NGTD.  - Vancomycin, Rocephin d/anne per ID as patient completed 2 weeks of treatment.,    #COVID -19 infection, now negative PCR x2  - not requiring supplemental O2  - cont supportive treatment     #Hypernatremia (worsening), chronic 2/2 Diabetes insipidus   - C/w DDAVP  - encourage PO intake   - Endo and Nephro called for f/u  - D5W IVFs.  - Will assess improvement and determine need for PICC per Endo    #Hypertension  -bp increasing, hypotensive on arrival, restart amlodipine, cont to monitor     #anemia of chronic disease   -s/p 1u PRBC   -cont to monitor, no signs of active bleeding     dispo: Pending improvement of hypernatremia, Endo/Nephro clearance. Will need to determine need for PICC line    Daughter Millie updated.   55y/oF PMH SAH, DI, chronic hypernatremia BIBA with dizziness. Pt came in with PICC line, reports getting D5W every other day for hypernatremia. Pt admitted with hypernatremia, started on IVF and desmopressin. Also found to be COVID+ with positive blood cultures. PICC line removed, pt started vanc/zosyn. Repeat blood cultures (1/1) with gram positive rods. Additional repeat blood cultures (1/5) NGTD.     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 mTEE 1/15: mobil echo density on tip of iv catheter in R atrium, consistent with vegetation or fibrinous formation   - COVID PCR negative x2. Isolation precautions d/c'ed as per ID and infection control.   - PICC removed per ID, sent tip for culture (negative), repeat blood cx 1/15 NGTD.  - Vancomycin, Rocephin d/anne per ID as patient completed 2 weeks of treatment.,    #COVID -19 infection, now negative PCR x2  - not requiring supplemental O2  - cont supportive treatment     #Hypernatremia (worsening), chronic 2/2 Diabetes insipidus   - C/w DDAVP  - encourage PO intake   - Endo and Nephro called for f/u  - D5W IVFs   - Per daughter, patient seizes when Na < 145.   - Will assess improvement and determine need for PICC per Endo    #Hypertension  -bp increasing, hypotensive on arrival, restart amlodipine, cont to monitor     #anemia of chronic disease   -s/p 1u PRBC   -cont to monitor, no signs of active bleeding     dispo: Pending improvement of hypernatremia, Endo/Nephro clearance. Will need to determine need for PICC line    Daughter Millie ([phone]) updated

## 2021-01-18 NOTE — PROGRESS NOTE ADULT - ASSESSMENT
DI- sodium worsening.    To go up on dose of  DDAVP to 0.1 mg in am and 0.2 mg in pm  patient to drink atleast 2 lit of fluid as prescription.  Encourage patient to drink more water, if she does than we can avoid doing pick line in her.  keep record of  amount  in take per day   Strict Is and Os.

## 2021-01-18 NOTE — PROGRESS NOTE ADULT - ASSESSMENT
Hypovolemic, Hypertonic, Hypernatremia,    TOSHIA,    D/W Neurology & The Daughter,    DdAVP dose UpRegulated,    IVF : D5W,    Advised 2 L daily of hernandez intake,    To Maintain Normal SNa+ Level,     D/W Dr. Cooper,

## 2021-01-18 NOTE — PROCEDURE NOTE - NSPROCDETAILS_GEN_ALL_CORE
blood seen on insertion/dressing applied/flushes easily/secured in place
location identified, draped/prepped, sterile technique used/sterile dressing applied/sterile technique, catheter placed/supine position/ultrasound guidance
guidewire recovered/lumen(s) aspirated and flushed/sterile dressing applied/sterile technique, catheter placed/ultrasound guidance with use of sterile gel and probe cove
location identified, draped/prepped, sterile technique used/blood seen on insertion/dressing applied/flushes easily/secured in place/sterile technique, catheter placed

## 2021-01-18 NOTE — PROGRESS NOTE ADULT - SUBJECTIVE AND OBJECTIVE BOX
[facility]  INFECTIOUS DISEASES AND INTERNAL MEDICINE at [place]  =======================================================  Mj Lee MD  Diplomates American Board of Internal Medicine and Infectious Diseases  Tel: [phone]      Fax: [phone]  =======================================================    KRUPA MEDINA [id number]    Follow up:      Allergies:  No Known Allergies           REVIEW OF SYSTEMS:  CONSTITUTIONAL:  No Fever or chills  HEENT:   No diplopia or blurred vision.  No earache, sore throat or runny nose.  CARDIOVASCULAR:  No pressure, squeezing, strangling, tightness, heaviness or aching about the chest, neck, axilla or epigastrium.  RESPIRATORY:  No cough, shortness of breath  GASTROINTESTINAL:  No nausea, vomiting or diarrhea.  GENITOURINARY:  No dysuria, frequency or urgency. No Blood in urine  MUSCULOSKELETAL:  no joint aches, no muscle pain  SKIN:  No change in skin, hair or nails.  NEUROLOGIC:  No Headaches, seizures or weakness.  PSYCHIATRIC:  No disorder of thought or mood.  ENDOCRINE:  No heat or cold intolerance  HEMATOLOGICAL:  No easy bruising or bleeding.       Physical Exam:  GEN: NAD, pleasant  HEENT: normocephalic and atraumatic. EOMI. PERRL.  Anicteric   NECK: Supple.   LUNGS: Clear to auscultation.  HEART: Regular rate and rhythm without murmur.  ABDOMEN: Soft, nontender, and nondistended.  Positive bowel sounds.    : No CVA tenderness  EXTREMITIES: Without any edema.  MSK: no joint swelling  NEUROLOGIC: Cranial nerves II through XII are grossly intact. No focal deficits  PSYCHIATRIC: Appropriate affect .  SKIN: No Rash      Vitals:    T(F): 98.4 (18 Jan 2021 08:18), Max: 99.6 (18 Jan 2021 00:31)  HR: 94 (18 Jan 2021 08:18)  BP: 130/85 (18 Jan 2021 08:18)  RR: 18 (18 Jan 2021 08:18)  SpO2: 95% (18 Jan 2021 08:18) (95% - 96%)  temp max in last 48H T(F): , Max: 99.6 (01-18-21 @ 00:31)      Current Antibiotics:    Other medications:  amLODIPine   Tablet 10 milliGRAM(s) Oral daily  chlorhexidine 2% Cloths 1 Application(s) Topical <User Schedule>  chlorhexidine 4% Liquid 1 Application(s) Topical <User Schedule>  desmopressin 0.1 milliGRAM(s) Oral <User Schedule>  dextrose 5%. 1000 milliLiter(s) IV Continuous <Continuous>  enoxaparin Injectable 40 milliGRAM(s) SubCutaneous daily  folic acid 1 milliGRAM(s) Oral daily  levETIRAcetam 500 milliGRAM(s) Oral two times a day  multivitamin 1 Tablet(s) Oral daily  pantoprazole    Tablet 40 milliGRAM(s) Oral before breakfast        Labs:                        8.3    6.46  )-----------( 301      ( 18 Jan 2021 08:27 )             29.5      01-18    153<H>  |  117<H>  |  30.0<H>  ----------------------------<  79  4.3   |  28.0  |  1.45<H>    Ca    9.0      18 Jan 2021 08:27    TPro  6.9  /  Alb  3.4  /  TBili  0.2<L>  /  DBili  x   /  AST  23  /  ALT  33<H>  /  AlkPhos  115  01-18      Culture - Catheter (collected 01-16-21 @ 03:03)  Source: .Catheter PICC Tip  Final Report (01-17-21 @ 16:54):    No growth    Culture - Blood (collected 01-15-21 @ 22:25)  Source: .Blood Blood-Venous    Culture - Blood (collected 01-15-21 @ 22:25)  Source: .Blood Blood-Peripheral    Culture - Blood (collected 01-05-21 @ 17:59)  Source: .Blood Blood-Venous  Final Report (01-10-21 @ 19:01):    No growth at 5 days.    Culture - Blood (collected 01-05-21 @ 17:59)  Source: .Blood Blood-Peripheral  Final Report (01-10-21 @ 19:01):    No growth at 5 days.      WBC Count: 6.46 K/uL (01-18-21 @ 08:27)  WBC Count: 7.64 K/uL (01-17-21 @ 09:38)  WBC Count: 8.20 K/uL (01-16-21 @ 08:59)  WBC Count: 8.05 K/uL (01-14-21 @ 13:45)  WBC Count: 7.78 K/uL (01-14-21 @ 11:07)    Creatinine, Serum: 1.45 mg/dL (01-18-21 @ 08:27)  Creatinine, Serum: 1.34 mg/dL (01-17-21 @ 09:38)  Creatinine, Serum: 1.38 mg/dL (01-16-21 @ 08:59)  Creatinine, Serum: 1.26 mg/dL (01-15-21 @ 13:29)  Creatinine, Serum: 1.23 mg/dL (01-14-21 @ 13:45)      Ferritin, Serum: 753 ng/mL (12-30-20 @ 01:51)           COVID-19 PCR: NotDetec (01-13-21 @ 13:51)  COVID-19 PCR: NotDetec (01-12-21 @ 16:06)  COVID-19 PCR: Detected (01-06-21 @ 08:23)  COVID-19 IgG Antibody Interpretation: Negative (12-30-20 @ 06:49)  COVID-19 IgG Antibody Index: 0.06 Index (12-30-20 @ 06:49)  COVID-19 PCR: Detected (12-29-20 @ 22:59)     [facility]  INFECTIOUS DISEASES AND INTERNAL MEDICINE at [place]  =======================================================  Mj Lee MD  Diplomates American Board of Internal Medicine and Infectious Diseases  Tel: [phone]      Fax: [phone]  =======================================================    KRUPA MEDINA [id number]    Follow up: bacteremia       Allergies:  No Known Allergies           REVIEW OF SYSTEMS:  CONSTITUTIONAL:  No Fever or chills  HEENT:   No diplopia or blurred vision.  No earache, sore throat or runny nose.  CARDIOVASCULAR:  No pressure, squeezing, strangling, tightness, heaviness or aching about the chest, neck, axilla or epigastrium.  RESPIRATORY:  No cough, shortness of breath  GASTROINTESTINAL:  No nausea, vomiting or diarrhea.  GENITOURINARY:  No dysuria, frequency or urgency. No Blood in urine  MUSCULOSKELETAL:  no joint aches, no muscle pain  SKIN:  No change in skin, hair or nails.  NEUROLOGIC:  No Headaches, seizures or weakness.  PSYCHIATRIC:  No disorder of thought or mood.  ENDOCRINE:  No heat or cold intolerance  HEMATOLOGICAL:  No easy bruising or bleeding.       Physical Exam:  GEN: NAD, pleasant  HEENT: normocephalic and atraumatic. EOMI. PERRL.  Anicteric   NECK: Supple.   LUNGS: Clear to auscultation.  HEART: Regular rate and rhythm without murmur.  ABDOMEN: Soft, nontender, and nondistended.  Positive bowel sounds.    : No CVA tenderness  EXTREMITIES: Without any edema.  MSK: no joint swelling  NEUROLOGIC: Cranial nerves II through XII are grossly intact. No focal deficits  PSYCHIATRIC: Appropriate affect .  SKIN: No Rash      Vitals:    T(F): 98.4 (18 Jan 2021 08:18), Max: 99.6 (18 Jan 2021 00:31)  HR: 94 (18 Jan 2021 08:18)  BP: 130/85 (18 Jan 2021 08:18)  RR: 18 (18 Jan 2021 08:18)  SpO2: 95% (18 Jan 2021 08:18) (95% - 96%)  temp max in last 48H T(F): , Max: 99.6 (01-18-21 @ 00:31)      Current Antibiotics:    Other medications:  amLODIPine   Tablet 10 milliGRAM(s) Oral daily  chlorhexidine 2% Cloths 1 Application(s) Topical <User Schedule>  chlorhexidine 4% Liquid 1 Application(s) Topical <User Schedule>  desmopressin 0.1 milliGRAM(s) Oral <User Schedule>  dextrose 5%. 1000 milliLiter(s) IV Continuous <Continuous>  enoxaparin Injectable 40 milliGRAM(s) SubCutaneous daily  folic acid 1 milliGRAM(s) Oral daily  levETIRAcetam 500 milliGRAM(s) Oral two times a day  multivitamin 1 Tablet(s) Oral daily  pantoprazole    Tablet 40 milliGRAM(s) Oral before breakfast        Labs:                        8.3    6.46  )-----------( 301      ( 18 Jan 2021 08:27 )             29.5      01-18    153<H>  |  117<H>  |  30.0<H>  ----------------------------<  79  4.3   |  28.0  |  1.45<H>    Ca    9.0      18 Jan 2021 08:27    TPro  6.9  /  Alb  3.4  /  TBili  0.2<L>  /  DBili  x   /  AST  23  /  ALT  33<H>  /  AlkPhos  115  01-18      Culture - Catheter (collected 01-16-21 @ 03:03)  Source: .Catheter PICC Tip  Final Report (01-17-21 @ 16:54):    No growth    Culture - Blood (collected 01-15-21 @ 22:25)  Source: .Blood Blood-Venous    Culture - Blood (collected 01-15-21 @ 22:25)  Source: .Blood Blood-Peripheral    Culture - Blood (collected 01-05-21 @ 17:59)  Source: .Blood Blood-Venous  Final Report (01-10-21 @ 19:01):    No growth at 5 days.    Culture - Blood (collected 01-05-21 @ 17:59)  Source: .Blood Blood-Peripheral  Final Report (01-10-21 @ 19:01):    No growth at 5 days.      WBC Count: 6.46 K/uL (01-18-21 @ 08:27)  WBC Count: 7.64 K/uL (01-17-21 @ 09:38)  WBC Count: 8.20 K/uL (01-16-21 @ 08:59)  WBC Count: 8.05 K/uL (01-14-21 @ 13:45)  WBC Count: 7.78 K/uL (01-14-21 @ 11:07)    Creatinine, Serum: 1.45 mg/dL (01-18-21 @ 08:27)  Creatinine, Serum: 1.34 mg/dL (01-17-21 @ 09:38)  Creatinine, Serum: 1.38 mg/dL (01-16-21 @ 08:59)  Creatinine, Serum: 1.26 mg/dL (01-15-21 @ 13:29)  Creatinine, Serum: 1.23 mg/dL (01-14-21 @ 13:45)      Ferritin, Serum: 753 ng/mL (12-30-20 @ 01:51)           COVID-19 PCR: NotDetec (01-13-21 @ 13:51)  COVID-19 PCR: NotDetec (01-12-21 @ 16:06)  COVID-19 PCR: Detected (01-06-21 @ 08:23)  COVID-19 IgG Antibody Interpretation: Negative (12-30-20 @ 06:49)  COVID-19 IgG Antibody Index: 0.06 Index (12-30-20 @ 06:49)  COVID-19 PCR: Detected (12-29-20 @ 22:59)     [facility]  INFECTIOUS DISEASES AND INTERNAL MEDICINE at [place]  =======================================================  Mj Lee MD  Diplomates American Board of Internal Medicine and Infectious Diseases  Tel: [phone]      Fax: [phone]  =======================================================    KRUPA MEDINA [id number]    Follow up: bacteremia   feels well       Allergies:  No Known Allergies           REVIEW OF SYSTEMS:  CONSTITUTIONAL:  No Fever or chills  HEENT:   No diplopia or blurred vision.  No earache, sore throat or runny nose.  CARDIOVASCULAR:  No pressure, squeezing, strangling, tightness, heaviness or aching about the chest, neck, axilla or epigastrium.  RESPIRATORY:  No cough, shortness of breath  GASTROINTESTINAL:  No nausea, vomiting or diarrhea.  GENITOURINARY:  No dysuria, frequency or urgency. No Blood in urine  MUSCULOSKELETAL:  no joint aches, no muscle pain  SKIN:  No change in skin, hair or nails.  NEUROLOGIC:  No Headaches, seizures or weakness.  PSYCHIATRIC:  No disorder of thought or mood.  ENDOCRINE:  No heat or cold intolerance  HEMATOLOGICAL:  No easy bruising or bleeding.       Physical Exam:  GEN: NAD, pleasant  HEENT: normocephalic and atraumatic. EOMI. PERRL.  Anicteric   NECK: Supple.   LUNGS: Clear to auscultation.  HEART: Regular rate and rhythm without murmur.  ABDOMEN: Soft, nontender, and nondistended.  Positive bowel sounds.    : No CVA tenderness  EXTREMITIES: Without any edema.  MSK: no joint swelling  NEUROLOGIC: Cranial nerves II through XII are grossly intact. No focal deficits  PSYCHIATRIC: Appropriate affect .  SKIN: No Rash      Vitals:    T(F): 98.4 (18 Jan 2021 08:18), Max: 99.6 (18 Jan 2021 00:31)  HR: 94 (18 Jan 2021 08:18)  BP: 130/85 (18 Jan 2021 08:18)  RR: 18 (18 Jan 2021 08:18)  SpO2: 95% (18 Jan 2021 08:18) (95% - 96%)  temp max in last 48H T(F): , Max: 99.6 (01-18-21 @ 00:31)      Current Antibiotics:    Other medications:  amLODIPine   Tablet 10 milliGRAM(s) Oral daily  chlorhexidine 2% Cloths 1 Application(s) Topical <User Schedule>  chlorhexidine 4% Liquid 1 Application(s) Topical <User Schedule>  desmopressin 0.1 milliGRAM(s) Oral <User Schedule>  dextrose 5%. 1000 milliLiter(s) IV Continuous <Continuous>  enoxaparin Injectable 40 milliGRAM(s) SubCutaneous daily  folic acid 1 milliGRAM(s) Oral daily  levETIRAcetam 500 milliGRAM(s) Oral two times a day  multivitamin 1 Tablet(s) Oral daily  pantoprazole    Tablet 40 milliGRAM(s) Oral before breakfast        Labs:                        8.3    6.46  )-----------( 301      ( 18 Jan 2021 08:27 )             29.5      01-18    153<H>  |  117<H>  |  30.0<H>  ----------------------------<  79  4.3   |  28.0  |  1.45<H>    Ca    9.0      18 Jan 2021 08:27    TPro  6.9  /  Alb  3.4  /  TBili  0.2<L>  /  DBili  x   /  AST  23  /  ALT  33<H>  /  AlkPhos  115  01-18      Culture - Catheter (collected 01-16-21 @ 03:03)  Source: .Catheter PICC Tip  Final Report (01-17-21 @ 16:54):    No growth    Culture - Blood (collected 01-15-21 @ 22:25)  Source: .Blood Blood-Venous    Culture - Blood (collected 01-15-21 @ 22:25)  Source: .Blood Blood-Peripheral    Culture - Blood (collected 01-05-21 @ 17:59)  Source: .Blood Blood-Venous  Final Report (01-10-21 @ 19:01):    No growth at 5 days.    Culture - Blood (collected 01-05-21 @ 17:59)  Source: .Blood Blood-Peripheral  Final Report (01-10-21 @ 19:01):    No growth at 5 days.      WBC Count: 6.46 K/uL (01-18-21 @ 08:27)  WBC Count: 7.64 K/uL (01-17-21 @ 09:38)  WBC Count: 8.20 K/uL (01-16-21 @ 08:59)  WBC Count: 8.05 K/uL (01-14-21 @ 13:45)  WBC Count: 7.78 K/uL (01-14-21 @ 11:07)    Creatinine, Serum: 1.45 mg/dL (01-18-21 @ 08:27)  Creatinine, Serum: 1.34 mg/dL (01-17-21 @ 09:38)  Creatinine, Serum: 1.38 mg/dL (01-16-21 @ 08:59)  Creatinine, Serum: 1.26 mg/dL (01-15-21 @ 13:29)  Creatinine, Serum: 1.23 mg/dL (01-14-21 @ 13:45)      Ferritin, Serum: 753 ng/mL (12-30-20 @ 01:51)           COVID-19 PCR: NotDetec (01-13-21 @ 13:51)  COVID-19 PCR: NotDetec (01-12-21 @ 16:06)  COVID-19 PCR: Detected (01-06-21 @ 08:23)  COVID-19 IgG Antibody Interpretation: Negative (12-30-20 @ 06:49)  COVID-19 IgG Antibody Index: 0.06 Index (12-30-20 @ 06:49)  COVID-19 PCR: Detected (12-29-20 @ 22:59)

## 2021-01-18 NOTE — PROCEDURE NOTE - ADDITIONAL PROCEDURE DETAILS
Sono guided
4FR 46CM  36CIRC [employer] POWER PICC LINE good heme back ns flush left brachial vein

## 2021-01-18 NOTE — PROGRESS NOTE ADULT - ASSESSMENT
56 y/o female with h/o SAH, diabetes insipidus, chronic hypernatremia was BIBA for as pt. felt a little dizzy after coming out of bathroom, no syncope, no fall, no trauma, no focal weakness, no palpitations, no cp, no sob. pt. reports appetite has been ok, no abd. pain, no n/v/d. no fever. appetite is good. no sick contact. As per pt. she has a PICC line and every other day gets D5W through that for h/o hypernatremia. pt. bp 92/60 , hr 87 upon arrival. As per pt. she runs bp on the low side.     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  Repeat TTE 1/12 with +mobile echodensity on aortic valve.   - Patient was taken off isolation with (-) COVID PCR x 2 and s/p SAADIA 1/15 no vegetation on valve, possible vegetation vs thrombus on tip of PICC  - PICC removed on 1/15, tip CX ngtd and repeat BCX from 1/15 NGTD,   - completed 14 days of IV abx since clearing her blood cultures (1/5/21)  - DC abx  -  - outpatient ID f/u  d/w Dr Cooper   56 y/o female with h/o SAH, diabetes insipidus, chronic hypernatremia was BIBA for as pt. felt a little dizzy after coming out of bathroom, no syncope, no fall, no trauma, no focal weakness, no palpitations, no cp, no sob. pt. reports appetite has been ok, no abd. pain, no n/v/d. no fever. appetite is good. no sick contact. As per pt. she has a PICC line and every other day gets D5W through that for h/o hypernatremia. pt. bp 92/60 , hr 87 upon arrival. As per pt. she runs bp on the low side.     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  Repeat TTE 1/12 with +mobile echodensity on aortic valve.   - Patient was taken off isolation with (-) COVID PCR x 2 and s/p SAADIA 1/15 no vegetation on valve, possible vegetation vs thrombus on tip of PICC  - PICC removed on 1/15/21, tip CX ngtd and repeat BCX from 1/15 NGTD   - completed 14 days of IV abx since clearing her blood cultures (1/5/21)  - DC abx  - would try to avoid future PICC placements if at all possible due to history of PICC line infections   - Outpatient ID f/u      d/w Dr Cooper

## 2021-01-18 NOTE — PROGRESS NOTE ADULT - SUBJECTIVE AND OBJECTIVE BOX
KRUPA MEDINA    [id number]    55y      Female    CC: hypernatremia     INTERVAL HPI/OVERNIGHT EVENTS:   Patient seen and examined at bedside. No acute events reported overnight. Denies any complaints.    REVIEW OF SYSTEMS:    CONSTITUTIONAL: No fever, weight loss, or fatigue  RESPIRATORY: No cough, wheezing, hemoptysis; No shortness of breath  CARDIOVASCULAR: No chest pain, palpitations  GASTROINTESTINAL: No abdominal or epigastric pain. No nausea, vomiting  NEUROLOGICAL: No headaches, memory loss, loss of strength.    Vital Signs Last 24 Hrs  T(C): 36.4 (15 Jose C 2021 00:29), Max: 36.7 (14 Jan 2021 16:34)  T(F): 97.6 (15 Jose C 2021 00:29), Max: 98 (14 Jan 2021 16:34)  HR: 77 (15 Jose C 2021 00:29) (77 - 78)  BP: 127/82 (15 Jose C 2021 00:29) (127/82 - 127/87)  BP(mean): --  RR: 18 (15 Jose C 2021 00:29) (18 - 20)  SpO2: 98% (15 Jose C 2021 00:29) (96% - 98%)    PHYSICAL EXAM:    GENERAL: NAD  HEENT: PERRL, +EOMI  NECK: soft, supple  CHEST/LUNG: Clear to auscultation bilaterally; No wheezing  HEART: S1S2+, Regular rate and rhythm; No murmurs, rubs, or gallops  ABDOMEN: Soft, Nontender, Nondistended; Bowel sounds present  SKIN: warm, dry  NEURO: AAOX3, no focal deficits  PSYCH: normal affect     LABS:                        10.8   8.05  )-----------( 413      ( 14 Jan 2021 13:45 )             37.3     01-15    141  |  104  |  24.0<H>  ----------------------------<  102<H>  4.4   |  26.0  |  1.26    Ca    9.3      15 Jose C 2021 13:29        MEDICATIONS  (STANDING):  amLODIPine   Tablet 10 milliGRAM(s) Oral daily  cefTRIAXone   IVPB 2000 milliGRAM(s) IV Intermittent every 24 hours  chlorhexidine 2% Cloths 1 Application(s) Topical <User Schedule>  chlorhexidine 4% Liquid 1 Application(s) Topical <User Schedule>  desmopressin 0.1 milliGRAM(s) Oral two times a day  enoxaparin Injectable 40 milliGRAM(s) SubCutaneous daily  folic acid 1 milliGRAM(s) Oral daily  levETIRAcetam 500 milliGRAM(s) Oral two times a day  multivitamin 1 Tablet(s) Oral daily  pantoprazole    Tablet 40 milliGRAM(s) Oral before breakfast  sodium bicarbonate 1300 milliGRAM(s) Oral two times a day  vancomycin  IVPB 750 milliGRAM(s) IV Intermittent every 12 hours    MEDICATIONS  (PRN):  acetaminophen   Tablet .. 650 milliGRAM(s) Oral every 6 hours PRN Temp greater or equal to 38.5C (101.3F)  sodium chloride 0.9% lock flush 10 milliLiter(s) IV Push every 1 hour PRN Pre/post blood products, medications, blood draw, and to maintain line patency      RADIOLOGY & ADDITIONAL TESTS:    < from: SAADIA Echo Doppler (01.15.21 @ 09:20) >  Summary:   1. Left ventricular ejection fraction, by visual estimation, is 60 to 65%.   2. Normal global left ventricular systolic function.   3. Normal right ventricular size and function.   4. Normal left atrial size.   5. Color flow doppler and intravenous injection of agitated saline demonstrates the presence of an intact intra atrial septum.   6. Normal right atrial size.   7. Trace mitral valve regurgitation.   8. There is a 0.52 cm calcification on the tip of the non-coronary coronary cusp of the aortic valve. This causes trivial aortic regurgitation.   9. There is a mobile echo density on the tip of the intravenous catheter in the right atrium measuring 0.38 cm that can be consistent with a vegetation or fibrinous formation. Clinical correlation is recommended.  10. Small pericardial effusion with no echo evidence of hemodynamic compromise.    MD Sophie Electronically signed on 1/15/2021 at 11:15:23 AM    < end of copied text >

## 2021-01-18 NOTE — PROCEDURE NOTE - NSINFORMCONSENT_GEN_A_CORE
Benefits, risks, and possible complications of procedure explained to patient/caregiver who verbalized understanding and gave written consent.
Benefits, risks, and possible complications of procedure explained to patient/caregiver who verbalized understanding and gave verbal consent.
Benefits, risks, and possible complications of procedure explained to patient/caregiver who verbalized understanding and gave verbal consent.
Benefits, risks, and possible complications of procedure explained to patient/caregiver who verbalized understanding and gave written consent.

## 2021-01-18 NOTE — PROGRESS NOTE ADULT - SUBJECTIVE AND OBJECTIVE BOX
[facility] DIVISION OF KIDNEY DISEASES AND HYPERTENSION -- FOLLOW UP NOTE  --------------------------------------------------------------------------------  Chief Complaint: Hypernatremia, DI on DdAVP,     24 hour events/subjective: Lack Of Thirst Perception,     PAST HISTORY  --------------------------------------------------------------------------------  No significant changes to PMH, PSH, FHx, SHx, unless otherwise noted    ALLERGIES & MEDICATIONS  --------------------------------------------------------------------------------  Allergies    No Known Allergies    Standing Inpatient Medications  amLODIPine   Tablet 10 milliGRAM(s) Oral daily  chlorhexidine 2% Cloths 1 Application(s) Topical <User Schedule>  chlorhexidine 4% Liquid 1 Application(s) Topical <User Schedule>  desmopressin 0.1 milliGRAM(s) Oral <User Schedule>  dextrose 5%. 1000 milliLiter(s) IV Continuous <Continuous>  enoxaparin Injectable 40 milliGRAM(s) SubCutaneous daily  folic acid 1 milliGRAM(s) Oral daily  levETIRAcetam 500 milliGRAM(s) Oral two times a day  multivitamin 1 Tablet(s) Oral daily  pantoprazole    Tablet 40 milliGRAM(s) Oral before breakfast    PRN Inpatient Medications  acetaminophen   Tablet .. 650 milliGRAM(s) Oral every 6 hours PRN  sodium chloride 0.9% lock flush 10 milliLiter(s) IV Push every 1 hour PRN    REVIEW OF SYSTEMS  --------------------------------------------------------------------------------  Gen: No weight changes, fatigue, fevers/chills, weakness  Skin: No rashes  Head/Eyes/Ears/Mouth: No headache; Normal hearing; Normal vision w/o blurriness; No sinus pain/discomfort, sore throat  Respiratory: No dyspnea, cough, wheezing, hemoptysis  CV: No chest pain, PND, orthopnea  GI: No abdominal pain, diarrhea, constipation, nausea, vomiting, melena, hematochezia  : No increased frequency, dysuria, hematuria, nocturia  MSK: No joint pain/swelling; no back pain; no edema  Neuro: No dizziness/lightheadedness, weakness, seizures, numbness, tingling  Heme: No easy bruising or bleeding  Endo: No heat/cold intolerance  Psych: No significant nervousness, anxiety, stress, depression    All other systems were reviewed and are negative, except as noted.    VITALS/PHYSICAL EXAM  --------------------------------------------------------------------------------  T(C): 36.9 (01-18-21 @ 08:18), Max: 37.6 (01-18-21 @ 00:31)  HR: 94 (01-18-21 @ 08:18) (94 - 112)  BP: 130/85 (01-18-21 @ 08:18) (108/70 - 130/85)  RR: 18 (01-18-21 @ 08:18) (18 - 19)  SpO2: 95% (01-18-21 @ 08:18) (95% - 96%)    Physical Exam:  	Gen: NAD, well-appearing, Pale,   	HEENT: PERRL, supple neck, clear oropharynx. Poor Skin Turgor,   	Pulm: CTA B/L  	CV: RRR, S1S2; no rub  	Back: No spinal or CVA tenderness; no sacral edema  	Abd: +BS, soft, nontender/nondistended  	: No suprapubic tenderness  	UE: Warm, FROM, no clubbing, intact strength; no edema; no asterixis  	LE: Warm, FROM, no clubbing, intact strength; no edema  	Neuro: No focal deficits, intact gait  	Psych: Normal affect and mood  	Skin: Warm, without rashes  	Vascular access:    LABS/STUDIES  --------------------------------------------------------------------------------              8.3    6.46  >-----------<  301      [01-18-21 @ 08:27]              29.5     153  |  117  |  30.0  ----------------------------<  79      [01-18-21 @ 08:27]  4.3   |  28.0  |  1.45        Ca     9.0     [01-18-21 @ 08:27]    TPro  6.9  /  Alb  3.4  /  TBili  0.2  /  DBili  x   /  AST  23  /  ALT  33  /  AlkPhos  115  [01-18-21 @ 08:27]    Creatinine Trend:  SCr 1.45 [01-18 @ 08:27]  SCr 1.34 [01-17 @ 09:38]  SCr 1.38 [01-16 @ 08:59]  SCr 1.26 [01-15 @ 13:29]  SCr 1.23 [01-14 @ 13:45]    Urinalysis - [01-03-21 @ 14:38]      Color Yellow / Appearance Clear / SG 1.020 / pH 5.0      Gluc Negative / Ketone Negative  / Bili Negative / Urobili Negative       Blood Trace / Protein 30 / Leuk Est Negative / Nitrite Negative      RBC 0-2 / WBC Negative / Hyaline  / Gran  / Sq Epi  / Non Sq Epi Occasional / Bacteria Few    Iron 14, TIBC 180, %sat 8      [12-30-20 @ 01:51]  Ferritin 753      [12-30-20 @ 01:51]

## 2021-01-18 NOTE — PROGRESS NOTE ADULT - SUBJECTIVE AND OBJECTIVE BOX
INTERVAL HPI/OVERNIGHT EVENTS:  followup DI  long standing  partiallly empty sella  s/p COVID  now isolation precautions dc as p had neg PCR x 2   sodium again higher today  to 153   pt did not drink enough  fluids  lacks thirst mechanism     MEDICATIONS  (STANDING):  amLODIPine   Tablet 10 milliGRAM(s) Oral daily  cefTRIAXone   IVPB 2000 milliGRAM(s) IV Intermittent every 24 hours  chlorhexidine 2% Cloths 1 Application(s) Topical <User Schedule>  chlorhexidine 4% Liquid 1 Application(s) Topical <User Schedule>  desmopressin 0.1 milliGRAM(s) Oral two times a day  enoxaparin Injectable 40 milliGRAM(s) SubCutaneous daily  folic acid 1 milliGRAM(s) Oral daily  levETIRAcetam 500 milliGRAM(s) Oral two times a day  multivitamin 1 Tablet(s) Oral daily  pantoprazole    Tablet 40 milliGRAM(s) Oral before breakfast  sodium bicarbonate 1300 milliGRAM(s) Oral two times a day  vancomycin  IVPB 1000 milliGRAM(s) IV Intermittent <User Schedule>    MEDICATIONS  (PRN):  acetaminophen   Tablet .. 650 milliGRAM(s) Oral every 6 hours PRN Temp greater or equal to 38.5C (101.3F)  sodium chloride 0.9% lock flush 10 milliLiter(s) IV Push every 1 hour PRN Pre/post blood products, medications, blood draw, and to maintain line patency      Allergies    No Known Allergies        Review of systems: no cp, no n/v, no sob    Vital Signs Last 24 Hrs  T(C): 37.2 (17 Jan 2021 15:00), Max: 37.4 (16 Jan 2021 23:08)  T(F): 99 (17 Jan 2021 15:00), Max: 99.4 (16 Jan 2021 23:08)  HR: 105 (17 Jan 2021 15:00) (103 - 111)  BP: 108/70 (17 Jan 2021 15:00) (108/70 - 114/71)  BP(mean): --  RR: 19 (17 Jan 2021 15:00) (18 - 19)  SpO2: 95% (17 Jan 2021 15:00) (94% - 95%)    PHYSICAL EXAM:  Constitutional: NAD, well-groomed, well-developed  Respiratory: CTAB  Cardiovascular: S1 and S2, RRR, no M/G/R  Gastrointestinal: BS+, soft, no organomegaly or mass  Extremities: No peripheral edema, no pedal lesions  Neurological: A/O x 3, no focal deficits  Psychiatric: Normal mood, normal affect        LABS:                        8.4    7.64  )-----------( 358      ( 17 Jan 2021 09:38 )             30.2     01-17    149<H>  |  113<H>  |  30.0<H>  ----------------------------<  80  4.5   |  25.0  |  1.34<H>    Ca    8.9      17 Jan 2021 09:38    TPro  7.0  /  Alb  3.5  /  TBili  <0.2<L>  /  DBili  x   /  AST  26  /  ALT  33<H>  /  AlkPhos  116  01-17

## 2021-01-19 LAB
ANION GAP SERPL CALC-SCNC: 10 MMOL/L — SIGNIFICANT CHANGE UP (ref 5–17)
BUN SERPL-MCNC: 32 MG/DL — HIGH (ref 8–20)
CALCIUM SERPL-MCNC: 8.9 MG/DL — SIGNIFICANT CHANGE UP (ref 8.6–10.2)
CHLORIDE SERPL-SCNC: 108 MMOL/L — HIGH (ref 98–107)
CO2 SERPL-SCNC: 24 MMOL/L — SIGNIFICANT CHANGE UP (ref 22–29)
CREAT SERPL-MCNC: 1.49 MG/DL — HIGH (ref 0.5–1.3)
GLUCOSE SERPL-MCNC: 79 MG/DL — SIGNIFICANT CHANGE UP (ref 70–99)
HCT VFR BLD CALC: 31 % — LOW (ref 34.5–45)
HGB BLD-MCNC: 8.7 G/DL — LOW (ref 11.5–15.5)
MCHC RBC-ENTMCNC: 25.8 PG — LOW (ref 27–34)
MCHC RBC-ENTMCNC: 28.1 GM/DL — LOW (ref 32–36)
MCV RBC AUTO: 92 FL — SIGNIFICANT CHANGE UP (ref 80–100)
PLATELET # BLD AUTO: 270 K/UL — SIGNIFICANT CHANGE UP (ref 150–400)
POTASSIUM SERPL-MCNC: 5.4 MMOL/L — HIGH (ref 3.5–5.3)
POTASSIUM SERPL-SCNC: 5.4 MMOL/L — HIGH (ref 3.5–5.3)
RBC # BLD: 3.37 M/UL — LOW (ref 3.8–5.2)
RBC # FLD: 15.5 % — HIGH (ref 10.3–14.5)
SODIUM SERPL-SCNC: 142 MMOL/L — SIGNIFICANT CHANGE UP (ref 135–145)
WBC # BLD: 6.47 K/UL — SIGNIFICANT CHANGE UP (ref 3.8–10.5)
WBC # FLD AUTO: 6.47 K/UL — SIGNIFICANT CHANGE UP (ref 3.8–10.5)

## 2021-01-19 PROCEDURE — 99233 SBSQ HOSP IP/OBS HIGH 50: CPT

## 2021-01-19 PROCEDURE — 99232 SBSQ HOSP IP/OBS MODERATE 35: CPT

## 2021-01-19 RX ADMIN — AMLODIPINE BESYLATE 10 MILLIGRAM(S): 2.5 TABLET ORAL at 04:44

## 2021-01-19 RX ADMIN — SODIUM CHLORIDE 125 MILLILITER(S): 9 INJECTION, SOLUTION INTRAVENOUS at 00:21

## 2021-01-19 RX ADMIN — SODIUM CHLORIDE 125 MILLILITER(S): 9 INJECTION, SOLUTION INTRAVENOUS at 04:38

## 2021-01-19 RX ADMIN — DESMOPRESSIN ACETATE 0.1 MILLIGRAM(S): 0.1 TABLET ORAL at 21:37

## 2021-01-19 RX ADMIN — PANTOPRAZOLE SODIUM 40 MILLIGRAM(S): 20 TABLET, DELAYED RELEASE ORAL at 04:44

## 2021-01-19 RX ADMIN — DESMOPRESSIN ACETATE 0.1 MILLIGRAM(S): 0.1 TABLET ORAL at 04:44

## 2021-01-19 RX ADMIN — LEVETIRACETAM 500 MILLIGRAM(S): 250 TABLET, FILM COATED ORAL at 04:44

## 2021-01-19 RX ADMIN — CHLORHEXIDINE GLUCONATE 1 APPLICATION(S): 213 SOLUTION TOPICAL at 04:45

## 2021-01-19 RX ADMIN — Medication 1 MILLIGRAM(S): at 11:01

## 2021-01-19 RX ADMIN — ENOXAPARIN SODIUM 40 MILLIGRAM(S): 100 INJECTION SUBCUTANEOUS at 11:01

## 2021-01-19 RX ADMIN — LEVETIRACETAM 500 MILLIGRAM(S): 250 TABLET, FILM COATED ORAL at 16:51

## 2021-01-19 RX ADMIN — Medication 1 TABLET(S): at 11:01

## 2021-01-19 RX ADMIN — DESMOPRESSIN ACETATE 0.1 MILLIGRAM(S): 0.1 TABLET ORAL at 11:01

## 2021-01-19 NOTE — PROGRESS NOTE ADULT - ASSESSMENT
D/W Dr. Ibarra & the family,    Rec : PICC    SNa+ TIW    Titration of IVF , w. Goal SNa+ [id number] Meq., ( Per Family )    1:1 Care , MARK Alexandra.,     OP Care,    TY,

## 2021-01-19 NOTE — PROGRESS NOTE ADULT - SUBJECTIVE AND OBJECTIVE BOX
INTERVAL HPI/ OVERNIGHT EVENTS:    Followup DI :  long standing  partial empty sella  s/p COVID  now isolation precautions dc as pt  had neg PCR x 2   sodium again high  to 153 then down to 150 , today 142.    Dementia , Loss Of Thirst Perception, Loss of memory,     MEDICATIONS  (STANDING):    amLODIPine   Tablet 10 milliGRAM(s) Oral daily  cefTRIAXone   IVPB 2000 milliGRAM(s) IV Intermittent every 24 hours  chlorhexidine 2% Cloths 1 Application(s) Topical <User Schedule>  chlorhexidine 4% Liquid 1 Application(s) Topical <User Schedule>  desmopressin 0.1 milliGRAM(s) Oral two times a day  enoxaparin Injectable 40 milliGRAM(s) SubCutaneous daily  folic acid 1 milliGRAM(s) Oral daily  levETIRAcetam 500 milliGRAM(s) Oral two times a day  multivitamin 1 Tablet(s) Oral daily  pantoprazole    Tablet 40 milliGRAM(s) Oral before breakfast  sodium bicarbonate 1300 milliGRAM(s) Oral two times a day  vancomycin  IVPB 1000 milliGRAM(s) IV Intermittent <User Schedule>    MEDICATIONS  (PRN):  acetaminophen   Tablet .. 650 milliGRAM(s) Oral every 6 hours PRN Temp greater or equal to 38.5C (101.3F)  sodium chloride 0.9% lock flush 10 milliLiter(s) IV Push every 1 hour PRN Pre/post blood products, medications, blood draw, and to maintain line patency    Allergies    No Known Allergies    Review of systems: no cp, no n/v, no sob, more awake and alert today    Vital Signs Last 24 Hrs  T(C): 37.2 (17 Jan 2021 15:00), Max: 37.4 (16 Jan 2021 23:08)  T(F): 99 (17 Jan 2021 15:00), Max: 99.4 (16 Jan 2021 23:08)  HR: 105 (17 Jan 2021 15:00) (103 - 111)  BP: 108/70 (17 Jan 2021 15:00) (108/70 - 114/71)  RR: 19 (17 Jan 2021 15:00) (18 - 19)  SpO2: 95% (17 Jan 2021 15:00) (94% - 95%)    PHYSICAL EXAM:  Constitutional: NAD, well-groomed, well-developed  Respiratory: CTAB  Cardiovascular: S1 and S2, RRR, no M/G/R  Extremities: No peripheral edema, no pedal lesions  Neurological: A/O x 3, no focal deficits  Psychiatric: Flat  mood &  affect    LABS:                        8.4    7.64  )-----------( 358      ( 17 Jan 2021 09:38 )             30.2     01-17    149<H>  |  113<H>  |  30.0<H>  ----------------------------<  80  4.5   |  25.0  |  1.34<H>    Ca    8.9      17 Jan 2021 09:38    TPro  7.0  /  Alb  3.5  /  TBili  <0.2<L>  /  DBili  x   /  AST  26  /  ALT  33<H>  /  AlkPhos  116  01-17    DI- sodium worsening yesterday, upto 153 Meq., then down to 142 Meq.,     Family insisting to put PICC line as they say she is not able to handle water intake at home.    To continue  DDAVP to 0.1 mg in am and 0.2 mg in pm or 0.1 mg tid    patient to drink at least 2 lit of fluid as prescription.    Strict Is and Os.

## 2021-01-19 NOTE — PROGRESS NOTE ADULT - SUBJECTIVE AND OBJECTIVE BOX
KRUPA MEDINA    [id number]    55y      Female    CC: hypernatremia     INTERVAL HPI/OVERNIGHT EVENTS:   Patient seen and examined at bedside. No acute events reported overnight. Denies any complaints.    REVIEW OF SYSTEMS:    CONSTITUTIONAL: No fever, weight loss, or fatigue  RESPIRATORY: No cough, wheezing, hemoptysis; No shortness of breath  CARDIOVASCULAR: No chest pain, palpitations  GASTROINTESTINAL: No abdominal or epigastric pain. No nausea, vomiting  NEUROLOGICAL: No headaches, memory loss, loss of strength.    Vital Signs Last 24 Hrs  T(C): 36.4 (15 Jose C 2021 00:29), Max: 36.7 (14 Jan 2021 16:34)  T(F): 97.6 (15 Jose C 2021 00:29), Max: 98 (14 Jan 2021 16:34)  HR: 77 (15 Jose C 2021 00:29) (77 - 78)  BP: 127/82 (15 Jose C 2021 00:29) (127/82 - 127/87)  BP(mean): --  RR: 18 (15 Jose C 2021 00:29) (18 - 20)  SpO2: 98% (15 Jose C 2021 00:29) (96% - 98%)    PHYSICAL EXAM:    GENERAL: NAD  HEENT: PERRL, +EOMI  NECK: soft, supple  CHEST/LUNG: Clear to auscultation bilaterally; No wheezing  HEART: S1S2+, Regular rate and rhythm; No murmurs, rubs, or gallops  ABDOMEN: Soft, Nontender, Nondistended; Bowel sounds present  SKIN: warm, dry  NEURO: AAOX3, no focal deficits  PSYCH: normal affect     LABS:                        10.8   8.05  )-----------( 413      ( 14 Jan 2021 13:45 )             37.3     01-15    141  |  104  |  24.0<H>  ----------------------------<  102<H>  4.4   |  26.0  |  1.26    Ca    9.3      15 Jose C 2021 13:29        MEDICATIONS  (STANDING):  amLODIPine   Tablet 10 milliGRAM(s) Oral daily  cefTRIAXone   IVPB 2000 milliGRAM(s) IV Intermittent every 24 hours  chlorhexidine 2% Cloths 1 Application(s) Topical <User Schedule>  chlorhexidine 4% Liquid 1 Application(s) Topical <User Schedule>  desmopressin 0.1 milliGRAM(s) Oral two times a day  enoxaparin Injectable 40 milliGRAM(s) SubCutaneous daily  folic acid 1 milliGRAM(s) Oral daily  levETIRAcetam 500 milliGRAM(s) Oral two times a day  multivitamin 1 Tablet(s) Oral daily  pantoprazole    Tablet 40 milliGRAM(s) Oral before breakfast  sodium bicarbonate 1300 milliGRAM(s) Oral two times a day  vancomycin  IVPB 750 milliGRAM(s) IV Intermittent every 12 hours    MEDICATIONS  (PRN):  acetaminophen   Tablet .. 650 milliGRAM(s) Oral every 6 hours PRN Temp greater or equal to 38.5C (101.3F)  sodium chloride 0.9% lock flush 10 milliLiter(s) IV Push every 1 hour PRN Pre/post blood products, medications, blood draw, and to maintain line patency      RADIOLOGY & ADDITIONAL TESTS:    < from: [facility] Echo Doppler (01.15.21 @ 09:20) >  Summary:   1. Left ventricular ejection fraction, by visual estimation, is 60 to 65%.   2. Normal global left ventricular systolic function.   3. Normal right ventricular size and function.   4. Normal left atrial size.   5. Color flow doppler and intravenous injection of agitated saline demonstrates the presence of an intact intra atrial septum.   6. Normal right atrial size.   7. Trace mitral valve regurgitation.   8. There is a 0.52 cm calcification on the tip of the non-coronary coronary cusp of the aortic valve. This causes trivial aortic regurgitation.   9. There is a mobile echo density on the tip of the intravenous catheter in the right atrium measuring 0.38 cm that can be consistent with a vegetation or fibrinous formation. Clinical correlation is recommended.  10. Small pericardial effusion with no echo evidence of hemodynamic compromise.    MD Sophie Electronically signed on 1/15/2021 at 11:15:23 AM    < end of copied text >

## 2021-01-19 NOTE — PROGRESS NOTE ADULT - ASSESSMENT
DI- sodium worsening yesterday, upto 150, then down to 153  encouraged patient to drink water    To continue  DDAVP to 0.1 mg in am and 0.2 mg in pm  patient to drink atleast 2 lit of fluid as prescription.  Encourage patient to drink more water, if she does than we can avoid doing pick line in her.  keep record of  amount  in take per day   Strict Is and Os.         DI- sodium worsening yesterday, upto 153, then down to 142.  Family insisting to put PICC line as they say she is not able to handle water intake at home.    To continue  DDAVP to 0.1 mg in am and 0.2 mg in pm or 0.1 mg tid  patient to drink atleast 2 lit of fluid as prescription.  keep record of  amount  in take per day   Strict Is and Os.

## 2021-01-19 NOTE — CHART NOTE - NSCHARTNOTEFT_GEN_A_CORE
Source: Patient [x]  Family [ ]   other [ ]    Current Diet: Diet, DASH/TLC:   Sodium & Cholesterol Restricted (12-29-20 @ 19:17)      Patient reports [ ] nausea  [ ] vomiting [ ] diarrhea [ ] constipation  [ ]chewing problems [ ] swallowing issues  [ ] other: denies    PO intake:  < 50% [ ]   50-75%  [ ]   %  [x]  other :    Source for PO intake [x] Patient [ ] family [x] chart [ ] staff [ ] other    Current Weight:   (12/29)   125lbs     % Weight Change: Unable to obtain new weight, bedscale likely inaccurate     Pertinent Medications: MEDICATIONS  (STANDING):  amLODIPine   Tablet 10 milliGRAM(s) Oral daily  chlorhexidine 2% Cloths 1 Application(s) Topical <User Schedule>  chlorhexidine 4% Liquid 1 Application(s) Topical <User Schedule>  desmopressin 0.1 milliGRAM(s) Oral <User Schedule>  enoxaparin Injectable 40 milliGRAM(s) SubCutaneous daily  folic acid 1 milliGRAM(s) Oral daily  levETIRAcetam 500 milliGRAM(s) Oral two times a day  multivitamin 1 Tablet(s) Oral daily  pantoprazole    Tablet 40 milliGRAM(s) Oral before breakfast    MEDICATIONS  (PRN):  acetaminophen   Tablet .. 650 milliGRAM(s) Oral every 6 hours PRN Temp greater or equal to 38.5C (101.3F)  sodium chloride 0.9% lock flush 10 milliLiter(s) IV Push every 1 hour PRN Pre/post blood products, medications, blood draw, and to maintain line patency    Pertinent Labs: CBC Full  -  ( 19 Jan 2021 09:58 )  WBC Count : 6.47 K/uL  RBC Count : 3.37 M/uL  Hemoglobin : 8.7 g/dL  Hematocrit : 31.0 %  Platelet Count - Automated : 270 K/uL  Mean Cell Volume : 92.0 fl  Mean Cell Hemoglobin : 25.8 pg  Mean Cell Hemoglobin Concentration : 28.1 gm/dL  01-19 Na142 mmol/L Glu 79 mg/dL K+ 5.4 mmol/L<H> Cr  1.49 mg/dL<H> BUN 32.0 mg/dL<H> Phos n/a   Alb n/a   PAB n/a       Skin: s/p PICC line    Nutrition focused physical exam conducted - found signs of malnutrition [x]absent [ ]present    Subcutaneous fat loss: [ ] Orbital fat pads region, [ ]Buccal fat region, [ ]Triceps region,  [ ]Ribs region    Muscle wasting: [ ]Temples region, [ ]Clavicle region, [ ]Shoulder region, [ ]Scapula region, [ ]Interosseous region,  [ ]thigh region, [ ]Calf region    Estimated Needs:   [x] no change since previous assessment  [ ] recalculated:     Current Nutrition Diagnosis: Pt remains at nutrition risk secondary to altered nutrition-related labs related to worsening hypernatremia as evidenced by SNa 142mg/dL, noted with elevated K+, BUN, creat. Pt reports that she is eating well, states she's drinking fluids also though noted with several bottles of water unopened at bedside. Encouraged adequate fluid intake to promote proper bowel and renal function. Pt understanding. Obtained order for breakfast tomorrow.     Recommendations:   1) Add Ensure Enlive BID  2) Continue MVI and folic acid daily   3) Please obtain new weight as feasible    Monitoring and Evaluation:   [x] PO intake [x] Tolerance to diet prescription [X] Weights  [X] Follow up per protocol [X] Labs:

## 2021-01-19 NOTE — PROGRESS NOTE ADULT - SUBJECTIVE AND OBJECTIVE BOX
INTERVAL HPI/OVERNIGHT EVENTS:  followup DI  long standing  partiallly empty sella  s/p COVID  now isolation precautions dc as p had neg PCR x 2   sodium again high  to 153 then down to 150   lacks thirst mechanism     MEDICATIONS  (STANDING):  amLODIPine   Tablet 10 milliGRAM(s) Oral daily  cefTRIAXone   IVPB 2000 milliGRAM(s) IV Intermittent every 24 hours  chlorhexidine 2% Cloths 1 Application(s) Topical <User Schedule>  chlorhexidine 4% Liquid 1 Application(s) Topical <User Schedule>  desmopressin 0.1 milliGRAM(s) Oral two times a day  enoxaparin Injectable 40 milliGRAM(s) SubCutaneous daily  folic acid 1 milliGRAM(s) Oral daily  levETIRAcetam 500 milliGRAM(s) Oral two times a day  multivitamin 1 Tablet(s) Oral daily  pantoprazole    Tablet 40 milliGRAM(s) Oral before breakfast  sodium bicarbonate 1300 milliGRAM(s) Oral two times a day  vancomycin  IVPB 1000 milliGRAM(s) IV Intermittent <User Schedule>    MEDICATIONS  (PRN):  acetaminophen   Tablet .. 650 milliGRAM(s) Oral every 6 hours PRN Temp greater or equal to 38.5C (101.3F)  sodium chloride 0.9% lock flush 10 milliLiter(s) IV Push every 1 hour PRN Pre/post blood products, medications, blood draw, and to maintain line patency      Allergies    No Known Allergies        Review of systems: no cp, no n/v, no sob    Vital Signs Last 24 Hrs  T(C): 37.2 (17 Jan 2021 15:00), Max: 37.4 (16 Jan 2021 23:08)  T(F): 99 (17 Jan 2021 15:00), Max: 99.4 (16 Jan 2021 23:08)  HR: 105 (17 Jan 2021 15:00) (103 - 111)  BP: 108/70 (17 Jan 2021 15:00) (108/70 - 114/71)  BP(mean): --  RR: 19 (17 Jan 2021 15:00) (18 - 19)  SpO2: 95% (17 Jan 2021 15:00) (94% - 95%)    PHYSICAL EXAM:  Constitutional: NAD, well-groomed, well-developed  Respiratory: CTAB  Cardiovascular: S1 and S2, RRR, no M/G/R  Gastrointestinal: BS+, soft, no organomegaly or mass  Extremities: No peripheral edema, no pedal lesions  Neurological: A/O x 3, no focal deficits  Psychiatric: Normal mood, normal affect        LABS:                        8.4    7.64  )-----------( 358      ( 17 Jan 2021 09:38 )             30.2     01-17    149<H>  |  113<H>  |  30.0<H>  ----------------------------<  80  4.5   |  25.0  |  1.34<H>    Ca    8.9      17 Jan 2021 09:38    TPro  7.0  /  Alb  3.5  /  TBili  <0.2<L>  /  DBili  x   /  AST  26  /  ALT  33<H>  /  AlkPhos  116  01-17       INTERVAL HPI/OVERNIGHT EVENTS:  followup DI  long standing  partiallly empty sella  s/p COVID  now isolation precautions dc as p had neg PCR x 2   sodium again high  to 153 then down to 150 , today 142.  lacks thirst mechanism     MEDICATIONS  (STANDING):  amLODIPine   Tablet 10 milliGRAM(s) Oral daily  cefTRIAXone   IVPB 2000 milliGRAM(s) IV Intermittent every 24 hours  chlorhexidine 2% Cloths 1 Application(s) Topical <User Schedule>  chlorhexidine 4% Liquid 1 Application(s) Topical <User Schedule>  desmopressin 0.1 milliGRAM(s) Oral two times a day  enoxaparin Injectable 40 milliGRAM(s) SubCutaneous daily  folic acid 1 milliGRAM(s) Oral daily  levETIRAcetam 500 milliGRAM(s) Oral two times a day  multivitamin 1 Tablet(s) Oral daily  pantoprazole    Tablet 40 milliGRAM(s) Oral before breakfast  sodium bicarbonate 1300 milliGRAM(s) Oral two times a day  vancomycin  IVPB 1000 milliGRAM(s) IV Intermittent <User Schedule>    MEDICATIONS  (PRN):  acetaminophen   Tablet .. 650 milliGRAM(s) Oral every 6 hours PRN Temp greater or equal to 38.5C (101.3F)  sodium chloride 0.9% lock flush 10 milliLiter(s) IV Push every 1 hour PRN Pre/post blood products, medications, blood draw, and to maintain line patency      Allergies    No Known Allergies        Review of systems: no cp, no n/v, no sob, more awake and alert today    Vital Signs Last 24 Hrs  T(C): 37.2 (17 Jan 2021 15:00), Max: 37.4 (16 Jan 2021 23:08)  T(F): 99 (17 Jan 2021 15:00), Max: 99.4 (16 Jan 2021 23:08)  HR: 105 (17 Jan 2021 15:00) (103 - 111)  BP: 108/70 (17 Jan 2021 15:00) (108/70 - 114/71)  BP(mean): --  RR: 19 (17 Jan 2021 15:00) (18 - 19)  SpO2: 95% (17 Jan 2021 15:00) (94% - 95%)    PHYSICAL EXAM:  Constitutional: NAD, well-groomed, well-developed  Respiratory: CTAB  Cardiovascular: S1 and S2, RRR, no M/G/R  Extremities: No peripheral edema, no pedal lesions  Neurological: A/O x 3, no focal deficits  Psychiatric: Normal mood, normal affect        LABS:                        8.4    7.64  )-----------( 358      ( 17 Jan 2021 09:38 )             30.2     01-17    149<H>  |  113<H>  |  30.0<H>  ----------------------------<  80  4.5   |  25.0  |  1.34<H>    Ca    8.9      17 Jan 2021 09:38    TPro  7.0  /  Alb  3.5  /  TBili  <0.2<L>  /  DBili  x   /  AST  26  /  ALT  33<H>  /  AlkPhos  116  01-17

## 2021-01-19 NOTE — PROGRESS NOTE ADULT - ASSESSMENT
55y/oF PMH SAH, DI, chronic hypernatremia BIBA with dizziness. Pt came in with PICC line, reports getting D5W every other day for hypernatremia. Pt admitted with hypernatremia, started on IVF and desmopressin. Also found to be COVID+ with positive blood cultures. PICC line removed, pt started vanc/zosyn. Repeat blood cultures (1/1) with gram positive rods. Additional repeat blood cultures (1/5) NGTD.     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 mTEE 1/15: mobil echo density on tip of iv catheter in R atrium, consistent with vegetation or fibrinous formation   - COVID PCR negative x2. Isolation precautions d/c'ed as per ID and infection control.   - PICC removed per ID, sent tip for culture (negative), repeat blood cx 1/15 NGTD.  - Vancomycin, Rocephin d/anne per ID as patient completed 2 weeks of treatment.,    #COVID -19 infection, now negative PCR x2  - not requiring supplemental O2  - cont supportive treatment     #Hypernatremia (improved)), Diabetes insipidus  - C/w DDAVP 0.1mg TID  - encourage PO intake   - S/p D5W  - Nephro, Endo following  - Per daughter, patient seizes when Na < 145. Has not been noted to seize with normal Na levels while inpatient. As per Neurology, may maintain normal Na levels.  -  very agitated regarding patient's sodium levels, states they have to be 144-145. Discussed with him that the threshold is unrealistic and Neurology has recommended normal Na levels. Patient has been monitored here and has not seizure with Na levels in the 130s. Discussed the need for encourage of PO fluid intake and patient may require more routine weekly BMPs. Nephrology contacted patient's family and discussed care. Will proceed with replacing PICC per Endo/Nephro.    #Hypertension  -bp increasing, hypotensive on arrival, restart amlodipine, cont to monitor     #anemia of chronic disease   -s/p 1u PRBC   -cont to monitor, no signs of active bleeding     dispo: Pending improvement of hypernatremia, Endo/Nephro clearance. Will need to determine need for PICC line    Daughter Millie ([phone]) updated.   very agitated regarding patient's sodium levels, states they have to be 144-145. Discussed with him that the threshold is unrealistic and Neurology has recommended normal Na levels. Patient has been monitored here and has not seizure with Na levels in the 130s. Discussed the need for encourage of PO fluid intake and patient may require more routine weekly BMPs. Nephrology contacted patient's family and discussed care. Will proceed with replacing PICC per Endo/Nephro.     55y/oF PMH SAH, DI, chronic hypernatremia BIBA with dizziness. Pt came in with PICC line, reports getting D5W every other day for hypernatremia. Pt admitted with hypernatremia, started on IVF and desmopressin. Also found to be COVID+ with positive blood cultures. PICC line removed, pt started vanc/zosyn. Repeat blood cultures (1/1) with gram positive rods. Additional repeat blood cultures (1/5) NGTD.     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 mTEE 1/15: mobil echo density on tip of iv catheter in R atrium, consistent with vegetation or fibrinous formation   - COVID PCR negative x2. Isolation precautions d/c'ed as per ID and infection control.   - PICC removed per ID, sent tip for culture (negative), repeat blood cx 1/15 NGTD.  - Vancomycin, Rocephin d/anne per ID as patient completed 2 weeks of treatment.,    #COVID -19 infection, now negative PCR x2  - not requiring supplemental O2  - cont supportive treatment     #Hypernatremia (improved)), Diabetes insipidus  - C/w DDAVP 0.1mg TID  - encourage PO intake   - S/p D5W IVF  - Nephro, Endo following  - Per daughter, patient seizes when Na < 145. Has not been noted to seize with normal Na levels while inpatient. As per Neurology, may maintain normal Na levels.  - PICC per Endo/Nephro     #Hypertension  - C/w Norvasc    #anemia of chronic disease   -s/p 1u PRBC   -cont to monitor, no signs of active bleeding     dispo: Pending PICC for home IVF infusion    Daughter Millie ([phone]) updated.   very agitated regarding patient's sodium levels, stating that they have to be 144-145 or the patient seizes. Discussed with him that the threshold is unrealistic and Neurology has recommended maintaining normal Na levels. Patient has been monitored throughout her course and has not seized with Na levels in the 130s. Discussed the need for encouragement of PO fluid intake and patient may require more routine BMPs at home. Nephrology contacted patient's family and discussed plan care. Will proceed with replacing PICC per Endo/Nephro.

## 2021-01-20 LAB
ANION GAP SERPL CALC-SCNC: 8 MMOL/L — SIGNIFICANT CHANGE UP (ref 5–17)
BUN SERPL-MCNC: 33 MG/DL — HIGH (ref 8–20)
CALCIUM SERPL-MCNC: 9.2 MG/DL — SIGNIFICANT CHANGE UP (ref 8.6–10.2)
CHLORIDE SERPL-SCNC: 110 MMOL/L — HIGH (ref 98–107)
CO2 SERPL-SCNC: 25 MMOL/L — SIGNIFICANT CHANGE UP (ref 22–29)
CREAT SERPL-MCNC: 1.28 MG/DL — SIGNIFICANT CHANGE UP (ref 0.5–1.3)
CULTURE RESULTS: SIGNIFICANT CHANGE UP
CULTURE RESULTS: SIGNIFICANT CHANGE UP
GLUCOSE SERPL-MCNC: 76 MG/DL — SIGNIFICANT CHANGE UP (ref 70–99)
POTASSIUM SERPL-MCNC: 5.5 MMOL/L — HIGH (ref 3.5–5.3)
POTASSIUM SERPL-SCNC: 5.5 MMOL/L — HIGH (ref 3.5–5.3)
SODIUM SERPL-SCNC: 143 MMOL/L — SIGNIFICANT CHANGE UP (ref 135–145)
SPECIMEN SOURCE: SIGNIFICANT CHANGE UP
SPECIMEN SOURCE: SIGNIFICANT CHANGE UP

## 2021-01-20 PROCEDURE — 37248 TRLUML BALO ANGIOP 1ST VEIN: CPT

## 2021-01-20 PROCEDURE — 76000 FLUOROSCOPY <1 HR PHYS/QHP: CPT | Mod: 26,59

## 2021-01-20 PROCEDURE — 36597 REPOSITION VENOUS CATHETER: CPT

## 2021-01-20 PROCEDURE — 99232 SBSQ HOSP IP/OBS MODERATE 35: CPT

## 2021-01-20 PROCEDURE — 71045 X-RAY EXAM CHEST 1 VIEW: CPT | Mod: 26

## 2021-01-20 PROCEDURE — 99233 SBSQ HOSP IP/OBS HIGH 50: CPT

## 2021-01-20 RX ORDER — SODIUM ZIRCONIUM CYCLOSILICATE 10 G/10G
5 POWDER, FOR SUSPENSION ORAL ONCE
Refills: 0 | Status: DISCONTINUED | OUTPATIENT
Start: 2021-01-20 | End: 2021-01-21

## 2021-01-20 RX ORDER — SODIUM ZIRCONIUM CYCLOSILICATE 10 G/10G
5 POWDER, FOR SUSPENSION ORAL ONCE
Refills: 0 | Status: COMPLETED | OUTPATIENT
Start: 2021-01-20 | End: 2021-01-20

## 2021-01-20 RX ADMIN — PANTOPRAZOLE SODIUM 40 MILLIGRAM(S): 20 TABLET, DELAYED RELEASE ORAL at 04:34

## 2021-01-20 RX ADMIN — LEVETIRACETAM 500 MILLIGRAM(S): 250 TABLET, FILM COATED ORAL at 18:30

## 2021-01-20 RX ADMIN — DESMOPRESSIN ACETATE 0.1 MILLIGRAM(S): 0.1 TABLET ORAL at 13:08

## 2021-01-20 RX ADMIN — CHLORHEXIDINE GLUCONATE 1 APPLICATION(S): 213 SOLUTION TOPICAL at 04:34

## 2021-01-20 RX ADMIN — AMLODIPINE BESYLATE 10 MILLIGRAM(S): 2.5 TABLET ORAL at 04:35

## 2021-01-20 RX ADMIN — DESMOPRESSIN ACETATE 0.1 MILLIGRAM(S): 0.1 TABLET ORAL at 04:35

## 2021-01-20 RX ADMIN — ENOXAPARIN SODIUM 40 MILLIGRAM(S): 100 INJECTION SUBCUTANEOUS at 13:08

## 2021-01-20 RX ADMIN — CHLORHEXIDINE GLUCONATE 1 APPLICATION(S): 213 SOLUTION TOPICAL at 21:30

## 2021-01-20 RX ADMIN — LEVETIRACETAM 500 MILLIGRAM(S): 250 TABLET, FILM COATED ORAL at 04:34

## 2021-01-20 RX ADMIN — DESMOPRESSIN ACETATE 0.1 MILLIGRAM(S): 0.1 TABLET ORAL at 21:32

## 2021-01-20 RX ADMIN — SODIUM ZIRCONIUM CYCLOSILICATE 5 GRAM(S): 10 POWDER, FOR SUSPENSION ORAL at 16:18

## 2021-01-20 RX ADMIN — Medication 1 TABLET(S): at 13:08

## 2021-01-20 RX ADMIN — Medication 1 MILLIGRAM(S): at 13:08

## 2021-01-20 NOTE — PROGRESS NOTE ADULT - SUBJECTIVE AND OBJECTIVE BOX
INTERVAL HPI/ OVERNIGHT EVENTS:    followup DI  long standing  partial empty sella,    s/p COVID  now isolation precautions dc as pt  had neg PCR x 2     lacks thirst mechanism     MEDICATIONS  (STANDING):  amLODIPine   Tablet 10 milliGRAM(s) Oral daily  cefTRIAXone   IVPB 2000 milliGRAM(s) IV Intermittent every 24 hours  chlorhexidine 2% Cloths 1 Application(s) Topical <User Schedule>  chlorhexidine 4% Liquid 1 Application(s) Topical <User Schedule>  desmopressin 0.1 milliGRAM(s) Oral two times a day  enoxaparin Injectable 40 milliGRAM(s) SubCutaneous daily  folic acid 1 milliGRAM(s) Oral daily  levETIRAcetam 500 milliGRAM(s) Oral two times a day  multivitamin 1 Tablet(s) Oral daily  pantoprazole    Tablet 40 milliGRAM(s) Oral before breakfast  sodium bicarbonate 1300 milliGRAM(s) Oral two times a day  vancomycin  IVPB 1000 milliGRAM(s) IV Intermittent <User Schedule>    MEDICATIONS  (PRN):  acetaminophen   Tablet .. 650 milliGRAM(s) Oral every 6 hours PRN Temp greater or equal to 38.5C (101.3F)  sodium chloride 0.9% lock flush 10 milliLiter(s) IV Push every 1 hour PRN Pre/post blood products, medications, blood draw, and to maintain line patency    Allergies    No Known Allergies    Review of systems: no cp, no n/v, no sob, more awake and alert today    Vital Signs Last 24 Hrs  T(C): 37.2 (17 Jan 2021 15:00), Max: 37.4 (16 Jan 2021 23:08)  T(F): 99 (17 Jan 2021 15:00), Max: 99.4 (16 Jan 2021 23:08)  HR: 105 (17 Jan 2021 15:00) (103 - 111)  BP: 108/70 (17 Jan 2021 15:00) (108/70 - 114/71)  BP(mean): --  RR: 19 (17 Jan 2021 15:00) (18 - 19)  SpO2: 95% (17 Jan 2021 15:00) (94% - 95%)    PHYSICAL EXAM:  Constitutional: NAD, well-groomed, well-developed  Respiratory: CTAB  Cardiovascular: S1 and S2, RRR, no M/G/R  Extremities: No peripheral edema, no pedal lesions  Neurological: A/O x 3, no focal deficits  Psychiatric: Normal mood, normal affect    LABS:                        8.4    7.64  )-----------( 358      ( 17 Jan 2021 09:38 )             30.2     01-17    149<H>  |  113<H>  |  30.0<H>  ----------------------------<  80  4.5   |  25.0  |  1.34<H>    Ca    8.9      17 Jan 2021 09:38    TPro  7.0  /  Alb  3.5  /  TBili  <0.2<L>  /  DBili  x   /  AST  26  /  ALT  33<H>  /  AlkPhos  116  01-17    DI- sodium better now around 143.    Family insisting to put PICC line as they say she is not able to handle water intake at home.    To continue  DDAVP to 0.1 mg in am and 0.2 mg in pm or 0.1 mg tid  patient to drink  2 lit of fluid as prescription.     Strict Is and Os.    TY

## 2021-01-20 NOTE — PROGRESS NOTE ADULT - ASSESSMENT
DI- sodium better now around 143.    Family insisting to put PICC line as they say she is not able to handle water intake at home.    To continue  DDAVP to 0.1 mg in am and 0.2 mg in pm or 0.1 mg tid  patient to drink atleast 2 lit of fluid as prescription.  keep record of  amount  in take per day   Strict Is and Os.

## 2021-01-20 NOTE — PROGRESS NOTE ADULT - SUBJECTIVE AND OBJECTIVE BOX
INTERVAL HPI/OVERNIGHT EVENTS:  followup DI  long standing  partiallly empty sella  s/p COVID  now isolation precautions dc as p had neg PCR x 2   sodium again high  to 153 then down to 150 , today 142.  lacks thirst mechanism     MEDICATIONS  (STANDING):  amLODIPine   Tablet 10 milliGRAM(s) Oral daily  cefTRIAXone   IVPB 2000 milliGRAM(s) IV Intermittent every 24 hours  chlorhexidine 2% Cloths 1 Application(s) Topical <User Schedule>  chlorhexidine 4% Liquid 1 Application(s) Topical <User Schedule>  desmopressin 0.1 milliGRAM(s) Oral two times a day  enoxaparin Injectable 40 milliGRAM(s) SubCutaneous daily  folic acid 1 milliGRAM(s) Oral daily  levETIRAcetam 500 milliGRAM(s) Oral two times a day  multivitamin 1 Tablet(s) Oral daily  pantoprazole    Tablet 40 milliGRAM(s) Oral before breakfast  sodium bicarbonate 1300 milliGRAM(s) Oral two times a day  vancomycin  IVPB 1000 milliGRAM(s) IV Intermittent <User Schedule>    MEDICATIONS  (PRN):  acetaminophen   Tablet .. 650 milliGRAM(s) Oral every 6 hours PRN Temp greater or equal to 38.5C (101.3F)  sodium chloride 0.9% lock flush 10 milliLiter(s) IV Push every 1 hour PRN Pre/post blood products, medications, blood draw, and to maintain line patency      Allergies    No Known Allergies        Review of systems: no cp, no n/v, no sob, more awake and alert today    Vital Signs Last 24 Hrs  T(C): 37.2 (17 Jan 2021 15:00), Max: 37.4 (16 Jan 2021 23:08)  T(F): 99 (17 Jan 2021 15:00), Max: 99.4 (16 Jan 2021 23:08)  HR: 105 (17 Jan 2021 15:00) (103 - 111)  BP: 108/70 (17 Jan 2021 15:00) (108/70 - 114/71)  BP(mean): --  RR: 19 (17 Jan 2021 15:00) (18 - 19)  SpO2: 95% (17 Jan 2021 15:00) (94% - 95%)    PHYSICAL EXAM:  Constitutional: NAD, well-groomed, well-developed  Respiratory: CTAB  Cardiovascular: S1 and S2, RRR, no M/G/R  Extremities: No peripheral edema, no pedal lesions  Neurological: A/O x 3, no focal deficits  Psychiatric: Normal mood, normal affect        LABS:                        8.4    7.64  )-----------( 358      ( 17 Jan 2021 09:38 )             30.2     01-17    149<H>  |  113<H>  |  30.0<H>  ----------------------------<  80  4.5   |  25.0  |  1.34<H>    Ca    8.9      17 Jan 2021 09:38    TPro  7.0  /  Alb  3.5  /  TBili  <0.2<L>  /  DBili  x   /  AST  26  /  ALT  33<H>  /  AlkPhos  116  01-17

## 2021-01-20 NOTE — PROGRESS NOTE ADULT - ASSESSMENT
55y/oF PMH SAH, DI, chronic hypernatremia BIBA with dizziness. Pt came in with PICC line, reports getting D5W every other day for hypernatremia. Pt admitted with hypernatremia, started on IVF and desmopressin. Also found to be COVID+ with positive blood cultures. PICC line removed, pt started vanc/zosyn. Repeat blood cultures (1/1) with gram positive rods. Additional repeat blood cultures (1/5) NGTD.     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 mTEE 1/15: mobil echo density on tip of iv catheter in R atrium, consistent with vegetation or fibrinous formation   - COVID PCR negative x2. Isolation precautions d/c'ed as per ID and infection control.   - PICC removed per ID, sent tip for culture (negative), repeat blood cx 1/15 NGTD.  - Vancomycin, Rocephin d/anne per ID as patient completed 2 weeks of treatment.,    #COVID -19 infection, now negative PCR x2  - not requiring supplemental O2  - cont supportive treatment     #Hypernatremia (improved)), Diabetes insipidus  - C/w DDAVP 0.1mg TID  - encourage PO intake   - S/p D5W IVF  - Nephro, Endo following  - Per daughter, patient seizes when Na < 145. Has not been noted to seize with normal Na levels while inpatient. As per Neurology, may maintain normal Na levels.  - PICC per Endo/Nephro     #Hypertension  - C/w Norvasc    #anemia of chronic disease   -s/p 1u PRBC   -cont to monitor, no signs of active bleeding     dispo: Pending PICC for home IVF infusion    Daughter Millie ([phone]) updated

## 2021-01-20 NOTE — PHYSICAL THERAPY INITIAL EVALUATION ADULT - ADDITIONAL COMMENTS
pt states she lives with her daughter in a 2-story house with 3 steps to enter (+rail). states she switched bedrooms and stays on first floor. independent with mobility without device. states neither she nor her daughter work, so her dtr is able to assist.

## 2021-01-20 NOTE — PROGRESS NOTE ADULT - SUBJECTIVE AND OBJECTIVE BOX
IR Post Procedure Note    Diagnosis: Long term IV Access needed    Procedure: PICC Repo    : Keven Hood MD    Contrast: None    Anesthesia: 1% Lidocaine Subcutaneous    Estimated Blood Loss: Less than 10cc    Specimens: None    Complications: No Immediate Complications    Anticoagulation: Resume without Restriction    Findings & Plan: 42 cm RUE PICC repositioned after venoplasty of subclavian occlusion and stenosis. Tip in SVC, OK to use.      Please call Interventional Radiology with any questions, concerns, or issues.

## 2021-01-20 NOTE — PHYSICAL THERAPY INITIAL EVALUATION ADULT - PERTINENT HX OF CURRENT PROBLEM, REHAB EVAL
55y/oF PMH SAH, DI, chronic hypernatremia BIBA with dizziness. Pt came in with PICC line, reports getting D5W every other day for hypernatremia. Pt admitted with hypernatremia, started on IVF and desmopressin. Also found to be COVID+ with positive blood cultures. PICC line removed, pt started vanc/zosyn. Repeat blood cultures (1/1) with gram positive rods. Additional repeat blood cultures (1/5) NGTD.

## 2021-01-20 NOTE — PROGRESS NOTE ADULT - ASSESSMENT
DI - On DdAVP 0.3 mg., Daily,    Oral Fluid 2 L Daily,    Monitor SNa+, TIW,    If SNa+ > 147, Increase fluid to 2,5 L     If < 145, Restrict to 1 L Daily,    Instructions to ,    Please call as needed , TY

## 2021-01-20 NOTE — PROGRESS NOTE ADULT - SUBJECTIVE AND OBJECTIVE BOX
KRUPA MEDINA    [id number]    55y      Female    CC: hypernatremia     INTERVAL HPI/OVERNIGHT EVENTS:   Patient seen and examined at bedside. No acute events reported overnight. No new complaints. Na today 143.    REVIEW OF SYSTEMS:    CONSTITUTIONAL: No fever, weight loss, or fatigue  RESPIRATORY: No cough, wheezing, hemoptysis; No shortness of breath  CARDIOVASCULAR: No chest pain, palpitations  GASTROINTESTINAL: No abdominal or epigastric pain. No nausea, vomiting  NEUROLOGICAL: No headaches, memory loss, loss of strength.    Vital Signs Last 24 Hrs  T(C): 36.4 (15 Jose C 2021 00:29), Max: 36.7 (14 Jan 2021 16:34)  T(F): 97.6 (15 Jose C 2021 00:29), Max: 98 (14 Jan 2021 16:34)  HR: 77 (15 Jose C 2021 00:29) (77 - 78)  BP: 127/82 (15 Jose C 2021 00:29) (127/82 - 127/87)  BP(mean): --  RR: 18 (15 Jose C 2021 00:29) (18 - 20)  SpO2: 98% (15 Jose C 2021 00:29) (96% - 98%)    PHYSICAL EXAM:    GENERAL: NAD  HEENT: PERRL, +EOMI  NECK: soft, supple  CHEST/LUNG: Clear to auscultation bilaterally; No wheezing  HEART: S1S2+, Regular rate and rhythm; No murmurs, rubs, or gallops  ABDOMEN: Soft, Nontender, Nondistended; Bowel sounds present  SKIN: warm, dry  NEURO: AAOX3, no focal deficits  PSYCH: normal affect     LABS:                        10.8   8.05  )-----------( 413      ( 14 Jan 2021 13:45 )             37.3     01-15    141  |  104  |  24.0<H>  ----------------------------<  102<H>  4.4   |  26.0  |  1.26    Ca    9.3      15 Jose C 2021 13:29        MEDICATIONS  (STANDING):  amLODIPine   Tablet 10 milliGRAM(s) Oral daily  cefTRIAXone   IVPB 2000 milliGRAM(s) IV Intermittent every 24 hours  chlorhexidine 2% Cloths 1 Application(s) Topical <User Schedule>  chlorhexidine 4% Liquid 1 Application(s) Topical <User Schedule>  desmopressin 0.1 milliGRAM(s) Oral two times a day  enoxaparin Injectable 40 milliGRAM(s) SubCutaneous daily  folic acid 1 milliGRAM(s) Oral daily  levETIRAcetam 500 milliGRAM(s) Oral two times a day  multivitamin 1 Tablet(s) Oral daily  pantoprazole    Tablet 40 milliGRAM(s) Oral before breakfast  sodium bicarbonate 1300 milliGRAM(s) Oral two times a day  vancomycin  IVPB 750 milliGRAM(s) IV Intermittent every 12 hours    MEDICATIONS  (PRN):  acetaminophen   Tablet .. 650 milliGRAM(s) Oral every 6 hours PRN Temp greater or equal to 38.5C (101.3F)  sodium chloride 0.9% lock flush 10 milliLiter(s) IV Push every 1 hour PRN Pre/post blood products, medications, blood draw, and to maintain line patency      RADIOLOGY & ADDITIONAL TESTS:    < from: SAADIA Echo Doppler (01.15.21 @ 09:20) >  Summary:   1. Left ventricular ejection fraction, by visual estimation, is 60 to 65%.   2. Normal global left ventricular systolic function.   3. Normal right ventricular size and function.   4. Normal left atrial size.   5. Color flow doppler and intravenous injection of agitated saline demonstrates the presence of an intact intra atrial septum.   6. Normal right atrial size.   7. Trace mitral valve regurgitation.   8. There is a 0.52 cm calcification on the tip of the non-coronary coronary cusp of the aortic valve. This causes trivial aortic regurgitation.   9. There is a mobile echo density on the tip of the intravenous catheter in the right atrium measuring 0.38 cm that can be consistent with a vegetation or fibrinous formation. Clinical correlation is recommended.  10. Small pericardial effusion with no echo evidence of hemodynamic compromise.    MD Sophie Electronically signed on 1/15/2021 at 11:15:23 AM    < end of copied text >

## 2021-01-20 NOTE — PROGRESS NOTE ADULT - REASON FOR ADMISSION
hypernatremia
Hypernatremia, DI ( History )
hypernatremia

## 2021-01-21 ENCOUNTER — TRANSCRIPTION ENCOUNTER (OUTPATIENT)
Age: 56
End: 2021-01-21

## 2021-01-21 VITALS
RESPIRATION RATE: 16 BRPM | DIASTOLIC BLOOD PRESSURE: 80 MMHG | SYSTOLIC BLOOD PRESSURE: 120 MMHG | HEART RATE: 80 BPM | OXYGEN SATURATION: 96 % | TEMPERATURE: 98 F

## 2021-01-21 LAB
ANION GAP SERPL CALC-SCNC: 10 MMOL/L — SIGNIFICANT CHANGE UP (ref 5–17)
BUN SERPL-MCNC: 34 MG/DL — HIGH (ref 8–20)
CALCIUM SERPL-MCNC: 9.2 MG/DL — SIGNIFICANT CHANGE UP (ref 8.6–10.2)
CHLORIDE SERPL-SCNC: 108 MMOL/L — HIGH (ref 98–107)
CO2 SERPL-SCNC: 23 MMOL/L — SIGNIFICANT CHANGE UP (ref 22–29)
CREAT SERPL-MCNC: 1.13 MG/DL — SIGNIFICANT CHANGE UP (ref 0.5–1.3)
GLUCOSE SERPL-MCNC: 80 MG/DL — SIGNIFICANT CHANGE UP (ref 70–99)
POTASSIUM SERPL-MCNC: 5.4 MMOL/L — HIGH (ref 3.5–5.3)
POTASSIUM SERPL-SCNC: 5.4 MMOL/L — HIGH (ref 3.5–5.3)
SODIUM SERPL-SCNC: 141 MMOL/L — SIGNIFICANT CHANGE UP (ref 135–145)

## 2021-01-21 PROCEDURE — 85025 COMPLETE CBC W/AUTO DIFF WBC: CPT

## 2021-01-21 PROCEDURE — 84300 ASSAY OF URINE SODIUM: CPT

## 2021-01-21 PROCEDURE — 87150 DNA/RNA AMPLIFIED PROBE: CPT

## 2021-01-21 PROCEDURE — 84466 ASSAY OF TRANSFERRIN: CPT

## 2021-01-21 PROCEDURE — 82962 GLUCOSE BLOOD TEST: CPT

## 2021-01-21 PROCEDURE — C1751: CPT

## 2021-01-21 PROCEDURE — 86850 RBC ANTIBODY SCREEN: CPT

## 2021-01-21 PROCEDURE — 85730 THROMBOPLASTIN TIME PARTIAL: CPT

## 2021-01-21 PROCEDURE — 71250 CT THORAX DX C-: CPT

## 2021-01-21 PROCEDURE — 87641 MR-STAPH DNA AMP PROBE: CPT

## 2021-01-21 PROCEDURE — 82507 ASSAY OF CITRATE: CPT

## 2021-01-21 PROCEDURE — 87040 BLOOD CULTURE FOR BACTERIA: CPT

## 2021-01-21 PROCEDURE — 71045 X-RAY EXAM CHEST 1 VIEW: CPT

## 2021-01-21 PROCEDURE — 87631 RESP VIRUS 3-5 TARGETS: CPT

## 2021-01-21 PROCEDURE — 86900 BLOOD TYPING SEROLOGIC ABO: CPT

## 2021-01-21 PROCEDURE — C1894: CPT

## 2021-01-21 PROCEDURE — 84133 ASSAY OF URINE POTASSIUM: CPT

## 2021-01-21 PROCEDURE — 85045 AUTOMATED RETICULOCYTE COUNT: CPT

## 2021-01-21 PROCEDURE — 70450 CT HEAD/BRAIN W/O DYE: CPT

## 2021-01-21 PROCEDURE — 85027 COMPLETE CBC AUTOMATED: CPT

## 2021-01-21 PROCEDURE — 99232 SBSQ HOSP IP/OBS MODERATE 35: CPT

## 2021-01-21 PROCEDURE — 82340 ASSAY OF CALCIUM IN URINE: CPT

## 2021-01-21 PROCEDURE — 80202 ASSAY OF VANCOMYCIN: CPT

## 2021-01-21 PROCEDURE — 83605 ASSAY OF LACTIC ACID: CPT

## 2021-01-21 PROCEDURE — 82728 ASSAY OF FERRITIN: CPT

## 2021-01-21 PROCEDURE — P9016: CPT

## 2021-01-21 PROCEDURE — 93005 ELECTROCARDIOGRAM TRACING: CPT

## 2021-01-21 PROCEDURE — 83945 ASSAY OF OXALATE: CPT

## 2021-01-21 PROCEDURE — C1769: CPT

## 2021-01-21 PROCEDURE — 86769 SARS-COV-2 COVID-19 ANTIBODY: CPT

## 2021-01-21 PROCEDURE — 99285 EMERGENCY DEPT VISIT HI MDM: CPT | Mod: 25

## 2021-01-21 PROCEDURE — 93320 DOPPLER ECHO COMPLETE: CPT

## 2021-01-21 PROCEDURE — 86901 BLOOD TYPING SEROLOGIC RH(D): CPT

## 2021-01-21 PROCEDURE — 81001 URINALYSIS AUTO W/SCOPE: CPT

## 2021-01-21 PROCEDURE — 87070 CULTURE OTHR SPECIMN AEROBIC: CPT

## 2021-01-21 PROCEDURE — 74176 CT ABD & PELVIS W/O CONTRAST: CPT

## 2021-01-21 PROCEDURE — 83550 IRON BINDING TEST: CPT

## 2021-01-21 PROCEDURE — 85610 PROTHROMBIN TIME: CPT

## 2021-01-21 PROCEDURE — 36430 TRANSFUSION BLD/BLD COMPNT: CPT

## 2021-01-21 PROCEDURE — 84484 ASSAY OF TROPONIN QUANT: CPT

## 2021-01-21 PROCEDURE — 82570 ASSAY OF URINE CREATININE: CPT

## 2021-01-21 PROCEDURE — 84702 CHORIONIC GONADOTROPIN TEST: CPT

## 2021-01-21 PROCEDURE — 76000 FLUOROSCOPY <1 HR PHYS/QHP: CPT

## 2021-01-21 PROCEDURE — U0005: CPT

## 2021-01-21 PROCEDURE — 93880 EXTRACRANIAL BILAT STUDY: CPT

## 2021-01-21 PROCEDURE — 93308 TTE F-UP OR LMTD: CPT

## 2021-01-21 PROCEDURE — 97163 PT EVAL HIGH COMPLEX 45 MIN: CPT

## 2021-01-21 PROCEDURE — 84100 ASSAY OF PHOSPHORUS: CPT

## 2021-01-21 PROCEDURE — 83930 ASSAY OF BLOOD OSMOLALITY: CPT

## 2021-01-21 PROCEDURE — 93325 DOPPLER ECHO COLOR FLOW MAPG: CPT

## 2021-01-21 PROCEDURE — 93312 ECHO TRANSESOPHAGEAL: CPT

## 2021-01-21 PROCEDURE — 82436 ASSAY OF URINE CHLORIDE: CPT

## 2021-01-21 PROCEDURE — 93306 TTE W/DOPPLER COMPLETE: CPT

## 2021-01-21 PROCEDURE — 83935 ASSAY OF URINE OSMOLALITY: CPT

## 2021-01-21 PROCEDURE — 80069 RENAL FUNCTION PANEL: CPT

## 2021-01-21 PROCEDURE — 83735 ASSAY OF MAGNESIUM: CPT

## 2021-01-21 PROCEDURE — 80053 COMPREHEN METABOLIC PANEL: CPT

## 2021-01-21 PROCEDURE — 36415 COLL VENOUS BLD VENIPUNCTURE: CPT

## 2021-01-21 PROCEDURE — 80048 BASIC METABOLIC PNL TOTAL CA: CPT

## 2021-01-21 PROCEDURE — 87640 STAPH A DNA AMP PROBE: CPT

## 2021-01-21 PROCEDURE — 87186 SC STD MICRODIL/AGAR DIL: CPT

## 2021-01-21 PROCEDURE — C1725: CPT

## 2021-01-21 PROCEDURE — U0003: CPT

## 2021-01-21 PROCEDURE — 86923 COMPATIBILITY TEST ELECTRIC: CPT

## 2021-01-21 PROCEDURE — 83540 ASSAY OF IRON: CPT

## 2021-01-21 RX ORDER — SODIUM ZIRCONIUM CYCLOSILICATE 10 G/10G
10 POWDER, FOR SUSPENSION ORAL ONCE
Refills: 0 | Status: DISCONTINUED | OUTPATIENT
Start: 2021-01-21 | End: 2021-01-21

## 2021-01-21 RX ORDER — AMLODIPINE BESYLATE 2.5 MG/1
1 TABLET ORAL
Qty: 30 | Refills: 0
Start: 2021-01-21 | End: 2021-02-19

## 2021-01-21 RX ORDER — DESMOPRESSIN ACETATE 0.1 MG/1
1 TABLET ORAL
Qty: 30 | Refills: 0
Start: 2021-01-21

## 2021-01-21 RX ORDER — DESMOPRESSIN ACETATE 0.1 MG/1
1 TABLET ORAL
Qty: 30 | Refills: 0 | DISCHARGE
Start: 2021-01-21

## 2021-01-21 RX ORDER — SODIUM ZIRCONIUM CYCLOSILICATE 10 G/10G
5 POWDER, FOR SUSPENSION ORAL ONCE
Refills: 0 | Status: COMPLETED | OUTPATIENT
Start: 2021-01-21 | End: 2021-01-21

## 2021-01-21 RX ADMIN — DESMOPRESSIN ACETATE 0.1 MILLIGRAM(S): 0.1 TABLET ORAL at 06:11

## 2021-01-21 RX ADMIN — CHLORHEXIDINE GLUCONATE 1 APPLICATION(S): 213 SOLUTION TOPICAL at 06:11

## 2021-01-21 RX ADMIN — CHLORHEXIDINE GLUCONATE 1 APPLICATION(S): 213 SOLUTION TOPICAL at 06:10

## 2021-01-21 RX ADMIN — ENOXAPARIN SODIUM 40 MILLIGRAM(S): 100 INJECTION SUBCUTANEOUS at 11:28

## 2021-01-21 RX ADMIN — PANTOPRAZOLE SODIUM 40 MILLIGRAM(S): 20 TABLET, DELAYED RELEASE ORAL at 06:11

## 2021-01-21 RX ADMIN — SODIUM ZIRCONIUM CYCLOSILICATE 5 GRAM(S): 10 POWDER, FOR SUSPENSION ORAL at 15:58

## 2021-01-21 RX ADMIN — AMLODIPINE BESYLATE 10 MILLIGRAM(S): 2.5 TABLET ORAL at 06:11

## 2021-01-21 RX ADMIN — Medication 1 TABLET(S): at 11:29

## 2021-01-21 RX ADMIN — DESMOPRESSIN ACETATE 0.1 MILLIGRAM(S): 0.1 TABLET ORAL at 15:57

## 2021-01-21 RX ADMIN — Medication 1 MILLIGRAM(S): at 11:29

## 2021-01-21 RX ADMIN — LEVETIRACETAM 500 MILLIGRAM(S): 250 TABLET, FILM COATED ORAL at 06:11

## 2021-01-21 NOTE — DISCHARGE NOTE NURSING/CASE MANAGEMENT/SOCIAL WORK - NSSCTYPOFSERV_GEN_ALL_CORE
RESUMPTION OF HOME IV FLUIDS EVERY OTHER DAY *** Will perform 1-time BMP Lab Draw on 1/25/2021, then contact Dr. Mark Reyes, Endocrinologist, with results in order to resume Home IV Fluid infusion; going forward BMP Lab Draws and IVF schedule should be managed by Dr. Reyes's office, as per prior to admission. ***

## 2021-01-21 NOTE — CHART NOTE - NSCHARTNOTESELECT_GEN_ALL_CORE
Event Note
Nutrition Services
Event Note
Nutrition Services

## 2021-01-21 NOTE — CHART NOTE - NSCHARTNOTEFT_GEN_A_CORE
Spoke to Dr. Mancera of Nephrology regarding patient's BMP this AM. As per Nephrology, patient to fluid restrict to 1L a day and continue DDAVP 0.1mg TID. Patient to follow up with his Endocrinologist in 2-3 days and resume checking BMPs w/ home IVF infusion. Spoke to Dr. Mancera of Nephrology regarding patient's BMP this AM. As per Nephrology, patient to fluid restrict to 1L a day and continue DDAVP 0.1mg TID. Patient to follow up with his Endocrinologist in 2-3 days and resume checking BMPs for home IVF infusion. Patient to also receive 1 dose Lokelma and clear for discharge from Nephrology perspective. Spoke to Dr. Mancera of Nephrology regarding patient's BMP this AM. As per Nephrology, patient to fluid restrict to 1L a day and continue DDAVP 0.1mg TID. Patient to follow up with his Endocrinologist in 2-3 days and resume checking BMPs for home IVF infusion. Patient to also receive 1 dose Lokelma and clear for discharge from Nephrology perspective. Also spoke to Endocrinology, who also agreed with 0.3mg TID dose with fluid restriction from 1-1.5L a day with outpatient follow up BMP and IVF per her Endocrinologist. Spoke to Dr. Mancera of Nephrology regarding patient's BMP this AM. As per Nephrology, patient to fluid restrict to 1L a day and continue DDAVP 0.1mg TID. Patient to follow up with her Endocrinologist in 2-3 days and resume checking BMPs routinely for her home IVF infusion. Advised to repeat BMP on Monday 1/25/20201. Patient to also receive 1 dose Lokelma and is clear for discharge from Nephrology perspective. Also spoke to Endocrinology, who also agreed with 0.3mg TID dose with fluid restriction from 1-1.5L a day with outpatient follow up BMP in 1 week and IVF to be resumed per her Endocrinologist.

## 2021-01-21 NOTE — DISCHARGE NOTE NURSING/CASE MANAGEMENT/SOCIAL WORK - PATIENT PORTAL LINK FT
You can access the FollowMyHealth Patient Portal offered by [facility] by registering at the following website: [email or web address]. By joining [facility]’s FollowMyHealth portal, you will also be able to view your health information using other applications (apps) compatible with our system.

## 2021-01-22 ENCOUNTER — TRANSCRIPTION ENCOUNTER (OUTPATIENT)
Age: 56
End: 2021-01-22

## 2021-01-22 PROCEDURE — 99239 HOSP IP/OBS DSCHRG MGMT >30: CPT

## 2021-02-04 ENCOUNTER — APPOINTMENT (OUTPATIENT)
Dept: CARDIOLOGY | Facility: CLINIC | Age: 56
End: 2021-02-04

## 2021-02-17 ENCOUNTER — APPOINTMENT (OUTPATIENT)
Dept: CARDIOLOGY | Facility: CLINIC | Age: 56
End: 2021-02-17
Payer: MEDICAID

## 2021-02-17 ENCOUNTER — NON-APPOINTMENT (OUTPATIENT)
Age: 56
End: 2021-02-17

## 2021-02-17 VITALS
HEART RATE: 65 BPM | OXYGEN SATURATION: 99 % | SYSTOLIC BLOOD PRESSURE: 133 MMHG | WEIGHT: 163 LBS | RESPIRATION RATE: 12 BRPM | TEMPERATURE: 98.3 F | HEIGHT: 61 IN | DIASTOLIC BLOOD PRESSURE: 80 MMHG | BODY MASS INDEX: 30.78 KG/M2

## 2021-02-17 DIAGNOSIS — E87.0 HYPEROSMOLALITY AND HYPERNATREMIA: ICD-10-CM

## 2021-02-17 DIAGNOSIS — Z87.898 PERSONAL HISTORY OF OTHER SPECIFIED CONDITIONS: ICD-10-CM

## 2021-02-17 DIAGNOSIS — R93.1 ABNORMAL FINDINGS ON DIAGNOSTIC IMAGING OF HEART AND CORONARY CIRCULATION: ICD-10-CM

## 2021-02-17 DIAGNOSIS — I10 ESSENTIAL (PRIMARY) HYPERTENSION: ICD-10-CM

## 2021-02-17 DIAGNOSIS — I60.9 NONTRAUMATIC SUBARACHNOID HEMORRHAGE, UNSPECIFIED: ICD-10-CM

## 2021-02-17 DIAGNOSIS — E23.2 DIABETES INSIPIDUS: ICD-10-CM

## 2021-02-17 DIAGNOSIS — Z86.39 PERSONAL HISTORY OF OTHER ENDOCRINE, NUTRITIONAL AND METABOLIC DISEASE: ICD-10-CM

## 2021-02-17 DIAGNOSIS — U07.1 COVID-19: ICD-10-CM

## 2021-02-17 PROCEDURE — 93000 ELECTROCARDIOGRAM COMPLETE: CPT

## 2021-02-17 PROCEDURE — 99213 OFFICE O/P EST LOW 20 MIN: CPT | Mod: 25

## 2021-02-17 PROCEDURE — 99072 ADDL SUPL MATRL&STAF TM PHE: CPT

## 2021-02-17 RX ORDER — CHLORHEXIDINE GLUCONATE 4 %
325 (65 FE) LIQUID (ML) TOPICAL
Qty: 60 | Refills: 4 | Status: ACTIVE | COMMUNITY
Start: 2021-02-17

## 2021-02-17 RX ORDER — AMLODIPINE BESYLATE 10 MG/1
10 TABLET ORAL
Qty: 30 | Refills: 0 | Status: ACTIVE | COMMUNITY
Start: 2021-02-17

## 2021-02-17 RX ORDER — DESMOPRESSIN ACETATE 0.1 MG/1
0.1 TABLET ORAL 3 TIMES DAILY
Refills: 0 | Status: ACTIVE | COMMUNITY
Start: 2021-02-17

## 2021-02-17 RX ORDER — PANTOPRAZOLE 40 MG/1
40 TABLET, DELAYED RELEASE ORAL
Qty: 14 | Refills: 0 | Status: ACTIVE | COMMUNITY
Start: 2021-02-17

## 2021-02-17 RX ORDER — MULTIVITAMIN
TABLET ORAL
Refills: 0 | Status: ACTIVE | COMMUNITY
Start: 2021-02-17

## 2021-02-17 RX ORDER — LEVETIRACETAM 500 MG/1
500 TABLET, FILM COATED ORAL TWICE DAILY
Refills: 0 | Status: ACTIVE | COMMUNITY
Start: 2021-02-17

## 2021-02-17 NOTE — REASON FOR VISIT
[Follow-Up - From Hospitalization] : follow-up of a recent hospitalization for [Abnormal Test Result] : an abnormal test result [Family Member] : family member

## 2021-02-17 NOTE — HISTORY OF PRESENT ILLNESS
[FreeTextEntry1] : Ms. KRUPA MEDINA is a 55 year female who presents to Cardiology for post-hospitalization follow up.  Was hospitalized for nearly a month at [facility] after development of symptomatic hypernatremia, COVID19, and subsequent bacteremia.  Initial concern for AV vegetation, but SAADIA showed possible PICC line vegetation.  Afer the PICC was changed, bacteremia cleared, and she completed antibiotic therapy.\par \par Since hospital discharge she feels well.  Home care and services have been arranged.  Her daughter reports that the still has difficulty the desire for PO fluids, but this remains supplemented by D5W infusions coordinated by Endocrinology.\par \par Seen in office today, She feels well. Specifically She denies chest pain, dyspnea, exertional symptoms, orthopnea, lower extremity edema, palpitations, or syncope.

## 2021-02-17 NOTE — PHYSICAL EXAM
[General Appearance - Well Developed] : well developed [Normal Appearance] : normal appearance [General Appearance - Well Nourished] : well nourished [Well Groomed] : well groomed [No Deformities] : no deformities [General Appearance - In No Acute Distress] : no acute distress [Normal Conjunctiva] : the conjunctiva exhibited no abnormalities [Eyelids - No Xanthelasma] : the eyelids demonstrated no xanthelasmas [Normal Oral Mucosa] : normal oral mucosa [No Oral Pallor] : no oral pallor [No Oral Cyanosis] : no oral cyanosis [Normal Jugular Venous A Waves Present] : normal jugular venous A waves present [Normal Jugular Venous V Waves Present] : normal jugular venous V waves present [No Jugular Venous Quezada A Waves] : no jugular venous quezada A waves [Heart Rate And Rhythm] : heart rate and rhythm were normal [Heart Sounds] : normal S1 and S2 [Murmurs] : no murmurs present [Respiration, Rhythm And Depth] : normal respiratory rhythm and effort [Exaggerated Use Of Accessory Muscles For Inspiration] : no accessory muscle use [Auscultation Breath Sounds / Voice Sounds] : lungs were clear to auscultation bilaterally [Abdomen Soft] : soft [Abdomen Tenderness] : non-tender [] : no hepato-splenomegaly [Abdomen Mass (___ Cm)] : no abdominal mass palpated [Abnormal Walk] : normal gait [Oriented To Time, Place, And Person] : oriented to person, place, and time [FreeTextEntry1] : RUE PICC in place

## 2021-03-10 NOTE — PATIENT PROFILE ADULT - DO YOU FEEL UNSAFE AT HOME, WORK, OR SCHOOL?
Pt not signed into TEAMS, and then signed after calling her
no edema,  no murmurs,  regular rate and rhythm
no

## 2021-06-08 NOTE — H&P ADULT - COMMENTS
Patient was re-examined at 2:10 PM    He was laying comfortably in his bed and watching TV.  He states he is feeling really mad at his current situation.  He states his pain is still present.  He is continuing to ice his lateral leg.         On exam, his compartments feel more compressible than the previous exam.  He does not report as much pain his compartment compression.  There are regions in the lateral and anterior compartment that are without significant pain on palpation.  No pain with palpation in the posterior compartment.  Improved ability to flex and extend toes.  Limited ankle dorsiflexion and plantarflexion due to pain.  DP pulses palpable.  Sensation remains intact in s/d peroneal, sural, saphenous, and plantar nerves    At this time the patients exam remains stable/improving.  Will continue to monitor compartments and neuro status every hour.  Maintain ice and elevation at all times.      Vladimir Vizcaino MD   Orthopaedic Surgery, PGY-4  Available on Y[facility]     ROS obtained from daughter as pt refuses to provide any information - says "I don't know"

## 2021-07-14 NOTE — ED ADULT TRIAGE NOTE - PRO INTERPRETER NEED 2
Ishan Josehp)  Surgery  [address]  [place] [address]  Phone: [phone]  Fax: [phone]  Scheduled Appointment: 07/22/2021   English

## 2021-08-04 NOTE — ED PROVIDER NOTE - CCCP TRG CHIEF CMPLNT
fall Detail Level: Detailed Depth Of Biopsy: dermis Was A Bandage Applied: Yes Size Of Lesion In Cm: 0.3 X Size Of Lesion In Cm: 0 Biopsy Type: H and E Biopsy Method: Dermablade Anesthesia Type: 1% lidocaine with epinephrine Anesthesia Volume In Cc (Will Not Render If 0): 0.5 Hemostasis: Drysol Wound Care: Petrolatum Dressing: bandage Destruction After The Procedure: No Type Of Destruction Used: Curettage Curettage Text: The wound bed was treated with curettage after the biopsy was performed. Cryotherapy Text: The wound bed was treated with cryotherapy after the biopsy was performed. Electrodesiccation Text: The wound bed was treated with electrodesiccation after the biopsy was performed. Electrodesiccation And Curettage Text: The wound bed was treated with electrodesiccation and curettage after the biopsy was performed. Silver Nitrate Text: The wound bed was treated with silver nitrate after the biopsy was performed. Lab: 6 Lab Facility: 3 Consent: Written consent was obtained and risks were reviewed including but not limited to scarring, infection, bleeding, scabbing, incomplete removal, nerve damage and allergy to anesthesia. Post-Care Instructions: I reviewed with the patient in detail post-care instructions. Patient is to keep the biopsy site dry overnight, and then apply bacitracin twice daily until healed. Patient may apply hydrogen peroxide soaks to remove any crusting. Notification Instructions: Patient will be notified of biopsy results. However, patient instructed to call the office if not contacted within 2 weeks. Billing Type: Third-Party Bill Information: Selecting Yes will display possible errors in your note based on the variables you have selected. This validation is only offered as a suggestion for you. PLEASE NOTE THAT THE VALIDATION TEXT WILL BE REMOVED WHEN YOU FINALIZE YOUR NOTE. IF YOU WANT TO FAX A PRELIMINARY NOTE YOU WILL NEED TO TOGGLE THIS TO 'NO' IF YOU DO NOT WANT IT IN YOUR FAXED NOTE.

## 2021-08-17 ENCOUNTER — APPOINTMENT (OUTPATIENT)
Dept: CARDIOLOGY | Facility: CLINIC | Age: 56
End: 2021-08-17

## 2021-08-18 NOTE — PATIENT PROFILE ADULT - CONTRAINDICATIONS & PRECAUTIONS (SELECT ALL THAT APPLY)
OUTPATIENT PROGRESS NOTE  TRANSITIONAL CARE MANAGEMENT - HOSPITAL DISCHARGE FOLLOW-UP      CHIEF COMPLAINT  Transitional Care Management (cellulitis on breast, swelling & redness have gone down)      Ms. Galindo is unaccompanied today.    SUBJECTIVE   The patient was discharged from the hospital on 8/5/2021. The Discharge Summary was reviewed. It documents that the patient was hospitalized for severe sepsis secondary to left breast cellulitis. They're not sure how the infection got in there, but she's very happy it's improving. No new fevers.    Pertinent un-finalized hospital performed diagnostic tests - were reviewed..    Pertinent un-finalized hospital lab tests - were reviewed.    Advance Directive:   The patient has the following documents:  No Advance Directives on file. Patient offered documents.    Durable Medical Equipment/Assistive devices prescribed: None     MEDICATIONS  The discharge medication list was reviewed. Outpatient medications were updated today. She is fully compliant with the medication regimen prescribed at the time of discharge.    HISTORIES  I have personally reviewed and updated the following electronic medical record sections: Allergies, Problem List, Past Medical History and Past Surgical History.    REVIEW OF SYSTEMS  GENERAL: denies fever, chills and fatigue  SKIN: denies rash, dry skin, scaling and itching  BREASTS: denies asymetry of the breasts, skin changes of the breasts and lumps or bumps in the breasts    PHYSICAL EXAM  Visit Vitals  /78   Pulse 82   Resp 16   Ht 5' 11\" (1.803 m)   Wt 92.9 kg   LMP 07/25/2021   SpO2 97%   BMI 28.56 kg/m²     General appearance: alert, cooperative and no distress  Head: Normocephalic, without obvious abnormality, atraumatic  Lungs: normal work of breathing      ASSESSMENT  1. Cellulitis of left breast    2. Sepsis, due to unspecified organism, unspecified whether acute organ dysfunction present (CMS/[facility])    3. Hospital discharge follow-up         PLAN  Improving, no sign of recurrent infection. Call if further questions or concerns    Patient adherence to her treatment plan was assessed. She is   fully compliant with the entire discharge treatment plan.     Nancy Weber MD  8/17/2021    none...

## 2021-10-21 NOTE — PROGRESS NOTE ADULT - SUBJECTIVE AND OBJECTIVE BOX
Detail Level: Detailed INTERVAL HPI/OVERNIGHT EVENTS:  followup DI   MEDICATIONS  (STANDING):  amLODIPine   Tablet 5 milliGRAM(s) Oral daily  chlorhexidine 2% Cloths 1 Application(s) Topical <User Schedule>  chlorhexidine 4% Liquid 1 Application(s) Topical <User Schedule>  desmopressin 0.1 milliGRAM(s) Oral two times a day  enoxaparin Injectable 40 milliGRAM(s) SubCutaneous daily  folic acid 1 milliGRAM(s) Oral daily  levETIRAcetam 500 milliGRAM(s) Oral two times a day  multivitamin 1 Tablet(s) Oral daily  pantoprazole    Tablet 40 milliGRAM(s) Oral before breakfast  sodium bicarbonate 1300 milliGRAM(s) Oral two times a day  vancomycin  IVPB 500 milliGRAM(s) IV Intermittent every 12 hours    MEDICATIONS  (PRN):  acetaminophen   Tablet .. 650 milliGRAM(s) Oral every 6 hours PRN Temp greater or equal to 38.5C (101.3F)  sodium chloride 0.9% lock flush 10 milliLiter(s) IV Push every 1 hour PRN Pre/post blood products, medications, blood draw, and to maintain line patency      Allergies    No Known Allergies    Intolerances        Review of systems: Pt reports to be feelign well   no problem with appetite    no CP no pressure nO plap no dyspnea no N/v or abd pain     Vital Signs Last 24 Hrs  T(C): 36.9 (11 Jan 2021 20:29), Max: 37.2 (11 Jan 2021 09:42)  T(F): 98.5 (11 Jan 2021 20:29), Max: 99 (11 Jan 2021 09:42)  HR: 85 (11 Jan 2021 20:29) (71 - 85)  BP: 135/81 (11 Jan 2021 20:29) (106/66 - 141/96)  BP(mean): --  RR: 18 (11 Jan 2021 20:29) (18 - 18)  SpO2: 95% (11 Jan 2021 20:29) (93% - 95%)    PHYSICAL EXAM:      Constitutional: NAD, well-groomed, well-developed  HEENT: PERRLA, EOMI, no exophalmos  Neck: No LAD, No JVD, trachea midline, no thyroid enlargement  Back: Normal spine flexure, No CVA tenderness  Respiratory: CTAB  Cardiovascular: S1 and S2, RRR, no M/G/R  Gastrointestinal: BS+, soft, no organomeglag or mass  Extremities: No peripheral edema, no pedal lesions  Vascular: 2+ peripheral pulses  Neurological: A/O x 3, no focal deficits  Psychiatric: Normal mood, normal affect  Musculoskeletal: 5/5 strength b/l upper and lower extremities  Skin: No rashes, no acanthosis        LABS:                        8.3    7.25  )-----------( 422      ( 11 Jan 2021 07:50 )             28.4     01-11    138  |  102  |  17.0  ----------------------------<  76  4.1   |  29.0  |  1.01    Ca    8.8      11 Jan 2021 07:50  Mg     2.0     01-11    TPro  6.5<L>  /  Alb  3.0<L>  /  TBili  0.2<L>  /  DBili  x   /  AST  22  /  ALT  25  /  AlkPhos  87  01-10          CAPILLARY BLOOD GLUCOSE      RADIOLOGY & ADDITIONAL TESTS:

## 2021-10-22 NOTE — ED ADULT NURSE NOTE - NEURO GAIT
Nephrology Service Consultation    Patient:  Alondra Cramer  MRN: [id number]  Consulting physician:  Ethel Shah MD  Reason for Consult: Acute renal failure on CKD    History Obtained From:  patient, electronic medical record  PCP: Cecelia Quintero MD    HISTORY OF PRESENT ILLNESS:   The patient is a [de-identified] y.o. female who presents with weakness and is legally blind. She apparently try to ambulate on her home. She lost balance and fell and broke her hip. She sees my partner Dr. Starla Camacho. Has known stage IV kidney disease. .  Start antibiotics for possible cellulitis. October 10 patient had right hip surgery. Was to be discharged yesterday but was not feeling well and upset. Was moved to the medical floor today. Found to be in A. fib rapid rate and started on Cardizem drip per cardiology. With above setting renal function worsening and renal asked to evaluate. Patient indicates that she is aware of her current kidney status and does not want dialysis as an option. Only medical therapy. With this renal was asked to evaluate.     Past Medical History:        Diagnosis Date    Acid reflux     'no recent issue\"    Arthritis     \"Left Index Finger\"    CHF (congestive heart failure) ([facility]     per old chart hx of CHF with admission 12/2016 with pulmonary edema    Chronic kidney disease     Sees Dr. Mark Nuñez have one kidney, dr Erma Fonseca told me that in 2011 I think\"    GERD (gastroesophageal reflux disease)     History of blood transfusion 2011 Or 2012    No Reaction To Blood Transfusion Received    Hypertension     ( pt states she is not on any medication for blood pressure)    Hypomagnesemia     Hypothyroidism     MRSA (methicillin resistant staph aureus) culture positive 10/08/2021    Leg    Shortness of breath on exertion     SVT (supraventricular tachycardia) ([facility]     per old chart hx of SVT with admission 2016 tx with Adenosine and per notes in 5/2018 hx of SVT- no cardiologist    Teeth missing     Upper And Lower    Wears glasses        Past Surgical History:        Procedure Laterality Date    ABDOMEN SURGERY      DENTAL SURGERY      Teeth Extracted In Past    ENDOSCOPY, COLON, DIAGNOSTIC  2011 Or 2012    EYE SURGERY  ? when    clyde cataract ext    FEMUR FRACTURE SURGERY Left 5/22/2021    FEMUR IM NAIL REGINALDO INSERTION performed by Melissa Haro MD at [facility]  2011    Abdominal Hernia Repair     HIP SURGERY Right 10/8/2021    RIGHT HIP HEMIARTHROPLASTY performed by Melissa Haro MD at [address] [place] [address] Right 10/10/2021    HIP HEMIARTHROPLASTY performed by Melissa Haro MD at [place] [address]  05/27/2018    exp lap . converted to exp laporotomy with ventral hernia repair with mesh    OTHER SURGICAL HISTORY  79/65/8113    umbilical scar excision    VENTRAL HERNIA REPAIR  09/16/2016    Robotic laparoscopic       Medications:   Scheduled Meds:   [START ON 10/23/2021] enoxaparin  30 mg SubCUTAneous Daily    acetaminophen  1,000 mg Oral 4x Daily WC    [START ON 10/23/2021] allopurinol  50 mg Oral Daily    [START ON 10/23/2021] collagenase   Topical Daily    [START ON 10/23/2021] levothyroxine  50 mcg Oral Daily    metoprolol tartrate  25 mg Oral BID    miconazole   Topical BID    [START ON 10/23/2021] pantoprazole  40 mg Oral QAM AC    sodium chloride flush  10 mL IntraVENous 2 times per day    midodrine  10 mg Oral TID WC    iron sucrose  300 mg IntraVENous Once    digoxin  250 mcg IntraVENous Once    meropenem  500 mg IntraVENous Q12H    fluconazole  400 mg IntraVENous Q24H     Continuous Infusions:   sodium chloride      sodium chloride      sodium chloride 125 mL/hr at 10/22/21 1620    sodium chloride      dilTIAZem (CARDIZEM) 125 mg in dextrose 5% 125 mL infusion 5 mg/hr (10/22/21 1622)     PRN Meds:.sodium chloride, magnesium hydroxide, sennosides-docusate sodium, bisacodyl, polyethylene glycol, sodium chloride, U/A:    Lab Results   Component Value Date    NITRU NEGATIVE 10/20/2021    COLORU YELLOW 10/20/2021    WBCUA 331 10/20/2021    RBCUA 155 10/20/2021    MUCUS RARE 10/08/2021    TRICHOMONAS NONE SEEN 10/20/2021    YEAST OCCASIONAL 10/20/2021    BACTERIA OCCASIONAL 10/20/2021    CLARITYU CLOUDY 10/20/2021    SPECGRAV 1.018 10/20/2021    UROBILINOGEN NEGATIVE 10/20/2021    BILIRUBINUR NEGATIVE 10/20/2021    BLOODU LARGE 10/20/2021    KETUA NEGATIVE 10/20/2021     ABG:    Lab Results   Component Value Date    PO2ART 75 08/14/2011    BEART 1 08/14/2011     HgBA1c:  No results found for: LABA1C  Microalbumen/Creatinine ratio:  No components found for: RUCREAT  TSH:    Lab Results   Component Value Date    TSH 3.260 09/06/2018     IRON:    Lab Results   Component Value Date    IRON 204 05/24/2021     Iron Saturation:  No components found for: PERCENTFE  TIBC:    Lab Results   Component Value Date    TIBC 206 05/24/2021     FERRITIN:  No results found for: FERRITIN  RPR:  No results found for: RPR  SHADE:  No results found for: ANATITER, SHADE  24 Hour Urine for Creatinine Clearance:  No components found for: CREAT4, UHRS10, UTV10  -----------------------------------------------------------------      Assessment and Recommendations     Patient Active Problem List   Diagnosis Code    Chronic kidney disease (CKD) stage G4/A2, severely decreased glomerular filtration rate (GFR) between 15-29 mL/min/1.73 square meter and albuminuria creatinine ratio between  mg/g ([facility]) N18.4    Ventral hernia without obstruction or gangrene K43.9    SVT (supraventricular tachycardia) ([facility] I47.1    CRF (chronic renal failure) N18.9    Hypothyroid E03.9    Chronic kidney disease, stage IV (severe) ([facility] N18.4    Gastroesophageal reflux disease K21.9    H/O ventral hernia Z87.19    S/P ventral herniorrhaphy Z98.890, Z87.19    S/P herniorrhaphy Z98.890, Z87.19    Essential hypertension I10    History of ventral hernia repair requires assistance

## 2021-12-01 PROCEDURE — G9005: CPT

## 2021-12-22 NOTE — CONSULT NOTE ADULT - PROBLEM SELECTOR PROBLEM 2
[facility] EMERGENCY DEPT  [address] [id number] [phone]    Work/School Note    Date: 12/22/2021    To Whom It May concern:    Mary Warner was seen and treated today in the emergency room by the following provider(s):  Physician Assistant: Vinay Regan. Mary Warner is excused from work/school on 12/22/21 and 12/23/21. She is medically clear to return to work/school on 12/24/2021.        Sincerely,          Vinay Tomlin Diabetes insipidus

## 2022-01-03 NOTE — ED STATDOCS - CPE ED NEURO NORM
Patient returned phone call from earlier. Informed patient of 1/7/2022 at 0900 CT SIM at [facility] Radiation Oncology dept. Reviewed directions to the department and provided their main contact number. She wrote down information and verbally agreed to appointment. Denies any needs from NN at this time. CRISTÓBAL Spence, RN, OCN  Oncology Nurse Navigator normal...

## 2022-01-16 NOTE — PATIENT PROFILE ADULT. - PATIENT REPRESENTATIVE: ( YOU CAN CHOOSE ANY PERSON THAT CAN ASSIST YOU WITH YOUR HEALTH CARE PREFERENCES, DOES NOT HAVE TO BE A SPOUSE, IMMEDIATE FAMILY OR SIGNIFICANT OTHER/PARTNER)
Declines
PAST MEDICAL HISTORY:  Arthritis     Atrial fibrillation     CAD (coronary artery disease) (4 stents, non-adherent to aspirin)    Diabetes mellitus, new onset     Hard of hearing     History of bradycardia     History of TIAs Last 2018    Hyperlipidemia     Hypertension     Myocardial infarction (2003)    Single kidney (hx/o LEFT nephrectomy at age 14; pt states due to a ureter"blockage" causing "infection"; pt denies hx/o renal dysfunction)

## 2022-06-15 NOTE — ED ADULT TRIAGE NOTE - MEANS OF ARRIVAL
ambulatory Infliximab Counseling:  I discussed with the patient the risks of infliximab including but not limited to myelosuppression, immunosuppression, autoimmune hepatitis, demyelinating diseases, lymphoma, and serious infections.  The patient understands that monitoring is required including a PPD at baseline and must alert us or the primary physician if symptoms of infection or other concerning signs are noted.

## 2022-09-24 NOTE — H&P ADULT - ENMT
[facility]  Progress Note - Haven Guymon 1935, 78 y o  female MRN: [id number]  Unit/Bed#: S -01 Encounter: [id number]  Primary Care Provider: Carlos Alberto Connor MD   Date and time admitted to hospital: 9/20/2022  1:21 PM    * Sepsis [facility])  Assessment & Plan  Sepsis resolved      C  difficile colitis  Assessment & Plan  · Presented from SNF with abnormal lab work, diarrhea and generalized abdominal pain for the past several days  Patient with recent hospitalization after a fall and was treated for a UTI with 3 days of IV ceftriaxone  · CT A/P shows severe colitis  · Continue IV fluids  Continue oral vancomycin, leukocytosis improving  Patient has no abdominal tenderness today    Hypokalemia  Assessment & Plan  Potassium was supplemented, repeat potassium level up to 5 however specimen hemolyzed  Recheck again in a m  [place] Elevated serum alkaline phosphatase level  Assessment & Plan  · Alk phos 482 on admission; no recent LFTs work for comparison  · AST, ALT and T bili within normal limits  · Uncertain etiology  In the setting of acute colitis/ sepsis  · Liver unremarkable on CT scan      Hyponatremia  Assessment & Plan  · POA with Na 130  Likely secondary to poor p o  Intake with electrolyte loss  Continue monitoring  IV hydration    Dementia ([facility]  Assessment & Plan  · Supportive care  Severe protein-calorie malnutrition ([facility]  Assessment & Plan  Malnutrition Findings:   Adult Malnutrition type: Acute illness  Adult Degree of Malnutrition: Other severe protein calorie malnutrition  Malnutrition Characteristics: Muscle loss, Fat loss, Weight loss       nutrition supplementation            360 Statement: related to inadequate energy intake as evidenced by 10% weight loss in September, severely depressed temples, dark sunken orbital, hollow supraclavicular space, wasted gastrocnemius, wasted interosseous   Int: Diet to advance as medically indicated, medical food supplements with meals  BMI Findings:  Adult BMI Classifications: Underweight < 18 5        Body mass index is 16 02 kg/m²  Stage 4 chronic kidney disease ([facility]  Assessment & Plan  Cr above baseline on presentation  · Baseline Cr 1 3-1 5  · Avoid nephrotoxins and hypotension  · Monitor I&Os  · Monitor labs  · Continue gentle hydration  VTE Pharmacologic Prophylaxis:   Pharmacologic: Heparin  Mechanical VTE Prophylaxis in Place: Yes    Patient Centered Rounds: I have performed bedside rounds with nursing staff today  Education and Discussions with Family / Patient:  Patient    Time Spent for Care: 30 minutes  More than 50% of total time spent on counseling and coordination of care as described above  Current Length of Stay: 4 day(s)    Current Patient Status: Inpatient   Certification Statement: The patient will continue to require additional inpatient hospital stay due to C diff colitis, leukocytosis    Discharge Plan:  48-72 hours    Code Status: Level 3 - DNAR and DNI      Subjective:   Patient feels better today, denies any diarrhea, abdominal pain  However she is a poor historian secondary to dementia  Objective:     Vitals:   Temp (24hrs), Av 1 °F (37 3 °C), Min:98 6 °F (37 °C), Max:99 8 °F (37 7 °C)    Temp:  [98 6 °F (37 °C)-99 8 °F (37 7 °C)] 98 8 °F (37 1 °C)  HR:  [73-90] 73  Resp:  [16-18] 18  BP: (106-124)/(63-76) 124/76  SpO2:  [93 %-97 %] 97 %  Body mass index is 16 02 kg/m²  Input and Output Summary (last 24 hours):        Intake/Output Summary (Last 24 hours) at 2022 1400  Last data filed at 2022 0226  Gross per 24 hour   Intake 1299 17 ml   Output --   Net 1299 17 ml       Physical Exam:     Physical Exam   General appearance:  Patient not in acute distress  Eyes:  Pupils equal reacting to light  ENT:  Moist oral mucous membranes  CVS:  S1-S2 heard, regular rate and rhythm, no pedal edema  Chest:  Bilateral air entry present, clear to auscultation  Abdomen:  Soft, nontender, bowel sounds present  CNS:  No focal neurological deficits  Genitourinary: deferred  Skin:  No acute rash   psychiatric:  No psychosis  Musculoskeletal:  No joint deformities     Additional Data:     Labs:    Results from last 7 days   Lab Units 09/23/22  1143 09/22/22  0605   WBC Thousand/uL 22 64* 32 46*   HEMOGLOBIN g/dL 15 7* 14 2   HEMATOCRIT % 46 3* 42 0   PLATELETS Thousands/uL 284 235   BANDS PCT %  --  4   LYMPHO PCT %  --  2*   MONO PCT %  --  7   EOS PCT %  --  0     Results from last 7 days   Lab Units 09/23/22  1945 09/23/22  1143 09/22/22  0605 09/21/22  0431   SODIUM mmol/L  --  138   < > 132*   POTASSIUM mmol/L 5 0 2 6*   < > 3 3*   CHLORIDE mmol/L  --  112*   < > 105   CO2 mmol/L  --  19*   < > 18*   BUN mg/dL  --  24   < > 49*   CREATININE mg/dL  --  1 09   < > 1 46*   ANION GAP mmol/L  --  7   < > 9   CALCIUM mg/dL  --  7 6*   < > 8 0*   ALBUMIN g/dL  --   --   --  2 3*   TOTAL BILIRUBIN mg/dL  --   --   --  0 66   ALK PHOS U/L  --   --   --  396*   ALT U/L  --   --   --  34   AST U/L  --   --   --  30   GLUCOSE RANDOM mg/dL  --  134   < > 83    < > = values in this interval not displayed  Results from last 7 days   Lab Units 09/23/22  1143 09/22/22  1329 09/21/22  0431 09/20/22  1819 09/20/22  1537   LACTIC ACID mmol/L  --  1 4  --  1 4  --    PROCALCITONIN ng/ml 3 08*  --  5 89*  --  6 47*           * I Have Reviewed All Lab Data Listed Above  * Additional Pertinent Lab Tests Reviewed: [address] Admission Reviewed    Recent Cultures (last 7 days):     Results from last 7 days   Lab Units 09/21/22  0321 09/20/22  1819   BLOOD CULTURE   --  No Growth at 72 hrs  No Growth at 72 hrs     C DIFF TOXIN B BY PCR  Positive*  --        Last 24 Hours Medication List:   Current Facility-Administered Medications   Medication Dose Route Frequency Provider Last Rate    acetaminophen  975 mg Oral Q8H [address] MD Massiel Cabrales aluminum-magnesium hydroxide-simethicone  30 mL Oral Q4H PRN Sang Ingram MD      ferrous sulfate  325 mg Oral HS Tyrell Jeffery, PA-C      folic acid  203 mcg Oral Daily Tyrell Jeffery, PA-C      gabapentin  100 mg Oral HS Tyrell [place]um [place], PA-C      heparin (porcine)  5,000 Units Subcutaneous [facility] Yamilex Alex PA-C      lactated ringers  50 mL/hr Intravenous Continuous Valentino Rein, MD 50 mL/hr (09/24/22 0226)    levothyroxine  50 mcg Oral Early Morning Tyrell Gum [place], PA-C      mirtazapine  7 5 mg Oral HS Tyrell Gum Spring, PA-C      LEI ANTIFUNGAL   Topical BID Erlin Flores MD      oxyCODONE  2 5 mg Oral Q8H PRN Sang Ingram MD      rOPINIRole  1 mg Oral Daily Tyrell Jeffery, PA-C      vancomycin  125 mg Oral Q6H Melody Anton MD          Today, Patient Was Seen By: Teddy Foster MD    ** Please Note: Dictation voice to text software may have been used in the creation of this document   ** details…

## 2022-10-27 NOTE — PROGRESS NOTE ADULT - PROBLEM/PLAN-3
Patient's shoes and socks removed  Right Foot/Ankle   Right Foot Inspection  Skin Exam: skin normal, skin intact, callus and callus  No dry skin, no warmth, no erythema, no maceration, no abnormal color, no pre-ulcer and no ulcer  Toe Exam: ROM and strength within normal limits  Sensory   Monofilament testing: intact    Vascular  Capillary refills: < 3 seconds  The right DP pulse is 1+  The right PT pulse is 1+  Left Foot/Ankle  Left Foot Inspection  Skin Exam: skin normal, skin intact and callus  No dry skin, no warmth, no erythema, no maceration, normal color, no pre-ulcer and no ulcer  Toe Exam: ROM and strength within normal limits  Sensory   Monofilament testing: intact    Vascular  Capillary refills: < 3 seconds  The left DP pulse is 1+  The left PT pulse is 1+  Assign Risk Category  No deformity present  No loss of protective sensation  No weak pulses  Risk: 0     Assessment and Plan:     Problem List Items Addressed This Visit    None          Preventive health issues were discussed with patient, and age appropriate screening tests were ordered as noted in patient's After Visit Summary  Personalized health advice and appropriate referrals for health education or preventive services given if needed, as noted in patient's After Visit Summary       History of Present Illness:     Patient presents for a Medicare Wellness Visit    HPI   Patient Care Team:  Danita Small MD as PCP - General (Internal Medicine)  Danita Small MD as PCP - PCP-[facility]     Review of Systems:     Review of Systems     Problem List:     Patient Active Problem List   Diagnosis   • Hypertension   • Type 2 diabetes mellitus ([address] )   • Tobacco dependence syndrome   • Chronic obstructive pulmonary disease (HCC)   • Benign essential hypertension   • Body mass index (BMI) 40 0-44 9, adult   • Morbid (severe) obesity due to excess calories ([address] )   • Type II diabetes mellitus with
manifestations [facility])      Past Medical and Surgical History:     Past Medical History:   Diagnosis Date   • Anxiety    • Arthritis    • Bipolar disorder ([place] [address] )    • Colon polyp    • COPD (chronic obstructive pulmonary disease) ([place] [address] )    • Depression    • Diabetes mellitus ([place] [address] )     type 2   • Gout    • High triglycerides    • Hypertension    • Hypothyroidism 02/20/2013   • Obesity    • Paranoid schizophrenia ([place] [address] )    • Psychiatric disorder    • Sciatica 12/03/2013   • Seasonal allergies    • Sleep apnea      Past Surgical History:   Procedure Laterality Date   • COLONOSCOPY     • UMBILICAL HERNIA REPAIR     • WISDOM TOOTH EXTRACTION        Family History:     Family History   Problem Relation Age of Onset   • Colon cancer Mother    • Coronary artery disease Father    • Hyperlipidemia Father    • Coronary artery disease Family       Social History:     Social History     Socioeconomic History   • Marital status: Single     Spouse name: Not on file   • Number of children: Not on file   • Years of education: Not on file   • Highest education level: Not on file   Occupational History   • Not on file   Tobacco Use   • Smoking status: Current Every Day Smoker     Packs/day: 2 00     Years: 35 00     Pack years: 70 00     Types: Cigarettes   • Smokeless tobacco: Never Used   Vaping Use   • Vaping Use: Never used   Substance and Sexual Activity   • Alcohol use: No   • Drug use: No   • Sexual activity: Not Currently     Partners: Female   Other Topics Concern   • Not on file   Social History Narrative   • Not on file     Social Determinants of Health     Financial Resource Strain: Not on file   Food Insecurity: Not on file   Transportation Needs: Not on file   Physical Activity: Not on file   Stress: Not on file   Social Connections: Not on file   Intimate Partner Violence: Not on file   Housing Stability: Not on file      Medications and Allergies:     Current Outpatient Medications   Medication Sig Dispense Refill   •
amLODIPine (NORVASC) 5 mg tablet Take 1 tablet (5 mg total) by mouth daily 90 tablet 3   • atorvastatin (LIPITOR) 80 mg tablet Take 1 tablet (80 mg total) by mouth daily with dinner 90 tablet 3   • celecoxib (CeleBREX) 200 mg capsule take 1 capsule by mouth once daily with food or breakfast 90 capsule 2   • cetirizine (ZyrTEC) 10 mg tablet Take 1 tablet (10 mg total) by mouth daily In the evening 90 tablet 3   • cholecalciferol (VITAMIN D3) 1,000 units tablet Take 1 tablet (1,000 Units total) by mouth daily 90 tablet 3   • fenofibrate (TRICOR) 145 mg tablet Take 1 tablet (145 mg total) by mouth daily with dinner 90 tablet 3   • FLUoxetine (PROzac) 20 mg capsule Take 1 capsule (20 mg total) by mouth daily 90 capsule 3   • fluticasone (FLONASE) 50 mcg/act nasal spray 2 sprays into each nostril daily 1 g 0   • haloperidol (HALDOL) 5 mg tablet 2 5 mg daily at bedtime   0   • levothyroxine (Synthroid) 100 mcg tablet Take 1 tablet (100 mcg total) by mouth daily Please stop 88 mcg 90 tablet 3   • lisinopril (ZESTRIL) 10 mg tablet Take 1 tablet (10 mg total) by mouth daily 90 tablet 3   • metoprolol succinate (TOPROL-XL) 25 mg 24 hr tablet Take 0 5 tablets (12 5 mg total) by mouth daily 90 tablet 3   • sodium chloride (OCEAN) 0 65 % nasal spray 1 spray into each nostril 3 (three) times a day 30 mL 1     No current facility-administered medications for this visit       Allergies   Allergen Reactions   • No Active Allergies       Immunizations:     Immunization History   Administered Date(s) Administered   • Fluzone Split Quad 0 25 mL 10/04/2016, 08/30/2017   • INFLUENZA 10/30/2013, 11/12/2015, 10/04/2016, 08/30/2017, 09/01/2017, 12/19/2018   • Influenza Split 10/01/2013, 09/11/2014   • Influenza, recombinant, quadrivalent,injectable, preservative free 12/19/2018, 11/07/2019, 11/05/2020, 11/16/2021   • Influenza, seasonal, injectable 09/14/2012, 11/12/2015   • Pneumococcal Conjugate 13-Valent 08/30/2017   • Pneumococcal
Polysaccharide PPV23 11/12/2014   • Tdap 07/30/2020   • Zoster 06/16/2018      Health Maintenance:         Topic Date Due   • Lung Cancer Screening  11/23/2022   • Colorectal Cancer Screening  10/04/2029   • HIV Screening  Completed   • Hepatitis C Screening  Completed         Topic Date Due   • COVID-19 Vaccine (1) Never done   • Hepatitis A Vaccine (1 of 2 - Risk 2-dose series) Never done   • Hepatitis B Vaccine (1 of 3 - Risk 3-dose series) Never done   • Influenza Vaccine (1) 09/01/2022      Medicare Screening Tests and Risk Assessments:     Carol Akins is here for his Initial Wellness visit  Health Risk Assessment:   Patient rates overall health as good  Patient feels that their physical health rating is same  Patient is satisfied with their life  Eyesight was rated as same  Hearing was rated as same  Patient feels that their emotional and mental health rating is same  Patients states they are sometimes angry  Patient states they are never, rarely unusually tired/fatigued  Pain experienced in the last 7 days has been none  Patient states that he has experienced no weight loss or gain in last 6 months  Depression Screening:   PHQ-2 Score: 0      Fall Risk Screening: In the past year, patient has experienced: no history of falling in past year      Home Safety:  Patient does not have trouble with stairs inside or outside of their home  Patient has working smoke alarms and has working carbon monoxide detector  Home safety hazards include: none  Nutrition:   Current diet is Diabetic  Medications:   Patient is currently taking over-the-counter supplements  OTC medications include: see medication list  Patient is able to manage medications  Activities of Daily Living (ADLs)/Instrumental Activities of Daily Living (IADLs):   Walk and transfer into and out of bed and chair?: Yes  Dress and groom yourself?: Yes    Bathe or shower yourself?: Yes    Feed yourself?  Yes  Do your laundry/housekeeping?:
Yes  Manage your money, pay your bills and track your expenses?: Yes  Make your own meals?: Yes    Do your own shopping?: Yes    PREVENTIVE SCREENINGS      Cardiovascular Screening:    General: Screening Not Indicated, History Lipid Disorder and Risks and Benefits Discussed      Diabetes Screening:     General: History Diabetes, Risks and Benefits Discussed and Screening Current      Colorectal Cancer Screening:     General: Screening Current      Prostate Cancer Screening:    General: Screening Current and Risks and Benefits Discussed      Osteoporosis Screening:    General: Risks and Benefits Discussed      Abdominal Aortic Aneurysm (AAA) Screening:    Risk factors include: tobacco use        General: Risks and Benefits Discussed      Lung Cancer Screening:     General: Screening Current and Risks and Benefits Discussed      Hepatitis C Screening:    General: Screening Current and Risks and Benefits Discussed    Other Counseling Topics:   Car/seat belt/driving safety, skin self-exam, sunscreen and calcium and vitamin D intake and regular weightbearing exercise  No exam data present     Physical Exam:     There were no vitals taken for this visit      Physical Exam     Danita Small MD
DISPLAY PLAN FREE TEXT

## 2022-12-20 NOTE — PROGRESS NOTE ADULT - PROBLEM/PLAN-4
Body Location Override (Optional - Billing Will Still Be Based On Selected Body Map Location If Applicable): right proximal dorsal forearm
Detail Level: Detailed
Add 19261 Cpt? (Important Note: In 2017 The Use Of 40565 Is Being Tracked By Cms To Determine Future Global Period Reimbursement For Global Periods): no
DISPLAY PLAN FREE TEXT

## 2023-03-09 NOTE — PROGRESS NOTE ADULT - NSTELEHEALTH_GEN_ALL_CORE
Hand-Off     Procedure hand off report given to [facility]. Pt's vital signs are stable. Procedural access site is dry and intact with no signs and symptoms of bleeding and hematoma. TR Band with 12 ml of air   Bedrest status until 1815  Plan for discharge at 2015  Dr. Lesa Truong spoke with patient/family post procedure.      Patient transferred with family and belongings
No

## 2023-05-20 ENCOUNTER — EMERGENCY (EMERGENCY)
Facility: HOSPITAL | Age: 58
LOS: 1 days | Discharge: HOME | End: 2023-05-20
Attending: EMERGENCY MEDICINE
Payer: MEDICAID

## 2023-05-20 VITALS
DIASTOLIC BLOOD PRESSURE: 99 MMHG | RESPIRATION RATE: 18 BRPM | HEIGHT: 60 IN | OXYGEN SATURATION: 97 % | HEART RATE: 92 BPM | SYSTOLIC BLOOD PRESSURE: 161 MMHG | WEIGHT: 153 LBS | TEMPERATURE: 99 F

## 2023-05-20 DIAGNOSIS — Z98.84 BARIATRIC SURGERY STATUS: Chronic | ICD-10-CM

## 2023-05-20 PROCEDURE — 99282 EMERGENCY DEPT VISIT SF MDM: CPT

## 2023-05-20 PROCEDURE — 99283 EMERGENCY DEPT VISIT LOW MDM: CPT

## 2023-05-20 NOTE — ED STATDOCS - PATIENT PORTAL LINK FT
You can access the FollowMyHealth Patient Portal offered by [facility] by registering at the following website: [email or web address]. By joining [employer]’s FollowMyHealth portal, you will also be able to view your health information using other applications (apps) compatible with our system.

## 2023-05-20 NOTE — ED STATDOCS - CONSTITUTIONAL, MLM
normal... Patient with short term memory loss, can speak and walk, well appearing and in no apparent distress.

## 2023-05-20 NOTE — ED STATDOCS - CLINICAL SUMMARY MEDICAL DECISION MAKING FREE TEXT BOX
Patient presented with picc line in right arm half pulled out. Patient is due for desmopressin subcutaneously, and not due for her fluids until Monday at noon, her hydration schedule is Monday, Wednesday, Friday. Patient's caretaker is her daughter. Advised patient and caretaker to return between 7am and 9am on Monday to get picc line placed within the correct timeframe

## 2023-05-20 NOTE — ED STATDOCS - OBJECTIVE STATEMENT
56 y/o female with PMHx of 2 aneurysms and diabetes insipidus presents to ED after picc line was accidentally pulled out. Patient had picc line placed due to diabetes insipidus. Patient is followed by endocrinologist. Next time for hydration is on Monday at noon. Mental status is same since picc line was pulled out as per patient's daughter

## 2023-06-07 ENCOUNTER — INPATIENT (INPATIENT)
Facility: HOSPITAL | Age: 58
LOS: 36 days | Discharge: HOME | DRG: 643 | End: 2023-07-14
Attending: STUDENT IN AN ORGANIZED HEALTH CARE EDUCATION/TRAINING PROGRAM | Admitting: HOSPITALIST
Payer: MEDICAID

## 2023-06-07 VITALS
WEIGHT: 199.96 LBS | RESPIRATION RATE: 20 BRPM | HEART RATE: 92 BPM | TEMPERATURE: 98 F | OXYGEN SATURATION: 97 % | SYSTOLIC BLOOD PRESSURE: 170 MMHG | HEIGHT: 60 IN | DIASTOLIC BLOOD PRESSURE: 105 MMHG

## 2023-06-07 DIAGNOSIS — Z98.84 BARIATRIC SURGERY STATUS: Chronic | ICD-10-CM

## 2023-06-07 DIAGNOSIS — E87.0 HYPEROSMOLALITY AND HYPERNATREMIA: ICD-10-CM

## 2023-06-07 LAB
ALBUMIN SERPL ELPH-MCNC: 4.1 G/DL — SIGNIFICANT CHANGE UP (ref 3.3–5.2)
ALP SERPL-CCNC: 117 U/L — SIGNIFICANT CHANGE UP (ref 40–120)
ALT FLD-CCNC: 25 U/L — SIGNIFICANT CHANGE UP
ANION GAP SERPL CALC-SCNC: 10 MMOL/L — SIGNIFICANT CHANGE UP (ref 5–17)
ANION GAP SERPL CALC-SCNC: 9 MMOL/L — SIGNIFICANT CHANGE UP (ref 5–17)
APPEARANCE UR: CLEAR — SIGNIFICANT CHANGE UP
APTT BLD: 39.2 SEC — HIGH (ref 27.5–35.5)
AST SERPL-CCNC: 22 U/L — SIGNIFICANT CHANGE UP
BACTERIA # UR AUTO: ABNORMAL
BASOPHILS # BLD AUTO: 0.04 K/UL — SIGNIFICANT CHANGE UP (ref 0–0.2)
BASOPHILS NFR BLD AUTO: 0.8 % — SIGNIFICANT CHANGE UP (ref 0–2)
BILIRUB SERPL-MCNC: 0.4 MG/DL — SIGNIFICANT CHANGE UP (ref 0.4–2)
BILIRUB UR-MCNC: NEGATIVE — SIGNIFICANT CHANGE UP
BUN SERPL-MCNC: 27.1 MG/DL — HIGH (ref 8–20)
BUN SERPL-MCNC: 27.4 MG/DL — HIGH (ref 8–20)
CALCIUM SERPL-MCNC: 8.9 MG/DL — SIGNIFICANT CHANGE UP (ref 8.4–10.5)
CALCIUM SERPL-MCNC: 9.5 MG/DL — SIGNIFICANT CHANGE UP (ref 8.4–10.5)
CHLORIDE SERPL-SCNC: 116 MMOL/L — HIGH (ref 96–108)
CHLORIDE SERPL-SCNC: 118 MMOL/L — HIGH (ref 96–108)
CO2 SERPL-SCNC: 23 MMOL/L — SIGNIFICANT CHANGE UP (ref 22–29)
CO2 SERPL-SCNC: 27 MMOL/L — SIGNIFICANT CHANGE UP (ref 22–29)
COLOR SPEC: YELLOW — SIGNIFICANT CHANGE UP
CREAT ?TM UR-MCNC: 69 MG/DL — SIGNIFICANT CHANGE UP
CREAT SERPL-MCNC: 1.05 MG/DL — SIGNIFICANT CHANGE UP (ref 0.5–1.3)
CREAT SERPL-MCNC: 1.13 MG/DL — SIGNIFICANT CHANGE UP (ref 0.5–1.3)
DIFF PNL FLD: NEGATIVE — SIGNIFICANT CHANGE UP
EGFR: 56 ML/MIN/1.73M2 — LOW
EGFR: 61 ML/MIN/1.73M2 — SIGNIFICANT CHANGE UP
EOSINOPHIL # BLD AUTO: 0.03 K/UL — SIGNIFICANT CHANGE UP (ref 0–0.5)
EOSINOPHIL NFR BLD AUTO: 0.6 % — SIGNIFICANT CHANGE UP (ref 0–6)
EPI CELLS # UR: SIGNIFICANT CHANGE UP
GLUCOSE SERPL-MCNC: 74 MG/DL — SIGNIFICANT CHANGE UP (ref 70–99)
GLUCOSE SERPL-MCNC: 79 MG/DL — SIGNIFICANT CHANGE UP (ref 70–99)
GLUCOSE UR QL: NEGATIVE MG/DL — SIGNIFICANT CHANGE UP
HCT VFR BLD CALC: 36.5 % — SIGNIFICANT CHANGE UP (ref 34.5–45)
HGB BLD-MCNC: 11 G/DL — LOW (ref 11.5–15.5)
IMM GRANULOCYTES NFR BLD AUTO: 0.4 % — SIGNIFICANT CHANGE UP (ref 0–0.9)
INR BLD: 1.03 RATIO — SIGNIFICANT CHANGE UP (ref 0.88–1.16)
KETONES UR-MCNC: NEGATIVE — SIGNIFICANT CHANGE UP
LEUKOCYTE ESTERASE UR-ACNC: ABNORMAL
LYMPHOCYTES # BLD AUTO: 1.54 K/UL — SIGNIFICANT CHANGE UP (ref 1–3.3)
LYMPHOCYTES # BLD AUTO: 32 % — SIGNIFICANT CHANGE UP (ref 13–44)
MAGNESIUM SERPL-MCNC: 2 MG/DL — SIGNIFICANT CHANGE UP (ref 1.6–2.6)
MCHC RBC-ENTMCNC: 26.4 PG — LOW (ref 27–34)
MCHC RBC-ENTMCNC: 30.1 GM/DL — LOW (ref 32–36)
MCV RBC AUTO: 87.5 FL — SIGNIFICANT CHANGE UP (ref 80–100)
MONOCYTES # BLD AUTO: 0.37 K/UL — SIGNIFICANT CHANGE UP (ref 0–0.9)
MONOCYTES NFR BLD AUTO: 7.7 % — SIGNIFICANT CHANGE UP (ref 2–14)
NEUTROPHILS # BLD AUTO: 2.82 K/UL — SIGNIFICANT CHANGE UP (ref 1.8–7.4)
NEUTROPHILS NFR BLD AUTO: 58.5 % — SIGNIFICANT CHANGE UP (ref 43–77)
NITRITE UR-MCNC: NEGATIVE — SIGNIFICANT CHANGE UP
OSMOLALITY UR: 715 MOSM/KG — SIGNIFICANT CHANGE UP (ref 300–1000)
PH UR: 6 — SIGNIFICANT CHANGE UP (ref 5–8)
PLATELET # BLD AUTO: 179 K/UL — SIGNIFICANT CHANGE UP (ref 150–400)
POTASSIUM SERPL-MCNC: 4 MMOL/L — SIGNIFICANT CHANGE UP (ref 3.5–5.3)
POTASSIUM SERPL-MCNC: 4.4 MMOL/L — SIGNIFICANT CHANGE UP (ref 3.5–5.3)
POTASSIUM SERPL-SCNC: 4 MMOL/L — SIGNIFICANT CHANGE UP (ref 3.5–5.3)
POTASSIUM SERPL-SCNC: 4.4 MMOL/L — SIGNIFICANT CHANGE UP (ref 3.5–5.3)
POTASSIUM UR-SCNC: 24 MMOL/L — SIGNIFICANT CHANGE UP
PROT ?TM UR-MCNC: 15 MG/DL — HIGH (ref 0–12)
PROT SERPL-MCNC: 7.8 G/DL — SIGNIFICANT CHANGE UP (ref 6.6–8.7)
PROT UR-MCNC: NEGATIVE — SIGNIFICANT CHANGE UP
PROT/CREAT UR-RTO: 0.2 RATIO — SIGNIFICANT CHANGE UP
PROTHROM AB SERPL-ACNC: 12 SEC — SIGNIFICANT CHANGE UP (ref 10.5–13.4)
RBC # BLD: 4.17 M/UL — SIGNIFICANT CHANGE UP (ref 3.8–5.2)
RBC # FLD: 13.5 % — SIGNIFICANT CHANGE UP (ref 10.3–14.5)
RBC CASTS # UR COMP ASSIST: NEGATIVE /HPF — SIGNIFICANT CHANGE UP (ref 0–4)
SODIUM SERPL-SCNC: 151 MMOL/L — HIGH (ref 135–145)
SODIUM SERPL-SCNC: 152 MMOL/L — HIGH (ref 135–145)
SODIUM UR-SCNC: 190 MMOL/L — SIGNIFICANT CHANGE UP
SP GR SPEC: 1.01 — SIGNIFICANT CHANGE UP (ref 1.01–1.02)
TROPONIN T SERPL-MCNC: <0.01 NG/ML — SIGNIFICANT CHANGE UP (ref 0–0.06)
UROBILINOGEN FLD QL: NEGATIVE MG/DL — SIGNIFICANT CHANGE UP
WBC # BLD: 4.82 K/UL — SIGNIFICANT CHANGE UP (ref 3.8–10.5)
WBC # FLD AUTO: 4.82 K/UL — SIGNIFICANT CHANGE UP (ref 3.8–10.5)
WBC UR QL: ABNORMAL /HPF (ref 0–5)

## 2023-06-07 PROCEDURE — 71045 X-RAY EXAM CHEST 1 VIEW: CPT | Mod: 26

## 2023-06-07 PROCEDURE — 99223 1ST HOSP IP/OBS HIGH 75: CPT

## 2023-06-07 PROCEDURE — 93010 ELECTROCARDIOGRAM REPORT: CPT

## 2023-06-07 PROCEDURE — 70496 CT ANGIOGRAPHY HEAD: CPT | Mod: 26,ME

## 2023-06-07 PROCEDURE — 99285 EMERGENCY DEPT VISIT HI MDM: CPT

## 2023-06-07 PROCEDURE — 70498 CT ANGIOGRAPHY NECK: CPT | Mod: 26,MA

## 2023-06-07 PROCEDURE — G1004: CPT

## 2023-06-07 RX ORDER — SODIUM CHLORIDE 9 MG/ML
1000 INJECTION INTRAMUSCULAR; INTRAVENOUS; SUBCUTANEOUS ONCE
Refills: 0 | Status: COMPLETED | OUTPATIENT
Start: 2023-06-07 | End: 2023-06-07

## 2023-06-07 RX ORDER — MULTIVIT-MIN/FERROUS GLUCONATE 9 MG/15 ML
1 LIQUID (ML) ORAL DAILY
Refills: 0 | Status: DISCONTINUED | OUTPATIENT
Start: 2023-06-07 | End: 2023-07-14

## 2023-06-07 RX ORDER — FOLIC ACID 0.8 MG
1 TABLET ORAL DAILY
Refills: 0 | Status: DISCONTINUED | OUTPATIENT
Start: 2023-06-07 | End: 2023-07-14

## 2023-06-07 RX ORDER — SODIUM CHLORIDE 9 MG/ML
1000 INJECTION INTRAMUSCULAR; INTRAVENOUS; SUBCUTANEOUS
Refills: 0 | Status: DISCONTINUED | OUTPATIENT
Start: 2023-06-07 | End: 2023-06-07

## 2023-06-07 RX ORDER — AMLODIPINE BESYLATE 2.5 MG/1
10 TABLET ORAL DAILY
Refills: 0 | Status: DISCONTINUED | OUTPATIENT
Start: 2023-06-07 | End: 2023-06-11

## 2023-06-07 RX ORDER — SODIUM CHLORIDE 9 MG/ML
1000 INJECTION, SOLUTION INTRAVENOUS
Refills: 0 | Status: DISCONTINUED | OUTPATIENT
Start: 2023-06-07 | End: 2023-06-08

## 2023-06-07 RX ORDER — ENOXAPARIN SODIUM 100 MG/ML
40 INJECTION SUBCUTANEOUS EVERY 24 HOURS
Refills: 0 | Status: DISCONTINUED | OUTPATIENT
Start: 2023-06-08 | End: 2023-07-14

## 2023-06-07 RX ORDER — LEVETIRACETAM 250 MG/1
500 TABLET, FILM COATED ORAL
Refills: 0 | Status: DISCONTINUED | OUTPATIENT
Start: 2023-06-07 | End: 2023-07-14

## 2023-06-07 RX ORDER — CEFTRIAXONE 500 MG/1
1000 INJECTION, POWDER, FOR SOLUTION INTRAMUSCULAR; INTRAVENOUS EVERY 24 HOURS
Refills: 0 | Status: DISCONTINUED | OUTPATIENT
Start: 2023-06-07 | End: 2023-06-07

## 2023-06-07 RX ORDER — ACETAMINOPHEN 500 MG
650 TABLET ORAL EVERY 6 HOURS
Refills: 0 | Status: DISCONTINUED | OUTPATIENT
Start: 2023-06-07 | End: 2023-07-14

## 2023-06-07 RX ORDER — ONDANSETRON 8 MG/1
4 TABLET, FILM COATED ORAL EVERY 8 HOURS
Refills: 0 | Status: DISCONTINUED | OUTPATIENT
Start: 2023-06-07 | End: 2023-07-14

## 2023-06-07 RX ORDER — DESMOPRESSIN ACETATE 0.1 MG/1
0.1 TABLET ORAL
Refills: 0 | Status: DISCONTINUED | OUTPATIENT
Start: 2023-06-07 | End: 2023-06-09

## 2023-06-07 RX ORDER — PANTOPRAZOLE SODIUM 20 MG/1
40 TABLET, DELAYED RELEASE ORAL
Refills: 0 | Status: DISCONTINUED | OUTPATIENT
Start: 2023-06-07 | End: 2023-07-14

## 2023-06-07 RX ORDER — DESMOPRESSIN ACETATE 0.1 MG/1
1 TABLET ORAL
Refills: 0 | DISCHARGE

## 2023-06-07 RX ORDER — CEFTRIAXONE 500 MG/1
1000 INJECTION, POWDER, FOR SOLUTION INTRAMUSCULAR; INTRAVENOUS EVERY 24 HOURS
Refills: 0 | Status: COMPLETED | OUTPATIENT
Start: 2023-06-07 | End: 2023-06-09

## 2023-06-07 RX ADMIN — SODIUM CHLORIDE 1000 MILLILITER(S): 9 INJECTION INTRAMUSCULAR; INTRAVENOUS; SUBCUTANEOUS at 14:29

## 2023-06-07 RX ADMIN — CEFTRIAXONE 1000 MILLIGRAM(S): 500 INJECTION, POWDER, FOR SOLUTION INTRAMUSCULAR; INTRAVENOUS at 17:23

## 2023-06-07 RX ADMIN — LEVETIRACETAM 500 MILLIGRAM(S): 250 TABLET, FILM COATED ORAL at 17:22

## 2023-06-07 NOTE — PATIENT PROFILE ADULT - FALL HARM RISK - RISK INTERVENTIONS
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
## 2023-06-07 NOTE — ED ADULT NURSE NOTE - OBJECTIVE STATEMENT
Pt with PMHx double brain aneurysm and DI with associated short term memory loss was found by [employer] walking around in [place]. Pt is unsure how she got there or where she was going. As per , she occasionally becomes confused but is well managed at home and has had several bouts of hyponatremia in the past. Pt has weekly blood draws at home for labs. Although pt is confused to situation, she is compliant and oriented to self and time.

## 2023-06-07 NOTE — ED PROVIDER NOTE - PHYSICAL EXAMINATION
Gen: Alert, NAD  Neck: +supple, no tenderness/meningismus/JVD, +Trachea midline  Pulm: Bilateral BS, normal resp effort, no wheeze/stridor/retractions  CV: RRR, no M/R/G, 2+dist pulses  Abd: soft, NT/ND, +BS, no hepatosplenomegaly  Mskel: ROM intact x4 extremities.  no edema/erythema/cyanosis  Skin: no rash, warm, dry  Neuro: AAOx2, no sensory/motor deficits, CN 2-12 intact

## 2023-06-07 NOTE — ED ADULT TRIAGE NOTE - GLASGOW COMA SCALE: BEST MOTOR RESPONSE, MLM
(M6) obeys commands 37 yo male pw verticle forehead laceration after walking into wall this morning. no loc, no headache
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
## 2023-06-07 NOTE — ED PROVIDER NOTE - CLINICAL SUMMARY MEDICAL DECISION MAKING FREE TEXT BOX
57-year-old female; with PMH significant for diabetes insipidus, cerebral aneurysms (history of SAH), HTN, s/p gastric bypass, s/p craniotomy, s/p cerebral aneurysm repair; now presenting with AMS.   found to have hypernatremia, will admit for correction and nephrology consulted
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
## 2023-06-07 NOTE — PATIENT PROFILE ADULT - NSFALLSECTIONLABEL_GEN_A_CORE
CC:  Celi Jin is here today for Medicare Wellness Visit, Follow-up (Blood pressure/diabetic), and fatigue (Tired all the time and sleep alot)    Medications: medications verified, no change  Added preferred pharmacy  Refills needed today?  NO  denies Latex allergy or sensitivity  Health Maintenance Due   Topic Date Due   • Shingles Vaccine (1 of 2) Never done   • Breast Cancer Screening  01/04/2020   • Diabetes Eye Exam  07/01/2020   • Diabetes Foot Exam  01/19/2022   • Diabetes A1C  05/23/2023     Patient is due for topics as listed above but is not proceeding with Immunization(s) Shingles at this time. Education provided for Immunization(s) Shingles..  Patient would like communication of their results via:        Cell Phone:   Telephone Information:   Mobile [phone]     Okay to leave a message containing results? Yes    Advance Directives:  discussed and advised the patient to complete one      Patient here for Medicare wellness visit.  Patient declines having MWV done outside [place] or within one year from today.  Pt wishes to proceed with wellness visit.  Discussed that wellness portion is covered 100% but any concerns discussed may be charged as office visit.  Pt agreeable.    Patient Care Team:  Arlet Barkley MD as PCP - General (Family Practice)  Annie Ruiz AUD as Audiologist (Audiology)    PAST MEDICAL HX:    Diabetes Mellitus                                             Anxiety                                                       Hyperlipidemia                                                Essential (primary) hypertension                              Sinusitis, chronic                                            Depression                                                    PAST SURGICAL HX:    COLONOSCOPY                                     12/05/2003      Comment: tubular adenoma and hyperplastic polyps    COLONOSCOPY                                     06/06/2006      Comment: tubular  adenoma    COLONOSCOPY                                     2007      Comment: tubular adenoma    COLONOSCOPY W BIOPSY                            6.6.        Comment: asc colon tubular adenoma polyp sigmoid                diverticulosis, 5 yr recall     COLONOSCOPY DIAGNOSTIC                          2017    LAPAROSCOPY,ABDM,ZOE,OMENT,DX                  2017      Comment: exploratory laparoscopy with adhesiolysis    HYSTERECTOMY                                                Comment: [facility]SO    INCISIONAL HERNIA REPAIR                        01/10/2018      Comment: S/p exp laparotomy with cholecystectomy,                appendectomy, repair of huge chronically                incarcerated incisional hernia with loss-domain               with [employer]     APPENDECTOMY                                    01/10/2018    CHOLECYSTECTOMY                                 01/10/2018    Family History   Problem Relation Age of Onset   • Heart disease Father    • Other Father         alzheimers, cause of death   • Diabetes Mother         unsure of cause of death, passed in sleep   • Diabetes Sister    • Depression Sister    • Patient is unaware of any medical problems Brother    • Depression Sister      Review of patient's family status indicates:    Father                                       90    Mother                                       68    Sister                         Alive                     Brother                        Alive                       Comment: Unknown hx    Sister                         Alive                     Social History     Tobacco Use   • Smoking status: Former     Current packs/day: 0.00     Average packs/day: 2.0 packs/day for 30.0 years (60.0 pk-yrs)     Types: Cigarettes     Start date:      Quit date:      Years since quittin.3   • Smokeless tobacco: Never   Substance Use Topics   • Alcohol use: Yes     Comment: rare   • Drug  use: No     Social History     Socioeconomic History   • Marital status:      Spouse name: Not on file   • Number of children: Not on file   • Years of education: Not on file   • Highest education level: Not on file   Occupational History   • Not on file   Tobacco Use   • Smoking status: Former     Current packs/day: 0.00     Average packs/day: 2.0 packs/day for 30.0 years (60.0 pk-yrs)     Types: Cigarettes     Start date:      Quit date:      Years since quittin.3   • Smokeless tobacco: Never   Vaping Use   • Vaping status: Not on file   Substance and Sexual Activity   • Alcohol use: Yes     Comment: rare   • Drug use: No   • Sexual activity: Not Currently   Other Topics Concern   • Not on file   Social History Narrative   • Not on file     Social Determinants of Health     Financial Resource Strain: Not on file   Food Insecurity: Not on file   Transportation Needs: Not on file   Physical Activity: Not on file   Stress: Not on file   Social Connections: Not on file   Intimate Partner Violence: Not At Risk (2021)    Intimate Partner Violence    • Social Determinants: Intimate Partner Violence Past Fear: No    • Social Determinants: Intimate Partner Violence Current Fear: No       Patients medical, surgical, and family history reviewed.     .
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
## 2023-06-07 NOTE — PATIENT PROFILE ADULT - FUNCTIONAL ASSESSMENT - BASIC MOBILITY 6.
4-calculated by average/Not able to assess (calculate score using [facility] averaging method)

## 2023-06-07 NOTE — H&P ADULT - NSHPPHYSICALEXAM_GEN_ALL_CORE
PHYSICAL EXAM:    GENERAL: NAD, well-groomed, well-developed  HEAD:  Atraumatic, Normocephalic  EYES: EOMI, PERRLA, conjunctiva and sclera clear  ENMT: Dry mucous membranes  NECK: Supple, No JVD  NERVOUS SYSTEM:  Alert & Oriented X3, Good concentration  CHEST/LUNG: Clear to percussion bilaterally; No rales, rhonchi,   HEART: Regular rate and rhythm; No murmurs,   ABDOMEN: Soft, Nontender, Nondistended; Bowel sounds present  EXTREMITIES:   No clubbing, cyanosis, or edema

## 2023-06-08 LAB
ANION GAP SERPL CALC-SCNC: 10 MMOL/L — SIGNIFICANT CHANGE UP (ref 5–17)
BUN SERPL-MCNC: 26.3 MG/DL — HIGH (ref 8–20)
CALCIUM SERPL-MCNC: 8.9 MG/DL — SIGNIFICANT CHANGE UP (ref 8.4–10.5)
CHLORIDE SERPL-SCNC: 119 MMOL/L — HIGH (ref 96–108)
CO2 SERPL-SCNC: 24 MMOL/L — SIGNIFICANT CHANGE UP (ref 22–29)
CREAT SERPL-MCNC: 1 MG/DL — SIGNIFICANT CHANGE UP (ref 0.5–1.3)
EGFR: 65 ML/MIN/1.73M2 — SIGNIFICANT CHANGE UP
GLUCOSE SERPL-MCNC: 99 MG/DL — SIGNIFICANT CHANGE UP (ref 70–99)
HCT VFR BLD CALC: 33.2 % — LOW (ref 34.5–45)
HGB BLD-MCNC: 9.9 G/DL — LOW (ref 11.5–15.5)
MCHC RBC-ENTMCNC: 26.3 PG — LOW (ref 27–34)
MCHC RBC-ENTMCNC: 29.8 GM/DL — LOW (ref 32–36)
MCV RBC AUTO: 88.3 FL — SIGNIFICANT CHANGE UP (ref 80–100)
PLATELET # BLD AUTO: 166 K/UL — SIGNIFICANT CHANGE UP (ref 150–400)
POTASSIUM SERPL-MCNC: 4.3 MMOL/L — SIGNIFICANT CHANGE UP (ref 3.5–5.3)
POTASSIUM SERPL-SCNC: 4.3 MMOL/L — SIGNIFICANT CHANGE UP (ref 3.5–5.3)
RBC # BLD: 3.76 M/UL — LOW (ref 3.8–5.2)
RBC # FLD: 13.6 % — SIGNIFICANT CHANGE UP (ref 10.3–14.5)
SODIUM SERPL-SCNC: 153 MMOL/L — HIGH (ref 135–145)
UUN UR-MCNC: 592 MG/DL — SIGNIFICANT CHANGE UP
WBC # BLD: 3.99 K/UL — SIGNIFICANT CHANGE UP (ref 3.8–10.5)
WBC # FLD AUTO: 3.99 K/UL — SIGNIFICANT CHANGE UP (ref 3.8–10.5)

## 2023-06-08 PROCEDURE — 99233 SBSQ HOSP IP/OBS HIGH 50: CPT

## 2023-06-08 RX ORDER — SODIUM CHLORIDE 9 MG/ML
1000 INJECTION, SOLUTION INTRAVENOUS
Refills: 0 | Status: DISCONTINUED | OUTPATIENT
Start: 2023-06-08 | End: 2023-06-09

## 2023-06-08 RX ADMIN — LEVETIRACETAM 500 MILLIGRAM(S): 250 TABLET, FILM COATED ORAL at 05:58

## 2023-06-08 RX ADMIN — SODIUM CHLORIDE 125 MILLILITER(S): 9 INJECTION, SOLUTION INTRAVENOUS at 22:06

## 2023-06-08 RX ADMIN — SODIUM CHLORIDE 60 MILLILITER(S): 9 INJECTION, SOLUTION INTRAVENOUS at 01:30

## 2023-06-08 RX ADMIN — AMLODIPINE BESYLATE 10 MILLIGRAM(S): 2.5 TABLET ORAL at 05:58

## 2023-06-08 RX ADMIN — DESMOPRESSIN ACETATE 0.1 MILLIGRAM(S): 0.1 TABLET ORAL at 09:44

## 2023-06-08 RX ADMIN — Medication 1 TABLET(S): at 17:25

## 2023-06-08 RX ADMIN — SODIUM CHLORIDE 125 MILLILITER(S): 9 INJECTION, SOLUTION INTRAVENOUS at 17:25

## 2023-06-08 RX ADMIN — Medication 1 MILLIGRAM(S): at 17:25

## 2023-06-08 RX ADMIN — DESMOPRESSIN ACETATE 0.1 MILLIGRAM(S): 0.1 TABLET ORAL at 01:29

## 2023-06-08 RX ADMIN — LEVETIRACETAM 500 MILLIGRAM(S): 250 TABLET, FILM COATED ORAL at 17:25

## 2023-06-08 RX ADMIN — ENOXAPARIN SODIUM 40 MILLIGRAM(S): 100 INJECTION SUBCUTANEOUS at 05:59

## 2023-06-08 RX ADMIN — DESMOPRESSIN ACETATE 0.1 MILLIGRAM(S): 0.1 TABLET ORAL at 17:25

## 2023-06-08 RX ADMIN — PANTOPRAZOLE SODIUM 40 MILLIGRAM(S): 20 TABLET, DELAYED RELEASE ORAL at 05:58

## 2023-06-08 RX ADMIN — CEFTRIAXONE 1000 MILLIGRAM(S): 500 INJECTION, POWDER, FOR SOLUTION INTRAMUSCULAR; INTRAVENOUS at 17:25
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
## 2023-06-08 NOTE — PROGRESS NOTE ADULT - ASSESSMENT
57y/oF PMH SAH s/p Brain aneurysm clippingx 2, DI, Seizures 2/2 hypo or  hypernatremia was BIBA after she was was reported to have wandered off. pt called police " I got lost". In ER, She was found to be altered, confused, daughter reported issues with sodium in past. Labs significant for Na 152. She has significant h/o PICC line hydration at home  for hypernatremia. her PICC line came out in May 2023. Patient is forgetful, She is being admitted fr further care.     Acute metabolic encephalopathy - DDX - Hypernatremia related to DI Vs UTI   Hypernatremia  CT head, CTA - neg for acute abnormalities   f/u urine culture   monitor BMP cloesly   renal consulted  IV DW  adjusted  trend Na    hx seizures - ct keppra     Morning cbc, bmp reviewed. Morning CBC, BMP ordered  DVT ppx: lovenox  Dispo: Patient remains Acute   Case Discussed with nephrology

## 2023-06-09 LAB
AMPHET UR-MCNC: NEGATIVE — SIGNIFICANT CHANGE UP
ANION GAP SERPL CALC-SCNC: 12 MMOL/L — SIGNIFICANT CHANGE UP (ref 5–17)
ANION GAP SERPL CALC-SCNC: 12 MMOL/L — SIGNIFICANT CHANGE UP (ref 5–17)
APPEARANCE UR: CLEAR — SIGNIFICANT CHANGE UP
BARBITURATES UR SCN-MCNC: NEGATIVE — SIGNIFICANT CHANGE UP
BENZODIAZ UR-MCNC: NEGATIVE — SIGNIFICANT CHANGE UP
BILIRUB UR-MCNC: NEGATIVE — SIGNIFICANT CHANGE UP
BUN SERPL-MCNC: 14.3 MG/DL — SIGNIFICANT CHANGE UP (ref 8–20)
BUN SERPL-MCNC: 15.5 MG/DL — SIGNIFICANT CHANGE UP (ref 8–20)
CALCIUM SERPL-MCNC: 8.9 MG/DL — SIGNIFICANT CHANGE UP (ref 8.4–10.5)
CALCIUM SERPL-MCNC: 9.1 MG/DL — SIGNIFICANT CHANGE UP (ref 8.4–10.5)
CHLORIDE SERPL-SCNC: 101 MMOL/L — SIGNIFICANT CHANGE UP (ref 96–108)
CHLORIDE SERPL-SCNC: 95 MMOL/L — LOW (ref 96–108)
CO2 SERPL-SCNC: 23 MMOL/L — SIGNIFICANT CHANGE UP (ref 22–29)
CO2 SERPL-SCNC: 23 MMOL/L — SIGNIFICANT CHANGE UP (ref 22–29)
COCAINE METAB.OTHER UR-MCNC: NEGATIVE — SIGNIFICANT CHANGE UP
COLOR SPEC: YELLOW — SIGNIFICANT CHANGE UP
CORTIS AM PEAK SERPL-MCNC: 13.4 UG/DL — SIGNIFICANT CHANGE UP (ref 6–18.4)
CREAT SERPL-MCNC: 0.8 MG/DL — SIGNIFICANT CHANGE UP (ref 0.5–1.3)
CREAT SERPL-MCNC: 0.88 MG/DL — SIGNIFICANT CHANGE UP (ref 0.5–1.3)
DIFF PNL FLD: NEGATIVE — SIGNIFICANT CHANGE UP
EGFR: 76 ML/MIN/1.73M2 — SIGNIFICANT CHANGE UP
EGFR: 85 ML/MIN/1.73M2 — SIGNIFICANT CHANGE UP
GLUCOSE SERPL-MCNC: 86 MG/DL — SIGNIFICANT CHANGE UP (ref 70–99)
GLUCOSE SERPL-MCNC: 99 MG/DL — SIGNIFICANT CHANGE UP (ref 70–99)
GLUCOSE UR QL: NEGATIVE MG/DL — SIGNIFICANT CHANGE UP
KETONES UR-MCNC: NEGATIVE — SIGNIFICANT CHANGE UP
LEUKOCYTE ESTERASE UR-ACNC: NEGATIVE — SIGNIFICANT CHANGE UP
METHADONE UR-MCNC: NEGATIVE — SIGNIFICANT CHANGE UP
NITRITE UR-MCNC: NEGATIVE — SIGNIFICANT CHANGE UP
OPIATES UR-MCNC: NEGATIVE — SIGNIFICANT CHANGE UP
PCP SPEC-MCNC: SIGNIFICANT CHANGE UP
PCP UR-MCNC: NEGATIVE — SIGNIFICANT CHANGE UP
PH UR: 6 — SIGNIFICANT CHANGE UP (ref 5–8)
POTASSIUM SERPL-MCNC: 4.2 MMOL/L — SIGNIFICANT CHANGE UP (ref 3.5–5.3)
POTASSIUM SERPL-MCNC: 4.2 MMOL/L — SIGNIFICANT CHANGE UP (ref 3.5–5.3)
POTASSIUM SERPL-SCNC: 4.2 MMOL/L — SIGNIFICANT CHANGE UP (ref 3.5–5.3)
POTASSIUM SERPL-SCNC: 4.2 MMOL/L — SIGNIFICANT CHANGE UP (ref 3.5–5.3)
PROCALCITONIN SERPL-MCNC: 0.25 NG/ML — HIGH (ref 0.02–0.1)
PROT UR-MCNC: NEGATIVE — SIGNIFICANT CHANGE UP
SODIUM SERPL-SCNC: 130 MMOL/L — LOW (ref 135–145)
SODIUM SERPL-SCNC: 136 MMOL/L — SIGNIFICANT CHANGE UP (ref 135–145)
SP GR SPEC: 1 — LOW (ref 1.01–1.02)
THC UR QL: NEGATIVE — SIGNIFICANT CHANGE UP
TSH SERPL-MCNC: 2.2 UIU/ML — SIGNIFICANT CHANGE UP (ref 0.27–4.2)
UROBILINOGEN FLD QL: NEGATIVE MG/DL — SIGNIFICANT CHANGE UP

## 2023-06-09 PROCEDURE — 99233 SBSQ HOSP IP/OBS HIGH 50: CPT

## 2023-06-09 RX ORDER — SODIUM CHLORIDE 9 MG/ML
1000 INJECTION, SOLUTION INTRAVENOUS
Refills: 0 | Status: DISCONTINUED | OUTPATIENT
Start: 2023-06-09 | End: 2023-06-09

## 2023-06-09 RX ORDER — SODIUM CHLORIDE 5 G/100ML
500 INJECTION, SOLUTION INTRAVENOUS
Refills: 0 | Status: COMPLETED | OUTPATIENT
Start: 2023-06-09 | End: 2023-06-09

## 2023-06-09 RX ORDER — OLANZAPINE 15 MG/1
7.5 TABLET, FILM COATED ORAL ONCE
Refills: 0 | Status: DISCONTINUED | OUTPATIENT
Start: 2023-06-09 | End: 2023-06-09

## 2023-06-09 RX ORDER — HYDRALAZINE HCL 50 MG
10 TABLET ORAL ONCE
Refills: 0 | Status: COMPLETED | OUTPATIENT
Start: 2023-06-09 | End: 2023-06-09

## 2023-06-09 RX ORDER — SODIUM CHLORIDE 5 G/100ML
500 INJECTION, SOLUTION INTRAVENOUS
Refills: 0 | Status: DISCONTINUED | OUTPATIENT
Start: 2023-06-09 | End: 2023-06-13

## 2023-06-09 RX ORDER — OLANZAPINE 15 MG/1
5 TABLET, FILM COATED ORAL ONCE
Refills: 0 | Status: COMPLETED | OUTPATIENT
Start: 2023-06-09 | End: 2023-06-09

## 2023-06-09 RX ORDER — DESMOPRESSIN ACETATE 0.1 MG/1
0.1 TABLET ORAL
Refills: 0 | Status: DISCONTINUED | OUTPATIENT
Start: 2023-06-09 | End: 2023-06-09

## 2023-06-09 RX ORDER — HYDRALAZINE HCL 50 MG
5 TABLET ORAL ONCE
Refills: 0 | Status: COMPLETED | OUTPATIENT
Start: 2023-06-09 | End: 2023-06-09

## 2023-06-09 RX ADMIN — ENOXAPARIN SODIUM 40 MILLIGRAM(S): 100 INJECTION SUBCUTANEOUS at 05:05

## 2023-06-09 RX ADMIN — LEVETIRACETAM 500 MILLIGRAM(S): 250 TABLET, FILM COATED ORAL at 17:03

## 2023-06-09 RX ADMIN — AMLODIPINE BESYLATE 10 MILLIGRAM(S): 2.5 TABLET ORAL at 05:04

## 2023-06-09 RX ADMIN — PANTOPRAZOLE SODIUM 40 MILLIGRAM(S): 20 TABLET, DELAYED RELEASE ORAL at 09:15

## 2023-06-09 RX ADMIN — OLANZAPINE 5 MILLIGRAM(S): 15 TABLET, FILM COATED ORAL at 04:12

## 2023-06-09 RX ADMIN — Medication 1 MILLIGRAM(S): at 09:24

## 2023-06-09 RX ADMIN — SODIUM CHLORIDE 40 MILLILITER(S): 5 INJECTION, SOLUTION INTRAVENOUS at 16:16

## 2023-06-09 RX ADMIN — Medication 5 MILLIGRAM(S): at 18:12

## 2023-06-09 RX ADMIN — DESMOPRESSIN ACETATE 0.1 MILLIGRAM(S): 0.1 TABLET ORAL at 00:56

## 2023-06-09 RX ADMIN — DESMOPRESSIN ACETATE 0.1 MILLIGRAM(S): 0.1 TABLET ORAL at 09:14

## 2023-06-09 RX ADMIN — Medication 1 TABLET(S): at 09:24

## 2023-06-09 RX ADMIN — SODIUM CHLORIDE 175 MILLILITER(S): 9 INJECTION, SOLUTION INTRAVENOUS at 04:19

## 2023-06-09 RX ADMIN — LEVETIRACETAM 500 MILLIGRAM(S): 250 TABLET, FILM COATED ORAL at 05:04

## 2023-06-09 RX ADMIN — Medication 30 MILLILITER(S): at 22:36

## 2023-06-09 RX ADMIN — CEFTRIAXONE 1000 MILLIGRAM(S): 500 INJECTION, POWDER, FOR SOLUTION INTRAMUSCULAR; INTRAVENOUS at 17:03

## 2023-06-09 RX ADMIN — SODIUM CHLORIDE 175 MILLILITER(S): 9 INJECTION, SOLUTION INTRAVENOUS at 12:12

## 2023-06-09 NOTE — CHART NOTE - NSCHARTNOTEFT_GEN_A_CORE
Pt admitted for AMS and hypernatremia.  Vital Signs Last 24 Hrs  T(C): 36.3 (08 Jun 2023 23:33), Max: 37.2 (08 Jun 2023 15:31)  T(F): 97.4 (08 Jun 2023 23:33), Max: 98.9 (08 Jun 2023 15:31)  HR: 79 (08 Jun 2023 23:33) (62 - 79)  BP: 150/92 (08 Jun 2023 23:33) (108/76 - 150/92)  BP(mean): --  RR: 18 (08 Jun 2023 23:33) (18 - 18)  SpO2: 97% (08 Jun 2023 23:33) (95% - 98%)    Parameters below as of 08 Jun 2023 23:33  Patient On (Oxygen Delivery Method): room air    Pt disoriented and agitated despite redirection of staff.    Impression;1- acute agitation  Plan: 1- zyprexa 5mg IM stat Pt admitted for AMS and hypernatremia.  Vital Signs Last 24 Hrs  T(C): 36.3 (08 Jun 2023 23:33), Max: 37.2 (08 Jun 2023 15:31)  T(F): 97.4 (08 Jun 2023 23:33), Max: 98.9 (08 Jun 2023 15:31)  HR: 79 (08 Jun 2023 23:33) (62 - 79)  BP: 150/92 (08 Jun 2023 23:33) (108/76 - 150/92)  BP(mean): --  RR: 18 (08 Jun 2023 23:33) (18 - 18)  SpO2: 97% (08 Jun 2023 23:33) (95% - 98%)    Parameters below as of 08 Jun 2023 23:33  Patient On (Oxygen Delivery Method): room air    Pt disoriented and agitated despite redirection of staff.    Impression;1- acute agitation  Plan: 1- zyprexa 5mg IM stat          2- case d/w Nocturnist team; IV D5W rate increased to 175cc/hr.          3- continue to monitor

## 2023-06-09 NOTE — CHART NOTE - NSCHARTNOTEFT_GEN_A_CORE
Follow up on serum sodium following 1.5%NaCL 500cc at rate of 40cc/hr over 3 hours.  Lab shows serum sodium 130.  Call placed to Dr. Portillo, nephrology and orders for 1.5% NaCl 500cc at rate of 50cc/hr over 5 hours given and entered   AM labs pending.  RN aware of change to orders

## 2023-06-09 NOTE — PROGRESS NOTE ADULT - ASSESSMENT
57y/oF PMH SAH s/p Brain aneurysm clippingx 2, DI, Seizures 2/2 hypo or  hypernatremia was BIBA after she was was reported to have wandered off. pt called police " I got lost". In ER, She was found to be altered, confused, daughter reported issues with sodium in past. Labs significant for Na 152. She has significant h/o PICC line hydration at home  for hypernatremia. her PICC line came out in May 2023. Patient is forgetful, She is being admitted fr further care.     Acute metabolic encephalopathy   Hypernatremia related to DI        goal Na 145-147 per daughter as lower        CT head, CTA - neg for acute abnormalities        c/w IVF       BMP TID      PICC was dislodged and nephrology was monitoring off IVF       renal following     hypothermia: f/u cultures    c/w rocephin       hx seizures - ct keppra     Morning cbc, bmp reviewed. Morning CBC, BMP ordered  DVT ppx: lovenox  Dispo: Patient remains Acute     spoke to daughter over thep amaya. a

## 2023-06-10 LAB
ANION GAP SERPL CALC-SCNC: 11 MMOL/L — SIGNIFICANT CHANGE UP (ref 5–17)
ANION GAP SERPL CALC-SCNC: 12 MMOL/L — SIGNIFICANT CHANGE UP (ref 5–17)
ANION GAP SERPL CALC-SCNC: 14 MMOL/L — SIGNIFICANT CHANGE UP (ref 5–17)
BUN SERPL-MCNC: 14.3 MG/DL — SIGNIFICANT CHANGE UP (ref 8–20)
BUN SERPL-MCNC: 21.7 MG/DL — HIGH (ref 8–20)
BUN SERPL-MCNC: 26.8 MG/DL — HIGH (ref 8–20)
CALCIUM SERPL-MCNC: 8.8 MG/DL — SIGNIFICANT CHANGE UP (ref 8.4–10.5)
CALCIUM SERPL-MCNC: 9 MG/DL — SIGNIFICANT CHANGE UP (ref 8.4–10.5)
CALCIUM SERPL-MCNC: 9.2 MG/DL — SIGNIFICANT CHANGE UP (ref 8.4–10.5)
CHLORIDE SERPL-SCNC: 100 MMOL/L — SIGNIFICANT CHANGE UP (ref 96–108)
CHLORIDE SERPL-SCNC: 100 MMOL/L — SIGNIFICANT CHANGE UP (ref 96–108)
CHLORIDE SERPL-SCNC: 97 MMOL/L — SIGNIFICANT CHANGE UP (ref 96–108)
CO2 SERPL-SCNC: 22 MMOL/L — SIGNIFICANT CHANGE UP (ref 22–29)
CO2 SERPL-SCNC: 23 MMOL/L — SIGNIFICANT CHANGE UP (ref 22–29)
CO2 SERPL-SCNC: 24 MMOL/L — SIGNIFICANT CHANGE UP (ref 22–29)
CREAT SERPL-MCNC: 0.86 MG/DL — SIGNIFICANT CHANGE UP (ref 0.5–1.3)
CREAT SERPL-MCNC: 1.21 MG/DL — SIGNIFICANT CHANGE UP (ref 0.5–1.3)
CREAT SERPL-MCNC: 1.28 MG/DL — SIGNIFICANT CHANGE UP (ref 0.5–1.3)
CULTURE RESULTS: SIGNIFICANT CHANGE UP
EGFR: 48 ML/MIN/1.73M2 — LOW
EGFR: 52 ML/MIN/1.73M2 — LOW
EGFR: 78 ML/MIN/1.73M2 — SIGNIFICANT CHANGE UP
GLUCOSE SERPL-MCNC: 127 MG/DL — HIGH (ref 70–99)
GLUCOSE SERPL-MCNC: 81 MG/DL — SIGNIFICANT CHANGE UP (ref 70–99)
GLUCOSE SERPL-MCNC: 83 MG/DL — SIGNIFICANT CHANGE UP (ref 70–99)
HCT VFR BLD CALC: 33.2 % — LOW (ref 34.5–45)
HGB BLD-MCNC: 10.3 G/DL — LOW (ref 11.5–15.5)
MAGNESIUM SERPL-MCNC: 1.7 MG/DL — LOW (ref 1.8–2.6)
MCHC RBC-ENTMCNC: 25.7 PG — LOW (ref 27–34)
MCHC RBC-ENTMCNC: 31 GM/DL — LOW (ref 32–36)
MCV RBC AUTO: 82.8 FL — SIGNIFICANT CHANGE UP (ref 80–100)
PHOSPHATE SERPL-MCNC: 2.5 MG/DL — SIGNIFICANT CHANGE UP (ref 2.4–4.7)
PLATELET # BLD AUTO: 168 K/UL — SIGNIFICANT CHANGE UP (ref 150–400)
POTASSIUM SERPL-MCNC: 4.2 MMOL/L — SIGNIFICANT CHANGE UP (ref 3.5–5.3)
POTASSIUM SERPL-MCNC: 4.4 MMOL/L — SIGNIFICANT CHANGE UP (ref 3.5–5.3)
POTASSIUM SERPL-MCNC: 4.5 MMOL/L — SIGNIFICANT CHANGE UP (ref 3.5–5.3)
POTASSIUM SERPL-SCNC: 4.2 MMOL/L — SIGNIFICANT CHANGE UP (ref 3.5–5.3)
POTASSIUM SERPL-SCNC: 4.4 MMOL/L — SIGNIFICANT CHANGE UP (ref 3.5–5.3)
POTASSIUM SERPL-SCNC: 4.5 MMOL/L — SIGNIFICANT CHANGE UP (ref 3.5–5.3)
RBC # BLD: 4.01 M/UL — SIGNIFICANT CHANGE UP (ref 3.8–5.2)
RBC # FLD: 12.9 % — SIGNIFICANT CHANGE UP (ref 10.3–14.5)
SODIUM SERPL-SCNC: 132 MMOL/L — LOW (ref 135–145)
SODIUM SERPL-SCNC: 134 MMOL/L — LOW (ref 135–145)
SODIUM SERPL-SCNC: 137 MMOL/L — SIGNIFICANT CHANGE UP (ref 135–145)
SPECIMEN SOURCE: SIGNIFICANT CHANGE UP
WBC # BLD: 5.06 K/UL — SIGNIFICANT CHANGE UP (ref 3.8–10.5)
WBC # FLD AUTO: 5.06 K/UL — SIGNIFICANT CHANGE UP (ref 3.8–10.5)

## 2023-06-10 PROCEDURE — 99223 1ST HOSP IP/OBS HIGH 75: CPT

## 2023-06-10 PROCEDURE — 99233 SBSQ HOSP IP/OBS HIGH 50: CPT

## 2023-06-10 RX ORDER — MAGNESIUM OXIDE 400 MG ORAL TABLET 241.3 MG
400 TABLET ORAL
Refills: 0 | Status: DISCONTINUED | OUTPATIENT
Start: 2023-06-10 | End: 2023-06-12

## 2023-06-10 RX ORDER — SODIUM CHLORIDE 9 MG/ML
1 INJECTION INTRAMUSCULAR; INTRAVENOUS; SUBCUTANEOUS ONCE
Refills: 0 | Status: COMPLETED | OUTPATIENT
Start: 2023-06-10 | End: 2023-06-10

## 2023-06-10 RX ADMIN — SODIUM CHLORIDE 1 GRAM(S): 9 INJECTION INTRAMUSCULAR; INTRAVENOUS; SUBCUTANEOUS at 09:45

## 2023-06-10 RX ADMIN — LEVETIRACETAM 500 MILLIGRAM(S): 250 TABLET, FILM COATED ORAL at 05:53

## 2023-06-10 RX ADMIN — MAGNESIUM OXIDE 400 MG ORAL TABLET 400 MILLIGRAM(S): 241.3 TABLET ORAL at 17:43

## 2023-06-10 RX ADMIN — MAGNESIUM OXIDE 400 MG ORAL TABLET 400 MILLIGRAM(S): 241.3 TABLET ORAL at 12:05

## 2023-06-10 RX ADMIN — SODIUM CHLORIDE 50 MILLILITER(S): 5 INJECTION, SOLUTION INTRAVENOUS at 01:34

## 2023-06-10 RX ADMIN — PANTOPRAZOLE SODIUM 40 MILLIGRAM(S): 20 TABLET, DELAYED RELEASE ORAL at 05:53

## 2023-06-10 RX ADMIN — Medication 10 MILLIGRAM(S): at 00:10

## 2023-06-10 RX ADMIN — MAGNESIUM OXIDE 400 MG ORAL TABLET 400 MILLIGRAM(S): 241.3 TABLET ORAL at 08:41

## 2023-06-10 RX ADMIN — LEVETIRACETAM 500 MILLIGRAM(S): 250 TABLET, FILM COATED ORAL at 17:43

## 2023-06-10 RX ADMIN — ENOXAPARIN SODIUM 40 MILLIGRAM(S): 100 INJECTION SUBCUTANEOUS at 05:53

## 2023-06-10 RX ADMIN — Medication 1 MILLIGRAM(S): at 12:05

## 2023-06-10 RX ADMIN — Medication 1 TABLET(S): at 12:05

## 2023-06-10 RX ADMIN — AMLODIPINE BESYLATE 10 MILLIGRAM(S): 2.5 TABLET ORAL at 05:53

## 2023-06-10 NOTE — CONSULT NOTE ADULT - ASSESSMENT
56 yo F w h/o SAH s/p Brain aneurysm clipping, central DI, Seizures 2/2 hypo or  hypernatremia   Pt was BIBA after she was was reported to have wandered off. pt called police " I got lost". In ER, She was found to be altered, confused    Central DI/ HyperNatremia  Likely 2/2  SAH s/p Brain aneurysm  Pt is on strict fluid control and DDAVP at home  hypotonic IVF adjusted pt need a slow correction of her elevated Na  DDAVP regimen also dec to BID  Awaiting BMP drawn at 1: 15 pm  Repeat BMP scheduled for 10 PM    Discussed plans w pt and her  Angel at bedside  He wants to be called w any updates [phone]    AM labs will follow    
On admission, despite hypernatremia and before DDAVp was given, urine osmolarity was 715.  I suspect pt. has 3 disorders of fluid balance:  1. Diabetes insipidus  2. 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 cerebral salt wasting syndrome, which leads to excess sodium excretion. (Accounting for high urine osmolarity)  So pt. cannot conserve water or sodium, and sodium level will fluctuate depending on which loss predominates. Untreated pt. may become hypotensive, accounting for her need for outpatient fluid therapy.    Consider using fludrocortisone and DDAVp together? Will await nephrology input.    
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  Patient is a poor historian.  On arrival to ED, she was found to be hypernatremic with Na of 152, Scr 1.1, S Osm 325.    1. Hypernatremia:  -Secondary to DI  -High SOsm, UOsm high (which is not consistent w/ DI, additional component of ? dehydration)  -will place on D 5 1/4 NS @ 60 cc/hr  -will resume home dose of ddAVP 0.1 mg Q8hrs  -Will follow Na w/ AM labs.    2. HTN: within acceptable levels.

## 2023-06-10 NOTE — CONSULT NOTE ADULT - SUBJECTIVE AND OBJECTIVE BOX
HPI:  57y/oF PMH SAH s/p Brain aneurysm clippingx 2, DI, Seizures 2/2 hypo or  hypernatremia was BIBA after she was was reported to have wandered off. pt called police " I got lost". In ER, She was found to be altered, confused, daughter reported issues with sodium in past. Labs significant for Na 152. She has significant h/o PICC line hydration at home  for hypernatremia. her PICC line came out in May 2023. Patient is forgetful, Unable to provide much history thinks she has been doing well at home, denies any fevers, diarrhea. Some improvement in serum Na  Pt more alert today still w periods of confusion, denies HA CP no SOB   Angel at bedside    Follows in our office w Dr Manuel    PAST MEDICAL & SURGICAL HISTORY:  Subarachnoid Hemorrhage      HTN (Hypertension)      Diabetes insipidus      Brain aneurysm      Memory loss, short term      S/P Craniotomy      S/P Cerebral Aneurysm Repair      H/O gastric bypass      FAMILY HISTORY:  Family history of hypertension  mother    [place]    Social History:Non smoker    MEDICATIONS  (STANDING):  amLODIPine   Tablet 10 milliGRAM(s) Oral daily  cefTRIAXone Injectable. 1000 milliGRAM(s) IV Push every 24 hours  desmopressin 0.1 milliGRAM(s) Oral <User Schedule>  dextrose 5%. 1000 milliLiter(s) (100 mL/Hr) IV Continuous <Continuous>  enoxaparin Injectable 40 milliGRAM(s) SubCutaneous every 24 hours  folic acid 1 milliGRAM(s) Oral daily  levETIRAcetam 500 milliGRAM(s) Oral two times a day  multivitamin/minerals 1 Tablet(s) Oral daily  pantoprazole    Tablet 40 milliGRAM(s) Oral before breakfast    MEDICATIONS  (PRN):  acetaminophen     Tablet .. 650 milliGRAM(s) Oral every 6 hours PRN Temp greater or equal to 38C (100.4F), Mild Pain (1 - 3)  aluminum hydroxide/magnesium hydroxide/simethicone Suspension 30 milliLiter(s) Oral every 4 hours PRN Dyspepsia  ondansetron Injectable 4 milliGRAM(s) IV Push every 8 hours PRN Nausea and/or Vomiting   Meds reviewed      Vital Signs Last 24 Hrs  T(C): 35.1 (2023 11:37), Max: 37.2 (2023 15:31)  T(F): 95.2 (2023 11:37), Max: 98.9 (2023 15:31)  HR: 85 (2023 11:37) (62 - 93)  BP: 123/87 (2023 11:37) (114/79 - 184/122)  BP(mean): --  RR: 18 (2023 11:37) (18 - 18)  SpO2: 96% (2023 11:37) (95% - 98%)    Parameters below as of 2023 11:37  Patient On (Oxygen Delivery Method): room air    PHYSICAL EXAM:    GENERAL: NAD  HEAD: NCAT  EYES: EOMI  NECK: Supple  NERVOUS SYSTEM:  Alert & Oriented X3  CHEST/LUNG: Clear to percussion bilaterally  HEART: Regular rate and rhythm  ABDOMEN: Soft, Nontender, Nondistended; =BS  EXTREMITIES: no edema    LABS:                        9.9    3.99  )-----------( 166      ( 2023 03:26 )             33.2     06-08    153<H>  |  119<H>  |  26.3<H>  ----------------------------<  99  4.3   |  24.0  |  1.00    Ca    8.9      2023 03:26        Urinalysis Basic - ( 2023 05:50 )    Color: Yellow / Appearance: Clear / S.005 / pH: x  Gluc: x / Ketone: Negative  / Bili: Negative / Urobili: Negative mg/dL   Blood: x / Protein: Negative / Nitrite: Negative   Leuk Esterase: Negative / RBC: x / WBC x   Sq Epi: x / Non Sq Epi: x / Bacteria: x              RADIOLOGY & ADDITIONAL TESTS:  
HPI:  57y/oF PMH SAH s/p Brain aneurysm clippingx 2, DI, Seizures 2/2 hypo or  hypernatremia was BIBA after she was was reported to have wandered off. pt called police " I got lost". In ER, She was found to be altered, confused, daughter reported issues with sodium in past. Labs significant for Na 152. She has significant h/o PICC line hydration at home  for hypernatremia. her PICC line came out in May 2023. Patient is forgetful, Unable to provide much history thinks she has been doing well at home, denies any fevers, diarrhea.   Called daughter to get collateral information- no response, left voicemail. med hx obtained from Dr Garcia  (2023 15:44)  Pt. known to our service from a prior hospital stay. She developed diabetes insipidus following a subarachnoid hemorrhage, and has had great difficulty both in hospital and at home in maintaining a physiologic sodium level, with swings from hypernatremia to hyponatremia.       PAST MEDICAL & SURGICAL HISTORY:  Subarachnoid Hemorrhage      HTN (Hypertension)      Diabetes insipidus      Brain aneurysm      Memory loss, short term      S/P Craniotomy      S/P Cerebral Aneurysm Repair      H/O gastric bypass          FAMILY HISTORY:  Family history of hypertension  mother        SOCIAL HISTORY:    REVIEW OF SYSTEMS:  pt. currently feeling well      MEDICATIONS  (STANDING):  amLODIPine   Tablet 10 milliGRAM(s) Oral daily  enoxaparin Injectable 40 milliGRAM(s) SubCutaneous every 24 hours  folic acid 1 milliGRAM(s) Oral daily  levETIRAcetam 500 milliGRAM(s) Oral two times a day  magnesium oxide 400 milliGRAM(s) Oral three times a day with meals  multivitamin/minerals 1 Tablet(s) Oral daily  pantoprazole    Tablet 40 milliGRAM(s) Oral before breakfast  sodium chloride 1.5%. 500 milliLiter(s) (40 mL/Hr) IV Continuous <Continuous>    MEDICATIONS  (PRN):  acetaminophen     Tablet .. 650 milliGRAM(s) Oral every 6 hours PRN Temp greater or equal to 38C (100.4F), Mild Pain (1 - 3)  aluminum hydroxide/magnesium hydroxide/simethicone Suspension 30 milliLiter(s) Oral every 4 hours PRN Dyspepsia  ondansetron Injectable 4 milliGRAM(s) IV Push every 8 hours PRN Nausea and/or Vomiting      Allergies    No Known Allergies    Intolerances          PHYSICAL EXAM:    Vital Signs Last 24 Hrs  T(C): 36.7 (10 [place] 2023 11:00), Max: 37.1 (2023 16:17)  T(F): 98 (10 [place] 2023 11:00), Max: 98.8 (2023 16:17)  HR: 53 (10 [place] 2023 11:00) (53 - 84)  BP: 136/66 (10 [place] 2023 11:00) (136/66 - 164/103)  BP(mean): 106 (2023 16:17) (106 - 106)  RR: 18 (10 [place] 2023 11:00) (18 - 18)  SpO2: 97% (10 [place] 2023 11:00) (94% - 97%)    Parameters below as of 10 [place] 2023 11:00  Patient On (Oxygen Delivery Method): room air        General appearance: Well developed, well nourished.    Eyes: Pupils equal     Neck: Trachea midline. No thyroid enlargement.    Lungs: Normal respiratory excursion. Lungs clear.    CV: Regular cardiac rhythm.    Skin: Warm and dry    Neuro: Cranial nerves intact. Normal motor  function.     Psych: Normal affect, good judgement.            LABS:                        10.3   5.06  )-----------( 168      ( 10 [place] 2023 05:40 )             33.2     06-10    132<L>  |  97  |  14.3  ----------------------------<  83  4.5   |  24.0  |  0.86    Ca    9.2      10 [place] 2023 05:40  Phos  2.5     06-10  Mg     1.7     06-10      Urinalysis Basic - ( 2023 05:50 )    Color: Yellow / Appearance: Clear / S.005 / pH: x  Gluc: x / Ketone: Negative  / Bili: Negative / Urobili: Negative mg/dL   Blood: x / Protein: Negative / Nitrite: Negative   Leuk Esterase: Negative / RBC: x / WBC x   Sq Epi: x / Non Sq Epi: x / Bacteria: x                  
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  Patient is a poor historian.  On arrival to ED, she was found to be hypernatremic with Na of 152, Scr 1.1, S Osm 325.    Most of history is obtained through review of chart as patient is a poor historian due to memory loss.  Patient does not have good insight into her medical history and her daughter helps with administration of her medications.  She was diagnosed with central DI in  after intracerebral hemorrhage.  Since then, she has been on ddAVP, but her hypernatremia has been difficult to control as it seems that the patient does not have an intact thirst mechanism.  Patient had a PICC line and will have IVFs given every other day for management of her chronic hypernatremia.  She currently denies any associated polyuria or polydipsia.  She denies missing any doses of ddAVP lately (at home she is prescribed 0.1 mg 3XD).    PAST MEDICAL & SURGICAL HISTORY:  Memory loss, short term  Brain aneurysm  Diabetes insipidus  HTN (Hypertension)  Subarachnoid Hemorrhage  H/O gastric bypass  S/P Cerebral Aneurysm Repair  S/P Craniotomy    FAMILY HISTORY:  Family history of hypertension, mother    PAST SOCIAL HISTORY:  Denies tobacco, alcohol, drug abuse.     ALLERGIES:  No Known Allergies    HOME MEDICATIONS:  desmopressin 0.1 mg oral tablet: 1 tab(s) orally 3 times a day (2023 16:33)  folic acid 1 mg oral tablet: 1 tab(s) orally once a day (03 Oct 2020 00:53)  levETIRAcetam 500 mg oral tablet: 1 tab(s) orally 2 times a day (03 Oct 2020 00:53)    REVIEW OF SYSTEMS: All systems were reviewed in detail. Pertinent positive and negative have been detailed in HPI, otherwise negative.     VITALS/PHYSICAL EXAM:    Vital Signs Last 24 Hrs  T(C): 36.6 (2023 19:13), Max: 36.7 (2023 10:01)  T(F): 97.9 (2023 19:13), Max: 98 (2023 10:01)  HR: 73 (2023 19:13) (73 - 92)  BP: 100/67 (2023 19:13) (100/67 - 170/105)  RR: 18 (2023 19:13) (18 - 20)  SpO2: 96% (2023 19:13) (96% - 97%)  Patient On (Oxygen Delivery Method): room air    Physical Exam:  Gen: no acute distress  MS: alert, conversing normally  Eyes: EOMI, no icterus  HENT: NCAT, MMM  CV: rhythm reg reg, rate normal, no m/g/r, no LE edema  Chest: CTAB, no w/r/r,  Abd: soft, NT, ND  Neuro: moving all 4 limbs spontaneously, no tremor  MSK: normal bulk and tone, no joint swelling  Skin: dry, warm, no rash or jaundice    LABS/STUDIES:      152<H>  |  116<H>  |  27.4<H>  ----------------------------<  79  4.0   |  27.0  |  1.13    Ca    9.5      2023 11:46  Mg     2.0         TPro  7.8  /  Alb  4.1  /  TBili  0.4  /  DBili  x   /  AST  22  /  ALT  25  /  AlkPhos  117    CBC Full  -  ( 2023 11:46 )  WBC Count : 4.82 K/uL  RBC Count : 4.17 M/uL  Hemoglobin : 11.0 g/dL  Hematocrit : 36.5 %  Platelet Count - Automated : 179 K/uL  Mean Cell Volume : 87.5 fl  Mean Cell Hemoglobin : 26.4 pg  Mean Cell Hemoglobin Concentration : 30.1 gm/dL  Auto Neutrophil # : 2.82 K/uL  Auto Lymphocyte # : 1.54 K/uL  Auto Monocyte # : 0.37 K/uL  Auto Eosinophil # : 0.03 K/uL  Auto Basophil # : 0.04 K/uL  Auto Neutrophil % : 58.5 %  Auto Lymphocyte % : 32.0 %  Auto Monocyte % : 7.7 %  Auto Eosinophil % : 0.6 %  Auto Basophil % : 0.8 %    Urinalysis Basic - ( 2023 13:35 )  Color: Yellow / Appearance: Clear / S.010 / pH: x  Gluc: x / Ketone: Negative  / Bili: Negative / Urobili: Negative mg/dL   Blood: x / Protein: Negative / Nitrite: Negative   Leuk Esterase: Moderate / RBC: Negative /HPF / WBC 6-10 /HPF   Sq Epi: x / Non Sq Epi: x / Bacteria: Occasional

## 2023-06-10 NOTE — PROGRESS NOTE ADULT - ASSESSMENT
57y/oF PMH SAH s/p Brain aneurysm clippingx 2, DI, Seizures 2/2 hypo or  hypernatremia presenting with Hypernatremia    Acute metabolic encephalopathy   Hypernatremia related to DI   goal Na 145-147 per Family  CT head, CTA - neg for acute abnormalities   BMP TID  Nephrology following  Currently Na 132. Received Hypertonic with some improvement. Discussed with Nephrology. Plan for Salt tabs today     hypothermia: f/u cultures  s/p  rocephin   Urine culture negative     hx seizures - ct keppra     Morning cbc, bmp reviewed. Morning CBC, BMP ordered  DVT ppx: lovenox  Dispo: Patient remains Acute     Informed by floor RN early this morning that patient's  was calling for updates regarding Na.  Called  at bedside while evaluating the patient. Upon calling, I introduced myself by name as the primary medical doctor today.  was loud and verbally aggressive regarding the Na level. Seemed angry that the level was not between 146-148. It was explained that the Na was 132 today.  continued to be loud and was angry that the physicians have not yet corrected the sodium. It was explained that the sodium has been checked several times per day and Nephrology provided hypertonic saline yesterday successfully increased Na from 130 to 132 and will be planning to provide Salt Tabs today in an attempt to bring the Na to goal.  continued be loud, aggressive, and mad and inconsolable despite reassurance that there was a plan in place to manage the patient.  It was alter revealed to another physician that the  states that he would punch Dr. He  (Author) in the face if he were to see him. This was escalated to Medicine Admin, ADN, Charge Nurse, Security.     57y/oF PMH SAH s/p Brain aneurysm clippingx 2, DI, Seizures 2/2 hypo or  hypernatremia presenting with Hypernatremia    Acute metabolic encephalopathy   Hypernatremia related to DI   goal Na 145-147 per Family  CT head, CTA - neg for acute abnormalities   BMP TID  Nephrology following  Currently Na 132. Received Hypertonic with some improvement. Discussed with Nephrology. Plan for Salt tabs today  Endocrine consulted    hypothermia: f/u cultures  s/p  rocephin   Urine culture negative     hx seizures - ct keppra     Morning cbc, bmp reviewed. Morning CBC, BMP ordered  DVT ppx: lovenox  Dispo: Patient remains Acute   Discussed with Nephrology    Informed by floor RN early this morning that patient's  was calling for updates regarding Na.  Called  at bedside while evaluating the patient. Upon calling, I introduced myself by name as the primary medical doctor today.  was loud and verbally aggressive regarding the Na level. Seemed angry that the level was not between 146-148. It was explained that the Na was 132 today.  continued to be loud and was angry that the physicians have not yet corrected the sodium. It was explained that the sodium has been checked several times per day and Nephrology provided hypertonic saline yesterday successfully increased Na from 130 to 132 and will be planning to provide Salt Tabs today in an attempt to bring the Na to goal.  continued be loud, aggressive, and mad and inconsolable despite reassurance that there was a plan in place to manage the patient.  It was alter revealed to another physician that the  states that he would punch Dr. He  (Author) in the face if he were to see him. This was escalated to Medicine Admin, ADN, Charge Nurse, Security.

## 2023-06-10 NOTE — PROGRESS NOTE ADULT - ASSESSMENT
58 yo F w h/o SAH s/p Brain aneurysm clipping, central DI, Seizures 2/2 hypo or  hypernatremia   Pt was BIBA after she was was reported to have wandered off. pt called police " I got lost". In ER, She was found to be altered, confused    Central DI/ HyperNatremia  Likely 2/2  SAH s/p Brain aneurysm  Pt is on strict fluid control and DDAVP at home  IVF adjusted to slow correction of her elevated Na  DDAVP stopped  Will give one dose of NaCL 1 gm x1 now    Repeat labs at noon and 6pm     Discussed plans w pt and her  Angel on phone [phone]    AM labs will follow   56 yo F w h/o SAH s/p Brain aneurysm clipping, central DI, Seizures 2/2 hypo or  hypernatremia   Pt was BIBA after she was was reported to have wandered off. pt called police " I got lost". In ER, She was found to be altered, confused    Central DI/ HyperNatremia  Likely 2/2  SAH s/p Brain aneurysm  Pt is on strict fluid control and DDAVP at home  IVF adjusted to slow correction of her elevated Na  DDAVP stopped  Will give one dose of NaCL 1 gm x1 now    Repeat labs at noon and 6pm     Discussed plans w pt and her  Angel on phone [phone]  Pt  Angel on phone was agitated regarding sodium fluctuations  I reassured him that pt was stable and explained plan in detail spent 20 min  Pt  Angel remained agitated and told me he was mad at Dr He and told me he would "punch Dr BANUELOS in the face" if he saw him  I did my best to calm pt down and reassure him that we were taking good care of his wife    Will follow

## 2023-06-11 LAB
ANION GAP SERPL CALC-SCNC: 12 MMOL/L — SIGNIFICANT CHANGE UP (ref 5–17)
ANION GAP SERPL CALC-SCNC: 13 MMOL/L — SIGNIFICANT CHANGE UP (ref 5–17)
ANION GAP SERPL CALC-SCNC: 13 MMOL/L — SIGNIFICANT CHANGE UP (ref 5–17)
BASOPHILS # BLD AUTO: 0.03 K/UL — SIGNIFICANT CHANGE UP (ref 0–0.2)
BASOPHILS NFR BLD AUTO: 0.7 % — SIGNIFICANT CHANGE UP (ref 0–2)
BUN SERPL-MCNC: 32.1 MG/DL — HIGH (ref 8–20)
BUN SERPL-MCNC: 35.5 MG/DL — HIGH (ref 8–20)
BUN SERPL-MCNC: 47.4 MG/DL — HIGH (ref 8–20)
CALCIUM SERPL-MCNC: 8.7 MG/DL — SIGNIFICANT CHANGE UP (ref 8.4–10.5)
CALCIUM SERPL-MCNC: 8.7 MG/DL — SIGNIFICANT CHANGE UP (ref 8.4–10.5)
CALCIUM SERPL-MCNC: 8.8 MG/DL — SIGNIFICANT CHANGE UP (ref 8.4–10.5)
CHLORIDE SERPL-SCNC: 100 MMOL/L — SIGNIFICANT CHANGE UP (ref 96–108)
CHLORIDE SERPL-SCNC: 101 MMOL/L — SIGNIFICANT CHANGE UP (ref 96–108)
CHLORIDE SERPL-SCNC: 106 MMOL/L — SIGNIFICANT CHANGE UP (ref 96–108)
CO2 SERPL-SCNC: 23 MMOL/L — SIGNIFICANT CHANGE UP (ref 22–29)
CO2 SERPL-SCNC: 23 MMOL/L — SIGNIFICANT CHANGE UP (ref 22–29)
CO2 SERPL-SCNC: 25 MMOL/L — SIGNIFICANT CHANGE UP (ref 22–29)
CREAT SERPL-MCNC: 1.45 MG/DL — HIGH (ref 0.5–1.3)
CREAT SERPL-MCNC: 1.65 MG/DL — HIGH (ref 0.5–1.3)
CREAT SERPL-MCNC: 1.93 MG/DL — HIGH (ref 0.5–1.3)
EGFR: 29 ML/MIN/1.73M2 — LOW
EGFR: 36 ML/MIN/1.73M2 — LOW
EGFR: 42 ML/MIN/1.73M2 — LOW
EOSINOPHIL # BLD AUTO: 0.06 K/UL — SIGNIFICANT CHANGE UP (ref 0–0.5)
EOSINOPHIL NFR BLD AUTO: 1.4 % — SIGNIFICANT CHANGE UP (ref 0–6)
GLUCOSE SERPL-MCNC: 100 MG/DL — HIGH (ref 70–99)
GLUCOSE SERPL-MCNC: 63 MG/DL — LOW (ref 70–99)
GLUCOSE SERPL-MCNC: 80 MG/DL — SIGNIFICANT CHANGE UP (ref 70–99)
HCT VFR BLD CALC: 33.3 % — LOW (ref 34.5–45)
HGB BLD-MCNC: 10.6 G/DL — LOW (ref 11.5–15.5)
IMM GRANULOCYTES NFR BLD AUTO: 1.4 % — HIGH (ref 0–0.9)
LYMPHOCYTES # BLD AUTO: 1.89 K/UL — SIGNIFICANT CHANGE UP (ref 1–3.3)
LYMPHOCYTES # BLD AUTO: 43.8 % — SIGNIFICANT CHANGE UP (ref 13–44)
MAGNESIUM SERPL-MCNC: 2.5 MG/DL — SIGNIFICANT CHANGE UP (ref 1.6–2.6)
MCHC RBC-ENTMCNC: 26.6 PG — LOW (ref 27–34)
MCHC RBC-ENTMCNC: 31.8 GM/DL — LOW (ref 32–36)
MCV RBC AUTO: 83.5 FL — SIGNIFICANT CHANGE UP (ref 80–100)
MONOCYTES # BLD AUTO: 0.51 K/UL — SIGNIFICANT CHANGE UP (ref 0–0.9)
MONOCYTES NFR BLD AUTO: 11.8 % — SIGNIFICANT CHANGE UP (ref 2–14)
NEUTROPHILS # BLD AUTO: 1.77 K/UL — LOW (ref 1.8–7.4)
NEUTROPHILS NFR BLD AUTO: 40.9 % — LOW (ref 43–77)
PHOSPHATE SERPL-MCNC: 4 MG/DL — SIGNIFICANT CHANGE UP (ref 2.4–4.7)
PLATELET # BLD AUTO: 175 K/UL — SIGNIFICANT CHANGE UP (ref 150–400)
POTASSIUM SERPL-MCNC: 4.4 MMOL/L — SIGNIFICANT CHANGE UP (ref 3.5–5.3)
POTASSIUM SERPL-MCNC: 4.7 MMOL/L — SIGNIFICANT CHANGE UP (ref 3.5–5.3)
POTASSIUM SERPL-MCNC: 4.7 MMOL/L — SIGNIFICANT CHANGE UP (ref 3.5–5.3)
POTASSIUM SERPL-SCNC: 4.4 MMOL/L — SIGNIFICANT CHANGE UP (ref 3.5–5.3)
POTASSIUM SERPL-SCNC: 4.7 MMOL/L — SIGNIFICANT CHANGE UP (ref 3.5–5.3)
POTASSIUM SERPL-SCNC: 4.7 MMOL/L — SIGNIFICANT CHANGE UP (ref 3.5–5.3)
RBC # BLD: 3.99 M/UL — SIGNIFICANT CHANGE UP (ref 3.8–5.2)
RBC # FLD: 13.2 % — SIGNIFICANT CHANGE UP (ref 10.3–14.5)
SODIUM SERPL-SCNC: 137 MMOL/L — SIGNIFICANT CHANGE UP (ref 135–145)
SODIUM SERPL-SCNC: 138 MMOL/L — SIGNIFICANT CHANGE UP (ref 135–145)
SODIUM SERPL-SCNC: 141 MMOL/L — SIGNIFICANT CHANGE UP (ref 135–145)
WBC # BLD: 4.32 K/UL — SIGNIFICANT CHANGE UP (ref 3.8–10.5)
WBC # FLD AUTO: 4.32 K/UL — SIGNIFICANT CHANGE UP (ref 3.8–10.5)

## 2023-06-11 PROCEDURE — 99233 SBSQ HOSP IP/OBS HIGH 50: CPT

## 2023-06-11 RX ORDER — SODIUM CHLORIDE 9 MG/ML
1 INJECTION INTRAMUSCULAR; INTRAVENOUS; SUBCUTANEOUS ONCE
Refills: 0 | Status: COMPLETED | OUTPATIENT
Start: 2023-06-11 | End: 2023-06-11

## 2023-06-11 RX ADMIN — SODIUM CHLORIDE 1 GRAM(S): 9 INJECTION INTRAMUSCULAR; INTRAVENOUS; SUBCUTANEOUS at 09:10

## 2023-06-11 RX ADMIN — MAGNESIUM OXIDE 400 MG ORAL TABLET 400 MILLIGRAM(S): 241.3 TABLET ORAL at 17:22

## 2023-06-11 RX ADMIN — MAGNESIUM OXIDE 400 MG ORAL TABLET 400 MILLIGRAM(S): 241.3 TABLET ORAL at 12:23

## 2023-06-11 RX ADMIN — Medication 650 MILLIGRAM(S): at 23:25

## 2023-06-11 RX ADMIN — Medication 650 MILLIGRAM(S): at 23:55

## 2023-06-11 RX ADMIN — LEVETIRACETAM 500 MILLIGRAM(S): 250 TABLET, FILM COATED ORAL at 17:22

## 2023-06-11 RX ADMIN — ENOXAPARIN SODIUM 40 MILLIGRAM(S): 100 INJECTION SUBCUTANEOUS at 05:42

## 2023-06-11 RX ADMIN — MAGNESIUM OXIDE 400 MG ORAL TABLET 400 MILLIGRAM(S): 241.3 TABLET ORAL at 09:10

## 2023-06-11 RX ADMIN — LEVETIRACETAM 500 MILLIGRAM(S): 250 TABLET, FILM COATED ORAL at 05:42

## 2023-06-11 RX ADMIN — Medication 1 MILLIGRAM(S): at 12:23

## 2023-06-11 RX ADMIN — Medication 1 TABLET(S): at 12:23

## 2023-06-11 RX ADMIN — PANTOPRAZOLE SODIUM 40 MILLIGRAM(S): 20 TABLET, DELAYED RELEASE ORAL at 05:42

## 2023-06-11 NOTE — PROGRESS NOTE ADULT - ASSESSMENT
57y/oF PMH SAH s/p Brain aneurysm clippingx 2, DI, Seizures 2/2 hypo or  hypernatremia presenting with Hypernatremia    Acute metabolic encephalopathy   Hypernatremia related to DI   goal Na 145-147 per Family  CT head, CTA - neg for acute abnormalities   BMP TID  Nephrology following  Sodium Improved after salt tab  Endocrine and Nephrology following  Additional salt tab today    hypothermia: f/u cultures  s/p  rocephin   Urine culture negative     hx seizures - ct keppra     Morning cbc, bmp reviewed. Morning CBC, BMP ordered  DVT ppx: lovenox  Dispo: Patient remains Acute   Discussed with Nephrology

## 2023-06-11 NOTE — PROGRESS NOTE ADULT - ASSESSMENT
56 yo F w h/o SAH s/p Brain aneurysm clipping, central DI, Seizures 2/2 hypo or  hypernatremia   Pt was BIBA after she was was reported to have wandered off. pt called police " I got lost". In ER, She was found to be altered, confused    Central DI/ HyperNatremia  Likely 2/2  SAH s/p Brain aneurysm  Pt is on strict fluid control and DDAVP at home  Off IVF for now  DDAVP stopped for now  Will give one dose of NaCL 1 gm x1 now  Appreciate Endocrine input once serum Na stabilizes will add Florinef to DDAVP for long term management     Repeat labs at noon and 6pm     Will follow

## 2023-06-12 LAB
ANION GAP SERPL CALC-SCNC: 10 MMOL/L — SIGNIFICANT CHANGE UP (ref 5–17)
ANION GAP SERPL CALC-SCNC: 12 MMOL/L — SIGNIFICANT CHANGE UP (ref 5–17)
BASOPHILS # BLD AUTO: 0.03 K/UL — SIGNIFICANT CHANGE UP (ref 0–0.2)
BASOPHILS NFR BLD AUTO: 0.6 % — SIGNIFICANT CHANGE UP (ref 0–2)
BUN SERPL-MCNC: 49.5 MG/DL — HIGH (ref 8–20)
BUN SERPL-MCNC: 52.2 MG/DL — HIGH (ref 8–20)
CALCIUM SERPL-MCNC: 8.7 MG/DL — SIGNIFICANT CHANGE UP (ref 8.4–10.5)
CALCIUM SERPL-MCNC: 8.9 MG/DL — SIGNIFICANT CHANGE UP (ref 8.4–10.5)
CHLORIDE SERPL-SCNC: 108 MMOL/L — SIGNIFICANT CHANGE UP (ref 96–108)
CHLORIDE SERPL-SCNC: 109 MMOL/L — HIGH (ref 96–108)
CO2 SERPL-SCNC: 23 MMOL/L — SIGNIFICANT CHANGE UP (ref 22–29)
CO2 SERPL-SCNC: 26 MMOL/L — SIGNIFICANT CHANGE UP (ref 22–29)
CREAT SERPL-MCNC: 1.57 MG/DL — HIGH (ref 0.5–1.3)
CREAT SERPL-MCNC: 1.67 MG/DL — HIGH (ref 0.5–1.3)
EGFR: 35 ML/MIN/1.73M2 — LOW
EGFR: 38 ML/MIN/1.73M2 — LOW
EOSINOPHIL # BLD AUTO: 0.08 K/UL — SIGNIFICANT CHANGE UP (ref 0–0.5)
EOSINOPHIL NFR BLD AUTO: 1.5 % — SIGNIFICANT CHANGE UP (ref 0–6)
GLUCOSE SERPL-MCNC: 108 MG/DL — HIGH (ref 70–99)
GLUCOSE SERPL-MCNC: 63 MG/DL — LOW (ref 70–99)
HCT VFR BLD CALC: 32.6 % — LOW (ref 34.5–45)
HGB BLD-MCNC: 10 G/DL — LOW (ref 11.5–15.5)
IMM GRANULOCYTES NFR BLD AUTO: 1.7 % — HIGH (ref 0–0.9)
LYMPHOCYTES # BLD AUTO: 1.8 K/UL — SIGNIFICANT CHANGE UP (ref 1–3.3)
LYMPHOCYTES # BLD AUTO: 34.2 % — SIGNIFICANT CHANGE UP (ref 13–44)
MAGNESIUM SERPL-MCNC: 3.3 MG/DL — HIGH (ref 1.6–2.6)
MCHC RBC-ENTMCNC: 26.2 PG — LOW (ref 27–34)
MCHC RBC-ENTMCNC: 30.7 GM/DL — LOW (ref 32–36)
MCV RBC AUTO: 85.6 FL — SIGNIFICANT CHANGE UP (ref 80–100)
MONOCYTES # BLD AUTO: 0.69 K/UL — SIGNIFICANT CHANGE UP (ref 0–0.9)
MONOCYTES NFR BLD AUTO: 13.1 % — SIGNIFICANT CHANGE UP (ref 2–14)
NEUTROPHILS # BLD AUTO: 2.57 K/UL — SIGNIFICANT CHANGE UP (ref 1.8–7.4)
NEUTROPHILS NFR BLD AUTO: 48.9 % — SIGNIFICANT CHANGE UP (ref 43–77)
PHOSPHATE SERPL-MCNC: 3.6 MG/DL — SIGNIFICANT CHANGE UP (ref 2.4–4.7)
PLATELET # BLD AUTO: 190 K/UL — SIGNIFICANT CHANGE UP (ref 150–400)
POTASSIUM SERPL-MCNC: 4.5 MMOL/L — SIGNIFICANT CHANGE UP (ref 3.5–5.3)
POTASSIUM SERPL-MCNC: 5.3 MMOL/L — SIGNIFICANT CHANGE UP (ref 3.5–5.3)
POTASSIUM SERPL-SCNC: 4.5 MMOL/L — SIGNIFICANT CHANGE UP (ref 3.5–5.3)
POTASSIUM SERPL-SCNC: 5.3 MMOL/L — SIGNIFICANT CHANGE UP (ref 3.5–5.3)
RBC # BLD: 3.81 M/UL — SIGNIFICANT CHANGE UP (ref 3.8–5.2)
RBC # FLD: 13.7 % — SIGNIFICANT CHANGE UP (ref 10.3–14.5)
SODIUM SERPL-SCNC: 144 MMOL/L — SIGNIFICANT CHANGE UP (ref 135–145)
SODIUM SERPL-SCNC: 144 MMOL/L — SIGNIFICANT CHANGE UP (ref 135–145)
WBC # BLD: 5.26 K/UL — SIGNIFICANT CHANGE UP (ref 3.8–10.5)
WBC # FLD AUTO: 5.26 K/UL — SIGNIFICANT CHANGE UP (ref 3.8–10.5)

## 2023-06-12 PROCEDURE — 99231 SBSQ HOSP IP/OBS SF/LOW 25: CPT

## 2023-06-12 PROCEDURE — 99233 SBSQ HOSP IP/OBS HIGH 50: CPT

## 2023-06-12 RX ADMIN — LEVETIRACETAM 500 MILLIGRAM(S): 250 TABLET, FILM COATED ORAL at 17:36

## 2023-06-12 RX ADMIN — Medication 1 TABLET(S): at 12:21

## 2023-06-12 RX ADMIN — LEVETIRACETAM 500 MILLIGRAM(S): 250 TABLET, FILM COATED ORAL at 05:26

## 2023-06-12 RX ADMIN — Medication 30 MILLILITER(S): at 17:36

## 2023-06-12 RX ADMIN — ENOXAPARIN SODIUM 40 MILLIGRAM(S): 100 INJECTION SUBCUTANEOUS at 05:26

## 2023-06-12 RX ADMIN — Medication 1 MILLIGRAM(S): at 12:21

## 2023-06-12 RX ADMIN — PANTOPRAZOLE SODIUM 40 MILLIGRAM(S): 20 TABLET, DELAYED RELEASE ORAL at 05:26

## 2023-06-12 NOTE — PROGRESS NOTE ADULT - ASSESSMENT
57y/oF PMH SAH s/p Brain aneurysm clippingx 2, DI, Seizures 2/2 hypo or hypernatremia was BIBA after she was was reported to have wandered off. In ER, She was found to be altered, confused, daughter reported issues with sodium in past. Pt. known to our service from a prior hospital stay. She developed diabetes insipidus following a subarachnoid hemorrhage, and has had great difficulty both in hospital and at home in maintaining a physiologic sodium level, with swings from hypernatremia to hyponatremia.     1. Central DI/Hypernatremia - likely secondary to SAH  -   - Recommend to hold further NaCl tabs to see how she responds  - Monitor I&Os, serum sodium, serum os, urine os, urine sodium  - May add florinef with DDAVP for long term management

## 2023-06-12 NOTE — PROGRESS NOTE ADULT - ASSESSMENT
58 yo F w h/o SAH s/p Brain aneurysm clipping, central DI, Seizures 2/2 hypo or  hypernatremia   Pt was BIBA after she was was reported to have wandered off. pt called police " I got lost". In ER, She was found to be altered, confused    Central DI/ HyperNatremia  Likely 2/2  SAH s/p Brain aneurysm  Pt is on strict fluid control and DDAVP at home  Off IVF for now  DDAVP stopped for now  s/p  NaCL 1 gm yest  Appreciate Endocrine input once serum Na stabilizes will add Florinef to DDAVP for long term management     Repeat labs at 4pm     Will follow

## 2023-06-12 NOTE — PROGRESS NOTE ADULT - ASSESSMENT
57y/oF PMH SAH s/p Brain aneurysm clippingx 2, DI, Seizures 2/2 hypo or  hypernatremia presenting with Hypernatremia    Acute metabolic encephalopathy   Hypernatremia related to DI   goal Na 145-147 per Family  CT head, CTA - neg for acute abnormalities   BMP TID  Nephrology following  Sodium Improved after salt tab  Endocrine and Nephrology following  Na improved with salt tabs  Will discuss labs with nephrology     hypothermia:  s/p  rocephin   Urine culture negative     hx seizures - ct keppra     Morning cbc, bmp reviewed. Morning CBC, BMP ordered  DVT ppx: lovenox  Dispo: Patient remains Acute   Discussed with Nephrology

## 2023-06-13 LAB
ANION GAP SERPL CALC-SCNC: 11 MMOL/L — SIGNIFICANT CHANGE UP (ref 5–17)
ANION GAP SERPL CALC-SCNC: 11 MMOL/L — SIGNIFICANT CHANGE UP (ref 5–17)
BASOPHILS # BLD AUTO: 0.03 K/UL — SIGNIFICANT CHANGE UP (ref 0–0.2)
BASOPHILS NFR BLD AUTO: 0.6 % — SIGNIFICANT CHANGE UP (ref 0–2)
BUN SERPL-MCNC: 48 MG/DL — HIGH (ref 8–20)
BUN SERPL-MCNC: 48.2 MG/DL — HIGH (ref 8–20)
CALCIUM SERPL-MCNC: 9 MG/DL — SIGNIFICANT CHANGE UP (ref 8.4–10.5)
CALCIUM SERPL-MCNC: 9.1 MG/DL — SIGNIFICANT CHANGE UP (ref 8.4–10.5)
CHLORIDE SERPL-SCNC: 113 MMOL/L — HIGH (ref 96–108)
CHLORIDE SERPL-SCNC: 115 MMOL/L — HIGH (ref 96–108)
CO2 SERPL-SCNC: 25 MMOL/L — SIGNIFICANT CHANGE UP (ref 22–29)
CO2 SERPL-SCNC: 27 MMOL/L — SIGNIFICANT CHANGE UP (ref 22–29)
CREAT SERPL-MCNC: 1.3 MG/DL — SIGNIFICANT CHANGE UP (ref 0.5–1.3)
CREAT SERPL-MCNC: 1.72 MG/DL — HIGH (ref 0.5–1.3)
EGFR: 34 ML/MIN/1.73M2 — LOW
EGFR: 47 ML/MIN/1.73M2 — LOW
EOSINOPHIL # BLD AUTO: 0.08 K/UL — SIGNIFICANT CHANGE UP (ref 0–0.5)
EOSINOPHIL NFR BLD AUTO: 1.5 % — SIGNIFICANT CHANGE UP (ref 0–6)
GLUCOSE SERPL-MCNC: 102 MG/DL — HIGH (ref 70–99)
GLUCOSE SERPL-MCNC: 77 MG/DL — SIGNIFICANT CHANGE UP (ref 70–99)
HCT VFR BLD CALC: 36.2 % — SIGNIFICANT CHANGE UP (ref 34.5–45)
HGB BLD-MCNC: 10.5 G/DL — LOW (ref 11.5–15.5)
IMM GRANULOCYTES NFR BLD AUTO: 1.4 % — HIGH (ref 0–0.9)
LYMPHOCYTES # BLD AUTO: 1.38 K/UL — SIGNIFICANT CHANGE UP (ref 1–3.3)
LYMPHOCYTES # BLD AUTO: 26.7 % — SIGNIFICANT CHANGE UP (ref 13–44)
MAGNESIUM SERPL-MCNC: 3.4 MG/DL — HIGH (ref 1.6–2.6)
MCHC RBC-ENTMCNC: 25.9 PG — LOW (ref 27–34)
MCHC RBC-ENTMCNC: 29 GM/DL — LOW (ref 32–36)
MCV RBC AUTO: 89.2 FL — SIGNIFICANT CHANGE UP (ref 80–100)
MONOCYTES # BLD AUTO: 0.4 K/UL — SIGNIFICANT CHANGE UP (ref 0–0.9)
MONOCYTES NFR BLD AUTO: 7.7 % — SIGNIFICANT CHANGE UP (ref 2–14)
NEUTROPHILS # BLD AUTO: 3.21 K/UL — SIGNIFICANT CHANGE UP (ref 1.8–7.4)
NEUTROPHILS NFR BLD AUTO: 62.1 % — SIGNIFICANT CHANGE UP (ref 43–77)
PHOSPHATE SERPL-MCNC: 3.5 MG/DL — SIGNIFICANT CHANGE UP (ref 2.4–4.7)
PLATELET # BLD AUTO: 182 K/UL — SIGNIFICANT CHANGE UP (ref 150–400)
POTASSIUM SERPL-MCNC: 5 MMOL/L — SIGNIFICANT CHANGE UP (ref 3.5–5.3)
POTASSIUM SERPL-MCNC: 5.3 MMOL/L — SIGNIFICANT CHANGE UP (ref 3.5–5.3)
POTASSIUM SERPL-SCNC: 5 MMOL/L — SIGNIFICANT CHANGE UP (ref 3.5–5.3)
POTASSIUM SERPL-SCNC: 5.3 MMOL/L — SIGNIFICANT CHANGE UP (ref 3.5–5.3)
RBC # BLD: 4.06 M/UL — SIGNIFICANT CHANGE UP (ref 3.8–5.2)
RBC # FLD: 14 % — SIGNIFICANT CHANGE UP (ref 10.3–14.5)
SODIUM SERPL-SCNC: 151 MMOL/L — HIGH (ref 135–145)
SODIUM SERPL-SCNC: 151 MMOL/L — HIGH (ref 135–145)
WBC # BLD: 5.17 K/UL — SIGNIFICANT CHANGE UP (ref 3.8–10.5)
WBC # FLD AUTO: 5.17 K/UL — SIGNIFICANT CHANGE UP (ref 3.8–10.5)

## 2023-06-13 PROCEDURE — 99233 SBSQ HOSP IP/OBS HIGH 50: CPT

## 2023-06-13 PROCEDURE — 99231 SBSQ HOSP IP/OBS SF/LOW 25: CPT

## 2023-06-13 RX ORDER — DESMOPRESSIN ACETATE 0.1 MG/1
1 TABLET ORAL ONCE
Refills: 0 | Status: COMPLETED | OUTPATIENT
Start: 2023-06-13 | End: 2023-06-13

## 2023-06-13 RX ADMIN — LEVETIRACETAM 500 MILLIGRAM(S): 250 TABLET, FILM COATED ORAL at 05:14

## 2023-06-13 RX ADMIN — ENOXAPARIN SODIUM 40 MILLIGRAM(S): 100 INJECTION SUBCUTANEOUS at 05:14

## 2023-06-13 RX ADMIN — PANTOPRAZOLE SODIUM 40 MILLIGRAM(S): 20 TABLET, DELAYED RELEASE ORAL at 05:14

## 2023-06-13 RX ADMIN — Medication 1 MILLIGRAM(S): at 12:59

## 2023-06-13 RX ADMIN — Medication 1 TABLET(S): at 14:37

## 2023-06-13 RX ADMIN — DESMOPRESSIN ACETATE 1 MICROGRAM(S): 0.1 TABLET ORAL at 18:50

## 2023-06-13 RX ADMIN — LEVETIRACETAM 500 MILLIGRAM(S): 250 TABLET, FILM COATED ORAL at 18:46

## 2023-06-13 NOTE — PROGRESS NOTE ADULT - ASSESSMENT
57y/oF PMH SAH s/p Brain aneurysm clippingx 2, DI, Seizures 2/2 hypo or  hypernatremia presenting with Hypernatremia    Acute metabolic encephalopathy   Hypernatremia related to DI   goal Na 145-147 per Family  CT head, CTA - neg for acute abnormalities   BMP TID  Nephrology following  Sodium Improved after salt tab  Endocrine and Nephrology following  Na improved with salt tabs  Will discuss labs with nephrology   Possible restart ddavp and or fludrocort    hypothermia:  s/p  rocephin   Urine culture negative     hx seizures - ct keppra     Morning cbc, bmp reviewed. Morning CBC, BMP ordered  DVT ppx: lovenox  Dispo: Patient remains Acute   Discussed with Nephrology

## 2023-06-13 NOTE — PROGRESS NOTE ADULT - ASSESSMENT
57y/oF PMH SAH s/p Brain aneurysm clippingx 2, DI, Seizures 2/2 hypo or hypernatremia was BIBA after she was was reported to have wandered off. In ER, She was found to be altered, confused, daughter reported issues with sodium in past. Pt. known to our service from a prior hospital stay. She developed diabetes insipidus following a subarachnoid hemorrhage, and has had great difficulty both in hospital and at home in maintaining a physiologic sodium level, with swings from hypernatremia to hyponatremia.     1. Central DI/Hypernatremia - likely secondary to SAH  -   - Hold further NaCl tabs, monitor daily fluid intake to see if it is consistent   - Restart ddvap at 0.1mg daily  - Monitor I&Os, serum sodium, serum os, urine os, urine sodium  - May add florinef with DDAVP for long term management

## 2023-06-13 NOTE — PROGRESS NOTE ADULT - ASSESSMENT
56 yo F w h/o SAH s/p Brain aneurysm clipping, central DI, Seizures 2/2 hypo or  hypernatremia   Pt was BIBA after she was was reported to have wandered off. pt called police " I got lost". In ER, She was found to be altered, confused    Central DI/ HyperNatremia  Likely 2/2  SAH s/p Brain aneurysm  Pt is on strict fluid control and DDAVP at home  Serum Na is very sensitive has been fluctuating  DDAVP 1 mcg sq x 1 now  stop NACl tablets  Appreciate Endocrine input once serum Na stabilizes will add Florinef to DDAVP for long term management     Repeat BMP now and at 8 PM     Will follow

## 2023-06-14 ENCOUNTER — TRANSCRIPTION ENCOUNTER (OUTPATIENT)
Age: 58
End: 2023-06-14

## 2023-06-14 LAB
ANION GAP SERPL CALC-SCNC: 10 MMOL/L — SIGNIFICANT CHANGE UP (ref 5–17)
ANION GAP SERPL CALC-SCNC: 15 MMOL/L — SIGNIFICANT CHANGE UP (ref 5–17)
BASOPHILS # BLD AUTO: 0.03 K/UL — SIGNIFICANT CHANGE UP (ref 0–0.2)
BASOPHILS NFR BLD AUTO: 0.6 % — SIGNIFICANT CHANGE UP (ref 0–2)
BUN SERPL-MCNC: 49.8 MG/DL — HIGH (ref 8–20)
BUN SERPL-MCNC: 50 MG/DL — HIGH (ref 8–20)
CALCIUM SERPL-MCNC: 8.8 MG/DL — SIGNIFICANT CHANGE UP (ref 8.4–10.5)
CALCIUM SERPL-MCNC: 8.9 MG/DL — SIGNIFICANT CHANGE UP (ref 8.4–10.5)
CHLORIDE SERPL-SCNC: 112 MMOL/L — HIGH (ref 96–108)
CHLORIDE SERPL-SCNC: 117 MMOL/L — HIGH (ref 96–108)
CO2 SERPL-SCNC: 21 MMOL/L — LOW (ref 22–29)
CO2 SERPL-SCNC: 25 MMOL/L — SIGNIFICANT CHANGE UP (ref 22–29)
CREAT SERPL-MCNC: 1.4 MG/DL — HIGH (ref 0.5–1.3)
CREAT SERPL-MCNC: 1.47 MG/DL — HIGH (ref 0.5–1.3)
CULTURE RESULTS: SIGNIFICANT CHANGE UP
CULTURE RESULTS: SIGNIFICANT CHANGE UP
EGFR: 41 ML/MIN/1.73M2 — LOW
EGFR: 43 ML/MIN/1.73M2 — LOW
EOSINOPHIL # BLD AUTO: 0.05 K/UL — SIGNIFICANT CHANGE UP (ref 0–0.5)
EOSINOPHIL NFR BLD AUTO: 1 % — SIGNIFICANT CHANGE UP (ref 0–6)
GLUCOSE SERPL-MCNC: 126 MG/DL — HIGH (ref 70–99)
GLUCOSE SERPL-MCNC: 75 MG/DL — SIGNIFICANT CHANGE UP (ref 70–99)
HCT VFR BLD CALC: 32.4 % — LOW (ref 34.5–45)
HGB BLD-MCNC: 9.7 G/DL — LOW (ref 11.5–15.5)
IMM GRANULOCYTES NFR BLD AUTO: 1.5 % — HIGH (ref 0–0.9)
LYMPHOCYTES # BLD AUTO: 1.68 K/UL — SIGNIFICANT CHANGE UP (ref 1–3.3)
LYMPHOCYTES # BLD AUTO: 35.2 % — SIGNIFICANT CHANGE UP (ref 13–44)
MAGNESIUM SERPL-MCNC: 2.9 MG/DL — HIGH (ref 1.6–2.6)
MCHC RBC-ENTMCNC: 26.4 PG — LOW (ref 27–34)
MCHC RBC-ENTMCNC: 29.9 GM/DL — LOW (ref 32–36)
MCV RBC AUTO: 88 FL — SIGNIFICANT CHANGE UP (ref 80–100)
MONOCYTES # BLD AUTO: 0.55 K/UL — SIGNIFICANT CHANGE UP (ref 0–0.9)
MONOCYTES NFR BLD AUTO: 11.5 % — SIGNIFICANT CHANGE UP (ref 2–14)
NEUTROPHILS # BLD AUTO: 2.39 K/UL — SIGNIFICANT CHANGE UP (ref 1.8–7.4)
NEUTROPHILS NFR BLD AUTO: 50.2 % — SIGNIFICANT CHANGE UP (ref 43–77)
PHOSPHATE SERPL-MCNC: 3.6 MG/DL — SIGNIFICANT CHANGE UP (ref 2.4–4.7)
PLATELET # BLD AUTO: 190 K/UL — SIGNIFICANT CHANGE UP (ref 150–400)
POTASSIUM SERPL-MCNC: 4.7 MMOL/L — SIGNIFICANT CHANGE UP (ref 3.5–5.3)
POTASSIUM SERPL-MCNC: 5 MMOL/L — SIGNIFICANT CHANGE UP (ref 3.5–5.3)
POTASSIUM SERPL-SCNC: 4.7 MMOL/L — SIGNIFICANT CHANGE UP (ref 3.5–5.3)
POTASSIUM SERPL-SCNC: 5 MMOL/L — SIGNIFICANT CHANGE UP (ref 3.5–5.3)
RBC # BLD: 3.68 M/UL — LOW (ref 3.8–5.2)
RBC # FLD: 14 % — SIGNIFICANT CHANGE UP (ref 10.3–14.5)
SODIUM SERPL-SCNC: 148 MMOL/L — HIGH (ref 135–145)
SODIUM SERPL-SCNC: 152 MMOL/L — HIGH (ref 135–145)
SPECIMEN SOURCE: SIGNIFICANT CHANGE UP
SPECIMEN SOURCE: SIGNIFICANT CHANGE UP
WBC # BLD: 4.77 K/UL — SIGNIFICANT CHANGE UP (ref 3.8–10.5)
WBC # FLD AUTO: 4.77 K/UL — SIGNIFICANT CHANGE UP (ref 3.8–10.5)

## 2023-06-14 PROCEDURE — 99231 SBSQ HOSP IP/OBS SF/LOW 25: CPT

## 2023-06-14 PROCEDURE — 99233 SBSQ HOSP IP/OBS HIGH 50: CPT

## 2023-06-14 RX ORDER — DESMOPRESSIN ACETATE 0.1 MG/1
0.1 TABLET ORAL
Refills: 0 | Status: DISCONTINUED | OUTPATIENT
Start: 2023-06-14 | End: 2023-07-05

## 2023-06-14 RX ADMIN — LEVETIRACETAM 500 MILLIGRAM(S): 250 TABLET, FILM COATED ORAL at 17:17

## 2023-06-14 RX ADMIN — PANTOPRAZOLE SODIUM 40 MILLIGRAM(S): 20 TABLET, DELAYED RELEASE ORAL at 05:06

## 2023-06-14 RX ADMIN — Medication 1 TABLET(S): at 11:26

## 2023-06-14 RX ADMIN — Medication 1 MILLIGRAM(S): at 11:25

## 2023-06-14 RX ADMIN — ENOXAPARIN SODIUM 40 MILLIGRAM(S): 100 INJECTION SUBCUTANEOUS at 05:06

## 2023-06-14 RX ADMIN — DESMOPRESSIN ACETATE 0.1 MILLIGRAM(S): 0.1 TABLET ORAL at 17:17

## 2023-06-14 RX ADMIN — LEVETIRACETAM 500 MILLIGRAM(S): 250 TABLET, FILM COATED ORAL at 05:10

## 2023-06-14 NOTE — PROGRESS NOTE ADULT - ASSESSMENT
58 yo F w h/o SAH s/p Brain aneurysm clipping, central DI, Seizures 2/2 hypo or  hypernatremia   Pt was BIBA after she was was reported to have wandered off. pt called police " I got lost". In ER, She was found to be altered, confused    Central DI/ HyperNatremia  Likely 2/2  SAH s/p Brain aneurysm  Pt is on strict fluid control and DDAVP at home  Serum Na is very sensitive has been fluctuating    Add Desmopressin O.1 mg po bid   Watch UO and lab trend   BMP 6 pm and am   Record UO

## 2023-06-14 NOTE — DISCHARGE NOTE NURSING/CASE MANAGEMENT/SOCIAL WORK - NSDCPEFALRISK_GEN_ALL_CORE
For information on Fall & Injury Prevention, visit: [email or web address] OR  [email or web address] OR  https://www.cdc.gov/steadi/patient.html

## 2023-06-14 NOTE — DIETITIAN INITIAL EVALUATION ADULT - NS FNS ENTERAL CURRENT ORDER
Current diet order meets estimated nutrient requirements [Thin] : thin [Normal] : PERRL, extraocular movements intact, cranial nerves II-XII grossly intact [70: Both greater restriction of and less time spent in play activity.] : 70: Both greater restriction of and less time spent in play activity. [Pallor] : no pallor [Icterus] : not icterus [Ulcers] : no ulcers [de-identified] : anxious with right side of jaw completely obscured by swelling [de-identified] : scar on the left side of neck , right angle of the jaw completely obscured with a 2 cm firm  node that is slightly tender and a 1.5 cm node submental node that is firm and rock hard [de-identified] : tachycardic no mumurs appreciated [de-identified] : soft but slightly protuberant no appreciable HSM  [de-identified] : no axillary nodes appreciated [de-identified] : multiple bruises on bilateral arms seem to be getting a little better [de-identified] : dressing on right groin

## 2023-06-14 NOTE — DIETITIAN INITIAL EVALUATION ADULT - PERTINENT LABORATORY DATA
06-14    152<H>  |  117<H>  |  50.0<H>  ----------------------------<  75  5.0   |  25.0  |  1.47<H>    Ca    8.9      14 Jun 2023 06:25  Phos  3.6     06-14  Mg     2.9     06-14

## 2023-06-14 NOTE — DIETITIAN INITIAL EVALUATION ADULT - PERTINENT MEDS FT
MEDICATIONS  (STANDING):  desmopressin 0.1 milliGRAM(s) Oral two times a day  enoxaparin Injectable 40 milliGRAM(s) SubCutaneous every 24 hours  folic acid 1 milliGRAM(s) Oral daily  levETIRAcetam 500 milliGRAM(s) Oral two times a day  multivitamin/minerals 1 Tablet(s) Oral daily  pantoprazole    Tablet 40 milliGRAM(s) Oral before breakfast    MEDICATIONS  (PRN):  acetaminophen     Tablet .. 650 milliGRAM(s) Oral every 6 hours PRN Temp greater or equal to 38C (100.4F), Mild Pain (1 - 3)  aluminum hydroxide/magnesium hydroxide/simethicone Suspension 30 milliLiter(s) Oral every 4 hours PRN Dyspepsia  ondansetron Injectable 4 milliGRAM(s) IV Push every 8 hours PRN Nausea and/or Vomiting

## 2023-06-14 NOTE — PROGRESS NOTE ADULT - ASSESSMENT
57y/oF PMH SAH s/p Brain aneurysm clippingx 2, DI, Seizures 2/2 hypo or hypernatremia was BIBA after she was was reported to have wandered off. In ER, She was found to be altered, confused, daughter reported issues with sodium in past. Pt. known to our service from a prior hospital stay. She developed diabetes insipidus following a subarachnoid hemorrhage, and has had great difficulty both in hospital and at home in maintaining a physiologic sodium level, with swings from hypernatremia to hyponatremia.     1. Central DI/Hypernatremia - likely secondary to SAH  -   - Continue daily ddavp  - Monitor I&Os, serum sodium, serum os, urine os, urine sodium  - May add florinef with DDAVP for long term management 57y/oF PMH SAH s/p Brain aneurysm clippingx 2, DI, Seizures 2/2 hypo or hypernatremia was BIBA after she was was reported to have wandered off. In ER, She was found to be altered, confused, daughter reported issues with sodium in past. Pt. known to our service from a prior hospital stay. She developed diabetes insipidus following a subarachnoid hemorrhage, and has had great difficulty both in hospital and at home in maintaining a physiologic sodium level, with swings from hypernatremia to hyponatremia.     1. Central DI/Hypernatremia - likely secondary to SAH  -  today  - Continue daily ddavp  - Monitor I&Os  - May add florinef with DDAVP for long term management  -Avoid salt tabs.  -follow nephrology recs

## 2023-06-14 NOTE — DIETITIAN INITIAL EVALUATION ADULT - OTHER INFO
57y/oF PMH SAH s/p Brain aneurysm clippingx 2, DI, Seizures 2/2 hypo or  hypernatremia was BIBA after she was was reported to have wandered off. pt called police " I got lost". In ER, She was found to be altered, confused, daughter reported issues with sodium in past. Labs significant for Na 152. She has significant h/o PICC line hydration at home  for hypernatremia. her PICC line came out in May 2023. Patient is forgetful, Unable to provide much history thinks she has been doing well at home. Aware SNa, Mg, BUN and creat remain elevated at this time.

## 2023-06-14 NOTE — DIETITIAN INITIAL EVALUATION ADULT - ORAL INTAKE PTA/DIET HISTORY
Pt reports having a good appetite/PO intake completing >75% of meals. Pt follows a low Na diet at home, UBW ~152lbs reported has remained stable per Pt. Unable to obtain new bedscale weight at this time. Pt declines nutrition education, RD to remain available.

## 2023-06-14 NOTE — DIETITIAN INITIAL EVALUATION ADULT - ETIOLOGY
related to Central DI/Hypernatremia likely 2/2  SAH s/p Brain aneurysm, strict fluid control and DDVAP PTA

## 2023-06-14 NOTE — DISCHARGE NOTE NURSING/CASE MANAGEMENT/SOCIAL WORK - PATIENT PORTAL LINK FT
You can access the FollowMyHealth Patient Portal offered by [facility] by registering at the following website: [email or web address]. By joining [facility]’s FollowMyHealth portal, you will also be able to view your health information using other applications (apps) compatible with our system.

## 2023-06-14 NOTE — DIETITIAN INITIAL EVALUATION ADULT - NSICDXPASTMEDICALHX_GEN_ALL_CORE_FT
Dr. Blue
PAST MEDICAL HISTORY:  Brain aneurysm     Diabetes insipidus     HTN (Hypertension)     Memory loss, short term     Subarachnoid Hemorrhage

## 2023-06-14 NOTE — PROGRESS NOTE ADULT - ASSESSMENT
57y/oF PMH SAH s/p Brain aneurysm clippingx 2, DI, Seizures 2/2 hypo or  hypernatremia presenting with Hypernatremia    Acute metabolic encephalopathy   Hypernatremia related to DI   goal Na 145-147 per Family  CT head, CTA - neg for acute abnormalities   BMP TID  Nephrology following  Sodium Improved after salt tab  Endocrine and Nephrology following  Na improved with salt tabs  Adding ddavp    hypothermia:  s/p  rocephin   Urine culture negative     hx seizures - ct keppra     Morning cbc, bmp reviewed. Morning CBC, BMP ordered  DVT ppx: lovenox  Dispo: Patient remains Acute   Discussed with Nephrology

## 2023-06-15 LAB
ANION GAP SERPL CALC-SCNC: 11 MMOL/L — SIGNIFICANT CHANGE UP (ref 5–17)
ANION GAP SERPL CALC-SCNC: 11 MMOL/L — SIGNIFICANT CHANGE UP (ref 5–17)
ANION GAP SERPL CALC-SCNC: 12 MMOL/L — SIGNIFICANT CHANGE UP (ref 5–17)
BASOPHILS # BLD AUTO: 0.03 K/UL — SIGNIFICANT CHANGE UP (ref 0–0.2)
BASOPHILS NFR BLD AUTO: 0.7 % — SIGNIFICANT CHANGE UP (ref 0–2)
BUN SERPL-MCNC: 44.7 MG/DL — HIGH (ref 8–20)
BUN SERPL-MCNC: 48.4 MG/DL — HIGH (ref 8–20)
BUN SERPL-MCNC: 49.5 MG/DL — HIGH (ref 8–20)
CALCIUM SERPL-MCNC: 8.6 MG/DL — SIGNIFICANT CHANGE UP (ref 8.4–10.5)
CALCIUM SERPL-MCNC: 8.8 MG/DL — SIGNIFICANT CHANGE UP (ref 8.4–10.5)
CALCIUM SERPL-MCNC: 9 MG/DL — SIGNIFICANT CHANGE UP (ref 8.4–10.5)
CHLORIDE SERPL-SCNC: 115 MMOL/L — HIGH (ref 96–108)
CHLORIDE SERPL-SCNC: 116 MMOL/L — HIGH (ref 96–108)
CHLORIDE SERPL-SCNC: 118 MMOL/L — HIGH (ref 96–108)
CO2 SERPL-SCNC: 24 MMOL/L — SIGNIFICANT CHANGE UP (ref 22–29)
CO2 SERPL-SCNC: 24 MMOL/L — SIGNIFICANT CHANGE UP (ref 22–29)
CO2 SERPL-SCNC: 25 MMOL/L — SIGNIFICANT CHANGE UP (ref 22–29)
CREAT SERPL-MCNC: 1.43 MG/DL — HIGH (ref 0.5–1.3)
CREAT SERPL-MCNC: 1.62 MG/DL — HIGH (ref 0.5–1.3)
CREAT SERPL-MCNC: 1.68 MG/DL — HIGH (ref 0.5–1.3)
EGFR: 35 ML/MIN/1.73M2 — LOW
EGFR: 36 ML/MIN/1.73M2 — LOW
EGFR: 42 ML/MIN/1.73M2 — LOW
EOSINOPHIL # BLD AUTO: 0.05 K/UL — SIGNIFICANT CHANGE UP (ref 0–0.5)
EOSINOPHIL NFR BLD AUTO: 1.2 % — SIGNIFICANT CHANGE UP (ref 0–6)
GLUCOSE SERPL-MCNC: 81 MG/DL — SIGNIFICANT CHANGE UP (ref 70–99)
GLUCOSE SERPL-MCNC: 88 MG/DL — SIGNIFICANT CHANGE UP (ref 70–99)
GLUCOSE SERPL-MCNC: 90 MG/DL — SIGNIFICANT CHANGE UP (ref 70–99)
HCT VFR BLD CALC: 31.2 % — LOW (ref 34.5–45)
HGB BLD-MCNC: 9.2 G/DL — LOW (ref 11.5–15.5)
IMM GRANULOCYTES NFR BLD AUTO: 1.6 % — HIGH (ref 0–0.9)
LYMPHOCYTES # BLD AUTO: 1.8 K/UL — SIGNIFICANT CHANGE UP (ref 1–3.3)
LYMPHOCYTES # BLD AUTO: 41.7 % — SIGNIFICANT CHANGE UP (ref 13–44)
MAGNESIUM SERPL-MCNC: 2.6 MG/DL — SIGNIFICANT CHANGE UP (ref 1.6–2.6)
MCHC RBC-ENTMCNC: 26.1 PG — LOW (ref 27–34)
MCHC RBC-ENTMCNC: 29.5 GM/DL — LOW (ref 32–36)
MCV RBC AUTO: 88.6 FL — SIGNIFICANT CHANGE UP (ref 80–100)
MONOCYTES # BLD AUTO: 0.63 K/UL — SIGNIFICANT CHANGE UP (ref 0–0.9)
MONOCYTES NFR BLD AUTO: 14.6 % — HIGH (ref 2–14)
NEUTROPHILS # BLD AUTO: 1.74 K/UL — LOW (ref 1.8–7.4)
NEUTROPHILS NFR BLD AUTO: 40.2 % — LOW (ref 43–77)
PHOSPHATE SERPL-MCNC: 3.7 MG/DL — SIGNIFICANT CHANGE UP (ref 2.4–4.7)
PLATELET # BLD AUTO: 190 K/UL — SIGNIFICANT CHANGE UP (ref 150–400)
POTASSIUM SERPL-MCNC: 4.9 MMOL/L — SIGNIFICANT CHANGE UP (ref 3.5–5.3)
POTASSIUM SERPL-MCNC: 5.1 MMOL/L — SIGNIFICANT CHANGE UP (ref 3.5–5.3)
POTASSIUM SERPL-MCNC: 5.5 MMOL/L — HIGH (ref 3.5–5.3)
POTASSIUM SERPL-SCNC: 4.9 MMOL/L — SIGNIFICANT CHANGE UP (ref 3.5–5.3)
POTASSIUM SERPL-SCNC: 5.1 MMOL/L — SIGNIFICANT CHANGE UP (ref 3.5–5.3)
POTASSIUM SERPL-SCNC: 5.5 MMOL/L — HIGH (ref 3.5–5.3)
RBC # BLD: 3.52 M/UL — LOW (ref 3.8–5.2)
RBC # FLD: 14.3 % — SIGNIFICANT CHANGE UP (ref 10.3–14.5)
SODIUM SERPL-SCNC: 150 MMOL/L — HIGH (ref 135–145)
SODIUM SERPL-SCNC: 153 MMOL/L — HIGH (ref 135–145)
SODIUM SERPL-SCNC: 153 MMOL/L — HIGH (ref 135–145)
WBC # BLD: 4.32 K/UL — SIGNIFICANT CHANGE UP (ref 3.8–10.5)
WBC # FLD AUTO: 4.32 K/UL — SIGNIFICANT CHANGE UP (ref 3.8–10.5)

## 2023-06-15 PROCEDURE — 99231 SBSQ HOSP IP/OBS SF/LOW 25: CPT

## 2023-06-15 PROCEDURE — 99233 SBSQ HOSP IP/OBS HIGH 50: CPT

## 2023-06-15 RX ORDER — SODIUM CHLORIDE 9 MG/ML
1000 INJECTION INTRAMUSCULAR; INTRAVENOUS; SUBCUTANEOUS
Refills: 0 | Status: DISCONTINUED | OUTPATIENT
Start: 2023-06-15 | End: 2023-06-18

## 2023-06-15 RX ORDER — SODIUM ZIRCONIUM CYCLOSILICATE 10 G/10G
10 POWDER, FOR SUSPENSION ORAL ONCE
Refills: 0 | Status: COMPLETED | OUTPATIENT
Start: 2023-06-15 | End: 2023-06-15

## 2023-06-15 RX ORDER — SODIUM CHLORIDE 9 MG/ML
1000 INJECTION INTRAMUSCULAR; INTRAVENOUS; SUBCUTANEOUS
Refills: 0 | Status: DISCONTINUED | OUTPATIENT
Start: 2023-06-15 | End: 2023-06-15

## 2023-06-15 RX ADMIN — DESMOPRESSIN ACETATE 0.1 MILLIGRAM(S): 0.1 TABLET ORAL at 17:55

## 2023-06-15 RX ADMIN — PANTOPRAZOLE SODIUM 40 MILLIGRAM(S): 20 TABLET, DELAYED RELEASE ORAL at 05:59

## 2023-06-15 RX ADMIN — LEVETIRACETAM 500 MILLIGRAM(S): 250 TABLET, FILM COATED ORAL at 05:59

## 2023-06-15 RX ADMIN — Medication 1 MILLIGRAM(S): at 12:16

## 2023-06-15 RX ADMIN — DESMOPRESSIN ACETATE 0.1 MILLIGRAM(S): 0.1 TABLET ORAL at 05:59

## 2023-06-15 RX ADMIN — SODIUM ZIRCONIUM CYCLOSILICATE 10 GRAM(S): 10 POWDER, FOR SUSPENSION ORAL at 21:57

## 2023-06-15 RX ADMIN — Medication 1 TABLET(S): at 12:18

## 2023-06-15 RX ADMIN — LEVETIRACETAM 500 MILLIGRAM(S): 250 TABLET, FILM COATED ORAL at 17:56

## 2023-06-15 RX ADMIN — ENOXAPARIN SODIUM 40 MILLIGRAM(S): 100 INJECTION SUBCUTANEOUS at 05:59

## 2023-06-15 RX ADMIN — SODIUM CHLORIDE 70 MILLILITER(S): 9 INJECTION INTRAMUSCULAR; INTRAVENOUS; SUBCUTANEOUS at 12:15

## 2023-06-15 NOTE — PROGRESS NOTE ADULT - ASSESSMENT
57y/oF PMH SAH s/p Brain aneurysm clippingx 2, DI, Seizures 2/2 hypo or  hypernatremia presenting with Hypernatremia    Acute metabolic encephalopathy   Hypernatremia related to DI   goal Na 145-147 per Family  CT head, CTA - neg for acute abnormalities   BMP TID  Nephrology following  Sodium Improved after salt tab  Endocrine and Nephrology following  Na improved with salt tabs  added daily ddavp with only transient response   titrate ddavp to BID with IVF per Nephrology recs    hypothermia:  s/p  rocephin   Urine culture negative     hx seizures - ct keppra     Morning cbc, bmp reviewed. Morning CBC, BMP ordered  DVT ppx: lovenox  Dispo: Patient remains Acute   Discussed with Nephrology  Patient mentation significantly improved since admission. Conversational, friendly often inquiring as to her medical plan therefore has capacity and autonomy over her own medical decisions.

## 2023-06-15 NOTE — PROGRESS NOTE ADULT - ASSESSMENT
57y/oF PMH SAH s/p Brain aneurysm clippingx 2, DI, Seizures 2/2 hypo or hypernatremia was BIBA after she was was reported to have wandered off. In ER, She was found to be altered, confused, daughter reported issues with sodium in past. Pt. known to our service from a prior hospital stay. She developed diabetes insipidus following a subarachnoid hemorrhage, and has had great difficulty both in hospital and at home in maintaining a physiologic sodium level, with swings from hypernatremia to hyponatremia.     1. Central DI/Hypernatremia - likely secondary to SAH  -   - DDAVP bid  - 1/4 NS as per nephrology, monitor BMP  - Monitor I&Os (output has not been recorded)

## 2023-06-15 NOTE — PROGRESS NOTE ADULT - ASSESSMENT
56 yo F w h/o SAH s/p Brain aneurysm clipping, central DI, Seizures 2/2 hypo or  hypernatremia   Pt was BIBA after she was was reported to have wandered off. pt called police " I got lost". In ER, She was found to be altered, confused    Central DI/ HyperNatremia  Likely 2/2  SAH s/p Brain aneurysm  Cortisol and TSH OK   Pt is on strict fluid control and DDAVP at home  Serum Na is very sensitive has been fluctuating    Added Desmopressin O.1 mg po bid   Watch UO and lab trend   EST free water deficit is 5 L   Add 1/4 NS x 24 h   BMP 7 pm and am  58 yo F w h/o SAH s/p Brain aneurysm clipping, central DI, Seizures 2/2 hypo or  hypernatremia   Pt was BIBA after she was was reported to have wandered off. pt called police " I got lost". In ER, She was found to be altered, confused    Central DI/ HyperNatremia  Likely 2/2  SAH s/p Brain aneurysm  Cortisol and TSH OK   Pt is on strict fluid control and DDAVP at home  Serum Na is very sensitive has been fluctuating    Added Desmopressin O.1 mg po bid   Watch UO and lab trend   EST free water deficit is 5 L   Add 1/4 NS x 24 h   BMP at 1 pm and  7 pm  58 yo F w h/o SAH s/p Brain aneurysm clipping, central DI, Seizures 2/2 hypo or  hypernatremia   Pt was BIBA after she was was reported to have wandered off. pt called police " I got lost". In ER, She was found to be altered, confused    Central DI/ HyperNatremia  Likely 2/2  SAH s/p Brain aneurysm  Cortisol and TSH OK   Pt is on strict fluid control and DDAVP at home  Serum Na is very sensitive has been fluctuating    Added Desmopressin O.1 mg po bid   Watch UO and lab trend   EST free water deficit is 5 L   Add 1/4 NS x 12 h   BMP at 1 pm and  7 pm

## 2023-06-16 LAB
ANION GAP SERPL CALC-SCNC: 10 MMOL/L — SIGNIFICANT CHANGE UP (ref 5–17)
BASOPHILS # BLD AUTO: 0.03 K/UL — SIGNIFICANT CHANGE UP (ref 0–0.2)
BASOPHILS NFR BLD AUTO: 0.7 % — SIGNIFICANT CHANGE UP (ref 0–2)
BUN SERPL-MCNC: 45.7 MG/DL — HIGH (ref 8–20)
CALCIUM SERPL-MCNC: 8.7 MG/DL — SIGNIFICANT CHANGE UP (ref 8.4–10.5)
CHLORIDE SERPL-SCNC: 115 MMOL/L — HIGH (ref 96–108)
CO2 SERPL-SCNC: 25 MMOL/L — SIGNIFICANT CHANGE UP (ref 22–29)
CREAT SERPL-MCNC: 1.44 MG/DL — HIGH (ref 0.5–1.3)
EGFR: 42 ML/MIN/1.73M2 — LOW
EOSINOPHIL # BLD AUTO: 0.05 K/UL — SIGNIFICANT CHANGE UP (ref 0–0.5)
EOSINOPHIL NFR BLD AUTO: 1.2 % — SIGNIFICANT CHANGE UP (ref 0–6)
GLUCOSE SERPL-MCNC: 81 MG/DL — SIGNIFICANT CHANGE UP (ref 70–99)
HCT VFR BLD CALC: 29.4 % — LOW (ref 34.5–45)
HGB BLD-MCNC: 8.7 G/DL — LOW (ref 11.5–15.5)
IMM GRANULOCYTES NFR BLD AUTO: 1.7 % — HIGH (ref 0–0.9)
LYMPHOCYTES # BLD AUTO: 2.15 K/UL — SIGNIFICANT CHANGE UP (ref 1–3.3)
LYMPHOCYTES # BLD AUTO: 51.7 % — HIGH (ref 13–44)
MAGNESIUM SERPL-MCNC: 2.4 MG/DL — SIGNIFICANT CHANGE UP (ref 1.6–2.6)
MCHC RBC-ENTMCNC: 26.2 PG — LOW (ref 27–34)
MCHC RBC-ENTMCNC: 29.6 GM/DL — LOW (ref 32–36)
MCV RBC AUTO: 88.6 FL — SIGNIFICANT CHANGE UP (ref 80–100)
MONOCYTES # BLD AUTO: 0.61 K/UL — SIGNIFICANT CHANGE UP (ref 0–0.9)
MONOCYTES NFR BLD AUTO: 14.7 % — HIGH (ref 2–14)
NEUTROPHILS # BLD AUTO: 1.25 K/UL — LOW (ref 1.8–7.4)
NEUTROPHILS NFR BLD AUTO: 30 % — LOW (ref 43–77)
PHOSPHATE SERPL-MCNC: 4.2 MG/DL — SIGNIFICANT CHANGE UP (ref 2.4–4.7)
PLATELET # BLD AUTO: 170 K/UL — SIGNIFICANT CHANGE UP (ref 150–400)
POTASSIUM SERPL-MCNC: 5 MMOL/L — SIGNIFICANT CHANGE UP (ref 3.5–5.3)
POTASSIUM SERPL-SCNC: 5 MMOL/L — SIGNIFICANT CHANGE UP (ref 3.5–5.3)
RBC # BLD: 3.32 M/UL — LOW (ref 3.8–5.2)
RBC # FLD: 14 % — SIGNIFICANT CHANGE UP (ref 10.3–14.5)
SODIUM SERPL-SCNC: 150 MMOL/L — HIGH (ref 135–145)
WBC # BLD: 4.16 K/UL — SIGNIFICANT CHANGE UP (ref 3.8–10.5)
WBC # FLD AUTO: 4.16 K/UL — SIGNIFICANT CHANGE UP (ref 3.8–10.5)

## 2023-06-16 PROCEDURE — 99233 SBSQ HOSP IP/OBS HIGH 50: CPT

## 2023-06-16 PROCEDURE — 99231 SBSQ HOSP IP/OBS SF/LOW 25: CPT

## 2023-06-16 RX ADMIN — DESMOPRESSIN ACETATE 0.1 MILLIGRAM(S): 0.1 TABLET ORAL at 17:10

## 2023-06-16 RX ADMIN — DESMOPRESSIN ACETATE 0.1 MILLIGRAM(S): 0.1 TABLET ORAL at 06:10

## 2023-06-16 RX ADMIN — LEVETIRACETAM 500 MILLIGRAM(S): 250 TABLET, FILM COATED ORAL at 06:10

## 2023-06-16 RX ADMIN — Medication 1 TABLET(S): at 07:53

## 2023-06-16 RX ADMIN — PANTOPRAZOLE SODIUM 40 MILLIGRAM(S): 20 TABLET, DELAYED RELEASE ORAL at 06:11

## 2023-06-16 RX ADMIN — Medication 1 MILLIGRAM(S): at 07:52

## 2023-06-16 RX ADMIN — LEVETIRACETAM 500 MILLIGRAM(S): 250 TABLET, FILM COATED ORAL at 17:10

## 2023-06-16 RX ADMIN — ENOXAPARIN SODIUM 40 MILLIGRAM(S): 100 INJECTION SUBCUTANEOUS at 06:11

## 2023-06-16 NOTE — PROGRESS NOTE ADULT - ASSESSMENT
57y/oF PMH SAH s/p Brain aneurysm clippingx 2, DI, Seizures 2/2 hypo or hypernatremia was BIBA after she was was reported to have wandered off. In ER, She was found to be altered, confused, daughter reported issues with sodium in past. Pt. known to our service from a prior hospital stay. She developed diabetes insipidus following a subarachnoid hemorrhage, and has had great difficulty both in hospital and at home in maintaining a physiologic sodium level, with swings from hypernatremia to hyponatremia.     1. Central DI/Hypernatremia - likely secondary to SAH  - , nephrology following  - Can increase ddavp 0.1mg to tid  - Monitor BMP, I&Os

## 2023-06-16 NOTE — PROGRESS NOTE ADULT - ASSESSMENT
56 yo F w h/o SAH s/p Brain aneurysm clipping, central DI, Seizures 2/2 hypo or  hypernatremia   Pt was BIBA after she was was reported to have wandered off. pt called police " I got lost". In ER, She was found to be altered, confused    Central DI/ HyperNatremia  Likely 2/2  SAH s/p Brain aneurysm  Cortisol and TSH OK   Pt is on strict fluid control and DDAVP at home  Serum Na is very sensitive has been fluctuating    Added Desmopressin O.1 mg po bid   Watch UO and lab trend   EST free water deficit is 5 L   Add 1/4 NS x 12 h - Now Na 153>150  -700 cc/d  Shall add low dose HCTZ and watch    Main issue is that patient has to remember herself or be given Desmopressin and water in a timely manner with out missing any doses  Had PICC for IVF infusion prn  Home draws were arranged and can be done again but will need close supervision of meds and POf to avoid high or low sodium  will ask RN/aid to see if patient can take water at certain intervals herself or unable to do so  Called  but mailbox full, couldn't leave a message

## 2023-06-16 NOTE — PROGRESS NOTE ADULT - ASSESSMENT
57y/oF PMH SAH s/p Brain aneurysm clippingx 2, DI, Seizures 2/2 hypo or  hypernatremia presenting with Hypernatremia    Acute metabolic encephalopathy   Hypernatremia related to DI   goal Na 145-147 per Family  CT head, CTA - neg for acute abnormalities   BMP TID  Nephrology following  Sodium Improved after salt tab  Endocrine and Nephrology following  Na improved with salt tabs  daily ddavp with only transient response   titrated ddavp to BID with IVF per Nephrology recs - Only with modest response   Further nephrology recs pending. [place] to evaluate today    hypothermia:  s/p  rocephin   Urine culture negative     hx seizures - ct keppra     Morning cbc, bmp reviewed. Morning CBC, BMP ordered  DVT ppx: lovenox  Dispo: Patient remains Acute   Discussed with Nephrology  Patient mentation significantly improved since admission. Conversational, friendly often inquiring as to her medical plan therefore has capacity and autonomy over her own medical decisions.   Discussed with Dr. Palumbo. Plans to reach out to patient's spouse to update and obtain collateral information

## 2023-06-17 LAB
ALBUMIN SERPL ELPH-MCNC: 3.6 G/DL — SIGNIFICANT CHANGE UP (ref 3.3–5.2)
ALP SERPL-CCNC: 170 U/L — HIGH (ref 40–120)
ALT FLD-CCNC: 73 U/L — HIGH
ANION GAP SERPL CALC-SCNC: 12 MMOL/L — SIGNIFICANT CHANGE UP (ref 5–17)
AST SERPL-CCNC: 40 U/L — HIGH
BASOPHILS # BLD AUTO: 0.01 K/UL — SIGNIFICANT CHANGE UP (ref 0–0.2)
BASOPHILS NFR BLD AUTO: 0.2 % — SIGNIFICANT CHANGE UP (ref 0–2)
BILIRUB SERPL-MCNC: 0.2 MG/DL — LOW (ref 0.4–2)
BUN SERPL-MCNC: 39.8 MG/DL — HIGH (ref 8–20)
CALCIUM SERPL-MCNC: 8.8 MG/DL — SIGNIFICANT CHANGE UP (ref 8.4–10.5)
CHLORIDE SERPL-SCNC: 114 MMOL/L — HIGH (ref 96–108)
CO2 SERPL-SCNC: 23 MMOL/L — SIGNIFICANT CHANGE UP (ref 22–29)
CREAT SERPL-MCNC: 1.28 MG/DL — SIGNIFICANT CHANGE UP (ref 0.5–1.3)
EGFR: 48 ML/MIN/1.73M2 — LOW
EOSINOPHIL # BLD AUTO: 0.03 K/UL — SIGNIFICANT CHANGE UP (ref 0–0.5)
EOSINOPHIL NFR BLD AUTO: 0.7 % — SIGNIFICANT CHANGE UP (ref 0–6)
FERRITIN SERPL-MCNC: 879 NG/ML — HIGH (ref 15–150)
GLUCOSE SERPL-MCNC: 83 MG/DL — SIGNIFICANT CHANGE UP (ref 70–99)
HCT VFR BLD CALC: 31.4 % — LOW (ref 34.5–45)
HGB BLD-MCNC: 9.1 G/DL — LOW (ref 11.5–15.5)
IMM GRANULOCYTES NFR BLD AUTO: 1.7 % — HIGH (ref 0–0.9)
IRON SATN MFR SERPL: 25 % — SIGNIFICANT CHANGE UP (ref 14–50)
IRON SATN MFR SERPL: 76 UG/DL — SIGNIFICANT CHANGE UP (ref 37–145)
LYMPHOCYTES # BLD AUTO: 1.96 K/UL — SIGNIFICANT CHANGE UP (ref 1–3.3)
LYMPHOCYTES # BLD AUTO: 48.5 % — HIGH (ref 13–44)
MAGNESIUM SERPL-MCNC: 2.4 MG/DL — SIGNIFICANT CHANGE UP (ref 1.6–2.6)
MCHC RBC-ENTMCNC: 26.1 PG — LOW (ref 27–34)
MCHC RBC-ENTMCNC: 29 GM/DL — LOW (ref 32–36)
MCV RBC AUTO: 90.2 FL — SIGNIFICANT CHANGE UP (ref 80–100)
MONOCYTES # BLD AUTO: 0.52 K/UL — SIGNIFICANT CHANGE UP (ref 0–0.9)
MONOCYTES NFR BLD AUTO: 12.9 % — SIGNIFICANT CHANGE UP (ref 2–14)
NEUTROPHILS # BLD AUTO: 1.45 K/UL — LOW (ref 1.8–7.4)
NEUTROPHILS NFR BLD AUTO: 36 % — LOW (ref 43–77)
PHOSPHATE SERPL-MCNC: 4.1 MG/DL — SIGNIFICANT CHANGE UP (ref 2.4–4.7)
PLATELET # BLD AUTO: 172 K/UL — SIGNIFICANT CHANGE UP (ref 150–400)
POTASSIUM SERPL-MCNC: 4.7 MMOL/L — SIGNIFICANT CHANGE UP (ref 3.5–5.3)
POTASSIUM SERPL-SCNC: 4.7 MMOL/L — SIGNIFICANT CHANGE UP (ref 3.5–5.3)
PROT SERPL-MCNC: 6.8 G/DL — SIGNIFICANT CHANGE UP (ref 6.6–8.7)
RBC # BLD: 3.48 M/UL — LOW (ref 3.8–5.2)
RBC # FLD: 14.2 % — SIGNIFICANT CHANGE UP (ref 10.3–14.5)
SODIUM SERPL-SCNC: 149 MMOL/L — HIGH (ref 135–145)
TIBC SERPL-MCNC: 299 UG/DL — SIGNIFICANT CHANGE UP (ref 220–430)
TRANSFERRIN SERPL-MCNC: 209 MG/DL — SIGNIFICANT CHANGE UP (ref 192–382)
WBC # BLD: 4.04 K/UL — SIGNIFICANT CHANGE UP (ref 3.8–10.5)
WBC # FLD AUTO: 4.04 K/UL — SIGNIFICANT CHANGE UP (ref 3.8–10.5)

## 2023-06-17 PROCEDURE — 99233 SBSQ HOSP IP/OBS HIGH 50: CPT

## 2023-06-17 RX ADMIN — Medication 1 MILLIGRAM(S): at 08:28

## 2023-06-17 RX ADMIN — DESMOPRESSIN ACETATE 0.1 MILLIGRAM(S): 0.1 TABLET ORAL at 17:17

## 2023-06-17 RX ADMIN — ENOXAPARIN SODIUM 40 MILLIGRAM(S): 100 INJECTION SUBCUTANEOUS at 06:05

## 2023-06-17 RX ADMIN — LEVETIRACETAM 500 MILLIGRAM(S): 250 TABLET, FILM COATED ORAL at 17:17

## 2023-06-17 RX ADMIN — LEVETIRACETAM 500 MILLIGRAM(S): 250 TABLET, FILM COATED ORAL at 06:05

## 2023-06-17 RX ADMIN — DESMOPRESSIN ACETATE 0.1 MILLIGRAM(S): 0.1 TABLET ORAL at 06:05

## 2023-06-17 RX ADMIN — Medication 1 TABLET(S): at 08:28

## 2023-06-17 RX ADMIN — PANTOPRAZOLE SODIUM 40 MILLIGRAM(S): 20 TABLET, DELAYED RELEASE ORAL at 06:05

## 2023-06-17 NOTE — PROGRESS NOTE ADULT - ASSESSMENT
56 yo F w h/o SAH s/p Brain aneurysm clipping, central DI, Seizures 2/2 hypo or  hypernatremia   Pt was BIBA after she was was reported to have wandered off. pt called police " I got lost". In ER, She was found to be altered, confused    Central DI/ HyperNatremia  Likely 2/2  SAH s/p Brain aneurysm  Cortisol and TSH OK   Pt is on strict fluid control and DDAVP at home  Serum Na is very sensitive has been fluctuating    Added Desmopressin O.1 mg po bid   Watch UO and lab trend   EST free water deficit is 5 L   Add 1/4 NS x 12 h - Now Na 153>150>149  -700 cc/d  On  low dose HCTZ  watch    Main issue is that patient has to remember herself or be given Desmopressin and water in a timely manner with out missing any doses  Had PICC for IVF infusion prn  Home draws were arranged and can be done again but will need close supervision of meds and POf to avoid high or low sodium  will ask RN/aid to see if patient can take water at certain intervals herself or unable to do so  Called  but mailbox full, couldn't leave a message

## 2023-06-17 NOTE — PROGRESS NOTE ADULT - ASSESSMENT
57y/oF PMH SAH s/p Brain aneurysm clippingx 2, DI, Seizures 2/2 hypo or  hypernatremia presenting with Hypernatremia    Acute metabolic encephalopathy, improving   Hypernatremia related to DI   Nephrology follow up appreciated,   Endocrine and Nephrology following  added HCTZ on 6/16  continue Desmopressin 0.1 mg PO bid  patient advised to consume 8-10 oz water every 3-4 hrs  I/O noted  continue to monitor sodium.      hypothermia:  s/p  rocephin   Urine culture negative     hx seizures - ct keppra       DVT ppx: lovenox    Discussed with patient, RN and nephrology.

## 2023-06-18 LAB
ANION GAP SERPL CALC-SCNC: 12 MMOL/L — SIGNIFICANT CHANGE UP (ref 5–17)
BUN SERPL-MCNC: 39.6 MG/DL — HIGH (ref 8–20)
CALCIUM SERPL-MCNC: 8.9 MG/DL — SIGNIFICANT CHANGE UP (ref 8.4–10.5)
CHLORIDE SERPL-SCNC: 113 MMOL/L — HIGH (ref 96–108)
CO2 SERPL-SCNC: 24 MMOL/L — SIGNIFICANT CHANGE UP (ref 22–29)
CREAT SERPL-MCNC: 1.58 MG/DL — HIGH (ref 0.5–1.3)
EGFR: 37 ML/MIN/1.73M2 — LOW
GLUCOSE SERPL-MCNC: 84 MG/DL — SIGNIFICANT CHANGE UP (ref 70–99)
HCT VFR BLD CALC: 32.9 % — LOW (ref 34.5–45)
HGB BLD-MCNC: 9.8 G/DL — LOW (ref 11.5–15.5)
MAGNESIUM SERPL-MCNC: 2.4 MG/DL — SIGNIFICANT CHANGE UP (ref 1.6–2.6)
MCHC RBC-ENTMCNC: 26.5 PG — LOW (ref 27–34)
MCHC RBC-ENTMCNC: 29.8 GM/DL — LOW (ref 32–36)
MCV RBC AUTO: 88.9 FL — SIGNIFICANT CHANGE UP (ref 80–100)
PHOSPHATE SERPL-MCNC: 4 MG/DL — SIGNIFICANT CHANGE UP (ref 2.4–4.7)
PLATELET # BLD AUTO: 204 K/UL — SIGNIFICANT CHANGE UP (ref 150–400)
POTASSIUM SERPL-MCNC: 5.5 MMOL/L — HIGH (ref 3.5–5.3)
POTASSIUM SERPL-SCNC: 5.5 MMOL/L — HIGH (ref 3.5–5.3)
RBC # BLD: 3.7 M/UL — LOW (ref 3.8–5.2)
RBC # FLD: 14.4 % — SIGNIFICANT CHANGE UP (ref 10.3–14.5)
SODIUM SERPL-SCNC: 149 MMOL/L — HIGH (ref 135–145)
WBC # BLD: 4.68 K/UL — SIGNIFICANT CHANGE UP (ref 3.8–10.5)
WBC # FLD AUTO: 4.68 K/UL — SIGNIFICANT CHANGE UP (ref 3.8–10.5)

## 2023-06-18 PROCEDURE — 99233 SBSQ HOSP IP/OBS HIGH 50: CPT

## 2023-06-18 RX ORDER — SODIUM ZIRCONIUM CYCLOSILICATE 10 G/10G
10 POWDER, FOR SUSPENSION ORAL ONCE
Refills: 0 | Status: DISCONTINUED | OUTPATIENT
Start: 2023-06-18 | End: 2023-06-18

## 2023-06-18 RX ORDER — SODIUM ZIRCONIUM CYCLOSILICATE 10 G/10G
5 POWDER, FOR SUSPENSION ORAL ONCE
Refills: 0 | Status: COMPLETED | OUTPATIENT
Start: 2023-06-18 | End: 2023-06-18

## 2023-06-18 RX ADMIN — SODIUM ZIRCONIUM CYCLOSILICATE 5 GRAM(S): 10 POWDER, FOR SUSPENSION ORAL at 17:19

## 2023-06-18 RX ADMIN — Medication 1 MILLIGRAM(S): at 11:26

## 2023-06-18 RX ADMIN — DESMOPRESSIN ACETATE 0.1 MILLIGRAM(S): 0.1 TABLET ORAL at 17:18

## 2023-06-18 RX ADMIN — ENOXAPARIN SODIUM 40 MILLIGRAM(S): 100 INJECTION SUBCUTANEOUS at 05:24

## 2023-06-18 RX ADMIN — LEVETIRACETAM 500 MILLIGRAM(S): 250 TABLET, FILM COATED ORAL at 05:25

## 2023-06-18 RX ADMIN — PANTOPRAZOLE SODIUM 40 MILLIGRAM(S): 20 TABLET, DELAYED RELEASE ORAL at 05:25

## 2023-06-18 RX ADMIN — Medication 1 TABLET(S): at 11:26

## 2023-06-18 RX ADMIN — DESMOPRESSIN ACETATE 0.1 MILLIGRAM(S): 0.1 TABLET ORAL at 05:25

## 2023-06-18 RX ADMIN — LEVETIRACETAM 500 MILLIGRAM(S): 250 TABLET, FILM COATED ORAL at 17:18

## 2023-06-18 NOTE — PROGRESS NOTE ADULT - ASSESSMENT
57 yr old female with subarachnoid hemorrhage s/p brain aneurysm clipping x 2, diabetes insipidus, seizures, history of fluctuations of sodium levels at home in the past requiring PICC line for hydration, presented after she wandered off, admitted for acute metabolic encephalopathy, noted to have hypernatremia 152 on admission. CT head on admission, without acute changes. Nephrology was consulted, she was given hypotonic saline with close monitoring of sodium levels. At home, her sodium levels are managed with DDAVP and strict fluid control. Endocrine consulted as well. 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 She then developed hyponatremia, which was corrected with sodium chloride tablets and hypertonic saline. She was closely followed by nephrology during her course. Mental status improved. She developed hypernatremia again, Desmopressin was resumed, HCTZ was added on 6/16, and encouraged to drink 8-10 oz of water every 3-4 hrs.     1. Hypernatremia sec DI:  Likely sec SAH sec brain aneurysm  Continue Desmopressin and HCTZ  nephrology following  monitor BMP daily  monitor oral water intake    2. Hyperkalemia:  Chadma ordered  monitor BMP    3. Seizures:  Continue Keppra    4. DVT ppx:  Lovenox    Discussed with patient.

## 2023-06-19 LAB
ANION GAP SERPL CALC-SCNC: 11 MMOL/L — SIGNIFICANT CHANGE UP (ref 5–17)
BASOPHILS # BLD AUTO: 0.05 K/UL — SIGNIFICANT CHANGE UP (ref 0–0.2)
BASOPHILS NFR BLD AUTO: 1.1 % — SIGNIFICANT CHANGE UP (ref 0–2)
BUN SERPL-MCNC: 39.9 MG/DL — HIGH (ref 8–20)
CALCIUM SERPL-MCNC: 9.1 MG/DL — SIGNIFICANT CHANGE UP (ref 8.4–10.5)
CHLORIDE SERPL-SCNC: 114 MMOL/L — HIGH (ref 96–108)
CO2 SERPL-SCNC: 24 MMOL/L — SIGNIFICANT CHANGE UP (ref 22–29)
CREAT SERPL-MCNC: 1.29 MG/DL — SIGNIFICANT CHANGE UP (ref 0.5–1.3)
EGFR: 48 ML/MIN/1.73M2 — LOW
EOSINOPHIL # BLD AUTO: 0.05 K/UL — SIGNIFICANT CHANGE UP (ref 0–0.5)
EOSINOPHIL NFR BLD AUTO: 1.1 % — SIGNIFICANT CHANGE UP (ref 0–6)
GLUCOSE SERPL-MCNC: 74 MG/DL — SIGNIFICANT CHANGE UP (ref 70–99)
HCT VFR BLD CALC: 34.9 % — SIGNIFICANT CHANGE UP (ref 34.5–45)
HGB BLD-MCNC: 9.9 G/DL — LOW (ref 11.5–15.5)
IMM GRANULOCYTES NFR BLD AUTO: 1.7 % — HIGH (ref 0–0.9)
LYMPHOCYTES # BLD AUTO: 2.3 K/UL — SIGNIFICANT CHANGE UP (ref 1–3.3)
LYMPHOCYTES # BLD AUTO: 48.8 % — HIGH (ref 13–44)
MAGNESIUM SERPL-MCNC: 2.4 MG/DL — SIGNIFICANT CHANGE UP (ref 1.6–2.6)
MCHC RBC-ENTMCNC: 26.1 PG — LOW (ref 27–34)
MCHC RBC-ENTMCNC: 28.4 GM/DL — LOW (ref 32–36)
MCV RBC AUTO: 92.1 FL — SIGNIFICANT CHANGE UP (ref 80–100)
MONOCYTES # BLD AUTO: 0.6 K/UL — SIGNIFICANT CHANGE UP (ref 0–0.9)
MONOCYTES NFR BLD AUTO: 12.7 % — SIGNIFICANT CHANGE UP (ref 2–14)
NEUTROPHILS # BLD AUTO: 1.63 K/UL — LOW (ref 1.8–7.4)
NEUTROPHILS NFR BLD AUTO: 34.6 % — LOW (ref 43–77)
PHOSPHATE SERPL-MCNC: 4.3 MG/DL — SIGNIFICANT CHANGE UP (ref 2.4–4.7)
PLATELET # BLD AUTO: 180 K/UL — SIGNIFICANT CHANGE UP (ref 150–400)
POTASSIUM SERPL-MCNC: 4.8 MMOL/L — SIGNIFICANT CHANGE UP (ref 3.5–5.3)
POTASSIUM SERPL-SCNC: 4.8 MMOL/L — SIGNIFICANT CHANGE UP (ref 3.5–5.3)
RBC # BLD: 3.79 M/UL — LOW (ref 3.8–5.2)
RBC # FLD: 14.6 % — HIGH (ref 10.3–14.5)
SODIUM SERPL-SCNC: 149 MMOL/L — HIGH (ref 135–145)
WBC # BLD: 4.71 K/UL — SIGNIFICANT CHANGE UP (ref 3.8–10.5)
WBC # FLD AUTO: 4.71 K/UL — SIGNIFICANT CHANGE UP (ref 3.8–10.5)

## 2023-06-19 PROCEDURE — 99233 SBSQ HOSP IP/OBS HIGH 50: CPT

## 2023-06-19 RX ADMIN — DESMOPRESSIN ACETATE 0.1 MILLIGRAM(S): 0.1 TABLET ORAL at 18:10

## 2023-06-19 RX ADMIN — DESMOPRESSIN ACETATE 0.1 MILLIGRAM(S): 0.1 TABLET ORAL at 06:06

## 2023-06-19 RX ADMIN — Medication 1 MILLIGRAM(S): at 18:10

## 2023-06-19 RX ADMIN — LEVETIRACETAM 500 MILLIGRAM(S): 250 TABLET, FILM COATED ORAL at 18:10

## 2023-06-19 RX ADMIN — ENOXAPARIN SODIUM 40 MILLIGRAM(S): 100 INJECTION SUBCUTANEOUS at 06:06

## 2023-06-19 RX ADMIN — Medication 1 TABLET(S): at 18:10

## 2023-06-19 RX ADMIN — LEVETIRACETAM 500 MILLIGRAM(S): 250 TABLET, FILM COATED ORAL at 06:06

## 2023-06-19 RX ADMIN — PANTOPRAZOLE SODIUM 40 MILLIGRAM(S): 20 TABLET, DELAYED RELEASE ORAL at 06:06

## 2023-06-19 NOTE — PROGRESS NOTE ADULT - ASSESSMENT
57 yr old female with subarachnoid hemorrhage s/p brain aneurysm clipping x 2, diabetes insipidus, seizures, history of fluctuations of sodium levels at home in the past requiring PICC line for hydration, presented after she wandered off, admitted for acute metabolic encephalopathy, noted to have hypernatremia 152 on admission. CT head on admission, without acute changes. Nephrology was consulted, she was given hypotonic saline with close monitoring of sodium levels. At home, her sodium levels are managed with DDAVP and strict fluid control. Endocrine consulted as well. 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 She then developed hyponatremia, which was corrected with sodium chloride tablets and hypertonic saline. She was closely followed by nephrology during her course. Mental status improved. She developed hypernatremia again, Desmopressin was resumed, HCTZ was added on 6/16, and encouraged to drink 8-10 oz of water every 3-4 hrs.     1. Hypernatremia sec DI:  Likely sec SAH sec brain aneurysm  Continue Desmopressin and HCTZ  nephrology following  monitor BMP daily  monitor oral water intake    2. Hyperkalemia:  resolved.    3. Seizures:  Continue Keppra    4. DVT ppx:  Lovenox    Discussed with patient.   Updated daughter.  Attempted to call spouse, both at work and cell, no answer.   Will attempt to call again.       57 yr old female with subarachnoid hemorrhage s/p brain aneurysm clipping x 2, diabetes insipidus, seizures, history of fluctuations of sodium levels at home in the past requiring PICC line for hydration, presented after she wandered off, admitted for acute metabolic encephalopathy, noted to have hypernatremia 152 on admission. CT head on admission, without acute changes. Nephrology was consulted, she was given hypotonic saline with close monitoring of sodium levels. At home, her sodium levels are managed with DDAVP and strict fluid control. Endocrine consulted as well. 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 She then developed hyponatremia, which was corrected with sodium chloride tablets and hypertonic saline. She was closely followed by nephrology during her course. Mental status improved. She developed hypernatremia again, Desmopressin was resumed, HCTZ was added on 6/16, and encouraged to drink 8-10 oz of water every 3-4 hrs.     1. Hypernatremia sec DI:  Likely sec SAH sec brain aneurysm  Continue Desmopressin and HCTZ  nephrology following  monitor BMP daily  monitor oral water intake    2. Hyperkalemia:  resolved.    3. Seizures:  Continue Keppra    4. DVT ppx:  Lovenox    Discussed with patient.   Updated daughter.  spoke with .

## 2023-06-19 NOTE — PROGRESS NOTE ADULT - ASSESSMENT
57y/oF PMH SAH s/p Brain aneurysm clippingx 2, DI, Seizures 2/2 hypo or hypernatremia was BIBA after she was was reported to have wandered off. In ER, She was found to be altered, confused, daughter reported issues with sodium in past. Pt. known to our service from a prior hospital stay. She developed diabetes insipidus following a subarachnoid hemorrhage, and has had great difficulty both in hospital and at home in maintaining a physiologic sodium level, with swings from hypernatremia to hyponatremia.     1. Central DI/Hypernatremia - likely secondary to SAH  - , nephrology following  - On ddavp 0.1mg bid, HCTZ  - Monitor BMP, strict I&Os (not recorded)  - Will need further planning with outpatient care, pt is forgetful

## 2023-06-19 NOTE — PROGRESS NOTE ADULT - ASSESSMENT
56 yo F w h/o SAH s/p Brain aneurysm clipping, central DI, Seizures 2/2 hypo or  hypernatremia   Pt was BIBA after she was was reported to have wandered off. pt called police " I got lost". In ER, She was found to be altered, confused    Central DI/ HyperNatremia  Likely 2/2  SAH s/p Brain aneurysm  Cortisol and TSH OK   Pt is on strict fluid control and DDAVP at home  Serum Na is very sensitive has been fluctuating    Added Desmopressin O.1 mg po bid   Watch UO and lab trend   EST free water deficit is 5 L   Add 1/4 NS x 12 h - Now Na 153>150>149>149  -700 cc/d  On  low dose HCTZ  watch    Main issue is that patient has to remember herself or be given Desmopressin and water in a timely manner with out missing any doses  Had a PICC line inthe past with infusion of water but refusing, also need to check insurance issues  Willing to drink water when reminded by family on face time  Had PICC for IVF infusion prn  Home draws were arranged and can be done again but will need close supervision of meds and POf to avoid high or low sodium  will ask RN/aid to see if patient can take water at certain intervals herself or unable to do so  Called  but mailbox full, couldn't leave a message

## 2023-06-20 LAB
ANION GAP SERPL CALC-SCNC: 13 MMOL/L — SIGNIFICANT CHANGE UP (ref 5–17)
BASOPHILS # BLD AUTO: 0.03 K/UL — SIGNIFICANT CHANGE UP (ref 0–0.2)
BASOPHILS NFR BLD AUTO: 0.6 % — SIGNIFICANT CHANGE UP (ref 0–2)
BUN SERPL-MCNC: 42.7 MG/DL — HIGH (ref 8–20)
CALCIUM SERPL-MCNC: 8.9 MG/DL — SIGNIFICANT CHANGE UP (ref 8.4–10.5)
CHLORIDE SERPL-SCNC: 117 MMOL/L — HIGH (ref 96–108)
CO2 SERPL-SCNC: 22 MMOL/L — SIGNIFICANT CHANGE UP (ref 22–29)
CREAT SERPL-MCNC: 1.29 MG/DL — SIGNIFICANT CHANGE UP (ref 0.5–1.3)
EGFR: 48 ML/MIN/1.73M2 — LOW
EOSINOPHIL # BLD AUTO: 0.06 K/UL — SIGNIFICANT CHANGE UP (ref 0–0.5)
EOSINOPHIL NFR BLD AUTO: 1.2 % — SIGNIFICANT CHANGE UP (ref 0–6)
GLUCOSE SERPL-MCNC: 76 MG/DL — SIGNIFICANT CHANGE UP (ref 70–99)
HCT VFR BLD CALC: 32.1 % — LOW (ref 34.5–45)
HGB BLD-MCNC: 9.6 G/DL — LOW (ref 11.5–15.5)
IMM GRANULOCYTES NFR BLD AUTO: 1.4 % — HIGH (ref 0–0.9)
LYMPHOCYTES # BLD AUTO: 2.51 K/UL — SIGNIFICANT CHANGE UP (ref 1–3.3)
LYMPHOCYTES # BLD AUTO: 51.3 % — HIGH (ref 13–44)
MAGNESIUM SERPL-MCNC: 2.3 MG/DL — SIGNIFICANT CHANGE UP (ref 1.6–2.6)
MCHC RBC-ENTMCNC: 26.6 PG — LOW (ref 27–34)
MCHC RBC-ENTMCNC: 29.9 GM/DL — LOW (ref 32–36)
MCV RBC AUTO: 88.9 FL — SIGNIFICANT CHANGE UP (ref 80–100)
MONOCYTES # BLD AUTO: 0.57 K/UL — SIGNIFICANT CHANGE UP (ref 0–0.9)
MONOCYTES NFR BLD AUTO: 11.7 % — SIGNIFICANT CHANGE UP (ref 2–14)
NEUTROPHILS # BLD AUTO: 1.65 K/UL — LOW (ref 1.8–7.4)
NEUTROPHILS NFR BLD AUTO: 33.8 % — LOW (ref 43–77)
PHOSPHATE SERPL-MCNC: 4.2 MG/DL — SIGNIFICANT CHANGE UP (ref 2.4–4.7)
PLATELET # BLD AUTO: 198 K/UL — SIGNIFICANT CHANGE UP (ref 150–400)
POTASSIUM SERPL-MCNC: 4.7 MMOL/L — SIGNIFICANT CHANGE UP (ref 3.5–5.3)
POTASSIUM SERPL-SCNC: 4.7 MMOL/L — SIGNIFICANT CHANGE UP (ref 3.5–5.3)
RBC # BLD: 3.61 M/UL — LOW (ref 3.8–5.2)
RBC # FLD: 14.4 % — SIGNIFICANT CHANGE UP (ref 10.3–14.5)
SODIUM SERPL-SCNC: 152 MMOL/L — HIGH (ref 135–145)
WBC # BLD: 4.89 K/UL — SIGNIFICANT CHANGE UP (ref 3.8–10.5)
WBC # FLD AUTO: 4.89 K/UL — SIGNIFICANT CHANGE UP (ref 3.8–10.5)

## 2023-06-20 PROCEDURE — 99233 SBSQ HOSP IP/OBS HIGH 50: CPT

## 2023-06-20 PROCEDURE — 99232 SBSQ HOSP IP/OBS MODERATE 35: CPT

## 2023-06-20 RX ADMIN — LEVETIRACETAM 500 MILLIGRAM(S): 250 TABLET, FILM COATED ORAL at 04:26

## 2023-06-20 RX ADMIN — DESMOPRESSIN ACETATE 0.1 MILLIGRAM(S): 0.1 TABLET ORAL at 17:58

## 2023-06-20 RX ADMIN — Medication 1 TABLET(S): at 11:51

## 2023-06-20 RX ADMIN — PANTOPRAZOLE SODIUM 40 MILLIGRAM(S): 20 TABLET, DELAYED RELEASE ORAL at 04:26

## 2023-06-20 RX ADMIN — ENOXAPARIN SODIUM 40 MILLIGRAM(S): 100 INJECTION SUBCUTANEOUS at 04:25

## 2023-06-20 RX ADMIN — LEVETIRACETAM 500 MILLIGRAM(S): 250 TABLET, FILM COATED ORAL at 17:58

## 2023-06-20 RX ADMIN — DESMOPRESSIN ACETATE 0.1 MILLIGRAM(S): 0.1 TABLET ORAL at 04:26

## 2023-06-20 RX ADMIN — Medication 1 MILLIGRAM(S): at 11:51

## 2023-06-20 NOTE — PROGRESS NOTE ADULT - ASSESSMENT
57 yr old female with subarachnoid hemorrhage s/p brain aneurysm clipping x 2, diabetes insipidus, seizures, history of fluctuations of sodium levels at home in the past requiring PICC line for hydration, presented after she wandered off, admitted for acute metabolic encephalopathy, noted to have hypernatremia 152 on admission. CT head on admission, without acute changes. Nephrology was consulted, she was given hypotonic saline with close monitoring of sodium levels. At home, her sodium levels are managed with DDAVP and strict fluid control. Endocrine consulted as well. 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 She then developed hyponatremia, which was corrected with sodium chloride tablets and hypertonic saline. She was closely followed by nephrology during her course. Mental status improved. She developed hypernatremia again, Desmopressin was resumed, HCTZ was added on 6/16, and encouraged to drink 8-10 oz of water every 3-4 hrs.     1. Hypernatremia sec DI:  Likely sec SAH sec brain aneurysm  Continue Desmopressin and HCTZ  nephrology following  monitor BMP daily  sodium 152 today  discussed with case management and nephrology reg PICC arrangements  she is now amenable  but IVF needs to be supplemented with adequate PO water intake as well.   monitor oral water intake    2. Hyperkalemia:  resolved.    3. Seizures:  Continue Keppra    4. DVT ppx:  Lovenox    Discussed with patient.          57 yr old female with subarachnoid hemorrhage s/p brain aneurysm clipping x 2, diabetes insipidus, seizures, history of fluctuations of sodium levels at home in the past requiring PICC line for hydration, presented after she wandered off, admitted for acute metabolic encephalopathy, noted to have hypernatremia 152 on admission. CT head on admission, without acute changes. Nephrology was consulted, she was given hypotonic saline with close monitoring of sodium levels. At home, her sodium levels are managed with DDAVP and strict fluid control. Endocrine consulted as well. 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 She then developed hyponatremia, which was corrected with sodium chloride tablets and hypertonic saline. She was closely followed by nephrology during her course. Mental status improved. She developed hypernatremia again, Desmopressin was resumed, HCTZ was added on 6/16, and encouraged to drink 8-10 oz of water every 3-4 hrs.     1. Hypernatremia sec DI:  Likely sec SAH sec brain aneurysm  Continue Desmopressin and HCTZ  nephrology following  monitor BMP daily  sodium 152 today  discussed with case management and nephrology reg PICC arrangements  she is now amenable  but IVF needs to be supplemented with adequate PO water intake as well.   monitor oral water intake    2. Hyperkalemia:  resolved.    3. Seizures:  Continue Keppra    4. DVT ppx:  Lovenox    Discussed with patient.   updated  and daughter.  Feel she needs in person supervision at home to ensure adequate PO hydration.

## 2023-06-20 NOTE — PROGRESS NOTE ADULT - ASSESSMENT
57y/oF PMH SAH s/p Brain aneurysm clippingx 2, DI, Seizures 2/2 hypo or hypernatremia was BIBA after she was was reported to have wandered off. In ER, She was found to be altered, confused, daughter reported issues with sodium in past. Pt. known to our service from a prior hospital stay. She developed diabetes insipidus following a subarachnoid hemorrhage, and has had great difficulty both in hospital and at home in maintaining a physiologic sodium level, with swings from hypernatremia to hyponatremia.     1. Central DI/Hypernatremia - likely secondary to SAH  -   - On ddavp 0.1mg bid, HCTZ  - Monitor BMP, strict I&Os  - Will need further planning with outpatient care, pt is forgetful  - Follow nephrology recommendations, pt on HCTZ until 6/21

## 2023-06-20 NOTE — PROGRESS NOTE ADULT - ASSESSMENT
58 yo F w h/o SAH s/p Brain aneurysm clipping, central DI, Seizures 2/2 hypo or  hypernatremia   Pt was BIBA after she was was reported to have wandered off. pt called police " I got lost". In ER, She was found to be altered, confused    Central DI/ HyperNatremia  Likely 2/2  SAH s/p Brain aneurysm  Cortisol and TSH OK   Pt is on strict fluid control and DDAVP at home  Serum Na is very sensitive has been fluctuating  Had a PICc line     Added Desmopressin O.1 mg po bid   Watch UO and lab trend   EST free water deficit is 5 L   While4 on IVFs 1/4 NS, na was down to 144 - Now Na 153>150>149>149>152 while patient thinks she drank regularly and more   Will need constant reminders to keep taking POFs as a mainstay  May need extra supplemental fluid via PICC or a port  On  low dose HCTZ  watch    Main issue is that patient has to remember herself or be given Desmopressin and water in a timely manner with out missing any doses  Had a PICC line inthe past with infusion of D5or 1/4 NS but refusing though now agreeable. also need to check insurance issues  Willing to drink water when reminded by family on face time  Home draws were arranged and can be done again but will need close supervision of meds and POf to avoid high or low sodium  Called  but mailbox full, couldn't leave a message   D/w Dr. Pavon multiple times

## 2023-06-21 LAB
ALBUMIN SERPL ELPH-MCNC: 3.4 G/DL — SIGNIFICANT CHANGE UP (ref 3.3–5.2)
ALP SERPL-CCNC: 157 U/L — HIGH (ref 40–120)
ALT FLD-CCNC: 56 U/L — HIGH
ANION GAP SERPL CALC-SCNC: 12 MMOL/L — SIGNIFICANT CHANGE UP (ref 5–17)
AST SERPL-CCNC: 37 U/L — HIGH
BASOPHILS # BLD AUTO: 0.03 K/UL — SIGNIFICANT CHANGE UP (ref 0–0.2)
BASOPHILS NFR BLD AUTO: 0.7 % — SIGNIFICANT CHANGE UP (ref 0–2)
BILIRUB DIRECT SERPL-MCNC: 0.1 MG/DL — SIGNIFICANT CHANGE UP (ref 0–0.3)
BILIRUB INDIRECT FLD-MCNC: 0.2 MG/DL — SIGNIFICANT CHANGE UP (ref 0.2–1)
BILIRUB SERPL-MCNC: 0.3 MG/DL — LOW (ref 0.4–2)
BUN SERPL-MCNC: 39.6 MG/DL — HIGH (ref 8–20)
CALCIUM SERPL-MCNC: 8.9 MG/DL — SIGNIFICANT CHANGE UP (ref 8.4–10.5)
CHLORIDE SERPL-SCNC: 112 MMOL/L — HIGH (ref 96–108)
CO2 SERPL-SCNC: 23 MMOL/L — SIGNIFICANT CHANGE UP (ref 22–29)
CREAT SERPL-MCNC: 1.17 MG/DL — SIGNIFICANT CHANGE UP (ref 0.5–1.3)
EGFR: 54 ML/MIN/1.73M2 — LOW
EOSINOPHIL # BLD AUTO: 0.04 K/UL — SIGNIFICANT CHANGE UP (ref 0–0.5)
EOSINOPHIL NFR BLD AUTO: 1 % — SIGNIFICANT CHANGE UP (ref 0–6)
GLUCOSE SERPL-MCNC: 75 MG/DL — SIGNIFICANT CHANGE UP (ref 70–99)
HCT VFR BLD CALC: 31.1 % — LOW (ref 34.5–45)
HGB BLD-MCNC: 9.1 G/DL — LOW (ref 11.5–15.5)
IMM GRANULOCYTES NFR BLD AUTO: 1.5 % — HIGH (ref 0–0.9)
LYMPHOCYTES # BLD AUTO: 1.99 K/UL — SIGNIFICANT CHANGE UP (ref 1–3.3)
LYMPHOCYTES # BLD AUTO: 48.4 % — HIGH (ref 13–44)
MAGNESIUM SERPL-MCNC: 2.2 MG/DL — SIGNIFICANT CHANGE UP (ref 1.6–2.6)
MCHC RBC-ENTMCNC: 26.1 PG — LOW (ref 27–34)
MCHC RBC-ENTMCNC: 29.3 GM/DL — LOW (ref 32–36)
MCV RBC AUTO: 89.1 FL — SIGNIFICANT CHANGE UP (ref 80–100)
MONOCYTES # BLD AUTO: 0.5 K/UL — SIGNIFICANT CHANGE UP (ref 0–0.9)
MONOCYTES NFR BLD AUTO: 12.2 % — SIGNIFICANT CHANGE UP (ref 2–14)
NEUTROPHILS # BLD AUTO: 1.49 K/UL — LOW (ref 1.8–7.4)
NEUTROPHILS NFR BLD AUTO: 36.2 % — LOW (ref 43–77)
PLATELET # BLD AUTO: 191 K/UL — SIGNIFICANT CHANGE UP (ref 150–400)
POTASSIUM SERPL-MCNC: 4.5 MMOL/L — SIGNIFICANT CHANGE UP (ref 3.5–5.3)
POTASSIUM SERPL-SCNC: 4.5 MMOL/L — SIGNIFICANT CHANGE UP (ref 3.5–5.3)
PROT SERPL-MCNC: 6.7 G/DL — SIGNIFICANT CHANGE UP (ref 6.6–8.7)
RBC # BLD: 3.49 M/UL — LOW (ref 3.8–5.2)
RBC # FLD: 14.5 % — SIGNIFICANT CHANGE UP (ref 10.3–14.5)
SODIUM SERPL-SCNC: 147 MMOL/L — HIGH (ref 135–145)
WBC # BLD: 4.11 K/UL — SIGNIFICANT CHANGE UP (ref 3.8–10.5)
WBC # FLD AUTO: 4.11 K/UL — SIGNIFICANT CHANGE UP (ref 3.8–10.5)

## 2023-06-21 PROCEDURE — 99233 SBSQ HOSP IP/OBS HIGH 50: CPT

## 2023-06-21 RX ADMIN — LEVETIRACETAM 500 MILLIGRAM(S): 250 TABLET, FILM COATED ORAL at 17:26

## 2023-06-21 RX ADMIN — LEVETIRACETAM 500 MILLIGRAM(S): 250 TABLET, FILM COATED ORAL at 05:06

## 2023-06-21 RX ADMIN — Medication 1 TABLET(S): at 12:09

## 2023-06-21 RX ADMIN — Medication 1 MILLIGRAM(S): at 12:09

## 2023-06-21 RX ADMIN — DESMOPRESSIN ACETATE 0.1 MILLIGRAM(S): 0.1 TABLET ORAL at 05:06

## 2023-06-21 RX ADMIN — DESMOPRESSIN ACETATE 0.1 MILLIGRAM(S): 0.1 TABLET ORAL at 17:26

## 2023-06-21 RX ADMIN — PANTOPRAZOLE SODIUM 40 MILLIGRAM(S): 20 TABLET, DELAYED RELEASE ORAL at 05:06

## 2023-06-21 RX ADMIN — ENOXAPARIN SODIUM 40 MILLIGRAM(S): 100 INJECTION SUBCUTANEOUS at 05:05

## 2023-06-21 NOTE — PROGRESS NOTE ADULT - ASSESSMENT
57 yr old female with subarachnoid hemorrhage s/p brain aneurysm clipping x 2, diabetes insipidus, seizures, history of fluctuations of sodium levels at home in the past requiring PICC line for hydration, presented after she wandered off, admitted for acute metabolic encephalopathy, noted to have hypernatremia 152 on admission. CT head on admission, without acute changes. Nephrology was consulted, she was given hypotonic saline with close monitoring of sodium levels. At home, her sodium levels are managed with DDAVP and strict fluid control. Endocrine consulted as well. 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 She then developed hyponatremia, which was corrected with sodium chloride tablets and hypertonic saline. She was closely followed by nephrology during her course. Mental status improved. She developed hypernatremia again, Desmopressin was resumed, HCTZ was added on 6/16, and encouraged to drink 8-10 oz of water every 3-4 hrs.     1. Hypernatremia sec DI:  Likely sec SAH sec brain aneurysm  Continue Desmopressin and HCTZ  nephrology following  monitor BMP daily  sodium 147  discussed with case management and nephrology reg PICC arrangements  but IVF needs to be supplemented with adequate PO water intake as well.   monitor oral water intake    2. Hyperkalemia:  resolved.    3. Seizures:  Continue Keppra    4. DVT ppx:  Lovenox    Discussed with patient.   patient will likely need home IVF in addition to close inpatient monitoring to ensure adequate water intake to maintain sodium levels.  discussed with case management if she will be able to receive home infusion prior to PICC placement.

## 2023-06-21 NOTE — PROGRESS NOTE ADULT - ASSESSMENT
56 yo F w h/o SAH s/p Brain aneurysm clipping, central DI, Seizures 2/2 hypo or  hypernatremia   Pt was BIBA after she was was reported to have wandered off. pt called police " I got lost". In ER, She was found to be altered, confused    Central DI/ HyperNatremia  Likely 2/2  SAH s/p Brain aneurysm  Pt is on strict fluid control and DDAVP at home  Serum Na is very sensitive has been fluctuating    Added Desmopressin O.1 mg po bid   Patient should likely have some type of home care to ensure  she maintains good fluid intake and med compliance at home

## 2023-06-22 LAB
ANION GAP SERPL CALC-SCNC: 12 MMOL/L — SIGNIFICANT CHANGE UP (ref 5–17)
BUN SERPL-MCNC: 37.6 MG/DL — HIGH (ref 8–20)
CALCIUM SERPL-MCNC: 8.9 MG/DL — SIGNIFICANT CHANGE UP (ref 8.4–10.5)
CHLORIDE SERPL-SCNC: 111 MMOL/L — HIGH (ref 96–108)
CO2 SERPL-SCNC: 24 MMOL/L — SIGNIFICANT CHANGE UP (ref 22–29)
CREAT SERPL-MCNC: 1.18 MG/DL — SIGNIFICANT CHANGE UP (ref 0.5–1.3)
EGFR: 53 ML/MIN/1.73M2 — LOW
GLUCOSE SERPL-MCNC: 72 MG/DL — SIGNIFICANT CHANGE UP (ref 70–99)
POTASSIUM SERPL-MCNC: 4.9 MMOL/L — SIGNIFICANT CHANGE UP (ref 3.5–5.3)
POTASSIUM SERPL-SCNC: 4.9 MMOL/L — SIGNIFICANT CHANGE UP (ref 3.5–5.3)
SODIUM SERPL-SCNC: 147 MMOL/L — HIGH (ref 135–145)

## 2023-06-22 PROCEDURE — 99232 SBSQ HOSP IP/OBS MODERATE 35: CPT

## 2023-06-22 RX ADMIN — DESMOPRESSIN ACETATE 0.1 MILLIGRAM(S): 0.1 TABLET ORAL at 18:15

## 2023-06-22 RX ADMIN — LEVETIRACETAM 500 MILLIGRAM(S): 250 TABLET, FILM COATED ORAL at 18:14

## 2023-06-22 RX ADMIN — PANTOPRAZOLE SODIUM 40 MILLIGRAM(S): 20 TABLET, DELAYED RELEASE ORAL at 06:02

## 2023-06-22 RX ADMIN — DESMOPRESSIN ACETATE 0.1 MILLIGRAM(S): 0.1 TABLET ORAL at 06:02

## 2023-06-22 RX ADMIN — LEVETIRACETAM 500 MILLIGRAM(S): 250 TABLET, FILM COATED ORAL at 06:02

## 2023-06-22 RX ADMIN — ENOXAPARIN SODIUM 40 MILLIGRAM(S): 100 INJECTION SUBCUTANEOUS at 06:07

## 2023-06-22 RX ADMIN — Medication 1 MILLIGRAM(S): at 11:43

## 2023-06-22 RX ADMIN — Medication 1 TABLET(S): at 11:43

## 2023-06-22 NOTE — PROGRESS NOTE ADULT - ASSESSMENT
58 yo F w h/o SAH s/p Brain aneurysm clipping, central DI, Seizures 2/2 hypo or  hypernatremia   Pt was BIBA after she was was reported to have wandered off. pt called police " I got lost". In ER, She was found to be altered, confused    Central DI/ HyperNatremia  Likely 2/2  SAH s/p Brain aneurysm  Pt is on strict fluid control and DDAVP at home  Serum Na is very sensitive has been fluctuating    Added Desmopressin O.1 mg po bid   Patient should likely have some type of home care to ensure  she maintains good fluid intake and med compliance at home

## 2023-06-22 NOTE — PROGRESS NOTE ADULT - ASSESSMENT
57 yr old female with subarachnoid hemorrhage s/p brain aneurysm clipping x 2, diabetes insipidus, seizures, history of fluctuations of sodium levels at home in the past requiring PICC line for hydration, presented after she wandered off, admitted for acute metabolic encephalopathy, noted to have hypernatremia 152 on admission. CT head on admission, without acute changes. Nephrology was consulted, she was given hypotonic saline with close monitoring of sodium levels. At home, her sodium levels are managed with DDAVP and strict fluid control. Endocrine consulted as well. 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 She then developed hyponatremia, which was corrected with sodium chloride tablets and hypertonic saline. She was closely followed by nephrology during her course. Mental status improved. She developed hypernatremia again, Desmopressin was resumed, HCTZ was added on 6/16, and encouraged to drink 8-10 oz of water every 3-4 hrs.     1. Hypernatremia sec DI:  Likely sec SAH sec brain aneurysm  Continue Desmopressin and HCTZ  nephrology following  monitor BMP daily  sodium 147  discussed with case management and nephrology reg PICC arrangements  monitor oral intake  currently levels relatively stable with oral hydration.    2. Hyperkalemia:  resolved.    3. Seizures:  Continue Keppra    4. DVT ppx:  Lovenox    Discussed with patient.

## 2023-06-22 NOTE — PROGRESS NOTE ADULT - ASSESSMENT
57y/oF PMH SAH s/p Brain aneurysm clippingx 2, DI, Seizures 2/2 hypo or hypernatremia was BIBA after she was was reported to have wandered off. In ER, She was found to be altered, confused, daughter reported issues with sodium in past. Pt. known to our service from a prior hospital stay. She developed diabetes insipidus following a subarachnoid hemorrhage, and has had great difficulty both in hospital and at home in maintaining a physiologic sodium level, with swings from hypernatremia to hyponatremia.     1. Central DI/Hypernatremia - likely secondary to SAH  - , nephrology following  - On ddavp 0.1mg bid  - Monitor BMP, strict I&Os  - Planning with case management, PICC arrangements for d/c home

## 2023-06-23 LAB
ANION GAP SERPL CALC-SCNC: 14 MMOL/L — SIGNIFICANT CHANGE UP (ref 5–17)
BUN SERPL-MCNC: 41.2 MG/DL — HIGH (ref 8–20)
CALCIUM SERPL-MCNC: 9 MG/DL — SIGNIFICANT CHANGE UP (ref 8.4–10.5)
CHLORIDE SERPL-SCNC: 112 MMOL/L — HIGH (ref 96–108)
CO2 SERPL-SCNC: 21 MMOL/L — LOW (ref 22–29)
CREAT SERPL-MCNC: 1.16 MG/DL — SIGNIFICANT CHANGE UP (ref 0.5–1.3)
EGFR: 54 ML/MIN/1.73M2 — LOW
GLUCOSE SERPL-MCNC: 67 MG/DL — LOW (ref 70–99)
MAGNESIUM SERPL-MCNC: 2.3 MG/DL — SIGNIFICANT CHANGE UP (ref 1.6–2.6)
PHOSPHATE SERPL-MCNC: 3.9 MG/DL — SIGNIFICANT CHANGE UP (ref 2.4–4.7)
POTASSIUM SERPL-MCNC: 4.8 MMOL/L — SIGNIFICANT CHANGE UP (ref 3.5–5.3)
POTASSIUM SERPL-SCNC: 4.8 MMOL/L — SIGNIFICANT CHANGE UP (ref 3.5–5.3)
SODIUM SERPL-SCNC: 147 MMOL/L — HIGH (ref 135–145)

## 2023-06-23 PROCEDURE — 99232 SBSQ HOSP IP/OBS MODERATE 35: CPT

## 2023-06-23 RX ADMIN — Medication 1 TABLET(S): at 12:04

## 2023-06-23 RX ADMIN — Medication 1 MILLIGRAM(S): at 12:04

## 2023-06-23 RX ADMIN — LEVETIRACETAM 500 MILLIGRAM(S): 250 TABLET, FILM COATED ORAL at 17:35

## 2023-06-23 RX ADMIN — LEVETIRACETAM 500 MILLIGRAM(S): 250 TABLET, FILM COATED ORAL at 05:22

## 2023-06-23 RX ADMIN — PANTOPRAZOLE SODIUM 40 MILLIGRAM(S): 20 TABLET, DELAYED RELEASE ORAL at 05:24

## 2023-06-23 RX ADMIN — DESMOPRESSIN ACETATE 0.1 MILLIGRAM(S): 0.1 TABLET ORAL at 05:22

## 2023-06-23 RX ADMIN — ENOXAPARIN SODIUM 40 MILLIGRAM(S): 100 INJECTION SUBCUTANEOUS at 05:22

## 2023-06-23 RX ADMIN — DESMOPRESSIN ACETATE 0.1 MILLIGRAM(S): 0.1 TABLET ORAL at 17:36

## 2023-06-23 NOTE — PROGRESS NOTE ADULT - NS ATTEND OPT1A GEN_ALL_CORE
Medical decision making
History/Exam/Medical decision making
History/Exam/Medical decision making
Medical decision making

## 2023-06-23 NOTE — PROGRESS NOTE ADULT - NS ATTEND AMEND GEN_ALL_CORE FT
I have seen and examined patient with NP, agree with above assessment and plan.
I have seen and examined patient with NP, agree with above assessment and plan.
pt repruthst obe feelign ok      Sodium improving today   monitor off HCTZ    cont DDAVP 0.1 mg bid
I have seen and examined patient with NP, agree with above assessment and plan.
I have seen and examined patient with NP, agree with above assessment and plan.
Pt with DI secondary to SAH     not neha to be complaint with DDAVP regiemn    encouraged tp to keep water intake conssitent   and taker he meds asprescribed   pt will need home care
Pt seen and examined at bedside   pt alert and oriented     Dw pt med compliance--    startegoeies to keep same water intake daily      DI    cont RX     Serum sodium is improving sliughtly daily      cont R      ?Needs visitng nurses to help with outpt med management
Pt with inc sodium today    on HCTZ   pt amenable to PICC line - to maintain  hydration    will also work to be consistent with water intake
I have seen and examined patient with NP, agree with above assessment and plan.

## 2023-06-23 NOTE — PROGRESS NOTE ADULT - NS ATTEND OPT1 GEN_ALL_CORE
I independently performed the documented:
I attest my time as attending is greater than 50% of the total combined time spent on qualifying patient care activities by the PA/NP and attending.
I independently performed the documented:
I attest my time as attending is greater than 50% of the total combined time spent on qualifying patient care activities by the PA/NP and attending.
I independently performed the documented:
I attest my time as attending is greater than 50% of the total combined time spent on qualifying patient care activities by the PA/NP and attending.
I independently performed the documented:

## 2023-06-23 NOTE — PROGRESS NOTE ADULT - ASSESSMENT
57 yr old female with subarachnoid hemorrhage s/p brain aneurysm clipping x 2, diabetes insipidus, seizures, history of fluctuations of sodium levels at home in the past requiring PICC line for hydration, presented after she wandered off, admitted for acute metabolic encephalopathy, noted to have hypernatremia 152 on admission. CT head on admission, without acute changes. Nephrology was consulted, she was given hypotonic saline with close monitoring of sodium levels. At home, her sodium levels are managed with DDAVP and strict fluid control. Endocrine consulted as well. 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 She then developed hyponatremia, which was corrected with sodium chloride tablets and hypertonic saline. She was closely followed by nephrology during her course. Mental status improved. She developed hypernatremia again, Desmopressin was resumed, HCTZ was added on 6/16, and encouraged to drink 8-10 oz of water every 3-4 hrs.     1. Hypernatremia sec DI:  Likely sec SAH sec brain aneurysm  Continue Desmopressin and HCTZ  nephrology following  monitor BMP daily  sodium 147  nephrology not recommending PICC, given risks of bacteremia and infection, also current levels are stable on PO hydration.  She does need inperson supervision to ensure PO hydration at home, either family vs aides to supervise.  monitor oral intake      2. Hyperkalemia:  resolved.    3. Seizures:  Continue Keppra    4. DVT ppx:  Lovenox    Discussed with patient.   Discussed with nephrology, rec oral hydration.   Updated family, daughter and , they wish for patient to have PICC for IV hydration at home, risks of infection/sepsis/endocarditis explained to family.

## 2023-06-23 NOTE — PROGRESS NOTE ADULT - ASSESSMENT
57F PMH SAH s/p Brain aneurysm clippingx 2, DI, Seizures 2/2 hypo or hypernatremia was BIBA after she was was reported to have wandered off. In ER, She was found to be altered, confused, daughter reported issues with sodium in past. Pt. known to our service from a prior hospital stay. She developed diabetes insipidus following a subarachnoid hemorrhage, and has had great difficulty both in hospital and at home in maintaining a physiologic sodium level, with swings from hypernatremia to hyponatremia.     1. Central DI/Hypernatremia - likely secondary to SAH  - , nephrology following  - On ddavp 0.1mg bid, continue current rx  - Monitor BMP, strict I&Os  - Planning with case management, PICC arrangements for d/c home

## 2023-06-23 NOTE — CHART NOTE - NSCHARTNOTEFT_GEN_A_CORE
Source: Patient [x ]  Family [ ]   other [ ]    Current Diet:   Diet, Regular (06-07-23 @ 15:36)    Patient reports [ ] nausea  [ ] vomiting [ ] diarrhea [ ] constipation  [ ]chewing problems [ ] swallowing issues  [ ] other:     PO intake:  < 50% [ ]   50-75%  [ ]   %  [x ]  other :    Source for PO intake [x ] Patient [ ] family [x ] chart [x ] staff [ ] other    Current Weight:   (6/23)  208 lbs RD bedscale  (6/14)  152 lbs  (6/7)    200 lbs    % Weight Change: Unclear accuracy of weight 2/2 inconsistency, will continue to monitor     Pertinent Medications: MEDICATIONS  (STANDING):  desmopressin 0.1 milliGRAM(s) Oral two times a day  enoxaparin Injectable 40 milliGRAM(s) SubCutaneous every 24 hours  folic acid 1 milliGRAM(s) Oral daily  levETIRAcetam 500 milliGRAM(s) Oral two times a day  multivitamin/minerals 1 Tablet(s) Oral daily  pantoprazole    Tablet 40 milliGRAM(s) Oral before breakfast    MEDICATIONS  (PRN):  acetaminophen     Tablet .. 650 milliGRAM(s) Oral every 6 hours PRN Temp greater or equal to 38C (100.4F), Mild Pain (1 - 3)  aluminum hydroxide/magnesium hydroxide/simethicone Suspension 30 milliLiter(s) Oral every 4 hours PRN Dyspepsia  ondansetron Injectable 4 milliGRAM(s) IV Push every 8 hours PRN Nausea and/or Vomiting    Pertinent Labs: 06-23 Na147 mmol/L<H> Glu 67 mg/dL<L> K+ 4.8 mmol/L Cr  1.16 mg/dL BUN 41.2 mg/dL<H> Phos 3.9 mg/dL Alb n/a   PAB n/a       Skin: No skin breakdown/edema noted     Nutrition focused physical exam conducted - found signs of malnutrition [ ]absent [ x]present    Subcutaneous fat loss: [ ] Orbital fat pads region, [ ]Buccal fat region, [ ]Triceps region,  [ ]Ribs region    Muscle wasting: [ x]Temples region, [ ]Clavicle region, [ ]Shoulder region, [ ]Scapula region, [ ]Interosseous region,  [ ]thigh region, [ ]Calf region    Estimated Needs:   [x] no change since previous assessment  [ ] recalculated:     Current Nutrition Diagnosis: Pt remains at nutrition risk secondary to altered nutrition related lab values related to Central DI/Hypernatremia likely 2/2  SAH s/p Brain aneurysm, strict fluid control and DDVAP PTA as evidenced by SNa 152 <H>, Mg 2.9 <H> BUN 50.0 <H>, creat 1.47 <H>. Pt remains with good PO intake, states completing >75% of meals. Aware SNa remains elevated. Last documented BM 6/23.     Recommendations:   1) Change to DASH/TLC  2) Encourage HBV protein sources   3) Encourage adeuate fluid intake   4) Monitor weights daily for trend/accuracy     Monitoring and Evaluation:   [x] PO intake [ ] Tolerance to diet prescription [X] Weights  [X] Follow up per protocol [X] Labs:

## 2023-06-24 LAB
ANION GAP SERPL CALC-SCNC: 12 MMOL/L — SIGNIFICANT CHANGE UP (ref 5–17)
BUN SERPL-MCNC: 36.8 MG/DL — HIGH (ref 8–20)
CALCIUM SERPL-MCNC: 8.9 MG/DL — SIGNIFICANT CHANGE UP (ref 8.4–10.5)
CHLORIDE SERPL-SCNC: 109 MMOL/L — HIGH (ref 96–108)
CO2 SERPL-SCNC: 21 MMOL/L — LOW (ref 22–29)
CREAT SERPL-MCNC: 1.14 MG/DL — SIGNIFICANT CHANGE UP (ref 0.5–1.3)
EGFR: 55 ML/MIN/1.73M2 — LOW
GLUCOSE SERPL-MCNC: 87 MG/DL — SIGNIFICANT CHANGE UP (ref 70–99)
POTASSIUM SERPL-MCNC: 4.7 MMOL/L — SIGNIFICANT CHANGE UP (ref 3.5–5.3)
POTASSIUM SERPL-SCNC: 4.7 MMOL/L — SIGNIFICANT CHANGE UP (ref 3.5–5.3)
SODIUM SERPL-SCNC: 142 MMOL/L — SIGNIFICANT CHANGE UP (ref 135–145)

## 2023-06-24 PROCEDURE — 99233 SBSQ HOSP IP/OBS HIGH 50: CPT

## 2023-06-24 RX ADMIN — PANTOPRAZOLE SODIUM 40 MILLIGRAM(S): 20 TABLET, DELAYED RELEASE ORAL at 05:12

## 2023-06-24 RX ADMIN — Medication 1 MILLIGRAM(S): at 11:12

## 2023-06-24 RX ADMIN — LEVETIRACETAM 500 MILLIGRAM(S): 250 TABLET, FILM COATED ORAL at 05:11

## 2023-06-24 RX ADMIN — DESMOPRESSIN ACETATE 0.1 MILLIGRAM(S): 0.1 TABLET ORAL at 05:11

## 2023-06-24 RX ADMIN — ENOXAPARIN SODIUM 40 MILLIGRAM(S): 100 INJECTION SUBCUTANEOUS at 05:11

## 2023-06-24 RX ADMIN — Medication 1 TABLET(S): at 11:11

## 2023-06-24 RX ADMIN — LEVETIRACETAM 500 MILLIGRAM(S): 250 TABLET, FILM COATED ORAL at 16:39

## 2023-06-24 RX ADMIN — DESMOPRESSIN ACETATE 0.1 MILLIGRAM(S): 0.1 TABLET ORAL at 16:39

## 2023-06-24 NOTE — PROGRESS NOTE ADULT - ASSESSMENT
A) Central DI sodium normal at  present; Anemia.      P) Diet  adjusted, repeat  anemia  eval. Not  able  to reach family  today.

## 2023-06-24 NOTE — PROGRESS NOTE ADULT - ASSESSMENT
56 y/o F w/ PMH of SAH s/p brain aneurysm clipping x 2, Na fluctuations 2/2 DI, seizures was admitted for acute metabolic encephalopathy 2/2 hypernatremia.  CTH (-) for acute findings.  Nephro and endo were consulted.  Pt was started on hypotonic saline initially.  At home she is managed w/ DDAVP and strict fluid control.  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  During hospital course she became hyponatremic and has since been corrected w/ NaCl tabs and hypertonic saline.  Mental status back to baseline.  Mild hypernatremia has since develped; NaCl tabs d/c'd, desmopressin resumed and HCTZ added (on 6/16).  Pt encouraged to drink 8-10 oz of water every 3-4 hrs.      Hypernatremia 2/2 DI which is likely sequale of SAH   - c/w Desmopressin and HCTZ  - AM BMP pending to reassess Na levels   - If continues trending down can likely start d/c planning   - Will order AM chemestry to continue monitoring lytes   - Family request for PICC on d/c noted however is not appropriate given risk out weights benefits and levels have remained stable w/ po hydration   - Agree that pt does need inperson supervision to ensure PO hydration at home; either family vs aides to supervise  monitor oral intake  - Nephrology following and recs noted       Hyperkalemia  - Had resolved   - AM labs pending to continue monitoring       Seizures  - c/w Keppra      VTE ppx: lovenox     Dispo: Anticipate d/c in 1-2 days if Na levels remain stable.    Will update family once AM labs resulted.

## 2023-06-25 LAB
ANION GAP SERPL CALC-SCNC: 11 MMOL/L — SIGNIFICANT CHANGE UP (ref 5–17)
BUN SERPL-MCNC: 37.7 MG/DL — HIGH (ref 8–20)
CALCIUM SERPL-MCNC: 8.8 MG/DL — SIGNIFICANT CHANGE UP (ref 8.4–10.5)
CHLORIDE SERPL-SCNC: 110 MMOL/L — HIGH (ref 96–108)
CO2 SERPL-SCNC: 22 MMOL/L — SIGNIFICANT CHANGE UP (ref 22–29)
CREAT SERPL-MCNC: 1.06 MG/DL — SIGNIFICANT CHANGE UP (ref 0.5–1.3)
EGFR: 61 ML/MIN/1.73M2 — SIGNIFICANT CHANGE UP
FERRITIN SERPL-MCNC: 985 NG/ML — HIGH (ref 15–150)
GLUCOSE SERPL-MCNC: 72 MG/DL — SIGNIFICANT CHANGE UP (ref 70–99)
HCT VFR BLD CALC: 30.4 % — LOW (ref 34.5–45)
HGB BLD-MCNC: 9.1 G/DL — LOW (ref 11.5–15.5)
IRON SATN MFR SERPL: 21 % — SIGNIFICANT CHANGE UP (ref 14–50)
IRON SATN MFR SERPL: 66 UG/DL — SIGNIFICANT CHANGE UP (ref 37–145)
MAGNESIUM SERPL-MCNC: 2.1 MG/DL — SIGNIFICANT CHANGE UP (ref 1.8–2.6)
MCHC RBC-ENTMCNC: 26 PG — LOW (ref 27–34)
MCHC RBC-ENTMCNC: 29.9 GM/DL — LOW (ref 32–36)
MCV RBC AUTO: 86.9 FL — SIGNIFICANT CHANGE UP (ref 80–100)
PHOSPHATE SERPL-MCNC: 3.6 MG/DL — SIGNIFICANT CHANGE UP (ref 2.4–4.7)
PLATELET # BLD AUTO: 222 K/UL — SIGNIFICANT CHANGE UP (ref 150–400)
POTASSIUM SERPL-MCNC: 4.6 MMOL/L — SIGNIFICANT CHANGE UP (ref 3.5–5.3)
POTASSIUM SERPL-SCNC: 4.6 MMOL/L — SIGNIFICANT CHANGE UP (ref 3.5–5.3)
RBC # BLD: 3.5 M/UL — LOW (ref 3.8–5.2)
RBC # BLD: 3.5 M/UL — LOW (ref 3.8–5.2)
RBC # FLD: 14.4 % — SIGNIFICANT CHANGE UP (ref 10.3–14.5)
RETICS #: 63 K/UL — SIGNIFICANT CHANGE UP (ref 25–125)
RETICS/RBC NFR: 1.8 % — SIGNIFICANT CHANGE UP (ref 0.5–2.5)
SODIUM SERPL-SCNC: 142 MMOL/L — SIGNIFICANT CHANGE UP (ref 135–145)
TIBC SERPL-MCNC: 307 UG/DL — SIGNIFICANT CHANGE UP (ref 220–430)
TRANSFERRIN SERPL-MCNC: 215 MG/DL — SIGNIFICANT CHANGE UP (ref 192–382)
WBC # BLD: 4.68 K/UL — SIGNIFICANT CHANGE UP (ref 3.8–10.5)
WBC # FLD AUTO: 4.68 K/UL — SIGNIFICANT CHANGE UP (ref 3.8–10.5)

## 2023-06-25 PROCEDURE — 99232 SBSQ HOSP IP/OBS MODERATE 35: CPT

## 2023-06-25 RX ADMIN — PANTOPRAZOLE SODIUM 40 MILLIGRAM(S): 20 TABLET, DELAYED RELEASE ORAL at 06:21

## 2023-06-25 RX ADMIN — LEVETIRACETAM 500 MILLIGRAM(S): 250 TABLET, FILM COATED ORAL at 17:20

## 2023-06-25 RX ADMIN — DESMOPRESSIN ACETATE 0.1 MILLIGRAM(S): 0.1 TABLET ORAL at 17:20

## 2023-06-25 RX ADMIN — Medication 1 TABLET(S): at 13:03

## 2023-06-25 RX ADMIN — Medication 1 MILLIGRAM(S): at 13:03

## 2023-06-25 RX ADMIN — LEVETIRACETAM 500 MILLIGRAM(S): 250 TABLET, FILM COATED ORAL at 06:22

## 2023-06-25 RX ADMIN — ENOXAPARIN SODIUM 40 MILLIGRAM(S): 100 INJECTION SUBCUTANEOUS at 06:22

## 2023-06-25 RX ADMIN — DESMOPRESSIN ACETATE 0.1 MILLIGRAM(S): 0.1 TABLET ORAL at 06:21

## 2023-06-25 NOTE — PROGRESS NOTE ADULT - ASSESSMENT
56 y/o F w/ PMH of SAH s/p brain aneurysm clipping x 2, Na fluctuations 2/2 DI, seizures was admitted for acute metabolic encephalopathy 2/2 hypernatremia.  CTH (-) for acute findings.  Nephro and endo were consulted.  Pt was started on hypotonic saline initially.  At home she is managed w/ DDAVP and strict fluid control.  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  During hospital course she became hyponatremic and has since been corrected w/ NaCl tabs and hypertonic saline.  Mental status back to baseline.  Mild hypernatremia has since developed NaCl tabs d/c'd, desmopressin resumed and HCTZ added (on 6/16).  Pt encouraged to drink 8-10 oz of water every 3-4 hrs.        Hypernatremia 2/2 DI which is likely sequale of SAH   - c/w Desmopressin and HCTZ  - Na levels nL    - Family request for PICC on d/c noted however is not appropriate given risk out weights benefits and levels have remained stable w/ po hydration   - Per discussion w/ nephrology he notes pt has had line infection in the past from prior picc and is inappropriate given pt able to take po   - Agree w/ nephrology that PICC line is inappropriate at this time   - Pt does need in person supervision to ensure PO hydration at home; either family vs aides to supervise  - Nephrology following and recs noted       Hyperkalemia  - Resolved       Seizures  - c/w Keppra      VTE ppx: lovenox     Dispo: Medically stable for d/c however family not in agreement for d/c without PICC line for hydration.

## 2023-06-26 LAB
ANION GAP SERPL CALC-SCNC: 12 MMOL/L — SIGNIFICANT CHANGE UP (ref 5–17)
ANION GAP SERPL CALC-SCNC: 13 MMOL/L — SIGNIFICANT CHANGE UP (ref 5–17)
APPEARANCE UR: CLEAR — SIGNIFICANT CHANGE UP
BACTERIA # UR AUTO: ABNORMAL
BILIRUB UR-MCNC: NEGATIVE — SIGNIFICANT CHANGE UP
BUN SERPL-MCNC: 43.1 MG/DL — HIGH (ref 8–20)
BUN SERPL-MCNC: 44.6 MG/DL — HIGH (ref 8–20)
CALCIUM SERPL-MCNC: 8.7 MG/DL — SIGNIFICANT CHANGE UP (ref 8.4–10.5)
CALCIUM SERPL-MCNC: 9.2 MG/DL — SIGNIFICANT CHANGE UP (ref 8.4–10.5)
CHLORIDE SERPL-SCNC: 113 MMOL/L — HIGH (ref 96–108)
CHLORIDE SERPL-SCNC: 115 MMOL/L — HIGH (ref 96–108)
CO2 SERPL-SCNC: 18 MMOL/L — LOW (ref 22–29)
CO2 SERPL-SCNC: 20 MMOL/L — LOW (ref 22–29)
COLOR SPEC: YELLOW — SIGNIFICANT CHANGE UP
CREAT SERPL-MCNC: 1.21 MG/DL — SIGNIFICANT CHANGE UP (ref 0.5–1.3)
CREAT SERPL-MCNC: 1.27 MG/DL — SIGNIFICANT CHANGE UP (ref 0.5–1.3)
DIFF PNL FLD: ABNORMAL
EGFR: 49 ML/MIN/1.73M2 — LOW
EGFR: 52 ML/MIN/1.73M2 — LOW
EPI CELLS # UR: SIGNIFICANT CHANGE UP
GLUCOSE SERPL-MCNC: 100 MG/DL — HIGH (ref 70–99)
GLUCOSE SERPL-MCNC: 68 MG/DL — LOW (ref 70–99)
GLUCOSE UR QL: NEGATIVE MG/DL — SIGNIFICANT CHANGE UP
KETONES UR-MCNC: NEGATIVE — SIGNIFICANT CHANGE UP
LEUKOCYTE ESTERASE UR-ACNC: NEGATIVE — SIGNIFICANT CHANGE UP
NITRITE UR-MCNC: NEGATIVE — SIGNIFICANT CHANGE UP
PH UR: 5 — SIGNIFICANT CHANGE UP (ref 5–8)
POTASSIUM SERPL-MCNC: 4.7 MMOL/L — SIGNIFICANT CHANGE UP (ref 3.5–5.3)
POTASSIUM SERPL-MCNC: 5.6 MMOL/L — HIGH (ref 3.5–5.3)
POTASSIUM SERPL-SCNC: 4.7 MMOL/L — SIGNIFICANT CHANGE UP (ref 3.5–5.3)
POTASSIUM SERPL-SCNC: 5.6 MMOL/L — HIGH (ref 3.5–5.3)
PROT UR-MCNC: 30 MG/DL
RBC CASTS # UR COMP ASSIST: SIGNIFICANT CHANGE UP /HPF (ref 0–4)
SODIUM SERPL-SCNC: 145 MMOL/L — SIGNIFICANT CHANGE UP (ref 135–145)
SODIUM SERPL-SCNC: 146 MMOL/L — HIGH (ref 135–145)
SP GR SPEC: 1.02 — SIGNIFICANT CHANGE UP (ref 1.01–1.02)
UROBILINOGEN FLD QL: NEGATIVE MG/DL — SIGNIFICANT CHANGE UP
WBC UR QL: SIGNIFICANT CHANGE UP /HPF (ref 0–5)

## 2023-06-26 PROCEDURE — 99232 SBSQ HOSP IP/OBS MODERATE 35: CPT

## 2023-06-26 RX ORDER — FERROUS SULFATE 325(65) MG
300 TABLET ORAL DAILY
Refills: 0 | Status: DISCONTINUED | OUTPATIENT
Start: 2023-06-26 | End: 2023-07-14

## 2023-06-26 RX ORDER — SODIUM BICARBONATE 1 MEQ/ML
1300 SYRINGE (ML) INTRAVENOUS
Refills: 0 | Status: DISCONTINUED | OUTPATIENT
Start: 2023-06-26 | End: 2023-06-30

## 2023-06-26 RX ADMIN — Medication 1 MILLIGRAM(S): at 12:48

## 2023-06-26 RX ADMIN — LEVETIRACETAM 500 MILLIGRAM(S): 250 TABLET, FILM COATED ORAL at 06:29

## 2023-06-26 RX ADMIN — Medication 300 MILLIGRAM(S): at 12:49

## 2023-06-26 RX ADMIN — PANTOPRAZOLE SODIUM 40 MILLIGRAM(S): 20 TABLET, DELAYED RELEASE ORAL at 06:29

## 2023-06-26 RX ADMIN — Medication 1300 MILLIGRAM(S): at 18:03

## 2023-06-26 RX ADMIN — DESMOPRESSIN ACETATE 0.1 MILLIGRAM(S): 0.1 TABLET ORAL at 18:03

## 2023-06-26 RX ADMIN — ENOXAPARIN SODIUM 40 MILLIGRAM(S): 100 INJECTION SUBCUTANEOUS at 06:29

## 2023-06-26 RX ADMIN — LEVETIRACETAM 500 MILLIGRAM(S): 250 TABLET, FILM COATED ORAL at 18:04

## 2023-06-26 RX ADMIN — Medication 1 TABLET(S): at 12:48

## 2023-06-26 RX ADMIN — DESMOPRESSIN ACETATE 0.1 MILLIGRAM(S): 0.1 TABLET ORAL at 06:29

## 2023-06-26 NOTE — PROGRESS NOTE ADULT - ASSESSMENT
Addended by: NORM COLLADO on: 10/3/2022 01:02 PM     Modules accepted: Orders     56 y/o F w/ PMH of SAH s/p brain aneurysm clipping x 2, Na fluctuations 2/2 DI, seizures was admitted for acute metabolic encephalopathy 2/2 hypernatremia.  CTH (-) for acute findings.  Nephro and endo were consulted.  Pt was started on hypotonic saline initially.  At home she is managed w/ DDAVP and strict fluid control.  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  During hospital course she became hyponatremic and has since been corrected w/ NaCl tabs and hypertonic saline.  Mental status back to baseline.  Mild hypernatremia has since developed NaCl tabs d/c'd, desmopressin resumed and HCTZ added (on 6/16).  Pt encouraged to drink 8-10 oz of water every 3-4 hrs.        Hypernatremia 2/2 DI which is likely sequale of SAH   - c/w Desmopressin and HCTZ  - Na levels had normalized but mildly elevated this morning however repeat BMP ordered given hemolysis of K; will f/u results and reassess Na levels   - Family request for PICC on d/c noted however is not appropriate given risk out weights benefits and levels have remained stable w/ po hydration   - Per discussion w/ nephrology he notes pt has had line infection in the past from prior picc and is inappropriate given pt able to take po   - Agree w/ nephrology that PICC line is inappropriate at this time   - Pt does need in person supervision to ensure PO hydration at home; either family vs aides to supervise  - Nephrology following and recs noted       Hyperkalemia  - BMP this morning reproting K5.6 however hemolyzed   - Stat BMP ordered to reasses; if true hyperK will order lokelma       Seizures  - c/w Keppra      VTE ppx: lovenox     Dispo: Medically stable for d/c however family not in agreement for d/c without PICC line for hydration.

## 2023-06-27 LAB
ANION GAP SERPL CALC-SCNC: 12 MMOL/L — SIGNIFICANT CHANGE UP (ref 5–17)
BUN SERPL-MCNC: 42.7 MG/DL — HIGH (ref 8–20)
CALCIUM SERPL-MCNC: 9 MG/DL — SIGNIFICANT CHANGE UP (ref 8.4–10.5)
CHLORIDE SERPL-SCNC: 116 MMOL/L — HIGH (ref 96–108)
CO2 SERPL-SCNC: 21 MMOL/L — LOW (ref 22–29)
CREAT SERPL-MCNC: 1.15 MG/DL — SIGNIFICANT CHANGE UP (ref 0.5–1.3)
EGFR: 55 ML/MIN/1.73M2 — LOW
GLUCOSE SERPL-MCNC: 71 MG/DL — SIGNIFICANT CHANGE UP (ref 70–99)
POTASSIUM SERPL-MCNC: 5.1 MMOL/L — SIGNIFICANT CHANGE UP (ref 3.5–5.3)
POTASSIUM SERPL-SCNC: 5.1 MMOL/L — SIGNIFICANT CHANGE UP (ref 3.5–5.3)
SODIUM SERPL-SCNC: 149 MMOL/L — HIGH (ref 135–145)

## 2023-06-27 PROCEDURE — 99232 SBSQ HOSP IP/OBS MODERATE 35: CPT

## 2023-06-27 RX ORDER — ERYTHROPOIETIN 10000 [IU]/ML
20000 INJECTION, SOLUTION INTRAVENOUS; SUBCUTANEOUS ONCE
Refills: 0 | Status: COMPLETED | OUTPATIENT
Start: 2023-06-27 | End: 2023-06-27

## 2023-06-27 RX ADMIN — Medication 1 TABLET(S): at 12:27

## 2023-06-27 RX ADMIN — ERYTHROPOIETIN 20000 UNIT(S): 10000 INJECTION, SOLUTION INTRAVENOUS; SUBCUTANEOUS at 18:59

## 2023-06-27 RX ADMIN — ENOXAPARIN SODIUM 40 MILLIGRAM(S): 100 INJECTION SUBCUTANEOUS at 07:03

## 2023-06-27 RX ADMIN — LEVETIRACETAM 500 MILLIGRAM(S): 250 TABLET, FILM COATED ORAL at 18:59

## 2023-06-27 RX ADMIN — LEVETIRACETAM 500 MILLIGRAM(S): 250 TABLET, FILM COATED ORAL at 07:04

## 2023-06-27 RX ADMIN — Medication 1 MILLIGRAM(S): at 12:26

## 2023-06-27 RX ADMIN — DESMOPRESSIN ACETATE 0.1 MILLIGRAM(S): 0.1 TABLET ORAL at 19:00

## 2023-06-27 RX ADMIN — PANTOPRAZOLE SODIUM 40 MILLIGRAM(S): 20 TABLET, DELAYED RELEASE ORAL at 07:03

## 2023-06-27 RX ADMIN — Medication 1300 MILLIGRAM(S): at 07:03

## 2023-06-27 RX ADMIN — Medication 300 MILLIGRAM(S): at 12:26

## 2023-06-27 RX ADMIN — DESMOPRESSIN ACETATE 0.1 MILLIGRAM(S): 0.1 TABLET ORAL at 07:04

## 2023-06-27 RX ADMIN — Medication 1300 MILLIGRAM(S): at 18:59

## 2023-06-27 NOTE — PROGRESS NOTE ADULT - ASSESSMENT
A) Central DI sodium normal at  present; Anemia. metabolic  acidosis  with urine pH 5.0. Hyperkalemia.      P) Diet  adjusted, po iron, epo..

## 2023-06-27 NOTE — PROGRESS NOTE ADULT - ASSESSMENT
58 y/o F w/ PMH of SAH s/p brain aneurysm clipping x 2, Na fluctuations 2/2 DI, seizures was admitted for acute metabolic encephalopathy 2/2 hypernatremia.  CTH (-) for acute findings.  Nephro and endo were consulted.  Pt was started on hypotonic saline initially.  At home she is managed w/ DDAVP and strict fluid control.  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  During hospital course she became hyponatremic and has since been corrected w/ NaCl tabs and hypertonic saline.  Mental status back to baseline.  Mild hypernatremia has since developed NaCl tabs d/c'd, desmopressin resumed and HCTZ added (on 6/16).  Pt encouraged to drink 8-10 oz of water every 3-4 hrs.        Hypernatremia 2/2 DI which is likely sequale of SAH   - c/w Desmopressin and HCTZ  - Na levels fluctuate, mildly elevated this morning but overall improved  - Continue encouraging and reminding pt to take po fluids   - Family request for PICC on d/c noted however is not appropriate given risk out weights benefits and levels have remained stable w/ po hydration   - Per discussion w/ nephrology he notes pt has had line infection in the past from prior picc and is inappropriate given pt able to take po   - Agree w/ nephrology that PICC line is inappropriate at this time   - Pt does need in person supervision to ensure PO hydration at home; either family vs aides to supervise  - Nephrology following and recs noted       Hyperkalemia, resolved   - Normal        Seizures  - c/w Keppra      VTE ppx: lovenox     Dispo: Medically stable for d/c however family not in agreement for d/c without PICC line for hydration.

## 2023-06-28 LAB
ANION GAP SERPL CALC-SCNC: 11 MMOL/L — SIGNIFICANT CHANGE UP (ref 5–17)
BODY SURFACE AREA CALCULATION: 1.73 M2 — SIGNIFICANT CHANGE UP
BUN SERPL-MCNC: 43.1 MG/DL — HIGH (ref 8–20)
CALCIUM SERPL-MCNC: 8.9 MG/DL — SIGNIFICANT CHANGE UP (ref 8.4–10.5)
CHLORIDE SERPL-SCNC: 115 MMOL/L — HIGH (ref 96–108)
CO2 SERPL-SCNC: 22 MMOL/L — SIGNIFICANT CHANGE UP (ref 22–29)
COLLECT DURATION TIME UR: 24 HR — SIGNIFICANT CHANGE UP
CREAT CL ?TM UR+SERPL-VRATE: 32 ML/MIN — LOW (ref 75–115)
CREAT SERPL-MCNC: 1.24 MG/DL — SIGNIFICANT CHANGE UP (ref 0.5–1.3)
EGFR: 50 ML/MIN/1.73M2 — LOW
GLUCOSE SERPL-MCNC: 72 MG/DL — SIGNIFICANT CHANGE UP (ref 70–99)
HCT VFR BLD CALC: 31.6 % — LOW (ref 34.5–45)
HGB BLD-MCNC: 9.4 G/DL — LOW (ref 11.5–15.5)
POTASSIUM SERPL-MCNC: 5.2 MMOL/L — SIGNIFICANT CHANGE UP (ref 3.5–5.3)
POTASSIUM SERPL-SCNC: 5.2 MMOL/L — SIGNIFICANT CHANGE UP (ref 3.5–5.3)
PROT 24H UR-MRATE: 89 MG/24HR — SIGNIFICANT CHANGE UP (ref 50–100)
SODIUM SERPL-SCNC: 148 MMOL/L — HIGH (ref 135–145)
TOTAL VOLUME - 24 HOUR: 525 ML — SIGNIFICANT CHANGE UP
TOTAL VOLUME - 24 HOUR: 525 ML — SIGNIFICANT CHANGE UP
URINE CREATININE CALCULATION: 0.6 G/24 HR — LOW (ref 0.8–1.8)
URINE CREATININE CALCULATION: 0.6 G/24 HR — LOW (ref 0.8–1.8)

## 2023-06-28 PROCEDURE — 99232 SBSQ HOSP IP/OBS MODERATE 35: CPT

## 2023-06-28 RX ADMIN — LEVETIRACETAM 500 MILLIGRAM(S): 250 TABLET, FILM COATED ORAL at 05:17

## 2023-06-28 RX ADMIN — Medication 1 TABLET(S): at 12:50

## 2023-06-28 RX ADMIN — DESMOPRESSIN ACETATE 0.1 MILLIGRAM(S): 0.1 TABLET ORAL at 18:43

## 2023-06-28 RX ADMIN — Medication 1300 MILLIGRAM(S): at 05:17

## 2023-06-28 RX ADMIN — LEVETIRACETAM 500 MILLIGRAM(S): 250 TABLET, FILM COATED ORAL at 18:43

## 2023-06-28 RX ADMIN — PANTOPRAZOLE SODIUM 40 MILLIGRAM(S): 20 TABLET, DELAYED RELEASE ORAL at 05:17

## 2023-06-28 RX ADMIN — Medication 1 MILLIGRAM(S): at 12:50

## 2023-06-28 RX ADMIN — Medication 1300 MILLIGRAM(S): at 18:43

## 2023-06-28 RX ADMIN — DESMOPRESSIN ACETATE 0.1 MILLIGRAM(S): 0.1 TABLET ORAL at 05:17

## 2023-06-28 RX ADMIN — ENOXAPARIN SODIUM 40 MILLIGRAM(S): 100 INJECTION SUBCUTANEOUS at 05:17

## 2023-06-28 RX ADMIN — Medication 300 MILLIGRAM(S): at 12:50

## 2023-06-28 NOTE — PROGRESS NOTE ADULT - ASSESSMENT
Hypernatremia: central DI post SAH/brain aneurysm  - cont DDAVP and oral fluid intake as thirst requires  - monitor labs

## 2023-06-28 NOTE — PROGRESS NOTE ADULT - ASSESSMENT
56 y/o F w/ PMH of SAH s/p brain aneurysm clipping x 2, Na fluctuations 2/2 DI, seizures was admitted for acute metabolic encephalopathy 2/2 hypernatremia.  CTH (-) for acute findings.  Nephro and endo were consulted.  Pt was started on hypotonic saline initially.  At home she is managed w/ DDAVP and strict fluid control.  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  During hospital course she became hyponatremic and has since been corrected w/ NaCl tabs and hypertonic saline.  Mental status back to baseline.  Mild hypernatremia has since developed NaCl tabs d/c'd, desmopressin resumed and HCTZ added (on 6/16).  Pt encouraged to drink 8-10 oz of water every 3-4 hrs.        Hypernatremia 2/2 DI which is likely sequale of SAH   - Na levels fluctuate, mildly elevated this morning but overall improved  - c/w Desmopressin and HCTZ  - Continue encouraging and reminding pt to take po fluids   - Family request for PICC on d/c noted per discussion w/ nephrology pt had line infection in the past from prior picc and is inappropriate given pt able to take po just needs to be reminded  - Agree w/ nephrology that PICC line is inappropriate at this time   - Pt does need in person supervision to ensure PO hydration at home; either family vs aides to supervise  - Nephrology following and recs noted       Hyperkalemia, resolved   - Normal        Seizures  - c/w Keppra      VTE ppx: lovenox     Dispo: Medically stable for d/c however family not in agreement for d/c without PICC line for hydration or would be agreeable if can get her to go to an infusion center for fluids however would need to be accepted and would need accepting doctor in the community willing to manage.

## 2023-06-29 LAB
ANION GAP SERPL CALC-SCNC: 11 MMOL/L — SIGNIFICANT CHANGE UP (ref 5–17)
BUN SERPL-MCNC: 38.8 MG/DL — HIGH (ref 8–20)
CALCIUM SERPL-MCNC: 8.9 MG/DL — SIGNIFICANT CHANGE UP (ref 8.4–10.5)
CHLORIDE SERPL-SCNC: 117 MMOL/L — HIGH (ref 96–108)
CO2 SERPL-SCNC: 23 MMOL/L — SIGNIFICANT CHANGE UP (ref 22–29)
CREAT SERPL-MCNC: 1.27 MG/DL — SIGNIFICANT CHANGE UP (ref 0.5–1.3)
EGFR: 49 ML/MIN/1.73M2 — LOW
GLUCOSE SERPL-MCNC: 75 MG/DL — SIGNIFICANT CHANGE UP (ref 70–99)
POTASSIUM SERPL-MCNC: 4.9 MMOL/L — SIGNIFICANT CHANGE UP (ref 3.5–5.3)
POTASSIUM SERPL-SCNC: 4.9 MMOL/L — SIGNIFICANT CHANGE UP (ref 3.5–5.3)
SODIUM SERPL-SCNC: 151 MMOL/L — HIGH (ref 135–145)

## 2023-06-29 PROCEDURE — 99232 SBSQ HOSP IP/OBS MODERATE 35: CPT

## 2023-06-29 RX ADMIN — Medication 1300 MILLIGRAM(S): at 05:58

## 2023-06-29 RX ADMIN — LEVETIRACETAM 500 MILLIGRAM(S): 250 TABLET, FILM COATED ORAL at 05:58

## 2023-06-29 RX ADMIN — ENOXAPARIN SODIUM 40 MILLIGRAM(S): 100 INJECTION SUBCUTANEOUS at 05:58

## 2023-06-29 RX ADMIN — Medication 1300 MILLIGRAM(S): at 16:48

## 2023-06-29 RX ADMIN — DESMOPRESSIN ACETATE 0.1 MILLIGRAM(S): 0.1 TABLET ORAL at 16:49

## 2023-06-29 RX ADMIN — Medication 1 TABLET(S): at 08:57

## 2023-06-29 RX ADMIN — Medication 300 MILLIGRAM(S): at 08:56

## 2023-06-29 RX ADMIN — PANTOPRAZOLE SODIUM 40 MILLIGRAM(S): 20 TABLET, DELAYED RELEASE ORAL at 05:59

## 2023-06-29 RX ADMIN — Medication 1 MILLIGRAM(S): at 08:57

## 2023-06-29 RX ADMIN — LEVETIRACETAM 500 MILLIGRAM(S): 250 TABLET, FILM COATED ORAL at 16:48

## 2023-06-29 RX ADMIN — DESMOPRESSIN ACETATE 0.1 MILLIGRAM(S): 0.1 TABLET ORAL at 05:58

## 2023-06-29 NOTE — PROGRESS NOTE ADULT - ASSESSMENT
56 y/o F w/ PMH of SAH s/p brain aneurysm clipping x 2, Na fluctuations 2/2 DI, seizures was admitted for acute metabolic encephalopathy 2/2 hypernatremia.  CTH (-) for acute findings.  Nephro and endo were consulted.  Pt was started on hypotonic saline initially.  At home she is managed w/ DDAVP and strict fluid control.  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  During hospital course she became hyponatremic and has since been corrected w/ NaCl tabs and hypertonic saline.  Mental status back to baseline.  Mild hypernatremia has since developed NaCl tabs d/c'd, desmopressin resumed and HCTZ added (on 6/16).  Pt encouraged to drink 8-10 oz of water every 3-4 hrs.        Hypernatremia 2/2 DI which is likely sequale of SAH   - Na levels fluctuate, but trending up the past few days  - Need to continue encouraging pt to drink water q3-4h   - If IVNS continues rising may need IVFs   - c/w Desmopressin and HCTZ  - Family request for PICC on d/c noted per discussion w/ nephrology pt had line infection in the past from prior picc and is inappropriate given pt able to take po just needs to be reminded  - Pt does need in person supervision to ensure PO hydration at home; either family vs aides to supervise  - Nephrology following and recs noted       Hyperkalemia, resolved   - Normal        Seizures  - c/w Keppra      VTE ppx: lovenox     Dispo: Family not in agreement for d/c without PICC line for hydration or would be agreeable if can get her to go to an infusion center for fluids however would need to be accepted and would need accepting doctor in the community willing to manage.  Trying to see if an infusion center would accept her.

## 2023-06-29 NOTE — PROGRESS NOTE ADULT - ASSESSMENT
Hypernatremia: central DI post SAH/brain aneurysm  Clinically stable  - cont DDAVP   - provide patient access to fluid and remind her to drink adequate amount  - monitor labs

## 2023-06-30 LAB
ANION GAP SERPL CALC-SCNC: 11 MMOL/L — SIGNIFICANT CHANGE UP (ref 5–17)
BUN SERPL-MCNC: 31 MG/DL — HIGH (ref 8–20)
CALCIUM SERPL-MCNC: 9.2 MG/DL — SIGNIFICANT CHANGE UP (ref 8.4–10.5)
CHLORIDE SERPL-SCNC: 114 MMOL/L — HIGH (ref 96–108)
CO2 SERPL-SCNC: 25 MMOL/L — SIGNIFICANT CHANGE UP (ref 22–29)
CREAT SERPL-MCNC: 1.11 MG/DL — SIGNIFICANT CHANGE UP (ref 0.5–1.3)
EGFR: 57 ML/MIN/1.73M2 — LOW
GLUCOSE SERPL-MCNC: 75 MG/DL — SIGNIFICANT CHANGE UP (ref 70–99)
POTASSIUM SERPL-MCNC: 4.7 MMOL/L — SIGNIFICANT CHANGE UP (ref 3.5–5.3)
POTASSIUM SERPL-SCNC: 4.7 MMOL/L — SIGNIFICANT CHANGE UP (ref 3.5–5.3)
SODIUM SERPL-SCNC: 150 MMOL/L — HIGH (ref 135–145)

## 2023-06-30 PROCEDURE — 99232 SBSQ HOSP IP/OBS MODERATE 35: CPT

## 2023-06-30 RX ORDER — HYDRALAZINE HCL 50 MG
10 TABLET ORAL ONCE
Refills: 0 | Status: COMPLETED | OUTPATIENT
Start: 2023-06-30 | End: 2023-06-30

## 2023-06-30 RX ORDER — SODIUM BICARBONATE 1 MEQ/ML
650 SYRINGE (ML) INTRAVENOUS
Refills: 0 | Status: DISCONTINUED | OUTPATIENT
Start: 2023-06-30 | End: 2023-07-05

## 2023-06-30 RX ADMIN — LEVETIRACETAM 500 MILLIGRAM(S): 250 TABLET, FILM COATED ORAL at 16:41

## 2023-06-30 RX ADMIN — Medication 650 MILLIGRAM(S): at 16:41

## 2023-06-30 RX ADMIN — Medication 300 MILLIGRAM(S): at 08:35

## 2023-06-30 RX ADMIN — Medication 1300 MILLIGRAM(S): at 05:29

## 2023-06-30 RX ADMIN — ENOXAPARIN SODIUM 40 MILLIGRAM(S): 100 INJECTION SUBCUTANEOUS at 05:29

## 2023-06-30 RX ADMIN — DESMOPRESSIN ACETATE 0.1 MILLIGRAM(S): 0.1 TABLET ORAL at 05:31

## 2023-06-30 RX ADMIN — DESMOPRESSIN ACETATE 0.1 MILLIGRAM(S): 0.1 TABLET ORAL at 16:41

## 2023-06-30 RX ADMIN — Medication 1 TABLET(S): at 08:35

## 2023-06-30 RX ADMIN — Medication 1 MILLIGRAM(S): at 08:35

## 2023-06-30 RX ADMIN — PANTOPRAZOLE SODIUM 40 MILLIGRAM(S): 20 TABLET, DELAYED RELEASE ORAL at 05:32

## 2023-06-30 RX ADMIN — LEVETIRACETAM 500 MILLIGRAM(S): 250 TABLET, FILM COATED ORAL at 05:29

## 2023-06-30 RX ADMIN — Medication 10 MILLIGRAM(S): at 11:39

## 2023-06-30 NOTE — PROGRESS NOTE ADULT - ASSESSMENT
58 y/o F w/ PMH of SAH s/p brain aneurysm clipping x 2, Na fluctuations 2/2 DI, seizures was admitted for acute metabolic encephalopathy 2/2 hypernatremia.  CTH (-) for acute findings.  Nephro and endo were consulted.  Pt was started on hypotonic saline initially.  At home she is managed w/ DDAVP and strict fluid control.  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  During hospital course she became hyponatremic and has since been corrected w/ NaCl tabs and hypertonic saline.  Mental status back to baseline.  Mild hypernatremia has since developed NaCl tabs d/c'd, desmopressin resumed and HCTZ added (on 6/16).  Pt encouraged to drink 8-10 oz of water every 3-4 hrs.        Hypernatremia 2/2 DI which is likely sequale of SAH   - Na levels fluctuate, but trending down today   - Need to continue encouraging pt to drink water q3-4h   - If IVNS continues rising may need IVFs   - c/w Desmopressin and HCTZ  - Family request for PICC on d/c noted per discussion w/ nephrology pt had line infection in the past from prior picc and is inappropriate given pt able to take po just needs to be reminded  - Pt does need in person supervision to ensure PO hydration at home; either family vs aides to supervise  - Nephrology following and recs noted       Hyperkalemia, resolved   - Normal        Seizures  - c/w Keppra      VTE ppx: lovenox     Dispo: Family not in agreement for d/c without PICC line for hydration or would be agreeable if can get her to go to an infusion center for fluids however have not found an accepting center and would need accepting doctor in the community willing to manage.

## 2023-06-30 NOTE — PROGRESS NOTE ADULT - ASSESSMENT
Hypernatremia: rise in serum Na  Central DI post SAH/brain aneurysm  Clinically stable  - cont DDAVP   - provide patient access to fluid and remind her to drink adequate amount  MA better will deescalate Nabicarb    AM labs will follow

## 2023-06-30 NOTE — CHART NOTE - NSCHARTNOTEFT_GEN_A_CORE
Source: Patient [x ]  Family [ ]   other [ ]    Current Diet: Diet, Regular:   No Concentrated Potassium (06-27-23 @ 11:23)    Patient reports [ ] nausea  [ ] vomiting [ ] diarrhea [ ] constipation  [ ]chewing problems [ ] swallowing issues  [ ] other:     PO intake:  < 50% [ ]   50-75%  [ ]   %  [x ]  other :    Source for PO intake [x ] Patient [ ] family [x ] chart [ x] staff [ ] other    Current Weight:   (6/23)  208 lbs RD bedscale  (6/14)  152 lbs  (6/7)    200 lbs    % Weight Change: No recent weight documented     Pertinent Medications: MEDICATIONS  (STANDING):  desmopressin 0.1 milliGRAM(s) Oral two times a day  enoxaparin Injectable 40 milliGRAM(s) SubCutaneous every 24 hours  ferrous    sulfate Liquid 300 milliGRAM(s) Oral daily  folic acid 1 milliGRAM(s) Oral daily  levETIRAcetam 500 milliGRAM(s) Oral two times a day  multivitamin/minerals 1 Tablet(s) Oral daily  pantoprazole    Tablet 40 milliGRAM(s) Oral before breakfast  sodium bicarbonate 1300 milliGRAM(s) Oral two times a day    MEDICATIONS  (PRN):  acetaminophen     Tablet .. 650 milliGRAM(s) Oral every 6 hours PRN Temp greater or equal to 38C (100.4F), Mild Pain (1 - 3)  aluminum hydroxide/magnesium hydroxide/simethicone Suspension 30 milliLiter(s) Oral every 4 hours PRN Dyspepsia  ondansetron Injectable 4 milliGRAM(s) IV Push every 8 hours PRN Nausea and/or Vomiting    Pertinent Labs:   06-30 Na150 mmol/L<H> Glu 75 mg/dL K+ 4.7 mmol/L Cr  1.11 mg/dL BUN 31.0 mg/dL<H> Phos n/a   Alb n/a   PAB n/a       Skin: No skin breakdown/edema noted     Nutrition focused physical exam conducted - found signs of malnutrition [ ]absent [x]present    Subcutaneous fat loss: [ ] Orbital fat pads region, [ ]Buccal fat region, [ ]Triceps region,  [ ]Ribs region    Muscle wasting: [ x]Temples region, [ ]Clavicle region, [ ]Shoulder region, [ ]Scapula region, [ ]Interosseous region,  [ ]thigh region, [ ]Calf region    Estimated Needs:   [x] no change since previous assessment  [ ] recalculated:     Current Nutrition Diagnosis: Pt remains at nutrition risk secondary to altered nutrition related lab values related to Central DI/Hypernatremia likely 2/2  SAH s/p Brain aneurysm, strict fluid control and DDVAP PTA as evidenced by SNa 152 <H>, Mg 2.9 <H> BUN 50.0 <H>, creat 1.47 <H>.   Pt continues with good PO intake completing >75% of meals. SNa consistently elevated 2/2 decreased fluid intake. Continuing to encourage Pt to drink every few hours to ensure adequate hydration. CM/SW following for d/c planning.     Recommendations:   1) Change to DASH/TLC  2) Encourage HBV protein sources   3) Encourage adeuate fluid intake   4) Monitor weights daily for trend/accuracy     Monitoring and Evaluation:   [ x] PO intake [ x] Tolerance to diet prescription [X] Weights  [X] Follow up per protocol [X] Labs:

## 2023-07-01 LAB
ANION GAP SERPL CALC-SCNC: 10 MMOL/L — SIGNIFICANT CHANGE UP (ref 5–17)
BUN SERPL-MCNC: 28.2 MG/DL — HIGH (ref 8–20)
CALCIUM SERPL-MCNC: 9.2 MG/DL — SIGNIFICANT CHANGE UP (ref 8.4–10.5)
CHLORIDE SERPL-SCNC: 113 MMOL/L — HIGH (ref 96–108)
CO2 SERPL-SCNC: 25 MMOL/L — SIGNIFICANT CHANGE UP (ref 22–29)
CREAT SERPL-MCNC: 1.06 MG/DL — SIGNIFICANT CHANGE UP (ref 0.5–1.3)
EGFR: 61 ML/MIN/1.73M2 — SIGNIFICANT CHANGE UP
GLUCOSE SERPL-MCNC: 74 MG/DL — SIGNIFICANT CHANGE UP (ref 70–99)
POTASSIUM SERPL-MCNC: 4.2 MMOL/L — SIGNIFICANT CHANGE UP (ref 3.5–5.3)
POTASSIUM SERPL-SCNC: 4.2 MMOL/L — SIGNIFICANT CHANGE UP (ref 3.5–5.3)
SODIUM SERPL-SCNC: 148 MMOL/L — HIGH (ref 135–145)

## 2023-07-01 PROCEDURE — 99232 SBSQ HOSP IP/OBS MODERATE 35: CPT

## 2023-07-01 RX ORDER — AMLODIPINE BESYLATE 2.5 MG/1
5 TABLET ORAL ONCE
Refills: 0 | Status: COMPLETED | OUTPATIENT
Start: 2023-07-01 | End: 2023-07-01

## 2023-07-01 RX ADMIN — LEVETIRACETAM 500 MILLIGRAM(S): 250 TABLET, FILM COATED ORAL at 18:30

## 2023-07-01 RX ADMIN — DESMOPRESSIN ACETATE 0.1 MILLIGRAM(S): 0.1 TABLET ORAL at 05:30

## 2023-07-01 RX ADMIN — Medication 1 TABLET(S): at 12:26

## 2023-07-01 RX ADMIN — Medication 300 MILLIGRAM(S): at 12:26

## 2023-07-01 RX ADMIN — DESMOPRESSIN ACETATE 0.1 MILLIGRAM(S): 0.1 TABLET ORAL at 18:30

## 2023-07-01 RX ADMIN — Medication 1 MILLIGRAM(S): at 12:26

## 2023-07-01 RX ADMIN — AMLODIPINE BESYLATE 5 MILLIGRAM(S): 2.5 TABLET ORAL at 05:48

## 2023-07-01 RX ADMIN — PANTOPRAZOLE SODIUM 40 MILLIGRAM(S): 20 TABLET, DELAYED RELEASE ORAL at 05:31

## 2023-07-01 RX ADMIN — Medication 650 MILLIGRAM(S): at 18:30

## 2023-07-01 RX ADMIN — ENOXAPARIN SODIUM 40 MILLIGRAM(S): 100 INJECTION SUBCUTANEOUS at 05:30

## 2023-07-01 RX ADMIN — Medication 650 MILLIGRAM(S): at 05:30

## 2023-07-01 RX ADMIN — LEVETIRACETAM 500 MILLIGRAM(S): 250 TABLET, FILM COATED ORAL at 05:30

## 2023-07-01 NOTE — PROGRESS NOTE ADULT - ASSESSMENT
Hypernatremia:   Serum Na 150--> 148  Central DI post SAH/brain aneurysm  Clinically stable  - cont DDAVP   - provide patient access to fluid and remind her to drink adequate amount  MA better w Nabicarb    AM labs will follow

## 2023-07-01 NOTE — PROGRESS NOTE ADULT - ASSESSMENT
56 y/o F w/ PMH of SAH s/p brain aneurysm clipping x 2, Na fluctuations 2/2 DI, seizures was admitted for acute metabolic encephalopathy 2/2 hypernatremia.  CTH (-) for acute findings.  Nephro and endo were consulted.  Pt was started on hypotonic saline initially.  At home she is managed w/ DDAVP and strict fluid control. During hospital course she became hyponatremic and it was corrected w/ NaCl tabs and hypertonic saline.  Mental status back to baseline.  She has developed Hypernatremia now, NaCl tabs d/c'd, desmopressin resumed. Pt encouraged to drink 8-10 oz of water every 3-4 hrs.      1) Hypernatremia 2/2 DI which is likely sequale of SAH   - Na levels fluctuate, but trending down    - Need to continue encouraging pt to drink water q3-4h   - c/w Desmopressin   - Family request for PICC on d/c noted per discussion w/ nephrology pt had line infection in the past from prior picc and is inappropriate given pt able to take po just needs to be reminded  - Pt does need in person supervision to ensure PO hydration at home; either family vs aides to supervise  - Nephrology following and recs noted     2) Hyperkalemia  - resolved     3) Seizures  - c/w Keppra    VTE Prophylaxis -- Lovenox SQ    Dispo: Family not in agreement for d/c without PICC line for hydration or would be agreeable if can get her to go to an infusion center for fluids however have not found an accepting center and would need accepting doctor in the community who iswilling to manage.

## 2023-07-02 LAB
ANION GAP SERPL CALC-SCNC: 10 MMOL/L — SIGNIFICANT CHANGE UP (ref 5–17)
BUN SERPL-MCNC: 28.1 MG/DL — HIGH (ref 8–20)
CALCIUM SERPL-MCNC: 9.1 MG/DL — SIGNIFICANT CHANGE UP (ref 8.4–10.5)
CHLORIDE SERPL-SCNC: 111 MMOL/L — HIGH (ref 96–108)
CO2 SERPL-SCNC: 25 MMOL/L — SIGNIFICANT CHANGE UP (ref 22–29)
CREAT SERPL-MCNC: 1.05 MG/DL — SIGNIFICANT CHANGE UP (ref 0.5–1.3)
EGFR: 61 ML/MIN/1.73M2 — SIGNIFICANT CHANGE UP
GLUCOSE SERPL-MCNC: 67 MG/DL — LOW (ref 70–99)
POTASSIUM SERPL-MCNC: 4.4 MMOL/L — SIGNIFICANT CHANGE UP (ref 3.5–5.3)
POTASSIUM SERPL-SCNC: 4.4 MMOL/L — SIGNIFICANT CHANGE UP (ref 3.5–5.3)
SODIUM SERPL-SCNC: 146 MMOL/L — HIGH (ref 135–145)

## 2023-07-02 PROCEDURE — 99232 SBSQ HOSP IP/OBS MODERATE 35: CPT

## 2023-07-02 RX ORDER — AMLODIPINE BESYLATE 2.5 MG/1
5 TABLET ORAL DAILY
Refills: 0 | Status: DISCONTINUED | OUTPATIENT
Start: 2023-07-02 | End: 2023-07-03

## 2023-07-02 RX ADMIN — Medication 300 MILLIGRAM(S): at 12:44

## 2023-07-02 RX ADMIN — Medication 1 MILLIGRAM(S): at 12:45

## 2023-07-02 RX ADMIN — AMLODIPINE BESYLATE 5 MILLIGRAM(S): 2.5 TABLET ORAL at 12:45

## 2023-07-02 RX ADMIN — Medication 30 MILLILITER(S): at 21:27

## 2023-07-02 RX ADMIN — Medication 650 MILLIGRAM(S): at 06:36

## 2023-07-02 RX ADMIN — PANTOPRAZOLE SODIUM 40 MILLIGRAM(S): 20 TABLET, DELAYED RELEASE ORAL at 06:35

## 2023-07-02 RX ADMIN — Medication 1 TABLET(S): at 12:44

## 2023-07-02 RX ADMIN — LEVETIRACETAM 500 MILLIGRAM(S): 250 TABLET, FILM COATED ORAL at 06:36

## 2023-07-02 RX ADMIN — DESMOPRESSIN ACETATE 0.1 MILLIGRAM(S): 0.1 TABLET ORAL at 18:35

## 2023-07-02 RX ADMIN — DESMOPRESSIN ACETATE 0.1 MILLIGRAM(S): 0.1 TABLET ORAL at 06:35

## 2023-07-02 RX ADMIN — ENOXAPARIN SODIUM 40 MILLIGRAM(S): 100 INJECTION SUBCUTANEOUS at 06:35

## 2023-07-02 RX ADMIN — LEVETIRACETAM 500 MILLIGRAM(S): 250 TABLET, FILM COATED ORAL at 18:35

## 2023-07-02 RX ADMIN — Medication 650 MILLIGRAM(S): at 18:35

## 2023-07-02 NOTE — PROGRESS NOTE ADULT - ASSESSMENT
Hypernatremia: improving  Serum Na 150--> 148 --> 146  Central DI post SAH/brain aneurysm  Clinically stable  - cont DDAVP   - provide patient access to fluid and remind her to drink adequate amount  MA better w Nabicarb    AM labs will follow

## 2023-07-02 NOTE — PROGRESS NOTE ADULT - ASSESSMENT
58 y/o F w/ PMH of SAH s/p brain aneurysm clipping x 2, Na fluctuations 2/2 DI, seizures was admitted for acute metabolic encephalopathy 2/2 hypernatremia.  CTH (-) for acute findings.  Nephro and endo were consulted.  Pt was started on hypotonic saline initially.  At home she is managed w/ DDAVP and strict fluid control. During hospital course she became hyponatremic and it was corrected w/ NaCl tabs and hypertonic saline.  Mental status back to baseline.  She has developed Hypernatremia now, NaCl tabs d/c'd, desmopressin resumed. Pt encouraged to drink 8-10 oz of water every 3-4 hrs.      1) Hypernatremia 2/2 DI which is likely sequale of SAH   - Na levels fluctuate, but trending down    - Need to continue encouraging pt to drink water q3-4h   - c/w Desmopressin   - Family request for PICC on d/c noted per discussion w/ nephrology pt had line infection in the past from prior picc and is inappropriate given pt able to take po just needs to be reminded  - Pt does need in person supervision to ensure PO hydration at home; either family vs aides to supervise  - Nephrology following and recs noted     2) Hyperkalemia  - resolved     3) Seizures  - c/w Keppra    4) HTN  - Start Amlodipine 5 mg daily     VTE Prophylaxis -- Lovenox SQ    Dispo: Family not in agreement for d/c without PICC line for hydration or would be agreeable if can get her to go to an infusion center for fluids however have not found an accepting center and would need accepting doctor in the community who is willing to manage.

## 2023-07-03 LAB
ANION GAP SERPL CALC-SCNC: 11 MMOL/L — SIGNIFICANT CHANGE UP (ref 5–17)
BUN SERPL-MCNC: 29.6 MG/DL — HIGH (ref 8–20)
CALCIUM SERPL-MCNC: 8.8 MG/DL — SIGNIFICANT CHANGE UP (ref 8.4–10.5)
CHLORIDE SERPL-SCNC: 112 MMOL/L — HIGH (ref 96–108)
CO2 SERPL-SCNC: 24 MMOL/L — SIGNIFICANT CHANGE UP (ref 22–29)
CREAT SERPL-MCNC: 1.02 MG/DL — SIGNIFICANT CHANGE UP (ref 0.5–1.3)
EGFR: 63 ML/MIN/1.73M2 — SIGNIFICANT CHANGE UP
GLUCOSE SERPL-MCNC: 63 MG/DL — LOW (ref 70–99)
POTASSIUM SERPL-MCNC: 4.5 MMOL/L — SIGNIFICANT CHANGE UP (ref 3.5–5.3)
POTASSIUM SERPL-SCNC: 4.5 MMOL/L — SIGNIFICANT CHANGE UP (ref 3.5–5.3)
SODIUM SERPL-SCNC: 147 MMOL/L — HIGH (ref 135–145)

## 2023-07-03 PROCEDURE — 99232 SBSQ HOSP IP/OBS MODERATE 35: CPT

## 2023-07-03 RX ORDER — AMLODIPINE BESYLATE 2.5 MG/1
10 TABLET ORAL DAILY
Refills: 0 | Status: DISCONTINUED | OUTPATIENT
Start: 2023-07-04 | End: 2023-07-14

## 2023-07-03 RX ORDER — AMLODIPINE BESYLATE 2.5 MG/1
5 TABLET ORAL ONCE
Refills: 0 | Status: COMPLETED | OUTPATIENT
Start: 2023-07-03 | End: 2023-07-03

## 2023-07-03 RX ADMIN — Medication 650 MILLIGRAM(S): at 17:38

## 2023-07-03 RX ADMIN — Medication 1 TABLET(S): at 12:41

## 2023-07-03 RX ADMIN — Medication 1 MILLIGRAM(S): at 12:41

## 2023-07-03 RX ADMIN — Medication 300 MILLIGRAM(S): at 12:41

## 2023-07-03 RX ADMIN — LEVETIRACETAM 500 MILLIGRAM(S): 250 TABLET, FILM COATED ORAL at 17:35

## 2023-07-03 RX ADMIN — DESMOPRESSIN ACETATE 0.1 MILLIGRAM(S): 0.1 TABLET ORAL at 05:04

## 2023-07-03 RX ADMIN — Medication 650 MILLIGRAM(S): at 05:05

## 2023-07-03 RX ADMIN — PANTOPRAZOLE SODIUM 40 MILLIGRAM(S): 20 TABLET, DELAYED RELEASE ORAL at 05:05

## 2023-07-03 RX ADMIN — AMLODIPINE BESYLATE 5 MILLIGRAM(S): 2.5 TABLET ORAL at 10:47

## 2023-07-03 RX ADMIN — DESMOPRESSIN ACETATE 0.1 MILLIGRAM(S): 0.1 TABLET ORAL at 17:36

## 2023-07-03 RX ADMIN — AMLODIPINE BESYLATE 5 MILLIGRAM(S): 2.5 TABLET ORAL at 05:04

## 2023-07-03 RX ADMIN — LEVETIRACETAM 500 MILLIGRAM(S): 250 TABLET, FILM COATED ORAL at 05:04

## 2023-07-03 RX ADMIN — ENOXAPARIN SODIUM 40 MILLIGRAM(S): 100 INJECTION SUBCUTANEOUS at 05:04

## 2023-07-03 NOTE — PROGRESS NOTE ADULT - ASSESSMENT
58 y/o F w/ PMH of SAH s/p brain aneurysm clipping x 2, Na fluctuations 2/2 DI, seizures was admitted for acute metabolic encephalopathy 2/2 hypernatremia.  CTH (-) for acute findings.  Nephro and endo were consulted.  Pt was started on hypotonic saline initially.  At home she is managed w/ DDAVP and strict fluid control. During hospital course she became hyponatremic and it was corrected w/ NaCl tabs and hypertonic saline.  Mental status back to baseline.  She has developed Hypernatremia now, NaCl tabs d/c'd, desmopressin resumed. Pt encouraged to drink 8-10 oz of water every 3-4 hrs.      1) Hypernatremia 2/2 DI which is likely sequale of SAH   - Na levels fluctuate, but trending down    - Need to continue encouraging pt to drink water q3-4h   - c/w Desmopressin   - Family request for PICC on d/c noted per discussion w/ nephrology pt had line infection in the past from prior picc and is inappropriate given pt able to take po just needs to be reminded  - Pt does need in person supervision to ensure PO hydration at home; either family vs aides to supervise  - Nephrology following and recs noted     2) Hyperkalemia  - resolved     3) Seizures  - c/w Keppra    4) HTN  - Started Amlodipine (increased to 10 mg daily)     VTE Prophylaxis -- Lovenox SQ    Dispo: Family not in agreement for d/c without PICC line for hydration or would be agreeable if can get her to go to an infusion center for fluids however have not found an accepting center and would need accepting doctor in the community who is willing to manage.

## 2023-07-03 NOTE — PROGRESS NOTE ADULT - ASSESSMENT
Hypernatremia: improving  Serum Na 150--> 148 --> 146--> 147   Central DI post SAH/brain aneurysm  Clinically stable  - cont DDAVP   - provide patient access to fluid and remind her to drink adequate amount  MA better w Nabicarb    AM labs will follow

## 2023-07-04 LAB
ANION GAP SERPL CALC-SCNC: 12 MMOL/L — SIGNIFICANT CHANGE UP (ref 5–17)
BUN SERPL-MCNC: 29.5 MG/DL — HIGH (ref 8–20)
CALCIUM SERPL-MCNC: 9.2 MG/DL — SIGNIFICANT CHANGE UP (ref 8.4–10.5)
CHLORIDE SERPL-SCNC: 113 MMOL/L — HIGH (ref 96–108)
CO2 SERPL-SCNC: 21 MMOL/L — LOW (ref 22–29)
CREAT SERPL-MCNC: 1.13 MG/DL — SIGNIFICANT CHANGE UP (ref 0.5–1.3)
EGFR: 56 ML/MIN/1.73M2 — LOW
GLUCOSE SERPL-MCNC: 81 MG/DL — SIGNIFICANT CHANGE UP (ref 70–99)
POTASSIUM SERPL-MCNC: 5.6 MMOL/L — HIGH (ref 3.5–5.3)
POTASSIUM SERPL-SCNC: 5.6 MMOL/L — HIGH (ref 3.5–5.3)
SODIUM SERPL-SCNC: 146 MMOL/L — HIGH (ref 135–145)

## 2023-07-04 PROCEDURE — 99232 SBSQ HOSP IP/OBS MODERATE 35: CPT

## 2023-07-04 RX ADMIN — DESMOPRESSIN ACETATE 0.1 MILLIGRAM(S): 0.1 TABLET ORAL at 17:42

## 2023-07-04 RX ADMIN — DESMOPRESSIN ACETATE 0.1 MILLIGRAM(S): 0.1 TABLET ORAL at 06:10

## 2023-07-04 RX ADMIN — LEVETIRACETAM 500 MILLIGRAM(S): 250 TABLET, FILM COATED ORAL at 17:42

## 2023-07-04 RX ADMIN — Medication 650 MILLIGRAM(S): at 17:43

## 2023-07-04 RX ADMIN — AMLODIPINE BESYLATE 10 MILLIGRAM(S): 2.5 TABLET ORAL at 06:10

## 2023-07-04 RX ADMIN — LEVETIRACETAM 500 MILLIGRAM(S): 250 TABLET, FILM COATED ORAL at 06:11

## 2023-07-04 RX ADMIN — Medication 1 MILLIGRAM(S): at 13:17

## 2023-07-04 RX ADMIN — Medication 1 TABLET(S): at 13:13

## 2023-07-04 RX ADMIN — Medication 650 MILLIGRAM(S): at 06:11

## 2023-07-04 RX ADMIN — ENOXAPARIN SODIUM 40 MILLIGRAM(S): 100 INJECTION SUBCUTANEOUS at 06:10

## 2023-07-04 RX ADMIN — Medication 300 MILLIGRAM(S): at 13:13

## 2023-07-04 RX ADMIN — PANTOPRAZOLE SODIUM 40 MILLIGRAM(S): 20 TABLET, DELAYED RELEASE ORAL at 06:11

## 2023-07-04 NOTE — PROGRESS NOTE ADULT - ASSESSMENT
56 yo F w h/o SAH s/p Brain aneurysm clipping, central DI, Seizures 2/2 hypo or  hypernatremia   Pt was BIBA after she was was reported to have wandered off. pt called police " I got lost". In ER, She was found to be altered, confused    Central DI/ HyperNatremia  Likely 2/2  SAH s/p Brain aneurysm  Pt is on strict fluid control and DDAVP at home  Serum Na is very sensitive has been fluctuating - currently more stable     Added Desmopressin O.1 mg po bid   Patient should likely have some type of home care to ensure  she maintains good fluid intake and med compliance at home     HTN - Watch on meds     MA - Better , oral bicarb     Labs in am

## 2023-07-04 NOTE — PROGRESS NOTE ADULT - ASSESSMENT
56 y/o F w/ PMH of SAH s/p brain aneurysm clipping x 2, Na fluctuations 2/2 DI, seizures was admitted for acute metabolic encephalopathy 2/2 hypernatremia.  CTH (-) for acute findings.  Nephro and endo were consulted.  Pt was started on hypotonic saline initially.  At home she is managed w/ DDAVP and strict fluid control. During hospital course she became hyponatremic and it was corrected w/ NaCl tabs and hypertonic saline.  Mental status back to baseline.  She has developed Hypernatremia now, NaCl tabs d/c'd, desmopressin resumed. Pt encouraged to drink 8-10 oz of water every 3-4 hrs.      1) Hypernatremia 2/2 DI which is likely sequale of SAH   - Na levels fluctuate, but trending down    - Need to continue encouraging pt to drink water q3-4h   - c/w Desmopressin   - Family request for PICC on d/c noted per discussion w/ nephrology pt had line infection in the past from prior picc and is inappropriate given pt able to take po just needs to be reminded  - Pt does need in person supervision to ensure PO hydration at home; either family vs aides to supervise  - Nephrology following and recs noted     2) Hyperkalemia  - resolved but today sample is hemolyzed  - Repeat BMP tomorrow     3) Seizures  - c/w Keppra    4) HTN  - Started Amlodipine (increased to 10 mg daily)     VTE Prophylaxis -- Lovenox SQ    Dispo: Home likely in 24 hours if Sodium remains stable.   Family not in agreement for d/c without PICC line for hydration or would be agreeable if can get her to go to an infusion center for fluids however have not found an accepting center and would need accepting doctor in the community who is willing to manage.

## 2023-07-05 LAB
ANION GAP SERPL CALC-SCNC: 10 MMOL/L — SIGNIFICANT CHANGE UP (ref 5–17)
BUN SERPL-MCNC: 29.8 MG/DL — HIGH (ref 8–20)
CALCIUM SERPL-MCNC: 8.9 MG/DL — SIGNIFICANT CHANGE UP (ref 8.4–10.5)
CHLORIDE SERPL-SCNC: 116 MMOL/L — HIGH (ref 96–108)
CO2 SERPL-SCNC: 25 MMOL/L — SIGNIFICANT CHANGE UP (ref 22–29)
CREAT SERPL-MCNC: 1.07 MG/DL — SIGNIFICANT CHANGE UP (ref 0.5–1.3)
EGFR: 60 ML/MIN/1.73M2 — SIGNIFICANT CHANGE UP
GLUCOSE SERPL-MCNC: 71 MG/DL — SIGNIFICANT CHANGE UP (ref 70–99)
HCT VFR BLD CALC: 32.6 % — LOW (ref 34.5–45)
HGB BLD-MCNC: 9.6 G/DL — LOW (ref 11.5–15.5)
MAGNESIUM SERPL-MCNC: 2.2 MG/DL — SIGNIFICANT CHANGE UP (ref 1.6–2.6)
MCHC RBC-ENTMCNC: 26.2 PG — LOW (ref 27–34)
MCHC RBC-ENTMCNC: 29.4 GM/DL — LOW (ref 32–36)
MCV RBC AUTO: 89.1 FL — SIGNIFICANT CHANGE UP (ref 80–100)
PLATELET # BLD AUTO: 216 K/UL — SIGNIFICANT CHANGE UP (ref 150–400)
POTASSIUM SERPL-MCNC: 4.6 MMOL/L — SIGNIFICANT CHANGE UP (ref 3.5–5.3)
POTASSIUM SERPL-SCNC: 4.6 MMOL/L — SIGNIFICANT CHANGE UP (ref 3.5–5.3)
RBC # BLD: 3.66 M/UL — LOW (ref 3.8–5.2)
RBC # FLD: 15.4 % — HIGH (ref 10.3–14.5)
SODIUM SERPL-SCNC: 151 MMOL/L — HIGH (ref 135–145)
WBC # BLD: 3.59 K/UL — LOW (ref 3.8–10.5)
WBC # FLD AUTO: 3.59 K/UL — LOW (ref 3.8–10.5)

## 2023-07-05 PROCEDURE — 99232 SBSQ HOSP IP/OBS MODERATE 35: CPT

## 2023-07-05 RX ORDER — DESMOPRESSIN ACETATE 0.1 MG/1
0.1 TABLET ORAL ONCE
Refills: 0 | Status: COMPLETED | OUTPATIENT
Start: 2023-07-05 | End: 2023-07-05

## 2023-07-05 RX ORDER — DESMOPRESSIN ACETATE 0.1 MG/1
0.2 TABLET ORAL
Refills: 0 | Status: DISCONTINUED | OUTPATIENT
Start: 2023-07-05 | End: 2023-07-06

## 2023-07-05 RX ORDER — DESMOPRESSIN ACETATE 0.1 MG/1
0.2 TABLET ORAL
Refills: 0 | Status: DISCONTINUED | OUTPATIENT
Start: 2023-07-05 | End: 2023-07-05

## 2023-07-05 RX ADMIN — DESMOPRESSIN ACETATE 0.1 MILLIGRAM(S): 0.1 TABLET ORAL at 09:08

## 2023-07-05 RX ADMIN — ENOXAPARIN SODIUM 40 MILLIGRAM(S): 100 INJECTION SUBCUTANEOUS at 05:06

## 2023-07-05 RX ADMIN — PANTOPRAZOLE SODIUM 40 MILLIGRAM(S): 20 TABLET, DELAYED RELEASE ORAL at 05:06

## 2023-07-05 RX ADMIN — LEVETIRACETAM 500 MILLIGRAM(S): 250 TABLET, FILM COATED ORAL at 05:07

## 2023-07-05 RX ADMIN — DESMOPRESSIN ACETATE 0.1 MILLIGRAM(S): 0.1 TABLET ORAL at 05:07

## 2023-07-05 RX ADMIN — Medication 1 MILLIGRAM(S): at 11:13

## 2023-07-05 RX ADMIN — LEVETIRACETAM 500 MILLIGRAM(S): 250 TABLET, FILM COATED ORAL at 18:28

## 2023-07-05 RX ADMIN — Medication 1 TABLET(S): at 11:13

## 2023-07-05 RX ADMIN — DESMOPRESSIN ACETATE 0.2 MILLIGRAM(S): 0.1 TABLET ORAL at 18:27

## 2023-07-05 RX ADMIN — Medication 650 MILLIGRAM(S): at 05:06

## 2023-07-05 RX ADMIN — AMLODIPINE BESYLATE 10 MILLIGRAM(S): 2.5 TABLET ORAL at 05:07

## 2023-07-05 RX ADMIN — Medication 300 MILLIGRAM(S): at 11:13

## 2023-07-05 NOTE — PROGRESS NOTE ADULT - ASSESSMENT
A) Central DI; metabolic acidosis better    P) Low solute diet, low  salt diet; increased DDAVP  dose and to be  given only on  empty stomach  not  with  meal due  to absorption  pattern. Follow up labs .

## 2023-07-05 NOTE — PROGRESS NOTE ADULT - ASSESSMENT
56 y/o F w/ PMH of SAH s/p brain aneurysm clipping x 2, Na fluctuations 2/2 DI, seizures was admitted for acute metabolic encephalopathy 2/2 hypernatremia.  CTH (-) for acute findings.  Nephro and endo were consulted.  Pt was started on hypotonic saline initially.  At home she is managed w/ DDAVP and strict fluid control. During hospital course she became hyponatremic and it was corrected w/ NaCl tabs and hypertonic saline.  Mental status back to baseline.  She has developed Hypernatremia now, NaCl tabs d/c'd, desmopressin resumed. Pt encouraged to drink 8-10 oz of water every 3-4 hrs.      1) Hypernatremia 2/2 DI which is likely sequale of SAH   - Na levels fluctuate, but trending down    - Need to continue encouraging pt to drink water q3-4h   - c/w Desmopressin (increased to 0.2 mg BID -- needs to take on empty stomach, not with meals)  - Family requested for PICC on d/c -- per discussion w/ nephrology pt had line infection in the past from prior picc and is inappropriate given pt able to take po just needs to be reminded  - Pt does need in person supervision to ensure PO hydration at home; either family vs aides to supervise  - Nephrology following and recs noted     2) Hyperkalemia  - resolved     3) Seizures  - c/w Keppra    4) HTN  - Started Amlodipine (increased to 10 mg daily)     VTE Prophylaxis -- Lovenox SQ    Dispo: Home once Sodium is between 145-147.   Patient's family want patient to be discharged home with PICC line, explained to family that there is no indication of PICC line at this time. Patient's Sodium is better controlled with Desmopressin and oral intake (needs regular reminders). Family is requesting arrangement for Infusion Center, explained to the  that family has to arrange with her PMD as an outpatient however he is not agreeable and is requesting Care Plan Meeting. Coordinating with Nephro for meeting, likely on Friday.

## 2023-07-06 LAB
ANION GAP SERPL CALC-SCNC: 8 MMOL/L — SIGNIFICANT CHANGE UP (ref 5–17)
BUN SERPL-MCNC: 38.1 MG/DL — HIGH (ref 8–20)
CALCIUM SERPL-MCNC: 8.8 MG/DL — SIGNIFICANT CHANGE UP (ref 8.4–10.5)
CHLORIDE SERPL-SCNC: 114 MMOL/L — HIGH (ref 96–108)
CO2 SERPL-SCNC: 25 MMOL/L — SIGNIFICANT CHANGE UP (ref 22–29)
CREAT SERPL-MCNC: 1.06 MG/DL — SIGNIFICANT CHANGE UP (ref 0.5–1.3)
EGFR: 61 ML/MIN/1.73M2 — SIGNIFICANT CHANGE UP
GLUCOSE SERPL-MCNC: 70 MG/DL — SIGNIFICANT CHANGE UP (ref 70–99)
POTASSIUM SERPL-MCNC: 4.6 MMOL/L — SIGNIFICANT CHANGE UP (ref 3.5–5.3)
POTASSIUM SERPL-SCNC: 4.6 MMOL/L — SIGNIFICANT CHANGE UP (ref 3.5–5.3)
SODIUM SERPL-SCNC: 147 MMOL/L — HIGH (ref 135–145)

## 2023-07-06 PROCEDURE — 99232 SBSQ HOSP IP/OBS MODERATE 35: CPT

## 2023-07-06 RX ORDER — DESMOPRESSIN ACETATE 0.1 MG/1
0.3 TABLET ORAL
Refills: 0 | Status: DISCONTINUED | OUTPATIENT
Start: 2023-07-06 | End: 2023-07-14

## 2023-07-06 RX ADMIN — PANTOPRAZOLE SODIUM 40 MILLIGRAM(S): 20 TABLET, DELAYED RELEASE ORAL at 05:36

## 2023-07-06 RX ADMIN — LEVETIRACETAM 500 MILLIGRAM(S): 250 TABLET, FILM COATED ORAL at 18:20

## 2023-07-06 RX ADMIN — ENOXAPARIN SODIUM 40 MILLIGRAM(S): 100 INJECTION SUBCUTANEOUS at 05:35

## 2023-07-06 RX ADMIN — AMLODIPINE BESYLATE 10 MILLIGRAM(S): 2.5 TABLET ORAL at 05:36

## 2023-07-06 RX ADMIN — Medication 300 MILLIGRAM(S): at 11:42

## 2023-07-06 RX ADMIN — LEVETIRACETAM 500 MILLIGRAM(S): 250 TABLET, FILM COATED ORAL at 05:36

## 2023-07-06 RX ADMIN — DESMOPRESSIN ACETATE 0.2 MILLIGRAM(S): 0.1 TABLET ORAL at 05:36

## 2023-07-06 RX ADMIN — Medication 1 TABLET(S): at 11:42

## 2023-07-06 RX ADMIN — Medication 1 MILLIGRAM(S): at 11:42

## 2023-07-06 RX ADMIN — DESMOPRESSIN ACETATE 0.3 MILLIGRAM(S): 0.1 TABLET ORAL at 18:20

## 2023-07-06 NOTE — PROGRESS NOTE ADULT - ASSESSMENT
58 y/o F w/ PMH of SAH s/p brain aneurysm clipping x 2, Na fluctuations 2/2 DI, seizures was admitted for acute metabolic encephalopathy 2/2 hypernatremia.  CTH (-) for acute findings.  Nephro and endo were consulted.  Pt was started on hypotonic saline initially.  At home she is managed w/ DDAVP and strict fluid control. During hospital course she became hyponatremic and it was corrected w/ NaCl tabs and hypertonic saline.  Mental status back to baseline.  She has developed Hypernatremia now, NaCl tabs d/c'd, desmopressin resumed. Pt encouraged to drink 8-10 oz of water every 3-4 hrs.      1) Hypernatremia 2/2 DI which is likely sequale of SAH   - Na levels fluctuate, but trending down    - Need to continue encouraging pt to drink water q3-4h   - c/w Desmopressin (increased to 0.2 mg BID -- needs to take on empty stomach, not with meals)  - Family requested for PICC on d/c -- per discussion w/ nephrology pt had line infection in the past from prior picc and is inappropriate given pt able to take po just needs to be reminded  - Pt does need in person supervision to ensure PO hydration at home; either family vs aides to supervise  - Nephrology following and recs noted     2) Hyperkalemia  - resolved     3) Seizures  - c/w Keppra    4) HTN  - Started Amlodipine (increased to 10 mg daily)     VTE Prophylaxis -- Lovenox SQ    Dispo: Home likely tomorrow.   Patient's family want patient to be discharged home with PICC line, explained to family that there is no indication of PICC line at this time. Patient's Sodium is better controlled with Desmopressin and oral intake (needs regular reminders). Family is requesting arrangement for Infusion Center, explained to the  that family has to arrange with her PMD as an outpatient however he is not agreeable and is requesting Care Plan Meeting. Coordinating with Nephro for meeting, likely on Friday.     Updated patient's daughter.

## 2023-07-07 LAB
ANION GAP SERPL CALC-SCNC: 13 MMOL/L — SIGNIFICANT CHANGE UP (ref 5–17)
BUN SERPL-MCNC: 34.8 MG/DL — HIGH (ref 8–20)
CALCIUM SERPL-MCNC: 9.1 MG/DL — SIGNIFICANT CHANGE UP (ref 8.4–10.5)
CHLORIDE SERPL-SCNC: 114 MMOL/L — HIGH (ref 96–108)
CO2 SERPL-SCNC: 20 MMOL/L — LOW (ref 22–29)
CREAT SERPL-MCNC: 1.09 MG/DL — SIGNIFICANT CHANGE UP (ref 0.5–1.3)
EGFR: 59 ML/MIN/1.73M2 — LOW
GLUCOSE SERPL-MCNC: 72 MG/DL — SIGNIFICANT CHANGE UP (ref 70–99)
POTASSIUM SERPL-MCNC: 5 MMOL/L — SIGNIFICANT CHANGE UP (ref 3.5–5.3)
POTASSIUM SERPL-SCNC: 5 MMOL/L — SIGNIFICANT CHANGE UP (ref 3.5–5.3)
SODIUM SERPL-SCNC: 147 MMOL/L — HIGH (ref 135–145)

## 2023-07-07 PROCEDURE — 99233 SBSQ HOSP IP/OBS HIGH 50: CPT

## 2023-07-07 RX ADMIN — AMLODIPINE BESYLATE 10 MILLIGRAM(S): 2.5 TABLET ORAL at 06:04

## 2023-07-07 RX ADMIN — Medication 1 TABLET(S): at 13:19

## 2023-07-07 RX ADMIN — LEVETIRACETAM 500 MILLIGRAM(S): 250 TABLET, FILM COATED ORAL at 18:08

## 2023-07-07 RX ADMIN — LEVETIRACETAM 500 MILLIGRAM(S): 250 TABLET, FILM COATED ORAL at 06:04

## 2023-07-07 RX ADMIN — ENOXAPARIN SODIUM 40 MILLIGRAM(S): 100 INJECTION SUBCUTANEOUS at 06:03

## 2023-07-07 RX ADMIN — PANTOPRAZOLE SODIUM 40 MILLIGRAM(S): 20 TABLET, DELAYED RELEASE ORAL at 06:04

## 2023-07-07 RX ADMIN — Medication 300 MILLIGRAM(S): at 13:17

## 2023-07-07 RX ADMIN — DESMOPRESSIN ACETATE 0.3 MILLIGRAM(S): 0.1 TABLET ORAL at 18:08

## 2023-07-07 RX ADMIN — DESMOPRESSIN ACETATE 0.3 MILLIGRAM(S): 0.1 TABLET ORAL at 06:04

## 2023-07-07 RX ADMIN — Medication 1 MILLIGRAM(S): at 13:18

## 2023-07-07 NOTE — PROGRESS NOTE ADULT - ASSESSMENT
58 y/o F w/ PMH of SAH s/p brain aneurysm clipping x 2, Na fluctuations 2/2 DI, seizures was admitted for acute metabolic encephalopathy 2/2 hypernatremia.  CTH (-) for acute findings.  Nephro and endo were consulted.  Pt was started on hypotonic saline initially.  At home she is managed w/ DDAVP and strict fluid control. During hospital course she became hyponatremic and it was corrected w/ NaCl tabs and hypertonic saline.  Mental status back to baseline.  She has developed Hypernatremia now, NaCl tabs d/c'd, desmopressin resumed. Pt encouraged to drink 8-10 oz of water every 3-4 hrs.      1) Hypernatremia 2/2 DI which is likely sequale of SAH   - Need to continue encouraging pt to drink water q3-4h   - c/w Desmopressin (increased to 0.3 mg BID -- needs to take on empty stomach, not with meals)  - Family requested for PICC on d/c -- per discussion w/ nephrology pt had line infection in the past from prior picc and is inappropriate given pt able to take po just needs to be reminded  - Pt does need in person supervision to ensure PO hydration at home; either family vs aides to supervise  - Nephrology following and recs noted     2) Hyperkalemia  - resolved     3) Seizures  - c/w Keppra    4) HTN  - Started Amlodipine (increased to 10 mg daily)     VTE Prophylaxis -- Lovenox SQ    Dispo: Home.    Called patient's daughter and  this morning to ask for meeting this afternoon however they did not  the phone. Later reached patient's daughter via patient's ipad. She was upset that we did not tell them 24 hours in advance even though she was told yesterday that we are arranging a meeting tomorrow and will call her once timings are confirmed. Patient's  (Katherin Santiago) had mentioned on 7/4/23 that once meeting is arranged, just let us know and we both will be there.   After talking to Ms. Pinto, her father called and later he came around 1:30 pm for meeting.   Met with  Katherin along with Dr. Anna CM (Gerri) and Nurse Manager (Zena) in presence of security --- daughter was on phone, explained to them at length about her current condition and treatment plan based on her response that she needs to drink water every few hours and someone from family has to keep reminding her. However, they both were not agreeable to current recommendations and keep asking to place the PICC line in her. Explained to them about the complications of PICC and history of prior infection however they do not want to hear that SHE DOES NOT NEED PICC LINE.   During, discussion they were asked about prior provider who was managing her PICC and they mentioned Dr. Palumbo. It was clarified by Dr. Stone that Dr. Palumbo (Nephrologist) has never been involved in management of her PICC Line. Their office had managed blood draws only but not PICC Line. Family to bring paper work for prior PICC Line. CM will find out who was managing prior PICC Line.

## 2023-07-07 NOTE — PROGRESS NOTE ADULT - ASSESSMENT
58 yo F w h/o SAH s/p Brain aneurysm clipping, central DI, Seizures 2/2 hypo or  hypernatremia   Pt was BIBA after she was was reported to have wandered off. pt called police " I got lost". In ER, She was found to be altered, confused    Central DI/ HyperNatremia  Likely 2/2  SAH s/p Brain aneurysm  Pt is on strict fluid control and DDAVP at home  Serum Na is very sensitive has been fluctuating - currently more stable     Added Desmopressin back ---> increased to O.3 mg po bid ( avoid with meals )   Patient should likely have some type of home care to ensure  she maintains good fluid intake and med compliance at home     HTN - Watch on meds     MA - Better , oral bicarb off     Possible family meeting today

## 2023-07-08 LAB
ANION GAP SERPL CALC-SCNC: 12 MMOL/L — SIGNIFICANT CHANGE UP (ref 5–17)
BUN SERPL-MCNC: 37 MG/DL — HIGH (ref 8–20)
CALCIUM SERPL-MCNC: 9.4 MG/DL — SIGNIFICANT CHANGE UP (ref 8.4–10.5)
CHLORIDE SERPL-SCNC: 114 MMOL/L — HIGH (ref 96–108)
CO2 SERPL-SCNC: 21 MMOL/L — LOW (ref 22–29)
CREAT SERPL-MCNC: 1.12 MG/DL — SIGNIFICANT CHANGE UP (ref 0.5–1.3)
EGFR: 57 ML/MIN/1.73M2 — LOW
GLUCOSE SERPL-MCNC: 62 MG/DL — LOW (ref 70–99)
POTASSIUM SERPL-MCNC: 5 MMOL/L — SIGNIFICANT CHANGE UP (ref 3.5–5.3)
POTASSIUM SERPL-SCNC: 5 MMOL/L — SIGNIFICANT CHANGE UP (ref 3.5–5.3)
SODIUM SERPL-SCNC: 146 MMOL/L — HIGH (ref 135–145)

## 2023-07-08 PROCEDURE — 99232 SBSQ HOSP IP/OBS MODERATE 35: CPT

## 2023-07-08 RX ADMIN — DESMOPRESSIN ACETATE 0.3 MILLIGRAM(S): 0.1 TABLET ORAL at 17:00

## 2023-07-08 RX ADMIN — DESMOPRESSIN ACETATE 0.3 MILLIGRAM(S): 0.1 TABLET ORAL at 05:03

## 2023-07-08 RX ADMIN — LEVETIRACETAM 500 MILLIGRAM(S): 250 TABLET, FILM COATED ORAL at 05:03

## 2023-07-08 RX ADMIN — PANTOPRAZOLE SODIUM 40 MILLIGRAM(S): 20 TABLET, DELAYED RELEASE ORAL at 05:04

## 2023-07-08 RX ADMIN — Medication 1 MILLIGRAM(S): at 11:33

## 2023-07-08 RX ADMIN — LEVETIRACETAM 500 MILLIGRAM(S): 250 TABLET, FILM COATED ORAL at 17:00

## 2023-07-08 RX ADMIN — Medication 1 TABLET(S): at 11:33

## 2023-07-08 RX ADMIN — Medication 300 MILLIGRAM(S): at 11:33

## 2023-07-08 RX ADMIN — AMLODIPINE BESYLATE 10 MILLIGRAM(S): 2.5 TABLET ORAL at 05:04

## 2023-07-08 RX ADMIN — ENOXAPARIN SODIUM 40 MILLIGRAM(S): 100 INJECTION SUBCUTANEOUS at 05:03

## 2023-07-08 NOTE — PROGRESS NOTE ADULT - ASSESSMENT
58 yo F w h/o SAH s/p Brain aneurysm clipping, central DI, Seizures 2/2 hypo or  hypernatremia   Pt was BIBA after she was was reported to have wandered off. pt called police " I got lost". In ER, She was found to be altered, confused    Central DI/ HyperNatremia - Chronic   Likely 2/2  SAH s/p Brain aneurysm  Pt is on strict fluid control and DDAVP at home  Serum Na is very sensitive has been fluctuating - currently more stable     Added Desmopressin back ---> increased to O.3 mg po bid ( avoid with meals )   Patient should likely have some type of home care to ensure  she maintains good fluid intake and med compliance at home   Again , I encouraged her to keep a good pattern of fluid intake     HTN - Watch on meds     MA - Better , oral bicarb off

## 2023-07-08 NOTE — PROGRESS NOTE ADULT - ASSESSMENT
56 y/o F w/ PMH of SAH s/p brain aneurysm clipping x 2, Na fluctuations 2/2 DI, seizures was admitted for acute metabolic encephalopathy 2/2 hypernatremia.  CTH (-) for acute findings.  Nephro and endo were consulted.  Pt was started on hypotonic saline initially.  At home she is managed w/ DDAVP and strict fluid control. During hospital course she became hyponatremic and it was corrected w/ NaCl tabs and hypertonic saline.  Mental status back to baseline.  She has developed Hypernatremia now, NaCl tabs d/c'd, desmopressin resumed. Pt encouraged to drink 8-10 oz of water every 3-4 hrs.      #Hypernatremia 2/2 DI which is likely sequale of SAH   - Need to continue encouraging pt to drink water q3-4h   - c/w Desmopressin (increased to 0.3 mg BID -- needs to take on empty stomach, not with meals)  - Family requested for PICC on d/c -- per discussion w/ nephrology pt had line infection in the past from prior picc and is inappropriate given pt able to take po just needs to be reminded  - Pt does need in person supervision to ensure PO hydration at home; either family vs aides to supervise  -  to bring in record from prior PICC line placement to see which outpt provider was taking care of the PICC  - Nephrology following and recs noted     #Hyperkalemia  - resolved     #Seizures  - c/w Keppra    #HTN  - Started Amlodipine (increased to 10 mg daily)     VTE Prophylaxis -- Lovenox SQ    Dispo: Home pending family to bring in prior paper work for PICC line    Prior provider had extensive discussion with pt, patient daughter and . Family to bring paper work for prior PICC Line. CM will find out who was managing prior PICC Line.

## 2023-07-09 LAB
ANION GAP SERPL CALC-SCNC: 12 MMOL/L — SIGNIFICANT CHANGE UP (ref 5–17)
BUN SERPL-MCNC: 40.6 MG/DL — HIGH (ref 8–20)
CALCIUM SERPL-MCNC: 8.8 MG/DL — SIGNIFICANT CHANGE UP (ref 8.4–10.5)
CHLORIDE SERPL-SCNC: 115 MMOL/L — HIGH (ref 96–108)
CO2 SERPL-SCNC: 22 MMOL/L — SIGNIFICANT CHANGE UP (ref 22–29)
CREAT SERPL-MCNC: 1.28 MG/DL — SIGNIFICANT CHANGE UP (ref 0.5–1.3)
EGFR: 48 ML/MIN/1.73M2 — LOW
GLUCOSE SERPL-MCNC: 69 MG/DL — LOW (ref 70–99)
POTASSIUM SERPL-MCNC: 4.4 MMOL/L — SIGNIFICANT CHANGE UP (ref 3.5–5.3)
POTASSIUM SERPL-SCNC: 4.4 MMOL/L — SIGNIFICANT CHANGE UP (ref 3.5–5.3)
SODIUM SERPL-SCNC: 149 MMOL/L — HIGH (ref 135–145)

## 2023-07-09 PROCEDURE — 99232 SBSQ HOSP IP/OBS MODERATE 35: CPT

## 2023-07-09 RX ADMIN — Medication 1 TABLET(S): at 12:03

## 2023-07-09 RX ADMIN — Medication 300 MILLIGRAM(S): at 12:03

## 2023-07-09 RX ADMIN — PANTOPRAZOLE SODIUM 40 MILLIGRAM(S): 20 TABLET, DELAYED RELEASE ORAL at 05:19

## 2023-07-09 RX ADMIN — ENOXAPARIN SODIUM 40 MILLIGRAM(S): 100 INJECTION SUBCUTANEOUS at 05:18

## 2023-07-09 RX ADMIN — LEVETIRACETAM 500 MILLIGRAM(S): 250 TABLET, FILM COATED ORAL at 17:31

## 2023-07-09 RX ADMIN — DESMOPRESSIN ACETATE 0.3 MILLIGRAM(S): 0.1 TABLET ORAL at 17:32

## 2023-07-09 RX ADMIN — Medication 1 MILLIGRAM(S): at 12:03

## 2023-07-09 RX ADMIN — AMLODIPINE BESYLATE 10 MILLIGRAM(S): 2.5 TABLET ORAL at 05:19

## 2023-07-09 RX ADMIN — LEVETIRACETAM 500 MILLIGRAM(S): 250 TABLET, FILM COATED ORAL at 05:19

## 2023-07-09 RX ADMIN — DESMOPRESSIN ACETATE 0.3 MILLIGRAM(S): 0.1 TABLET ORAL at 05:19

## 2023-07-09 NOTE — PROGRESS NOTE ADULT - ASSESSMENT
58 yo F w h/o SAH s/p Brain aneurysm clipping, central DI, Seizures 2/2 hypo or  hypernatremia   Pt was BIBA after she was was reported to have wandered off. pt called police " I got lost". In ER, She was found to be altered, confused    Central DI/ HyperNatremia - Chronic   Likely 2/2  SAH s/p Brain aneurysm  Pt is on strict fluid control and DDAVP at home  Serum Na is very sensitive has been fluctuating - currently more stable     Added Desmopressin back ---> increased to O.3 mg po bid ( avoid with meals )   Patient should likely have some type of home care to ensure  she maintains good fluid intake and med compliance at home   Again , I encouraged her to keep a good pattern of fluid intake   case management involved     HTN - Watch on meds     MA - Better , oral bicarb off

## 2023-07-10 LAB
ANION GAP SERPL CALC-SCNC: 9 MMOL/L — SIGNIFICANT CHANGE UP (ref 5–17)
BUN SERPL-MCNC: 44.2 MG/DL — HIGH (ref 8–20)
CALCIUM SERPL-MCNC: 8.8 MG/DL — SIGNIFICANT CHANGE UP (ref 8.4–10.5)
CHLORIDE SERPL-SCNC: 117 MMOL/L — HIGH (ref 96–108)
CO2 SERPL-SCNC: 23 MMOL/L — SIGNIFICANT CHANGE UP (ref 22–29)
CREAT SERPL-MCNC: 1.18 MG/DL — SIGNIFICANT CHANGE UP (ref 0.5–1.3)
EGFR: 53 ML/MIN/1.73M2 — LOW
GLUCOSE SERPL-MCNC: 71 MG/DL — SIGNIFICANT CHANGE UP (ref 70–99)
MAGNESIUM SERPL-MCNC: 2 MG/DL — SIGNIFICANT CHANGE UP (ref 1.6–2.6)
POTASSIUM SERPL-MCNC: 4.8 MMOL/L — SIGNIFICANT CHANGE UP (ref 3.5–5.3)
POTASSIUM SERPL-SCNC: 4.8 MMOL/L — SIGNIFICANT CHANGE UP (ref 3.5–5.3)
SODIUM SERPL-SCNC: 148 MMOL/L — HIGH (ref 135–145)

## 2023-07-10 PROCEDURE — 99232 SBSQ HOSP IP/OBS MODERATE 35: CPT

## 2023-07-10 RX ADMIN — DESMOPRESSIN ACETATE 0.3 MILLIGRAM(S): 0.1 TABLET ORAL at 06:17

## 2023-07-10 RX ADMIN — PANTOPRAZOLE SODIUM 40 MILLIGRAM(S): 20 TABLET, DELAYED RELEASE ORAL at 06:18

## 2023-07-10 RX ADMIN — Medication 300 MILLIGRAM(S): at 13:07

## 2023-07-10 RX ADMIN — LEVETIRACETAM 500 MILLIGRAM(S): 250 TABLET, FILM COATED ORAL at 17:38

## 2023-07-10 RX ADMIN — Medication 1 MILLIGRAM(S): at 13:07

## 2023-07-10 RX ADMIN — DESMOPRESSIN ACETATE 0.3 MILLIGRAM(S): 0.1 TABLET ORAL at 17:36

## 2023-07-10 RX ADMIN — AMLODIPINE BESYLATE 10 MILLIGRAM(S): 2.5 TABLET ORAL at 06:17

## 2023-07-10 RX ADMIN — LEVETIRACETAM 500 MILLIGRAM(S): 250 TABLET, FILM COATED ORAL at 06:17

## 2023-07-10 RX ADMIN — ENOXAPARIN SODIUM 40 MILLIGRAM(S): 100 INJECTION SUBCUTANEOUS at 06:18

## 2023-07-10 RX ADMIN — Medication 1 TABLET(S): at 13:07

## 2023-07-10 NOTE — PROGRESS NOTE ADULT - ASSESSMENT
58 y/o F w/ PMH of SAH s/p brain aneurysm clipping x 2, Na fluctuations 2/2 DI, seizures was admitted for acute metabolic encephalopathy 2/2 hypernatremia.  CTH (-) for acute findings.  Nephro and endo were consulted.  Pt was started on hypotonic saline initially.  At home she is managed w/ DDAVP and strict fluid control. During hospital course she became hyponatremic and it was corrected w/ NaCl tabs and hypertonic saline.  Mental status back to baseline.  She has developed Hypernatremia now, NaCl tabs d/c'd, desmopressin resumed. Pt encouraged to drink 8-10 oz of water every 3-4 hrs.      #Hypernatremia 2/2 DI which is likely sequale of SAH   - Need to continue encouraging pt to drink water q3-4h   - c/w Desmopressin (increased to 0.3 mg BID -- needs to take on empty stomach, not with meals)  - Family requested for PICC on d/c -- per discussion w/ nephrology pt had line infection in the past from prior picc and is inappropriate given pt able to take po just needs to be reminded  - Pt does need in person supervision to ensure PO hydration at home; either family vs aides to supervise  -  to bring in record from prior PICC line placement to see which outpt provider was taking care of the PICC  - Nephrology following and recs noted     #Hyperkalemia  - resolved     #Seizures  - c/w Keppra    #HTN  - Started Amlodipine (increased to 10 mg daily)     VTE Prophylaxis -- Lovenox SQ    Dispo: Home pending family to bring in prior paper work for PICC line    Prior provider had extensive discussion with pt, patient daughter and . Family to bring paper work for prior PICC Line. CM will find out who was managing prior PICC Line.

## 2023-07-11 LAB
ANION GAP SERPL CALC-SCNC: 11 MMOL/L — SIGNIFICANT CHANGE UP (ref 5–17)
ANION GAP SERPL CALC-SCNC: 14 MMOL/L — SIGNIFICANT CHANGE UP (ref 5–17)
BUN SERPL-MCNC: 48.5 MG/DL — HIGH (ref 8–20)
BUN SERPL-MCNC: 50 MG/DL — HIGH (ref 8–20)
CALCIUM SERPL-MCNC: 9.1 MG/DL — SIGNIFICANT CHANGE UP (ref 8.4–10.5)
CALCIUM SERPL-MCNC: 9.3 MG/DL — SIGNIFICANT CHANGE UP (ref 8.4–10.5)
CHLORIDE SERPL-SCNC: 120 MMOL/L — HIGH (ref 96–108)
CHLORIDE SERPL-SCNC: 121 MMOL/L — HIGH (ref 96–108)
CO2 SERPL-SCNC: 21 MMOL/L — LOW (ref 22–29)
CO2 SERPL-SCNC: 22 MMOL/L — SIGNIFICANT CHANGE UP (ref 22–29)
CREAT SERPL-MCNC: 1.31 MG/DL — HIGH (ref 0.5–1.3)
CREAT SERPL-MCNC: 1.33 MG/DL — HIGH (ref 0.5–1.3)
EGFR: 46 ML/MIN/1.73M2 — LOW
EGFR: 47 ML/MIN/1.73M2 — LOW
GLUCOSE SERPL-MCNC: 72 MG/DL — SIGNIFICANT CHANGE UP (ref 70–99)
GLUCOSE SERPL-MCNC: 98 MG/DL — SIGNIFICANT CHANGE UP (ref 70–99)
MAGNESIUM SERPL-MCNC: 2.3 MG/DL — SIGNIFICANT CHANGE UP (ref 1.6–2.6)
PHOSPHATE SERPL-MCNC: 3.6 MG/DL — SIGNIFICANT CHANGE UP (ref 2.4–4.7)
POTASSIUM SERPL-MCNC: 4.5 MMOL/L — SIGNIFICANT CHANGE UP (ref 3.5–5.3)
POTASSIUM SERPL-MCNC: 4.7 MMOL/L — SIGNIFICANT CHANGE UP (ref 3.5–5.3)
POTASSIUM SERPL-SCNC: 4.5 MMOL/L — SIGNIFICANT CHANGE UP (ref 3.5–5.3)
POTASSIUM SERPL-SCNC: 4.7 MMOL/L — SIGNIFICANT CHANGE UP (ref 3.5–5.3)
SARS-COV-2 RNA SPEC QL NAA+PROBE: SIGNIFICANT CHANGE UP
SODIUM SERPL-SCNC: 154 MMOL/L — HIGH (ref 135–145)
SODIUM SERPL-SCNC: 155 MMOL/L — HIGH (ref 135–145)

## 2023-07-11 PROCEDURE — 99232 SBSQ HOSP IP/OBS MODERATE 35: CPT

## 2023-07-11 RX ORDER — SODIUM CHLORIDE 9 MG/ML
1000 INJECTION INTRAMUSCULAR; INTRAVENOUS; SUBCUTANEOUS
Refills: 0 | Status: DISCONTINUED | OUTPATIENT
Start: 2023-07-11 | End: 2023-07-12

## 2023-07-11 RX ORDER — SODIUM CHLORIDE 9 MG/ML
1000 INJECTION INTRAMUSCULAR; INTRAVENOUS; SUBCUTANEOUS
Refills: 0 | Status: DISCONTINUED | OUTPATIENT
Start: 2023-07-11 | End: 2023-07-11

## 2023-07-11 RX ADMIN — Medication 300 MILLIGRAM(S): at 12:11

## 2023-07-11 RX ADMIN — LEVETIRACETAM 500 MILLIGRAM(S): 250 TABLET, FILM COATED ORAL at 17:43

## 2023-07-11 RX ADMIN — DESMOPRESSIN ACETATE 0.3 MILLIGRAM(S): 0.1 TABLET ORAL at 05:18

## 2023-07-11 RX ADMIN — ENOXAPARIN SODIUM 40 MILLIGRAM(S): 100 INJECTION SUBCUTANEOUS at 05:18

## 2023-07-11 RX ADMIN — Medication 1 TABLET(S): at 12:11

## 2023-07-11 RX ADMIN — Medication 1 MILLIGRAM(S): at 12:11

## 2023-07-11 RX ADMIN — DESMOPRESSIN ACETATE 0.3 MILLIGRAM(S): 0.1 TABLET ORAL at 17:43

## 2023-07-11 RX ADMIN — PANTOPRAZOLE SODIUM 40 MILLIGRAM(S): 20 TABLET, DELAYED RELEASE ORAL at 05:19

## 2023-07-11 RX ADMIN — SODIUM CHLORIDE 50 MILLILITER(S): 9 INJECTION INTRAMUSCULAR; INTRAVENOUS; SUBCUTANEOUS at 19:36

## 2023-07-11 RX ADMIN — LEVETIRACETAM 500 MILLIGRAM(S): 250 TABLET, FILM COATED ORAL at 05:19

## 2023-07-11 RX ADMIN — AMLODIPINE BESYLATE 10 MILLIGRAM(S): 2.5 TABLET ORAL at 05:19

## 2023-07-11 NOTE — PROGRESS NOTE ADULT - ASSESSMENT
56 yo F w h/o SAH s/p Brain aneurysm clipping, central DI, Seizures 2/2 hypo or  hypernatremia   Pt was BIBA after she was was reported to have wandered off. pt called police " I got lost". In ER, She was found to be altered, confused    Central DI/ HyperNatremia - Chronic   Likely 2/2  SAH s/p Brain aneurysm  Pt is on strict fluid control and DDAVP at home  Serum Na is very sensitive has been fluctuating - currently more stable     Added Desmopressin back ---> increased to O.3 mg po bid ( avoid with meals )   Patient should likely have some type of home care to ensure  she maintains good fluid intake and med compliance at home   Again , I encouraged her to keep a good pattern of fluid intake , has again  been discussed with her   Repeat BMP at 6 pm       HTN - Watch on meds     MA - stable off bicarbonate

## 2023-07-11 NOTE — PROGRESS NOTE ADULT - ASSESSMENT
58 y/o F w/ PMH of SAH s/p brain aneurysm clipping x 2, Na fluctuations 2/2 DI, seizures was admitted for acute metabolic encephalopathy 2/2 hypernatremia.  CTH (-) for acute findings.  Nephro and endo were consulted.  Pt was started on hypotonic saline initially.  At home she is managed w/ DDAVP and strict fluid control. During hospital course she became hyponatremic and it was corrected w/ NaCl tabs and hypertonic saline.  Mental status back to baseline.  She has developed Hypernatremia now, NaCl tabs d/c'd, desmopressin resumed. Pt encouraged to drink 8-10 oz of water every 3-4 hrs.      #Hypernatremia 2/2 DI which is likely sequale of SAH   - Need to continue encouraging pt to drink water q3-4h   - c/w Desmopressin (increased to 0.3 mg BID -- needs to take on empty stomach, not with meals)  - Family requested for PICC on d/c -- per discussion w/ nephrology pt had line infection in the past from prior picc and is inappropriate given pt able to take po just needs to be reminded  - Pt does need in person supervision to ensure PO hydration at home; either family vs aides to supervise  -  to bring in record from prior PICC line placement to see which outpt provider was taking care of the PICC  - Nephrology following and recs noted   - Na+ up to 154, f/u repeat in AM, PO hydration encouraged    #Hyperkalemia  - resolved     #Seizures  - c/w Keppra    #HTN  - Started Amlodipine (increased to 10 mg daily)     VTE Prophylaxis -- Lovenox SQ    Dispo: Home pending family to bring in prior paper work for PICC line    Prior provider had extensive discussion with pt, patient daughter and . Family to bring paper work for prior PICC Line. CM will find out who was managing prior PICC Line.     56 y/o F w/ PMH of SAH s/p brain aneurysm clipping x 2, Na fluctuations 2/2 DI, seizures was admitted for acute metabolic encephalopathy 2/2 hypernatremia.  CTH (-) for acute findings.  Nephro and endo were consulted.  Pt was started on hypotonic saline initially.  At home she is managed w/ DDAVP and strict fluid control. During hospital course she became hyponatremic and it was corrected w/ NaCl tabs and hypertonic saline.  Mental status back to baseline.  She has developed Hypernatremia now, NaCl tabs d/c'd, desmopressin resumed. Pt encouraged to drink 8-10 oz of water every 3-4 hrs.      #Hypernatremia 2/2 DI which is likely sequale of SAH   - Need to continue encouraging pt to drink water q3-4h   - c/w Desmopressin (increased to 0.3 mg BID -- needs to take on empty stomach, not with meals)  - Family requested for PICC on d/c -- per discussion w/ nephrology pt had line infection in the past from prior picc and is inappropriate given pt able to take po just needs to be reminded  - Pt does need in person supervision to ensure PO hydration at home; either family vs aides to supervise  -  to bring in record from prior PICC line placement to see which outpt provider was taking care of the PICC  - Nephrology following and recs noted   - Na+ up to 154, f/u repeat in AM, PO hydration encouraged    #Hyperkalemia  - resolved     #Seizures  - c/w Keppra    #HTN  - Started Amlodipine (increased to 10 mg daily)     VTE Prophylaxis -- Lovenox SQ    Dispo: pt family agreeing to long term care

## 2023-07-12 LAB
ANION GAP SERPL CALC-SCNC: 13 MMOL/L — SIGNIFICANT CHANGE UP (ref 5–17)
ANION GAP SERPL CALC-SCNC: 14 MMOL/L — SIGNIFICANT CHANGE UP (ref 5–17)
BUN SERPL-MCNC: 49.7 MG/DL — HIGH (ref 8–20)
BUN SERPL-MCNC: 50.5 MG/DL — HIGH (ref 8–20)
CALCIUM SERPL-MCNC: 9.3 MG/DL — SIGNIFICANT CHANGE UP (ref 8.4–10.5)
CALCIUM SERPL-MCNC: 9.5 MG/DL — SIGNIFICANT CHANGE UP (ref 8.4–10.5)
CHLORIDE SERPL-SCNC: 117 MMOL/L — HIGH (ref 96–108)
CHLORIDE SERPL-SCNC: 120 MMOL/L — HIGH (ref 96–108)
CO2 SERPL-SCNC: 16 MMOL/L — LOW (ref 22–29)
CO2 SERPL-SCNC: 20 MMOL/L — LOW (ref 22–29)
CREAT SERPL-MCNC: 1.09 MG/DL — SIGNIFICANT CHANGE UP (ref 0.5–1.3)
CREAT SERPL-MCNC: 1.19 MG/DL — SIGNIFICANT CHANGE UP (ref 0.5–1.3)
EGFR: 53 ML/MIN/1.73M2 — LOW
EGFR: 59 ML/MIN/1.73M2 — LOW
GLUCOSE SERPL-MCNC: 65 MG/DL — LOW (ref 70–99)
GLUCOSE SERPL-MCNC: 74 MG/DL — SIGNIFICANT CHANGE UP (ref 70–99)
POTASSIUM SERPL-MCNC: 5.2 MMOL/L — SIGNIFICANT CHANGE UP (ref 3.5–5.3)
POTASSIUM SERPL-MCNC: 5.7 MMOL/L — HIGH (ref 3.5–5.3)
POTASSIUM SERPL-SCNC: 5.2 MMOL/L — SIGNIFICANT CHANGE UP (ref 3.5–5.3)
POTASSIUM SERPL-SCNC: 5.7 MMOL/L — HIGH (ref 3.5–5.3)
SODIUM SERPL-SCNC: 147 MMOL/L — HIGH (ref 135–145)
SODIUM SERPL-SCNC: 153 MMOL/L — HIGH (ref 135–145)

## 2023-07-12 PROCEDURE — 99232 SBSQ HOSP IP/OBS MODERATE 35: CPT

## 2023-07-12 RX ORDER — SODIUM CHLORIDE 9 MG/ML
1000 INJECTION INTRAMUSCULAR; INTRAVENOUS; SUBCUTANEOUS
Refills: 0 | Status: DISCONTINUED | OUTPATIENT
Start: 2023-07-12 | End: 2023-07-12

## 2023-07-12 RX ADMIN — DESMOPRESSIN ACETATE 0.3 MILLIGRAM(S): 0.1 TABLET ORAL at 17:11

## 2023-07-12 RX ADMIN — Medication 300 MILLIGRAM(S): at 13:10

## 2023-07-12 RX ADMIN — ENOXAPARIN SODIUM 40 MILLIGRAM(S): 100 INJECTION SUBCUTANEOUS at 05:55

## 2023-07-12 RX ADMIN — AMLODIPINE BESYLATE 10 MILLIGRAM(S): 2.5 TABLET ORAL at 05:56

## 2023-07-12 RX ADMIN — Medication 1 MILLIGRAM(S): at 13:11

## 2023-07-12 RX ADMIN — PANTOPRAZOLE SODIUM 40 MILLIGRAM(S): 20 TABLET, DELAYED RELEASE ORAL at 05:55

## 2023-07-12 RX ADMIN — Medication 1 TABLET(S): at 13:11

## 2023-07-12 RX ADMIN — LEVETIRACETAM 500 MILLIGRAM(S): 250 TABLET, FILM COATED ORAL at 17:11

## 2023-07-12 RX ADMIN — LEVETIRACETAM 500 MILLIGRAM(S): 250 TABLET, FILM COATED ORAL at 05:55

## 2023-07-12 RX ADMIN — DESMOPRESSIN ACETATE 0.3 MILLIGRAM(S): 0.1 TABLET ORAL at 05:55

## 2023-07-12 RX ADMIN — SODIUM CHLORIDE 50 MILLILITER(S): 9 INJECTION INTRAMUSCULAR; INTRAVENOUS; SUBCUTANEOUS at 10:50

## 2023-07-12 NOTE — PROGRESS NOTE ADULT - ASSESSMENT
58 y/o F w/ PMH of SAH s/p brain aneurysm clipping x 2, Na fluctuations 2/2 DI, seizures was admitted for acute metabolic encephalopathy 2/2 hypernatremia.  CTH (-) for acute findings.  Nephro and endo were consulted.  Pt was started on hypotonic saline initially.  At home she is managed w/ DDAVP and strict fluid control. During hospital course she became hyponatremic and it was corrected w/ NaCl tabs and hypertonic saline.  Mental status back to baseline.  She has developed Hypernatremia now, NaCl tabs d/c'd, desmopressin resumed. Pt encouraged to drink 8-10 oz of water every 3-4 hrs.      #Hypernatremia 2/2 DI which is likely sequale of SAH   - Need to continue encouraging pt to drink water q3-4h   - c/w Desmopressin (increased to 0.3 mg BID -- needs to take on empty stomach, not with meals)  - Family requested for PICC on d/c -- per discussion w/ nephrology pt had line infection in the past from prior picc and is inappropriate given pt able to take po just needs to be reminded  - Pt does need in person supervision to ensure PO hydration at home; either family vs aides to supervise  -  to bring in record from prior PICC line placement to see which outpt provider was taking care of the PICC  - Nephrology following and recs noted   - Na+ up to 153, on 1/4NS x 5 hours, f/u repeat BMP    #Hyperkalemia  - resolved     #Seizures  - c/w Keppra    #HTN  - Started Amlodipine (increased to 10 mg daily)     VTE Prophylaxis -- Lovenox SQ    Dispo: pt family agreeing to long term care, once Na+ stable   58 y/o F w/ PMH of SAH s/p brain aneurysm clipping x 2, Na fluctuations 2/2 DI, seizures was admitted for acute metabolic encephalopathy 2/2 hypernatremia.  CTH (-) for acute findings.  Nephro and endo were consulted.  Pt was started on hypotonic saline initially.  At home she is managed w/ DDAVP and strict fluid control. During hospital course she became hyponatremic and it was corrected w/ NaCl tabs and hypertonic saline.  Mental status back to baseline.  She has developed Hypernatremia now, NaCl tabs d/c'd, desmopressin resumed. Pt encouraged to drink 8-10 oz of water every 3-4 hrs.      #Hypernatremia 2/2 DI which is likely sequale of SAH   - Need to continue encouraging pt to drink water q3-4h   - c/w Desmopressin (increased to 0.3 mg BID -- needs to take on empty stomach, not with meals)  - Family requested for PICC on d/c -- per discussion w/ nephrology pt had line infection in the past from prior picc and is inappropriate given pt able to take po just needs to be reminded  - Pt does need in person supervision to ensure PO hydration at home; either family vs aides to supervise  -  to bring in record from prior PICC line placement to see which outpt provider was taking care of the PICC  - Nephrology following and recs noted   - Na+ up to 153, on 1/4NS x 5 hours, f/u repeat BMP    #Hyperkalemia  - resolved     #Seizures  - c/w Keppra    #HTN  - Started Amlodipine (increased to 10 mg daily)     VTE Prophylaxis -- Lovenox SQ    Dispo: pt family agreeing to long term care, once okay by nephro

## 2023-07-12 NOTE — PROGRESS NOTE ADULT - ASSESSMENT
58 yo F w h/o SAH s/p Brain aneurysm clipping, central DI, Seizures 2/2 hypo or  hypernatremia   Pt was BIBA after she was was reported to have wandered off. pt called police " I got lost". In ER, She was found to be altered, confused    Central DI/ HyperNatremia - Chronic   Likely 2/2  SAH s/p Brain aneurysm  Pt is on strict fluid control and DDAVP at home  Serum Na is very sensitive has been fluctuating - currently more stable   d/w patient - took water easily again - needs to be reminded and then drinks    Added Desmopressin back ---> increased to O.3 mg po bid ( avoid with meals )   Patient should likely have some type of home care to ensure  she maintains good fluid intake and med compliance at home   Again , I encouraged her to keep a good pattern of fluid intake , has again  been discussed with her   Repeat BMP at 6 pm       HTN - Watch on meds     MA - stable off bicarbonate

## 2023-07-12 NOTE — CHART NOTE - NSCHARTNOTEFT_GEN_A_CORE
PA called by RN to report pt lost PIV access.   Several attempts made to obtain IV access with ultrasound guidance without success by myself, KIRA MICU RN.   22gauge PIV in R wrist eventually established by PA.  Left pt in bed calm and in no acute distress.   Tolerated PIV attempts well. No blood loss. Tourniquet removed, bed returned to original positioning.   Resume IVF at previous rate.  BMP pending.   Midline ordered.   Encouraged PO intake.   RN instructed to continue to monitor pt and notify provider of any changes in pt status.

## 2023-07-13 ENCOUNTER — TRANSCRIPTION ENCOUNTER (OUTPATIENT)
Age: 58
End: 2023-07-13

## 2023-07-13 LAB
ANION GAP SERPL CALC-SCNC: 11 MMOL/L — SIGNIFICANT CHANGE UP (ref 5–17)
BUN SERPL-MCNC: 50 MG/DL — HIGH (ref 8–20)
CALCIUM SERPL-MCNC: 9.2 MG/DL — SIGNIFICANT CHANGE UP (ref 8.4–10.5)
CHLORIDE SERPL-SCNC: 118 MMOL/L — HIGH (ref 96–108)
CO2 SERPL-SCNC: 22 MMOL/L — SIGNIFICANT CHANGE UP (ref 22–29)
CREAT SERPL-MCNC: 1.17 MG/DL — SIGNIFICANT CHANGE UP (ref 0.5–1.3)
EGFR: 54 ML/MIN/1.73M2 — LOW
GLUCOSE SERPL-MCNC: 70 MG/DL — SIGNIFICANT CHANGE UP (ref 70–99)
POTASSIUM SERPL-MCNC: 4.6 MMOL/L — SIGNIFICANT CHANGE UP (ref 3.5–5.3)
POTASSIUM SERPL-SCNC: 4.6 MMOL/L — SIGNIFICANT CHANGE UP (ref 3.5–5.3)
SODIUM SERPL-SCNC: 151 MMOL/L — HIGH (ref 135–145)

## 2023-07-13 PROCEDURE — 99232 SBSQ HOSP IP/OBS MODERATE 35: CPT

## 2023-07-13 RX ORDER — SODIUM CHLORIDE 9 MG/ML
1000 INJECTION INTRAMUSCULAR; INTRAVENOUS; SUBCUTANEOUS
Refills: 0 | Status: DISCONTINUED | OUTPATIENT
Start: 2023-07-13 | End: 2023-07-13

## 2023-07-13 RX ADMIN — DESMOPRESSIN ACETATE 0.3 MILLIGRAM(S): 0.1 TABLET ORAL at 17:22

## 2023-07-13 RX ADMIN — SODIUM CHLORIDE 50 MILLILITER(S): 9 INJECTION INTRAMUSCULAR; INTRAVENOUS; SUBCUTANEOUS at 09:07

## 2023-07-13 RX ADMIN — AMLODIPINE BESYLATE 10 MILLIGRAM(S): 2.5 TABLET ORAL at 05:57

## 2023-07-13 RX ADMIN — LEVETIRACETAM 500 MILLIGRAM(S): 250 TABLET, FILM COATED ORAL at 17:21

## 2023-07-13 RX ADMIN — PANTOPRAZOLE SODIUM 40 MILLIGRAM(S): 20 TABLET, DELAYED RELEASE ORAL at 05:57

## 2023-07-13 RX ADMIN — ENOXAPARIN SODIUM 40 MILLIGRAM(S): 100 INJECTION SUBCUTANEOUS at 05:57

## 2023-07-13 RX ADMIN — Medication 1 MILLIGRAM(S): at 11:39

## 2023-07-13 RX ADMIN — LEVETIRACETAM 500 MILLIGRAM(S): 250 TABLET, FILM COATED ORAL at 05:57

## 2023-07-13 RX ADMIN — Medication 300 MILLIGRAM(S): at 11:39

## 2023-07-13 RX ADMIN — DESMOPRESSIN ACETATE 0.3 MILLIGRAM(S): 0.1 TABLET ORAL at 05:57

## 2023-07-13 RX ADMIN — Medication 1 TABLET(S): at 11:39

## 2023-07-13 NOTE — PROGRESS NOTE ADULT - ASSESSMENT
56 yo F w h/o SAH s/p Brain aneurysm clipping, central DI, Seizures 2/2 hypo or  hypernatremia   Pt was BIBA after she was was reported to have wandered off. pt called police " I got lost". In ER, She was found to be altered, confused    Central DI/ HyperNatremia - Chronic   Likely 2/2  SAH s/p Brain aneurysm  Pt is on strict fluid control and DDAVP at home- was on 01TID, now ).3 bid but Nastill rising, may need to increase to 0.& per day  Serum Na is very sensitive has been fluctuating - currently >145 for last 10 d and went to 151, better today  K normal today  d/w patient - took water easily again - needs to be reminded and then drinks  Shall check UO     Added Desmopressin back ---> increased to O.3 mg po bid ( avoid with meals )   Patient should likely have some type of home care to ensure  she maintains good fluid intake and med compliance at home   Again , I encouraged her to keep a good pattern of fluid intake , has again  been discussed with her       HTN - Watch on meds Controlled    MA - stable off bicarbonate

## 2023-07-13 NOTE — DISCHARGE NOTE PROVIDER - NSDCFUADDAPPT_GEN_ALL_CORE_FT
Please followup with nephrology for blood works and further management of your sodium level within 1 week of discharge.

## 2023-07-13 NOTE — DISCHARGE NOTE PROVIDER - ATTENDING DISCHARGE PHYSICAL EXAMINATION:
VITALS:   T(C): 36.4 (07-14-23 @ 04:23), Max: 36.4 (07-13-23 @ 11:41)  HR: 87 (07-14-23 @ 04:23) (75 - 87)  BP: 126/86 (07-14-23 @ 04:23) (126/86 - 134/87)  RR: 18 (07-14-23 @ 04:23) (18 - 18)  SpO2: 96% (07-14-23 @ 04:23) (95% - 96%)    GENERAL: NAD, lying in bed comfortably  HEAD:  Atraumatic, Normocephalic  EYES: EOMI, PERRLA, conjunctiva and sclera clear  ENT: Moist mucous membranes  NECK: Supple, No JVD  CHEST/LUNG: Clear to auscultation bilaterally; No rales, rhonchi, wheezing, or rubs. Unlabored respirations  HEART: Regular rate and rhythm; No murmurs, rubs, or gallops  ABDOMEN: BSx4; Soft, nontender, nondistended  EXTREMITIES:  2+ Peripheral Pulses, brisk capillary refill. No clubbing, cyanosis, or edema  NERVOUS SYSTEM:  A&Ox3, no focal deficits   SKIN: No rashes or lesions  PSYCH: Normal affect, euthymic mood

## 2023-07-13 NOTE — DISCHARGE NOTE PROVIDER - HOSPITAL COURSE
58 y/o F w/ PMH of SAH s/p brain aneurysm clipping x 2, Na fluctuations 2/2 DI, seizures was admitted for acute metabolic encephalopathy 2/2 hypernatremia.  CTH (-) for acute findings.  ephro and endo were consulted. Pt was started on hypotonic saline initially.  At home she is managed w/ DDAVP and strict fluid control. During hospital course she became hyponatremic and it was corrected w/ NaCl tabs and hypertonic saline. Mental status back to baseline. Home desmopressin resumed. Pt encouraged to drink 8-10 oz of water every 3-4 hrs.  Seen by nephro and Na+ remains stable around 145-150s, will need intermittent 1/4NS at rehab and frequent BMP checks and continuos encouragement for drinking water.     Medically optimized for discharged with close outpatient followup with nephrology.     HIGH RISK OF READMISSION GIVEN COMPLEX MEDICAL ISSUES.

## 2023-07-13 NOTE — DISCHARGE NOTE PROVIDER - NSDCCPCAREPLAN_GEN_ALL_CORE_FT
PRINCIPAL DISCHARGE DIAGNOSIS  Diagnosis: Hypernatremia  Assessment and Plan of Treatment: Continue medications as instructed by nephrologist. Drinks 8oz of water every hour.     PRINCIPAL DISCHARGE DIAGNOSIS  Diagnosis: Hypernatremia  Assessment and Plan of Treatment: Continue medications as instructed by nephrologist. Drinks 8oz of water every hour.  Please followup with your nephrologist for repeat blood works and they will titrate your mediations as needed for maintain acceptable sodium level.

## 2023-07-13 NOTE — DISCHARGE NOTE PROVIDER - CARE PROVIDER_API CALL
Richie Manuel  Nephrology  [address] [place] [address]  Phone: [phone]  Fax: [phone]  Follow Up Time: 1 week

## 2023-07-13 NOTE — DISCHARGE NOTE PROVIDER - NSDCMRMEDTOKEN_GEN_ALL_CORE_FT
amLODIPine 10 mg oral tablet: 1 tab(s) orally once a day  desmopressin 0.1 mg oral tablet: 1 tab(s) orally 3 times a day  ferrous sulfate 325 mg (65 mg elemental iron) oral tablet: 1 tab(s) orally 2 times a day  folic acid 1 mg oral tablet: 1 tab(s) orally once a day  levETIRAcetam 500 mg oral tablet: 1 tab(s) orally 2 times a day  Multiple Vitamins with Minerals oral tablet: 1 tab(s) orally once a day  pantoprazole 40 mg oral delayed release tablet: 1 tab(s) orally once a day   amLODIPine 10 mg oral tablet: 1 tab(s) orally once a day  desmopressin 0.1 mg oral tablet: 4 tab(s) orally 2 times a day  ferrous sulfate 325 mg (65 mg elemental iron) oral tablet: 1 tab(s) orally 2 times a day  folic acid 1 mg oral tablet: 1 tab(s) orally once a day  levETIRAcetam 500 mg oral tablet: 1 tab(s) orally 2 times a day  Multiple Vitamins with Minerals oral tablet: 1 tab(s) orally once a day  pantoprazole 40 mg oral delayed release tablet: 1 tab(s) orally once a day

## 2023-07-13 NOTE — DISCHARGE NOTE PROVIDER - INSTRUCTIONS
Diet, Regular:   60 gm Protein (PRO60G)  No Concentrated Potassium  Low Sodium (07-05-23 @ 09:37) [Active]

## 2023-07-14 VITALS
HEART RATE: 80 BPM | DIASTOLIC BLOOD PRESSURE: 76 MMHG | SYSTOLIC BLOOD PRESSURE: 114 MMHG | RESPIRATION RATE: 18 BRPM | OXYGEN SATURATION: 99 %

## 2023-07-14 LAB
ANION GAP SERPL CALC-SCNC: 12 MMOL/L — SIGNIFICANT CHANGE UP (ref 5–17)
BUN SERPL-MCNC: 44.1 MG/DL — HIGH (ref 8–20)
CALCIUM SERPL-MCNC: 8.8 MG/DL — SIGNIFICANT CHANGE UP (ref 8.4–10.5)
CHLORIDE SERPL-SCNC: 119 MMOL/L — HIGH (ref 96–108)
CO2 SERPL-SCNC: 21 MMOL/L — LOW (ref 22–29)
CREAT SERPL-MCNC: 1.12 MG/DL — SIGNIFICANT CHANGE UP (ref 0.5–1.3)
EGFR: 57 ML/MIN/1.73M2 — LOW
GLUCOSE SERPL-MCNC: 63 MG/DL — LOW (ref 70–99)
POTASSIUM SERPL-MCNC: 4.9 MMOL/L — SIGNIFICANT CHANGE UP (ref 3.5–5.3)
POTASSIUM SERPL-SCNC: 4.9 MMOL/L — SIGNIFICANT CHANGE UP (ref 3.5–5.3)
SODIUM SERPL-SCNC: 152 MMOL/L — HIGH (ref 135–145)

## 2023-07-14 PROCEDURE — 87040 BLOOD CULTURE FOR BACTERIA: CPT

## 2023-07-14 PROCEDURE — 85610 PROTHROMBIN TIME: CPT

## 2023-07-14 PROCEDURE — 70498 CT ANGIOGRAPHY NECK: CPT | Mod: MA

## 2023-07-14 PROCEDURE — 70450 CT HEAD/BRAIN W/O DYE: CPT | Mod: ME

## 2023-07-14 PROCEDURE — 81003 URINALYSIS AUTO W/O SCOPE: CPT

## 2023-07-14 PROCEDURE — 85018 HEMOGLOBIN: CPT

## 2023-07-14 PROCEDURE — 87635 SARS-COV-2 COVID-19 AMP PRB: CPT

## 2023-07-14 PROCEDURE — 81001 URINALYSIS AUTO W/SCOPE: CPT

## 2023-07-14 PROCEDURE — 83550 IRON BINDING TEST: CPT

## 2023-07-14 PROCEDURE — 80053 COMPREHEN METABOLIC PANEL: CPT

## 2023-07-14 PROCEDURE — 84156 ASSAY OF PROTEIN URINE: CPT

## 2023-07-14 PROCEDURE — 84133 ASSAY OF URINE POTASSIUM: CPT

## 2023-07-14 PROCEDURE — 82728 ASSAY OF FERRITIN: CPT

## 2023-07-14 PROCEDURE — 82533 TOTAL CORTISOL: CPT

## 2023-07-14 PROCEDURE — 84145 PROCALCITONIN (PCT): CPT

## 2023-07-14 PROCEDURE — 85014 HEMATOCRIT: CPT

## 2023-07-14 PROCEDURE — 84100 ASSAY OF PHOSPHORUS: CPT

## 2023-07-14 PROCEDURE — 85027 COMPLETE CBC AUTOMATED: CPT

## 2023-07-14 PROCEDURE — 83930 ASSAY OF BLOOD OSMOLALITY: CPT

## 2023-07-14 PROCEDURE — 70496 CT ANGIOGRAPHY HEAD: CPT | Mod: ME

## 2023-07-14 PROCEDURE — 87086 URINE CULTURE/COLONY COUNT: CPT

## 2023-07-14 PROCEDURE — 84300 ASSAY OF URINE SODIUM: CPT

## 2023-07-14 PROCEDURE — 84484 ASSAY OF TROPONIN QUANT: CPT

## 2023-07-14 PROCEDURE — 80048 BASIC METABOLIC PNL TOTAL CA: CPT

## 2023-07-14 PROCEDURE — 99285 EMERGENCY DEPT VISIT HI MDM: CPT | Mod: 25

## 2023-07-14 PROCEDURE — 82570 ASSAY OF URINE CREATININE: CPT

## 2023-07-14 PROCEDURE — 84466 ASSAY OF TRANSFERRIN: CPT

## 2023-07-14 PROCEDURE — 85730 THROMBOPLASTIN TIME PARTIAL: CPT

## 2023-07-14 PROCEDURE — 71045 X-RAY EXAM CHEST 1 VIEW: CPT

## 2023-07-14 PROCEDURE — 85045 AUTOMATED RETICULOCYTE COUNT: CPT

## 2023-07-14 PROCEDURE — 80307 DRUG TEST PRSMV CHEM ANLYZR: CPT

## 2023-07-14 PROCEDURE — 83935 ASSAY OF URINE OSMOLALITY: CPT

## 2023-07-14 PROCEDURE — 36415 COLL VENOUS BLD VENIPUNCTURE: CPT

## 2023-07-14 PROCEDURE — G1004: CPT

## 2023-07-14 PROCEDURE — 93005 ELECTROCARDIOGRAM TRACING: CPT

## 2023-07-14 PROCEDURE — 84443 ASSAY THYROID STIM HORMONE: CPT

## 2023-07-14 PROCEDURE — 85025 COMPLETE CBC W/AUTO DIFF WBC: CPT

## 2023-07-14 PROCEDURE — 83540 ASSAY OF IRON: CPT

## 2023-07-14 PROCEDURE — 80076 HEPATIC FUNCTION PANEL: CPT

## 2023-07-14 PROCEDURE — 99239 HOSP IP/OBS DSCHRG MGMT >30: CPT

## 2023-07-14 PROCEDURE — 83735 ASSAY OF MAGNESIUM: CPT

## 2023-07-14 PROCEDURE — 84540 ASSAY OF URINE/UREA-N: CPT

## 2023-07-14 RX ORDER — DESMOPRESSIN ACETATE 0.1 MG/1
4 TABLET ORAL
Qty: 240 | Refills: 0
Start: 2023-07-14 | End: 2023-08-12

## 2023-07-14 RX ORDER — SODIUM CHLORIDE 9 MG/ML
1000 INJECTION INTRAMUSCULAR; INTRAVENOUS; SUBCUTANEOUS
Refills: 0 | Status: DISCONTINUED | OUTPATIENT
Start: 2023-07-14 | End: 2023-07-14

## 2023-07-14 RX ORDER — DESMOPRESSIN ACETATE 0.1 MG/1
0.4 TABLET ORAL
Refills: 0 | Status: DISCONTINUED | OUTPATIENT
Start: 2023-07-14 | End: 2023-07-14

## 2023-07-14 RX ADMIN — DESMOPRESSIN ACETATE 0.3 MILLIGRAM(S): 0.1 TABLET ORAL at 05:45

## 2023-07-14 RX ADMIN — Medication 300 MILLIGRAM(S): at 11:22

## 2023-07-14 RX ADMIN — PANTOPRAZOLE SODIUM 40 MILLIGRAM(S): 20 TABLET, DELAYED RELEASE ORAL at 05:44

## 2023-07-14 RX ADMIN — Medication 1 MILLIGRAM(S): at 11:23

## 2023-07-14 RX ADMIN — LEVETIRACETAM 500 MILLIGRAM(S): 250 TABLET, FILM COATED ORAL at 05:45

## 2023-07-14 RX ADMIN — AMLODIPINE BESYLATE 10 MILLIGRAM(S): 2.5 TABLET ORAL at 05:45

## 2023-07-14 RX ADMIN — ENOXAPARIN SODIUM 40 MILLIGRAM(S): 100 INJECTION SUBCUTANEOUS at 05:44

## 2023-07-14 RX ADMIN — Medication 1 TABLET(S): at 11:22

## 2023-07-14 NOTE — PROGRESS NOTE ADULT - PROVIDER SPECIALTY LIST ADULT
Endocrinology
Hospitalist
Nephrology
Hospitalist
Nephrology
Endocrinology
Endocrinology
Hospitalist
Nephrology
Endocrinology
Hospitalist
Internal Medicine
Nephrology

## 2023-07-14 NOTE — PROGRESS NOTE ADULT - SUBJECTIVE AND OBJECTIVE BOX
Chief Complaint:  AMS     SUBJECTIVE / OVERNIGHT EVENTS:  No acute events reported overnight.  Pt is a poor historian but offers no acute complaints at this time and has been taking adequate po fluid intake.         I&O's Summary    23 Jun 2023 07:01  -  24 Jun 2023 07:00  --------------------------------------------------------  IN: 2650 mL / OUT: 350 mL / NET: 2300 mL          PHYSICAL EXAM:  Vital Signs Last 24 Hrs  T(C): 37.1 (28 Jun 2023 04:41), Max: 37.1 (28 Jun 2023 04:41)  T(F): 98.8 (28 Jun 2023 04:41), Max: 98.8 (28 Jun 2023 04:41)  HR: 95 (28 Jun 2023 04:41) (67 - 95)  BP: 132/88 (28 Jun 2023 04:41) (132/88 - 142/85)  BP(mean): --  RR: 18 (28 Jun 2023 04:41) (18 - 18)  SpO2: 97% (28 Jun 2023 04:41) (96% - 97%)    Parameters below as of 27 Jun 2023 12:21  Patient On (Oxygen Delivery Method): room air      GENERAL: pt examined bedside, laying comfortably in bed in NAD  HEENT: NC/AT, moist oral mucosa, clear conjunctiva, sclera nonicteric  RESPIRATORY: Normal respiratory effort, no wheezing, rhonchi, rales  CARDIOVASCULAR: RRR, normal S1 and S2  ABDOMEN: soft, NT/ND, +bowel sounds, no rebound/guarding  EXTREMITIES: No cynaosis, no clubbing, no lower extremity edema  NEUROLOGY: A+Ox2-3 but no focal neurologic deficits appreciated       LABS:                    06-28    148<H>  |  115<H>  |  43.1<H>  ----------------------------<  72  5.2   |  22.0  |  1.24    Ca    8.9      28 Jun 2023 06:32        Urinalysis Basic - ( 23 Jun 2023 05:45 )    Color: x / Appearance: x / SG: x / pH: x  Gluc: 67 mg/dL / Ketone: x  / Bili: x / Urobili: x   Blood: x / Protein: x / Nitrite: x   Leuk Esterase: x / RBC: x / WBC x   Sq Epi: x / Non Sq Epi: x / Bacteria: x        CAPILLARY BLOOD GLUCOSE            RADIOLOGY & ADDITIONAL TESTS:      < from: Xray Chest 1 View- PORTABLE-Urgent (06.07.23 @ 12:06) >  IMPRESSION: Heart enlargement again noted.    < end of copied text >      < from: CT Head No Cont (06.07.23 @ 12:56) >  IMPRESSION:    CT brain:  No acute intracranial hemorrhage, brain edema, or mass effect.  No displaced calvarial fracture.    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    CTA brain:  No flow-limiting stenosis or patent vascular aneurysm. No AVM.    CTA neck:  No flow-limiting stenosis, evidence for arterial dissection, or vascular   aneurysm.    < end of copied text >    < from: CT Angio Neck w/ IV Cont (06.07.23 @ 13:01) >  IMPRESSION:    CT brain:  No acute intracranial hemorrhage, brain edema, or mass effect.  No displaced calvarial fracture.    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    CTA brain:  No flow-limiting stenosis or patent vascular aneurysm. No AVM.    CTA neck:  No flow-limiting stenosis, evidence for arterial dissection, or vascular   aneurysm.    < end of copied text >        MEDICATIONS  (STANDING):  desmopressin 0.1 milliGRAM(s) Oral two times a day  enoxaparin Injectable 40 milliGRAM(s) SubCutaneous every 24 hours  folic acid 1 milliGRAM(s) Oral daily  levETIRAcetam 500 milliGRAM(s) Oral two times a day  multivitamin/minerals 1 Tablet(s) Oral daily  pantoprazole    Tablet 40 milliGRAM(s) Oral before breakfast    MEDICATIONS  (PRN):  acetaminophen     Tablet .. 650 milliGRAM(s) Oral every 6 hours PRN Temp greater or equal to 38C (100.4F), Mild Pain (1 - 3)  aluminum hydroxide/magnesium hydroxide/simethicone Suspension 30 milliLiter(s) Oral every 4 hours PRN Dyspepsia  ondansetron Injectable 4 milliGRAM(s) IV Push every 8 hours PRN Nausea and/or Vomiting                                  
  Re-evaluated pt  Serum Na dec noted change IVF to 1.5% 40cc/hr x 3 hrs then stop  Stop DDAVP  will follow up w 10 pm BMP
  seen for agitation, hyperNa    no acute complaints  hypothermic this am  ros negative     MEDICATIONS  (STANDING):  amLODIPine   Tablet 10 milliGRAM(s) Oral daily  cefTRIAXone Injectable. 1000 milliGRAM(s) IV Push every 24 hours  desmopressin 0.1 milliGRAM(s) Oral <User Schedule>  dextrose 5%. 1000 milliLiter(s) (175 mL/Hr) IV Continuous <Continuous>  enoxaparin Injectable 40 milliGRAM(s) SubCutaneous every 24 hours  folic acid 1 milliGRAM(s) Oral daily  levETIRAcetam 500 milliGRAM(s) Oral two times a day  multivitamin/minerals 1 Tablet(s) Oral daily  pantoprazole    Tablet 40 milliGRAM(s) Oral before breakfast    MEDICATIONS  (PRN):  acetaminophen     Tablet .. 650 milliGRAM(s) Oral every 6 hours PRN Temp greater or equal to 38C (100.4F), Mild Pain (1 - 3)  aluminum hydroxide/magnesium hydroxide/simethicone Suspension 30 milliLiter(s) Oral every 4 hours PRN Dyspepsia  ondansetron Injectable 4 milliGRAM(s) IV Push every 8 hours PRN Nausea and/or Vomiting      Allergies    No Known Allergies      Vital Signs Last 24 Hrs  T(C): 35.1 (2023 11:37), Max: 37.2 (2023 15:31)  T(F): 95.2 (2023 11:37), Max: 98.9 (2023 15:31)  HR: 85 (2023 11:37) (62 - 93)  BP: 123/87 (2023 11:37) (108/76 - 184/122)  BP(mean): --  RR: 18 (2023 11:37) (18 - 18)  SpO2: 96% (2023 11:37) (95% - 98%)    Parameters below as of 2023 11:37  Patient On (Oxygen Delivery Method): room air        PHYSICAL EXAM:    GENERAL: NAD  CHEST/LUNG: Clear to ausculation bilaterall  HEART: Regular rate and rhythm; S1 S2  ABDOMEN: Soft, Nontender,  Bowel sounds present  EXTREMITIES: no edema   NERVOUS SYSTEM:  awake, alert, confusion at times. nonfocal neuro    LABS:                        9.9    3.99  )-----------( 166      ( 2023 03:26 )             33.2     06-08    153<H>  |  119<H>  |  26.3<H>  ----------------------------<  99  4.3   |  24.0  |  1.00    Ca    8.9      2023 03:26  Mg     2.0     06-07    TPro  7.8  /  Alb  4.1  /  TBili  0.4  /  DBili  x   /  AST  22  /  ALT  25  /  AlkPhos  117  06-07    PT/INR - ( 2023 11:46 )   PT: 12.0 sec;   INR: 1.03 ratio         PTT - ( 2023 11:46 )  PTT:39.2 sec  Urinalysis Basic - ( 2023 05:50 )    Color: Yellow / Appearance: Clear / S.005 / pH: x  Gluc: x / Ketone: Negative  / Bili: Negative / Urobili: Negative mg/dL   Blood: x / Protein: Negative / Nitrite: Negative   Leuk Esterase: Negative / RBC: x / WBC x   Sq Epi: x / Non Sq Epi: x / Bacteria: x        CAPILLARY BLOOD GLUCOSE            RADIOLOGY & ADDITIONAL TESTS:  
[facility]      SUBJECTIVE / OVERNIGHT EVENTS:  Patient was pleasant, smiling, well appearing. States that she is doing well.    Patient denies chest pain, SOB, abd pain, N/V, fever, chills, dysuria or any other complaints. All remainder ROS negative.     MEDICATIONS  (STANDING):  amLODIPine   Tablet 10 milliGRAM(s) Oral daily  enoxaparin Injectable 40 milliGRAM(s) SubCutaneous every 24 hours  folic acid 1 milliGRAM(s) Oral daily  levETIRAcetam 500 milliGRAM(s) Oral two times a day  magnesium oxide 400 milliGRAM(s) Oral three times a day with meals  multivitamin/minerals 1 Tablet(s) Oral daily  pantoprazole    Tablet 40 milliGRAM(s) Oral before breakfast  sodium chloride 1.5%. 500 milliLiter(s) (40 mL/Hr) IV Continuous <Continuous>    MEDICATIONS  (PRN):  acetaminophen     Tablet .. 650 milliGRAM(s) Oral every 6 hours PRN Temp greater or equal to 38C (100.4F), Mild Pain (1 - 3)  aluminum hydroxide/magnesium hydroxide/simethicone Suspension 30 milliLiter(s) Oral every 4 hours PRN Dyspepsia  ondansetron Injectable 4 milliGRAM(s) IV Push every 8 hours PRN Nausea and/or Vomiting        I&O's Summary      PHYSICAL EXAM:  Vital Signs Last 24 Hrs  T(C): 36.7 (10 [place] 2023 11:00), Max: 37.1 (2023 16:17)  T(F): 98 (10 [place] 2023 11:00), Max: 98.8 (2023 16:17)  HR: 53 (10 [place] 2023 11:00) (53 - 84)  BP: 136/66 (10 [place] 2023 11:00) (136/66 - 164/103)  BP(mean): 106 (2023 16:17) (106 - 106)  RR: 18 (10 [place] 2023 11:00) (18 - 18)  SpO2: 97% (10 [place] 2023 11:00) (94% - 97%)    Parameters below as of 10 [place] 2023 11:00  Patient On (Oxygen Delivery Method): room air            CONSTITUTIONAL: NAD, appears stated age  ENMT: Moist oral mucosa, no pharyngeal injection or exudates; normal dentition  RESPIRATORY: Normal respiratory effort; clear to auscultation bilaterally  CARDIOVASCULAR: Regular rate and rhythm, normal S1 and S2, no murmur/rub/gallop; Peripheral pulses are 2+ bilaterally  ABDOMEN: Nontender to palpation, normoactive bowel sounds, no rebound/guarding;   MUSCLOSKELETAL:  No clubbing or cyanosis of digits; no joint swelling or tenderness to palpation  PSYCH: A+O to person, place, and time; affect appropriate  NEUROLOGY: CN 2-12 are intact and symmetric; no gross sensory deficits;   SKIN: No rashes; no palpable lesions    LABS:                        10.3   5.06  )-----------( 168      ( 10 [place] 2023 05:40 )             33.2     06-10    132<L>  |  97  |  14.3  ----------------------------<  83  4.5   |  24.0  |  0.86    Ca    9.2      10 [place] 2023 05:40  Phos  2.5     06-10  Mg     1.7     06-10            Urinalysis Basic - ( 2023 05:50 )    Color: Yellow / Appearance: Clear / S.005 / pH: x  Gluc: x / Ketone: Negative  / Bili: Negative / Urobili: Negative mg/dL   Blood: x / Protein: Negative / Nitrite: Negative   Leuk Esterase: Negative / RBC: x / WBC x   Sq Epi: x / Non Sq Epi: x / Bacteria: x        Culture - Urine (collected 2023 05:50)  Source: Clean Catch Clean Catch (Midstream)  Final Report (10 [place] 2023 12:17):    <10,000 CFU/mL Normal Urogenital Johanna      CAPILLARY BLOOD GLUCOSE            RADIOLOGY & ADDITIONAL TESTS:  Results Reviewed:   Imaging Personally Reviewed:  Electrocardiogram Personally Reviewed:                                          
Hyperosmolality with hypernatremia    HPI:  57y/oF PMH SAH s/p Brain aneurysm clippingx 2, DI, Seizures 2/2 hypo or  hypernatremia was BIBA after she was was reported to have wandered off. pt called police " I got lost". In ER, She was found to be altered, confused, daughter reported issues with sodium in past. Labs significant for Na 152. She has significant h/o PICC line hydration at home  for hypernatremia. her PICC line came out in May 2023. Patient is forgetful, Unable to provide much history thinks she has been doing well at home, denies any fevers, diarrhea.   Called daughter to get collateral information- no response, left voicemail. med hx obtained from Dr Garcia  (07 Jun 2023 15:44)    Interval History:  Patient was seen and examined at bedside around 10 am.  Doing well.   No active complaints.   Denies headache, dizziness, visual symptoms, nausea, vomiting, abdominal / chest pain, palpitations or shortness of breath.    ROS:  As per interval history otherwise unremarkable.    PHYSICAL EXAM:  Vital Signs   T(C): 36.5 (04 Jul 2023 11:23), Max: 36.5 (04 Jul 2023 04:22)  T(F): 97.7 (04 Jul 2023 11:23), Max: 97.7 (04 Jul 2023 04:22)  HR: 77 (04 Jul 2023 11:23) (68 - 87)  BP: 115/73 (04 Jul 2023 11:23) (115/73 - 144/84)  RR: 18 (04 Jul 2023 11:23) (18 - 18)  SpO2: 96% (04 Jul 2023 11:23) (96% - 97%)  Parameters below as of 04 Jul 2023 11:23  Patient On (Oxygen Delivery Method): room air  General: Middle aged female sitting in bed comfortably. No acute distress  HEENT: EOMI. Clear conjunctivae. Moist mucus membrane  Neck: Supple.   Chest: CTA bilaterally. No wheezing, rales or rhonchi.   Heart: Normal S1 & S2. RRR.   Abdomen: Obese. Soft. Non-tender. + BS  Ext: No pedal edema. No calf tenderness   Neuro: AAO x 3. No focal deficit. No speech disorder  Skin: Warm and Dry  Psychiatry: Normal mood and affect    LABS:    07-04    146<H>  |  113<H>  |  29.5<H>  ----------------------------<  81  5.6<H>   |  21.0<L>  |  1.13    Ca    9.2      04 Jul 2023 10:30    RADIOLOGY & ADDITIONAL STUDIES:  Reviewed     MEDICATIONS  (STANDING):  amLODIPine   Tablet 10 milliGRAM(s) Oral daily  desmopressin 0.1 milliGRAM(s) Oral two times a day  enoxaparin Injectable 40 milliGRAM(s) SubCutaneous every 24 hours  ferrous    sulfate Liquid 300 milliGRAM(s) Oral daily  folic acid 1 milliGRAM(s) Oral daily  levETIRAcetam 500 milliGRAM(s) Oral two times a day  multivitamin/minerals 1 Tablet(s) Oral daily  pantoprazole    Tablet 40 milliGRAM(s) Oral before breakfast  sodium bicarbonate 650 milliGRAM(s) Oral two times a day    MEDICATIONS  (PRN):  acetaminophen     Tablet .. 650 milliGRAM(s) Oral every 6 hours PRN Temp greater or equal to 38C (100.4F), Mild Pain (1 - 3)  aluminum hydroxide/magnesium hydroxide/simethicone Suspension 30 milliLiter(s) Oral every 4 hours PRN Dyspepsia  ondansetron Injectable 4 milliGRAM(s) IV Push every 8 hours PRN Nausea and/or Vomiting          
Hyperosmolality with hypernatremia    HPI:  57y/oF PMH SAH s/p Brain aneurysm clippingx 2, DI, Seizures 2/2 hypo or  hypernatremia was BIBA after she was was reported to have wandered off. pt called police " I got lost". In ER, She was found to be altered, confused, daughter reported issues with sodium in past. Labs significant for Na 152. She has significant h/o PICC line hydration at home  for hypernatremia. her PICC line came out in May 2023. Patient is forgetful, Unable to provide much history thinks she has been doing well at home, denies any fevers, diarrhea.   Called daughter to get collateral information- no response, left voicemail. med hx obtained from Dr Garcia  (07 Jun 2023 15:44)    Interval History:  Patient was seen and examined at bedside around 10:45 am.  Doing well.   No active complaints.   Denies headache, dizziness, visual symptoms, nausea, vomiting, abdominal / chest pain, palpitations or shortness of breath.    ROS:  As per interval history otherwise unremarkable.    PHYSICAL EXAM:  Vital Signs   T(C): 36.4 (06 Jul 2023 16:38), Max: 36.5 (06 Jul 2023 04:56)  T(F): 97.5 (06 Jul 2023 16:38), Max: 97.7 (06 Jul 2023 04:56)  HR: 68 (06 Jul 2023 16:38) (68 - 90)  BP: 135/84 (06 Jul 2023 16:38) (115/77 - 141/89)  RR: 18 (06 Jul 2023 16:38) (16 - 18)  SpO2: 97% (06 Jul 2023 16:38) (92% - 97%)  Parameters below as of 06 Jul 2023 16:38  Patient On (Oxygen Delivery Method): room air  General: Middle aged female sitting in chair comfortably. No acute distress  HEENT: EOMI. Clear conjunctivae. Moist mucus membrane  Neck: Supple.   Chest: CTA bilaterally. No wheezing, rales or rhonchi.   Heart: Normal S1 & S2. RRR.   Abdomen: Obese. Soft. Non-tender. + BS  Ext: Lymphedema. No calf tenderness   Neuro: AAO x 3. No focal deficit. No speech disorder  Skin: Warm and Dry  Psychiatry: Normal mood and affect    I&O's Summary    05 Jul 2023 07:01  -  06 Jul 2023 07:00  --------------------------------------------------------  IN: 2800 mL / OUT: 650 mL / NET: 2150 mL    06 Jul 2023 07:01  -  06 Jul 2023 16:42  --------------------------------------------------------  IN: 750 mL / OUT: 250 mL / NET: 500 mL    LABS:                        9.6    3.59  )-----------( 216      ( 05 Jul 2023 05:50 )             32.6     07-06    147<H>  |  114<H>  |  38.1<H>  ----------------------------<  70  4.6   |  25.0  |  1.06    Ca    8.8      06 Jul 2023 06:20  Mg     2.2     07-05    RADIOLOGY & ADDITIONAL STUDIES:  Reviewed     MEDICATIONS  (STANDING):  amLODIPine   Tablet 10 milliGRAM(s) Oral daily  desmopressin 0.3 milliGRAM(s) Oral two times a day  enoxaparin Injectable 40 milliGRAM(s) SubCutaneous every 24 hours  ferrous    sulfate Liquid 300 milliGRAM(s) Oral daily  folic acid 1 milliGRAM(s) Oral daily  levETIRAcetam 500 milliGRAM(s) Oral two times a day  multivitamin/minerals 1 Tablet(s) Oral daily  pantoprazole    Tablet 40 milliGRAM(s) Oral before breakfast    MEDICATIONS  (PRN):  acetaminophen     Tablet .. 650 milliGRAM(s) Oral every 6 hours PRN Temp greater or equal to 38C (100.4F), Mild Pain (1 - 3)  aluminum hydroxide/magnesium hydroxide/simethicone Suspension 30 milliLiter(s) Oral every 4 hours PRN Dyspepsia  ondansetron Injectable 4 milliGRAM(s) IV Push every 8 hours PRN Nausea and/or Vomiting              
INTERVAL HPI/OVERNIGHT EVENTS:    CC: hypernatremia, h/o brain aneurysm, DI    No overnight events  denies complaints    Vital Signs Last 24 Hrs  T(C): 36.7 (18 Jun 2023 11:17), Max: 37 (17 Jun 2023 15:38)  T(F): 98 (18 Jun 2023 11:17), Max: 98.6 (17 Jun 2023 15:38)  HR: 76 (18 Jun 2023 11:17) (76 - 95)  BP: 129/81 (18 Jun 2023 11:17) (107/70 - 129/81)  BP(mean): --  RR: 18 (18 Jun 2023 11:17) (18 - 18)  SpO2: 96% (18 Jun 2023 11:17) (93% - 97%)    Parameters below as of 18 Jun 2023 11:17  Patient On (Oxygen Delivery Method): room air        PHYSICAL EXAM:    GENERAL: alert, not in distress  CHEST/LUNG: b/l air entry  HEART: reg  ABDOMEN: soft, bs+  EXTREMITIES:  no edema, tenderness    MEDICATIONS  (STANDING):  desmopressin 0.1 milliGRAM(s) Oral two times a day  enoxaparin Injectable 40 milliGRAM(s) SubCutaneous every 24 hours  folic acid 1 milliGRAM(s) Oral daily  hydrochlorothiazide 12.5 milliGRAM(s) Oral daily  levETIRAcetam 500 milliGRAM(s) Oral two times a day  multivitamin/minerals 1 Tablet(s) Oral daily  pantoprazole    Tablet 40 milliGRAM(s) Oral before breakfast  sodium zirconium cyclosilicate 10 Gram(s) Oral once    MEDICATIONS  (PRN):  acetaminophen     Tablet .. 650 milliGRAM(s) Oral every 6 hours PRN Temp greater or equal to 38C (100.4F), Mild Pain (1 - 3)  aluminum hydroxide/magnesium hydroxide/simethicone Suspension 30 milliLiter(s) Oral every 4 hours PRN Dyspepsia  ondansetron Injectable 4 milliGRAM(s) IV Push every 8 hours PRN Nausea and/or Vomiting      Allergies    No Known Allergies    Intolerances          LABS:                          9.8    4.68  )-----------( 204      ( 18 Jun 2023 09:22 )             32.9     06-18    149<H>  |  113<H>  |  39.6<H>  ----------------------------<  84  5.5<H>   |  24.0  |  1.58<H>    Ca    8.9      18 Jun 2023 09:22  Phos  4.0     06-18  Mg     2.4     06-18    TPro  6.8  /  Alb  3.6  /  TBili  0.2<L>  /  DBili  x   /  AST  40<H>  /  ALT  73<H>  /  AlkPhos  170<H>  06-17          RADIOLOGY & ADDITIONAL TESTS:  
INTERVAL HPI/OVERNIGHT EVENTS:    CC: hypernatremia, h/o brain aneurysm, DI    No overnight events  denies complaints  she was reluctant to have PICC placed again yesterday  now agreeable  sodium 152 today. Had about 2L yesterday    Vital Signs Last 24 Hrs  T(C): 36.5 (20 Jun 2023 11:31), Max: 37.6 (19 Jun 2023 18:15)  T(F): 97.7 (20 Jun 2023 11:31), Max: 99.6 (19 Jun 2023 18:15)  HR: 64 (20 Jun 2023 11:31) (64 - 87)  BP: 139/98 (20 Jun 2023 11:31) (114/80 - 139/98)  BP(mean): --  RR: 18 (20 Jun 2023 11:31) (18 - 18)  SpO2: 97% (20 Jun 2023 11:31) (95% - 97%)    Parameters below as of 20 Jun 2023 11:31  Patient On (Oxygen Delivery Method): room air        PHYSICAL EXAM:    GENERAL: alert, not in distress  CHEST/LUNG: b/l air entry  HEART: reg  ABDOMEN: soft, bs+  EXTREMITIES:  no edema, tenderness    MEDICATIONS  (STANDING):  desmopressin 0.1 milliGRAM(s) Oral two times a day  enoxaparin Injectable 40 milliGRAM(s) SubCutaneous every 24 hours  folic acid 1 milliGRAM(s) Oral daily  hydrochlorothiazide 12.5 milliGRAM(s) Oral daily  levETIRAcetam 500 milliGRAM(s) Oral two times a day  multivitamin/minerals 1 Tablet(s) Oral daily  pantoprazole    Tablet 40 milliGRAM(s) Oral before breakfast    MEDICATIONS  (PRN):  acetaminophen     Tablet .. 650 milliGRAM(s) Oral every 6 hours PRN Temp greater or equal to 38C (100.4F), Mild Pain (1 - 3)  aluminum hydroxide/magnesium hydroxide/simethicone Suspension 30 milliLiter(s) Oral every 4 hours PRN Dyspepsia  ondansetron Injectable 4 milliGRAM(s) IV Push every 8 hours PRN Nausea and/or Vomiting      Allergies    No Known Allergies    Intolerances          LABS:                          9.6    4.89  )-----------( 198      ( 20 Jun 2023 05:44 )             32.1     06-20    152<H>  |  117<H>  |  42.7<H>  ----------------------------<  76  4.7   |  22.0  |  1.29    Ca    8.9      20 Jun 2023 05:44  Phos  4.2     06-20  Mg     2.3     06-20            RADIOLOGY & ADDITIONAL TESTS:  
INTERVAL HPI/OVERNIGHT EVENTS:    CC: hypernatremia, h/o brain aneurysm, DI    No overnight events  denies complaints.     Vital Signs Last 24 Hrs  T(C): 36.4 (23 Jun 2023 11:10), Max: 36.6 (22 Jun 2023 17:49)  T(F): 97.6 (23 Jun 2023 11:10), Max: 97.8 (22 Jun 2023 17:49)  HR: 100 (23 Jun 2023 11:10) (75 - 100)  BP: 145/91 (23 Jun 2023 11:10) (127/83 - 145/91)  BP(mean): --  RR: 20 (23 Jun 2023 11:10) (19 - 20)  SpO2: 97% (23 Jun 2023 11:10) (92% - 97%)        PHYSICAL EXAM:    GENERAL: alert, not in distress  CHEST/LUNG: b/l air entry  HEART: reg  ABDOMEN: soft, bs+  EXTREMITIES:  no edema, tenderness    MEDICATIONS  (STANDING):  desmopressin 0.1 milliGRAM(s) Oral two times a day  enoxaparin Injectable 40 milliGRAM(s) SubCutaneous every 24 hours  folic acid 1 milliGRAM(s) Oral daily  levETIRAcetam 500 milliGRAM(s) Oral two times a day  multivitamin/minerals 1 Tablet(s) Oral daily  pantoprazole    Tablet 40 milliGRAM(s) Oral before breakfast    MEDICATIONS  (PRN):  acetaminophen     Tablet .. 650 milliGRAM(s) Oral every 6 hours PRN Temp greater or equal to 38C (100.4F), Mild Pain (1 - 3)  aluminum hydroxide/magnesium hydroxide/simethicone Suspension 30 milliLiter(s) Oral every 4 hours PRN Dyspepsia  ondansetron Injectable 4 milliGRAM(s) IV Push every 8 hours PRN Nausea and/or Vomiting      Allergies    No Known Allergies    Intolerances          LABS:      06-23    147<H>  |  112<H>  |  41.2<H>  ----------------------------<  67<L>  4.8   |  21.0<L>  |  1.16    Ca    9.0      23 Jun 2023 05:45  Phos  3.9     06-23  Mg     2.3     06-23        Urinalysis Basic - ( 23 Jun 2023 05:45 )    Color: x / Appearance: x / SG: x / pH: x  Gluc: 67 mg/dL / Ketone: x  / Bili: x / Urobili: x   Blood: x / Protein: x / Nitrite: x   Leuk Esterase: x / RBC: x / WBC x   Sq Epi: x / Non Sq Epi: x / Bacteria: x        RADIOLOGY & ADDITIONAL TESTS:  
Kerline Simmons M.D.    Patient is a 57y old  Female who presents with a chief complaint of altered mental state (08 Jul 2023 10:08)      SUBJECTIVE / OVERNIGHT EVENTS: No concerns. Feels well.     Patient denies chest pain, SOB, abd pain, N/V, fever, chills, dysuria or any other complaints. All remainder ROS negative.     MEDICATIONS  (STANDING):  amLODIPine   Tablet 10 milliGRAM(s) Oral daily  desmopressin 0.3 milliGRAM(s) Oral two times a day  enoxaparin Injectable 40 milliGRAM(s) SubCutaneous every 24 hours  ferrous    sulfate Liquid 300 milliGRAM(s) Oral daily  folic acid 1 milliGRAM(s) Oral daily  levETIRAcetam 500 milliGRAM(s) Oral two times a day  multivitamin/minerals 1 Tablet(s) Oral daily  pantoprazole    Tablet 40 milliGRAM(s) Oral before breakfast    MEDICATIONS  (PRN):  acetaminophen     Tablet .. 650 milliGRAM(s) Oral every 6 hours PRN Temp greater or equal to 38C (100.4F), Mild Pain (1 - 3)  aluminum hydroxide/magnesium hydroxide/simethicone Suspension 30 milliLiter(s) Oral every 4 hours PRN Dyspepsia  ondansetron Injectable 4 milliGRAM(s) IV Push every 8 hours PRN Nausea and/or Vomiting      I&O's Summary    07 Jul 2023 07:01  -  08 Jul 2023 07:00  --------------------------------------------------------  IN: 2100 mL / OUT: 550 mL / NET: 1550 mL    08 Jul 2023 07:01  -  08 Jul 2023 10:46  --------------------------------------------------------  IN: 325 mL / OUT: 215 mL / NET: 110 mL        PHYSICAL EXAM:  Vital Signs Last 24 Hrs  T(C): 36.4 (08 Jul 2023 04:44), Max: 36.4 (07 Jul 2023 11:47)  T(F): 97.5 (08 Jul 2023 04:44), Max: 97.5 (07 Jul 2023 11:47)  HR: 77 (08 Jul 2023 04:44) (63 - 77)  BP: 167/90 (08 Jul 2023 04:44) (118/82 - 167/90)  BP(mean): --  RR: 18 (08 Jul 2023 04:44) (18 - 18)  SpO2: 96% (08 Jul 2023 04:44) (95% - 97%)    Parameters below as of 08 Jul 2023 04:44  Patient On (Oxygen Delivery Method): room air      CONSTITUTIONAL: NAD, well-groomed  ENMT: Moist oral mucosa, no pharyngeal injection or exudates; normal dentition  RESPIRATORY: Normal respiratory effort; lungs are clear to auscultation bilaterally  CARDIOVASCULAR: Regular rate and rhythm; No lower extremity edema  ABDOMEN: Nontender to palpation, normoactive bowel sounds  PSYCH: A+O x3; affect appropriate  NEUROLOGY: CN 2-12 are intact and symmetric; no gross sensory deficits;   SKIN: No rashes; no palpable lesions    LABS:    07-08    146<H>  |  114<H>  |  37.0<H>  ----------------------------<  62<L>  5.0   |  21.0<L>  |  1.12    Ca    9.4      08 Jul 2023 05:45            Urinalysis Basic - ( 08 Jul 2023 05:45 )    Color: x / Appearance: x / SG: x / pH: x  Gluc: 62 mg/dL / Ketone: x  / Bili: x / Urobili: x   Blood: x / Protein: x / Nitrite: x   Leuk Esterase: x / RBC: x / WBC x   Sq Epi: x / Non Sq Epi: x / Bacteria: x        CAPILLARY BLOOD GLUCOSE          RADIOLOGY & ADDITIONAL TESTS:  Results Reviewed:   Imaging Personally Reviewed:  Electrocardiogram Personally Reviewed:
Kerline Simmons M.D.    Patient is a 57y old  Female who presents with a chief complaint of altered mental state (11 Jul 2023 11:49)      SUBJECTIVE / OVERNIGHT EVENTS: lost IV access overnight.     Patient denies chest pain, SOB, abd pain, N/V, fever, chills, dysuria or any other complaints. All remainder ROS negative.     MEDICATIONS  (STANDING):  amLODIPine   Tablet 10 milliGRAM(s) Oral daily  desmopressin 0.3 milliGRAM(s) Oral two times a day  enoxaparin Injectable 40 milliGRAM(s) SubCutaneous every 24 hours  ferrous    sulfate Liquid 300 milliGRAM(s) Oral daily  folic acid 1 milliGRAM(s) Oral daily  levETIRAcetam 500 milliGRAM(s) Oral two times a day  multivitamin/minerals 1 Tablet(s) Oral daily  pantoprazole    Tablet 40 milliGRAM(s) Oral before breakfast  sodium chloride 0.225%. 1000 milliLiter(s) (50 mL/Hr) IV Continuous <Continuous>  sodium chloride 0.225%. 1000 milliLiter(s) (50 mL/Hr) IV Continuous <Continuous>    MEDICATIONS  (PRN):  acetaminophen     Tablet .. 650 milliGRAM(s) Oral every 6 hours PRN Temp greater or equal to 38C (100.4F), Mild Pain (1 - 3)  aluminum hydroxide/magnesium hydroxide/simethicone Suspension 30 milliLiter(s) Oral every 4 hours PRN Dyspepsia  ondansetron Injectable 4 milliGRAM(s) IV Push every 8 hours PRN Nausea and/or Vomiting      I&O's Summary    11 Jul 2023 07:01  -  12 Jul 2023 07:00  --------------------------------------------------------  IN: 2060 mL / OUT: 0 mL / NET: 2060 mL        PHYSICAL EXAM:  Vital Signs Last 24 Hrs  T(C): 36.4 (12 Jul 2023 05:12), Max: 36.4 (12 Jul 2023 05:12)  T(F): 97.6 (12 Jul 2023 05:12), Max: 97.6 (12 Jul 2023 05:12)  HR: 80 (12 Jul 2023 05:12) (76 - 80)  BP: 150/92 (12 Jul 2023 05:12) (109/56 - 150/92)  BP(mean): --  RR: 18 (12 Jul 2023 05:12) (18 - 18)  SpO2: 97% (12 Jul 2023 05:12) (96% - 97%)    Parameters below as of 11 Jul 2023 11:31  Patient On (Oxygen Delivery Method): room air      CONSTITUTIONAL: NAD, well-groomed  ENMT: Moist oral mucosa, no pharyngeal injection or exudates; normal dentition  RESPIRATORY: Normal respiratory effort; lungs are clear to auscultation bilaterally  CARDIOVASCULAR: Regular rate and rhythm; No lower extremity edema  ABDOMEN: Nontender to palpation, normoactive bowel sounds  PSYCH: A+O x3; affect appropriate  NEUROLOGY: CN 2-12 are intact and symmetric; no gross sensory deficits;   SKIN: No rashes; no palpable lesions    LABS:    07-12    153<H>  |  120<H>  |  50.5<H>  ----------------------------<  65<L>  5.2   |  20.0<L>  |  1.09    Ca    9.5      12 Jul 2023 06:11  Phos  3.6     07-11  Mg     2.3     07-11            Urinalysis Basic - ( 12 Jul 2023 06:11 )    Color: x / Appearance: x / SG: x / pH: x  Gluc: 65 mg/dL / Ketone: x  / Bili: x / Urobili: x   Blood: x / Protein: x / Nitrite: x   Leuk Esterase: x / RBC: x / WBC x   Sq Epi: x / Non Sq Epi: x / Bacteria: x        CAPILLARY BLOOD GLUCOSE          RADIOLOGY & ADDITIONAL TESTS:  Results Reviewed:   Imaging Personally Reviewed:  Electrocardiogram Personally Reviewed:
Kerline Simmons M.D.    Patient is a 57y old  Female who presents with a chief complaint of altered mental state (12 Jul 2023 21:38)      SUBJECTIVE / OVERNIGHT EVENTS: no event overnight.     Patient denies chest pain, SOB, abd pain, N/V, fever, chills, dysuria or any other complaints. All remainder ROS negative.     MEDICATIONS  (STANDING):  amLODIPine   Tablet 10 milliGRAM(s) Oral daily  desmopressin 0.3 milliGRAM(s) Oral two times a day  enoxaparin Injectable 40 milliGRAM(s) SubCutaneous every 24 hours  ferrous    sulfate Liquid 300 milliGRAM(s) Oral daily  folic acid 1 milliGRAM(s) Oral daily  levETIRAcetam 500 milliGRAM(s) Oral two times a day  multivitamin/minerals 1 Tablet(s) Oral daily  pantoprazole    Tablet 40 milliGRAM(s) Oral before breakfast  sodium chloride 0.225%. 1000 milliLiter(s) (50 mL/Hr) IV Continuous <Continuous>    MEDICATIONS  (PRN):  acetaminophen     Tablet .. 650 milliGRAM(s) Oral every 6 hours PRN Temp greater or equal to 38C (100.4F), Mild Pain (1 - 3)  aluminum hydroxide/magnesium hydroxide/simethicone Suspension 30 milliLiter(s) Oral every 4 hours PRN Dyspepsia  ondansetron Injectable 4 milliGRAM(s) IV Push every 8 hours PRN Nausea and/or Vomiting      I&O's Summary    12 Jul 2023 07:01  -  13 Jul 2023 07:00  --------------------------------------------------------  IN: 1537 mL / OUT: 300 mL / NET: 1237 mL    13 Jul 2023 07:01  -  13 Jul 2023 10:37  --------------------------------------------------------  IN: 400 mL / OUT: 0 mL / NET: 400 mL        PHYSICAL EXAM:  Vital Signs Last 24 Hrs  T(C): 36.3 (13 Jul 2023 04:40), Max: 37.1 (12 Jul 2023 18:00)  T(F): 97.3 (13 Jul 2023 04:40), Max: 98.7 (12 Jul 2023 18:00)  HR: 78 (13 Jul 2023 04:40) (76 - 78)  BP: 133/86 (13 Jul 2023 04:40) (105/55 - 133/86)  BP(mean): --  RR: 18 (13 Jul 2023 04:40) (18 - 19)  SpO2: 96% (13 Jul 2023 04:40) (96% - 96%)    CONSTITUTIONAL: NAD, well-groomed  ENMT: Moist oral mucosa, no pharyngeal injection or exudates; normal dentition  RESPIRATORY: Normal respiratory effort; lungs are clear to auscultation bilaterally  CARDIOVASCULAR: Regular rate and rhythm; No lower extremity edema  ABDOMEN: Nontender to palpation, normoactive bowel sounds  PSYCH: A+O x3; affect appropriate  NEUROLOGY: CN 2-12 are intact and symmetric; no gross sensory deficits;   SKIN: No rashes; no palpable lesions    LABS:    07-13    151<H>  |  118<H>  |  50.0<H>  ----------------------------<  70  4.6   |  22.0  |  1.17    Ca    9.2      13 Jul 2023 05:36  Phos  3.6     07-11  Mg     2.3     07-11            Urinalysis Basic - ( 13 Jul 2023 05:36 )    Color: x / Appearance: x / SG: x / pH: x  Gluc: 70 mg/dL / Ketone: x  / Bili: x / Urobili: x   Blood: x / Protein: x / Nitrite: x   Leuk Esterase: x / RBC: x / WBC x   Sq Epi: x / Non Sq Epi: x / Bacteria: x        CAPILLARY BLOOD GLUCOSE          RADIOLOGY & ADDITIONAL TESTS:  Results Reviewed:   Imaging Personally Reviewed:  Electrocardiogram Personally Reviewed:
[facility] Division of Hospital Medicine      SUBJECTIVE / OVERNIGHT EVENTS:  Pleasant    Patient denies chest pain, SOB, abd pain, N/V, fever, chills, dysuria or any other complaints. All remainder ROS negative.     MEDICATIONS  (STANDING):  enoxaparin Injectable 40 milliGRAM(s) SubCutaneous every 24 hours  folic acid 1 milliGRAM(s) Oral daily  levETIRAcetam 500 milliGRAM(s) Oral two times a day  multivitamin/minerals 1 Tablet(s) Oral daily  pantoprazole    Tablet 40 milliGRAM(s) Oral before breakfast  sodium chloride 1.5%. 500 milliLiter(s) (40 mL/Hr) IV Continuous <Continuous>    MEDICATIONS  (PRN):  acetaminophen     Tablet .. 650 milliGRAM(s) Oral every 6 hours PRN Temp greater or equal to 38C (100.4F), Mild Pain (1 - 3)  aluminum hydroxide/magnesium hydroxide/simethicone Suspension 30 milliLiter(s) Oral every 4 hours PRN Dyspepsia  ondansetron Injectable 4 milliGRAM(s) IV Push every 8 hours PRN Nausea and/or Vomiting        I&O's Summary      PHYSICAL EXAM:  Vital Signs Last 24 Hrs  T(C): 36.4 (12 Jun 2023 10:44), Max: 36.7 (11 Jun 2023 11:12)  T(F): 97.6 (12 Jun 2023 10:44), Max: 98.1 (11 Jun 2023 11:12)  HR: 83 (12 Jun 2023 10:44) (83 - 89)  BP: 120/80 (12 Jun 2023 10:44) (104/73 - 149/85)  BP(mean): --  RR: 18 (12 Jun 2023 10:44) (18 - 20)  SpO2: 95% (12 Jun 2023 10:44) (95% - 98%)    Parameters below as of 12 Jun 2023 10:44  Patient On (Oxygen Delivery Method): room air            CONSTITUTIONAL: NAD, appears stated age  ENMT: Moist oral mucosa, no pharyngeal injection or exudates; normal dentition  RESPIRATORY: Normal respiratory effort; clear to auscultation bilaterally  CARDIOVASCULAR: Regular rate and rhythm, normal S1 and S2, no murmur/rub/gallop; Peripheral pulses are 2+ bilaterally  ABDOMEN: Nontender to palpation, normoactive bowel sounds, no rebound/guarding;   MUSCLOSKELETAL:  No clubbing or cyanosis of digits; no joint swelling or tenderness to palpation  PSYCH: A+O to person, place, and time; affect appropriate  NEUROLOGY: CN 2-12 are intact and symmetric; no gross sensory deficits;   SKIN: No rashes; no palpable lesions    LABS:                        10.0   5.26  )-----------( 190      ( 12 Jun 2023 06:08 )             32.6     06-12    144  |  109<H>  |  52.2<H>  ----------------------------<  63<L>  4.5   |  23.0  |  1.67<H>    Ca    8.7      12 Jun 2023 06:08  Phos  3.6     06-12  Mg     3.3     06-12                CAPILLARY BLOOD GLUCOSE            RADIOLOGY & ADDITIONAL TESTS:  Results Reviewed:   Imaging Personally Reviewed:  Electrocardiogram Personally Reviewed:                                          
NEPHROLOGY INTERVAL HPI/OVERNIGHT EVENTS:    Examined  Feeling better   No distress  Denies HA CP no SOB    MEDICATIONS  (STANDING):  desmopressin 0.1 milliGRAM(s) Oral two times a day  enoxaparin Injectable 40 milliGRAM(s) SubCutaneous every 24 hours  ferrous    sulfate Liquid 300 milliGRAM(s) Oral daily  folic acid 1 milliGRAM(s) Oral daily  levETIRAcetam 500 milliGRAM(s) Oral two times a day  multivitamin/minerals 1 Tablet(s) Oral daily  pantoprazole    Tablet 40 milliGRAM(s) Oral before breakfast  sodium bicarbonate 1300 milliGRAM(s) Oral two times a day    MEDICATIONS  (PRN):  acetaminophen     Tablet .. 650 milliGRAM(s) Oral every 6 hours PRN Temp greater or equal to 38C (100.4F), Mild Pain (1 - 3)  aluminum hydroxide/magnesium hydroxide/simethicone Suspension 30 milliLiter(s) Oral every 4 hours PRN Dyspepsia  ondansetron Injectable 4 milliGRAM(s) IV Push every 8 hours PRN Nausea and/or Vomiting      Allergies    No Known Allergies    Intolerances        Vital Signs Last 24 Hrs  T(C): 36.5 (30 Jun 2023 11:30), Max: 36.5 (29 Jun 2023 21:56)  T(F): 97.7 (30 Jun 2023 11:30), Max: 97.7 (29 Jun 2023 21:56)  HR: 81 (30 Jun 2023 11:30) (73 - 81)  BP: 148/84 (30 Jun 2023 13:21) (134/90 - 169/111)  BP(mean): --  RR: 18 (30 Jun 2023 11:30) (16 - 18)  SpO2: 96% (30 Jun 2023 11:30) (96% - 97%)    Parameters below as of 30 Jun 2023 11:30  Patient On (Oxygen Delivery Method): room air      PHYSICAL EXAM:  GENERAL: NAD  HEENT: NCAT  NECK: Supple  NERVOUS SYSTEM: Alert & Oriented X3  CHEST/LUNG: Clear  bilaterally  HEART: Regular rate and rhythm  ABDOMEN: Soft, Nontender, +BS  EXTREMITIES: no edema      LABS:    06-30    150<H>  |  114<H>  |  31.0<H>  ----------------------------<  75  4.7   |  25.0  |  1.11    Ca    9.2      30 Jun 2023 06:42        Urinalysis Basic - ( 30 Jun 2023 06:42 )    Color: x / Appearance: x / SG: x / pH: x  Gluc: 75 mg/dL / Ketone: x  / Bili: x / Urobili: x   Blood: x / Protein: x / Nitrite: x   Leuk Esterase: x / RBC: x / WBC x   Sq Epi: x / Non Sq Epi: x / Bacteria: x              RADIOLOGY & ADDITIONAL TESTS:  
NEPHROLOGY INTERVAL HPI/OVERNIGHT EVENTS:    Examined  No distress  "I feel good doc"  Denies HA CP no SOB    MEDICATIONS  (STANDING):  amLODIPine   Tablet 10 milliGRAM(s) Oral daily  enoxaparin Injectable 40 milliGRAM(s) SubCutaneous every 24 hours  folic acid 1 milliGRAM(s) Oral daily  levETIRAcetam 500 milliGRAM(s) Oral two times a day  magnesium oxide 400 milliGRAM(s) Oral three times a day with meals  multivitamin/minerals 1 Tablet(s) Oral daily  pantoprazole    Tablet 40 milliGRAM(s) Oral before breakfast  sodium chloride 1.5%. 500 milliLiter(s) (40 mL/Hr) IV Continuous <Continuous>    MEDICATIONS  (PRN):  acetaminophen     Tablet .. 650 milliGRAM(s) Oral every 6 hours PRN Temp greater or equal to 38C (100.4F), Mild Pain (1 - 3)  aluminum hydroxide/magnesium hydroxide/simethicone Suspension 30 milliLiter(s) Oral every 4 hours PRN Dyspepsia  ondansetron Injectable 4 milliGRAM(s) IV Push every 8 hours PRN Nausea and/or Vomiting      Allergies    No Known Allergies    Intolerances        Vital Signs Last 24 Hrs  T(C): 36.9 (10 [place] 2023 08:12), Max: 37.1 (2023 16:17)  T(F): 98.4 (10 [place] 2023 08:12), Max: 98.8 (2023 16:17)  HR: 75 (10 [place] 2023 08:12) (63 - 85)  BP: 145/86 (10 [place] 2023 08:12) (123/87 - 164/103)  BP(mean): 106 (2023 16:17) (106 - 106)  RR: 18 (10 [place] 2023 08:12) (18 - 18)  SpO2: 96% (10 [place] 2023 08:12) (94% - 97%)    Parameters below as of 10 [place] 2023 08:12  Patient On (Oxygen Delivery Method): room air    PHYSICAL EXAM:  GENERAL: NAD  HEAD: NCAT  EYES: EOMI  NECK: Supple  NERVOUS SYSTEM:  Alert & Oriented X3  CHEST/LUNG: Clear to percussion bilaterally  HEART: Regular rate and rhythm  ABDOMEN: Soft, Nontender, Nondistended; =BS  EXTREMITIES: no edema    LABS:                        10.3   5.06  )-----------( 168      ( 10 [place] 2023 05:40 )             33.2     -10    132<L>  |  97  |  14.3  ----------------------------<  83  4.5   |  24.0  |  0.86    Ca    9.2      10 [place] 2023 05:40  Phos  2.5     06-10  Mg     1.7     10        Urinalysis Basic - ( 2023 05:50 )    Color: Yellow / Appearance: Clear / S.005 / pH: x  Gluc: x / Ketone: Negative  / Bili: Negative / Urobili: Negative mg/dL   Blood: x / Protein: Negative / Nitrite: Negative   Leuk Esterase: Negative / RBC: x / WBC x   Sq Epi: x / Non Sq Epi: x / Bacteria: x      Magnesium, Serum: 1.7 mg/dL (06-10 @ 05:40)  Phosphorus Level, Serum: 2.5 mg/dL (06-10 @ 05:40)          RADIOLOGY & ADDITIONAL TESTS:  
NEPHROLOGY INTERVAL HPI/OVERNIGHT EVENTS:    Examined  No distress  Sitting in chair  "I feel very good doc"  Denies HA CP no SOB      MEDICATIONS  (STANDING):  desmopressin Injectable 1 MICROGram(s) SubCutaneous once  enoxaparin Injectable 40 milliGRAM(s) SubCutaneous every 24 hours  folic acid 1 milliGRAM(s) Oral daily  levETIRAcetam 500 milliGRAM(s) Oral two times a day  multivitamin/minerals 1 Tablet(s) Oral daily  pantoprazole    Tablet 40 milliGRAM(s) Oral before breakfast    MEDICATIONS  (PRN):  acetaminophen     Tablet .. 650 milliGRAM(s) Oral every 6 hours PRN Temp greater or equal to 38C (100.4F), Mild Pain (1 - 3)  aluminum hydroxide/magnesium hydroxide/simethicone Suspension 30 milliLiter(s) Oral every 4 hours PRN Dyspepsia  ondansetron Injectable 4 milliGRAM(s) IV Push every 8 hours PRN Nausea and/or Vomiting      Allergies    No Known Allergies    Intolerances        Vital Signs Last 24 Hrs  T(C): 36.4 (13 Jun 2023 10:45), Max: 37.1 (13 Jun 2023 05:09)  T(F): 97.6 (13 Jun 2023 10:45), Max: 98.7 (13 Jun 2023 05:09)  HR: 107 (13 Jun 2023 10:45) (72 - 107)  BP: 129/91 (13 Jun 2023 10:45) (120/76 - 129/91)  BP(mean): --  RR: 18 (13 Jun 2023 05:09) (18 - 20)  SpO2: 97% (13 Jun 2023 10:45) (97% - 97%)    Parameters below as of 13 Jun 2023 10:45  Patient On (Oxygen Delivery Method): room air      Daily     Daily     PHYSICAL EXAM:  GENERAL: NAD  HEAD: NCAT  EYES: EOMI  NECK: Supple  NERVOUS SYSTEM:  Alert & Oriented X3  CHEST/LUNG: Clear to percussion bilaterally  HEART: Regular rate and rhythm  ABDOMEN: Soft, Nontender, Nondistended; =BS  EXTREMITIES: no edema    LABS:                        10.5   5.17  )-----------( 182      ( 13 Jun 2023 06:20 )             36.2     06-13    151<H>  |  115<H>  |  48.2<H>  ----------------------------<  77  5.0   |  25.0  |  1.30    Ca    9.0      13 Jun 2023 06:20  Phos  3.5     06-13  Mg     3.4     06-13          Magnesium, Serum: 3.4 mg/dL (06-13 @ 06:20)  Phosphorus Level, Serum: 3.5 mg/dL (06-13 @ 06:20)          RADIOLOGY & ADDITIONAL TESTS:  
NEPHROLOGY INTERVAL HPI/OVERNIGHT EVENTS:    Feels well  Walking around room   No new events   UO NR   Intake 960 ml ?     MEDICATIONS  (STANDING):  enoxaparin Injectable 40 milliGRAM(s) SubCutaneous every 24 hours  folic acid 1 milliGRAM(s) Oral daily  levETIRAcetam 500 milliGRAM(s) Oral two times a day  multivitamin/minerals 1 Tablet(s) Oral daily  pantoprazole    Tablet 40 milliGRAM(s) Oral before breakfast    MEDICATIONS  (PRN):  acetaminophen     Tablet .. 650 milliGRAM(s) Oral every 6 hours PRN Temp greater or equal to 38C (100.4F), Mild Pain (1 - 3)  aluminum hydroxide/magnesium hydroxide/simethicone Suspension 30 milliLiter(s) Oral every 4 hours PRN Dyspepsia  ondansetron Injectable 4 milliGRAM(s) IV Push every 8 hours PRN Nausea and/or Vomiting      Allergies    No Known Allergies    Intolerances          Vital Signs Last 24 Hrs  T(C): 36.7 (14 Jun 2023 11:42), Max: 37 (14 Jun 2023 05:31)  T(F): 98 (14 Jun 2023 11:42), Max: 98.6 (14 Jun 2023 05:31)  HR: 84 (14 Jun 2023 11:42) (84 - 103)  BP: 172/76 (14 Jun 2023 11:42) (118/82 - 172/76)  BP(mean): --  RR: 18 (14 Jun 2023 11:42) (18 - 18)  SpO2: 94% (14 Jun 2023 11:42) (94% - 97%)    Parameters below as of 14 Jun 2023 11:42  Patient On (Oxygen Delivery Method): room air      Daily     Daily   I&O's Detail    13 Jun 2023 07:01  -  14 Jun 2023 07:00  --------------------------------------------------------  IN:    Oral Fluid: 360 mL  Total IN: 360 mL    OUT:  Total OUT: 0 mL    Total NET: 360 mL        I&O's Summary    13 Jun 2023 07:01  -  14 Jun 2023 07:00  --------------------------------------------------------  IN: 360 mL / OUT: 0 mL / NET: 360 mL        PHYSICAL EXAM:    GENERAL: NAD  HEAD: NCAT  EYES: EOMI  NECK: Supple  NERVOUS SYSTEM:  Alert & Oriented X3  CHEST/LUNG: Clear to percussion bilaterally  HEART: Regular rate and rhythm  ABDOMEN: Soft, Nontender, Nondistended; =BS  EXTREMITIES: no edema    LABS:                        9.7    4.77  )-----------( 190      ( 14 Jun 2023 06:25 )             32.4     06-14    152<H>  |  117<H>  |  50.0<H>  ----------------------------<  75  5.0   |  25.0  |  1.47<H>    Ca    8.9      14 Jun 2023 06:25  Phos  3.6     06-14  Mg     2.9     06-14          Magnesium, Serum: 2.9 mg/dL (06-14 @ 06:25)  Phosphorus Level, Serum: 3.6 mg/dL (06-14 @ 06:25)          RADIOLOGY & ADDITIONAL TESTS:  
NEPHROLOGY INTERVAL HPI/OVERNIGHT EVENTS:    No new events.    MEDICATIONS  (STANDING):  desmopressin 0.1 milliGRAM(s) Oral two times a day  enoxaparin Injectable 40 milliGRAM(s) SubCutaneous every 24 hours  folic acid 1 milliGRAM(s) Oral daily  levETIRAcetam 500 milliGRAM(s) Oral two times a day  multivitamin/minerals 1 Tablet(s) Oral daily  pantoprazole    Tablet 40 milliGRAM(s) Oral before breakfast    MEDICATIONS  (PRN):  acetaminophen     Tablet .. 650 milliGRAM(s) Oral every 6 hours PRN Temp greater or equal to 38C (100.4F), Mild Pain (1 - 3)  aluminum hydroxide/magnesium hydroxide/simethicone Suspension 30 milliLiter(s) Oral every 4 hours PRN Dyspepsia  ondansetron Injectable 4 milliGRAM(s) IV Push every 8 hours PRN Nausea and/or Vomiting      Allergies    No Known Allergies        Vital Signs Last 24 Hrs  T(C): 36.4 (24 Jun 2023 04:51), Max: 36.9 (23 Jun 2023 17:35)  T(F): 97.6 (24 Jun 2023 04:51), Max: 98.4 (23 Jun 2023 17:35)  HR: 81 (24 Jun 2023 04:51) (70 - 81)  BP: 135/88 (24 Jun 2023 04:51) (135/88 - 151/98)  BP(mean): --  RR: 19 (24 Jun 2023 04:51) (19 - 20)  SpO2: 95% (24 Jun 2023 04:51) (95% - 98%)    Parameters below as of 23 Jun 2023 17:35  Patient On (Oxygen Delivery Method): room air, cdaley      T(C): 36.4 (23 Jun 2023 11:10), Max: 36.6 (22 Jun 2023 17:49)  T(F): 97.6 (23 Jun 2023 11:10), Max: 97.8 (22 Jun 2023 17:49)  HR: 100 (23 Jun 2023 11:10) (75 - 100)  BP: 145/91 (23 Jun 2023 11:10) (127/83 - 145/91)  BP(mean): --  RR: 20 (23 Jun 2023 11:10) (19 - 20)  SpO2: 97% (23 Jun 2023 11:10) (92% - 97%)        PHYSICAL EXAM:    GENERAL: Comfortable oob in  room  HEAD: NCAT  EYES: Open  NECK: Supple  NERVOUS SYSTEM:  Alert , verbal  CHEST/LUNG: Clear to percussion bilaterally  HEART: Regular rate and rhythm  ABDOMEN: Soft, Nontender, Nondistended; =BS  EXTREMITIES: no edema    LABS:    06-24    142  |  109<H>  |  36.8<H>  ----------------------------<  87  4.7   |  21.0<L>  |  1.14    Ca    8.9      24 Jun 2023 12:45  Phos  3.9     06-23  Mg     2.3     06-23        06-23    147<H>  |  112<H>  |  41.2<H>  ----------------------------<  67<L>  4.8   |  21.0<L>  |  1.16    Ca    9.0      23 Jun 2023 05:45  Phos  3.9     06-23  Mg     2.3     06-23                            9.1    4.11  )-----------( 191      ( 21 Jun 2023 05:54 )             31.1     06-22    147<H>  |  111<H>  |  37.6<H>  ----------------------------<  72  4.9   |  24.0  |  1.18    Ca    8.9      22 Jun 2023 05:44  Mg     2.2     06-21    TPro  6.7  /  Alb  3.4  /  TBili  0.3<L>  /  DBili  0.1  /  AST  37<H>  /  ALT  56<H>  /  AlkPhos  157<H>  06-21      Urinalysis Basic - ( 22 Jun 2023 05:44 )    Color: x / Appearance: x / SG: x / pH: x  Gluc: 72 mg/dL / Ketone: x  / Bili: x / Urobili: x   Blood: x / Protein: x / Nitrite: x   Leuk Esterase: x / RBC: x / WBC x   Sq Epi: x / Non Sq Epi: x / Bacteria: x              RADIOLOGY & ADDITIONAL TESTS:  
NEPHROLOGY INTERVAL HPI/OVERNIGHT EVENTS:    Seated up in bed   Feels fine   No new events     MEDICATIONS  (STANDING):  amLODIPine   Tablet 10 milliGRAM(s) Oral daily  desmopressin 0.3 milliGRAM(s) Oral two times a day  enoxaparin Injectable 40 milliGRAM(s) SubCutaneous every 24 hours  ferrous    sulfate Liquid 300 milliGRAM(s) Oral daily  folic acid 1 milliGRAM(s) Oral daily  levETIRAcetam 500 milliGRAM(s) Oral two times a day  multivitamin/minerals 1 Tablet(s) Oral daily  pantoprazole    Tablet 40 milliGRAM(s) Oral before breakfast    MEDICATIONS  (PRN):  acetaminophen     Tablet .. 650 milliGRAM(s) Oral every 6 hours PRN Temp greater or equal to 38C (100.4F), Mild Pain (1 - 3)  aluminum hydroxide/magnesium hydroxide/simethicone Suspension 30 milliLiter(s) Oral every 4 hours PRN Dyspepsia  ondansetron Injectable 4 milliGRAM(s) IV Push every 8 hours PRN Nausea and/or Vomiting      Allergies    No Known Allergies    Intolerances          Vital Signs Last 24 Hrs  T(C): 36.4 (07 Jul 2023 11:47), Max: 36.7 (07 Jul 2023 04:42)  T(F): 97.5 (07 Jul 2023 11:47), Max: 98 (07 Jul 2023 04:42)  HR: 73 (07 Jul 2023 11:47) (68 - 85)  BP: 118/82 (07 Jul 2023 11:47) (118/82 - 136/82)  BP(mean): --  RR: 18 (07 Jul 2023 11:47) (18 - 18)  SpO2: 97% (07 Jul 2023 11:47) (95% - 97%)    Parameters below as of 07 Jul 2023 11:47  Patient On (Oxygen Delivery Method): room air      Daily     Daily   I&O's Detail    06 Jul 2023 07:01  -  07 Jul 2023 07:00  --------------------------------------------------------  IN:    Oral Fluid: 2300 mL  Total IN: 2300 mL    OUT:    Voided (mL): 750 mL  Total OUT: 750 mL    Total NET: 1550 mL        I&O's Summary    06 Jul 2023 07:01  -  07 Jul 2023 07:00  --------------------------------------------------------  IN: 2300 mL / OUT: 750 mL / NET: 1550 mL        PHYSICAL EXAM:    Heent - No edema   NECK: Supple  CHEST/LUNG: Clear / EAE   HEART: Regular rate and rhythm  ABDOMEN: Soft, Nontender, Nondistended; =BS  EXTREMITIES: no edema  LABS:    07-07    147<H>  |  114<H>  |  34.8<H>  ----------------------------<  72  5.0   |  20.0<L>  |  1.09    Ca    9.1      07 Jul 2023 06:35        Urinalysis Basic - ( 07 Jul 2023 06:35 )    Color: x / Appearance: x / SG: x / pH: x  Gluc: 72 mg/dL / Ketone: x  / Bili: x / Urobili: x   Blood: x / Protein: x / Nitrite: x   Leuk Esterase: x / RBC: x / WBC x   Sq Epi: x / Non Sq Epi: x / Bacteria: x              RADIOLOGY & ADDITIONAL TESTS:  
Patient seen and examined    Feels fine  no c/o CP SOB NV   No swelling feet    Vital Signs Last 24 Hrs  T(C): 36.4 (13 Jul 2023 11:41), Max: 37.1 (12 Jul 2023 18:00)  T(F): 97.5 (13 Jul 2023 11:41), Max: 98.7 (12 Jul 2023 18:00)  HR: 75 (13 Jul 2023 11:41) (75 - 78)  BP: 134/87 (13 Jul 2023 11:41) (105/55 - 134/87)  BP(mean): --  RR: 18 (13 Jul 2023 11:41) (18 - 19)  SpO2: 95% (13 Jul 2023 11:41) (95% - 96%)        PHYSICAL EXAM    GENERAL: NAD,   EYES:  conjunctiva and sclera clear  NECK: Supple, No JVD/Bruit  NERVOUS SYSTEM:  A/O x3,   CHEST:  No rales, No rhonchi  HEART:  RRR, No murmur  ABDOMEN: Soft, NT/ND BS+  EXTREMITIES:  No Edema;  SKIN: No rashes    12 Jul 2023 14:49    147    |  117    |  49.7   ----------------------------<  74     5.7     |  16.0   |  1.19     Ca    9.3        12 Jul 2023 14:49  Phos  3.6       11 Jul 2023 14:40  Mg     2.3       11 Jul 2023 14:40    07-13    151<H>  |  118<H>  |  50.0<H>  ----------------------------<  70  4.6   |  22.0  |  1.17    Ca    9.2      13 Jul 2023 05:36        MEDICATIONS  (STANDING):  amLODIPine   Tablet 10 milliGRAM(s) Oral daily  desmopressin 0.3 milliGRAM(s) Oral two times a day  enoxaparin Injectable 40 milliGRAM(s) SubCutaneous every 24 hours  ferrous    sulfate Liquid 300 milliGRAM(s) Oral daily  folic acid 1 milliGRAM(s) Oral daily  levETIRAcetam 500 milliGRAM(s) Oral two times a day  multivitamin/minerals 1 Tablet(s) Oral daily  pantoprazole    Tablet 40 milliGRAM(s) Oral before breakfast      
Patient seen and examined    Feels fine  no c/o CP SOB NV   No swelling feet    Vital Signs Last 24 Hrs  T(C): 37.1 (12 Jul 2023 18:00), Max: 37.1 (12 Jul 2023 18:00)  T(F): 98.7 (12 Jul 2023 18:00), Max: 98.7 (12 Jul 2023 18:00)  HR: 77 (12 Jul 2023 18:00) (76 - 80)  BP: 105/55 (12 Jul 2023 18:00) (105/55 - 150/92)  BP(mean): --  RR: 19 (12 Jul 2023 18:00) (18 - 19)  SpO2: 96% (12 Jul 2023 18:00) (96% - 97%)        PHYSICAL EXAM    GENERAL: NAD,   EYES:  conjunctiva and sclera clear  NECK: Supple, No JVD/Bruit  NERVOUS SYSTEM:  A/O x3,   CHEST:  No rales, No rhonchi  HEART:  RRR, No murmur  ABDOMEN: Soft, NT/ND BS+  EXTREMITIES:  No Edema;  SKIN: No rashes    12 Jul 2023 14:49    147    |  117    |  49.7   ----------------------------<  74     5.7     |  16.0   |  1.19     Ca    9.3        12 Jul 2023 14:49  Phos  3.6       11 Jul 2023 14:40  Mg     2.3       11 Jul 2023 14:40    MEDICATIONS  (STANDING):  amLODIPine   Tablet 10 milliGRAM(s) Oral daily  desmopressin 0.3 milliGRAM(s) Oral two times a day  enoxaparin Injectable 40 milliGRAM(s) SubCutaneous every 24 hours  ferrous    sulfate Liquid 300 milliGRAM(s) Oral daily  folic acid 1 milliGRAM(s) Oral daily  levETIRAcetam 500 milliGRAM(s) Oral two times a day  multivitamin/minerals 1 Tablet(s) Oral daily  pantoprazole    Tablet 40 milliGRAM(s) Oral before breakfast      
Patient seen and examined    Feels much better; Ob/Ch  no c/o CP SOB NV   No swelling feet  Claims to be watching hr own PO intake    Vital Signs Last 24 Hrs  T(C): 36.6 (17 Jun 2023 21:26), Max: 37 (17 Jun 2023 04:53)  T(F): 97.9 (17 Jun 2023 21:26), Max: 98.6 (17 Jun 2023 04:53)  HR: 95 (17 Jun 2023 21:26) (82 - 95)  BP: 123/81 (17 Jun 2023 21:26) (115/80 - 128/83)  BP(mean): --  RR: 18 (17 Jun 2023 21:26) (18 - 18)  SpO2: 93% (17 Jun 2023 21:26) (93% - 98%)    Parameters below as of 17 Jun 2023 21:26  Patient On (Oxygen Delivery Method): room air        PHYSICAL EXAM    GENERAL: NAD,   EYES:  conjunctiva and sclera clear  NECK: Supple, No JVD/Bruit  NERVOUS SYSTEM:  A/O x3,   CHEST:  No rales, No rhonchi  HEART:  RRR, No murmur  ABDOMEN: Soft, NT/ND BS+  EXTREMITIES:  No Edema;  SKIN: No rashes    18 Jun 2023 09:22    149    |  113    |  39.6   ----------------------------<  84     5.5     |  24.0   |  1.58     Ca    8.9        18 Jun 2023 09:22  Phos  4.0       18 Jun 2023 09:22  Mg     2.4       18 Jun 2023 09:22    TPro  6.8    /  Alb  3.6    /  TBili  0.2    /  DBili  x      /  AST  40     /  ALT  73     /  AlkPhos  170    17 Jun 2023 06:16      17 Jun 2023 06:16    149    |  114    |  39.8   ----------------------------<  83     4.7     |  23.0   |  1.28     Ca    8.8        17 Jun 2023 06:16  Phos  4.1       17 Jun 2023 06:16  Mg     2.4       17 Jun 2023 06:16    TPro  6.8    /  Alb  3.6    /  TBili  0.2    /  DBili  x      /  AST  40     /  ALT  73     /  AlkPhos  170    17 Jun 2023 06:16                          9.1    4.04  )-----------( 172      ( 17 Jun 2023 06:16 )             31.4                           9.8    4.68  )-----------( 204      ( 18 Jun 2023 09:22 )             32.9       Continue present treatment    
Patient seen and examined    Feels ok, OOB/Ch  no c/o CP SOB NV   No swelling feet  Claiming to drink POF    Vital Signs Last 24 Hrs  T(C): 37.6 (19 Jun 2023 18:15), Max: 37.6 (19 Jun 2023 18:15)  T(F): 99.6 (19 Jun 2023 18:15), Max: 99.6 (19 Jun 2023 18:15)  HR: 81 (19 Jun 2023 18:15) (81 - 91)  BP: 114/80 (19 Jun 2023 18:15) (110/77 - 131/77)  BP(mean): --  RR: 18 (19 Jun 2023 18:15) (18 - 18)  SpO2: 97% (19 Jun 2023 18:15) (95% - 97%)    Parameters below as of 19 Jun 2023 18:15  Patient On (Oxygen Delivery Method): room air        PHYSICAL EXAM    GENERAL: NAD,   EYES:  conjunctiva and sclera clear  NECK: Supple, No JVD/Bruit  NERVOUS SYSTEM:  A/O x3,   CHEST:  No rales, No rhonchi  HEART:  RRR, No murmur  ABDOMEN: Soft, NT/ND BS+  EXTREMITIES:  No Edema;  SKIN: No rashes    19 Jun 2023 05:53    149    |  114    |  39.9   ----------------------------<  74     4.8     |  24.0   |  1.29     Ca    9.1        19 Jun 2023 05:53  Phos  4.3       19 Jun 2023 05:53  Mg     2.4       19 Jun 2023 05:53                            9.9    4.71  )-----------( 180      ( 19 Jun 2023 05:53 )             34.9     MEDICATIONS  (STANDING):  desmopressin 0.1 milliGRAM(s) Oral two times a day  enoxaparin Injectable 40 milliGRAM(s) SubCutaneous every 24 hours  folic acid 1 milliGRAM(s) Oral daily  hydrochlorothiazide 12.5 milliGRAM(s) Oral daily  levETIRAcetam 500 milliGRAM(s) Oral two times a day  multivitamin/minerals 1 Tablet(s) Oral daily  pantoprazole    Tablet 40 milliGRAM(s) Oral before breakfast      
  Chief Complaint:  AMS     SUBJECTIVE / OVERNIGHT EVENTS:  No acute events reported overnight.  Pt is a poor historian but offers no acute complaints at this time and has been taking adequate po fluid intake.         I&O's Summary    23 Jun 2023 07:01  -  24 Jun 2023 07:00  --------------------------------------------------------  IN: 2650 mL / OUT: 350 mL / NET: 2300 mL          PHYSICAL EXAM:  Vital Signs Last 24 Hrs  T(C): 36.5 (29 Jun 2023 21:56), Max: 36.7 (29 Jun 2023 11:20)  T(F): 97.7 (29 Jun 2023 21:56), Max: 98 (29 Jun 2023 11:20)  HR: 73 (30 Jun 2023 06:54) (73 - 81)  BP: 139/90 (30 Jun 2023 06:54) (134/90 - 150/100)  BP(mean): --  RR: 18 (30 Jun 2023 05:25) (16 - 18)  SpO2: 96% (30 Jun 2023 05:25) (96% - 97%)    Parameters below as of 30 Jun 2023 05:25  Patient On (Oxygen Delivery Method): room air        GENERAL: pt examined bedside, laying comfortably in bed in NAD  HEENT: NC/AT, moist oral mucosa, clear conjunctiva, sclera nonicteric  RESPIRATORY: Normal respiratory effort, no wheezing, rhonchi, rales  CARDIOVASCULAR: RRR, normal S1 and S2  ABDOMEN: soft, NT/ND, +bowel sounds, no rebound/guarding  EXTREMITIES: No cynaosis, no clubbing, no lower extremity edema  NEUROLOGY: A+Ox2-3 but no focal neurologic deficits appreciated       LABS:      06-30    150<H>  |  114<H>  |  31.0<H>  ----------------------------<  75  4.7   |  25.0  |  1.11    Ca    9.2      30 Jun 2023 06:42      Urinalysis Basic - ( 23 Jun 2023 05:45 )    Color: x / Appearance: x / SG: x / pH: x  Gluc: 67 mg/dL / Ketone: x  / Bili: x / Urobili: x   Blood: x / Protein: x / Nitrite: x   Leuk Esterase: x / RBC: x / WBC x   Sq Epi: x / Non Sq Epi: x / Bacteria: x        CAPILLARY BLOOD GLUCOSE            RADIOLOGY & ADDITIONAL TESTS:      < from: Xray Chest 1 View- PORTABLE-Urgent (06.07.23 @ 12:06) >  IMPRESSION: Heart enlargement again noted.    < end of copied text >      < from: CT Head No Cont (06.07.23 @ 12:56) >  IMPRESSION:    CT brain:  No acute intracranial hemorrhage, brain edema, or mass effect.  No displaced calvarial fracture.    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    CTA brain:  No flow-limiting stenosis or patent vascular aneurysm. No AVM.    CTA neck:  No flow-limiting stenosis, evidence for arterial dissection, or vascular   aneurysm.    < end of copied text >    < from: CT Angio Neck w/ IV Cont (06.07.23 @ 13:01) >  IMPRESSION:    CT brain:  No acute intracranial hemorrhage, brain edema, or mass effect.  No displaced calvarial fracture.    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    CTA brain:  No flow-limiting stenosis or patent vascular aneurysm. No AVM.    CTA neck:  No flow-limiting stenosis, evidence for arterial dissection, or vascular   aneurysm.    < end of copied text >        MEDICATIONS  (STANDING):  desmopressin 0.1 milliGRAM(s) Oral two times a day  enoxaparin Injectable 40 milliGRAM(s) SubCutaneous every 24 hours  folic acid 1 milliGRAM(s) Oral daily  levETIRAcetam 500 milliGRAM(s) Oral two times a day  multivitamin/minerals 1 Tablet(s) Oral daily  pantoprazole    Tablet 40 milliGRAM(s) Oral before breakfast    MEDICATIONS  (PRN):  acetaminophen     Tablet .. 650 milliGRAM(s) Oral every 6 hours PRN Temp greater or equal to 38C (100.4F), Mild Pain (1 - 3)  aluminum hydroxide/magnesium hydroxide/simethicone Suspension 30 milliLiter(s) Oral every 4 hours PRN Dyspepsia  ondansetron Injectable 4 milliGRAM(s) IV Push every 8 hours PRN Nausea and/or Vomiting                                  
  Chief Complaint:  AMS     SUBJECTIVE / OVERNIGHT EVENTS:  No acute events reported overnight.  Pt is a poor historian but offers no acute complaints at this time and has been taking adequate po fluid intake.         I&O's Summary    23 Jun 2023 07:01  -  24 Jun 2023 07:00  --------------------------------------------------------  IN: 2650 mL / OUT: 350 mL / NET: 2300 mL          PHYSICAL EXAM:  Vital Signs Last 24 Hrs  T(C): 36.6 (27 Jun 2023 05:08), Max: 36.6 (27 Jun 2023 05:08)  T(F): 97.8 (27 Jun 2023 05:08), Max: 97.8 (27 Jun 2023 05:08)  HR: 86 (27 Jun 2023 05:08) (67 - 86)  BP: 119/73 (27 Jun 2023 05:08) (112/78 - 146/90)  BP(mean): --  RR: 18 (27 Jun 2023 05:08) (18 - 18)  SpO2: 97% (27 Jun 2023 05:08) (96% - 98%)    Parameters below as of 27 Jun 2023 00:01  Patient On (Oxygen Delivery Method): room air        GENERAL: pt examined bedside, laying comfortably in bed in NAD  HEENT: NC/AT, moist oral mucosa, clear conjunctiva, sclera nonicteric  RESPIRATORY: Normal respiratory effort, no wheezing, rhonchi, rales  CARDIOVASCULAR: RRR, normal S1 and S2  ABDOMEN: soft, NT/ND, normoactive bowel sounds, no rebound/guarding  EXTREMITIES: No cynaosis, no clubbing, no lower extremity edema  NEUROLOGY: A+Ox2-3 but no focal neurologic deficits appreciated       LABS:                      06-27    149<H>  |  116<H>  |  42.7<H>  ----------------------------<  71  5.1   |  21.0<L>  |  1.15    Ca    9.0      27 Jun 2023 05:29        Urinalysis Basic - ( 23 Jun 2023 05:45 )    Color: x / Appearance: x / SG: x / pH: x  Gluc: 67 mg/dL / Ketone: x  / Bili: x / Urobili: x   Blood: x / Protein: x / Nitrite: x   Leuk Esterase: x / RBC: x / WBC x   Sq Epi: x / Non Sq Epi: x / Bacteria: x        CAPILLARY BLOOD GLUCOSE            RADIOLOGY & ADDITIONAL TESTS:      < from: Xray Chest 1 View- PORTABLE-Urgent (06.07.23 @ 12:06) >  IMPRESSION: Heart enlargement again noted.    < end of copied text >      < from: CT Head No Cont (06.07.23 @ 12:56) >  IMPRESSION:    CT brain:  No acute intracranial hemorrhage, brain edema, or mass effect.  No displaced calvarial fracture.    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    CTA brain:  No flow-limiting stenosis or patent vascular aneurysm. No AVM.    CTA neck:  No flow-limiting stenosis, evidence for arterial dissection, or vascular   aneurysm.    < end of copied text >    < from: CT Angio Neck w/ IV Cont (06.07.23 @ 13:01) >  IMPRESSION:    CT brain:  No acute intracranial hemorrhage, brain edema, or mass effect.  No displaced calvarial fracture.    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    CTA brain:  No flow-limiting stenosis or patent vascular aneurysm. No AVM.    CTA neck:  No flow-limiting stenosis, evidence for arterial dissection, or vascular   aneurysm.    < end of copied text >        MEDICATIONS  (STANDING):  desmopressin 0.1 milliGRAM(s) Oral two times a day  enoxaparin Injectable 40 milliGRAM(s) SubCutaneous every 24 hours  folic acid 1 milliGRAM(s) Oral daily  levETIRAcetam 500 milliGRAM(s) Oral two times a day  multivitamin/minerals 1 Tablet(s) Oral daily  pantoprazole    Tablet 40 milliGRAM(s) Oral before breakfast    MEDICATIONS  (PRN):  acetaminophen     Tablet .. 650 milliGRAM(s) Oral every 6 hours PRN Temp greater or equal to 38C (100.4F), Mild Pain (1 - 3)  aluminum hydroxide/magnesium hydroxide/simethicone Suspension 30 milliLiter(s) Oral every 4 hours PRN Dyspepsia  ondansetron Injectable 4 milliGRAM(s) IV Push every 8 hours PRN Nausea and/or Vomiting                                  
  Chief Complaint:  AMS     SUBJECTIVE / OVERNIGHT EVENTS:  No acute events reported overnight.  Pt is a poor historian but offers no acute complaints at this time and has been taking adequate po fluid intake.         I&O's Summary    23 Jun 2023 07:01  -  24 Jun 2023 07:00  --------------------------------------------------------  IN: 2650 mL / OUT: 350 mL / NET: 2300 mL          PHYSICAL EXAM:  Vital Signs Last 24 Hrs  T(C): 36.6 (29 Jun 2023 04:51), Max: 36.8 (28 Jun 2023 21:19)  T(F): 97.8 (29 Jun 2023 04:51), Max: 98.3 (28 Jun 2023 21:19)  HR: 84 (29 Jun 2023 04:51) (77 - 84)  BP: 132/88 (29 Jun 2023 04:51) (132/88 - 141/89)  BP(mean): --  RR: 18 (29 Jun 2023 04:51) (18 - 20)  SpO2: 96% (29 Jun 2023 04:51) (96% - 99%)    Parameters below as of 28 Jun 2023 21:19  Patient On (Oxygen Delivery Method): room air      GENERAL: pt examined bedside, laying comfortably in bed in NAD  HEENT: NC/AT, moist oral mucosa, clear conjunctiva, sclera nonicteric  RESPIRATORY: Normal respiratory effort, no wheezing, rhonchi, rales  CARDIOVASCULAR: RRR, normal S1 and S2  ABDOMEN: soft, NT/ND, +bowel sounds, no rebound/guarding  EXTREMITIES: No cynaosis, no clubbing, no lower extremity edema  NEUROLOGY: A+Ox2-3 but no focal neurologic deficits appreciated       LABS:                    06-29    151<H>  |  117<H>  |  38.8<H>  ----------------------------<  75  4.9   |  23.0  |  1.27    Ca    8.9      29 Jun 2023 06:15          Urinalysis Basic - ( 23 Jun 2023 05:45 )    Color: x / Appearance: x / SG: x / pH: x  Gluc: 67 mg/dL / Ketone: x  / Bili: x / Urobili: x   Blood: x / Protein: x / Nitrite: x   Leuk Esterase: x / RBC: x / WBC x   Sq Epi: x / Non Sq Epi: x / Bacteria: x        CAPILLARY BLOOD GLUCOSE            RADIOLOGY & ADDITIONAL TESTS:      < from: Xray Chest 1 View- PORTABLE-Urgent (06.07.23 @ 12:06) >  IMPRESSION: Heart enlargement again noted.    < end of copied text >      < from: CT Head No Cont (06.07.23 @ 12:56) >  IMPRESSION:    CT brain:  No acute intracranial hemorrhage, brain edema, or mass effect.  No displaced calvarial fracture.    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    CTA brain:  No flow-limiting stenosis or patent vascular aneurysm. No AVM.    CTA neck:  No flow-limiting stenosis, evidence for arterial dissection, or vascular   aneurysm.    < end of copied text >    < from: CT Angio Neck w/ IV Cont (06.07.23 @ 13:01) >  IMPRESSION:    CT brain:  No acute intracranial hemorrhage, brain edema, or mass effect.  No displaced calvarial fracture.    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    CTA brain:  No flow-limiting stenosis or patent vascular aneurysm. No AVM.    CTA neck:  No flow-limiting stenosis, evidence for arterial dissection, or vascular   aneurysm.    < end of copied text >        MEDICATIONS  (STANDING):  desmopressin 0.1 milliGRAM(s) Oral two times a day  enoxaparin Injectable 40 milliGRAM(s) SubCutaneous every 24 hours  folic acid 1 milliGRAM(s) Oral daily  levETIRAcetam 500 milliGRAM(s) Oral two times a day  multivitamin/minerals 1 Tablet(s) Oral daily  pantoprazole    Tablet 40 milliGRAM(s) Oral before breakfast    MEDICATIONS  (PRN):  acetaminophen     Tablet .. 650 milliGRAM(s) Oral every 6 hours PRN Temp greater or equal to 38C (100.4F), Mild Pain (1 - 3)  aluminum hydroxide/magnesium hydroxide/simethicone Suspension 30 milliLiter(s) Oral every 4 hours PRN Dyspepsia  ondansetron Injectable 4 milliGRAM(s) IV Push every 8 hours PRN Nausea and/or Vomiting                                  
Hyperosmolality with hypernatremia    HPI:  57y/oF PMH SAH s/p Brain aneurysm clippingx 2, DI, Seizures 2/2 hypo or  hypernatremia was BIBA after she was was reported to have wandered off. pt called police " I got lost". In ER, She was found to be altered, confused, daughter reported issues with sodium in past. Labs significant for Na 152. She has significant h/o PICC line hydration at home  for hypernatremia. her PICC line came out in May 2023. Patient is forgetful, Unable to provide much history thinks she has been doing well at home, denies any fevers, diarrhea.   Called daughter to get collateral information- no response, left voicemail. med hx obtained from Dr Garcia  (07 Jun 2023 15:44)    Interval History:  Patient was seen and examined at bedside around 10:30 am.  Feels better.   Denies headache, dizziness, visual symptoms, nausea, vomiting, abdominal / chest pain, palpitations or shortness of breath.    ROS:  As per interval history otherwise unremarkable.    PHYSICAL EXAM:  Vital Signs  T(C): 36.4 (01 Jul 2023 05:44), Max: 36.5 (30 Jun 2023 17:01)  T(F): 97.6 (01 Jul 2023 05:44), Max: 97.7 (30 Jun 2023 17:01)  HR: 78 (01 Jul 2023 05:44) (78 - 82)  BP: 148/100 (01 Jul 2023 05:44) (148/83 - 148/100)  RR: 18 (01 Jul 2023 05:44) (18 - 18)  SpO2: 98% (01 Jul 2023 05:44) (97% - 98%)  General: Middle aged female sitting in bed comfortably. No acute distress  HEENT: EOMI. Clear conjunctivae. Moist mucus membrane  Neck: Supple.   Chest: CTA bilaterally. No wheezing, rales or rhonchi.   Heart: Normal S1 & S2. RRR.   Abdomen: Obese. Soft. Non-tender. + BS  Ext: No pedal edema. No calf tenderness   Neuro: AAO x 3. No focal deficit. No speech disorder  Skin: Warm and Dry  Psychiatry: Normal mood and affect    I&O's Summary    30 Jun 2023 07:01  -  01 Jul 2023 07:00  --------------------------------------------------------  IN: 400 mL / OUT: 350 mL / NET: 50 mL    LABS:  07-01    148<H>  |  113<H>  |  28.2<H>  ----------------------------<  74  4.2   |  25.0  |  1.06    Ca    9.2      01 Jul 2023 07:03    Urinalysis Basic - ( 01 Jul 2023 07:03 )    Color: x / Appearance: x / SG: x / pH: x  Gluc: 74 mg/dL / Ketone: x  / Bili: x / Urobili: x   Blood: x / Protein: x / Nitrite: x   Leuk Esterase: x / RBC: x / WBC x   Sq Epi: x / Non Sq Epi: x / Bacteria: x    RADIOLOGY & ADDITIONAL STUDIES:  Reviewed     MEDICATIONS  (STANDING):  desmopressin 0.1 milliGRAM(s) Oral two times a day  enoxaparin Injectable 40 milliGRAM(s) SubCutaneous every 24 hours  ferrous    sulfate Liquid 300 milliGRAM(s) Oral daily  folic acid 1 milliGRAM(s) Oral daily  levETIRAcetam 500 milliGRAM(s) Oral two times a day  multivitamin/minerals 1 Tablet(s) Oral daily  pantoprazole    Tablet 40 milliGRAM(s) Oral before breakfast  sodium bicarbonate 650 milliGRAM(s) Oral two times a day    MEDICATIONS  (PRN):  acetaminophen     Tablet .. 650 milliGRAM(s) Oral every 6 hours PRN Temp greater or equal to 38C (100.4F), Mild Pain (1 - 3)  aluminum hydroxide/magnesium hydroxide/simethicone Suspension 30 milliLiter(s) Oral every 4 hours PRN Dyspepsia  ondansetron Injectable 4 milliGRAM(s) IV Push every 8 hours PRN Nausea and/or Vomiting      
INTERVAL EVENTS:  Follow up DI, hypernatremia/hyponatremia management    ROS: Denies chest pain, sob, abd pain.    MEDICATIONS  (STANDING):  desmopressin 0.1 milliGRAM(s) Oral two times a day  enoxaparin Injectable 40 milliGRAM(s) SubCutaneous every 24 hours  folic acid 1 milliGRAM(s) Oral daily  hydrochlorothiazide 12.5 milliGRAM(s) Oral daily  levETIRAcetam 500 milliGRAM(s) Oral two times a day  multivitamin/minerals 1 Tablet(s) Oral daily  pantoprazole    Tablet 40 milliGRAM(s) Oral before breakfast    MEDICATIONS  (PRN):  acetaminophen     Tablet .. 650 milliGRAM(s) Oral every 6 hours PRN Temp greater or equal to 38C (100.4F), Mild Pain (1 - 3)  aluminum hydroxide/magnesium hydroxide/simethicone Suspension 30 milliLiter(s) Oral every 4 hours PRN Dyspepsia  ondansetron Injectable 4 milliGRAM(s) IV Push every 8 hours PRN Nausea and/or Vomiting    Allergies  No Known Allergies    Vital Signs Last 24 Hrs  T(C): 36.5 (20 Jun 2023 11:31), Max: 37.6 (19 Jun 2023 18:15)  T(F): 97.7 (20 Jun 2023 11:31), Max: 99.6 (19 Jun 2023 18:15)  HR: 64 (20 Jun 2023 11:31) (64 - 87)  BP: 139/98 (20 Jun 2023 11:31) (114/80 - 139/98)  BP(mean): --  RR: 18 (20 Jun 2023 11:31) (18 - 18)  SpO2: 97% (20 Jun 2023 11:31) (95% - 97%)    Parameters below as of 20 Jun 2023 11:31  Patient On (Oxygen Delivery Method): room air    PHYSICAL EXAM:  General: No apparent distress  Neck: Supple, trachea midline, no thyromegaly  Respiratory: Lungs clear bilaterally, normal rate, effort  Cardiac: +S1, S2, no m/r/g  GI: +BS, soft, non tender, non distended  Extremities: Mild LE edema  Neuro: A+O X3, no tremor      LABS:                        9.6    4.89  )-----------( 198      ( 20 Jun 2023 05:44 )             32.1     06-20    152<H>  |  117<H>  |  42.7<H>  ----------------------------<  76  4.7   |  22.0  |  1.29    Ca    8.9      20 Jun 2023 05:44  Phos  4.2     06-20  Mg     2.3     06-20    Thyroid Stimulating Hormone, Serum: 2.20 uIU/mL (06-09-23 @ 10:05)  
INTERVAL EVENTS:  Follow up DI/hypernatremia/hyponatremia    ROS: No complaints at time of exam    MEDICATIONS  (STANDING):  desmopressin 0.1 milliGRAM(s) Oral two times a day  enoxaparin Injectable 40 milliGRAM(s) SubCutaneous every 24 hours  folic acid 1 milliGRAM(s) Oral daily  hydrochlorothiazide 12.5 milliGRAM(s) Oral daily  levETIRAcetam 500 milliGRAM(s) Oral two times a day  multivitamin/minerals 1 Tablet(s) Oral daily  pantoprazole    Tablet 40 milliGRAM(s) Oral before breakfast    MEDICATIONS  (PRN):  acetaminophen     Tablet .. 650 milliGRAM(s) Oral every 6 hours PRN Temp greater or equal to 38C (100.4F), Mild Pain (1 - 3)  aluminum hydroxide/magnesium hydroxide/simethicone Suspension 30 milliLiter(s) Oral every 4 hours PRN Dyspepsia  ondansetron Injectable 4 milliGRAM(s) IV Push every 8 hours PRN Nausea and/or Vomiting    Allergies  No Known Allergies    Vital Signs Last 24 Hrs  T(C): 36.7 (19 Jun 2023 12:21), Max: 36.8 (19 Jun 2023 05:16)  T(F): 98 (19 Jun 2023 12:21), Max: 98.3 (19 Jun 2023 05:16)  HR: 83 (19 Jun 2023 12:21) (75 - 91)  BP: 123/85 (19 Jun 2023 12:21) (109/79 - 131/77)  BP(mean): --  RR: 18 (19 Jun 2023 12:21) (18 - 18)  SpO2: 97% (19 Jun 2023 12:21) (95% - 99%)    Parameters below as of 19 Jun 2023 12:21  Patient On (Oxygen Delivery Method): room air      PHYSICAL EXAM:  General: No apparent distress  Neck: Supple, trachea midline, no thyromegaly  Respiratory: Lungs clear bilaterally, normal rate, effort  Cardiac: +S1, S2, no m/r/g  GI: +BS, soft, non tender, non distended  Extremities: Mild LE edema bilaterally  Neuro: A+O X3, no tremor      LABS:                        9.9    4.71  )-----------( 180      ( 19 Jun 2023 05:53 )             34.9     06-19    149<H>  |  114<H>  |  39.9<H>  ----------------------------<  74  4.8   |  24.0  |  1.29    Ca    9.1      19 Jun 2023 05:53  Phos  4.3     06-19  Mg     2.4     06-19      Thyroid Stimulating Hormone, Serum: 2.20 uIU/mL (06-09-23 @ 10:05)  
INTERVAL EVENTS:  Follow up DI/hypernatremia/hyponatremia management    ROS: Denies chest pain, sob, abd pain    MEDICATIONS  (STANDING):  desmopressin 0.1 milliGRAM(s) Oral two times a day  enoxaparin Injectable 40 milliGRAM(s) SubCutaneous every 24 hours  folic acid 1 milliGRAM(s) Oral daily  levETIRAcetam 500 milliGRAM(s) Oral two times a day  multivitamin/minerals 1 Tablet(s) Oral daily  pantoprazole    Tablet 40 milliGRAM(s) Oral before breakfast    MEDICATIONS  (PRN):  acetaminophen     Tablet .. 650 milliGRAM(s) Oral every 6 hours PRN Temp greater or equal to 38C (100.4F), Mild Pain (1 - 3)  aluminum hydroxide/magnesium hydroxide/simethicone Suspension 30 milliLiter(s) Oral every 4 hours PRN Dyspepsia  ondansetron Injectable 4 milliGRAM(s) IV Push every 8 hours PRN Nausea and/or Vomiting    Allergies  No Known Allergies    Vital Signs Last 24 Hrs  T(C): 36.7 (14 Jun 2023 11:42), Max: 37 (14 Jun 2023 05:31)  T(F): 98 (14 Jun 2023 11:42), Max: 98.6 (14 Jun 2023 05:31)  HR: 84 (14 Jun 2023 11:42) (84 - 103)  BP: 172/76 (14 Jun 2023 11:42) (118/82 - 172/76)  BP(mean): --  RR: 18 (14 Jun 2023 11:42) (18 - 18)  SpO2: 94% (14 Jun 2023 11:42) (94% - 97%)    Parameters below as of 14 Jun 2023 11:42  Patient On (Oxygen Delivery Method): room air      PHYSICAL EXAM:  General: No apparent distress  Neck: Supple, trachea midline, no thyromegaly  Respiratory: Lungs clear bilaterally, normal rate, effort  Cardiac: +S1, S2, no m/r/g  GI: +BS, soft, non tender, non distended  Extremities: No peripheral edema, no pedal lesions  Neuro: A+O X3, no tremor    LABS:                        9.7    4.77  )-----------( 190      ( 14 Jun 2023 06:25 )             32.4     06-14    152<H>  |  117<H>  |  50.0<H>  ----------------------------<  75  5.0   |  25.0  |  1.47<H>    Ca    8.9      14 Jun 2023 06:25  Phos  3.6     06-14  Mg     2.9     06-14      Thyroid Stimulating Hormone, Serum: 2.20 uIU/mL (06-09-23 @ 10:05)  
INTERVAL EVENTS:  Follow up DI/hyponatremia/hypernatremia    ROS: Denies chest pain, sob, abd pain.     MEDICATIONS  (STANDING):  desmopressin 0.1 milliGRAM(s) Oral two times a day  enoxaparin Injectable 40 milliGRAM(s) SubCutaneous every 24 hours  folic acid 1 milliGRAM(s) Oral daily  levETIRAcetam 500 milliGRAM(s) Oral two times a day  multivitamin/minerals 1 Tablet(s) Oral daily  pantoprazole    Tablet 40 milliGRAM(s) Oral before breakfast    MEDICATIONS  (PRN):  acetaminophen     Tablet .. 650 milliGRAM(s) Oral every 6 hours PRN Temp greater or equal to 38C (100.4F), Mild Pain (1 - 3)  aluminum hydroxide/magnesium hydroxide/simethicone Suspension 30 milliLiter(s) Oral every 4 hours PRN Dyspepsia  ondansetron Injectable 4 milliGRAM(s) IV Push every 8 hours PRN Nausea and/or Vomiting    Allergies  No Known Allergies    Vital Signs Last 24 Hrs  T(C): 36.4 (22 Jun 2023 10:49), Max: 36.6 (22 Jun 2023 04:49)  T(F): 97.5 (22 Jun 2023 10:49), Max: 97.8 (22 Jun 2023 04:49)  HR: 63 (22 Jun 2023 10:49) (63 - 80)  BP: 127/84 (22 Jun 2023 10:49) (114/69 - 130/86)  BP(mean): --  RR: 20 (22 Jun 2023 10:49) (18 - 20)  SpO2: 97% (22 Jun 2023 10:49) (96% - 97%)    Parameters below as of 21 Jun 2023 17:33  Patient On (Oxygen Delivery Method): room air      PHYSICAL EXAM:  General: No apparent distress  Neck: Supple, trachea midline, no thyromegaly  Respiratory: Lungs clear bilaterally, normal rate, effort  Cardiac: +S1, S2, no m/r/g  GI: +BS, soft, non tender, non distended  Extremities: Mild LE edema bilaterally  Neuro: A+O X3, no tremor      LABS:                        9.1    4.11  )-----------( 191      ( 21 Jun 2023 05:54 )             31.1     06-22    147<H>  |  111<H>  |  37.6<H>  ----------------------------<  72  4.9   |  24.0  |  1.18    Ca    8.9      22 Jun 2023 05:44  Mg     2.2     06-21    TPro  6.7  /  Alb  3.4  /  TBili  0.3<L>  /  DBili  0.1  /  AST  37<H>  /  ALT  56<H>  /  AlkPhos  157<H>  06-21    Urinalysis Basic - ( 22 Jun 2023 05:44 )    Color: x / Appearance: x / SG: x / pH: x  Gluc: 72 mg/dL / Ketone: x  / Bili: x / Urobili: x   Blood: x / Protein: x / Nitrite: x   Leuk Esterase: x / RBC: x / WBC x   Sq Epi: x / Non Sq Epi: x / Bacteria: x        Thyroid Stimulating Hormone, Serum: 2.20 uIU/mL (06-09-23 @ 10:05)  
Kerline Simmons M.D.    Patient is a 57y old  Female who presents with a chief complaint of altered mental state (09 Jul 2023 10:43)      SUBJECTIVE / OVERNIGHT EVENTS: No concerns.     Patient denies chest pain, SOB, abd pain, N/V, fever, chills, dysuria or any other complaints. All remainder ROS negative.     MEDICATIONS  (STANDING):  amLODIPine   Tablet 10 milliGRAM(s) Oral daily  desmopressin 0.3 milliGRAM(s) Oral two times a day  enoxaparin Injectable 40 milliGRAM(s) SubCutaneous every 24 hours  ferrous    sulfate Liquid 300 milliGRAM(s) Oral daily  folic acid 1 milliGRAM(s) Oral daily  levETIRAcetam 500 milliGRAM(s) Oral two times a day  multivitamin/minerals 1 Tablet(s) Oral daily  pantoprazole    Tablet 40 milliGRAM(s) Oral before breakfast    MEDICATIONS  (PRN):  acetaminophen     Tablet .. 650 milliGRAM(s) Oral every 6 hours PRN Temp greater or equal to 38C (100.4F), Mild Pain (1 - 3)  aluminum hydroxide/magnesium hydroxide/simethicone Suspension 30 milliLiter(s) Oral every 4 hours PRN Dyspepsia  ondansetron Injectable 4 milliGRAM(s) IV Push every 8 hours PRN Nausea and/or Vomiting      I&O's Summary    08 Jul 2023 07:01  -  09 Jul 2023 07:00  --------------------------------------------------------  IN: 1225 mL / OUT: 850 mL / NET: 375 mL        PHYSICAL EXAM:  Vital Signs Last 24 Hrs  T(C): 36.5 (09 Jul 2023 05:14), Max: 36.5 (09 Jul 2023 05:14)  T(F): 97.7 (09 Jul 2023 05:14), Max: 97.7 (09 Jul 2023 05:14)  HR: 89 (09 Jul 2023 05:14) (68 - 89)  BP: 111/74 (09 Jul 2023 05:14) (111/74 - 133/86)  BP(mean): --  RR: 18 (09 Jul 2023 05:14) (18 - 18)  SpO2: 97% (09 Jul 2023 05:14) (95% - 98%)    Parameters below as of 08 Jul 2023 11:53  Patient On (Oxygen Delivery Method): room air    CONSTITUTIONAL: NAD, well-groomed  ENMT: Moist oral mucosa, no pharyngeal injection or exudates; normal dentition  RESPIRATORY: Normal respiratory effort; lungs are clear to auscultation bilaterally  CARDIOVASCULAR: Regular rate and rhythm; No lower extremity edema  ABDOMEN: Nontender to palpation, normoactive bowel sounds  PSYCH: A+O x3; affect appropriate  NEUROLOGY: CN 2-12 are intact and symmetric; no gross sensory deficits;   SKIN: No rashes; no palpable lesions    LABS:    07-09    149<H>  |  115<H>  |  40.6<H>  ----------------------------<  69<L>  4.4   |  22.0  |  1.28    Ca    8.8      09 Jul 2023 06:16            Urinalysis Basic - ( 09 Jul 2023 06:16 )    Color: x / Appearance: x / SG: x / pH: x  Gluc: 69 mg/dL / Ketone: x  / Bili: x / Urobili: x   Blood: x / Protein: x / Nitrite: x   Leuk Esterase: x / RBC: x / WBC x   Sq Epi: x / Non Sq Epi: x / Bacteria: x        CAPILLARY BLOOD GLUCOSE          RADIOLOGY & ADDITIONAL TESTS:  Results Reviewed:   Imaging Personally Reviewed:  Electrocardiogram Personally Reviewed:
Kerline Simmons M.D.    Patient is a 57y old  Female who presents with a chief complaint of altered mental state (11 Jul 2023 11:07)      SUBJECTIVE / OVERNIGHT EVENTS: No event overnight.     Patient denies chest pain, SOB, abd pain, N/V, fever, chills, dysuria or any other complaints. All remainder ROS negative.     MEDICATIONS  (STANDING):  amLODIPine   Tablet 10 milliGRAM(s) Oral daily  desmopressin 0.3 milliGRAM(s) Oral two times a day  enoxaparin Injectable 40 milliGRAM(s) SubCutaneous every 24 hours  ferrous    sulfate Liquid 300 milliGRAM(s) Oral daily  folic acid 1 milliGRAM(s) Oral daily  levETIRAcetam 500 milliGRAM(s) Oral two times a day  multivitamin/minerals 1 Tablet(s) Oral daily  pantoprazole    Tablet 40 milliGRAM(s) Oral before breakfast    MEDICATIONS  (PRN):  acetaminophen     Tablet .. 650 milliGRAM(s) Oral every 6 hours PRN Temp greater or equal to 38C (100.4F), Mild Pain (1 - 3)  aluminum hydroxide/magnesium hydroxide/simethicone Suspension 30 milliLiter(s) Oral every 4 hours PRN Dyspepsia  ondansetron Injectable 4 milliGRAM(s) IV Push every 8 hours PRN Nausea and/or Vomiting      I&O's Summary    10 Jul 2023 07:01  -  11 Jul 2023 07:00  --------------------------------------------------------  IN: 360 mL / OUT: 600 mL / NET: -240 mL        PHYSICAL EXAM:  Vital Signs Last 24 Hrs  T(C): 36.3 (11 Jul 2023 11:31), Max: 37 (10 Jul 2023 16:57)  T(F): 97.4 (11 Jul 2023 11:31), Max: 98.6 (10 Jul 2023 16:57)  HR: 76 (11 Jul 2023 11:31) (75 - 77)  BP: 109/56 (11 Jul 2023 11:31) (109/56 - 127/77)  BP(mean): --  RR: 18 (11 Jul 2023 11:31) (18 - 18)  SpO2: 96% (11 Jul 2023 11:31) (96% - 98%)    Parameters below as of 11 Jul 2023 11:31  Patient On (Oxygen Delivery Method): room air      CONSTITUTIONAL: NAD, well-groomed  ENMT: Moist oral mucosa, no pharyngeal injection or exudates; normal dentition  RESPIRATORY: Normal respiratory effort; lungs are clear to auscultation bilaterally  CARDIOVASCULAR: Regular rate and rhythm; No lower extremity edema  ABDOMEN: Nontender to palpation, normoactive bowel sounds  PSYCH: A+O x3; affect appropriate  NEUROLOGY: CN 2-12 are intact and symmetric; no gross sensory deficits;   SKIN: No rashes; no palpable lesions    LABS:    07-11    154<H>  |  121<H>  |  48.5<H>  ----------------------------<  72  4.7   |  22.0  |  1.31<H>    Ca    9.1      11 Jul 2023 05:22  Mg     2.0     07-10            Urinalysis Basic - ( 11 Jul 2023 05:22 )    Color: x / Appearance: x / SG: x / pH: x  Gluc: 72 mg/dL / Ketone: x  / Bili: x / Urobili: x   Blood: x / Protein: x / Nitrite: x   Leuk Esterase: x / RBC: x / WBC x   Sq Epi: x / Non Sq Epi: x / Bacteria: x        CAPILLARY BLOOD GLUCOSE          RADIOLOGY & ADDITIONAL TESTS:  Results Reviewed:   Imaging Personally Reviewed:  Electrocardiogram Personally Reviewed:
NEPHROLOGY INTERVAL HPI/OVERNIGHT EVENTS:    Examined  Feels well  No distress  Denies HA CP no SOB    MEDICATIONS  (STANDING):  amLODIPine   Tablet 5 milliGRAM(s) Oral daily  desmopressin 0.1 milliGRAM(s) Oral two times a day  enoxaparin Injectable 40 milliGRAM(s) SubCutaneous every 24 hours  ferrous    sulfate Liquid 300 milliGRAM(s) Oral daily  folic acid 1 milliGRAM(s) Oral daily  levETIRAcetam 500 milliGRAM(s) Oral two times a day  multivitamin/minerals 1 Tablet(s) Oral daily  pantoprazole    Tablet 40 milliGRAM(s) Oral before breakfast  sodium bicarbonate 650 milliGRAM(s) Oral two times a day    MEDICATIONS  (PRN):  acetaminophen     Tablet .. 650 milliGRAM(s) Oral every 6 hours PRN Temp greater or equal to 38C (100.4F), Mild Pain (1 - 3)  aluminum hydroxide/magnesium hydroxide/simethicone Suspension 30 milliLiter(s) Oral every 4 hours PRN Dyspepsia  ondansetron Injectable 4 milliGRAM(s) IV Push every 8 hours PRN Nausea and/or Vomiting      Allergies    No Known Allergies    Intolerances        Vital Signs Last 24 Hrs  T(C): 36.7 (02 Jul 2023 05:07), Max: 36.7 (02 Jul 2023 05:07)  T(F): 98.1 (02 Jul 2023 05:07), Max: 98.1 (02 Jul 2023 05:07)  HR: 76 (02 Jul 2023 05:07) (72 - 76)  BP: 155/87 (02 Jul 2023 05:07) (144/62 - 164/90)  BP(mean): --  RR: 18 (02 Jul 2023 05:07) (18 - 18)  SpO2: 96% (02 Jul 2023 05:07) (95% - 96%)    Parameters below as of 01 Jul 2023 21:03  Patient On (Oxygen Delivery Method): room air    PHYSICAL EXAM:  GENERAL: NAD  HEENT: NCAT  NECK: Supple  NERVOUS SYSTEM: Alert & Oriented X3  CHEST/LUNG: Clear  bilaterally  HEART: Regular rate and rhythm  ABDOMEN: Soft, Nontender, +BS  EXTREMITIES: no edema    LABS:    07-02    146<H>  |  111<H>  |  28.1<H>  ----------------------------<  67<L>  4.4   |  25.0  |  1.05    Ca    9.1      02 Jul 2023 07:42        Urinalysis Basic - ( 02 Jul 2023 07:42 )    Color: x / Appearance: x / SG: x / pH: x  Gluc: 67 mg/dL / Ketone: x  / Bili: x / Urobili: x   Blood: x / Protein: x / Nitrite: x   Leuk Esterase: x / RBC: x / WBC x   Sq Epi: x / Non Sq Epi: x / Bacteria: x              RADIOLOGY & ADDITIONAL TESTS:  
NEPHROLOGY INTERVAL HPI/OVERNIGHT EVENTS:  no acute overnight events noted  comfortable, no distress    MEDICATIONS  (STANDING):  amLODIPine   Tablet 10 milliGRAM(s) Oral daily  desmopressin 0.3 milliGRAM(s) Oral two times a day  enoxaparin Injectable 40 milliGRAM(s) SubCutaneous every 24 hours  ferrous    sulfate Liquid 300 milliGRAM(s) Oral daily  folic acid 1 milliGRAM(s) Oral daily  levETIRAcetam 500 milliGRAM(s) Oral two times a day  multivitamin/minerals 1 Tablet(s) Oral daily  pantoprazole    Tablet 40 milliGRAM(s) Oral before breakfast    MEDICATIONS  (PRN):  acetaminophen     Tablet .. 650 milliGRAM(s) Oral every 6 hours PRN Temp greater or equal to 38C (100.4F), Mild Pain (1 - 3)  aluminum hydroxide/magnesium hydroxide/simethicone Suspension 30 milliLiter(s) Oral every 4 hours PRN Dyspepsia  ondansetron Injectable 4 milliGRAM(s) IV Push every 8 hours PRN Nausea and/or Vomiting      Allergies    No Known Allergies          Vital Signs Last 24 Hrs  T(C): 36.4 (14 Jul 2023 04:23), Max: 36.4 (13 Jul 2023 11:41)  T(F): 97.5 (14 Jul 2023 04:23), Max: 97.5 (13 Jul 2023 11:41)  HR: 87 (14 Jul 2023 04:23) (75 - 87)  BP: 126/86 (14 Jul 2023 04:23) (126/86 - 134/87)  BP(mean): --  RR: 18 (14 Jul 2023 04:23) (18 - 18)  SpO2: 96% (14 Jul 2023 04:23) (95% - 96%)    Parameters below as of 13 Jul 2023 17:05  Patient On (Oxygen Delivery Method): room air        PHYSICAL EXAM:  GENERAL: Comfortable  HEENT: Moist mucous membranes  NECK: Supple; no JVD  NERVOUS SYSTEM:  Alert & Oriented X3  CHEST/LUNG: Clear  bilaterally  HEART: Regular rate and rhythm  ABDOMEN: Soft, Nontender, +BS  EXTREMITIES: no edema      LABS:    07-13    151<H>  |  118<H>  |  50.0<H>  ----------------------------<  70  4.6   |  22.0  |  1.17    Ca    9.2      13 Jul 2023 05:36        Urinalysis Basic - ( 13 Jul 2023 05:36 )    Color: x / Appearance: x / SG: x / pH: x  Gluc: 70 mg/dL / Ketone: x  / Bili: x / Urobili: x   Blood: x / Protein: x / Nitrite: x   Leuk Esterase: x / RBC: x / WBC x   Sq Epi: x / Non Sq Epi: x / Bacteria: x          RADIOLOGY & ADDITIONAL TESTS:  
Patient denied N/V/D. Serum sodium is relatively unchanged. Last sodium is 153meQ/L. Patient remains compliant with oral intake of fluids as confirmed by RN. Currently on D5 1/4 NS at 60cc/hr.     Vital Signs Last 24 Hrs  T(C): 37.1 (08 Jun 2023 11:43), Max: 37.1 (08 Jun 2023 11:43)  T(F): 98.7 (08 Jun 2023 11:43), Max: 98.7 (08 Jun 2023 11:43)  HR: 76 (08 Jun 2023 11:43) (63 - 76)  BP: 108/76 (08 Jun 2023 11:43) (100/67 - 133/86)  BP(mean): --  RR: 18 (08 Jun 2023 11:43) (18 - 19)  SpO2: 97% (08 Jun 2023 11:43) (95% - 98%)    Parameters below as of 08 Jun 2023 11:43  Patient On (Oxygen Delivery Method): room air    Physical Exam  General: Pleasant obese female in NAD  HEENT: Normocephalic  Cardiac: S1S2 RRR  Respiratory: CTAB  Abdomen: Soft, NT  Extremities: No appreciable edema  Neuro: Alert and awake; poor historian    06-08    153<H>  |  119<H>  |  26.3<H>  ----------------------------<  99  4.3   |  24.0  |  1.00    Ca    8.9      08 Jun 2023 03:26  Mg     2.0     06-07    TPro  7.8  /  Alb  4.1  /  TBili  0.4  /  DBili  x   /  AST  22  /  ALT  25  /  AlkPhos  117  06-07                        9.9    3.99  )-----------( 166      ( 08 Jun 2023 03:26 )             33.2     MEDICATIONS  (STANDING):  amLODIPine   Tablet 10 milliGRAM(s) Oral daily  cefTRIAXone Injectable. 1000 milliGRAM(s) IV Push every 24 hours  desmopressin 0.1 milliGRAM(s) Oral <User Schedule>  dextrose 5% + sodium chloride 0.225%. 1000 milliLiter(s) (60 mL/Hr) IV Continuous <Continuous>  enoxaparin Injectable 40 milliGRAM(s) SubCutaneous every 24 hours  folic acid 1 milliGRAM(s) Oral daily  levETIRAcetam 500 milliGRAM(s) Oral two times a day  multivitamin/minerals 1 Tablet(s) Oral daily  pantoprazole    Tablet 40 milliGRAM(s) Oral before breakfast    MEDICATIONS  (PRN):  acetaminophen     Tablet .. 650 milliGRAM(s) Oral every 6 hours PRN Temp greater or equal to 38C (100.4F), Mild Pain (1 - 3)  aluminum hydroxide/magnesium hydroxide/simethicone Suspension 30 milliLiter(s) Oral every 4 hours PRN Dyspepsia  ondansetron Injectable 4 milliGRAM(s) IV Push every 8 hours PRN Nausea and/or Vomiting      
Patient seen and examined    I&O's Summary    15 [place] 2023 07:01  -  16 Jun 2023 07:00  --------------------------------------------------------  IN: 796 mL / OUT: 0 mL / NET: 796 mL    16 Jun 2023 07:01  -  16 Jun 2023 15:38  --------------------------------------------------------  IN: 500 mL / OUT: 0 mL / NET: 500 mL        REVIEW OF SYSTEMS:    CONSTITUTIONAL: No F/C  RESPIRATORY: No cough,  No SOB  CARDIOVASCULAR: No CP/palpitations,    GASTROINTESTINAL: No abdominal pain  or NVD   GENITOURINARY: No UTI sx  NEUROLOGICAL: No headaches, NO wk/numbness  MUSCULOSKELETAL:   EXTREMITIES : no swelling,    Vital Signs Last 24 Hrs  T(C): 37 (16 Jun 2023 12:01), Max: 37 (16 Jun 2023 12:01)  T(F): 98.6 (16 Jun 2023 12:01), Max: 98.6 (16 Jun 2023 12:01)  HR: 87 (16 Jun 2023 12:01) (82 - 94)  BP: 122/80 (16 Jun 2023 12:01) (122/80 - 128/76)  BP(mean): --  RR: 18 (16 Jun 2023 12:01) (18 - 18)  SpO2: 97% (16 Jun 2023 12:01) (96% - 97%)    Parameters below as of 16 Jun 2023 05:02  Patient On (Oxygen Delivery Method): room air        PHYSICAL EXAM:    GENERAL: NAD,   EYES:  conjunctiva and sclera clear  NECK: Supple, No JVD/Bruit  NERVOUS SYSTEM:  A/O x3,   CHEST:  No rales or rhonchi  HEART:  RRR, No murmur  ABDOMEN: Soft, NT/ND BS+  EXTREMITIES:  No Edema;  SKIN: No rashes    LABS:                          8.7    4.16  )-----------( 170      ( 16 Jun 2023 05:00 )             29.4     06-16    150<H>  |  115<H>  |  45.7<H>  ----------------------------<  81  5.0   |  25.0  |  1.44<H>    Ca    8.7      16 Jun 2023 05:00  Phos  4.2     06-16  Mg     2.4     06-16        MEDICATIONS  (STANDING):  acetaminophen     Tablet .. PRN  aluminum hydroxide/magnesium hydroxide/simethicone Suspension PRN  desmopressin  enoxaparin Injectable  folic acid  levETIRAcetam  multivitamin/minerals  ondansetron Injectable PRN  pantoprazole    Tablet  sodium chloride 0.225%.      
[facility] Division of Hospital Medicine    SUBJECTIVE / OVERNIGHT EVENTS:  Patient is pleasant, friendly, conversational  No events  Inquired to her Na level and requested update as to plan.  Patient denies chest pain, SOB, abd pain, N/V, fever, chills, dysuria or any other complaints. All remainder ROS negative.     MEDICATIONS  (STANDING):  desmopressin 0.1 milliGRAM(s) Oral two times a day  enoxaparin Injectable 40 milliGRAM(s) SubCutaneous every 24 hours  folic acid 1 milliGRAM(s) Oral daily  levETIRAcetam 500 milliGRAM(s) Oral two times a day  multivitamin/minerals 1 Tablet(s) Oral daily  pantoprazole    Tablet 40 milliGRAM(s) Oral before breakfast  sodium chloride 0.225%. 1000 milliLiter(s) (70 mL/Hr) IV Continuous <Continuous>    MEDICATIONS  (PRN):  acetaminophen     Tablet .. 650 milliGRAM(s) Oral every 6 hours PRN Temp greater or equal to 38C (100.4F), Mild Pain (1 - 3)  aluminum hydroxide/magnesium hydroxide/simethicone Suspension 30 milliLiter(s) Oral every 4 hours PRN Dyspepsia  ondansetron Injectable 4 milliGRAM(s) IV Push every 8 hours PRN Nausea and/or Vomiting        I&O's Summary      PHYSICAL EXAM:  Vital Signs Last 24 Hrs  T(C): 37.1 (15 [place]quito 2023 11:07), Max: 37.2 (15 Flaquito 2023 04:48)  T(F): 98.8 (15 Flaquito 2023 11:07), Max: 99 (15 Flaquito 2023 04:48)  HR: 94 (15 [place]quito 2023 11:07) (55 - 94)  BP: 112/81 (15 [place]quito 2023 11:07) (112/80 - 121/79)  BP(mean): --  RR: 18 (15 [place]quito 2023 11:07) (18 - 18)  SpO2: 96% (15 Flaquito 2023 11:07) (95% - 96%)            CONSTITUTIONAL: NAD, appears stated age  ENMT: Moist oral mucosa, no pharyngeal injection or exudates; normal dentition  RESPIRATORY: Normal respiratory effort; clear to auscultation bilaterally  CARDIOVASCULAR: Regular rate and rhythm, normal S1 and S2, no murmur/rub/gallop; Peripheral pulses are 2+ bilaterally  ABDOMEN: Nontender to palpation, normoactive bowel sounds, no rebound/guarding;   MUSCLOSKELETAL:  No clubbing or cyanosis of digits; no joint swelling or tenderness to palpation  PSYCH: A+O to person, place, and time; affect appropriate  NEUROLOGY: CN 2-12 are intact and symmetric; no gross sensory deficits;   SKIN: No rashes; no palpable lesions    LABS:                        9.2    4.32  )-----------( 190      ( 15 [place]quito 2023 05:52 )             31.2     06-15    153<H>  |  116<H>  |  49.5<H>  ----------------------------<  81  5.1   |  25.0  |  1.43<H>    Ca    8.8      15 [place] 2023 05:52  Phos  3.7     06-15  Mg     2.6     06-15                CAPILLARY BLOOD GLUCOSE            RADIOLOGY & ADDITIONAL TESTS:  Results Reviewed:   Imaging Personally Reviewed:  Electrocardiogram Personally Reviewed:                                          
Hyperosmolality with hypernatremia    HPI:  57y/oF PMH SAH s/p Brain aneurysm clippingx 2, DI, Seizures 2/2 hypo or  hypernatremia was BIBA after she was was reported to have wandered off. pt called police " I got lost". In ER, She was found to be altered, confused, daughter reported issues with sodium in past. Labs significant for Na 152. She has significant h/o PICC line hydration at home  for hypernatremia. her PICC line came out in May 2023. Patient is forgetful, Unable to provide much history thinks she has been doing well at home, denies any fevers, diarrhea.   Called daughter to get collateral information- no response, left voicemail. med hx obtained from Dr Garcia  (07 Jun 2023 15:44)    Interval History:  Patient was seen and examined at bedside around 10:30 am.  Feels better.   No active complaints.   Denies headache, dizziness, visual symptoms, nausea, vomiting, abdominal / chest pain, palpitations or shortness of breath.    ROS:  As per interval history otherwise unremarkable.    PHYSICAL EXAM:  Vital Signs   T(C): 36.4 (05 Jul 2023 11:41), Max: 36.7 (05 Jul 2023 04:57)  T(F): 97.5 (05 Jul 2023 11:41), Max: 98 (05 Jul 2023 04:57)  HR: 67 (05 Jul 2023 11:41) (67 - 75)  BP: 125/87 (05 Jul 2023 11:41) (125/87 - 140/89)  RR: 18 (05 Jul 2023 11:41) (16 - 18)  SpO2: 94% (05 Jul 2023 11:41) (94% - 98%)  Parameters below as of 05 Jul 2023 11:41  Patient On (Oxygen Delivery Method): room air  General: Middle aged female sitting in bed comfortably. No acute distress  HEENT: EOMI. Clear conjunctivae. Moist mucus membrane  Neck: Supple.   Chest: CTA bilaterally. No wheezing, rales or rhonchi.   Heart: Normal S1 & S2. RRR.   Abdomen: Obese. Soft. Non-tender. + BS  Ext: No pedal edema. No calf tenderness   Neuro: AAO x 3. No focal deficit. No speech disorder  Skin: Warm and Dry  Psychiatry: Normal mood and affect    LABS:                        9.6    3.59  )-----------( 216      ( 05 Jul 2023 05:50 )             32.6     07-05    151<H>  |  116<H>  |  29.8<H>  ----------------------------<  71  4.6   |  25.0  |  1.07    Ca    8.9      05 Jul 2023 05:50  Mg     2.2     07-05    RADIOLOGY & ADDITIONAL STUDIES:  Reviewed     MEDICATIONS  (STANDING):  amLODIPine   Tablet 10 milliGRAM(s) Oral daily  desmopressin 0.2 milliGRAM(s) Oral two times a day  enoxaparin Injectable 40 milliGRAM(s) SubCutaneous every 24 hours  ferrous    sulfate Liquid 300 milliGRAM(s) Oral daily  folic acid 1 milliGRAM(s) Oral daily  levETIRAcetam 500 milliGRAM(s) Oral two times a day  multivitamin/minerals 1 Tablet(s) Oral daily  pantoprazole    Tablet 40 milliGRAM(s) Oral before breakfast    MEDICATIONS  (PRN):  acetaminophen     Tablet .. 650 milliGRAM(s) Oral every 6 hours PRN Temp greater or equal to 38C (100.4F), Mild Pain (1 - 3)  aluminum hydroxide/magnesium hydroxide/simethicone Suspension 30 milliLiter(s) Oral every 4 hours PRN Dyspepsia  ondansetron Injectable 4 milliGRAM(s) IV Push every 8 hours PRN Nausea and/or Vomiting            
INTERVAL EVENTS:  Follow up DI, hyponatremia-hypernatremia    ROS: Denies chest pain, sob, abd pain.    MEDICATIONS  (STANDING):  desmopressin 0.1 milliGRAM(s) Oral two times a day  enoxaparin Injectable 40 milliGRAM(s) SubCutaneous every 24 hours  folic acid 1 milliGRAM(s) Oral daily  levETIRAcetam 500 milliGRAM(s) Oral two times a day  multivitamin/minerals 1 Tablet(s) Oral daily  pantoprazole    Tablet 40 milliGRAM(s) Oral before breakfast  sodium chloride 0.225%. 1000 milliLiter(s) (70 mL/Hr) IV Continuous <Continuous>    MEDICATIONS  (PRN):  acetaminophen     Tablet .. 650 milliGRAM(s) Oral every 6 hours PRN Temp greater or equal to 38C (100.4F), Mild Pain (1 - 3)  aluminum hydroxide/magnesium hydroxide/simethicone Suspension 30 milliLiter(s) Oral every 4 hours PRN Dyspepsia  ondansetron Injectable 4 milliGRAM(s) IV Push every 8 hours PRN Nausea and/or Vomiting    Allergies  No Known Allergies    Vital Signs Last 24 Hrs  T(C): 37 (16 Jun 2023 12:01), Max: 37 (16 Jun 2023 12:01)  T(F): 98.6 (16 Jun 2023 12:01), Max: 98.6 (16 Jun 2023 12:01)  HR: 87 (16 Jun 2023 12:01) (82 - 94)  BP: 122/80 (16 Jun 2023 12:01) (122/80 - 128/76)  BP(mean): --  RR: 18 (16 Jun 2023 12:01) (18 - 18)  SpO2: 97% (16 Jun 2023 12:01) (96% - 97%)    Parameters below as of 16 Jun 2023 05:02  Patient On (Oxygen Delivery Method): room air    PHYSICAL EXAM:  General: No apparent distress  Neck: Supple, trachea midline, no thyromegaly  Respiratory: Lungs clear bilaterally, normal rate, effort  Cardiac: +S1, S2, no m/r/g  GI: +BS, soft, non tender, non distended  Extremities: No peripheral edema, no pedal lesions  Neuro: A+O X3, no tremor      LABS:                        8.7    4.16  )-----------( 170      ( 16 Jun 2023 05:00 )             29.4     06-16    150<H>  |  115<H>  |  45.7<H>  ----------------------------<  81  5.0   |  25.0  |  1.44<H>    Ca    8.7      16 Jun 2023 05:00  Phos  4.2     06-16  Mg     2.4     06-16    Thyroid Stimulating Hormone, Serum: 2.20 uIU/mL (06-09-23 @ 10:05)  
INTERVAL EVENTS:  Follow up DI/hypernatremia    ROS: No complaints at time of exam    MEDICATIONS  (STANDING):  enoxaparin Injectable 40 milliGRAM(s) SubCutaneous every 24 hours  folic acid 1 milliGRAM(s) Oral daily  levETIRAcetam 500 milliGRAM(s) Oral two times a day  multivitamin/minerals 1 Tablet(s) Oral daily  pantoprazole    Tablet 40 milliGRAM(s) Oral before breakfast    MEDICATIONS  (PRN):  acetaminophen     Tablet .. 650 milliGRAM(s) Oral every 6 hours PRN Temp greater or equal to 38C (100.4F), Mild Pain (1 - 3)  aluminum hydroxide/magnesium hydroxide/simethicone Suspension 30 milliLiter(s) Oral every 4 hours PRN Dyspepsia  ondansetron Injectable 4 milliGRAM(s) IV Push every 8 hours PRN Nausea and/or Vomiting    Allergies  No Known Allergies    Vital Signs Last 24 Hrs  T(C): 36.4 (13 Jun 2023 10:45), Max: 37.1 (13 Jun 2023 05:09)  T(F): 97.6 (13 Jun 2023 10:45), Max: 98.7 (13 Jun 2023 05:09)  HR: 107 (13 Jun 2023 10:45) (72 - 107)  BP: 129/91 (13 Jun 2023 10:45) (120/76 - 129/91)  BP(mean): --  RR: 18 (13 Jun 2023 05:09) (18 - 20)  SpO2: 97% (13 Jun 2023 10:45) (97% - 97%)    Parameters below as of 13 Jun 2023 10:45  Patient On (Oxygen Delivery Method): room air    PHYSICAL EXAM:  General: No apparent distress  Neck: Supple, trachea midline, no thyromegaly  Respiratory: Lungs clear bilaterally, normal rate, effort  Cardiac: +S1, S2, no m/r/g  GI: +BS, soft, non tender, non distended  Extremities: Mild LE edema  Neuro: Alert, very pleasant, confused at times      LABS:                        10.5   5.17  )-----------( 182      ( 13 Jun 2023 06:20 )             36.2     06-13    151<H>  |  115<H>  |  48.2<H>  ----------------------------<  77  5.0   |  25.0  |  1.30    Ca    9.0      13 Jun 2023 06:20  Phos  3.5     06-13  Mg     3.4     06-13    Thyroid Stimulating Hormone, Serum: 2.20 uIU/mL (06-09-23 @ 10:05)  
INTERVAL EVENTS:  Follow up for DI, hyponatremia/hypernatremia    ROS: No complaints at time of exam    MEDICATIONS  (STANDING):  desmopressin 0.1 milliGRAM(s) Oral two times a day  enoxaparin Injectable 40 milliGRAM(s) SubCutaneous every 24 hours  folic acid 1 milliGRAM(s) Oral daily  levETIRAcetam 500 milliGRAM(s) Oral two times a day  multivitamin/minerals 1 Tablet(s) Oral daily  pantoprazole    Tablet 40 milliGRAM(s) Oral before breakfast    MEDICATIONS  (PRN):  acetaminophen     Tablet .. 650 milliGRAM(s) Oral every 6 hours PRN Temp greater or equal to 38C (100.4F), Mild Pain (1 - 3)  aluminum hydroxide/magnesium hydroxide/simethicone Suspension 30 milliLiter(s) Oral every 4 hours PRN Dyspepsia  ondansetron Injectable 4 milliGRAM(s) IV Push every 8 hours PRN Nausea and/or Vomiting    Allergies  No Known Allergies    Vital Signs Last 24 Hrs  T(C): 36.4 (23 Jun 2023 11:10), Max: 36.6 (22 Jun 2023 17:49)  T(F): 97.6 (23 Jun 2023 11:10), Max: 97.8 (22 Jun 2023 17:49)  HR: 100 (23 Jun 2023 11:10) (75 - 100)  BP: 145/91 (23 Jun 2023 11:10) (127/83 - 145/91)  BP(mean): --  RR: 20 (23 Jun 2023 11:10) (19 - 20)  SpO2: 97% (23 Jun 2023 11:10) (92% - 97%)    PHYSICAL EXAM:  General: No apparent distress  Neck: Supple, trachea midline, no thyromegaly  Respiratory: Lungs clear bilaterally, normal rate, effort  Cardiac: +S1, S2, no m/r/g  GI: +BS, soft, non tender, non distended  Extremities: Mild LE edema bilaterally  Neuro: A+O X3, no tremor    LABS  06-23    147<H>  |  112<H>  |  41.2<H>  ----------------------------<  67<L>  4.8   |  21.0<L>  |  1.16    Ca    9.0      23 Jun 2023 05:45  Phos  3.9     06-23  Mg     2.3     06-23      Urinalysis Basic - ( 23 Jun 2023 05:45 )    Color: x / Appearance: x / SG: x / pH: x  Gluc: 67 mg/dL / Ketone: x  / Bili: x / Urobili: x   Blood: x / Protein: x / Nitrite: x   Leuk Esterase: x / RBC: x / WBC x   Sq Epi: x / Non Sq Epi: x / Bacteria: x      Thyroid Stimulating Hormone, Serum: 2.20 uIU/mL (06-09-23 @ 10:05)  
INTERVAL HPI/OVERNIGHT EVENTS:    CC: hypernatremia, h/o brain aneurysm, DI    No overnight events  denies complaints      Vital Signs Last 24 Hrs  T(C): 36.8 (19 Jun 2023 05:16), Max: 36.8 (19 Jun 2023 05:16)  T(F): 98.3 (19 Jun 2023 05:16), Max: 98.3 (19 Jun 2023 05:16)  HR: 83 (19 Jun 2023 05:16) (75 - 91)  BP: 131/77 (19 Jun 2023 05:16) (109/79 - 131/77)  BP(mean): --  RR: 18 (19 Jun 2023 05:16) (18 - 18)  SpO2: 95% (19 Jun 2023 05:16) (95% - 99%)    Parameters below as of 18 Jun 2023 21:21  Patient On (Oxygen Delivery Method): room air        PHYSICAL EXAM:    GENERAL: alert, not in distress  CHEST/LUNG: b/l air entry  HEART: reg  ABDOMEN: soft, bs+  EXTREMITIES:  no edema, tenderness    MEDICATIONS  (STANDING):  desmopressin 0.1 milliGRAM(s) Oral two times a day  enoxaparin Injectable 40 milliGRAM(s) SubCutaneous every 24 hours  folic acid 1 milliGRAM(s) Oral daily  hydrochlorothiazide 12.5 milliGRAM(s) Oral daily  levETIRAcetam 500 milliGRAM(s) Oral two times a day  multivitamin/minerals 1 Tablet(s) Oral daily  pantoprazole    Tablet 40 milliGRAM(s) Oral before breakfast    MEDICATIONS  (PRN):  acetaminophen     Tablet .. 650 milliGRAM(s) Oral every 6 hours PRN Temp greater or equal to 38C (100.4F), Mild Pain (1 - 3)  aluminum hydroxide/magnesium hydroxide/simethicone Suspension 30 milliLiter(s) Oral every 4 hours PRN Dyspepsia  ondansetron Injectable 4 milliGRAM(s) IV Push every 8 hours PRN Nausea and/or Vomiting      Allergies    No Known Allergies    Intolerances          LABS:                          9.9    4.71  )-----------( 180      ( 19 Jun 2023 05:53 )             34.9     06-19    149<H>  |  114<H>  |  39.9<H>  ----------------------------<  74  4.8   |  24.0  |  1.29    Ca    9.1      19 Jun 2023 05:53  Phos  4.3     06-19  Mg     2.4     06-19            RADIOLOGY & ADDITIONAL TESTS:  
Kerline Simmons M.D.    Patient is a 57y old  Female who presents with a chief complaint of altered mental state (09 Jul 2023 11:27)      SUBJECTIVE / OVERNIGHT EVENTS: No concerns.     Patient denies chest pain, SOB, abd pain, N/V, fever, chills, dysuria or any other complaints. All remainder ROS negative.     MEDICATIONS  (STANDING):  amLODIPine   Tablet 10 milliGRAM(s) Oral daily  desmopressin 0.3 milliGRAM(s) Oral two times a day  enoxaparin Injectable 40 milliGRAM(s) SubCutaneous every 24 hours  ferrous    sulfate Liquid 300 milliGRAM(s) Oral daily  folic acid 1 milliGRAM(s) Oral daily  levETIRAcetam 500 milliGRAM(s) Oral two times a day  multivitamin/minerals 1 Tablet(s) Oral daily  pantoprazole    Tablet 40 milliGRAM(s) Oral before breakfast    MEDICATIONS  (PRN):  acetaminophen     Tablet .. 650 milliGRAM(s) Oral every 6 hours PRN Temp greater or equal to 38C (100.4F), Mild Pain (1 - 3)  aluminum hydroxide/magnesium hydroxide/simethicone Suspension 30 milliLiter(s) Oral every 4 hours PRN Dyspepsia  ondansetron Injectable 4 milliGRAM(s) IV Push every 8 hours PRN Nausea and/or Vomiting      I&O's Summary    09 Jul 2023 07:01  -  10 Jul 2023 07:00  --------------------------------------------------------  IN: 240 mL / OUT: 1600 mL / NET: -1360 mL        PHYSICAL EXAM:  Vital Signs Last 24 Hrs  T(C): 36.5 (10 Jul 2023 04:58), Max: 36.5 (10 Jul 2023 04:58)  T(F): 97.7 (10 Jul 2023 04:58), Max: 97.7 (10 Jul 2023 04:58)  HR: 96 (10 Jul 2023 04:58) (75 - 96)  BP: 123/81 (10 Jul 2023 04:58) (123/81 - 144/73)  BP(mean): --  RR: 18 (10 Jul 2023 04:58) (18 - 18)  SpO2: 96% (10 Jul 2023 04:58) (96% - 97%)    Parameters below as of 09 Jul 2023 11:50  Patient On (Oxygen Delivery Method): room air    CONSTITUTIONAL: NAD, well-groomed  ENMT: Moist oral mucosa, no pharyngeal injection or exudates; normal dentition  RESPIRATORY: Normal respiratory effort; lungs are clear to auscultation bilaterally  CARDIOVASCULAR: Regular rate and rhythm; No lower extremity edema  ABDOMEN: Nontender to palpation, normoactive bowel sounds  PSYCH: A+O x3; affect appropriate  NEUROLOGY: CN 2-12 are intact and symmetric; no gross sensory deficits;   SKIN: No rashes; no palpable lesions    LABS:    07-10    148<H>  |  117<H>  |  44.2<H>  ----------------------------<  71  4.8   |  23.0  |  1.18    Ca    8.8      10 Jul 2023 05:01  Mg     2.0     07-10            Urinalysis Basic - ( 10 Jul 2023 05:01 )    Color: x / Appearance: x / SG: x / pH: x  Gluc: 71 mg/dL / Ketone: x  / Bili: x / Urobili: x   Blood: x / Protein: x / Nitrite: x   Leuk Esterase: x / RBC: x / WBC x   Sq Epi: x / Non Sq Epi: x / Bacteria: x        CAPILLARY BLOOD GLUCOSE          RADIOLOGY & ADDITIONAL TESTS:  Results Reviewed:   Imaging Personally Reviewed:  Electrocardiogram Personally Reviewed:
[facility] Division of Hospital Medicine      SUBJECTIVE / OVERNIGHT EVENTS:  No events  Patient is pleasant. happy that sodium is improving. understands that it is not at goal yet   Patient denies chest pain, SOB, abd pain, N/V, fever, chills, dysuria or any other complaints. All remainder ROS negative.     MEDICATIONS  (STANDING):  desmopressin 0.1 milliGRAM(s) Oral two times a day  enoxaparin Injectable 40 milliGRAM(s) SubCutaneous every 24 hours  folic acid 1 milliGRAM(s) Oral daily  levETIRAcetam 500 milliGRAM(s) Oral two times a day  multivitamin/minerals 1 Tablet(s) Oral daily  pantoprazole    Tablet 40 milliGRAM(s) Oral before breakfast  sodium chloride 0.225%. 1000 milliLiter(s) (70 mL/Hr) IV Continuous <Continuous>    MEDICATIONS  (PRN):  acetaminophen     Tablet .. 650 milliGRAM(s) Oral every 6 hours PRN Temp greater or equal to 38C (100.4F), Mild Pain (1 - 3)  aluminum hydroxide/magnesium hydroxide/simethicone Suspension 30 milliLiter(s) Oral every 4 hours PRN Dyspepsia  ondansetron Injectable 4 milliGRAM(s) IV Push every 8 hours PRN Nausea and/or Vomiting        I&O's Summary    15 [place] 2023 07:01  -  16 Jun 2023 07:00  --------------------------------------------------------  IN: 796 mL / OUT: 0 mL / NET: 796 mL    16 Jun 2023 07:01  -  16 Jun 2023 15:21  --------------------------------------------------------  IN: 500 mL / OUT: 0 mL / NET: 500 mL        PHYSICAL EXAM:  Vital Signs Last 24 Hrs  T(C): 37 (16 Jun 2023 12:01), Max: 37 (16 Jun 2023 12:01)  T(F): 98.6 (16 Jun 2023 12:01), Max: 98.6 (16 Jun 2023 12:01)  HR: 87 (16 Jun 2023 12:01) (82 - 94)  BP: 122/80 (16 Jun 2023 12:01) (122/80 - 128/76)  BP(mean): --  RR: 18 (16 Jun 2023 12:01) (18 - 18)  SpO2: 97% (16 Jun 2023 12:01) (96% - 97%)    Parameters below as of 16 Jun 2023 05:02  Patient On (Oxygen Delivery Method): room air            CONSTITUTIONAL: NAD, appears stated age  ENMT: Moist oral mucosa, no pharyngeal injection or exudates; normal dentition  RESPIRATORY: Normal respiratory effort; clear to auscultation bilaterally  CARDIOVASCULAR: Regular rate and rhythm, normal S1 and S2, no murmur/rub/gallop; Peripheral pulses are 2+ bilaterally  ABDOMEN: Nontender to palpation, normoactive bowel sounds, no rebound/guarding;   MUSCLOSKELETAL:  No clubbing or cyanosis of digits; no joint swelling or tenderness to palpation  PSYCH: A+O to person, place, and time; affect appropriate  NEUROLOGY: CN 2-12 are intact and symmetric; no gross sensory deficits;   SKIN: No rashes; no palpable lesions    LABS:                        8.7    4.16  )-----------( 170      ( 16 Jun 2023 05:00 )             29.4     06-16    150<H>  |  115<H>  |  45.7<H>  ----------------------------<  81  5.0   |  25.0  |  1.44<H>    Ca    8.7      16 Jun 2023 05:00  Phos  4.2     06-16  Mg     2.4     06-16                CAPILLARY BLOOD GLUCOSE            RADIOLOGY & ADDITIONAL TESTS:  Results Reviewed:   Imaging Personally Reviewed:  Electrocardiogram Personally Reviewed:                                          
NEPHROLOGY INTERVAL HPI/OVERNIGHT EVENTS:    Feels better   UO not recorded   She says she is drinking water     MEDICATIONS  (STANDING):  desmopressin 0.1 milliGRAM(s) Oral two times a day  enoxaparin Injectable 40 milliGRAM(s) SubCutaneous every 24 hours  folic acid 1 milliGRAM(s) Oral daily  levETIRAcetam 500 milliGRAM(s) Oral two times a day  multivitamin/minerals 1 Tablet(s) Oral daily  pantoprazole    Tablet 40 milliGRAM(s) Oral before breakfast    MEDICATIONS  (PRN):  acetaminophen     Tablet .. 650 milliGRAM(s) Oral every 6 hours PRN Temp greater or equal to 38C (100.4F), Mild Pain (1 - 3)  aluminum hydroxide/magnesium hydroxide/simethicone Suspension 30 milliLiter(s) Oral every 4 hours PRN Dyspepsia  ondansetron Injectable 4 milliGRAM(s) IV Push every 8 hours PRN Nausea and/or Vomiting      Allergies    No Known Allergies    Intolerances            Vital Signs Last 24 Hrs  T(C): 37.2 (15 Flaquito 2023 04:48), Max: 37.2 (15 Flaquito 2023 04:48)  T(F): 99 (15 Flaquito 2023 04:48), Max: 99 (15 Flaquito 2023 04:48)  HR: 55 (15 Flaquito 2023 04:48) (55 - 94)  BP: 121/79 (15 Flaquito 2023 04:48) (112/80 - 172/76)  BP(mean): --  RR: 18 (15 Flaquito 2023 04:48) (18 - 18)  SpO2: 95% (15 Flaquito 2023 04:48) (94% - 96%)    Parameters below as of 14 Jun 2023 11:42  Patient On (Oxygen Delivery Method): room air      Daily     Daily   I&O's Detail    I&O's Summary      PHYSICAL EXAM:      HEAD: NCAT  EYES: EOMI  NECK: Supple  NERVOUS SYSTEM:  Alert & Oriented X3  CHEST/LUNG: Clear to percussion bilaterally  HEART: Regular rate and rhythm  ABDOMEN: Soft, Nontender, Nondistended; =BS  EXTREMITIES: no edema  LABS:                        9.2    4.32  )-----------( 190      ( 15 Flaquito 2023 05:52 )             31.2     06-15    153<H>  |  116<H>  |  49.5<H>  ----------------------------<  81  5.1   |  25.0  |  1.43<H>    Ca    8.8      15 [place] 2023 05:52  Phos  3.7     06-15  Mg     2.6     06-15          Magnesium, Serum: 2.6 mg/dL (06-15 @ 05:52)  Phosphorus Level, Serum: 3.7 mg/dL (06-15 @ 05:52)          RADIOLOGY & ADDITIONAL TESTS:  
NEPHROLOGY INTERVAL HPI/OVERNIGHT EVENTS:    No new events.    MEDICATIONS  (STANDING):  amLODIPine   Tablet 10 milliGRAM(s) Oral daily  desmopressin 0.2 milliGRAM(s) Oral two times a day  desmopressin 0.2 milliGRAM(s) Oral two times a day  enoxaparin Injectable 40 milliGRAM(s) SubCutaneous every 24 hours  ferrous    sulfate Liquid 300 milliGRAM(s) Oral daily  folic acid 1 milliGRAM(s) Oral daily  levETIRAcetam 500 milliGRAM(s) Oral two times a day  multivitamin/minerals 1 Tablet(s) Oral daily  pantoprazole    Tablet 40 milliGRAM(s) Oral before breakfast  sodium bicarbonate 650 milliGRAM(s) Oral two times a day    MEDICATIONS  (PRN):  acetaminophen     Tablet .. 650 milliGRAM(s) Oral every 6 hours PRN Temp greater or equal to 38C (100.4F), Mild Pain (1 - 3)  aluminum hydroxide/magnesium hydroxide/simethicone Suspension 30 milliLiter(s) Oral every 4 hours PRN Dyspepsia  ondansetron Injectable 4 milliGRAM(s) IV Push every 8 hours PRN Nausea and/or Vomiting      Allergies    No Known Allergies          Vital Signs Last 24 Hrs  T(C): 36.7 (05 Jul 2023 04:57), Max: 36.7 (05 Jul 2023 04:57)  T(F): 98 (05 Jul 2023 04:57), Max: 98 (05 Jul 2023 04:57)  HR: 71 (05 Jul 2023 04:57) (68 - 77)  BP: 140/89 (05 Jul 2023 04:57) (115/73 - 140/89)  BP(mean): --  RR: 17 (05 Jul 2023 04:57) (16 - 18)  SpO2: 96% (05 Jul 2023 04:57) (94% - 98%)    Parameters below as of 05 Jul 2023 04:57  Patient On (Oxygen Delivery Method): room air      PHYSICAL EXAM:    GENERAL: comfortable in bed.  HEAD:  wnl  EYES:   ENMT:   NECK: veins  wnl  NERVOUS SYSTEM: alert, verbal   CHEST/LUNG: no 02, clear  HEART: no gallop  noted  ABDOMEN: nt  EXTREMITIES: same   LYMPH:   SKIN: no rash   neg    LABS:                        9.6    3.59  )-----------( 216      ( 05 Jul 2023 05:50 )             32.6     07-05    151<H>  |  116<H>  |  29.8<H>  ----------------------------<  71  4.6   |  25.0  |  1.07    Ca    8.9      05 Jul 2023 05:50  Mg     2.2     07-05        Urinalysis Basic - ( 05 Jul 2023 05:50 )    Color: x / Appearance: x / SG: x / pH: x  Gluc: 71 mg/dL / Ketone: x  / Bili: x / Urobili: x   Blood: x / Protein: x / Nitrite: x   Leuk Esterase: x / RBC: x / WBC x   Sq Epi: x / Non Sq Epi: x / Bacteria: x      Magnesium: 2.2 mg/dL (07-05 @ 05:50)          RADIOLOGY & ADDITIONAL TESTS:  
NEPHROLOGY INTERVAL HPI/OVERNIGHT EVENTS:    No new events.    MEDICATIONS  (STANDING):  amLODIPine   Tablet 10 milliGRAM(s) Oral daily  desmopressin 0.2 milliGRAM(s) Oral two times a day  desmopressin 0.2 milliGRAM(s) Oral two times a day  enoxaparin Injectable 40 milliGRAM(s) SubCutaneous every 24 hours  ferrous    sulfate Liquid 300 milliGRAM(s) Oral daily  folic acid 1 milliGRAM(s) Oral daily  levETIRAcetam 500 milliGRAM(s) Oral two times a day  multivitamin/minerals 1 Tablet(s) Oral daily  pantoprazole    Tablet 40 milliGRAM(s) Oral before breakfast  sodium bicarbonate 650 milliGRAM(s) Oral two times a day    MEDICATIONS  (PRN):  acetaminophen     Tablet .. 650 milliGRAM(s) Oral every 6 hours PRN Temp greater or equal to 38C (100.4F), Mild Pain (1 - 3)  aluminum hydroxide/magnesium hydroxide/simethicone Suspension 30 milliLiter(s) Oral every 4 hours PRN Dyspepsia  ondansetron Injectable 4 milliGRAM(s) IV Push every 8 hours PRN Nausea and/or Vomiting      Allergies    No Known Allergies      Vital Signs Last 24 Hrs  T(C): 36.5 (06 Jul 2023 04:56), Max: 36.5 (06 Jul 2023 04:56)  T(F): 97.7 (06 Jul 2023 04:56), Max: 97.7 (06 Jul 2023 04:56)  HR: 77 (06 Jul 2023 04:56) (67 - 90)  BP: 128/80 (06 Jul 2023 04:56) (124/74 - 141/89)  BP(mean): --  RR: 18 (06 Jul 2023 04:56) (16 - 18)  SpO2: 92% (06 Jul 2023 04:56) (92% - 95%)    Parameters below as of 06 Jul 2023 04:56  Patient On (Oxygen Delivery Method): room air      T(C): 36.7 (05 Jul 2023 04:57), Max: 36.7 (05 Jul 2023 04:57)  T(F): 98 (05 Jul 2023 04:57), Max: 98 (05 Jul 2023 04:57)  HR: 71 (05 Jul 2023 04:57) (68 - 77)  BP: 140/89 (05 Jul 2023 04:57) (115/73 - 140/89)  BP(mean): --  RR: 17 (05 Jul 2023 04:57) (16 - 18)  SpO2: 96% (05 Jul 2023 04:57) (94% - 98%)    Parameters below as of 05 Jul 2023 04:57  Patient On (Oxygen Delivery Method): room air      PHYSICAL EXAM:    GENERAL: comfortable in bed.  HEAD:  wnl  EYES:   ENMT:   NECK: veins  wnl  NERVOUS SYSTEM: alert, verbal   CHEST/LUNG: no 02, clear  HEART: no gallop  noted  ABDOMEN: nt  EXTREMITIES: same   LYMPH:   SKIN: no rash   neg    LABS:    07-06    147<H>  |  114<H>  |  38.1<H>  ----------------------------<  70  4.6   |  25.0  |  1.06    Ca    8.8      06 Jul 2023 06:20  Mg     2.2     07-05                            9.6    3.59  )-----------( 216      ( 05 Jul 2023 05:50 )             32.6     07-05    151<H>  |  116<H>  |  29.8<H>  ----------------------------<  71  4.6   |  25.0  |  1.07    Ca    8.9      05 Jul 2023 05:50  Mg     2.2     07-05        Urinalysis Basic - ( 05 Jul 2023 05:50 )    Color: x / Appearance: x / SG: x / pH: x  Gluc: 71 mg/dL / Ketone: x  / Bili: x / Urobili: x   Blood: x / Protein: x / Nitrite: x   Leuk Esterase: x / RBC: x / WBC x   Sq Epi: x / Non Sq Epi: x / Bacteria: x      Magnesium: 2.2 mg/dL (07-05 @ 05:50)          RADIOLOGY & ADDITIONAL TESTS:  
NEPHROLOGY INTERVAL HPI/OVERNIGHT EVENTS:    No new events.    MEDICATIONS  (STANDING):  desmopressin 0.1 milliGRAM(s) Oral two times a day  enoxaparin Injectable 40 milliGRAM(s) SubCutaneous every 24 hours  folic acid 1 milliGRAM(s) Oral daily  levETIRAcetam 500 milliGRAM(s) Oral two times a day  multivitamin/minerals 1 Tablet(s) Oral daily  pantoprazole    Tablet 40 milliGRAM(s) Oral before breakfast    MEDICATIONS  (PRN):  acetaminophen     Tablet .. 650 milliGRAM(s) Oral every 6 hours PRN Temp greater or equal to 38C (100.4F), Mild Pain (1 - 3)  aluminum hydroxide/magnesium hydroxide/simethicone Suspension 30 milliLiter(s) Oral every 4 hours PRN Dyspepsia  ondansetron Injectable 4 milliGRAM(s) IV Push every 8 hours PRN Nausea and/or Vomiting      Allergies    No Known Allergies        Vital Signs Last 24 Hrs  T(C): 36.6 (27 Jun 2023 05:08), Max: 36.6 (27 Jun 2023 05:08)  T(F): 97.8 (27 Jun 2023 05:08), Max: 97.8 (27 Jun 2023 05:08)  HR: 86 (27 Jun 2023 05:08) (67 - 86)  BP: 119/73 (27 Jun 2023 05:08) (119/73 - 146/90)  BP(mean): --  RR: 18 (27 Jun 2023 05:08) (18 - 18)  SpO2: 97% (27 Jun 2023 05:08) (96% - 98%)    Parameters below as of 27 Jun 2023 00:01  Patient On (Oxygen Delivery Method): room air      T(C): 36.4 (24 Jun 2023 04:51), Max: 36.9 (23 Jun 2023 17:35)  T(F): 97.6 (24 Jun 2023 04:51), Max: 98.4 (23 Jun 2023 17:35)  HR: 81 (24 Jun 2023 04:51) (70 - 81)  BP: 135/88 (24 Jun 2023 04:51) (135/88 - 151/98)  BP(mean): --  RR: 19 (24 Jun 2023 04:51) (19 - 20)  SpO2: 95% (24 Jun 2023 04:51) (95% - 98%)    Parameters below as of 23 Jun 2023 17:35  Patient On (Oxygen Delivery Method): room air, cdaley      T(C): 36.4 (23 Jun 2023 11:10), Max: 36.6 (22 Jun 2023 17:49)  T(F): 97.6 (23 Jun 2023 11:10), Max: 97.8 (22 Jun 2023 17:49)  HR: 100 (23 Jun 2023 11:10) (75 - 100)  BP: 145/91 (23 Jun 2023 11:10) (127/83 - 145/91)  BP(mean): --  RR: 20 (23 Jun 2023 11:10) (19 - 20)  SpO2: 97% (23 Jun 2023 11:10) (92% - 97%)        PHYSICAL EXAM:    GENERAL: Comfortable oob in  room  HEAD: NCAT  EYES: Open  NECK: Supple  NERVOUS SYSTEM:  Alert , verbal  CHEST/LUNG: Clear to percussion bilaterally  HEART: Regular rate and rhythm  ABDOMEN: Soft, Nontender, Nondistended; =BS  EXTREMITIES: no edema    LABS:    06-27    149<H>  |  116<H>  |  42.7<H>  ----------------------------<  71  5.1   |  21.0<L>  |  1.15    Ca    9.0      27 Jun 2023 05:29        06-24    142  |  109<H>  |  36.8<H>  ----------------------------<  87  4.7   |  21.0<L>  |  1.14    Ca    8.9      24 Jun 2023 12:45  Phos  3.9     06-23  Mg     2.3     06-23 06-23    147<H>  |  112<H>  |  41.2<H>  ----------------------------<  67<L>  4.8   |  21.0<L>  |  1.16    Ca    9.0      23 Jun 2023 05:45  Phos  3.9     06-23  Mg     2.3     06-23                            9.1    4.11  )-----------( 191      ( 21 Jun 2023 05:54 )             31.1     06-22    147<H>  |  111<H>  |  37.6<H>  ----------------------------<  72  4.9   |  24.0  |  1.18    Ca    8.9      22 Jun 2023 05:44  Mg     2.2     06-21    TPro  6.7  /  Alb  3.4  /  TBili  0.3<L>  /  DBili  0.1  /  AST  37<H>  /  ALT  56<H>  /  AlkPhos  157<H>  06-21      Urinalysis Basic - ( 22 Jun 2023 05:44 )    Color: x / Appearance: x / SG: x / pH: x  Gluc: 72 mg/dL / Ketone: x  / Bili: x / Urobili: x   Blood: x / Protein: x / Nitrite: x   Leuk Esterase: x / RBC: x / WBC x   Sq Epi: x / Non Sq Epi: x / Bacteria: x              RADIOLOGY & ADDITIONAL TESTS:  
[facility] Division of Hospital Medicine    SUBJECTIVE / OVERNIGHT EVENTS:  No events    Patient denies chest pain, SOB, abd pain, N/V, fever, chills, dysuria or any other complaints. All remainder ROS negative.     MEDICATIONS  (STANDING):  enoxaparin Injectable 40 milliGRAM(s) SubCutaneous every 24 hours  folic acid 1 milliGRAM(s) Oral daily  levETIRAcetam 500 milliGRAM(s) Oral two times a day  multivitamin/minerals 1 Tablet(s) Oral daily  pantoprazole    Tablet 40 milliGRAM(s) Oral before breakfast    MEDICATIONS  (PRN):  acetaminophen     Tablet .. 650 milliGRAM(s) Oral every 6 hours PRN Temp greater or equal to 38C (100.4F), Mild Pain (1 - 3)  aluminum hydroxide/magnesium hydroxide/simethicone Suspension 30 milliLiter(s) Oral every 4 hours PRN Dyspepsia  ondansetron Injectable 4 milliGRAM(s) IV Push every 8 hours PRN Nausea and/or Vomiting        I&O's Summary    12 Jun 2023 07:01  -  13 Jun 2023 07:00  --------------------------------------------------------  IN: 600 mL / OUT: 0 mL / NET: 600 mL        PHYSICAL EXAM:  Vital Signs Last 24 Hrs  T(C): 37.1 (13 Jun 2023 05:09), Max: 37.1 (13 Jun 2023 05:09)  T(F): 98.7 (13 Jun 2023 05:09), Max: 98.7 (13 Jun 2023 05:09)  HR: 72 (13 Jun 2023 05:09) (72 - 72)  BP: 120/76 (13 Jun 2023 05:09) (120/76 - 126/86)  BP(mean): --  RR: 18 (13 Jun 2023 05:09) (18 - 20)  SpO2: 97% (13 Jun 2023 05:09) (97% - 97%)    Parameters below as of 12 Jun 2023 17:35  Patient On (Oxygen Delivery Method): room air            CONSTITUTIONAL: NAD, appears stated age  ENMT: Moist oral mucosa, no pharyngeal injection or exudates; normal dentition  RESPIRATORY: Normal respiratory effort; clear to auscultation bilaterally  CARDIOVASCULAR: Regular rate and rhythm, normal S1 and S2, no murmur/rub/gallop; Peripheral pulses are 2+ bilaterally  ABDOMEN: Nontender to palpation, normoactive bowel sounds, no rebound/guarding;   MUSCLOSKELETAL:  No clubbing or cyanosis of digits; no joint swelling or tenderness to palpation  PSYCH: A+O to person, place, and time; affect appropriate  NEUROLOGY: CN 2-12 are intact and symmetric; no gross sensory deficits;   SKIN: No rashes; no palpable lesions    LABS:                        10.5   5.17  )-----------( 182      ( 13 Jun 2023 06:20 )             36.2     06-13    151<H>  |  115<H>  |  48.2<H>  ----------------------------<  77  5.0   |  25.0  |  1.30    Ca    9.0      13 Jun 2023 06:20  Phos  3.5     06-13  Mg     3.4     06-13                CAPILLARY BLOOD GLUCOSE            RADIOLOGY & ADDITIONAL TESTS:  Results Reviewed:   Imaging Personally Reviewed:  Electrocardiogram Personally Reviewed:                                          
Patient seen and examined    I&O's Summary    19 Jun 2023 07:01  -  20 Jun 2023 07:00  --------------------------------------------------------  IN: 1900 mL / OUT: 1800 mL / NET: 100 mL    OOb, Feels great  Though she drank much more yesterday - 3 pink jugs + more    REVIEW OF SYSTEMS:    CONSTITUTIONAL: No F/C  RESPIRATORY: No cough,  No SOB  CARDIOVASCULAR: No CP/palpitations,    GASTROINTESTINAL: No abdominal pain  or NVD   GENITOURINARY: No UTI sx  NEUROLOGICAL: No headaches, NO wk/numbness  MUSCULOSKELETAL:   EXTREMITIES : no swelling,    Vital Signs Last 24 Hrs  T(C): 36.5 (20 Jun 2023 11:31), Max: 37.6 (19 Jun 2023 18:15)  T(F): 97.7 (20 Jun 2023 11:31), Max: 99.6 (19 Jun 2023 18:15)  HR: 64 (20 Jun 2023 11:31) (64 - 87)  BP: 139/98 (20 Jun 2023 11:31) (114/80 - 139/98)  BP(mean): --  RR: 18 (20 Jun 2023 11:31) (18 - 18)  SpO2: 97% (20 Jun 2023 11:31) (95% - 97%)    Parameters below as of 20 Jun 2023 11:31  Patient On (Oxygen Delivery Method): room air        PHYSICAL EXAM:    GENERAL: NAD, Obese Pleasant  EYES:  conjunctiva and sclera clear  NECK: Supple, No JVD/Bruit  NERVOUS SYSTEM:  A/O x3,   CHEST:  No rales or rhonchi  HEART:  RRR, No murmur  ABDOMEN: Soft, NT/ND BS+  EXTREMITIES:  No/tr Edema;  SKIN: No rashes    LABS:                          9.6    4.89  )-----------( 198      ( 20 Jun 2023 05:44 )             32.1     06-20    152<H>  |  117<H>  |  42.7<H>  ----------------------------<  76  4.7   |  22.0  |  1.29    Ca    8.9      20 Jun 2023 05:44  Phos  4.2     06-20  Mg     2.3     06-20        MEDICATIONS  (STANDING):  acetaminophen     Tablet .. PRN  aluminum hydroxide/magnesium hydroxide/simethicone Suspension PRN  desmopressin  enoxaparin Injectable  folic acid  hydrochlorothiazide  levETIRAcetam  multivitamin/minerals  ondansetron Injectable PRN  pantoprazole    Tablet      
  Chief Complaint:  AMS     SUBJECTIVE / OVERNIGHT EVENTS:  No acute events reported overnight.  Pt is a poor historian but offers no acute complaints at this time and has been taking adequate po fluid intake.         I&O's Summary    23 Jun 2023 07:01  -  24 Jun 2023 07:00  --------------------------------------------------------  IN: 2650 mL / OUT: 350 mL / NET: 2300 mL          PHYSICAL EXAM:  Vital Signs Last 24 Hrs  T(C): 36.7 (26 Jun 2023 07:22), Max: 36.7 (26 Jun 2023 07:22)  T(F): 98.1 (26 Jun 2023 07:22), Max: 98.1 (26 Jun 2023 07:22)  HR: 77 (26 Jun 2023 07:22) (74 - 81)  BP: 115/77 (26 Jun 2023 07:22) (110/87 - 119/75)  BP(mean): --  RR: 18 (26 Jun 2023 07:22) (18 - 19)  SpO2: 94% (26 Jun 2023 07:22) (94% - 98%)    Parameters below as of 26 Jun 2023 07:22  Patient On (Oxygen Delivery Method): room air          GENERAL: pt examined bedside, laying comfortably in bed in NAD  HEENT: NC/AT, moist oral mucosa, clear conjunctiva, sclera nonicteric  RESPIRATORY: Normal respiratory effort, no wheezing, rhonchi, rales  CARDIOVASCULAR: RRR, normal S1 and S2  ABDOMEN: soft, NT/ND, normoactive bowel sounds, no rebound/guarding  EXTREMITIES: No cynaosis, no clubbing, no lower extremity edema  NEUROLOGY: A+Ox2-3 but no focal neurologic deficits appreciated       LABS:                                          9.1    4.68  )-----------( 222      ( 25 Jun 2023 07:08 )             30.4     06-26    146<H>  |  115<H>  |  44.6<H>  ----------------------------<  68<L>  5.6<H>   |  18.0<L>  |  1.21    Ca    8.7      26 Jun 2023 05:44  Phos  3.6     06-25  Mg     2.1     06-25          Urinalysis Basic - ( 23 Jun 2023 05:45 )    Color: x / Appearance: x / SG: x / pH: x  Gluc: 67 mg/dL / Ketone: x  / Bili: x / Urobili: x   Blood: x / Protein: x / Nitrite: x   Leuk Esterase: x / RBC: x / WBC x   Sq Epi: x / Non Sq Epi: x / Bacteria: x        CAPILLARY BLOOD GLUCOSE            RADIOLOGY & ADDITIONAL TESTS:      < from: Xray Chest 1 View- PORTABLE-Urgent (06.07.23 @ 12:06) >  IMPRESSION: Heart enlargement again noted.    < end of copied text >      < from: CT Head No Cont (06.07.23 @ 12:56) >  IMPRESSION:    CT brain:  No acute intracranial hemorrhage, brain edema, or mass effect.  No displaced calvarial fracture.    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    CTA brain:  No flow-limiting stenosis or patent vascular aneurysm. No AVM.    CTA neck:  No flow-limiting stenosis, evidence for arterial dissection, or vascular   aneurysm.    < end of copied text >    < from: CT Angio Neck w/ IV Cont (06.07.23 @ 13:01) >  IMPRESSION:    CT brain:  No acute intracranial hemorrhage, brain edema, or mass effect.  No displaced calvarial fracture.    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    CTA brain:  No flow-limiting stenosis or patent vascular aneurysm. No AVM.    CTA neck:  No flow-limiting stenosis, evidence for arterial dissection, or vascular   aneurysm.    < end of copied text >        MEDICATIONS  (STANDING):  desmopressin 0.1 milliGRAM(s) Oral two times a day  enoxaparin Injectable 40 milliGRAM(s) SubCutaneous every 24 hours  folic acid 1 milliGRAM(s) Oral daily  levETIRAcetam 500 milliGRAM(s) Oral two times a day  multivitamin/minerals 1 Tablet(s) Oral daily  pantoprazole    Tablet 40 milliGRAM(s) Oral before breakfast    MEDICATIONS  (PRN):  acetaminophen     Tablet .. 650 milliGRAM(s) Oral every 6 hours PRN Temp greater or equal to 38C (100.4F), Mild Pain (1 - 3)  aluminum hydroxide/magnesium hydroxide/simethicone Suspension 30 milliLiter(s) Oral every 4 hours PRN Dyspepsia  ondansetron Injectable 4 milliGRAM(s) IV Push every 8 hours PRN Nausea and/or Vomiting                                  
  Chief Complaint:  AMS     SUBJECTIVE / OVERNIGHT EVENTS:  No acute events reported overnight.  Pt is a poor historian but offers no acute complaints at this time.         I&O's Summary    23 Jun 2023 07:01  -  24 Jun 2023 07:00  --------------------------------------------------------  IN: 2650 mL / OUT: 350 mL / NET: 2300 mL          PHYSICAL EXAM:  Vital Signs Last 24 Hrs  T(C): 36.4 (24 Jun 2023 04:51), Max: 36.9 (23 Jun 2023 17:35)  T(F): 97.6 (24 Jun 2023 04:51), Max: 98.4 (23 Jun 2023 17:35)  HR: 81 (24 Jun 2023 04:51) (70 - 81)  BP: 135/88 (24 Jun 2023 04:51) (135/88 - 151/98)  BP(mean): --  RR: 19 (24 Jun 2023 04:51) (19 - 20)  SpO2: 95% (24 Jun 2023 04:51) (95% - 98%)    Parameters below as of 23 Jun 2023 17:35  Patient On (Oxygen Delivery Method): room air, cdaley      GENERAL: pt examined bedside, laying comfortably in bed in NAD  HEENT: NC/AT, moist oral mucosa, clear conjunctiva, sclera nonicteric  RESPIRATORY: Normal respiratory effort, no wheezing, rhonchi, rales  CARDIOVASCULAR: RRR, normal S1 and S2  ABDOMEN: soft, NT/ND, normoactive bowel sounds, no rebound/guarding  EXTREMITIES: No cynaosis, no clubbing, no lower extremity edema  NEUROLOGY: A+Ox2-3 but no focal neurologic deficits appreciated       LABS:    06-23    147<H>  |  112<H>  |  41.2<H>  ----------------------------<  67<L>  4.8   |  21.0<L>  |  1.16    Ca    9.0      23 Jun 2023 05:45  Phos  3.9     06-23  Mg     2.3     06-23        Urinalysis Basic - ( 23 Jun 2023 05:45 )    Color: x / Appearance: x / SG: x / pH: x  Gluc: 67 mg/dL / Ketone: x  / Bili: x / Urobili: x   Blood: x / Protein: x / Nitrite: x   Leuk Esterase: x / RBC: x / WBC x   Sq Epi: x / Non Sq Epi: x / Bacteria: x        CAPILLARY BLOOD GLUCOSE            RADIOLOGY & ADDITIONAL TESTS:      < from: Xray Chest 1 View- PORTABLE-Urgent (06.07.23 @ 12:06) >  IMPRESSION: Heart enlargement again noted.    < end of copied text >      < from: CT Head No Cont (06.07.23 @ 12:56) >  IMPRESSION:    CT brain:  No acute intracranial hemorrhage, brain edema, or mass effect.  No displaced calvarial fracture.    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    CTA brain:  No flow-limiting stenosis or patent vascular aneurysm. No AVM.    CTA neck:  No flow-limiting stenosis, evidence for arterial dissection, or vascular   aneurysm.    < end of copied text >    < from: CT Angio Neck w/ IV Cont (06.07.23 @ 13:01) >  IMPRESSION:    CT brain:  No acute intracranial hemorrhage, brain edema, or mass effect.  No displaced calvarial fracture.    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    CTA brain:  No flow-limiting stenosis or patent vascular aneurysm. No AVM.    CTA neck:  No flow-limiting stenosis, evidence for arterial dissection, or vascular   aneurysm.    < end of copied text >        MEDICATIONS  (STANDING):  desmopressin 0.1 milliGRAM(s) Oral two times a day  enoxaparin Injectable 40 milliGRAM(s) SubCutaneous every 24 hours  folic acid 1 milliGRAM(s) Oral daily  levETIRAcetam 500 milliGRAM(s) Oral two times a day  multivitamin/minerals 1 Tablet(s) Oral daily  pantoprazole    Tablet 40 milliGRAM(s) Oral before breakfast    MEDICATIONS  (PRN):  acetaminophen     Tablet .. 650 milliGRAM(s) Oral every 6 hours PRN Temp greater or equal to 38C (100.4F), Mild Pain (1 - 3)  aluminum hydroxide/magnesium hydroxide/simethicone Suspension 30 milliLiter(s) Oral every 4 hours PRN Dyspepsia  ondansetron Injectable 4 milliGRAM(s) IV Push every 8 hours PRN Nausea and/or Vomiting                                  
  Chief Complaint:  AMS     SUBJECTIVE / OVERNIGHT EVENTS:  No acute events reported overnight.  Pt is a poor historian but offers no acute complaints at this time.  has been taking adequate po fluid intake.         I&O's Summary    23 Jun 2023 07:01  -  24 Jun 2023 07:00  --------------------------------------------------------  IN: 2650 mL / OUT: 350 mL / NET: 2300 mL          PHYSICAL EXAM:  Vital Signs Last 24 Hrs  T(C): 36.4 (25 Jun 2023 05:10), Max: 36.4 (24 Jun 2023 17:29)  T(F): 97.5 (25 Jun 2023 05:10), Max: 97.5 (24 Jun 2023 17:29)  HR: 76 (25 Jun 2023 05:10) (61 - 81)  BP: 112/76 (25 Jun 2023 05:10) (112/76 - 141/93)  BP(mean): --  RR: 18 (25 Jun 2023 05:10) (18 - 18)  SpO2: 96% (25 Jun 2023 05:10) (96% - 98%)    Parameters below as of 24 Jun 2023 21:32  Patient On (Oxygen Delivery Method): room air      GENERAL: pt examined bedside, laying comfortably in bed in NAD  HEENT: NC/AT, moist oral mucosa, clear conjunctiva, sclera nonicteric  RESPIRATORY: Normal respiratory effort, no wheezing, rhonchi, rales  CARDIOVASCULAR: RRR, normal S1 and S2  ABDOMEN: soft, NT/ND, normoactive bowel sounds, no rebound/guarding  EXTREMITIES: No cynaosis, no clubbing, no lower extremity edema  NEUROLOGY: A+Ox2-3 but no focal neurologic deficits appreciated       LABS:                          9.1    4.68  )-----------( 222      ( 25 Jun 2023 07:08 )             30.4     06-25    142  |  110<H>  |  37.7<H>  ----------------------------<  72  4.6   |  22.0  |  1.06    Ca    8.8      25 Jun 2023 07:08  Phos  3.6     06-25  Mg     2.1     06-25          Urinalysis Basic - ( 23 Jun 2023 05:45 )    Color: x / Appearance: x / SG: x / pH: x  Gluc: 67 mg/dL / Ketone: x  / Bili: x / Urobili: x   Blood: x / Protein: x / Nitrite: x   Leuk Esterase: x / RBC: x / WBC x   Sq Epi: x / Non Sq Epi: x / Bacteria: x        CAPILLARY BLOOD GLUCOSE            RADIOLOGY & ADDITIONAL TESTS:      < from: Xray Chest 1 View- PORTABLE-Urgent (06.07.23 @ 12:06) >  IMPRESSION: Heart enlargement again noted.    < end of copied text >      < from: CT Head No Cont (06.07.23 @ 12:56) >  IMPRESSION:    CT brain:  No acute intracranial hemorrhage, brain edema, or mass effect.  No displaced calvarial fracture.    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    CTA brain:  No flow-limiting stenosis or patent vascular aneurysm. No AVM.    CTA neck:  No flow-limiting stenosis, evidence for arterial dissection, or vascular   aneurysm.    < end of copied text >    < from: CT Angio Neck w/ IV Cont (06.07.23 @ 13:01) >  IMPRESSION:    CT brain:  No acute intracranial hemorrhage, brain edema, or mass effect.  No displaced calvarial fracture.    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    CTA brain:  No flow-limiting stenosis or patent vascular aneurysm. No AVM.    CTA neck:  No flow-limiting stenosis, evidence for arterial dissection, or vascular   aneurysm.    < end of copied text >        MEDICATIONS  (STANDING):  desmopressin 0.1 milliGRAM(s) Oral two times a day  enoxaparin Injectable 40 milliGRAM(s) SubCutaneous every 24 hours  folic acid 1 milliGRAM(s) Oral daily  levETIRAcetam 500 milliGRAM(s) Oral two times a day  multivitamin/minerals 1 Tablet(s) Oral daily  pantoprazole    Tablet 40 milliGRAM(s) Oral before breakfast    MEDICATIONS  (PRN):  acetaminophen     Tablet .. 650 milliGRAM(s) Oral every 6 hours PRN Temp greater or equal to 38C (100.4F), Mild Pain (1 - 3)  aluminum hydroxide/magnesium hydroxide/simethicone Suspension 30 milliLiter(s) Oral every 4 hours PRN Dyspepsia  ondansetron Injectable 4 milliGRAM(s) IV Push every 8 hours PRN Nausea and/or Vomiting                                  
[facility] Division of Hospital Medicine      SUBJECTIVE / OVERNIGHT EVENTS:  No events    Patient denies chest pain, SOB, abd pain, N/V, fever, chills, dysuria or any other complaints. All remainder ROS negative.     MEDICATIONS  (STANDING):  amLODIPine   Tablet 10 milliGRAM(s) Oral daily  cefTRIAXone Injectable. 1000 milliGRAM(s) IV Push every 24 hours  desmopressin 0.1 milliGRAM(s) Oral <User Schedule>  dextrose 5%. 1000 milliLiter(s) (125 mL/Hr) IV Continuous <Continuous>  enoxaparin Injectable 40 milliGRAM(s) SubCutaneous every 24 hours  folic acid 1 milliGRAM(s) Oral daily  levETIRAcetam 500 milliGRAM(s) Oral two times a day  multivitamin/minerals 1 Tablet(s) Oral daily  pantoprazole    Tablet 40 milliGRAM(s) Oral before breakfast    MEDICATIONS  (PRN):  acetaminophen     Tablet .. 650 milliGRAM(s) Oral every 6 hours PRN Temp greater or equal to 38C (100.4F), Mild Pain (1 - 3)  aluminum hydroxide/magnesium hydroxide/simethicone Suspension 30 milliLiter(s) Oral every 4 hours PRN Dyspepsia  ondansetron Injectable 4 milliGRAM(s) IV Push every 8 hours PRN Nausea and/or Vomiting        I&O's Summary      PHYSICAL EXAM:  Vital Signs Last 24 Hrs  T(C): 37.1 (2023 11:43), Max: 37.1 (2023 11:43)  T(F): 98.7 (2023 11:43), Max: 98.7 (2023 11:43)  HR: 76 (2023 11:43) (63 - 76)  BP: 108/76 (2023 11:43) (100/67 - 133/86)  BP(mean): --  RR: 18 (2023 11:43) (18 - 19)  SpO2: 97% (2023 11:43) (95% - 98%)    Parameters below as of 2023 11:43  Patient On (Oxygen Delivery Method): room air            CONSTITUTIONAL: NAD, appears stated age  ENMT: Moist oral mucosa, no pharyngeal injection or exudates; normal dentition  RESPIRATORY: Normal respiratory effort; clear to auscultation bilaterally  CARDIOVASCULAR: Regular rate and rhythm, normal S1 and S2, no murmur/rub/gallop; Peripheral pulses are 2+ bilaterally  ABDOMEN: Nontender to palpation, normoactive bowel sounds, no rebound/guarding;   MUSCLOSKELETAL:  No clubbing or cyanosis of digits; no joint swelling or tenderness to palpation  PSYCH: A+O to person, place, and time; affect appropriate  NEUROLOGY: CN 2-12 are intact and symmetric; no gross sensory deficits;   SKIN: No rashes; no palpable lesions    LABS:                        9.9    3.99  )-----------( 166      ( 2023 03:26 )             33.2     06-08    153<H>  |  119<H>  |  26.3<H>  ----------------------------<  99  4.3   |  24.0  |  1.00    Ca    8.9      2023 03:26  Mg     2.0     06-07    TPro  7.8  /  Alb  4.1  /  TBili  0.4  /  DBili  x   /  AST  22  /  ALT  25  /  AlkPhos  117  06-07    PT/INR - ( 2023 11:46 )   PT: 12.0 sec;   INR: 1.03 ratio         PTT - ( 2023 11:46 )  PTT:39.2 sec  CARDIAC MARKERS ( 2023 11:46 )  x     / <0.01 ng/mL / x     / x     / x          Urinalysis Basic - ( 2023 13:35 )    Color: Yellow / Appearance: Clear / S.010 / pH: x  Gluc: x / Ketone: Negative  / Bili: Negative / Urobili: Negative mg/dL   Blood: x / Protein: Negative / Nitrite: Negative   Leuk Esterase: Moderate / RBC: Negative /HPF / WBC 6-10 /HPF   Sq Epi: x / Non Sq Epi: x / Bacteria: Occasional        CAPILLARY BLOOD GLUCOSE            RADIOLOGY & ADDITIONAL TESTS:  Results Reviewed:   Imaging Personally Reviewed:  Electrocardiogram Personally Reviewed:                                          
[facility] Division of Hospital Medicine    SUBJECTIVE / OVERNIGHT EVENTS:  No events    Patient denies chest pain, SOB, abd pain, N/V, fever, chills, dysuria or any other complaints. All remainder ROS negative.     MEDICATIONS  (STANDING):  desmopressin 0.1 milliGRAM(s) Oral two times a day  enoxaparin Injectable 40 milliGRAM(s) SubCutaneous every 24 hours  folic acid 1 milliGRAM(s) Oral daily  levETIRAcetam 500 milliGRAM(s) Oral two times a day  multivitamin/minerals 1 Tablet(s) Oral daily  pantoprazole    Tablet 40 milliGRAM(s) Oral before breakfast    MEDICATIONS  (PRN):  acetaminophen     Tablet .. 650 milliGRAM(s) Oral every 6 hours PRN Temp greater or equal to 38C (100.4F), Mild Pain (1 - 3)  aluminum hydroxide/magnesium hydroxide/simethicone Suspension 30 milliLiter(s) Oral every 4 hours PRN Dyspepsia  ondansetron Injectable 4 milliGRAM(s) IV Push every 8 hours PRN Nausea and/or Vomiting        I&O's Summary    13 Jun 2023 07:01  -  14 Jun 2023 07:00  --------------------------------------------------------  IN: 360 mL / OUT: 0 mL / NET: 360 mL        PHYSICAL EXAM:  Vital Signs Last 24 Hrs  T(C): 36.7 (14 Jun 2023 11:42), Max: 37 (14 Jun 2023 05:31)  T(F): 98 (14 Jun 2023 11:42), Max: 98.6 (14 Jun 2023 05:31)  HR: 84 (14 Jun 2023 11:42) (84 - 103)  BP: 172/76 (14 Jun 2023 11:42) (118/82 - 172/76)  BP(mean): --  RR: 18 (14 Jun 2023 11:42) (18 - 18)  SpO2: 94% (14 Jun 2023 11:42) (94% - 97%)    Parameters below as of 14 Jun 2023 11:42  Patient On (Oxygen Delivery Method): room air            CONSTITUTIONAL: NAD, appears stated age  ENMT: Moist oral mucosa, no pharyngeal injection or exudates; normal dentition  RESPIRATORY: Normal respiratory effort; clear to auscultation bilaterally  CARDIOVASCULAR: Regular rate and rhythm, normal S1 and S2, no murmur/rub/gallop; Peripheral pulses are 2+ bilaterally  ABDOMEN: Nontender to palpation, normoactive bowel sounds, no rebound/guarding;   MUSCLOSKELETAL:  No clubbing or cyanosis of digits; no joint swelling or tenderness to palpation  PSYCH: A+O to person, place, and time; affect appropriate  NEUROLOGY: CN 2-12 are intact and symmetric; no gross sensory deficits;   SKIN: No rashes; no palpable lesions    LABS:                        9.7    4.77  )-----------( 190      ( 14 Jun 2023 06:25 )             32.4     06-14    152<H>  |  117<H>  |  50.0<H>  ----------------------------<  75  5.0   |  25.0  |  1.47<H>    Ca    8.9      14 Jun 2023 06:25  Phos  3.6     06-14  Mg     2.9     06-14                CAPILLARY BLOOD GLUCOSE            RADIOLOGY & ADDITIONAL TESTS:  Results Reviewed:   Imaging Personally Reviewed:  Electrocardiogram Personally Reviewed:                                          
Hyperosmolality with hypernatremia    HPI:  57y/oF PMH SAH s/p Brain aneurysm clippingx 2, DI, Seizures 2/2 hypo or  hypernatremia was BIBA after she was was reported to have wandered off. pt called police " I got lost". In ER, She was found to be altered, confused, daughter reported issues with sodium in past. Labs significant for Na 152. She has significant h/o PICC line hydration at home  for hypernatremia. her PICC line came out in May 2023. Patient is forgetful, Unable to provide much history thinks she has been doing well at home, denies any fevers, diarrhea.   Called daughter to get collateral information- no response, left voicemail. med hx obtained from Dr Garcia  (07 Jun 2023 15:44)    Interval History:  Patient was seen and examined at bedside around 9 am and again just now.   Feels better.   No active complaints.   Denies headache, dizziness, visual symptoms, nausea, vomiting, abdominal / chest pain, palpitations or shortness of breath.    ROS:  As per interval history otherwise unremarkable.    PHYSICAL EXAM:  Vital Signs  T(C): 36.4 (07 Jul 2023 11:47), Max: 36.7 (07 Jul 2023 04:42)  T(F): 97.5 (07 Jul 2023 11:47), Max: 98 (07 Jul 2023 04:42)  HR: 73 (07 Jul 2023 11:47) (68 - 85)  BP: 118/82 (07 Jul 2023 11:47) (118/82 - 136/82)  RR: 18 (07 Jul 2023 11:47) (18 - 18)  SpO2: 97% (07 Jul 2023 11:47) (95% - 97%)  Parameters below as of 07 Jul 2023 11:47  Patient On (Oxygen Delivery Method): room air  General: Middle aged female sitting in chair comfortably. No acute distress  HEENT: EOMI. Clear conjunctivae. Moist mucus membrane  Neck: Supple.   Chest: CTA bilaterally. No wheezing, rales or rhonchi.   Heart: Normal S1 & S2. RRR.   Abdomen: Obese. Soft. Non-tender. + BS  Ext: Lymphedema. No calf tenderness   Neuro: AAO x 3. No focal deficit. No speech disorder  Skin: Warm and Dry  Psychiatry: Normal mood and affect    I&O's Summary    06 Jul 2023 07:01  -  07 Jul 2023 07:00  --------------------------------------------------------  IN: 2300 mL / OUT: 750 mL / NET: 1550 mL    LABS:    07-07    147<H>  |  114<H>  |  34.8<H>  ----------------------------<  72  5.0   |  20.0<L>  |  1.09    Ca    9.1      07 Jul 2023 06:35    RADIOLOGY & ADDITIONAL STUDIES:  Reviewed     MEDICATIONS  (STANDING):  amLODIPine   Tablet 10 milliGRAM(s) Oral daily  desmopressin 0.3 milliGRAM(s) Oral two times a day  enoxaparin Injectable 40 milliGRAM(s) SubCutaneous every 24 hours  ferrous    sulfate Liquid 300 milliGRAM(s) Oral daily  folic acid 1 milliGRAM(s) Oral daily  levETIRAcetam 500 milliGRAM(s) Oral two times a day  multivitamin/minerals 1 Tablet(s) Oral daily  pantoprazole    Tablet 40 milliGRAM(s) Oral before breakfast    MEDICATIONS  (PRN):  acetaminophen     Tablet .. 650 milliGRAM(s) Oral every 6 hours PRN Temp greater or equal to 38C (100.4F), Mild Pain (1 - 3)  aluminum hydroxide/magnesium hydroxide/simethicone Suspension 30 milliLiter(s) Oral every 4 hours PRN Dyspepsia  ondansetron Injectable 4 milliGRAM(s) IV Push every 8 hours PRN Nausea and/or Vomiting                
Hyperosmolality with hypernatremia    HPI:  57y/oF PMH SAH s/p Brain aneurysm clippingx 2, DI, Seizures 2/2 hypo or  hypernatremia was BIBA after she was was reported to have wandered off. pt called police " I got lost". In ER, She was found to be altered, confused, daughter reported issues with sodium in past. Labs significant for Na 152. She has significant h/o PICC line hydration at home  for hypernatremia. her PICC line came out in May 2023. Patient is forgetful, Unable to provide much history thinks she has been doing well at home, denies any fevers, diarrhea.   Called daughter to get collateral information- no response, left voicemail. med hx obtained from Dr Garcia  (07 Jun 2023 15:44)    Interval History:  Patient was seen and examined at bedside around 9:30 am.  Doing well.   Denies headache, dizziness, visual symptoms, nausea, vomiting, abdominal / chest pain, palpitations or shortness of breath.    ROS:  As per interval history otherwise unremarkable.    PHYSICAL EXAM:  Vital Signs   T(C): 36.5 (02 Jul 2023 12:45), Max: 36.7 (02 Jul 2023 05:07)  T(F): 97.7 (02 Jul 2023 12:45), Max: 98.1 (02 Jul 2023 05:07)  HR: 73 (02 Jul 2023 12:45) (72 - 76)  BP: 155/91 (02 Jul 2023 12:45) (144/62 - 164/90)  RR: 18 (02 Jul 2023 12:45) (18 - 18)  SpO2: 97% (02 Jul 2023 12:45) (95% - 97%)  Parameters below as of 02 Jul 2023 12:45  Patient On (Oxygen Delivery Method): room air  General: Middle aged female sitting in chair comfortably. No acute distress  HEENT: EOMI. Clear conjunctivae. Moist mucus membrane  Neck: Supple.   Chest: CTA bilaterally. No wheezing, rales or rhonchi.   Heart: Normal S1 & S2. RRR.   Abdomen: Obese. Soft. Non-tender. + BS  Ext: No pedal edema. No calf tenderness   Neuro: AAO x 3. No focal deficit. No speech disorder  Skin: Warm and Dry  Psychiatry: Normal mood and affect    LABS:    07-02    146<H>  |  111<H>  |  28.1<H>  ----------------------------<  67<L>  4.4   |  25.0  |  1.05    Ca    9.1      02 Jul 2023 07:42    RADIOLOGY & ADDITIONAL STUDIES:  Reviewed     MEDICATIONS  (STANDING):  amLODIPine   Tablet 5 milliGRAM(s) Oral daily  desmopressin 0.1 milliGRAM(s) Oral two times a day  enoxaparin Injectable 40 milliGRAM(s) SubCutaneous every 24 hours  ferrous    sulfate Liquid 300 milliGRAM(s) Oral daily  folic acid 1 milliGRAM(s) Oral daily  levETIRAcetam 500 milliGRAM(s) Oral two times a day  multivitamin/minerals 1 Tablet(s) Oral daily  pantoprazole    Tablet 40 milliGRAM(s) Oral before breakfast  sodium bicarbonate 650 milliGRAM(s) Oral two times a day    MEDICATIONS  (PRN):  acetaminophen     Tablet .. 650 milliGRAM(s) Oral every 6 hours PRN Temp greater or equal to 38C (100.4F), Mild Pain (1 - 3)  aluminum hydroxide/magnesium hydroxide/simethicone Suspension 30 milliLiter(s) Oral every 4 hours PRN Dyspepsia  ondansetron Injectable 4 milliGRAM(s) IV Push every 8 hours PRN Nausea and/or Vomiting        
Hyperosmolality with hypernatremia    HPI:  57y/oF PMH SAH s/p Brain aneurysm clippingx 2, DI, Seizures 2/2 hypo or  hypernatremia was BIBA after she was was reported to have wandered off. pt called police " I got lost". In ER, She was found to be altered, confused, daughter reported issues with sodium in past. Labs significant for Na 152. She has significant h/o PICC line hydration at home  for hypernatremia. her PICC line came out in May 2023. Patient is forgetful, Unable to provide much history thinks she has been doing well at home, denies any fevers, diarrhea.   Called daughter to get collateral information- no response, left voicemail. med hx obtained from Dr Garcia  (07 Jun 2023 15:44)    Interval History:  Patient was seen and examined at bedside around 9:45 am.  Feels better.  No active complaints.   Denies headache, dizziness, visual symptoms, nausea, vomiting, abdominal / chest pain, palpitations or shortness of breath.    ROS:  As per interval history otherwise unremarkable.    PHYSICAL EXAM:  Vital Signs  T(C): 36.4 (03 Jul 2023 09:10), Max: 36.7 (02 Jul 2023 19:01)  T(F): 97.5 (03 Jul 2023 09:10), Max: 98 (02 Jul 2023 19:01)  HR: 76 (03 Jul 2023 09:10) (68 - 76)  BP: 136/88 (03 Jul 2023 09:10) (136/88 - 169/102)  RR: 18 (03 Jul 2023 09:10) (18 - 18)  SpO2: 97% (03 Jul 2023 09:10) (94% - 97%)  Parameters below as of 03 Jul 2023 09:10  Patient On (Oxygen Delivery Method): room air  General: Middle aged female sitting in bed comfortably. No acute distress  HEENT: EOMI. Clear conjunctivae. Moist mucus membrane  Neck: Supple.   Chest: CTA bilaterally. No wheezing, rales or rhonchi.   Heart: Normal S1 & S2. RRR.   Abdomen: Obese. Soft. Non-tender. + BS  Ext: No pedal edema. No calf tenderness   Neuro: AAO x 3. No focal deficit. No speech disorder  Skin: Warm and Dry  Psychiatry: Normal mood and affect    LABS:    07-03    147<H>  |  112<H>  |  29.6<H>  ----------------------------<  63<L>  4.5   |  24.0  |  1.02    Ca    8.8      03 Jul 2023 05:30    RADIOLOGY & ADDITIONAL STUDIES:  Reviewed     MEDICATIONS  (STANDING):  desmopressin 0.1 milliGRAM(s) Oral two times a day  enoxaparin Injectable 40 milliGRAM(s) SubCutaneous every 24 hours  ferrous    sulfate Liquid 300 milliGRAM(s) Oral daily  folic acid 1 milliGRAM(s) Oral daily  levETIRAcetam 500 milliGRAM(s) Oral two times a day  multivitamin/minerals 1 Tablet(s) Oral daily  pantoprazole    Tablet 40 milliGRAM(s) Oral before breakfast  sodium bicarbonate 650 milliGRAM(s) Oral two times a day    MEDICATIONS  (PRN):  acetaminophen     Tablet .. 650 milliGRAM(s) Oral every 6 hours PRN Temp greater or equal to 38C (100.4F), Mild Pain (1 - 3)  aluminum hydroxide/magnesium hydroxide/simethicone Suspension 30 milliLiter(s) Oral every 4 hours PRN Dyspepsia  ondansetron Injectable 4 milliGRAM(s) IV Push every 8 hours PRN Nausea and/or Vomiting        
INTERVAL EVENTS:  Follow up diabetes management    ROS: Very pleasant, denies any pain/sob/excessive thirst.    MEDICATIONS  (STANDING):  desmopressin 0.1 milliGRAM(s) Oral two times a day  enoxaparin Injectable 40 milliGRAM(s) SubCutaneous every 24 hours  folic acid 1 milliGRAM(s) Oral daily  levETIRAcetam 500 milliGRAM(s) Oral two times a day  multivitamin/minerals 1 Tablet(s) Oral daily  pantoprazole    Tablet 40 milliGRAM(s) Oral before breakfast  sodium chloride 0.225%. 1000 milliLiter(s) (70 mL/Hr) IV Continuous <Continuous>    MEDICATIONS  (PRN):  acetaminophen     Tablet .. 650 milliGRAM(s) Oral every 6 hours PRN Temp greater or equal to 38C (100.4F), Mild Pain (1 - 3)  aluminum hydroxide/magnesium hydroxide/simethicone Suspension 30 milliLiter(s) Oral every 4 hours PRN Dyspepsia  ondansetron Injectable 4 milliGRAM(s) IV Push every 8 hours PRN Nausea and/or Vomiting    Allergies  No Known Allergies    Vital Signs Last 24 Hrs  T(C): 37.1 (15 [place] 2023 11:07), Max: 37.2 (15 [place] 2023 04:48)  T(F): 98.8 (15 [place] 2023 11:07), Max: 99 (15 [place] 2023 04:48)  HR: 94 (15 [place] 2023 11:07) (55 - 94)  BP: 112/81 (15 [place] 2023 11:07) (112/80 - 121/79)  BP(mean): --  RR: 18 (15 [place] 2023 11:07) (18 - 18)  SpO2: 96% (15 [place] 2023 11:07) (95% - 96%)      PHYSICAL EXAM:  General: No apparent distress  Neck: Supple, trachea midline, no thyromegaly  Respiratory: Lungs clear bilaterally, normal rate, effort  Cardiac: +S1, S2, no m/r/g  GI: +BS, soft, non tender, non distended  Extremities: No peripheral edema, no pedal lesions  Neuro: A+O X3, no tremor      LABS:                        9.2    4.32  )-----------( 190      ( 15 [place] 2023 05:52 )             31.2     06-15    153<H>  |  116<H>  |  49.5<H>  ----------------------------<  81  5.1   |  25.0  |  1.43<H>    Ca    8.8      15 [place] 2023 05:52  Phos  3.7     06-15  Mg     2.6     06-15      Thyroid Stimulating Hormone, Serum: 2.20 uIU/mL (06-09-23 @ 10:05)  
INTERVAL EVENTS:  Follow up hyponatremia    ROS: Denies pain at time of exam. Endorses good appetite    MEDICATIONS  (STANDING):  enoxaparin Injectable 40 milliGRAM(s) SubCutaneous every 24 hours  folic acid 1 milliGRAM(s) Oral daily  levETIRAcetam 500 milliGRAM(s) Oral two times a day  multivitamin/minerals 1 Tablet(s) Oral daily  pantoprazole    Tablet 40 milliGRAM(s) Oral before breakfast  sodium chloride 1.5%. 500 milliLiter(s) (40 mL/Hr) IV Continuous <Continuous>    MEDICATIONS  (PRN):  acetaminophen     Tablet .. 650 milliGRAM(s) Oral every 6 hours PRN Temp greater or equal to 38C (100.4F), Mild Pain (1 - 3)  aluminum hydroxide/magnesium hydroxide/simethicone Suspension 30 milliLiter(s) Oral every 4 hours PRN Dyspepsia  ondansetron Injectable 4 milliGRAM(s) IV Push every 8 hours PRN Nausea and/or Vomiting    Allergies  No Known Allergies    Vital Signs Last 24 Hrs  T(C): 36.4 (12 Jun 2023 10:44), Max: 36.7 (11 Jun 2023 17:30)  T(F): 97.6 (12 Jun 2023 10:44), Max: 98.1 (11 Jun 2023 17:30)  HR: 83 (12 Jun 2023 10:44) (83 - 88)  BP: 120/80 (12 Jun 2023 10:44) (108/76 - 149/85)  BP(mean): --  RR: 18 (12 Jun 2023 10:44) (18 - 20)  SpO2: 95% (12 Jun 2023 10:44) (95% - 98%)    Parameters below as of 12 Jun 2023 10:44  Patient On (Oxygen Delivery Method): room air      PHYSICAL EXAM:  General: No apparent distress  Neck: Supple, trachea midline, no thyromegaly  Respiratory: Lungs clear bilaterally, normal rate, effort  Cardiac: +S1, S2, no m/r/g  GI: +BS, soft, non tender, non distended  Extremities: Mild LE edema b/l  Neuro: A+O X3, no tremor    LABS:                        10.0   5.26  )-----------( 190      ( 12 Jun 2023 06:08 )             32.6     06-12    144  |  109<H>  |  52.2<H>  ----------------------------<  63<L>  4.5   |  23.0  |  1.67<H>    Ca    8.7      12 Jun 2023 06:08  Phos  3.6     06-12  Mg     3.3     06-12                  Thyroid Stimulating Hormone, Serum: 2.20 uIU/mL (06-09-23 @ 10:05)  
INTERVAL HPI/OVERNIGHT EVENTS:    CC: hypernatremia, DI, SAH s/p clipping    No overnight events  chart and course reviewed  states she feels fine.    Vital Signs Last 24 Hrs  T(C): 36.3 (17 Jun 2023 11:46), Max: 37 (17 Jun 2023 04:53)  T(F): 97.3 (17 Jun 2023 11:46), Max: 98.6 (17 Jun 2023 04:53)  HR: 82 (17 Jun 2023 11:46) (69 - 88)  BP: 121/86 (17 Jun 2023 11:46) (108/72 - 128/83)  BP(mean): --  RR: 18 (17 Jun 2023 11:46) (18 - 18)  SpO2: 96% (17 Jun 2023 11:46) (96% - 98%)    Parameters below as of 17 Jun 2023 11:46  Patient On (Oxygen Delivery Method): room air        PHYSICAL EXAM:    GENERAL: alert, not in distress  CHEST/LUNG: b/l air entry  HEART: reg  ABDOMEN: soft, bs+, non tender  EXTREMITIES:  no edema, tenderness    MEDICATIONS  (STANDING):  desmopressin 0.1 milliGRAM(s) Oral two times a day  enoxaparin Injectable 40 milliGRAM(s) SubCutaneous every 24 hours  folic acid 1 milliGRAM(s) Oral daily  hydrochlorothiazide 12.5 milliGRAM(s) Oral daily  levETIRAcetam 500 milliGRAM(s) Oral two times a day  multivitamin/minerals 1 Tablet(s) Oral daily  pantoprazole    Tablet 40 milliGRAM(s) Oral before breakfast    MEDICATIONS  (PRN):  acetaminophen     Tablet .. 650 milliGRAM(s) Oral every 6 hours PRN Temp greater or equal to 38C (100.4F), Mild Pain (1 - 3)  aluminum hydroxide/magnesium hydroxide/simethicone Suspension 30 milliLiter(s) Oral every 4 hours PRN Dyspepsia  ondansetron Injectable 4 milliGRAM(s) IV Push every 8 hours PRN Nausea and/or Vomiting      Allergies    No Known Allergies    Intolerances          LABS:                          9.1    4.04  )-----------( 172      ( 17 Jun 2023 06:16 )             31.4     06-17    149<H>  |  114<H>  |  39.8<H>  ----------------------------<  83  4.7   |  23.0  |  1.28    Ca    8.8      17 Jun 2023 06:16  Phos  4.1     06-17  Mg     2.4     06-17    TPro  6.8  /  Alb  3.6  /  TBili  0.2<L>  /  DBili  x   /  AST  40<H>  /  ALT  73<H>  /  AlkPhos  170<H>  06-17          RADIOLOGY & ADDITIONAL TESTS:  
INTERVAL HPI/OVERNIGHT EVENTS:    CC: hypernatremia, h/o brain aneurysm, DI    No overnight events  denies complaints      Vital Signs Last 24 Hrs  T(C): 36.4 (22 Jun 2023 10:49), Max: 36.6 (22 Jun 2023 04:49)  T(F): 97.5 (22 Jun 2023 10:49), Max: 97.8 (22 Jun 2023 04:49)  HR: 63 (22 Jun 2023 10:49) (63 - 80)  BP: 127/84 (22 Jun 2023 10:49) (114/69 - 130/86)  BP(mean): --  RR: 20 (22 Jun 2023 10:49) (18 - 20)  SpO2: 97% (22 Jun 2023 10:49) (96% - 97%)    Parameters below as of 21 Jun 2023 17:33  Patient On (Oxygen Delivery Method): room air        PHYSICAL EXAM:    GENERAL: alert, not in distress  CHEST/LUNG: b/l air entry  HEART: reg  ABDOMEN: soft, bs+  EXTREMITIES:  no edema, tenderness    MEDICATIONS  (STANDING):  desmopressin 0.1 milliGRAM(s) Oral two times a day  enoxaparin Injectable 40 milliGRAM(s) SubCutaneous every 24 hours  folic acid 1 milliGRAM(s) Oral daily  levETIRAcetam 500 milliGRAM(s) Oral two times a day  multivitamin/minerals 1 Tablet(s) Oral daily  pantoprazole    Tablet 40 milliGRAM(s) Oral before breakfast    MEDICATIONS  (PRN):  acetaminophen     Tablet .. 650 milliGRAM(s) Oral every 6 hours PRN Temp greater or equal to 38C (100.4F), Mild Pain (1 - 3)  aluminum hydroxide/magnesium hydroxide/simethicone Suspension 30 milliLiter(s) Oral every 4 hours PRN Dyspepsia  ondansetron Injectable 4 milliGRAM(s) IV Push every 8 hours PRN Nausea and/or Vomiting      Allergies    No Known Allergies    Intolerances          LABS:                          9.1    4.11  )-----------( 191      ( 21 Jun 2023 05:54 )             31.1     06-22    147<H>  |  111<H>  |  37.6<H>  ----------------------------<  72  4.9   |  24.0  |  1.18    Ca    8.9      22 Jun 2023 05:44  Mg     2.2     06-21    TPro  6.7  /  Alb  3.4  /  TBili  0.3<L>  /  DBili  0.1  /  AST  37<H>  /  ALT  56<H>  /  AlkPhos  157<H>  06-21      Urinalysis Basic - ( 22 Jun 2023 05:44 )    Color: x / Appearance: x / SG: x / pH: x  Gluc: 72 mg/dL / Ketone: x  / Bili: x / Urobili: x   Blood: x / Protein: x / Nitrite: x   Leuk Esterase: x / RBC: x / WBC x   Sq Epi: x / Non Sq Epi: x / Bacteria: x        RADIOLOGY & ADDITIONAL TESTS:  
INTERVAL HPI/OVERNIGHT EVENTS:    CC: hypernatremia, h/o brain aneurysm, DI    No overnight events  denies complaints.    Vital Signs Last 24 Hrs  T(C): 36.4 (21 Jun 2023 11:26), Max: 36.6 (20 Jun 2023 17:54)  T(F): 97.6 (21 Jun 2023 11:26), Max: 97.8 (20 Jun 2023 17:54)  HR: 76 (21 Jun 2023 11:26) (70 - 79)  BP: 118/83 (21 Jun 2023 11:26) (106/56 - 118/83)  BP(mean): --  RR: 20 (21 Jun 2023 11:26) (18 - 20)  SpO2: 95% (21 Jun 2023 11:26) (95% - 98%)    Parameters below as of 21 Jun 2023 04:51  Patient On (Oxygen Delivery Method): room air        PHYSICAL EXAM:    GENERAL: alert, not in distress  CHEST/LUNG: b/l air entry  HEART: reg  ABDOMEN: soft, bs+  EXTREMITIES:  no edema, tenderness    MEDICATIONS  (STANDING):  desmopressin 0.1 milliGRAM(s) Oral two times a day  enoxaparin Injectable 40 milliGRAM(s) SubCutaneous every 24 hours  folic acid 1 milliGRAM(s) Oral daily  levETIRAcetam 500 milliGRAM(s) Oral two times a day  multivitamin/minerals 1 Tablet(s) Oral daily  pantoprazole    Tablet 40 milliGRAM(s) Oral before breakfast    MEDICATIONS  (PRN):  acetaminophen     Tablet .. 650 milliGRAM(s) Oral every 6 hours PRN Temp greater or equal to 38C (100.4F), Mild Pain (1 - 3)  aluminum hydroxide/magnesium hydroxide/simethicone Suspension 30 milliLiter(s) Oral every 4 hours PRN Dyspepsia  ondansetron Injectable 4 milliGRAM(s) IV Push every 8 hours PRN Nausea and/or Vomiting      Allergies    No Known Allergies    Intolerances          LABS:                          9.1    4.11  )-----------( 191      ( 21 Jun 2023 05:54 )             31.1     06-21    147<H>  |  112<H>  |  39.6<H>  ----------------------------<  75  4.5   |  23.0  |  1.17    Ca    8.9      21 Jun 2023 05:54  Phos  4.2     06-20  Mg     2.2     06-21    TPro  6.7  /  Alb  3.4  /  TBili  0.3<L>  /  DBili  0.1  /  AST  37<H>  /  ALT  56<H>  /  AlkPhos  157<H>  06-21      Urinalysis Basic - ( 21 Jun 2023 05:54 )    Color: x / Appearance: x / SG: x / pH: x  Gluc: 75 mg/dL / Ketone: x  / Bili: x / Urobili: x   Blood: x / Protein: x / Nitrite: x   Leuk Esterase: x / RBC: x / WBC x   Sq Epi: x / Non Sq Epi: x / Bacteria: x        RADIOLOGY & ADDITIONAL TESTS:  
NEPHROLOGY INTERVAL HPI/OVERNIGHT EVENTS:    Examined  Feeling better   No distress  Denies HA CP no SOB    MEDICATIONS  (STANDING):  desmopressin 0.1 milliGRAM(s) Oral two times a day  enoxaparin Injectable 40 milliGRAM(s) SubCutaneous every 24 hours  ferrous    sulfate Liquid 300 milliGRAM(s) Oral daily  folic acid 1 milliGRAM(s) Oral daily  levETIRAcetam 500 milliGRAM(s) Oral two times a day  multivitamin/minerals 1 Tablet(s) Oral daily  pantoprazole    Tablet 40 milliGRAM(s) Oral before breakfast  sodium bicarbonate 650 milliGRAM(s) Oral two times a day    MEDICATIONS  (PRN):  acetaminophen     Tablet .. 650 milliGRAM(s) Oral every 6 hours PRN Temp greater or equal to 38C (100.4F), Mild Pain (1 - 3)  aluminum hydroxide/magnesium hydroxide/simethicone Suspension 30 milliLiter(s) Oral every 4 hours PRN Dyspepsia  ondansetron Injectable 4 milliGRAM(s) IV Push every 8 hours PRN Nausea and/or Vomiting      Allergies    No Known Allergies    Intolerances        Vital Signs Last 24 Hrs  T(C): 36.4 (01 Jul 2023 05:44), Max: 36.5 (30 Jun 2023 17:01)  T(F): 97.6 (01 Jul 2023 05:44), Max: 97.7 (30 Jun 2023 17:01)  HR: 78 (01 Jul 2023 05:44) (78 - 82)  BP: 148/100 (01 Jul 2023 05:44) (148/83 - 148/100)  BP(mean): --  RR: 18 (01 Jul 2023 05:44) (18 - 18)  SpO2: 98% (01 Jul 2023 05:44) (97% - 98%)    PHYSICAL EXAM:  GENERAL: NAD  HEENT: NCAT  NECK: Supple  NERVOUS SYSTEM: Alert & Oriented X3  CHEST/LUNG: Clear  bilaterally  HEART: Regular rate and rhythm  ABDOMEN: Soft, Nontender, +BS  EXTREMITIES: no edema    LABS:    07-01    148<H>  |  113<H>  |  28.2<H>  ----------------------------<  74  4.2   |  25.0  |  1.06    Ca    9.2      01 Jul 2023 07:03        Urinalysis Basic - ( 01 Jul 2023 07:03 )    Color: x / Appearance: x / SG: x / pH: x  Gluc: 74 mg/dL / Ketone: x  / Bili: x / Urobili: x   Blood: x / Protein: x / Nitrite: x   Leuk Esterase: x / RBC: x / WBC x   Sq Epi: x / Non Sq Epi: x / Bacteria: x              RADIOLOGY & ADDITIONAL TESTS:  
NEPHROLOGY INTERVAL HPI/OVERNIGHT EVENTS:    Examined  Feels well  No distress  Denies HA CP no SOB    MEDICATIONS  (STANDING):  desmopressin 0.1 milliGRAM(s) Oral two times a day  enoxaparin Injectable 40 milliGRAM(s) SubCutaneous every 24 hours  ferrous    sulfate Liquid 300 milliGRAM(s) Oral daily  folic acid 1 milliGRAM(s) Oral daily  levETIRAcetam 500 milliGRAM(s) Oral two times a day  multivitamin/minerals 1 Tablet(s) Oral daily  pantoprazole    Tablet 40 milliGRAM(s) Oral before breakfast  sodium bicarbonate 650 milliGRAM(s) Oral two times a day    MEDICATIONS  (PRN):  acetaminophen     Tablet .. 650 milliGRAM(s) Oral every 6 hours PRN Temp greater or equal to 38C (100.4F), Mild Pain (1 - 3)  aluminum hydroxide/magnesium hydroxide/simethicone Suspension 30 milliLiter(s) Oral every 4 hours PRN Dyspepsia  ondansetron Injectable 4 milliGRAM(s) IV Push every 8 hours PRN Nausea and/or Vomiting      Allergies    No Known Allergies    Intolerances        Vital Signs Last 24 Hrs  T(C): 36.4 (03 Jul 2023 09:10), Max: 36.7 (02 Jul 2023 19:01)  T(F): 97.5 (03 Jul 2023 09:10), Max: 98 (02 Jul 2023 19:01)  HR: 76 (03 Jul 2023 09:10) (68 - 76)  BP: 136/88 (03 Jul 2023 09:10) (136/88 - 169/102)  BP(mean): --  RR: 18 (03 Jul 2023 09:10) (18 - 18)  SpO2: 97% (03 Jul 2023 09:10) (94% - 97%)    Parameters below as of 03 Jul 2023 09:10  Patient On (Oxygen Delivery Method): room air      PHYSICAL EXAM:  GENERAL: NAD  HEENT: NCAT  NECK: Supple  NERVOUS SYSTEM: Alert & Oriented X3  CHEST/LUNG: Clear  bilaterally  HEART: Regular rate and rhythm  ABDOMEN: Soft, Nontender, +BS  EXTREMITIES: no edema    LABS:    07-03    147<H>  |  112<H>  |  29.6<H>  ----------------------------<  63<L>  4.5   |  24.0  |  1.02    Ca    8.8      03 Jul 2023 05:30        Urinalysis Basic - ( 03 Jul 2023 05:30 )    Color: x / Appearance: x / SG: x / pH: x  Gluc: 63 mg/dL / Ketone: x  / Bili: x / Urobili: x   Blood: x / Protein: x / Nitrite: x   Leuk Esterase: x / RBC: x / WBC x   Sq Epi: x / Non Sq Epi: x / Bacteria: x              RADIOLOGY & ADDITIONAL TESTS:  
NEPHROLOGY INTERVAL HPI/OVERNIGHT EVENTS:    Examined  No distress  "I feel very good doc"  Denies HA CP no SOB    MEDICATIONS  (STANDING):  enoxaparin Injectable 40 milliGRAM(s) SubCutaneous every 24 hours  folic acid 1 milliGRAM(s) Oral daily  levETIRAcetam 500 milliGRAM(s) Oral two times a day  magnesium oxide 400 milliGRAM(s) Oral three times a day with meals  multivitamin/minerals 1 Tablet(s) Oral daily  pantoprazole    Tablet 40 milliGRAM(s) Oral before breakfast  sodium chloride 1.5%. 500 milliLiter(s) (40 mL/Hr) IV Continuous <Continuous>    MEDICATIONS  (PRN):  acetaminophen     Tablet .. 650 milliGRAM(s) Oral every 6 hours PRN Temp greater or equal to 38C (100.4F), Mild Pain (1 - 3)  aluminum hydroxide/magnesium hydroxide/simethicone Suspension 30 milliLiter(s) Oral every 4 hours PRN Dyspepsia  ondansetron Injectable 4 milliGRAM(s) IV Push every 8 hours PRN Nausea and/or Vomiting      Allergies    No Known Allergies    Intolerances        Vital Signs Last 24 Hrs  T(C): 36.9 (11 Jun 2023 05:14), Max: 36.9 (11 Jun 2023 05:14)  T(F): 98.4 (11 Jun 2023 05:14), Max: 98.4 (11 Jun 2023 05:14)  HR: 86 (11 Jun 2023 05:14) (53 - 90)  BP: 103/62 (11 Jun 2023 05:14) (103/62 - 136/66)  BP(mean): --  RR: 18 (11 Jun 2023 05:14) (17 - 18)  SpO2: 98% (11 Jun 2023 05:14) (97% - 98%)    Parameters below as of 11 Jun 2023 05:14  Patient On (Oxygen Delivery Method): room air      PHYSICAL EXAM:  GENERAL: NAD  HEAD: NCAT  EYES: EOMI  NECK: Supple  NERVOUS SYSTEM:  Alert & Oriented X3  CHEST/LUNG: Clear to percussion bilaterally  HEART: Regular rate and rhythm  ABDOMEN: Soft, Nontender, Nondistended; =BS  EXTREMITIES: no edema      LABS:                        10.6   4.32  )-----------( 175      ( 11 Jun 2023 05:37 )             33.3     06-11    138  |  100  |  32.1<H>  ----------------------------<  80  4.4   |  25.0  |  1.45<H>    Ca    8.7      11 Jun 2023 05:37  Phos  4.0     06-11  Mg     2.5     06-11          Phosphorus Level, Serum: 4.0 mg/dL (06-11 @ 05:37)  Magnesium, Serum: 2.5 mg/dL (06-11 @ 05:37)          RADIOLOGY & ADDITIONAL TESTS:  
NEPHROLOGY INTERVAL HPI/OVERNIGHT EVENTS:    Examined  No distress  Sitting in chair  "I feel very good doc"  Denies HA CP no SOB    MEDICATIONS  (STANDING):  enoxaparin Injectable 40 milliGRAM(s) SubCutaneous every 24 hours  folic acid 1 milliGRAM(s) Oral daily  levETIRAcetam 500 milliGRAM(s) Oral two times a day  multivitamin/minerals 1 Tablet(s) Oral daily  pantoprazole    Tablet 40 milliGRAM(s) Oral before breakfast  sodium chloride 1.5%. 500 milliLiter(s) (40 mL/Hr) IV Continuous <Continuous>    MEDICATIONS  (PRN):  acetaminophen     Tablet .. 650 milliGRAM(s) Oral every 6 hours PRN Temp greater or equal to 38C (100.4F), Mild Pain (1 - 3)  aluminum hydroxide/magnesium hydroxide/simethicone Suspension 30 milliLiter(s) Oral every 4 hours PRN Dyspepsia  ondansetron Injectable 4 milliGRAM(s) IV Push every 8 hours PRN Nausea and/or Vomiting      Allergies    No Known Allergies    Intolerances        Vital Signs Last 24 Hrs  T(C): 36.4 (12 Jun 2023 10:44), Max: 36.7 (11 Jun 2023 17:30)  T(F): 97.6 (12 Jun 2023 10:44), Max: 98.1 (11 Jun 2023 17:30)  HR: 83 (12 Jun 2023 10:44) (83 - 88)  BP: 120/80 (12 Jun 2023 10:44) (108/76 - 149/85)  BP(mean): --  RR: 18 (12 Jun 2023 10:44) (18 - 20)  SpO2: 95% (12 Jun 2023 10:44) (95% - 98%)    Parameters below as of 12 Jun 2023 10:44  Patient On (Oxygen Delivery Method): room air    PHYSICAL EXAM:  GENERAL: NAD  HEAD: NCAT  EYES: EOMI  NECK: Supple  NERVOUS SYSTEM:  Alert & Oriented X3  CHEST/LUNG: Clear to percussion bilaterally  HEART: Regular rate and rhythm  ABDOMEN: Soft, Nontender, Nondistended; =BS  EXTREMITIES: no edema    LABS:                        10.0   5.26  )-----------( 190      ( 12 Jun 2023 06:08 )             32.6     06-12    144  |  109<H>  |  52.2<H>  ----------------------------<  63<L>  4.5   |  23.0  |  1.67<H>    Ca    8.7      12 Jun 2023 06:08  Phos  3.6     06-12  Mg     3.3     06-12          Magnesium, Serum: 3.3 mg/dL (06-12 @ 06:08)  Phosphorus Level, Serum: 3.6 mg/dL (06-12 @ 06:08)          RADIOLOGY & ADDITIONAL TESTS:  
NEPHROLOGY INTERVAL HPI/OVERNIGHT EVENTS:    Feels completely fine   Walking in room  No complaints   pitcher of water on bedside table    ml    MEDICATIONS  (STANDING):  amLODIPine   Tablet 10 milliGRAM(s) Oral daily  desmopressin 0.3 milliGRAM(s) Oral two times a day  enoxaparin Injectable 40 milliGRAM(s) SubCutaneous every 24 hours  ferrous    sulfate Liquid 300 milliGRAM(s) Oral daily  folic acid 1 milliGRAM(s) Oral daily  levETIRAcetam 500 milliGRAM(s) Oral two times a day  multivitamin/minerals 1 Tablet(s) Oral daily  pantoprazole    Tablet 40 milliGRAM(s) Oral before breakfast    MEDICATIONS  (PRN):  acetaminophen     Tablet .. 650 milliGRAM(s) Oral every 6 hours PRN Temp greater or equal to 38C (100.4F), Mild Pain (1 - 3)  aluminum hydroxide/magnesium hydroxide/simethicone Suspension 30 milliLiter(s) Oral every 4 hours PRN Dyspepsia  ondansetron Injectable 4 milliGRAM(s) IV Push every 8 hours PRN Nausea and/or Vomiting      Allergies    No Known Allergies    Intolerances          Vital Signs Last 24 Hrs  T(C): 36.5 (09 Jul 2023 05:14), Max: 36.5 (09 Jul 2023 05:14)  T(F): 97.7 (09 Jul 2023 05:14), Max: 97.7 (09 Jul 2023 05:14)  HR: 89 (09 Jul 2023 05:14) (68 - 89)  BP: 111/74 (09 Jul 2023 05:14) (111/74 - 133/86)  BP(mean): --  RR: 18 (09 Jul 2023 05:14) (18 - 18)  SpO2: 97% (09 Jul 2023 05:14) (95% - 98%)    Parameters below as of 08 Jul 2023 11:53  Patient On (Oxygen Delivery Method): room air      Daily     Daily   I&O's Detail    08 Jul 2023 07:01  -  09 Jul 2023 07:00  --------------------------------------------------------  IN:    Oral Fluid: 1225 mL  Total IN: 1225 mL    OUT:    Voided (mL): 850 mL  Total OUT: 850 mL    Total NET: 375 mL        I&O's Summary    08 Jul 2023 07:01  -  09 Jul 2023 07:00  --------------------------------------------------------  IN: 1225 mL / OUT: 850 mL / NET: 375 mL        PHYSICAL EXAM:      Heent - No edema   NECK: Supple  CHEST/LUNG: Clear / EAE   HEART: Regular rate and rhythm  ABDOMEN: Soft, Nontender, Nondistended;  EXTREMITIES: no edema  LABS:    07-09    149<H>  |  115<H>  |  40.6<H>  ----------------------------<  69<L>  4.4   |  22.0  |  1.28    Ca    8.8      09 Jul 2023 06:16        Urinalysis Basic - ( 09 Jul 2023 06:16 )    Color: x / Appearance: x / SG: x / pH: x  Gluc: 69 mg/dL / Ketone: x  / Bili: x / Urobili: x   Blood: x / Protein: x / Nitrite: x   Leuk Esterase: x / RBC: x / WBC x   Sq Epi: x / Non Sq Epi: x / Bacteria: x              RADIOLOGY & ADDITIONAL TESTS:  
NEPHROLOGY INTERVAL HPI/OVERNIGHT EVENTS:    Feels great   No complaints   Wants to go home   BP stable     MEDICATIONS  (STANDING):  amLODIPine   Tablet 10 milliGRAM(s) Oral daily  desmopressin 0.3 milliGRAM(s) Oral two times a day  enoxaparin Injectable 40 milliGRAM(s) SubCutaneous every 24 hours  ferrous    sulfate Liquid 300 milliGRAM(s) Oral daily  folic acid 1 milliGRAM(s) Oral daily  levETIRAcetam 500 milliGRAM(s) Oral two times a day  multivitamin/minerals 1 Tablet(s) Oral daily  pantoprazole    Tablet 40 milliGRAM(s) Oral before breakfast    MEDICATIONS  (PRN):  acetaminophen     Tablet .. 650 milliGRAM(s) Oral every 6 hours PRN Temp greater or equal to 38C (100.4F), Mild Pain (1 - 3)  aluminum hydroxide/magnesium hydroxide/simethicone Suspension 30 milliLiter(s) Oral every 4 hours PRN Dyspepsia  ondansetron Injectable 4 milliGRAM(s) IV Push every 8 hours PRN Nausea and/or Vomiting      Allergies    No Known Allergies    Intolerances    Vital Signs Last 24 Hrs  T(C): 36.4 (10 Jul 2023 11:00), Max: 36.5 (10 Jul 2023 04:58)  T(F): 97.5 (10 Jul 2023 11:00), Max: 97.7 (10 Jul 2023 04:58)  HR: 86 (10 Jul 2023 11:00) (80 - 96)  BP: 108/70 (10 Jul 2023 11:00) (108/70 - 144/73)  BP(mean): --  RR: 18 (10 Jul 2023 11:00) (18 - 18)  SpO2: 96% (10 Jul 2023 11:00) (96% - 97%)    Parameters below as of 10 Jul 2023 11:00  Patient On (Oxygen Delivery Method): room air      Daily     Daily   I&O's Detail    09 Jul 2023 07:01  -  10 Jul 2023 07:00  --------------------------------------------------------  IN:    Oral Fluid: 240 mL  Total IN: 240 mL    OUT:    Voided (mL): 1600 mL  Total OUT: 1600 mL    Total NET: -1360 mL        I&O's Summary    09 Jul 2023 07:01  -  10 Jul 2023 07:00  --------------------------------------------------------  IN: 240 mL / OUT: 1600 mL / NET: -1360 mL        PHYSICAL EXAM:    Heent - No edema   NECK: Supple  CHEST/LUNG: Clear / EAE   HEART: Regular rate and rhythm  ABDOMEN: Soft, Nontender, Nondistended;  EXTREMITIES: no edema    LABS:    07-10    148<H>  |  117<H>  |  44.2<H>  ----------------------------<  71  4.8   |  23.0  |  1.18    Ca    8.8      10 Jul 2023 05:01  Mg     2.0     07-10        Urinalysis Basic - ( 10 Jul 2023 05:01 )    Color: x / Appearance: x / SG: x / pH: x  Gluc: 71 mg/dL / Ketone: x  / Bili: x / Urobili: x   Blood: x / Protein: x / Nitrite: x   Leuk Esterase: x / RBC: x / WBC x   Sq Epi: x / Non Sq Epi: x / Bacteria: x      Magnesium: 2.0 mg/dL (07-10 @ 05:01)          RADIOLOGY & ADDITIONAL TESTS:  
NEPHROLOGY INTERVAL HPI/OVERNIGHT EVENTS:    Feels well  No events   No complaints   Meds the same       MEDICATIONS  (STANDING):  amLODIPine   Tablet 10 milliGRAM(s) Oral daily  desmopressin 0.3 milliGRAM(s) Oral two times a day  enoxaparin Injectable 40 milliGRAM(s) SubCutaneous every 24 hours  ferrous    sulfate Liquid 300 milliGRAM(s) Oral daily  folic acid 1 milliGRAM(s) Oral daily  levETIRAcetam 500 milliGRAM(s) Oral two times a day  multivitamin/minerals 1 Tablet(s) Oral daily  pantoprazole    Tablet 40 milliGRAM(s) Oral before breakfast    MEDICATIONS  (PRN):  acetaminophen     Tablet .. 650 milliGRAM(s) Oral every 6 hours PRN Temp greater or equal to 38C (100.4F), Mild Pain (1 - 3)  aluminum hydroxide/magnesium hydroxide/simethicone Suspension 30 milliLiter(s) Oral every 4 hours PRN Dyspepsia  ondansetron Injectable 4 milliGRAM(s) IV Push every 8 hours PRN Nausea and/or Vomiting      Allergies    No Known Allergies    Intolerances            Vital Signs Last 24 Hrs  T(C): 36.4 (08 Jul 2023 04:44), Max: 36.4 (07 Jul 2023 11:47)  T(F): 97.5 (08 Jul 2023 04:44), Max: 97.5 (07 Jul 2023 11:47)  HR: 77 (08 Jul 2023 04:44) (63 - 77)  BP: 167/90 (08 Jul 2023 04:44) (118/82 - 167/90)  BP(mean): --  RR: 18 (08 Jul 2023 04:44) (18 - 18)  SpO2: 96% (08 Jul 2023 04:44) (95% - 97%)    Parameters below as of 08 Jul 2023 04:44  Patient On (Oxygen Delivery Method): room air      Daily     Daily   I&O's Detail    07 Jul 2023 07:01  -  08 Jul 2023 07:00  --------------------------------------------------------  IN:    Oral Fluid: 2100 mL  Total IN: 2100 mL    OUT:    Voided (mL): 550 mL  Total OUT: 550 mL    Total NET: 1550 mL      08 Jul 2023 07:01  -  08 Jul 2023 10:08  --------------------------------------------------------  IN:    Oral Fluid: 325 mL  Total IN: 325 mL    OUT:    Voided (mL): 215 mL  Total OUT: 215 mL    Total NET: 110 mL        I&O's Summary    07 Jul 2023 07:01  -  08 Jul 2023 07:00  --------------------------------------------------------  IN: 2100 mL / OUT: 550 mL / NET: 1550 mL    08 Jul 2023 07:01  -  08 Jul 2023 10:08  --------------------------------------------------------  IN: 325 mL / OUT: 215 mL / NET: 110 mL        PHYSICAL EXAM:    Heent - No edema   NECK: Supple  CHEST/LUNG: Clear / EAE   HEART: Regular rate and rhythm  ABDOMEN: Soft, Nontender, Nondistended;  EXTREMITIES: no edema  LABS:    07-08    146<H>  |  114<H>  |  37.0<H>  ----------------------------<  62<L>  5.0   |  21.0<L>  |  1.12    Ca    9.4      08 Jul 2023 05:45        Urinalysis Basic - ( 08 Jul 2023 05:45 )    Color: x / Appearance: x / SG: x / pH: x  Gluc: 62 mg/dL / Ketone: x  / Bili: x / Urobili: x   Blood: x / Protein: x / Nitrite: x   Leuk Esterase: x / RBC: x / WBC x   Sq Epi: x / Non Sq Epi: x / Bacteria: x              RADIOLOGY & ADDITIONAL TESTS:  
NEPHROLOGY INTERVAL HPI/OVERNIGHT EVENTS:    Feels well  Wants to go home   No new events   UO 4.6 L     MEDICATIONS  (STANDING):  desmopressin 0.1 milliGRAM(s) Oral two times a day  enoxaparin Injectable 40 milliGRAM(s) SubCutaneous every 24 hours  folic acid 1 milliGRAM(s) Oral daily  levETIRAcetam 500 milliGRAM(s) Oral two times a day  multivitamin/minerals 1 Tablet(s) Oral daily  pantoprazole    Tablet 40 milliGRAM(s) Oral before breakfast    MEDICATIONS  (PRN):  acetaminophen     Tablet .. 650 milliGRAM(s) Oral every 6 hours PRN Temp greater or equal to 38C (100.4F), Mild Pain (1 - 3)  aluminum hydroxide/magnesium hydroxide/simethicone Suspension 30 milliLiter(s) Oral every 4 hours PRN Dyspepsia  ondansetron Injectable 4 milliGRAM(s) IV Push every 8 hours PRN Nausea and/or Vomiting      Allergies    No Known Allergies    Intolerances        Vital Signs Last 24 Hrs  T(C): 36.4 (21 Jun 2023 04:51), Max: 36.6 (20 Jun 2023 17:54)  T(F): 97.5 (21 Jun 2023 04:51), Max: 97.8 (20 Jun 2023 17:54)  HR: 70 (21 Jun 2023 04:51) (64 - 79)  BP: 112/79 (21 Jun 2023 04:51) (106/56 - 139/98)  BP(mean): --  RR: 18 (21 Jun 2023 04:51) (18 - 18)  SpO2: 96% (21 Jun 2023 04:51) (96% - 98%)    Parameters below as of 21 Jun 2023 04:51  Patient On (Oxygen Delivery Method): room air      Daily     Daily   I&O's Detail    20 Jun 2023 07:01  -  21 Jun 2023 07:00  --------------------------------------------------------  IN:    Oral Fluid: 4600 mL  Total IN: 4600 mL    OUT:  Total OUT: 0 mL    Total NET: 4600 mL      21 Jun 2023 07:01  -  21 Jun 2023 11:23  --------------------------------------------------------  IN:    Oral Fluid: 238 mL  Total IN: 238 mL    OUT:    Voided (mL): 200 mL  Total OUT: 200 mL    Total NET: 38 mL        I&O's Summary    20 Jun 2023 07:01  -  21 Jun 2023 07:00  --------------------------------------------------------  IN: 4600 mL / OUT: 0 mL / NET: 4600 mL    21 Jun 2023 07:01  -  21 Jun 2023 11:23  --------------------------------------------------------  IN: 238 mL / OUT: 200 mL / NET: 38 mL        PHYSICAL EXAM:  GENERAL: NAD  HEAD: NCAT  EYES: EOMI  NECK: Supple  NERVOUS SYSTEM:  Alert & Oriented X3  CHEST/LUNG: Clear to percussion bilaterally  HEART: Regular rate and rhythm  ABDOMEN: Soft, Nontender, Nondistended; =BS  EXTREMITIES: no edema    LABS:                        9.1    4.11  )-----------( 191      ( 21 Jun 2023 05:54 )             31.1     06-21    147<H>  |  112<H>  |  39.6<H>  ----------------------------<  75  4.5   |  23.0  |  1.17    Ca    8.9      21 Jun 2023 05:54  Phos  4.2     06-20  Mg     2.2     06-21    TPro  6.7  /  Alb  3.4  /  TBili  0.3<L>  /  DBili  0.1  /  AST  37<H>  /  ALT  56<H>  /  AlkPhos  157<H>  06-21      Urinalysis Basic - ( 21 Jun 2023 05:54 )    Color: x / Appearance: x / SG: x / pH: x  Gluc: 75 mg/dL / Ketone: x  / Bili: x / Urobili: x   Blood: x / Protein: x / Nitrite: x   Leuk Esterase: x / RBC: x / WBC x   Sq Epi: x / Non Sq Epi: x / Bacteria: x      Magnesium: 2.2 mg/dL (06-21 @ 05:54)          RADIOLOGY & ADDITIONAL TESTS:  
NEPHROLOGY INTERVAL HPI/OVERNIGHT EVENTS:    Interim noted   feels well  Keeps forgetting to drink water   No complaints   Meds with out change     MEDICATIONS  (STANDING):  amLODIPine   Tablet 10 milliGRAM(s) Oral daily  desmopressin 0.3 milliGRAM(s) Oral two times a day  enoxaparin Injectable 40 milliGRAM(s) SubCutaneous every 24 hours  ferrous    sulfate Liquid 300 milliGRAM(s) Oral daily  folic acid 1 milliGRAM(s) Oral daily  levETIRAcetam 500 milliGRAM(s) Oral two times a day  multivitamin/minerals 1 Tablet(s) Oral daily  pantoprazole    Tablet 40 milliGRAM(s) Oral before breakfast    MEDICATIONS  (PRN):  acetaminophen     Tablet .. 650 milliGRAM(s) Oral every 6 hours PRN Temp greater or equal to 38C (100.4F), Mild Pain (1 - 3)  aluminum hydroxide/magnesium hydroxide/simethicone Suspension 30 milliLiter(s) Oral every 4 hours PRN Dyspepsia  ondansetron Injectable 4 milliGRAM(s) IV Push every 8 hours PRN Nausea and/or Vomiting      Allergies    No Known Allergies    Intolerances          Vital Signs Last 24 Hrs  T(C): 36.4 (11 Jul 2023 05:02), Max: 37 (10 Jul 2023 16:57)  T(F): 97.6 (11 Jul 2023 05:02), Max: 98.6 (10 Jul 2023 16:57)  HR: 77 (11 Jul 2023 05:02) (75 - 77)  BP: 121/81 (11 Jul 2023 05:02) (121/81 - 127/77)  BP(mean): --  RR: 18 (11 Jul 2023 05:02) (18 - 18)  SpO2: 96% (11 Jul 2023 05:02) (96% - 98%)      Daily     Daily   I&O's Detail    10 Jul 2023 07:01  -  11 Jul 2023 07:00  --------------------------------------------------------  IN:    Oral Fluid: 360 mL  Total IN: 360 mL    OUT:    Voided (mL): 600 mL  Total OUT: 600 mL    Total NET: -240 mL        I&O's Summary    10 Jul 2023 07:01  -  11 Jul 2023 07:00  --------------------------------------------------------  IN: 360 mL / OUT: 600 mL / NET: -240 mL        PHYSICAL EXAM:    Heent - No edema   NECK: Supple  CHEST/LUNG: Clear / EAE   HEART: Regular rate and rhythm  ABDOMEN: Soft, Nontender, Nondistended;  EXTREMITIES: no edema  LABS:    07-11    154<H>  |  121<H>  |  48.5<H>  ----------------------------<  72  4.7   |  22.0  |  1.31<H>    Ca    9.1      11 Jul 2023 05:22  Mg     2.0     07-10        Urinalysis Basic - ( 11 Jul 2023 05:22 )    Color: x / Appearance: x / SG: x / pH: x  Gluc: 72 mg/dL / Ketone: x  / Bili: x / Urobili: x   Blood: x / Protein: x / Nitrite: x   Leuk Esterase: x / RBC: x / WBC x   Sq Epi: x / Non Sq Epi: x / Bacteria: x              RADIOLOGY & ADDITIONAL TESTS:  
NEPHROLOGY INTERVAL HPI/OVERNIGHT EVENTS:    No events   Feels well    MEDICATIONS  (STANDING):  amLODIPine   Tablet 10 milliGRAM(s) Oral daily  desmopressin 0.1 milliGRAM(s) Oral two times a day  enoxaparin Injectable 40 milliGRAM(s) SubCutaneous every 24 hours  ferrous    sulfate Liquid 300 milliGRAM(s) Oral daily  folic acid 1 milliGRAM(s) Oral daily  levETIRAcetam 500 milliGRAM(s) Oral two times a day  multivitamin/minerals 1 Tablet(s) Oral daily  pantoprazole    Tablet 40 milliGRAM(s) Oral before breakfast  sodium bicarbonate 650 milliGRAM(s) Oral two times a day    MEDICATIONS  (PRN):  acetaminophen     Tablet .. 650 milliGRAM(s) Oral every 6 hours PRN Temp greater or equal to 38C (100.4F), Mild Pain (1 - 3)  aluminum hydroxide/magnesium hydroxide/simethicone Suspension 30 milliLiter(s) Oral every 4 hours PRN Dyspepsia  ondansetron Injectable 4 milliGRAM(s) IV Push every 8 hours PRN Nausea and/or Vomiting      Allergies    No Known Allergies    Intolerances        Vital Signs Last 24 Hrs  T(C): 36.5 (04 Jul 2023 04:22), Max: 36.5 (04 Jul 2023 04:22)  T(F): 97.7 (04 Jul 2023 04:22), Max: 97.7 (04 Jul 2023 04:22)  HR: 87 (04 Jul 2023 04:22) (68 - 87)  BP: 144/84 (04 Jul 2023 04:22) (132/89 - 144/84)  BP(mean): --  RR: 18 (04 Jul 2023 04:22) (18 - 18)  SpO2: 96% (04 Jul 2023 04:22) (96% - 97%)    Parameters below as of 04 Jul 2023 04:22  Patient On (Oxygen Delivery Method): room air      Daily     Daily   I&O's Detail    I&O's Summary      PHYSICAL EXAM:    GENERAL: NAD  HEAD: NCAT  EYES: EOMI  NECK: Supple  CHEST/LUNG: Clear to percussion bilaterally  HEART: Regular rate and rhythm  ABDOMEN: Soft, Nontender, Nondistended; =BS  EXTREMITIES: no edema  LABS:    07-03    147<H>  |  112<H>  |  29.6<H>  ----------------------------<  63<L>  4.5   |  24.0  |  1.02    Ca    8.8      03 Jul 2023 05:30        Urinalysis Basic - ( 03 Jul 2023 05:30 )    Color: x / Appearance: x / SG: x / pH: x  Gluc: 63 mg/dL / Ketone: x  / Bili: x / Urobili: x   Blood: x / Protein: x / Nitrite: x   Leuk Esterase: x / RBC: x / WBC x   Sq Epi: x / Non Sq Epi: x / Bacteria: x              RADIOLOGY & ADDITIONAL TESTS:  
NEPHROLOGY INTERVAL HPI/OVERNIGHT EVENTS:    No new events.    MEDICATIONS  (STANDING):  desmopressin 0.1 milliGRAM(s) Oral two times a day  enoxaparin Injectable 40 milliGRAM(s) SubCutaneous every 24 hours  folic acid 1 milliGRAM(s) Oral daily  levETIRAcetam 500 milliGRAM(s) Oral two times a day  multivitamin/minerals 1 Tablet(s) Oral daily  pantoprazole    Tablet 40 milliGRAM(s) Oral before breakfast    MEDICATIONS  (PRN):  acetaminophen     Tablet .. 650 milliGRAM(s) Oral every 6 hours PRN Temp greater or equal to 38C (100.4F), Mild Pain (1 - 3)  aluminum hydroxide/magnesium hydroxide/simethicone Suspension 30 milliLiter(s) Oral every 4 hours PRN Dyspepsia  ondansetron Injectable 4 milliGRAM(s) IV Push every 8 hours PRN Nausea and/or Vomiting      Allergies    No Known Allergies        Vital Signs Last 24 Hrs  T(C): 36.4 (23 Jun 2023 11:10), Max: 36.6 (22 Jun 2023 17:49)  T(F): 97.6 (23 Jun 2023 11:10), Max: 97.8 (22 Jun 2023 17:49)  HR: 100 (23 Jun 2023 11:10) (75 - 100)  BP: 145/91 (23 Jun 2023 11:10) (127/83 - 145/91)  BP(mean): --  RR: 20 (23 Jun 2023 11:10) (19 - 20)  SpO2: 97% (23 Jun 2023 11:10) (92% - 97%)      T(C): 36.4 (22 Jun 2023 10:49), Max: 36.6 (22 Jun 2023 04:49)  T(F): 97.5 (22 Jun 2023 10:49), Max: 97.8 (22 Jun 2023 04:49)  HR: 63 (22 Jun 2023 10:49) (63 - 80)  BP: 127/84 (22 Jun 2023 10:49) (114/69 - 130/86)  BP(mean): --  RR: 20 (22 Jun 2023 10:49) (18 - 20)  SpO2: 97% (22 Jun 2023 10:49) (96% - 97%)    Parameters below as of 21 Jun 2023 17:33  Patient On (Oxygen Delivery Method): room air          PHYSICAL EXAM:    GENERAL: Comfortable  in bed  HEAD: NCAT  EYES: Open  NECK: Supple  NERVOUS SYSTEM:  Alert , verbal  CHEST/LUNG: Clear to percussion bilaterally  HEART: Regular rate and rhythm  ABDOMEN: Soft, Nontender, Nondistended; =BS  EXTREMITIES: no edema    LABS:    06-23    147<H>  |  112<H>  |  41.2<H>  ----------------------------<  67<L>  4.8   |  21.0<L>  |  1.16    Ca    9.0      23 Jun 2023 05:45  Phos  3.9     06-23  Mg     2.3     06-23                            9.1    4.11  )-----------( 191      ( 21 Jun 2023 05:54 )             31.1     06-22    147<H>  |  111<H>  |  37.6<H>  ----------------------------<  72  4.9   |  24.0  |  1.18    Ca    8.9      22 Jun 2023 05:44  Mg     2.2     06-21    TPro  6.7  /  Alb  3.4  /  TBili  0.3<L>  /  DBili  0.1  /  AST  37<H>  /  ALT  56<H>  /  AlkPhos  157<H>  06-21      Urinalysis Basic - ( 22 Jun 2023 05:44 )    Color: x / Appearance: x / SG: x / pH: x  Gluc: 72 mg/dL / Ketone: x  / Bili: x / Urobili: x   Blood: x / Protein: x / Nitrite: x   Leuk Esterase: x / RBC: x / WBC x   Sq Epi: x / Non Sq Epi: x / Bacteria: x              RADIOLOGY & ADDITIONAL TESTS:  
NEPHROLOGY INTERVAL HPI/OVERNIGHT EVENTS:    feels well  No events   She states that she is trying to focus on maintaining adequate hydration     MEDICATIONS  (STANDING):  desmopressin 0.1 milliGRAM(s) Oral two times a day  enoxaparin Injectable 40 milliGRAM(s) SubCutaneous every 24 hours  folic acid 1 milliGRAM(s) Oral daily  levETIRAcetam 500 milliGRAM(s) Oral two times a day  multivitamin/minerals 1 Tablet(s) Oral daily  pantoprazole    Tablet 40 milliGRAM(s) Oral before breakfast    MEDICATIONS  (PRN):  acetaminophen     Tablet .. 650 milliGRAM(s) Oral every 6 hours PRN Temp greater or equal to 38C (100.4F), Mild Pain (1 - 3)  aluminum hydroxide/magnesium hydroxide/simethicone Suspension 30 milliLiter(s) Oral every 4 hours PRN Dyspepsia  ondansetron Injectable 4 milliGRAM(s) IV Push every 8 hours PRN Nausea and/or Vomiting      Allergies    No Known Allergies    Intolerances            Vital Signs Last 24 Hrs  T(C): 36.4 (22 Jun 2023 10:49), Max: 36.6 (22 Jun 2023 04:49)  T(F): 97.5 (22 Jun 2023 10:49), Max: 97.8 (22 Jun 2023 04:49)  HR: 63 (22 Jun 2023 10:49) (63 - 80)  BP: 127/84 (22 Jun 2023 10:49) (114/69 - 130/86)  BP(mean): --  RR: 20 (22 Jun 2023 10:49) (18 - 20)  SpO2: 97% (22 Jun 2023 10:49) (96% - 97%)    Parameters below as of 21 Jun 2023 17:33  Patient On (Oxygen Delivery Method): room air      Daily     Daily   I&O's Detail    21 Jun 2023 07:01  -  22 Jun 2023 07:00  --------------------------------------------------------  IN:    Oral Fluid: 1199 mL  Total IN: 1199 mL    OUT:    Voided (mL): 650 mL  Total OUT: 650 mL    Total NET: 549 mL      22 Jun 2023 07:01  -  22 Jun 2023 13:32  --------------------------------------------------------  IN:    Oral Fluid: 443 mL  Total IN: 443 mL    OUT:    Voided (mL): 200 mL  Total OUT: 200 mL    Total NET: 243 mL        I&O's Summary    21 Jun 2023 07:01  -  22 Jun 2023 07:00  --------------------------------------------------------  IN: 1199 mL / OUT: 650 mL / NET: 549 mL    22 Jun 2023 07:01  -  22 Jun 2023 13:32  --------------------------------------------------------  IN: 443 mL / OUT: 200 mL / NET: 243 mL        PHYSICAL EXAM:    GENERAL: NAD  HEAD: NCAT  EYES: EOMI  NECK: Supple  NERVOUS SYSTEM:  Alert & Oriented X3  CHEST/LUNG: Clear to percussion bilaterally  HEART: Regular rate and rhythm  ABDOMEN: Soft, Nontender, Nondistended; =BS  EXTREMITIES: no edema    LABS:                        9.1    4.11  )-----------( 191      ( 21 Jun 2023 05:54 )             31.1     06-22    147<H>  |  111<H>  |  37.6<H>  ----------------------------<  72  4.9   |  24.0  |  1.18    Ca    8.9      22 Jun 2023 05:44  Mg     2.2     06-21    TPro  6.7  /  Alb  3.4  /  TBili  0.3<L>  /  DBili  0.1  /  AST  37<H>  /  ALT  56<H>  /  AlkPhos  157<H>  06-21      Urinalysis Basic - ( 22 Jun 2023 05:44 )    Color: x / Appearance: x / SG: x / pH: x  Gluc: 72 mg/dL / Ketone: x  / Bili: x / Urobili: x   Blood: x / Protein: x / Nitrite: x   Leuk Esterase: x / RBC: x / WBC x   Sq Epi: x / Non Sq Epi: x / Bacteria: x              RADIOLOGY & ADDITIONAL TESTS:  
NEPHROLOGY INTERVAL HPI/OVERNIGHT EVENTS:  no acute issues  remains stable    MEDICATIONS  (STANDING):  desmopressin 0.1 milliGRAM(s) Oral two times a day  enoxaparin Injectable 40 milliGRAM(s) SubCutaneous every 24 hours  ferrous    sulfate Liquid 300 milliGRAM(s) Oral daily  folic acid 1 milliGRAM(s) Oral daily  levETIRAcetam 500 milliGRAM(s) Oral two times a day  multivitamin/minerals 1 Tablet(s) Oral daily  pantoprazole    Tablet 40 milliGRAM(s) Oral before breakfast  sodium bicarbonate 1300 milliGRAM(s) Oral two times a day    MEDICATIONS  (PRN):  acetaminophen     Tablet .. 650 milliGRAM(s) Oral every 6 hours PRN Temp greater or equal to 38C (100.4F), Mild Pain (1 - 3)  aluminum hydroxide/magnesium hydroxide/simethicone Suspension 30 milliLiter(s) Oral every 4 hours PRN Dyspepsia  ondansetron Injectable 4 milliGRAM(s) IV Push every 8 hours PRN Nausea and/or Vomiting      Allergies    No Known Allergies          Vital Signs Last 24 Hrs  T(C): 36.6 (29 Jun 2023 04:51), Max: 36.8 (28 Jun 2023 21:19)  T(F): 97.8 (29 Jun 2023 04:51), Max: 98.3 (28 Jun 2023 21:19)  HR: 84 (29 Jun 2023 04:51) (77 - 84)  BP: 132/88 (29 Jun 2023 04:51) (132/88 - 141/89)  BP(mean): --  RR: 18 (29 Jun 2023 04:51) (18 - 20)  SpO2: 96% (29 Jun 2023 04:51) (96% - 99%)    Parameters below as of 28 Jun 2023 21:19  Patient On (Oxygen Delivery Method): room air        PHYSICAL EXAM:  GENERAL: Comfortable  HEENT: Moist mucous membranes  NECK: Supple; no JVD  NERVOUS SYSTEM:  Alert & Oriented X3  CHEST/LUNG: Clear  bilaterally  HEART: Regular rate and rhythm  ABDOMEN: Soft, Nontender, +BS  EXTREMITIES: no edema    LABS:                        9.4    x     )-----------( x        ( 28 Jun 2023 06:32 )             31.6     06-29    151<H>  |  117<H>  |  38.8<H>  ----------------------------<  75  4.9   |  23.0  |  1.27    Ca    8.9      29 Jun 2023 06:15        Urinalysis Basic - ( 29 Jun 2023 06:15 )    Color: x / Appearance: x / SG: x / pH: x  Gluc: 75 mg/dL / Ketone: x  / Bili: x / Urobili: x   Blood: x / Protein: x / Nitrite: x   Leuk Esterase: x / RBC: x / WBC x   Sq Epi: x / Non Sq Epi: x / Bacteria: x          RADIOLOGY & ADDITIONAL TESTS:  
NEPHROLOGY INTERVAL HPI/OVERNIGHT EVENTS:  pt doing well  no acute distress noted    MEDICATIONS  (STANDING):  desmopressin 0.1 milliGRAM(s) Oral two times a day  enoxaparin Injectable 40 milliGRAM(s) SubCutaneous every 24 hours  ferrous    sulfate Liquid 300 milliGRAM(s) Oral daily  folic acid 1 milliGRAM(s) Oral daily  levETIRAcetam 500 milliGRAM(s) Oral two times a day  multivitamin/minerals 1 Tablet(s) Oral daily  pantoprazole    Tablet 40 milliGRAM(s) Oral before breakfast  sodium bicarbonate 1300 milliGRAM(s) Oral two times a day    MEDICATIONS  (PRN):  acetaminophen     Tablet .. 650 milliGRAM(s) Oral every 6 hours PRN Temp greater or equal to 38C (100.4F), Mild Pain (1 - 3)  aluminum hydroxide/magnesium hydroxide/simethicone Suspension 30 milliLiter(s) Oral every 4 hours PRN Dyspepsia  ondansetron Injectable 4 milliGRAM(s) IV Push every 8 hours PRN Nausea and/or Vomiting      Allergies    No Known Allergies        Vital Signs Last 24 Hrs  T(C): 37.1 (28 Jun 2023 04:41), Max: 37.1 (28 Jun 2023 04:41)  T(F): 98.8 (28 Jun 2023 04:41), Max: 98.8 (28 Jun 2023 04:41)  HR: 95 (28 Jun 2023 04:41) (67 - 95)  BP: 132/88 (28 Jun 2023 04:41) (132/88 - 142/85)  BP(mean): --  RR: 18 (28 Jun 2023 04:41) (18 - 18)  SpO2: 97% (28 Jun 2023 04:41) (96% - 97%)    Parameters below as of 27 Jun 2023 12:21  Patient On (Oxygen Delivery Method): room air        PHYSICAL EXAM:  GENERAL: NAD  HEENT: Moist MMs  NECK: Supple  NERVOUS SYSTEM:  Alert & Oriented X3  CHEST/LUNG: Clear  bilaterally  HEART: Regular rate and rhythm  ABDOMEN: Soft, Nontender, +BS  EXTREMITIES: no edema    LABS:                        9.4    x     )-----------( x        ( 28 Jun 2023 06:32 )             31.6     06-28    148<H>  |  115<H>  |  43.1<H>  ----------------------------<  72  5.2   |  22.0  |  1.24    Ca    8.9      28 Jun 2023 06:32        Urinalysis Basic - ( 28 Jun 2023 06:32 )    Color: x / Appearance: x / SG: x / pH: x  Gluc: 72 mg/dL / Ketone: x  / Bili: x / Urobili: x   Blood: x / Protein: x / Nitrite: x   Leuk Esterase: x / RBC: x / WBC x   Sq Epi: x / Non Sq Epi: x / Bacteria: x          RADIOLOGY & ADDITIONAL TESTS:  
Patient seen and examined    Feels much better; Ob/Ch  no c/o CP SOB NV   No swelling feet    Vital Signs Last 24 Hrs  T(C): 36.6 (17 Jun 2023 21:26), Max: 37 (17 Jun 2023 04:53)  T(F): 97.9 (17 Jun 2023 21:26), Max: 98.6 (17 Jun 2023 04:53)  HR: 95 (17 Jun 2023 21:26) (82 - 95)  BP: 123/81 (17 Jun 2023 21:26) (115/80 - 128/83)  BP(mean): --  RR: 18 (17 Jun 2023 21:26) (18 - 18)  SpO2: 93% (17 Jun 2023 21:26) (93% - 98%)    Parameters below as of 17 Jun 2023 21:26  Patient On (Oxygen Delivery Method): room air        PHYSICAL EXAM    GENERAL: NAD,   EYES:  conjunctiva and sclera clear  NECK: Supple, No JVD/Bruit  NERVOUS SYSTEM:  A/O x3,   CHEST:  No rales, No rhonchi  HEART:  RRR, No murmur  ABDOMEN: Soft, NT/ND BS+  EXTREMITIES:  No Edema;  SKIN: No rashes    17 Jun 2023 06:16    149    |  114    |  39.8   ----------------------------<  83     4.7     |  23.0   |  1.28     Ca    8.8        17 Jun 2023 06:16  Phos  4.1       17 Jun 2023 06:16  Mg     2.4       17 Jun 2023 06:16    TPro  6.8    /  Alb  3.6    /  TBili  0.2    /  DBili  x      /  AST  40     /  ALT  73     /  AlkPhos  170    17 Jun 2023 06:16                          9.1    4.04  )-----------( 172      ( 17 Jun 2023 06:16 )             31.4         Continue present treatment    
[facility] Division of Hospital Medicine    SUBJECTIVE / OVERNIGHT EVENTS:  No events    Patient denies chest pain, SOB, abd pain, N/V, fever, chills, dysuria or any other complaints. All remainder ROS negative.     MEDICATIONS  (STANDING):  enoxaparin Injectable 40 milliGRAM(s) SubCutaneous every 24 hours  folic acid 1 milliGRAM(s) Oral daily  levETIRAcetam 500 milliGRAM(s) Oral two times a day  magnesium oxide 400 milliGRAM(s) Oral three times a day with meals  multivitamin/minerals 1 Tablet(s) Oral daily  pantoprazole    Tablet 40 milliGRAM(s) Oral before breakfast  sodium chloride 1.5%. 500 milliLiter(s) (40 mL/Hr) IV Continuous <Continuous>    MEDICATIONS  (PRN):  acetaminophen     Tablet .. 650 milliGRAM(s) Oral every 6 hours PRN Temp greater or equal to 38C (100.4F), Mild Pain (1 - 3)  aluminum hydroxide/magnesium hydroxide/simethicone Suspension 30 milliLiter(s) Oral every 4 hours PRN Dyspepsia  ondansetron Injectable 4 milliGRAM(s) IV Push every 8 hours PRN Nausea and/or Vomiting        I&O's Summary      PHYSICAL EXAM:  Vital Signs Last 24 Hrs  T(C): 36.7 (11 Jun 2023 11:12), Max: 36.9 (11 Jun 2023 05:14)  T(F): 98.1 (11 Jun 2023 11:12), Max: 98.4 (11 Jun 2023 05:14)  HR: 89 (11 Jun 2023 11:12) (82 - 90)  BP: 104/73 (11 Jun 2023 11:12) (103/62 - 115/73)  BP(mean): --  RR: 18 (11 Jun 2023 11:12) (17 - 18)  SpO2: 98% (11 Jun 2023 11:12) (97% - 98%)    Parameters below as of 11 Jun 2023 11:12  Patient On (Oxygen Delivery Method): room air            CONSTITUTIONAL: NAD, appears stated age  ENMT: Moist oral mucosa, no pharyngeal injection or exudates; normal dentition  RESPIRATORY: Normal respiratory effort; clear to auscultation bilaterally  CARDIOVASCULAR: Regular rate and rhythm, normal S1 and S2, no murmur/rub/gallop; Peripheral pulses are 2+ bilaterally  ABDOMEN: Nontender to palpation, normoactive bowel sounds, no rebound/guarding;   MUSCLOSKELETAL:  No clubbing or cyanosis of digits; no joint swelling or tenderness to palpation  PSYCH: A+O to person, place, and time; affect appropriate  NEUROLOGY: CN 2-12 are intact and symmetric; no gross sensory deficits;   SKIN: No rashes; no palpable lesions    LABS:                        10.6   4.32  )-----------( 175      ( 11 Jun 2023 05:37 )             33.3     06-11    138  |  100  |  32.1<H>  ----------------------------<  80  4.4   |  25.0  |  1.45<H>    Ca    8.7      11 Jun 2023 05:37  Phos  4.0     06-11  Mg     2.5     06-11                Culture - Blood (collected 09 Jun 2023 10:10)  Source: .Blood Blood-Peripheral  Preliminary Report (10 [place] 2023 17:01):    No growth to date.    Culture - Blood (collected 09 Jun 2023 10:05)  Source: .Blood Blood-Peripheral  Preliminary Report (10 [place] 2023 17:01):    No growth to date.    Culture - Urine (collected 09 Jun 2023 05:50)  Source: Clean Catch Clean Catch (Midstream)  Final Report (10 [place] 2023 12:17):    <10,000 CFU/mL Normal Urogenital Johanna      CAPILLARY BLOOD GLUCOSE            RADIOLOGY & ADDITIONAL TESTS:  Results Reviewed:   Imaging Personally Reviewed:  Electrocardiogram Personally Reviewed:

## 2023-07-14 NOTE — PROGRESS NOTE ADULT - ASSESSMENT
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

## 2023-07-14 NOTE — PROGRESS NOTE ADULT - REASON FOR ADMISSION
altered mental state

## 2023-08-03 NOTE — DISCHARGE NOTE PROVIDER - DISCHARGING ATTENDING PHYSICIAN:
KIMBERLY Zyclara Counseling:  I discussed with the patient the risks of imiquimod including but not limited to erythema, scaling, itching, weeping, crusting, and pain.  Patient understands that the inflammatory response to imiquimod is variable from person to person and was educated regarded proper titration schedule.  If flu-like symptoms develop, patient knows to discontinue the medication and contact us.

## 2023-08-04 ENCOUNTER — EMERGENCY (EMERGENCY)
Facility: HOSPITAL | Age: 58
LOS: 1 days | Discharge: HOME | End: 2023-08-04
Attending: EMERGENCY MEDICINE
Payer: MEDICAID

## 2023-08-04 VITALS
HEIGHT: 60 IN | RESPIRATION RATE: 18 BRPM | SYSTOLIC BLOOD PRESSURE: 141 MMHG | DIASTOLIC BLOOD PRESSURE: 90 MMHG | HEART RATE: 70 BPM | TEMPERATURE: 98 F | OXYGEN SATURATION: 98 % | WEIGHT: 201.72 LBS

## 2023-08-04 LAB
ALBUMIN SERPL ELPH-MCNC: 4 G/DL — SIGNIFICANT CHANGE UP (ref 3.3–5.2)
ALBUMIN SERPL ELPH-MCNC: 4.1 G/DL — SIGNIFICANT CHANGE UP (ref 3.3–5.2)
ALP SERPL-CCNC: 114 U/L — SIGNIFICANT CHANGE UP (ref 40–120)
ALP SERPL-CCNC: 115 U/L — SIGNIFICANT CHANGE UP (ref 40–120)
ALT FLD-CCNC: 48 U/L — HIGH
ALT FLD-CCNC: 49 U/L — HIGH
ANION GAP SERPL CALC-SCNC: 13 MMOL/L — SIGNIFICANT CHANGE UP (ref 5–17)
ANION GAP SERPL CALC-SCNC: 14 MMOL/L — SIGNIFICANT CHANGE UP (ref 5–17)
APPEARANCE UR: CLEAR — SIGNIFICANT CHANGE UP
AST SERPL-CCNC: 33 U/L — HIGH
AST SERPL-CCNC: 34 U/L — HIGH
BACTERIA # UR AUTO: ABNORMAL
BASOPHILS # BLD AUTO: 0.05 K/UL — SIGNIFICANT CHANGE UP (ref 0–0.2)
BASOPHILS NFR BLD AUTO: 0.9 % — SIGNIFICANT CHANGE UP (ref 0–2)
BILIRUB SERPL-MCNC: 0.4 MG/DL — SIGNIFICANT CHANGE UP (ref 0.4–2)
BILIRUB SERPL-MCNC: 0.4 MG/DL — SIGNIFICANT CHANGE UP (ref 0.4–2)
BILIRUB UR-MCNC: NEGATIVE — SIGNIFICANT CHANGE UP
BUN SERPL-MCNC: 30.4 MG/DL — HIGH (ref 8–20)
BUN SERPL-MCNC: 32.4 MG/DL — HIGH (ref 8–20)
CALCIUM SERPL-MCNC: 8.9 MG/DL — SIGNIFICANT CHANGE UP (ref 8.4–10.5)
CALCIUM SERPL-MCNC: 9.1 MG/DL — SIGNIFICANT CHANGE UP (ref 8.4–10.5)
CHLORIDE SERPL-SCNC: 115 MMOL/L — HIGH (ref 96–108)
CHLORIDE SERPL-SCNC: 119 MMOL/L — HIGH (ref 96–108)
CO2 SERPL-SCNC: 18 MMOL/L — LOW (ref 22–29)
CO2 SERPL-SCNC: 20 MMOL/L — LOW (ref 22–29)
COLOR SPEC: YELLOW — SIGNIFICANT CHANGE UP
CREAT SERPL-MCNC: 1.21 MG/DL — SIGNIFICANT CHANGE UP (ref 0.5–1.3)
CREAT SERPL-MCNC: 1.29 MG/DL — SIGNIFICANT CHANGE UP (ref 0.5–1.3)
DIFF PNL FLD: NEGATIVE — SIGNIFICANT CHANGE UP
EGFR: 48 ML/MIN/1.73M2 — LOW
EGFR: 52 ML/MIN/1.73M2 — LOW
EOSINOPHIL # BLD AUTO: 0.09 K/UL — SIGNIFICANT CHANGE UP (ref 0–0.5)
EOSINOPHIL NFR BLD AUTO: 1.6 % — SIGNIFICANT CHANGE UP (ref 0–6)
EPI CELLS # UR: SIGNIFICANT CHANGE UP
GLUCOSE SERPL-MCNC: 126 MG/DL — HIGH (ref 70–99)
GLUCOSE SERPL-MCNC: 75 MG/DL — SIGNIFICANT CHANGE UP (ref 70–99)
GLUCOSE UR QL: NEGATIVE MG/DL — SIGNIFICANT CHANGE UP
HCT VFR BLD CALC: 36.7 % — SIGNIFICANT CHANGE UP (ref 34.5–45)
HGB BLD-MCNC: 10.7 G/DL — LOW (ref 11.5–15.5)
IMM GRANULOCYTES NFR BLD AUTO: 0.2 % — SIGNIFICANT CHANGE UP (ref 0–0.9)
KETONES UR-MCNC: NEGATIVE — SIGNIFICANT CHANGE UP
LEUKOCYTE ESTERASE UR-ACNC: ABNORMAL
LYMPHOCYTES # BLD AUTO: 3.09 K/UL — SIGNIFICANT CHANGE UP (ref 1–3.3)
LYMPHOCYTES # BLD AUTO: 54.1 % — HIGH (ref 13–44)
MCHC RBC-ENTMCNC: 25.6 PG — LOW (ref 27–34)
MCHC RBC-ENTMCNC: 29.2 GM/DL — LOW (ref 32–36)
MCV RBC AUTO: 87.8 FL — SIGNIFICANT CHANGE UP (ref 80–100)
MONOCYTES # BLD AUTO: 0.37 K/UL — SIGNIFICANT CHANGE UP (ref 0–0.9)
MONOCYTES NFR BLD AUTO: 6.5 % — SIGNIFICANT CHANGE UP (ref 2–14)
NEUTROPHILS # BLD AUTO: 2.1 K/UL — SIGNIFICANT CHANGE UP (ref 1.8–7.4)
NEUTROPHILS NFR BLD AUTO: 36.7 % — LOW (ref 43–77)
NITRITE UR-MCNC: NEGATIVE — SIGNIFICANT CHANGE UP
PH UR: 5 — SIGNIFICANT CHANGE UP (ref 5–8)
PLATELET # BLD AUTO: 199 K/UL — SIGNIFICANT CHANGE UP (ref 150–400)
POTASSIUM SERPL-MCNC: 3.8 MMOL/L — SIGNIFICANT CHANGE UP (ref 3.5–5.3)
POTASSIUM SERPL-MCNC: 4.1 MMOL/L — SIGNIFICANT CHANGE UP (ref 3.5–5.3)
POTASSIUM SERPL-SCNC: 3.8 MMOL/L — SIGNIFICANT CHANGE UP (ref 3.5–5.3)
POTASSIUM SERPL-SCNC: 4.1 MMOL/L — SIGNIFICANT CHANGE UP (ref 3.5–5.3)
PROT SERPL-MCNC: 7.3 G/DL — SIGNIFICANT CHANGE UP (ref 6.6–8.7)
PROT SERPL-MCNC: 7.5 G/DL — SIGNIFICANT CHANGE UP (ref 6.6–8.7)
PROT UR-MCNC: 30 MG/DL
RBC # BLD: 4.18 M/UL — SIGNIFICANT CHANGE UP (ref 3.8–5.2)
RBC # FLD: 14.1 % — SIGNIFICANT CHANGE UP (ref 10.3–14.5)
RBC CASTS # UR COMP ASSIST: SIGNIFICANT CHANGE UP /HPF (ref 0–4)
SODIUM SERPL-SCNC: 146 MMOL/L — HIGH (ref 135–145)
SODIUM SERPL-SCNC: 153 MMOL/L — HIGH (ref 135–145)
SP GR SPEC: 1.02 — SIGNIFICANT CHANGE UP (ref 1.01–1.02)
TSH SERPL-MCNC: 0.57 UIU/ML — SIGNIFICANT CHANGE UP (ref 0.27–4.2)
UROBILINOGEN FLD QL: NEGATIVE MG/DL — SIGNIFICANT CHANGE UP
WBC # BLD: 5.71 K/UL — SIGNIFICANT CHANGE UP (ref 3.8–10.5)
WBC # FLD AUTO: 5.71 K/UL — SIGNIFICANT CHANGE UP (ref 3.8–10.5)
WBC UR QL: SIGNIFICANT CHANGE UP /HPF (ref 0–5)

## 2023-08-04 PROCEDURE — 82533 TOTAL CORTISOL: CPT

## 2023-08-04 PROCEDURE — 99284 EMERGENCY DEPT VISIT MOD MDM: CPT

## 2023-08-04 PROCEDURE — 80053 COMPREHEN METABOLIC PANEL: CPT

## 2023-08-04 PROCEDURE — 93005 ELECTROCARDIOGRAM TRACING: CPT

## 2023-08-04 PROCEDURE — 85025 COMPLETE CBC W/AUTO DIFF WBC: CPT

## 2023-08-04 PROCEDURE — 99283 EMERGENCY DEPT VISIT LOW MDM: CPT

## 2023-08-04 PROCEDURE — 81001 URINALYSIS AUTO W/SCOPE: CPT

## 2023-08-04 PROCEDURE — 36415 COLL VENOUS BLD VENIPUNCTURE: CPT

## 2023-08-04 PROCEDURE — 84443 ASSAY THYROID STIM HORMONE: CPT

## 2023-08-04 PROCEDURE — 93010 ELECTROCARDIOGRAM REPORT: CPT

## 2023-08-04 NOTE — ED ADULT NURSE NOTE - OBJECTIVE STATEMENT
Patient presents to ER for medical evaluation, patient reports PMD advised her to come to ED due to abnormal lab (Elevated Sodium), patient currently on desmopressin, HX of diabetes insipidus and brain aneurism, patient denies any pain, no N/V, resp even/unlabored.

## 2023-08-04 NOTE — ED ADULT NURSE REASSESSMENT NOTE - NS ED NURSE REASSESS COMMENT FT1
Assumed care of pt at 19:15 as stated in report from CHRISTINA cross. Charting as noted. Patient A&O x4, denies pain/discomfort, denies CP/SOB. Updated on the plan of care. Call bell within reach, bed locked in lowest position. IV site flushed w/ NS. No redness, swelling or pain noted to site. No signs of acute distress noted, safety maintained. Pt remains on CM in NSR.

## 2023-08-04 NOTE — ED ADULT TRIAGE NOTE - CHIEF COMPLAINT QUOTE
pt states MD called her today stated her sodium was high 165,   A&Ox3, resp wnl, had a desmapressin dose at 1 pm today, due for 1 at 6pm

## 2023-08-04 NOTE — ED PROVIDER NOTE - CARE PROVIDER_API CALL
Richie Manuel  Nephrology  [address] [place] [address]  Phone: [phone]  Fax: [phone]  Established Patient  Follow Up Time: 1-3 Days

## 2023-08-04 NOTE — ED PROVIDER NOTE - CLINICAL SUMMARY MEDICAL DECISION MAKING FREE TEXT BOX
History of SIADH and hyponatremia.  Patient likely has recurrent hyponatremia due to inadequate  Fluids. plan to check labs and urine and call nephrology and likely admit

## 2023-08-04 NOTE — ED PROVIDER NOTE - NSFOLLOWUPINSTRUCTIONS_ED_ALL_ED_FT
continue clear fluids at home as planned  Follow-up with your nephrologist  Return promptly in case of worsening symptoms

## 2023-08-04 NOTE — ED PROVIDER NOTE - OBJECTIVE STATEMENT
57-year-old female with history of brain aneurysm status post clipping and SIADH on desmopressin at home sent in by nephrologist for hyponatremia.  Patient's daughter states that the home health aide was off for the last 2 days and apparently when daughter was not home patient did not drink much fluids.  Patient feels fine and has no complaints and has been ambulatory and went to the mall before coming to the ED.

## 2023-08-04 NOTE — ED PROVIDER NOTE - PROGRESS NOTE DETAILS
Patient was asymptomatic and after clear liquids in the ED sodium improved to 146 will discharge with nephrology follow-up for

## 2023-08-04 NOTE — ED PROVIDER NOTE - PATIENT PORTAL LINK FT
You can access the FollowMyHealth Patient Portal offered by [facility] by registering at the following website: [email or web address]. By joining [employer]’s FollowMyHealth portal, you will also be able to view your health information using other applications (apps) compatible with our system.

## 2023-08-12 LAB
CORTICOSTEROID BINDING GLOBULIN RESULT: 2.2 MG/DL — SIGNIFICANT CHANGE UP
CORTIS F/TOTAL MFR SERPL: 3.2 % — SIGNIFICANT CHANGE UP
CORTIS SERPL-MCNC: 4.2 UG/DL — SIGNIFICANT CHANGE UP
CORTISOL, FREE RESULT: 0.13 UG/DL — LOW

## 2023-10-29 NOTE — PHARMACOTHERAPY INTERVENTION NOTE - INTERVENTION IDENTIFIED THROUGH THE ASP SURVEILLANCE REPORT
no
Alert-The patient is alert, awake and responds to voice. The patient is oriented to time, place, and person. The triage nurse is able to obtain subjective information.

## 2024-03-24 NOTE — PROGRESS NOTE ADULT - PROBLEM SELECTOR PLAN 2
-bp stable   -c/w norvasc 10mg po qd   -c/w labetolol 100mg po bid Attending Attestation (For Attendings USE Only)...

## 2024-05-13 NOTE — ED ADULT TRIAGE NOTE - NS ED NURSE DIRECT TO ROOM YN
Called patient to schedule MFM appointment, based on referral issued to Maternal Fetal Medicine by OB office.    Left voicemail requesting patient to call back and schedule appointment, with office number for return call [phone].     Yes